# Patient Record
Sex: MALE | Race: BLACK OR AFRICAN AMERICAN | URBAN - METROPOLITAN AREA
[De-identification: names, ages, dates, MRNs, and addresses within clinical notes are randomized per-mention and may not be internally consistent; named-entity substitution may affect disease eponyms.]

---

## 2017-02-20 ENCOUNTER — OP HISTORICAL/CONVERTED ENCOUNTER (OUTPATIENT)
Dept: OTHER | Age: 77
End: 2017-02-20

## 2017-03-01 ENCOUNTER — OP HISTORICAL/CONVERTED ENCOUNTER (OUTPATIENT)
Dept: OTHER | Age: 77
End: 2017-03-01

## 2017-03-09 ENCOUNTER — OP HISTORICAL/CONVERTED ENCOUNTER (OUTPATIENT)
Dept: OTHER | Age: 77
End: 2017-03-09

## 2018-04-24 ENCOUNTER — OP HISTORICAL/CONVERTED ENCOUNTER (OUTPATIENT)
Dept: OTHER | Age: 78
End: 2018-04-24

## 2018-05-10 ENCOUNTER — OFFICE VISIT (OUTPATIENT)
Dept: ENDOCRINOLOGY | Age: 78
End: 2018-05-10

## 2018-05-10 VITALS
TEMPERATURE: 98 F | OXYGEN SATURATION: 100 % | BODY MASS INDEX: 32.75 KG/M2 | DIASTOLIC BLOOD PRESSURE: 58 MMHG | HEIGHT: 70 IN | HEART RATE: 60 BPM | SYSTOLIC BLOOD PRESSURE: 104 MMHG | WEIGHT: 228.8 LBS | RESPIRATION RATE: 20 BRPM

## 2018-05-10 DIAGNOSIS — E11.65 UNCONTROLLED TYPE 2 DIABETES MELLITUS WITH HYPERGLYCEMIA, WITH LONG-TERM CURRENT USE OF INSULIN (HCC): Primary | ICD-10-CM

## 2018-05-10 DIAGNOSIS — E78.2 MIXED HYPERLIPIDEMIA: ICD-10-CM

## 2018-05-10 DIAGNOSIS — E11.65 TYPE 2 DIABETES MELLITUS WITH HYPERGLYCEMIA, UNSPECIFIED WHETHER LONG TERM INSULIN USE (HCC): ICD-10-CM

## 2018-05-10 DIAGNOSIS — Z79.4 UNCONTROLLED TYPE 2 DIABETES MELLITUS WITH HYPERGLYCEMIA, WITH LONG-TERM CURRENT USE OF INSULIN (HCC): Primary | ICD-10-CM

## 2018-05-10 DIAGNOSIS — I10 ESSENTIAL HYPERTENSION: ICD-10-CM

## 2018-05-10 LAB
GLUCOSE POC: 197 MG/DL
HBA1C MFR BLD HPLC: 10.1 %

## 2018-05-10 RX ORDER — BLOOD-GLUCOSE METER
EACH MISCELLANEOUS
Refills: 0 | COMMUNITY
Start: 2018-05-02 | End: 2018-05-10 | Stop reason: SDUPTHER

## 2018-05-10 RX ORDER — METFORMIN HYDROCHLORIDE 1000 MG/1
TABLET ORAL
Refills: 0 | COMMUNITY
Start: 2018-04-18

## 2018-05-10 RX ORDER — METOPROLOL TARTRATE 50 MG/1
TABLET ORAL
Refills: 3 | COMMUNITY
Start: 2018-04-18

## 2018-05-10 RX ORDER — ASPIRIN 81 MG/1
TABLET ORAL
Refills: 3 | COMMUNITY
Start: 2018-04-18

## 2018-05-10 RX ORDER — DONEPEZIL HYDROCHLORIDE 10 MG/1
TABLET, FILM COATED ORAL
Refills: 0 | COMMUNITY
Start: 2018-04-18

## 2018-05-10 RX ORDER — INSULIN ASPART 100 [IU]/ML
INJECTION, SOLUTION INTRAVENOUS; SUBCUTANEOUS
Qty: 30 ML | Refills: 6 | Status: SHIPPED | OUTPATIENT
Start: 2018-05-10 | End: 2018-06-21 | Stop reason: SDUPTHER

## 2018-05-10 RX ORDER — FINASTERIDE 5 MG/1
TABLET, FILM COATED ORAL
Refills: 0 | COMMUNITY
Start: 2018-04-18

## 2018-05-10 RX ORDER — GABAPENTIN 100 MG/1
CAPSULE ORAL
Refills: 0 | COMMUNITY
Start: 2018-02-28 | End: 2018-06-21 | Stop reason: ALTCHOICE

## 2018-05-10 RX ORDER — INSULIN GLARGINE 100 [IU]/ML
INJECTION, SOLUTION SUBCUTANEOUS
Refills: 3 | COMMUNITY
Start: 2018-04-18 | End: 2018-05-10 | Stop reason: SDUPTHER

## 2018-05-10 RX ORDER — CYCLOBENZAPRINE HCL 10 MG
TABLET ORAL
Refills: 0 | COMMUNITY
Start: 2018-04-10 | End: 2018-06-21 | Stop reason: ALTCHOICE

## 2018-05-10 RX ORDER — LANOLIN ALCOHOL/MO/W.PET/CERES
CREAM (GRAM) TOPICAL
Refills: 3 | COMMUNITY
Start: 2018-04-18

## 2018-05-10 RX ORDER — TAMSULOSIN HYDROCHLORIDE 0.4 MG/1
CAPSULE ORAL
Refills: 0 | COMMUNITY
Start: 2018-04-18

## 2018-05-10 RX ORDER — ISOSORBIDE MONONITRATE 60 MG/1
TABLET, EXTENDED RELEASE ORAL
Refills: 3 | COMMUNITY
Start: 2018-04-18

## 2018-05-10 RX ORDER — INSULIN ASPART 100 [IU]/ML
10 INJECTION, SOLUTION INTRAVENOUS; SUBCUTANEOUS
COMMUNITY
End: 2018-05-10 | Stop reason: SDUPTHER

## 2018-05-10 RX ORDER — INSULIN GLARGINE 100 [IU]/ML
INJECTION, SOLUTION SUBCUTANEOUS
Qty: 15 ML | Refills: 6 | Status: SHIPPED | OUTPATIENT
Start: 2018-05-10 | End: 2018-06-21 | Stop reason: SDUPTHER

## 2018-05-10 RX ORDER — LOSARTAN POTASSIUM 100 MG/1
TABLET ORAL
Refills: 3 | COMMUNITY
Start: 2018-04-18

## 2018-05-10 RX ORDER — BLOOD SUGAR DIAGNOSTIC
STRIP MISCELLANEOUS
Refills: 0 | COMMUNITY
Start: 2018-05-02 | End: 2018-11-08 | Stop reason: SDUPTHER

## 2018-05-10 RX ORDER — CHOLECALCIFEROL (VITAMIN D3) 25 MCG
TABLET ORAL
Refills: 1 | COMMUNITY
Start: 2018-04-18

## 2018-05-10 RX ORDER — PREDNISONE 50 MG/1
TABLET ORAL
Refills: 0 | COMMUNITY
Start: 2018-04-20 | End: 2018-06-21 | Stop reason: ALTCHOICE

## 2018-05-10 NOTE — MR AVS SNAPSHOT
[address]  [address] [facility] [address] 
[phone] Patient: Della Angulo MRN: [id number] AHR:[birth year]01[birth year] Visit Information Date & Time Provider Department Dept. Phone Encounter #  
 5/10/2018 10:00 AM Jessie Garcia MD [facility] End[facility] [phone] [id number] Follow-up Instructions Return in about 6 weeks (around 6/21/2018). Upcoming Health Maintenance Date Due DTaP/Tdap/Td series (1 - Tdap) 5/31/1960 ZOSTER VACCINE AGE 60> 3/31/1999 GLAUCOMA SCREENING Q2Y 5/31/2004 Pneumococcal 65+ Low/Medium Risk (1 of 2 - PCV13) 5/31/2004 MEDICARE YEARLY EXAM 5/9/2018 Influenza Age 5 to Adult 8/1/2018 Allergies as of 5/10/2018  Review Complete On: 5/10/2018 By: Jessie Garcia MD  
 No Known Allergies Current Immunizations  Never Reviewed No immunizations on file. Not reviewed this visit You Were Diagnosed With   
  
 Codes Comments Type 2 diabetes mellitus with hyperglycemia, unspecified whether long term insulin use (HCC)    -  Primary ICD-10-CM: E11.65 ICD-9-CM: 250.00 Vitals BP Pulse Temp Resp Height(growth percentile) Weight(growth percentile) 104/58 (BP 1 Location: Left arm, BP Patient Position: Sitting) 60 98 °F (36.7 °C) (Oral) 20 5' 10\" (1.778 m) 228 lb 12.8 oz (103.8 kg) SpO2 BMI Smoking Status 100% 32.83 kg/m2 Former Smoker Vitals History BMI and BSA Data Body Mass Index Body Surface Area  
 32.83 kg/m 2 2.26 m 2 Your Updated Medication List  
  
   
This list is accurate as of 5/10/18 10:45 AM.  Always use your most recent med list.  
  
  
  
  
 ASPIR-LOW 81 mg tablet Generic drug:  aspirin delayed-release TAKE 1 TABLET BY MOUTH ONCE DAILY BASAGLAR KWIKPEN U-100 INSULIN 100 unit/mL (3 mL) Inpn Generic drug:  insulin glargine INJECT 70 UNITS SUBCUTANEOUSLY AT BEDTIME  
  
 cyclobenzaprine 10 mg tablet Commonly known as:  FLEXERIL  
take 1 by mouth once daily if needed  
  
 donepezil 10 mg tablet Commonly known as:  ARICEPT  
TAKE 1 TABLET BY MOUTH ONCE DAILY  
  
 finasteride 5 mg tablet Commonly known as:  PROSCAR  
TAKE 1 TABLET BY MOUTH ONCE DAILY  
  
 gabapentin 100 mg capsule Commonly known as:  NEURONTIN  
take 1 capsule by mouth 1 OR 2 TIMES A DAY FOR PAIN  
  
 isosorbide mononitrate ER 60 mg CR tablet Commonly known as:  IMDUR  
TAKE 1 TABLET BY MOUTH ONCE DAILY losartan 100 mg tablet Commonly known as:  COZAAR  
TAKE 1 TABLET BY MOUTH ONCE DAILY  
  
 magnesium oxide 400 mg tablet Commonly known as:  MAG-OX  
TAKE 1 TABLET BY MOUTH ONCE DAILY  
  
 metFORMIN 1,000 mg tablet Commonly known as:  GLUCOPHAGE  
TAKE 1 TABLET BY MOUTH 2 TIMES A DAY WITH MEALS  
  
 metoprolol tartrate 50 mg tablet Commonly known as:  LOPRESSOR  
TAKE 1 TABLET BY MOUTH 2 TIMES A DAY WITH FOOD NovoLOG Flexpen U-100 Insulin 100 unit/mL Inpn Generic drug:  insulin aspart U-100  
10 Units by SubCUTAneous route Before breakfast, lunch, and dinner. Precision Xtra Misc Generic drug:  Blood-Glucose Meter TEST four times a day PRECISION XTRA TEST strip Generic drug:  glucose blood VI test strips TEST four times a day  
  
 predniSONE 50 mg tablet Commonly known as:  DELTASONE  
take 1 tablet by mouth 13 HOURS,7 HOURS AND 1 HOUR PRIOR TO CT SCAN  
  
 tamsulosin 0.4 mg capsule Commonly known as:  FLOMAX TAKE 1 CAPSULE BY MOUTH ONCE DAILY AT BEDTIME  
  
 VITAMIN D3 1,000 unit tablet Generic drug:  cholecalciferol TAKE 1 TABLET BY MOUTH ONCE DAILY We Performed the Following AMB POC GLUCOSE, QUANTITATIVE, BLOOD [20877 CPT(R)] AMB POC HEMOGLOBIN A1C [02508 CPT(R)] Follow-up Instructions Return in about 6 weeks (around 6/21/2018).   
  
  
Patient Instructions   
--------------------------------------------------------------------------- ----------------- Refills    -    please call your pharmacy and have them send us a refill request 
 
Results  -  allow up to a week for lab results to be processed and reviewed. Phone calls  -  Allow upto 24 hrs. for non-urgent calls to be retained Prior authorization - It may take up to 2 weeks to process, depending on your insurance Forms  -  FMLA, DMV, patient assistance, etc. will take up to 2 weeks to process Cancellations - please notify the office in advance if you cannot keep your appointment Samples  - will only be dispensed at visits as supply is limited If you are having a medical emergency call 911 
 
-------------------------------------------------------------------------------------------- Do not skip meals Do not eat in between meals Reduce carbs- pasta, rice, potatoes, bread Do not drink juices or sodas Donot eat peanut butter Do not eat sugar free cookies and cakes Do not eat peaches, grapes, pineapples, oranges, raisins and halos and clementines Check blood sugars immediately before each meal and at bedtime Take  BASAGLAR  insulin  50 units  at bed time Take NOVOLOG  insulin 15 units before breakfast, 15 units before lunch and 15 units before dinner. ( no novolog when not eating ) Also, add additional NOVOLOG  as follows with meals  If blood sugars are[de-identified] 
 
150-200 mg 3 units 201-250 mg 6 units 251-300 mg 9 units 301-350 mg 12 units 351-400 mg 15 units 401-450 mg 18 units 451-500 mg 21 units Less than 70 mg NO INSULIN 
 
 
 
---------------------------------------------------------------------------------------------------------- Introducing [facility] & HEALTH SERVICES! [employer] introduces [employer] patient portal. Now you can access parts of your medical record, email your doctor's office, and request medication refills online.    
 
1. In your internet browser, go to [email or web address] 2. Click on the First Time User? Click Here link in the Sign In box. You will see the New Member Sign Up page. 3. Enter your [employer] Access Code exactly as it appears below. You will not need to use this code after youve completed the sign-up process. If you do not sign up before the expiration date, you must request a new code. · [employer] Access Code: [email or web address] Expires: 8/8/2018 10:41 AM 
 
4. Enter the last four digits of your Social Security Number (xxxx) and Date of Birth (mm/dd/yyyy) as indicated and click Submit. You will be taken to the next sign-up page. 5. Create a [employer] ID. This will be your [employer] login ID and cannot be changed, so think of one that is secure and easy to remember. 6. Create a [employer] password. You can change your password at any time. 7. Enter your Password Reset Question and Answer. This can be used at a later time if you forget your password. 8. Enter your e-mail address. You will receive e-mail notification when new information is available in [address] 9. Click Sign Up. You can now view and download portions of your medical record. 10. Click the Download Summary menu link to download a portable copy of your medical information. If you have questions, please visit the Frequently Asked Questions section of the [employer] website. Remember, [employer] is NOT to be used for urgent needs. For medical emergencies, dial 911. Now available from your iPhone and Android! Please provide this summary of care documentation to your next provider. Your primary care clinician is listed as Ishaan Barber. If you have any questions after today's visit, please call [phone].

## 2018-05-10 NOTE — PROGRESS NOTES
HISTORY OF PRESENT ILLNESS  Estelita Suárez is a 65 y.o. male. HPI  Patient here for initial visit of Type 2 diabetes mellitus . Referred : by self    One of my Patient's wife referred him     H/o diabetes for many  years     Current A1C is over 10 % and symptoms/problems include fluctuant sugars     Current diabetic medications include intensive insulin injection program.  Basilar 30 units at night and novolog 30 units with each meal  He moved from [place] and has lived there all the time. He has come to live with his daughter and he is accompanied by his son-in-law at the visit    Patient is not very compliant with the diet he always drinks some high glycemic index foods    Current monitoring regimen: home blood tests - daily  Home blood sugar records: trend: fluctuating a lot  Any episodes of hypoglycemia? no    Weight trend: fluctuating a lot  Prior visit with dietician: no  Current diet: \"unhealthy\" diet in general  Current exercise: no regular exercise    Known diabetic complications: peripheral neuropathy  Cardiovascular risk factors: dyslipidemia, diabetes mellitus, obesity, male gender    Eye exam current (within one year): yes  ALEC: unknown     Past Medical History:   Diagnosis Date    Diabetes ([facility] [place] [address]      History reviewed. No pertinent surgical history.   Current Outpatient Prescriptions   Medication Sig    BASAGLAR KWIKPEN U-100 INSULIN 100 unit/mL (3 mL) inpn INJECT 70 UNITS SUBCUTANEOUSLY AT BEDTIME    ASPIR-LOW 81 mg tablet TAKE 1 TABLET BY MOUTH ONCE DAILY    PRECISION XTRA TEST strip TEST four times a day    PRECISION XTRA misc TEST four times a day    VITAMIN D3 1,000 unit tablet TAKE 1 TABLET BY MOUTH ONCE DAILY    cyclobenzaprine (FLEXERIL) 10 mg tablet take 1 by mouth once daily if needed    donepezil (ARICEPT) 10 mg tablet TAKE 1 TABLET BY MOUTH ONCE DAILY    finasteride (PROSCAR) 5 mg tablet TAKE 1 TABLET BY MOUTH ONCE DAILY    isosorbide mononitrate ER (IMDUR) 60 mg CR tablet TAKE 1 TABLET BY MOUTH ONCE DAILY    losartan (COZAAR) 100 mg tablet TAKE 1 TABLET BY MOUTH ONCE DAILY    magnesium oxide (MAG-OX) 400 mg tablet TAKE 1 TABLET BY MOUTH ONCE DAILY    metFORMIN (GLUCOPHAGE) 1,000 mg tablet TAKE 1 TABLET BY MOUTH 2 TIMES A DAY WITH MEALS    metoprolol tartrate (LOPRESSOR) 50 mg tablet TAKE 1 TABLET BY MOUTH 2 TIMES A DAY WITH FOOD    tamsulosin (FLOMAX) 0.4 mg capsule TAKE 1 CAPSULE BY MOUTH ONCE DAILY AT BEDTIME    insulin aspart U-100 (NOVOLOG FLEXPEN U-100 INSULIN) 100 unit/mL inpn 10 Units by SubCUTAneous route Before breakfast, lunch, and dinner.  gabapentin (NEURONTIN) 100 mg capsule take 1 capsule by mouth 1 OR 2 TIMES A DAY FOR PAIN    predniSONE (DELTASONE) 50 mg tablet take 1 tablet by mouth 13 HOURS,7 HOURS AND 1 HOUR PRIOR TO CT SCAN     No current facility-administered medications for this visit. Review of Systems   Constitutional: Negative. HENT: Negative. Eyes: Negative for pain and redness. Respiratory: Negative. Cardiovascular: Negative for chest pain, palpitations and leg swelling. Gastrointestinal: Negative. Negative for constipation. Genitourinary: Negative. Musculoskeletal: Negative for myalgias. Skin: Negative. Neurological: Negative. Endo/Heme/Allergies: Negative. Psychiatric/Behavioral: Negative for depression and memory loss. The patient does not have insomnia. Physical Exam   Constitutional: He is oriented to person, place, and time. He appears well-developed and well-nourished. HENT:   Head: Normocephalic. Eyes: Conjunctivae and EOM are normal. Pupils are equal, round, and reactive to light. Neck: Normal range of motion. Neck supple. No JVD present. No tracheal deviation present. No thyromegaly present. Cardiovascular: Normal rate, regular rhythm and normal heart sounds. Pulmonary/Chest: Effort normal and breath sounds normal.   Abdominal: Soft.  Bowel sounds are normal.   Musculoskeletal: Normal range of motion. Lymphadenopathy:     He has no cervical adenopathy. Neurological: He is alert and oriented to person, place, and time. He has normal reflexes. Skin: Skin is warm. Psychiatric: He has a normal mood and affect. ASSESSMENT and PLAN    1. Type 2 DM, poorly controlled : a1c is over 10 %     Discussed DM 2 patho-physiology extensively     Reviewed the glucose log - has to improve the checks     Adjusted basaglar   at bedtime. Had to   Humalog  immediately before breakfast, lunch and dinner. Add additional Humalog as recommended in increments . Patient is advised about checking blood sugars 4 times a day and maintaining log book. The danger of having low blood sugars has been explained with inappropriate use of insulin  Patient voiced understanding and using the printed instructions at home. Hypoglycemia management has been explained to the patient. Low Carbohydrate diet discussed with the patient     lab results and schedule of future lab studies reviewed with patient  specific diabetic recommendations: diabetic diet discussed in detail, written exchange diet given, low cholesterol diet, weight control and daily exercise discussed, home glucose monitoring emphasized, home glucose monitoring demonstrated and taught, glucose meter dispensed to patient, all medications, side effects and compliance discussed carefully and use and side effects of insulin is taught    2. Hypoglycemia :  Educated on treating the hypoglycemia. Discussed Glucagon use and prescribed    3. HTN : continue current care. Patient is educated about importance of compliance with anti-hypertensives especially ARB/ACEI    4. Dyslipidemia : continue current meds. Patient is educated about benefits and adverse effects of statins and explained how benefits outweigh risk. 5. use of aspirin to prevent MI and TIA's discussed      6.  H/o prostrate cancer : Reviewed the bone scan and its normal      7.  ?? Pt decribes an  Allergy he had - could have gotten prednisone then -  He does not take it any more     > 50 % of time is spent on counseling   Patient voiced understanding her plan of care

## 2018-05-10 NOTE — PROGRESS NOTES
Wt Readings from Last 3 Encounters:   05/10/18 228 lb 12.8 oz (103.8 kg)     Temp Readings from Last 3 Encounters:   05/10/18 98 °F (36.7 °C) (Oral)     BP Readings from Last 3 Encounters:   05/10/18 104/58     Pulse Readings from Last 3 Encounters:   05/10/18 60     Patient has meter. Patient has upcoming eye exam scheduled.

## 2018-05-10 NOTE — PATIENT INSTRUCTIONS
--------------------------------------------------------------------------------------------    Refills    -    please call your pharmacy and have them send us a refill request    Results  -  allow up to a week for lab results to be processed and reviewed. Phone calls  -  Allow upto 24 hrs. for non-urgent calls to be retained    Prior authorization - It may take up to 2 weeks to process, depending on your insurance    Forms  -  FMLA, DMV, patient assistance, etc. will take up to 2 weeks to process    Cancellations - please notify the office in advance if you cannot keep your appointment    Samples  - will only be dispensed at visits as supply is limited      If you are having a medical emergency call 911    --------------------------------------------------------------------------------------------      Do not skip meals  Do not eat in between meals    Reduce carbs- pasta, rice, potatoes, bread   Do not drink juices or sodas  Donot eat peanut butter     Do not eat sugar free cookies and cakes   Do not eat peaches, grapes, pineapples, oranges, raisins and halos and clementines         Check blood sugars immediately before each meal and at bedtime      Take  BASAGLAR  insulin  50 units  at bed time    Take NOVOLOG  insulin 15 units before breakfast, 15 units before lunch and 15 units before dinner.   ( no novolog when not eating )    Also, add additional NOVOLOG  as follows with meals  If blood sugars are[de-identified]    150-200 mg 3 units    201-250 mg 6 units    251-300 mg 9 units    301-350 mg 12 units    351-400 mg 15 units    401-450 mg 18 units    451-500 mg 21 units     Less than 70 mg NO INSULIN        ----------------------------------------------------------------------------------------------------------

## 2018-06-14 ENCOUNTER — HOSPITAL ENCOUNTER (OUTPATIENT)
Dept: LAB | Age: 78
Discharge: HOME | End: 2018-06-14
Payer: MEDICARE

## 2018-06-14 PROCEDURE — 36415 COLL VENOUS BLD VENIPUNCTURE: CPT

## 2018-06-14 PROCEDURE — 80053 COMPREHEN METABOLIC PANEL: CPT

## 2018-06-14 PROCEDURE — 84681 ASSAY OF C-PEPTIDE: CPT

## 2018-06-14 PROCEDURE — 82043 UR ALBUMIN QUANTITATIVE: CPT

## 2018-06-14 PROCEDURE — 80061 LIPID PANEL: CPT

## 2018-06-14 PROCEDURE — 83036 HEMOGLOBIN GLYCOSYLATED A1C: CPT

## 2018-06-21 ENCOUNTER — OFFICE VISIT (OUTPATIENT)
Dept: ENDOCRINOLOGY | Age: 78
End: 2018-06-21

## 2018-06-21 VITALS
WEIGHT: 234.8 LBS | BODY MASS INDEX: 33.61 KG/M2 | SYSTOLIC BLOOD PRESSURE: 134 MMHG | DIASTOLIC BLOOD PRESSURE: 61 MMHG | RESPIRATION RATE: 18 BRPM | TEMPERATURE: 97 F | HEIGHT: 70 IN | OXYGEN SATURATION: 97 % | HEART RATE: 72 BPM

## 2018-06-21 DIAGNOSIS — E11.65 UNCONTROLLED TYPE 2 DIABETES MELLITUS WITH HYPERGLYCEMIA, WITH LONG-TERM CURRENT USE OF INSULIN (HCC): Primary | ICD-10-CM

## 2018-06-21 DIAGNOSIS — Z79.4 UNCONTROLLED TYPE 2 DIABETES MELLITUS WITH HYPERGLYCEMIA, WITH LONG-TERM CURRENT USE OF INSULIN (HCC): Primary | ICD-10-CM

## 2018-06-21 DIAGNOSIS — E11.65 TYPE 2 DIABETES MELLITUS WITH HYPERGLYCEMIA, UNSPECIFIED WHETHER LONG TERM INSULIN USE (HCC): ICD-10-CM

## 2018-06-21 DIAGNOSIS — I10 ESSENTIAL HYPERTENSION: ICD-10-CM

## 2018-06-21 DIAGNOSIS — E78.2 MIXED HYPERLIPIDEMIA: ICD-10-CM

## 2018-06-21 LAB — GLUCOSE POC: 191 MG/DL

## 2018-06-21 RX ORDER — INSULIN GLARGINE 100 [IU]/ML
INJECTION, SOLUTION SUBCUTANEOUS
Qty: 30 ML | Refills: 6 | Status: SHIPPED | OUTPATIENT
Start: 2018-06-21 | End: 2019-07-29 | Stop reason: SDUPTHER

## 2018-06-21 RX ORDER — INSULIN ASPART 100 [IU]/ML
INJECTION, SOLUTION INTRAVENOUS; SUBCUTANEOUS
Qty: 30 ML | Refills: 6 | Status: SHIPPED | OUTPATIENT
Start: 2018-06-21 | End: 2019-07-29 | Stop reason: SDUPTHER

## 2018-06-21 NOTE — MR AVS SNAPSHOT
[address] [facility] [address] 
[phone] Patient: Sydni Leslie MRN: [id number] ZCA:[birth year] Visit Information Date & Time Provider Department Dept. Phone Encounter #  
 6/21/2018  9:45 AM Sonal Spencer MD [facility] Diabetes [facility] Endocrinology [phone] [id number] Follow-up Instructions Return in about 3 months (around 9/21/2018). Upcoming Health Maintenance Date Due  
 FOOT EXAM Q1 5/31/1950 EYE EXAM RETINAL OR DILATED Q1 5/31/1949 DTaP/Tdap/Td series (1 - Tdap) 5/31/1960 ZOSTER VACCINE AGE 60> 3/31/1999 GLAUCOMA SCREENING Q2Y 5/31/2004 Pneumococcal 65+ Low/Medium Risk (1 of 2 - PCV13) 5/31/2004 MEDICARE YEARLY EXAM 5/9/2018 Influenza Age 5 to Adult 8/1/2018 HEMOGLOBIN A1C Q6M 12/14/2018 MICROALBUMIN Q1 6/14/2019 LIPID PANEL Q1 6/14/2019 Allergies as of 6/21/2018  Review Complete On: 6/21/2018 By: Sonal Spencer MD  
 No Known Allergies Current Immunizations  Never Reviewed No immunizations on file. Not reviewed this visit Vitals BP Pulse Temp Resp Height(growth percentile) Weight(growth percentile) 134/61 (BP 1 Location: Left arm, BP Patient Position: Sitting) 72 97 °F (36.1 °C) (Oral) 18 5' 10\" (1.778 m) 234 lb 12.8 oz (106.5 kg) SpO2 BMI Smoking Status 97% 33.69 kg/m2 Former Smoker Vitals History BMI and BSA Data Body Mass Index Body Surface Area  
 33.69 kg/m 2 2.29 m 2 Preferred Pharmacy Pharmacy Name Phone [facility]. [facility] [address] [facility] [address] [facility] [address] [phone] Your Updated Medication List  
  
   
This list is accurate as of 6/21/18 10:41 AM.  Always use your most recent med list.  
  
  
  
  
 ASPIR-LOW 81 mg tablet Generic drug:  aspirin delayed-release TAKE 1 TABLET BY MOUTH ONCE DAILY  
  
 donepezil 10 mg tablet Commonly known as:  ARICEPT  
TAKE 1 TABLET BY MOUTH ONCE DAILY  
  
 finasteride 5 mg tablet Commonly known as:  PROSCAR  
TAKE 1 TABLET BY MOUTH ONCE DAILY  
  
 insulin aspart U-100 100 unit/mL Inpn Commonly known as:  NovoLOG Flexpen U-100 Insulin Inject 15 units before breakfast, lunch, and dinner. Plus sliding scale to max 108 units daily. insulin glargine 100 unit/mL (3 mL) Inpn Commonly known as:  BASAGLAR KWIKPEN U-100 INSULIN Decrease to 50 UNITS SUBCUTANEOUSLY AT BEDTIME  
  
 isosorbide mononitrate ER 60 mg CR tablet Commonly known as:  IMDUR  
TAKE 1 TABLET BY MOUTH ONCE DAILY losartan 100 mg tablet Commonly known as:  COZAAR  
TAKE 1 TABLET BY MOUTH ONCE DAILY  
  
 magnesium oxide 400 mg tablet Commonly known as:  MAG-OX  
TAKE 1 TABLET BY MOUTH ONCE DAILY  
  
 metFORMIN 1,000 mg tablet Commonly known as:  GLUCOPHAGE  
TAKE 1 TABLET BY MOUTH 2 TIMES A DAY WITH MEALS  
  
 metoprolol tartrate 50 mg tablet Commonly known as:  LOPRESSOR  
TAKE 1 TABLET BY MOUTH 2 TIMES A DAY WITH FOOD PRECISION XTRA TEST strip Generic drug:  glucose blood VI test strips TEST four times a day  
  
 tamsulosin 0.4 mg capsule Commonly known as:  FLOMAX TAKE 1 CAPSULE BY MOUTH ONCE DAILY AT BEDTIME  
  
 VITAMIN D3 1,000 unit tablet Generic drug:  cholecalciferol TAKE 1 TABLET BY MOUTH ONCE DAILY Follow-up Instructions Return in about 3 months (around 9/21/2018). Patient Instructions Refills    -    please call your pharmacy and have them send us a refill request 
 
Results  -  allow up to a week for lab results to be processed and reviewed. Phone calls  -  Allow upto 24 hrs. for non-urgent calls to be retained Prior authorization - It may take up to 2 weeks to process, depending on your insurance Forms  -  FMLA, DMV, patient assistance, etc. will take up to 2 weeks to process Cancellations - please notify the office in advance if you cannot keep your appointment Samples  - will only be dispensed at visits as supply is limited If you are having a medical emergency call 911 
 
-------------------------------------------------------------------------------------------- 
 
FREE style [address] [place] it up Do not skip meals Do not eat in between meals Reduce carbs- pasta, rice, potatoes, bread Do not drink juices or sodas. sherbat Donot eat peanut butter Do not eat sugar free cookies and cakes Do not eat peaches, grapes, pineapples, oranges, raisins and halos and clementines Check blood sugars immediately before each meal and at bedtime INCREASE   BASAGLAR  insulin  60 units  at bed time INCREASE  NOVOLOG  insulin 18 units before breakfast, 18 units before lunch and 18 units before dinner. ( no novolog when not eating ) Also, add additional NOVOLOG  as follows with meals  If blood sugars are[de-identified] 
 
150-200 mg 3 units 201-250 mg 6 units 251-300 mg 9 units 301-350 mg 12 units 351-400 mg 15 units 401-450 mg 18 units 451-500 mg 21 units Less than 70 mg NO INSULIN 
 
 
 
---------------------------------------------------------------------------------------------------------- Introducing [facility] & HEALTH SERVICES! [facility] introduces "[employer]" patient portal. Now you can access parts of your medical record, email your doctor's office, and request medication refills online. 1. In your internet browser, go to [email or web address] 2. Click on the First Time User? Click Here link in the Sign In box. You will see the New Member Sign Up page. 3. Enter your "[employer]" Access Code exactly as it appears below. You will not need to use this code after youve completed the sign-up process. If you do not sign up before the expiration date, you must request a new code. · [employer] Access Code: [email or web address] Expires: 8/8/2018 10:41 AM 
 
4. Enter the last four digits of your Social Security Number (xxxx) and Date of Birth (mm/dd/yyyy) as indicated and click Submit. You will be taken to the next sign-up page. 5. Create a [employer] ID. This will be your [employer] login ID and cannot be changed, so think of one that is secure and easy to remember. 6. Create a [employer] password. You can change your password at any time. 7. Enter your Password Reset Question and Answer. This can be used at a later time if you forget your password. 8. Enter your e-mail address. You will receive e-mail notification when new information is available in [address] 9. Click Sign Up. You can now view and download portions of your medical record. 10. Click the Download Summary menu link to download a portable copy of your medical information. If you have questions, please visit the Frequently Asked Questions section of the [employer] website. Remember, [employer] is NOT to be used for urgent needs. For medical emergencies, dial 911. Now available from your iPhone and Android! Please provide this summary of care documentation to your next provider. Your primary care clinician is listed as Saima Hodges. If you have any questions after today's visit, please call [phone].

## 2018-06-21 NOTE — PROGRESS NOTES
Wt Readings from Last 3 Encounters:   06/21/18 234 lb 12.8 oz (106.5 kg)   05/10/18 228 lb 12.8 oz (103.8 kg)     Temp Readings from Last 3 Encounters:   06/21/18 97 °F (36.1 °C) (Oral)   05/10/18 98 °F (36.7 °C) (Oral)     BP Readings from Last 3 Encounters:   06/21/18 134/61   05/10/18 104/58     Pulse Readings from Last 3 Encounters:   06/21/18 72   05/10/18 60     Lab Results   Component Value Date/Time    Hemoglobin A1c 8.1 (H) 06/14/2018 08:28 AM    Hemoglobin A1c (POC) 10.1 05/10/2018 10:28 AM     Patient has meter and logbook today.

## 2018-06-21 NOTE — PROGRESS NOTES
HISTORY OF PRESENT ILLNESS  Chelsey Chacon is a 77 y.o. male. HPI  Patient here for  First f/u after  initial visit of Type 2 diabetes mellitus   From May 2018     He felt better he says     Gained 6 lbs   SUGARS ARE IN GOOD RANGE     HE HAS NO HIGH OR LOW SUGARS         Old history :   Referred : by self    One of my Patient's wife referred him     H/o diabetes for many  years     Current A1C is over 10 % and symptoms/problems include fluctuant sugars     Current diabetic medications include intensive insulin injection program.  Basilar 30 units at night and novolog 30 units with each meal  He moved from [place] and has lived there all the time. He has come to live with his daughter and he is accompanied by his son-in-law at the visit    Patient is not very compliant with the diet he always drinks some high glycemic index foods    Current monitoring regimen: home blood tests - daily  Home blood sugar records: trend: fluctuating a lot  Any episodes of hypoglycemia? no    Weight trend: fluctuating a lot  Prior visit with dietician: no  Current diet: \"unhealthy\" diet in general  Current exercise: no regular exercise    Known diabetic complications: peripheral neuropathy  Cardiovascular risk factors: dyslipidemia, diabetes mellitus, obesity, male gender    Eye exam current (within one year): yes  ALEC: unknown     Past Medical History:   Diagnosis Date    Diabetes ([facility] [address]      History reviewed. No pertinent surgical history.   Current Outpatient Prescriptions   Medication Sig    ASPIR-LOW 81 mg tablet TAKE 1 TABLET BY MOUTH ONCE DAILY    PRECISION XTRA TEST strip TEST four times a day    VITAMIN D3 1,000 unit tablet TAKE 1 TABLET BY MOUTH ONCE DAILY    donepezil (ARICEPT) 10 mg tablet TAKE 1 TABLET BY MOUTH ONCE DAILY    finasteride (PROSCAR) 5 mg tablet TAKE 1 TABLET BY MOUTH ONCE DAILY    isosorbide mononitrate ER (IMDUR) 60 mg CR tablet TAKE 1 TABLET BY MOUTH ONCE DAILY    losartan (COZAAR) 100 mg tablet TAKE 1 TABLET BY MOUTH ONCE DAILY    magnesium oxide (MAG-OX) 400 mg tablet TAKE 1 TABLET BY MOUTH ONCE DAILY    metFORMIN (GLUCOPHAGE) 1,000 mg tablet TAKE 1 TABLET BY MOUTH 2 TIMES A DAY WITH MEALS    metoprolol tartrate (LOPRESSOR) 50 mg tablet TAKE 1 TABLET BY MOUTH 2 TIMES A DAY WITH FOOD    tamsulosin (FLOMAX) 0.4 mg capsule TAKE 1 CAPSULE BY MOUTH ONCE DAILY AT BEDTIME    insulin aspart U-100 (NOVOLOG FLEXPEN U-100 INSULIN) 100 unit/mL inpn Inject 15 units before breakfast, lunch, and dinner. Plus sliding scale to max 108 units daily.  insulin glargine (BASAGLAR KWIKPEN U-100 INSULIN) 100 unit/mL (3 mL) inpn Decrease to 50 UNITS SUBCUTANEOUSLY AT BEDTIME    cyclobenzaprine (FLEXERIL) 10 mg tablet take 1 by mouth once daily if needed    gabapentin (NEURONTIN) 100 mg capsule take 1 capsule by mouth 1 OR 2 TIMES A DAY FOR PAIN    predniSONE (DELTASONE) 50 mg tablet take 1 tablet by mouth 13 HOURS,7 HOURS AND 1 HOUR PRIOR TO CT SCAN     No current facility-administered medications for this visit. Review of Systems   Constitutional: Negative. HENT: Negative. Eyes: Negative for pain and redness. Respiratory: Negative. Cardiovascular: Negative for chest pain, palpitations and leg swelling. Gastrointestinal: Negative. Negative for constipation. Genitourinary: Negative. Musculoskeletal: Negative for myalgias. Skin: Negative. Neurological: Negative. Endo/Heme/Allergies: Negative. Psychiatric/Behavioral: Negative for depression and memory loss. The patient does not have insomnia. Physical Exam   Constitutional: He is oriented to person, place, and time. He appears well-developed and well-nourished. HENT:   Head: Normocephalic. Eyes: Conjunctivae and EOM are normal. Pupils are equal, round, and reactive to light. Neck: Normal range of motion. Neck supple. No JVD present. No tracheal deviation present. No thyromegaly present.    Cardiovascular: Normal rate, regular rhythm and normal heart sounds. Pulmonary/Chest: Effort normal and breath sounds normal.   Abdominal: Soft. Bowel sounds are normal.   Musculoskeletal: Normal range of motion. Lymphadenopathy:     He has no cervical adenopathy. Neurological: He is alert and oriented to person, place, and time. He has normal reflexes. Skin: Skin is warm. Psychiatric: He has a normal mood and affect. Diabetic foot exam: 2018    Left Foot:   Visual Exam: normal   HE HAS HABERDEN NODES ON DISTAL PHALANGEAL JOINT    Pulse DP: 2+ (normal)   Filament test: normal sensation    Vibratory sensation: normal      Right Foot:   Visual Exam: normal  RIGHT HAMMER TOES    Pulse DP: 2+ (normal)   Filament test: normal sensation    Vibratory sensation: normal          ASSESSMENT and PLAN    1. Type 2 DM, poorly controlled : a1c is over 10 %   HE HAS DONE WELL WHEN HE LEFT FROM OFFICE FOR FEW WEEKS , BUT HE STARTED DRINKING  SHERBAAT     Reviewed the glucose log - has to improve the checks   DISCUSSED DIT AGAIN  REFERRED TO DTC    Adjusted basaglar   at bedtime. Had to   Humalog  immediately before breakfast, lunch and dinner. Add additional Humalog as recommended in increments . Patient is advised about checking blood sugars 4 times a day and maintaining log book. The danger of having low blood sugars has been explained with inappropriate use of insulin  Patient voiced understanding and using the printed instructions at home. Hypoglycemia management has been explained to the patient. Low Carbohydrate diet discussed with the patient     2. Hypoglycemia :  Educated on treating the hypoglycemia. Discussed Glucagon use and prescribed    3. HTN : continue current care. Patient is educated about importance of compliance with anti-hypertensives especially ARB/ACEI    4. Dyslipidemia : continue current meds.  Patient is educated about benefits and adverse effects of statins and explained how benefits outweigh risk.    5. use of aspirin to prevent MI and TIA's discussed      6.  H/o prostrate cancer : Reviewed the bone scan and its normal      7.  ?? Pt decribes an  Allergy he had - could have gotten prednisone then -  He does not take it any more         REFERRED TO PODIATRIST   Did the comprehensive foot exam today   [facility]   The patient would benefit from diabetic foot wear to protect their feet       > 50 % of time is spent on counseling   Patient voiced understanding her plan of care

## 2018-06-21 NOTE — PATIENT INSTRUCTIONS
Refills    -    please call your pharmacy and have them send us a refill request    Results  -  allow up to a week for lab results to be processed and reviewed. Phone calls  -  Allow upto 24 hrs. for non-urgent calls to be retained    Prior authorization - It may take up to 2 weeks to process, depending on your insurance    Forms  -  FMLA, DMV, patient assistance, etc. will take up to 2 weeks to process    Cancellations - please notify the office in advance if you cannot keep your appointment    Samples  - will only be dispensed at visits as supply is limited      If you are having a medical emergency call 911    --------------------------------------------------------------------------------------------    FREE style [address] it up       Do not skip meals  Do not eat in between meals    Reduce carbs- pasta, rice, potatoes, bread   Do not drink juices or sodas. sherbat   Donot eat peanut butter     Do not eat sugar free cookies and cakes   Do not eat peaches, grapes, pineapples, oranges, raisins and halos and clementines         Check blood sugars immediately before each meal and at bedtime      INCREASE   BASAGLAR  insulin  60 units  at bed time    INCREASE  NOVOLOG  insulin 18 units before breakfast, 18 units before lunch and 18 units before dinner.   ( no novolog when not eating )    Also, add additional NOVOLOG  as follows with meals  If blood sugars are[de-identified]    150-200 mg 3 units    201-250 mg 6 units    251-300 mg 9 units    301-350 mg 12 units    351-400 mg 15 units    401-450 mg 18 units    451-500 mg 21 units     Less than 70 mg NO INSULIN        ----------------------------------------------------------------------------------------------------------

## 2018-10-03 ENCOUNTER — ED HISTORICAL/CONVERTED ENCOUNTER (OUTPATIENT)
Dept: OTHER | Age: 78
End: 2018-10-03

## 2018-11-07 ENCOUNTER — HOSPITAL ENCOUNTER (OUTPATIENT)
Dept: LAB | Age: 78
Discharge: HOME | End: 2018-11-07
Payer: MEDICARE

## 2018-11-07 PROCEDURE — 80061 LIPID PANEL: CPT

## 2018-11-07 PROCEDURE — 36415 COLL VENOUS BLD VENIPUNCTURE: CPT

## 2018-11-07 PROCEDURE — 83036 HEMOGLOBIN GLYCOSYLATED A1C: CPT

## 2018-11-07 PROCEDURE — 80053 COMPREHEN METABOLIC PANEL: CPT

## 2018-11-07 PROCEDURE — 82043 UR ALBUMIN QUANTITATIVE: CPT

## 2018-11-08 ENCOUNTER — OFFICE VISIT (OUTPATIENT)
Dept: ENDOCRINOLOGY | Age: 78
End: 2018-11-08

## 2018-11-08 VITALS
DIASTOLIC BLOOD PRESSURE: 70 MMHG | WEIGHT: 246.8 LBS | TEMPERATURE: 97.2 F | RESPIRATION RATE: 16 BRPM | HEIGHT: 70 IN | OXYGEN SATURATION: 98 % | HEART RATE: 66 BPM | BODY MASS INDEX: 35.33 KG/M2 | SYSTOLIC BLOOD PRESSURE: 158 MMHG

## 2018-11-08 DIAGNOSIS — E11.65 TYPE 2 DIABETES MELLITUS WITH HYPERGLYCEMIA, UNSPECIFIED WHETHER LONG TERM INSULIN USE (HCC): Primary | ICD-10-CM

## 2018-11-08 DIAGNOSIS — I10 ESSENTIAL HYPERTENSION: ICD-10-CM

## 2018-11-08 DIAGNOSIS — Z79.4 UNCONTROLLED TYPE 2 DIABETES MELLITUS WITH HYPERGLYCEMIA, WITH LONG-TERM CURRENT USE OF INSULIN (HCC): ICD-10-CM

## 2018-11-08 DIAGNOSIS — E11.65 UNCONTROLLED TYPE 2 DIABETES MELLITUS WITH HYPERGLYCEMIA, WITH LONG-TERM CURRENT USE OF INSULIN (HCC): ICD-10-CM

## 2018-11-08 DIAGNOSIS — E78.2 MIXED HYPERLIPIDEMIA: ICD-10-CM

## 2018-11-08 DIAGNOSIS — E11.65 TYPE 2 DIABETES MELLITUS WITH HYPERGLYCEMIA, UNSPECIFIED WHETHER LONG TERM INSULIN USE (HCC): ICD-10-CM

## 2018-11-08 RX ORDER — LANCETS 28 GAUGE
EACH MISCELLANEOUS
Qty: 200 LANCET | Refills: 6 | Status: SHIPPED | OUTPATIENT
Start: 2018-11-08

## 2018-11-08 RX ORDER — BLOOD SUGAR DIAGNOSTIC
STRIP MISCELLANEOUS
Qty: 200 STRIP | Refills: 6 | Status: SHIPPED | OUTPATIENT
Start: 2018-11-08 | End: 2018-12-12 | Stop reason: SDUPTHER

## 2018-11-08 NOTE — PROGRESS NOTES
1. Have you been to the ER, urgent care clinic since your last visit? Hospitalized since your last visit? No 
 
2. Have you seen or consulted any other health care providers outside of the [address] [place] [address] since your last visit? Include any pap smears or colon screening. No  
 
Lab Results Component Value Date/Time Hemoglobin A1c 7.7 (H) 11/07/2018 10:09 AM  
 Hemoglobin A1c (POC) 10.1 05/10/2018 10:28 AM  
  
Wt Readings from Last 3 Encounters:  
11/08/18 246 lb 12.8 oz (111.9 kg) 06/21/18 234 lb 12.8 oz (106.5 kg) 05/10/18 228 lb 12.8 oz (103.8 kg) Temp Readings from Last 3 Encounters:  
11/08/18 97.2 °F (36.2 °C) (Oral) 06/21/18 97 °F (36.1 °C) (Oral) 05/10/18 98 °F (36.7 °C) (Oral) BP Readings from Last 3 Encounters:  
11/08/18 158/70  
06/21/18 134/61  
05/10/18 104/58 Pulse Readings from Last 3 Encounters:  
11/08/18 66  
06/21/18 72  
05/10/18 60

## 2018-11-08 NOTE — PROGRESS NOTES
HISTORY OF PRESENT ILLNESS Lina Basilio is a 77 y.o. male. HPI Patient here for  Second  f/u after  initial visit of Type 2 diabetes mellitus   From June 21 2018 He is feeling  better he says Gained 6 lbs SUGARS ARE IN GOOD RANGE He had one occasional  LOW SUGARS Feels lonely Made good changes in diet Old history :  
Referred : by self One of my Patient's wife referred him H/o diabetes for many  years Current A1C is over 10 % and symptoms/problems include fluctuant sugars Current diabetic medications include intensive insulin injection program.  Basilar 30 units at night and novolog 30 units with each meal 
He moved from [place] and has lived there all the time. He has come to live with his daughter and he is accompanied by his son-in-law at the visit Patient is not very compliant with the diet he always drinks some high glycemic index foods Current monitoring regimen: home blood tests - daily Home blood sugar records: trend: fluctuating a lot Any episodes of hypoglycemia? no 
 
Weight trend: fluctuating a lot Prior visit with dietician: no 
Current diet: \"unhealthy\" diet in general 
Current exercise: no regular exercise Known diabetic complications: peripheral neuropathy Cardiovascular risk factors: dyslipidemia, diabetes mellitus, obesity, male gender Eye exam current (within one year): yes ALEC: unknown Past Medical History:  
Diagnosis Date  [facility] History reviewed. No pertinent surgical history. Current Outpatient Medications Medication Sig  
 insulin glargine (BASAGLAR KWIKPEN U-100 INSULIN) 100 unit/mL (3 mL) inpn Increase to 60 UNITS SUBCUTANEOUSLY AT BEDTIME  insulin aspart U-100 (NOVOLOG FLEXPEN U-100 INSULIN) 100 unit/mL inpn Increase to 18 units before breakfast, lunch, and dinner. Plus sliding scale to max 117 units daily.  ASPIR-LOW 81 mg tablet TAKE 1 TABLET BY MOUTH ONCE DAILY  PRECISION XTRA TEST strip TEST four times a day  VITAMIN D3 1,000 unit tablet TAKE 1 TABLET BY MOUTH ONCE DAILY  donepezil (ARICEPT) 10 mg tablet TAKE 1 TABLET BY MOUTH ONCE DAILY  finasteride (PROSCAR) 5 mg tablet TAKE 1 TABLET BY MOUTH ONCE DAILY  isosorbide mononitrate ER (IMDUR) 60 mg CR tablet TAKE 1 TABLET BY MOUTH ONCE DAILY  losartan (COZAAR) 100 mg tablet TAKE 1 TABLET BY MOUTH ONCE DAILY  magnesium oxide (MAG-OX) 400 mg tablet TAKE 1 TABLET BY MOUTH ONCE DAILY  metFORMIN (GLUCOPHAGE) 1,000 mg tablet TAKE 1 TABLET BY MOUTH 2 TIMES A DAY WITH MEALS  metoprolol tartrate (LOPRESSOR) 50 mg tablet TAKE 1 TABLET BY MOUTH 2 TIMES A DAY WITH FOOD  tamsulosin (FLOMAX) 0.4 mg capsule TAKE 1 CAPSULE BY MOUTH ONCE DAILY AT BEDTIME No current facility-administered medications for this visit. Review of Systems Constitutional: Negative. HENT: Negative. Eyes: Negative for pain and redness. Respiratory: Negative. Cardiovascular: Negative for chest pain, palpitations and leg swelling. Gastrointestinal: Negative. Negative for constipation. Genitourinary: Negative. Musculoskeletal: Negative for myalgias. Skin: Negative. Neurological: Negative. Endo/Heme/Allergies: Negative. Psychiatric/Behavioral: Negative for depression and memory loss. The patient does not have insomnia. Physical Exam  
Constitutional: He is oriented to person, place, and time. He appears well-developed and well-nourished. HENT:  
Head: Normocephalic. Eyes: Conjunctivae and EOM are normal. Pupils are equal, round, and reactive to light. Neck: Normal range of motion. Neck supple. No JVD present. No tracheal deviation present. No thyromegaly present. Cardiovascular: Normal rate, regular rhythm and normal heart sounds. Pulmonary/Chest: Effort normal and breath sounds normal.  
Abdominal: Soft. Bowel sounds are normal.  
Musculoskeletal: Normal range of motion. Lymphadenopathy:  
  He has no cervical adenopathy. Neurological: He is alert and oriented to person, place, and time. He has normal reflexes. Skin: Skin is warm. Psychiatric: He has a normal mood and affect. Diabetic foot exam: June 2018 Left Foot: 
 Visual Exam: normal   HE HAS HABERDEN NODES ON DISTAL PHALANGEAL JOINT Pulse DP: 2+ (normal) Filament test: normal sensation Vibratory sensation: normal 
   
Right Foot: 
 Visual Exam: normal  RIGHT HAMMER TOES  
 Pulse DP: 2+ (normal) Filament test: normal sensation Vibratory sensation: normal 
 
 
 
 
ASSESSMENT and PLAN 1. Type 2 DM, un controlled : a1c is    7.7 %     From   Nov 2018      compared to   over 10 % Reviewed the glucose log -  
reADJUSTED  
 
HAS INSULIN RESISTANCE On  basaglar   at bedtime. And   Humalog  immediately before breakfast, lunch and dinner. RECOMMENDING CGMS Mary Sands 
 is being seen for DM type 2 with complications, retinopathy and nephropathy,   
he performs 4-6 times of blood glucose checks a day utilizing the home BGM. Mary Sands 
 uses  Continuous subcutaneous  Insulin shots life long  therapy to treat his diabetes. Mary Sands 
 requires frequent adjustments to the regimen on the basis of therapeutic CGM testing results Add additional Humalog as recommended in increments . Patient is advised about checking blood sugars 4 times a day and maintaining log book. The danger of having low blood sugars has been explained with inappropriate use of insulin  Patient voiced understanding and using the printed instructions at home. Hypoglycemia management has been explained to the patient. Low Carbohydrate diet discussed with the patient 2. Hypoglycemia :  Educated on treating the hypoglycemia. Discussed Glucagon use and prescribed 3. HTN : continue current care.  Patient is educated about importance of compliance with anti-hypertensives especially ARB/ACEI 4. Dyslipidemia : continue current meds. Patient is educated about benefits and adverse effects of statins and explained how benefits outweigh risk. 5. use of aspirin to prevent MI and TIA's discussed 6. H/o prostrate cancer : Reviewed the bone scan and its normal 
 
 
7.  ?? Pt decribes an  Allergy he had - could have gotten prednisone then -  He does not take it any more  
 
 
 
 
> 50 % of time is spent on counseling Patient voiced understanding her plan of care

## 2018-11-08 NOTE — PATIENT INSTRUCTIONS
Refills    -    please call your pharmacy and have them send us a refill request 
 
Results  -  allow up to a week for lab results to be processed and reviewed. Phone calls  -  Allow upto 24 hrs. for non-urgent calls to be retained Prior authorization - It may take up to 2 weeks to process, depending on your insurance Forms  -  FMLA, DMV, patient assistance, etc. will take up to 2 weeks to process Cancellations - please notify the office in advance if you cannot keep your appointment Samples  - will only be dispensed at visits as supply is limited If you are having a medical emergency call 911 
 
-------------------------------------------------------------------------------------------- 
 
FREE style [address] Do not skip meals Do not eat in between meals Reduce carbs- pasta, rice, potatoes, bread Do not drink juices or sodas. sherbat Donot eat peanut butter Do not eat sugar free cookies and cakes Do not eat peaches, grapes, pineapples, oranges, raisins and halos and clementines Check blood sugars immediately before each meal and at bedtime 
 
 
deCREASE   BASAGLAR  insulin  50 units  at bed time 
 
deCREASE  NOVOLOG  insulin 14 units before breakfast, 14 units before lunch and 18 units before dinner. ( no novolog when not eating ) Also, add additional NOVOLOG  as follows with meals  If blood sugars are[de-identified] 
 
150-200 mg 3 units 201-250 mg 6 units 251-300 mg 9 units 301-350 mg 12 units 351-400 mg 15 units 401-450 mg 18 units 451-500 mg 21 units Less than 70 mg NO INSULIN 
 
 
 
----------------------------------------------------------------------------------------------------------

## 2018-12-12 ENCOUNTER — TELEPHONE (OUTPATIENT)
Dept: ENDOCRINOLOGY | Age: 78
End: 2018-12-12

## 2018-12-12 DIAGNOSIS — E11.65 UNCONTROLLED TYPE 2 DIABETES MELLITUS WITH HYPERGLYCEMIA, WITH LONG-TERM CURRENT USE OF INSULIN (HCC): ICD-10-CM

## 2018-12-12 DIAGNOSIS — E78.2 MIXED HYPERLIPIDEMIA: ICD-10-CM

## 2018-12-12 DIAGNOSIS — I10 ESSENTIAL HYPERTENSION: ICD-10-CM

## 2018-12-12 DIAGNOSIS — E11.65 TYPE 2 DIABETES MELLITUS WITH HYPERGLYCEMIA, UNSPECIFIED WHETHER LONG TERM INSULIN USE (HCC): ICD-10-CM

## 2018-12-12 DIAGNOSIS — Z79.4 UNCONTROLLED TYPE 2 DIABETES MELLITUS WITH HYPERGLYCEMIA, WITH LONG-TERM CURRENT USE OF INSULIN (HCC): ICD-10-CM

## 2018-12-12 RX ORDER — BLOOD SUGAR DIAGNOSTIC
STRIP MISCELLANEOUS
Qty: 200 STRIP | Refills: 6 | Status: SHIPPED | OUTPATIENT
Start: 2018-12-12

## 2018-12-12 RX ORDER — LANCETS 28 GAUGE
EACH MISCELLANEOUS
Qty: 200 LANCET | Refills: 6 | Status: SHIPPED | OUTPATIENT
Start: 2018-12-12

## 2018-12-12 NOTE — TELEPHONE ENCOUNTER
Patient is requesting lancets and Precision xtra test strips sent to [facility]. He is in the process of trying to get approved for a new sensor device, but is afraid he will run out in the mean time.

## 2019-07-22 ENCOUNTER — HOSPITAL ENCOUNTER (OUTPATIENT)
Dept: LAB | Age: 79
Discharge: HOME | End: 2019-07-22
Payer: MEDICARE

## 2019-07-22 DIAGNOSIS — E78.2 MIXED HYPERLIPIDEMIA: ICD-10-CM

## 2019-07-22 DIAGNOSIS — Z79.4 UNCONTROLLED TYPE 2 DIABETES MELLITUS WITH HYPERGLYCEMIA, WITH LONG-TERM CURRENT USE OF INSULIN (HCC): Primary | ICD-10-CM

## 2019-07-22 DIAGNOSIS — E11.65 UNCONTROLLED TYPE 2 DIABETES MELLITUS WITH HYPERGLYCEMIA, WITH LONG-TERM CURRENT USE OF INSULIN (HCC): Primary | ICD-10-CM

## 2019-07-22 PROCEDURE — 36415 COLL VENOUS BLD VENIPUNCTURE: CPT

## 2019-07-22 PROCEDURE — 83036 HEMOGLOBIN GLYCOSYLATED A1C: CPT

## 2019-07-22 PROCEDURE — 80053 COMPREHEN METABOLIC PANEL: CPT

## 2019-07-22 PROCEDURE — 82043 UR ALBUMIN QUANTITATIVE: CPT

## 2019-07-22 PROCEDURE — 80061 LIPID PANEL: CPT

## 2019-07-23 LAB
ALBUMIN SERPL-MCNC: 4 G/DL (ref 3.5–4.7)
ALBUMIN/CREAT UR: 266.5 MG/G CREAT (ref 0–30)
ALBUMIN/GLOB SERPL: 1.5 {RATIO} (ref 1.2–2.2)
ALP SERPL-CCNC: 69 IU/L (ref 39–117)
ALT SERPL-CCNC: 18 IU/L (ref 0–44)
AST SERPL-CCNC: 18 IU/L (ref 0–40)
BILIRUB SERPL-MCNC: 0.4 MG/DL (ref 0–1.2)
BUN SERPL-MCNC: 17 MG/DL (ref 8–27)
BUN/CREAT SERPL: 15 (ref 10–24)
CALCIUM SERPL-MCNC: 9.4 MG/DL (ref 8.6–10.2)
CHLORIDE SERPL-SCNC: 105 MMOL/L (ref 96–106)
CHOLEST SERPL-MCNC: 201 MG/DL (ref 100–199)
CO2 SERPL-SCNC: 24 MMOL/L (ref 20–29)
CREAT SERPL-MCNC: 1.16 MG/DL (ref 0.76–1.27)
CREAT UR-MCNC: 221.7 MG/DL
EST. AVERAGE GLUCOSE BLD GHB EST-MCNC: 220 MG/DL
GLOBULIN SER CALC-MCNC: 2.6 G/DL (ref 1.5–4.5)
GLUCOSE SERPL-MCNC: 279 MG/DL (ref 65–99)
HBA1C MFR BLD: 9.3 % (ref 4.8–5.6)
HDLC SERPL-MCNC: 33 MG/DL
INTERPRETATION, 910389: NORMAL
INTERPRETATION: NORMAL
LDLC SERPL CALC-MCNC: 122 MG/DL (ref 0–99)
Lab: NORMAL
MICROALBUMIN UR-MCNC: 590.9 UG/ML
PDF IMAGE, 910387: NORMAL
POTASSIUM SERPL-SCNC: 4.2 MMOL/L (ref 3.5–5.2)
PROT SERPL-MCNC: 6.6 G/DL (ref 6–8.5)
SODIUM SERPL-SCNC: 143 MMOL/L (ref 134–144)
TRIGL SERPL-MCNC: 232 MG/DL (ref 0–149)
VLDLC SERPL CALC-MCNC: 46 MG/DL (ref 5–40)

## 2019-07-29 ENCOUNTER — OFFICE VISIT (OUTPATIENT)
Dept: ENDOCRINOLOGY | Age: 79
End: 2019-07-29

## 2019-07-29 VITALS
WEIGHT: 236.3 LBS | SYSTOLIC BLOOD PRESSURE: 143 MMHG | RESPIRATION RATE: 20 BRPM | TEMPERATURE: 97.6 F | HEIGHT: 70 IN | HEART RATE: 67 BPM | BODY MASS INDEX: 33.83 KG/M2 | DIASTOLIC BLOOD PRESSURE: 67 MMHG | OXYGEN SATURATION: 95 %

## 2019-07-29 DIAGNOSIS — E11.65 TYPE 2 DIABETES MELLITUS WITH HYPERGLYCEMIA, UNSPECIFIED WHETHER LONG TERM INSULIN USE (HCC): ICD-10-CM

## 2019-07-29 DIAGNOSIS — E11.65 TYPE 2 DIABETES MELLITUS WITH HYPERGLYCEMIA, UNSPECIFIED WHETHER LONG TERM INSULIN USE (HCC): Primary | ICD-10-CM

## 2019-07-29 DIAGNOSIS — R80.1 PERSISTENT PROTEINURIA: ICD-10-CM

## 2019-07-29 DIAGNOSIS — I10 ESSENTIAL HYPERTENSION: ICD-10-CM

## 2019-07-29 DIAGNOSIS — E78.2 MIXED HYPERLIPIDEMIA: ICD-10-CM

## 2019-07-29 PROBLEM — E11.21 TYPE 2 DIABETES WITH NEPHROPATHY (HCC): Status: ACTIVE | Noted: 2019-07-29

## 2019-07-29 RX ORDER — PEN NEEDLE, DIABETIC 31 GX3/16"
NEEDLE, DISPOSABLE MISCELLANEOUS
Qty: 400 PEN NEEDLE | Refills: 4 | Status: SHIPPED | OUTPATIENT
Start: 2019-07-29

## 2019-07-29 RX ORDER — INSULIN GLARGINE 100 [IU]/ML
INJECTION, SOLUTION SUBCUTANEOUS
Qty: 90 ML | Refills: 4 | Status: SHIPPED | OUTPATIENT
Start: 2019-07-29 | End: 2020-07-08

## 2019-07-29 RX ORDER — LANCETS 28 GAUGE
EACH MISCELLANEOUS
Qty: 200 LANCET | Refills: 11 | Status: SHIPPED | OUTPATIENT
Start: 2019-07-29

## 2019-07-29 RX ORDER — SIMVASTATIN 20 MG/1
20 TABLET, FILM COATED ORAL
Qty: 90 TAB | Refills: 4 | Status: SHIPPED | OUTPATIENT
Start: 2019-07-29

## 2019-07-29 RX ORDER — INSULIN ASPART 100 [IU]/ML
INJECTION, SOLUTION INTRAVENOUS; SUBCUTANEOUS
Qty: 90 ML | Refills: 4 | Status: SHIPPED | OUTPATIENT
Start: 2019-07-29 | End: 2020-07-08

## 2019-07-29 NOTE — LETTER
7/29/19 Patient: Leslie Valdivia YOB: 1940 Date of Visit: 7/29/2019 Sofía Stanley, [address] [facility] [address] VIA Facsimile: [phone] Dear Sofía Stanley MD, Thank you for referring Mr. Josephine Marte to [address] [place] [address] for evaluation. My notes for this consultation are attached. If you have questions, please do not hesitate to call me. I look forward to following your patient along with you. Sincerely, Rose Jimenez MD

## 2019-07-29 NOTE — PROGRESS NOTES
1. Have you been to the ER, urgent care clinic since your last visit? May 2019/Difficulty Breathing/  Hospitalized since your last visit? No    2. Have you seen or consulted any other health care providers outside of the [address] [place] [address] since your last visit? Include any pap smears or colon screening. No     Wt Readings from Last 3 Encounters:   07/29/19 236 lb 4.8 oz (107.2 kg)   11/08/18 246 lb 12.8 oz (111.9 kg)   06/21/18 234 lb 12.8 oz (106.5 kg)     Temp Readings from Last 3 Encounters:   07/29/19 97.6 °F (36.4 °C) (Oral)   11/08/18 97.2 °F (36.2 °C) (Oral)   06/21/18 97 °F (36.1 °C) (Oral)     BP Readings from Last 3 Encounters:   07/29/19 143/67   11/08/18 158/70   06/21/18 134/61     Pulse Readings from Last 3 Encounters:   07/29/19 67   11/08/18 66   06/21/18 72     Lab Results   Component Value Date/Time    Hemoglobin A1c 9.3 (H) 07/22/2019 09:18 AM    Hemoglobin A1c (POC) 10.1 05/10/2018 10:28 AM     Patient has new meter with him today that he has not used.

## 2019-07-29 NOTE — PROGRESS NOTES
HISTORY OF PRESENT ILLNESS  Lo Aguirre is a [de-identified] y.o. male. HPI  Patient here for   f/u after  LAST  visit of Type 2 diabetes mellitus   From November 2018     Pt got admitted in [place] for hypoglycemia   Pt took insulin and had gone there for removal of cataract     He says he has that he is taking insulin without eating    Lost 10 lbs     LONG GAP   Unsure of how much doses of insulin he is taking (he is struggling to answer this question)          Old history  :     He is feeling  better he says     Gained 6 lbs   SUGARS ARE IN GOOD RANGE     He had one occasional  LOW SUGARS     Feels lonely     Made good changes in diet         Old history :   Referred : by self    One of my Patient's wife referred him     H/o diabetes for many  years     Current A1C is over 10 % and symptoms/problems include fluctuant sugars     Current diabetic medications include intensive insulin injection program.  Basilar 30 units at night and novolog 30 units with each meal  He moved from [place] and has lived there all the time. He has come to live with his daughter and he is accompanied by his son-in-law at the visit    Patient is not very compliant with the diet he always drinks some high glycemic index foods    Current monitoring regimen: home blood tests - daily  Home blood sugar records: trend: fluctuating a lot  Any episodes of hypoglycemia? no    Weight trend: fluctuating a lot  Prior visit with dietician: no  Current diet: \"unhealthy\" diet in general  Current exercise: no regular exercise    Known diabetic complications: peripheral neuropathy  Cardiovascular risk factors: dyslipidemia, diabetes mellitus, obesity, male gender    Eye exam current (within one year): yes  ALEC: unknown     Past Medical History:   Diagnosis Date    Diabetes ([facility] [address]      History reviewed. No pertinent surgical history. Current Outpatient Medications   Medication Sig    PRECISION XTRA TEST strip TEST four times a day Dx.  Code E11.65    lancets (FREESTYLE LANCETS) 28 gauge misc Use to test blood sugars four times daily. Dx code E11.65    lancets (TRUEPLUS LANCETS) 28 gauge misc Use to test blood sugars four times daily. Dx. Code E11.65    insulin glargine (BASAGLAR KWIKPEN U-100 INSULIN) 100 unit/mL (3 mL) inpn Increase to 60 UNITS SUBCUTANEOUSLY AT BEDTIME    ASPIR-LOW 81 mg tablet TAKE 1 TABLET BY MOUTH ONCE DAILY    VITAMIN D3 1,000 unit tablet TAKE 1 TABLET BY MOUTH ONCE DAILY    donepezil (ARICEPT) 10 mg tablet TAKE 1 TABLET BY MOUTH ONCE DAILY    finasteride (PROSCAR) 5 mg tablet TAKE 1 TABLET BY MOUTH ONCE DAILY    isosorbide mononitrate ER (IMDUR) 60 mg CR tablet TAKE 1 TABLET BY MOUTH ONCE DAILY    losartan (COZAAR) 100 mg tablet TAKE 1 TABLET BY MOUTH ONCE DAILY    magnesium oxide (MAG-OX) 400 mg tablet TAKE 1 TABLET BY MOUTH ONCE DAILY    metFORMIN (GLUCOPHAGE) 1,000 mg tablet TAKE 1 TABLET BY MOUTH 2 TIMES A DAY WITH MEALS    metoprolol tartrate (LOPRESSOR) 50 mg tablet TAKE 1 TABLET BY MOUTH 2 TIMES A DAY WITH FOOD    tamsulosin (FLOMAX) 0.4 mg capsule TAKE 1 CAPSULE BY MOUTH ONCE DAILY AT BEDTIME    flash glucose sensor (FREESTYLE LOLA 10 DAY SENSOR) kit Use one sensor every 10 days. Dx.Code E11.65    flash glucose scanning reader (FREESTYLE LOLA 10 DAY READER) [facility] Use as directed. Dx. Code E11.65    insulin aspart U-100 (NOVOLOG FLEXPEN U-100 INSULIN) 100 unit/mL inpn Increase to 18 units before breakfast, lunch, and dinner. Plus sliding scale to max 117 units daily. No current facility-administered medications for this visit. Review of Systems   Constitutional: Negative. HENT: Negative. Eyes: Negative for pain and redness. Respiratory: Negative. Cardiovascular: Negative for chest pain, palpitations and leg swelling. Gastrointestinal: Negative. Negative for constipation. Genitourinary: Negative. Musculoskeletal: Negative for myalgias. Skin: Negative. Neurological: Negative. Endo/Heme/Allergies: Negative. Psychiatric/Behavioral: Negative for depression and memory loss. The patient does not have insomnia. Physical Exam   Constitutional: He is oriented to person, place, and time. He appears well-developed and well-nourished. HENT:   Head: Normocephalic. Eyes: Conjunctivae and EOM are normal. Pupils are equal, round, and reactive to light. Neck: Normal range of motion. Neck supple. No JVD present. No tracheal deviation present. No thyromegaly present. Cardiovascular: Normal rate, regular rhythm and normal heart sounds. Pulmonary/Chest: Effort normal and breath sounds normal.   Abdominal: Soft. Bowel sounds are normal.   Musculoskeletal: Normal range of motion. Lymphadenopathy:     He has no cervical adenopathy. Neurological: He is alert and oriented to person, place, and time. He has normal reflexes. Skin: Skin is warm. Psychiatric: He has a normal mood and affect.        Diabetic foot exam: June 2018    Left Foot:   Visual Exam: normal   HE HAS HABERDEN NODES ON DISTAL PHALANGEAL JOINT    Pulse DP: 2+ (normal)   Filament test: normal sensation    Vibratory sensation: normal      Right Foot:   Visual Exam: normal  RIGHT HAMMER TOES    Pulse DP: 2+ (normal)   Filament test: normal sensation    Vibratory sensation: normal        Lab Results   Component Value Date/Time    Hemoglobin A1c 9.3 (H) 07/22/2019 09:18 AM    Hemoglobin A1c 7.7 (H) 11/07/2018 10:09 AM    Hemoglobin A1c 8.1 (H) 06/14/2018 08:28 AM    Glucose 279 (H) 07/22/2019 09:18 AM    Glucose  06/21/2018 10:45 AM    Microalb/Creat ratio (ug/mg creat.) 266.5 (H) 07/22/2019 09:18 AM    LDL, calculated 122 (H) 07/22/2019 09:18 AM    Creatinine 1.16 07/22/2019 09:18 AM      Lab Results   Component Value Date/Time    Cholesterol, total 201 (H) 07/22/2019 09:18 AM    HDL Cholesterol 33 (L) 07/22/2019 09:18 AM    LDL, calculated 122 (H) 07/22/2019 09:18 AM    Triglyceride 232 (H) 07/22/2019 09:18 AM Lab Results   Component Value Date/Time    ALT (SGPT) 18 07/22/2019 09:18 AM    AST (SGOT) 18 07/22/2019 09:18 AM    Alk. phosphatase 69 07/22/2019 09:18 AM    Bilirubin, total 0.4 07/22/2019 09:18 AM    Albumin 4.0 07/22/2019 09:18 AM    Protein, total 6.6 07/22/2019 09:18 AM     Lab Results   Component Value Date/Time    GFR est non-AA 59 (L) 07/22/2019 09:18 AM    GFR est AA 68 07/22/2019 09:18 AM    Creatinine 1.16 07/22/2019 09:18 AM    BUN 17 07/22/2019 09:18 AM    Sodium 143 07/22/2019 09:18 AM    Potassium 4.2 07/22/2019 09:18 AM    Chloride 105 07/22/2019 09:18 AM    CO2 24 07/22/2019 09:18 AM           ASSESSMENT and PLAN    1. Type 2 DM, un controlled : a1c is 9.3 %   From  July 2019    Compared to    7.7 %     From   Nov 2018      compared to   over 10 %     Could not review the glucose log -   Unclear about the control because patient has brought to meters with him precision extra and Prodig[employer]    He mentions that he had difficulty using the meters of any kind as noticed strips were being covered  Interestingly, upon review of the discharge notes from the hospital in [place] patient has explained to them about taking Basaglar 80 units and also higher doses of NovoLog which actually were given to him prior to the last visit    Today upon questioning, he is going  around and around the doses but he never specified how much he is taking    On  basaglar   at bedtime. And   Humalog  immediately before breakfast, lunch and dinner. RECOMMENDING CGMS but I have to review the log    Explained again the basal bolus regimen    Patient is advised about checking blood sugars 4 times a day and maintaining log book. The danger of having low blood sugars has been explained with inappropriate use of insulin  Patient voiced understanding and using the printed instructions at home. Hypoglycemia management has been explained to the patient. Low Carbohydrate diet discussed with the patient     2.  Hypoglycemia : Educated on treating the hypoglycemia. Discussed Glucagon use and prescribed    3. HTN : continue metoprolol and cozzar  And imdur  . Patient is educated about importance of compliance with anti-hypertensives especially ARB/ACEI    4. Dyslipidemia : ? LDL is 122   He is interested in starting on statin   . Patient is educated about benefits and adverse effects of statins and explained how benefits outweigh risk. 5. use of aspirin to prevent MI and TIA's discussed      6. H/o prostrate cancer : Reviewed the bone scan and its normal      7.  On ARICEPT   Wife is helpful as she has taken the pictures of his bubble wrapped medications and seems to know the difficulties which patient is going through at this time but patient is not willing to accept his deficiencies        > 50 % of time is spent on counseling   Patient voiced understanding her plan of care

## 2019-07-30 ENCOUNTER — TELEPHONE (OUTPATIENT)
Dept: ENDOCRINOLOGY | Age: 79
End: 2019-07-30

## 2019-07-30 NOTE — TELEPHONE ENCOUNTER
Pharmacy called with questions regarding Haroldine Beets. Pharmacists states that Dr. Darren Marshall [phone] has patient taking 80 units of Basaglar at bedtime on file at their pharmacy. Pharmacists is concerned because this office sent in Haroldine Beets for 50 units at bedtime.  Writer informed pharmacist that patient is to take Insulin as prescribed by Dr. Tim Paredes.

## 2019-10-07 ENCOUNTER — OP HISTORICAL/CONVERTED ENCOUNTER (OUTPATIENT)
Dept: OTHER | Age: 79
End: 2019-10-07

## 2021-01-01 ENCOUNTER — TRANSCRIPTION ENCOUNTER (OUTPATIENT)
Age: 81
End: 2021-01-01

## 2021-01-01 ENCOUNTER — INPATIENT (INPATIENT)
Facility: HOSPITAL | Age: 81
LOS: 2 days | Discharge: HOME | End: 2021-09-23
Attending: HOSPITALIST | Admitting: HOSPITALIST
Payer: MEDICARE

## 2021-01-01 VITALS — HEART RATE: 88 BPM | OXYGEN SATURATION: 97 %

## 2021-01-01 VITALS
HEIGHT: 70 IN | OXYGEN SATURATION: 100 % | TEMPERATURE: 97 F | SYSTOLIC BLOOD PRESSURE: 123 MMHG | RESPIRATION RATE: 16 BRPM | DIASTOLIC BLOOD PRESSURE: 52 MMHG | HEART RATE: 66 BPM

## 2021-01-01 DIAGNOSIS — I25.10 ATHEROSCLEROTIC HEART DISEASE OF NATIVE CORONARY ARTERY WITHOUT ANGINA PECTORIS: ICD-10-CM

## 2021-01-01 DIAGNOSIS — K92.2 GASTROINTESTINAL HEMORRHAGE, UNSPECIFIED: ICD-10-CM

## 2021-01-01 DIAGNOSIS — E11.69 TYPE 2 DIABETES MELLITUS WITH OTHER SPECIFIED COMPLICATION: ICD-10-CM

## 2021-01-01 DIAGNOSIS — N39.0 URINARY TRACT INFECTION, SITE NOT SPECIFIED: ICD-10-CM

## 2021-01-01 DIAGNOSIS — G47.33 OBSTRUCTIVE SLEEP APNEA (ADULT) (PEDIATRIC): ICD-10-CM

## 2021-01-01 DIAGNOSIS — K92.1 MELENA: ICD-10-CM

## 2021-01-01 DIAGNOSIS — I10 ESSENTIAL (PRIMARY) HYPERTENSION: ICD-10-CM

## 2021-01-01 DIAGNOSIS — M54.5 LOW BACK PAIN: ICD-10-CM

## 2021-01-01 DIAGNOSIS — C61 MALIGNANT NEOPLASM OF PROSTATE: ICD-10-CM

## 2021-01-01 DIAGNOSIS — Z29.9 ENCOUNTER FOR PROPHYLACTIC MEASURES, UNSPECIFIED: ICD-10-CM

## 2021-01-01 DIAGNOSIS — R06.02 SHORTNESS OF BREATH: ICD-10-CM

## 2021-01-01 LAB
A1C WITH ESTIMATED AVERAGE GLUCOSE RESULT: 8.8 % — HIGH (ref 4–5.6)
ALBUMIN SERPL ELPH-MCNC: 4.1 G/DL — SIGNIFICANT CHANGE UP (ref 3.3–5)
ALP SERPL-CCNC: 77 U/L — SIGNIFICANT CHANGE UP (ref 40–120)
ALT FLD-CCNC: 18 U/L — SIGNIFICANT CHANGE UP (ref 4–41)
ANION GAP SERPL CALC-SCNC: 11 MMOL/L — SIGNIFICANT CHANGE UP (ref 7–14)
ANION GAP SERPL CALC-SCNC: 13 MMOL/L — SIGNIFICANT CHANGE UP (ref 7–14)
ANION GAP SERPL CALC-SCNC: 13 MMOL/L — SIGNIFICANT CHANGE UP (ref 7–14)
ANION GAP SERPL CALC-SCNC: 7 MMOL/L — SIGNIFICANT CHANGE UP (ref 7–14)
APPEARANCE UR: ABNORMAL
AST SERPL-CCNC: 23 U/L — SIGNIFICANT CHANGE UP (ref 4–40)
BACTERIA # UR AUTO: NEGATIVE — SIGNIFICANT CHANGE UP
BASOPHILS # BLD AUTO: 0.04 K/UL — SIGNIFICANT CHANGE UP (ref 0–0.2)
BASOPHILS # BLD AUTO: 0.05 K/UL — SIGNIFICANT CHANGE UP (ref 0–0.2)
BASOPHILS NFR BLD AUTO: 0.9 % — SIGNIFICANT CHANGE UP (ref 0–2)
BASOPHILS NFR BLD AUTO: 1 % — SIGNIFICANT CHANGE UP (ref 0–2)
BILIRUB SERPL-MCNC: 0.4 MG/DL — SIGNIFICANT CHANGE UP (ref 0.2–1.2)
BILIRUB UR-MCNC: NEGATIVE — SIGNIFICANT CHANGE UP
BLD GP AB SCN SERPL QL: NEGATIVE — SIGNIFICANT CHANGE UP
BUN SERPL-MCNC: 14 MG/DL — SIGNIFICANT CHANGE UP (ref 7–23)
BUN SERPL-MCNC: 17 MG/DL — SIGNIFICANT CHANGE UP (ref 7–23)
BUN SERPL-MCNC: 21 MG/DL — SIGNIFICANT CHANGE UP (ref 7–23)
BUN SERPL-MCNC: 25 MG/DL — HIGH (ref 7–23)
CALCIUM SERPL-MCNC: 9.3 MG/DL — SIGNIFICANT CHANGE UP (ref 8.4–10.5)
CALCIUM SERPL-MCNC: 9.5 MG/DL — SIGNIFICANT CHANGE UP (ref 8.4–10.5)
CHLORIDE SERPL-SCNC: 103 MMOL/L — SIGNIFICANT CHANGE UP (ref 98–107)
CHLORIDE SERPL-SCNC: 108 MMOL/L — HIGH (ref 98–107)
CHLORIDE SERPL-SCNC: 108 MMOL/L — HIGH (ref 98–107)
CHLORIDE SERPL-SCNC: 109 MMOL/L — HIGH (ref 98–107)
CHOLEST SERPL-MCNC: 120 MG/DL — SIGNIFICANT CHANGE UP
CO2 SERPL-SCNC: 20 MMOL/L — LOW (ref 22–31)
CO2 SERPL-SCNC: 20 MMOL/L — LOW (ref 22–31)
CO2 SERPL-SCNC: 24 MMOL/L — SIGNIFICANT CHANGE UP (ref 22–31)
CO2 SERPL-SCNC: 25 MMOL/L — SIGNIFICANT CHANGE UP (ref 22–31)
COLOR SPEC: ABNORMAL
COVID-19 SPIKE DOMAIN AB INTERP: POSITIVE
COVID-19 SPIKE DOMAIN ANTIBODY RESULT: 66.9 U/ML — HIGH
CREAT SERPL-MCNC: 0.81 MG/DL — SIGNIFICANT CHANGE UP (ref 0.5–1.3)
CREAT SERPL-MCNC: 0.9 MG/DL — SIGNIFICANT CHANGE UP (ref 0.5–1.3)
CREAT SERPL-MCNC: 0.98 MG/DL — SIGNIFICANT CHANGE UP (ref 0.5–1.3)
CREAT SERPL-MCNC: 1.13 MG/DL — SIGNIFICANT CHANGE UP (ref 0.5–1.3)
CULTURE RESULTS: SIGNIFICANT CHANGE UP
DIFF PNL FLD: ABNORMAL
EOSINOPHIL # BLD AUTO: 0.17 K/UL — SIGNIFICANT CHANGE UP (ref 0–0.5)
EOSINOPHIL # BLD AUTO: 0.19 K/UL — SIGNIFICANT CHANGE UP (ref 0–0.5)
EOSINOPHIL NFR BLD AUTO: 3.3 % — SIGNIFICANT CHANGE UP (ref 0–6)
EOSINOPHIL NFR BLD AUTO: 4.3 % — SIGNIFICANT CHANGE UP (ref 0–6)
EPI CELLS # UR: 1 /HPF — SIGNIFICANT CHANGE UP (ref 0–5)
ESTIMATED AVERAGE GLUCOSE: 206 — SIGNIFICANT CHANGE UP
GLUCOSE BLDC GLUCOMTR-MCNC: 170 MG/DL — HIGH (ref 70–99)
GLUCOSE BLDC GLUCOMTR-MCNC: 224 MG/DL — HIGH (ref 70–99)
GLUCOSE SERPL-MCNC: 170 MG/DL — HIGH (ref 70–99)
GLUCOSE SERPL-MCNC: 172 MG/DL — HIGH (ref 70–99)
GLUCOSE SERPL-MCNC: 287 MG/DL — HIGH (ref 70–99)
GLUCOSE SERPL-MCNC: 288 MG/DL — HIGH (ref 70–99)
GLUCOSE UR QL: ABNORMAL
HCT VFR BLD CALC: 35.7 % — LOW (ref 39–50)
HCT VFR BLD CALC: 36.1 % — LOW (ref 39–50)
HCT VFR BLD CALC: 36.3 % — LOW (ref 39–50)
HCT VFR BLD CALC: 38.2 % — LOW (ref 39–50)
HCT VFR BLD CALC: 39.5 % — SIGNIFICANT CHANGE UP (ref 39–50)
HCT VFR BLD CALC: 40.7 % — SIGNIFICANT CHANGE UP (ref 39–50)
HDLC SERPL-MCNC: 40 MG/DL — LOW
HGB BLD-MCNC: 12.2 G/DL — LOW (ref 13–17)
HGB BLD-MCNC: 12.2 G/DL — LOW (ref 13–17)
HGB BLD-MCNC: 12.5 G/DL — LOW (ref 13–17)
HGB BLD-MCNC: 12.8 G/DL — LOW (ref 13–17)
HGB BLD-MCNC: 13.5 G/DL — SIGNIFICANT CHANGE UP (ref 13–17)
HGB BLD-MCNC: 13.7 G/DL — SIGNIFICANT CHANGE UP (ref 13–17)
HYALINE CASTS # UR AUTO: 0 /LPF — SIGNIFICANT CHANGE UP (ref 0–7)
IANC: 2.16 K/UL — SIGNIFICANT CHANGE UP (ref 1.5–8.5)
IANC: 2.73 K/UL — SIGNIFICANT CHANGE UP (ref 1.5–8.5)
IMM GRANULOCYTES NFR BLD AUTO: 0.2 % — SIGNIFICANT CHANGE UP (ref 0–1.5)
IMM GRANULOCYTES NFR BLD AUTO: 0.2 % — SIGNIFICANT CHANGE UP (ref 0–1.5)
INR BLD: 1.14 RATIO — SIGNIFICANT CHANGE UP (ref 0.88–1.16)
KETONES UR-MCNC: NEGATIVE — SIGNIFICANT CHANGE UP
LEUKOCYTE ESTERASE UR-ACNC: ABNORMAL
LIPID PNL WITH DIRECT LDL SERPL: 56 MG/DL — SIGNIFICANT CHANGE UP
LYMPHOCYTES # BLD AUTO: 1.52 K/UL — SIGNIFICANT CHANGE UP (ref 1–3.3)
LYMPHOCYTES # BLD AUTO: 1.77 K/UL — SIGNIFICANT CHANGE UP (ref 1–3.3)
LYMPHOCYTES # BLD AUTO: 34.2 % — SIGNIFICANT CHANGE UP (ref 13–44)
LYMPHOCYTES # BLD AUTO: 34.2 % — SIGNIFICANT CHANGE UP (ref 13–44)
MAGNESIUM SERPL-MCNC: 1.7 MG/DL — SIGNIFICANT CHANGE UP (ref 1.6–2.6)
MAGNESIUM SERPL-MCNC: 1.8 MG/DL — SIGNIFICANT CHANGE UP (ref 1.6–2.6)
MAGNESIUM SERPL-MCNC: 1.9 MG/DL — SIGNIFICANT CHANGE UP (ref 1.6–2.6)
MCHC RBC-ENTMCNC: 29.2 PG — SIGNIFICANT CHANGE UP (ref 27–34)
MCHC RBC-ENTMCNC: 29.3 PG — SIGNIFICANT CHANGE UP (ref 27–34)
MCHC RBC-ENTMCNC: 29.3 PG — SIGNIFICANT CHANGE UP (ref 27–34)
MCHC RBC-ENTMCNC: 29.5 PG — SIGNIFICANT CHANGE UP (ref 27–34)
MCHC RBC-ENTMCNC: 29.6 PG — SIGNIFICANT CHANGE UP (ref 27–34)
MCHC RBC-ENTMCNC: 29.8 PG — SIGNIFICANT CHANGE UP (ref 27–34)
MCHC RBC-ENTMCNC: 33.5 GM/DL — SIGNIFICANT CHANGE UP (ref 32–36)
MCHC RBC-ENTMCNC: 33.6 GM/DL — SIGNIFICANT CHANGE UP (ref 32–36)
MCHC RBC-ENTMCNC: 33.7 GM/DL — SIGNIFICANT CHANGE UP (ref 32–36)
MCHC RBC-ENTMCNC: 33.8 GM/DL — SIGNIFICANT CHANGE UP (ref 32–36)
MCHC RBC-ENTMCNC: 34.2 GM/DL — SIGNIFICANT CHANGE UP (ref 32–36)
MCHC RBC-ENTMCNC: 35 GM/DL — SIGNIFICANT CHANGE UP (ref 32–36)
MCV RBC AUTO: 85 FL — SIGNIFICANT CHANGE UP (ref 80–100)
MCV RBC AUTO: 85.9 FL — SIGNIFICANT CHANGE UP (ref 80–100)
MCV RBC AUTO: 86.8 FL — SIGNIFICANT CHANGE UP (ref 80–100)
MCV RBC AUTO: 87.2 FL — SIGNIFICANT CHANGE UP (ref 80–100)
MCV RBC AUTO: 87.6 FL — SIGNIFICANT CHANGE UP (ref 80–100)
MCV RBC AUTO: 88 FL — SIGNIFICANT CHANGE UP (ref 80–100)
MONOCYTES # BLD AUTO: 0.45 K/UL — SIGNIFICANT CHANGE UP (ref 0–0.9)
MONOCYTES # BLD AUTO: 0.52 K/UL — SIGNIFICANT CHANGE UP (ref 0–0.9)
MONOCYTES NFR BLD AUTO: 11.7 % — SIGNIFICANT CHANGE UP (ref 2–14)
MONOCYTES NFR BLD AUTO: 8.7 % — SIGNIFICANT CHANGE UP (ref 2–14)
NEUTROPHILS # BLD AUTO: 2.16 K/UL — SIGNIFICANT CHANGE UP (ref 1.8–7.4)
NEUTROPHILS # BLD AUTO: 2.73 K/UL — SIGNIFICANT CHANGE UP (ref 1.8–7.4)
NEUTROPHILS NFR BLD AUTO: 48.7 % — SIGNIFICANT CHANGE UP (ref 43–77)
NEUTROPHILS NFR BLD AUTO: 52.6 % — SIGNIFICANT CHANGE UP (ref 43–77)
NITRITE UR-MCNC: NEGATIVE — SIGNIFICANT CHANGE UP
NON HDL CHOLESTEROL: 80 MG/DL — SIGNIFICANT CHANGE UP
NRBC # BLD: 0 /100 WBCS — SIGNIFICANT CHANGE UP
NRBC # FLD: 0 K/UL — SIGNIFICANT CHANGE UP
NT-PROBNP SERPL-SCNC: 595 PG/ML — HIGH
OB PNL STL: POSITIVE
PH UR: 6 — SIGNIFICANT CHANGE UP (ref 5–8)
PHOSPHATE SERPL-MCNC: 2.7 MG/DL — SIGNIFICANT CHANGE UP (ref 2.5–4.5)
PHOSPHATE SERPL-MCNC: 2.8 MG/DL — SIGNIFICANT CHANGE UP (ref 2.5–4.5)
PHOSPHATE SERPL-MCNC: 3.1 MG/DL — SIGNIFICANT CHANGE UP (ref 2.5–4.5)
PLATELET # BLD AUTO: 106 K/UL — LOW (ref 150–400)
PLATELET # BLD AUTO: 147 K/UL — LOW (ref 150–400)
PLATELET # BLD AUTO: 152 K/UL — SIGNIFICANT CHANGE UP (ref 150–400)
PLATELET # BLD AUTO: 153 K/UL — SIGNIFICANT CHANGE UP (ref 150–400)
PLATELET # BLD AUTO: 163 K/UL — SIGNIFICANT CHANGE UP (ref 150–400)
PLATELET # BLD AUTO: 178 K/UL — SIGNIFICANT CHANGE UP (ref 150–400)
POTASSIUM SERPL-MCNC: 3.8 MMOL/L — SIGNIFICANT CHANGE UP (ref 3.5–5.3)
POTASSIUM SERPL-MCNC: 3.8 MMOL/L — SIGNIFICANT CHANGE UP (ref 3.5–5.3)
POTASSIUM SERPL-MCNC: 4 MMOL/L — SIGNIFICANT CHANGE UP (ref 3.5–5.3)
POTASSIUM SERPL-MCNC: 4 MMOL/L — SIGNIFICANT CHANGE UP (ref 3.5–5.3)
POTASSIUM SERPL-SCNC: 3.8 MMOL/L — SIGNIFICANT CHANGE UP (ref 3.5–5.3)
POTASSIUM SERPL-SCNC: 3.8 MMOL/L — SIGNIFICANT CHANGE UP (ref 3.5–5.3)
POTASSIUM SERPL-SCNC: 4 MMOL/L — SIGNIFICANT CHANGE UP (ref 3.5–5.3)
POTASSIUM SERPL-SCNC: 4 MMOL/L — SIGNIFICANT CHANGE UP (ref 3.5–5.3)
PROT SERPL-MCNC: 7.2 G/DL — SIGNIFICANT CHANGE UP (ref 6–8.3)
PROT UR-MCNC: ABNORMAL
PROTHROM AB SERPL-ACNC: 12.9 SEC — SIGNIFICANT CHANGE UP (ref 10.6–13.6)
RBC # BLD: 4.12 M/UL — LOW (ref 4.2–5.8)
RBC # BLD: 4.18 M/UL — LOW (ref 4.2–5.8)
RBC # BLD: 4.2 M/UL — SIGNIFICANT CHANGE UP (ref 4.2–5.8)
RBC # BLD: 4.34 M/UL — SIGNIFICANT CHANGE UP (ref 4.2–5.8)
RBC # BLD: 4.6 M/UL — SIGNIFICANT CHANGE UP (ref 4.2–5.8)
RBC # BLD: 4.67 M/UL — SIGNIFICANT CHANGE UP (ref 4.2–5.8)
RBC # FLD: 14.3 % — SIGNIFICANT CHANGE UP (ref 10.3–14.5)
RBC # FLD: 14.4 % — SIGNIFICANT CHANGE UP (ref 10.3–14.5)
RBC # FLD: 14.4 % — SIGNIFICANT CHANGE UP (ref 10.3–14.5)
RBC CASTS # UR COMP ASSIST: 195 /HPF — HIGH (ref 0–4)
RH IG SCN BLD-IMP: POSITIVE — SIGNIFICANT CHANGE UP
SARS-COV-2 IGG+IGM SERPL QL IA: 66.9 U/ML — HIGH
SARS-COV-2 IGG+IGM SERPL QL IA: POSITIVE
SARS-COV-2 RNA SPEC QL NAA+PROBE: SIGNIFICANT CHANGE UP
SODIUM SERPL-SCNC: 139 MMOL/L — SIGNIFICANT CHANGE UP (ref 135–145)
SODIUM SERPL-SCNC: 139 MMOL/L — SIGNIFICANT CHANGE UP (ref 135–145)
SODIUM SERPL-SCNC: 141 MMOL/L — SIGNIFICANT CHANGE UP (ref 135–145)
SODIUM SERPL-SCNC: 142 MMOL/L — SIGNIFICANT CHANGE UP (ref 135–145)
SP GR SPEC: 1.03 — SIGNIFICANT CHANGE UP (ref 1–1.05)
SPECIMEN SOURCE: SIGNIFICANT CHANGE UP
TRIGL SERPL-MCNC: 121 MG/DL — SIGNIFICANT CHANGE UP
TROPONIN T, HIGH SENSITIVITY RESULT: 13 NG/L — SIGNIFICANT CHANGE UP
TROPONIN T, HIGH SENSITIVITY RESULT: 14 NG/L — SIGNIFICANT CHANGE UP
UROBILINOGEN FLD QL: SIGNIFICANT CHANGE UP
WBC # BLD: 4.3 K/UL — SIGNIFICANT CHANGE UP (ref 3.8–10.5)
WBC # BLD: 4.44 K/UL — SIGNIFICANT CHANGE UP (ref 3.8–10.5)
WBC # BLD: 4.45 K/UL — SIGNIFICANT CHANGE UP (ref 3.8–10.5)
WBC # BLD: 4.56 K/UL — SIGNIFICANT CHANGE UP (ref 3.8–10.5)
WBC # BLD: 4.84 K/UL — SIGNIFICANT CHANGE UP (ref 3.8–10.5)
WBC # BLD: 5.18 K/UL — SIGNIFICANT CHANGE UP (ref 3.8–10.5)
WBC # FLD AUTO: 4.3 K/UL — SIGNIFICANT CHANGE UP (ref 3.8–10.5)
WBC # FLD AUTO: 4.44 K/UL — SIGNIFICANT CHANGE UP (ref 3.8–10.5)
WBC # FLD AUTO: 4.45 K/UL — SIGNIFICANT CHANGE UP (ref 3.8–10.5)
WBC # FLD AUTO: 4.56 K/UL — SIGNIFICANT CHANGE UP (ref 3.8–10.5)
WBC # FLD AUTO: 4.84 K/UL — SIGNIFICANT CHANGE UP (ref 3.8–10.5)
WBC # FLD AUTO: 5.18 K/UL — SIGNIFICANT CHANGE UP (ref 3.8–10.5)
WBC UR QL: >720 /HPF — HIGH (ref 0–5)

## 2021-01-01 PROCEDURE — 71045 X-RAY EXAM CHEST 1 VIEW: CPT | Mod: 26

## 2021-01-01 PROCEDURE — 93010 ELECTROCARDIOGRAM REPORT: CPT

## 2021-01-01 PROCEDURE — 99232 SBSQ HOSP IP/OBS MODERATE 35: CPT | Mod: GC

## 2021-01-01 PROCEDURE — 99223 1ST HOSP IP/OBS HIGH 75: CPT

## 2021-01-01 PROCEDURE — 99239 HOSP IP/OBS DSCHRG MGMT >30: CPT

## 2021-01-01 PROCEDURE — 99285 EMERGENCY DEPT VISIT HI MDM: CPT | Mod: 25

## 2021-01-01 PROCEDURE — 12345: CPT | Mod: NC

## 2021-01-01 RX ORDER — ATORVASTATIN CALCIUM 80 MG/1
40 TABLET, FILM COATED ORAL AT BEDTIME
Refills: 0 | Status: DISCONTINUED | OUTPATIENT
Start: 2021-01-01 | End: 2021-01-01

## 2021-01-01 RX ORDER — GLUCAGON INJECTION, SOLUTION 0.5 MG/.1ML
1 INJECTION, SOLUTION SUBCUTANEOUS ONCE
Refills: 0 | Status: DISCONTINUED | OUTPATIENT
Start: 2021-01-01 | End: 2021-01-01

## 2021-01-01 RX ORDER — INFLUENZA VIRUS VACCINE 15; 15; 15; 15 UG/.5ML; UG/.5ML; UG/.5ML; UG/.5ML
0.5 SUSPENSION INTRAMUSCULAR ONCE
Refills: 0 | Status: DISCONTINUED | OUTPATIENT
Start: 2021-01-01 | End: 2021-01-01

## 2021-01-01 RX ORDER — DEXTROSE 50 % IN WATER 50 %
25 SYRINGE (ML) INTRAVENOUS ONCE
Refills: 0 | Status: DISCONTINUED | OUTPATIENT
Start: 2021-01-01 | End: 2021-01-01

## 2021-01-01 RX ORDER — INSULIN GLARGINE 100 [IU]/ML
30 INJECTION, SOLUTION SUBCUTANEOUS AT BEDTIME
Refills: 0 | Status: DISCONTINUED | OUTPATIENT
Start: 2021-01-01 | End: 2021-01-01

## 2021-01-01 RX ORDER — LOSARTAN POTASSIUM 100 MG/1
50 TABLET, FILM COATED ORAL DAILY
Refills: 0 | Status: DISCONTINUED | OUTPATIENT
Start: 2021-01-01 | End: 2021-01-01

## 2021-01-01 RX ORDER — ISOSORBIDE MONONITRATE 60 MG/1
60 TABLET, EXTENDED RELEASE ORAL DAILY
Refills: 0 | Status: DISCONTINUED | OUTPATIENT
Start: 2021-01-01 | End: 2021-01-01

## 2021-01-01 RX ORDER — DIPHENHYDRAMINE HCL 50 MG
25 CAPSULE ORAL ONCE
Refills: 0 | Status: COMPLETED | OUTPATIENT
Start: 2021-01-01 | End: 2021-01-01

## 2021-01-01 RX ORDER — METOPROLOL TARTRATE 50 MG
50 TABLET ORAL
Refills: 0 | Status: DISCONTINUED | OUTPATIENT
Start: 2021-01-01 | End: 2021-01-01

## 2021-01-01 RX ORDER — TAMSULOSIN HYDROCHLORIDE 0.4 MG/1
0.4 CAPSULE ORAL AT BEDTIME
Refills: 0 | Status: DISCONTINUED | OUTPATIENT
Start: 2021-01-01 | End: 2021-01-01

## 2021-01-01 RX ORDER — INSULIN GLARGINE 100 [IU]/ML
15 INJECTION, SOLUTION SUBCUTANEOUS AT BEDTIME
Refills: 0 | Status: DISCONTINUED | OUTPATIENT
Start: 2021-01-01 | End: 2021-01-01

## 2021-01-01 RX ORDER — DEXTROSE 50 % IN WATER 50 %
12.5 SYRINGE (ML) INTRAVENOUS ONCE
Refills: 0 | Status: DISCONTINUED | OUTPATIENT
Start: 2021-01-01 | End: 2021-01-01

## 2021-01-01 RX ORDER — ACETAMINOPHEN 500 MG
650 TABLET ORAL EVERY 6 HOURS
Refills: 0 | Status: DISCONTINUED | OUTPATIENT
Start: 2021-01-01 | End: 2021-01-01

## 2021-01-01 RX ORDER — HYDROCORTISONE 1 %
1 OINTMENT (GRAM) TOPICAL
Qty: 30 | Refills: 0
Start: 2021-01-01 | End: 2021-01-01

## 2021-01-01 RX ORDER — INSULIN LISPRO 100/ML
VIAL (ML) SUBCUTANEOUS
Refills: 0 | Status: DISCONTINUED | OUTPATIENT
Start: 2021-01-01 | End: 2021-01-01

## 2021-01-01 RX ORDER — ENOXAPARIN SODIUM 100 MG/ML
40 INJECTION SUBCUTANEOUS DAILY
Refills: 0 | Status: DISCONTINUED | OUTPATIENT
Start: 2021-01-01 | End: 2021-01-01

## 2021-01-01 RX ORDER — DEXTROSE 50 % IN WATER 50 %
15 SYRINGE (ML) INTRAVENOUS ONCE
Refills: 0 | Status: DISCONTINUED | OUTPATIENT
Start: 2021-01-01 | End: 2021-01-01

## 2021-01-01 RX ORDER — INFLUENZA VIRUS VACCINE 15; 15; 15; 15 UG/.5ML; UG/.5ML; UG/.5ML; UG/.5ML
0.7 SUSPENSION INTRAMUSCULAR ONCE
Refills: 0 | Status: COMPLETED | OUTPATIENT
Start: 2021-01-01 | End: 2021-01-01

## 2021-01-01 RX ORDER — HYDROCORTISONE 1 %
1 OINTMENT (GRAM) TOPICAL AT BEDTIME
Refills: 0 | Status: DISCONTINUED | OUTPATIENT
Start: 2021-01-01 | End: 2021-01-01

## 2021-01-01 RX ORDER — ASPIRIN/CALCIUM CARB/MAGNESIUM 324 MG
81 TABLET ORAL DAILY
Refills: 0 | Status: DISCONTINUED | OUTPATIENT
Start: 2021-01-01 | End: 2021-01-01

## 2021-01-01 RX ORDER — CIPROFLOXACIN LACTATE 400MG/40ML
400 VIAL (ML) INTRAVENOUS EVERY 12 HOURS
Refills: 0 | Status: DISCONTINUED | OUTPATIENT
Start: 2021-01-01 | End: 2021-01-01

## 2021-01-01 RX ORDER — INSULIN LISPRO 100/ML
VIAL (ML) SUBCUTANEOUS AT BEDTIME
Refills: 0 | Status: DISCONTINUED | OUTPATIENT
Start: 2021-01-01 | End: 2021-01-01

## 2021-01-01 RX ORDER — SODIUM CHLORIDE 9 MG/ML
1000 INJECTION, SOLUTION INTRAVENOUS
Refills: 0 | Status: DISCONTINUED | OUTPATIENT
Start: 2021-01-01 | End: 2021-01-01

## 2021-01-01 RX ORDER — INSULIN GLARGINE 100 [IU]/ML
0 INJECTION, SOLUTION SUBCUTANEOUS
Qty: 0 | Refills: 0 | DISCHARGE

## 2021-01-01 RX ORDER — CEFTRIAXONE 500 MG/1
1000 INJECTION, POWDER, FOR SOLUTION INTRAMUSCULAR; INTRAVENOUS ONCE
Refills: 0 | Status: COMPLETED | OUTPATIENT
Start: 2021-01-01 | End: 2021-01-01

## 2021-01-01 RX ADMIN — Medication 1 TABLET(S): at 05:52

## 2021-01-01 RX ADMIN — INSULIN GLARGINE 15 UNIT(S): 100 INJECTION, SOLUTION SUBCUTANEOUS at 02:45

## 2021-01-01 RX ADMIN — LOSARTAN POTASSIUM 50 MILLIGRAM(S): 100 TABLET, FILM COATED ORAL at 05:53

## 2021-01-01 RX ADMIN — INSULIN GLARGINE 30 UNIT(S): 100 INJECTION, SOLUTION SUBCUTANEOUS at 21:51

## 2021-01-01 RX ADMIN — Medication 50 MILLIGRAM(S): at 05:52

## 2021-01-01 RX ADMIN — ENOXAPARIN SODIUM 40 MILLIGRAM(S): 100 INJECTION SUBCUTANEOUS at 18:08

## 2021-01-01 RX ADMIN — Medication 81 MILLIGRAM(S): at 16:12

## 2021-01-01 RX ADMIN — Medication 1: at 08:55

## 2021-01-01 RX ADMIN — Medication 25 MILLIGRAM(S): at 23:44

## 2021-01-01 RX ADMIN — Medication 1: at 09:08

## 2021-01-01 RX ADMIN — Medication 3: at 12:52

## 2021-01-01 RX ADMIN — ISOSORBIDE MONONITRATE 60 MILLIGRAM(S): 60 TABLET, EXTENDED RELEASE ORAL at 11:27

## 2021-01-01 RX ADMIN — Medication 81 MILLIGRAM(S): at 11:26

## 2021-01-01 RX ADMIN — ISOSORBIDE MONONITRATE 60 MILLIGRAM(S): 60 TABLET, EXTENDED RELEASE ORAL at 11:53

## 2021-01-01 RX ADMIN — ATORVASTATIN CALCIUM 40 MILLIGRAM(S): 80 TABLET, FILM COATED ORAL at 21:19

## 2021-01-01 RX ADMIN — LOSARTAN POTASSIUM 50 MILLIGRAM(S): 100 TABLET, FILM COATED ORAL at 05:03

## 2021-01-01 RX ADMIN — LOSARTAN POTASSIUM 50 MILLIGRAM(S): 100 TABLET, FILM COATED ORAL at 05:51

## 2021-01-01 RX ADMIN — Medication 1 SUPPOSITORY(S): at 21:19

## 2021-01-01 RX ADMIN — Medication 200 MILLIGRAM(S): at 19:16

## 2021-01-01 RX ADMIN — Medication 81 MILLIGRAM(S): at 11:33

## 2021-01-01 RX ADMIN — Medication 50 MILLIGRAM(S): at 05:54

## 2021-01-01 RX ADMIN — Medication 50 MILLIGRAM(S): at 18:08

## 2021-01-01 RX ADMIN — CEFTRIAXONE 100 MILLIGRAM(S): 500 INJECTION, POWDER, FOR SOLUTION INTRAMUSCULAR; INTRAVENOUS at 22:41

## 2021-01-01 RX ADMIN — Medication 1 TABLET(S): at 17:56

## 2021-01-01 RX ADMIN — TAMSULOSIN HYDROCHLORIDE 0.4 MILLIGRAM(S): 0.4 CAPSULE ORAL at 21:19

## 2021-01-01 RX ADMIN — Medication 2: at 08:53

## 2021-01-01 RX ADMIN — Medication 3: at 17:33

## 2021-01-01 RX ADMIN — INSULIN GLARGINE 30 UNIT(S): 100 INJECTION, SOLUTION SUBCUTANEOUS at 22:28

## 2021-01-01 RX ADMIN — Medication 25 MILLIGRAM(S): at 19:59

## 2021-01-01 RX ADMIN — ATORVASTATIN CALCIUM 40 MILLIGRAM(S): 80 TABLET, FILM COATED ORAL at 21:26

## 2021-01-01 RX ADMIN — Medication 1 TABLET(S): at 05:54

## 2021-01-01 RX ADMIN — Medication 1 SUPPOSITORY(S): at 22:48

## 2021-01-01 RX ADMIN — TAMSULOSIN HYDROCHLORIDE 0.4 MILLIGRAM(S): 0.4 CAPSULE ORAL at 21:26

## 2021-01-01 RX ADMIN — Medication 3: at 12:57

## 2021-01-01 RX ADMIN — ISOSORBIDE MONONITRATE 60 MILLIGRAM(S): 60 TABLET, EXTENDED RELEASE ORAL at 11:33

## 2021-01-01 RX ADMIN — Medication 2: at 18:14

## 2021-01-01 RX ADMIN — Medication 50 MILLIGRAM(S): at 05:04

## 2021-09-20 NOTE — ED ADULT NURSE NOTE - OBJECTIVE STATEMENT
Pt received to room 15 AAO x 3 c/o BRBPR x 1 month intermittently with intermittent SOB. Pt not on blood thinners, denies dizziness. Labs sent, 18G placed to the left AC and pt breathing with ease on room aair at rest. Eval in progress.

## 2021-09-20 NOTE — ED ADULT TRIAGE NOTE - CHIEF COMPLAINT QUOTE
pt c/o lower back pain and dysuria beginning this morning. pt also reports 2 episodes of bright red blood from the rectum around 3pm this afternoon, no clots. denies chest pain, sob, n/v/d, fever. hx. DM, HTN, CAD, prostate CA no chemo.

## 2021-09-20 NOTE — ED PROVIDER NOTE - NSICDXPASTSURGICALHX_GEN_ALL_CORE_FT
PAST SURGICAL HISTORY:  S/P cholecystectomy     S/P coronary angioplasty 10 years ago stent placement

## 2021-09-20 NOTE — ED PROVIDER NOTE - CLINICAL SUMMARY MEDICAL DECISION MAKING FREE TEXT BOX
elderly well appearing male w/ brbpr, some exertional sob. VSS. Exam w ***** Concern for ***** elderly well appearing male w/ brbpr, some exertional sob. VSS. Exam w maroon colored blood per rectum and some internal hemorrhoids. Concern for blood loss anemia. Exertional sob could be 2/2 anemia or cardiac etiology. Will send labs, type, cxr, blood gas, ua/ucx, reassess.

## 2021-09-20 NOTE — ED PROVIDER NOTE - NSICDXPASTMEDICALHX_GEN_ALL_CORE_FT
PAST MEDICAL HISTORY:  CAD (coronary artery disease)     Diabetes mellitus     Hypercholesteremia     Hypertension     Prostate ca s/p SEED 5 yrs ago    Sleep apnea on cpap at home

## 2021-09-20 NOTE — ED PROVIDER NOTE - PHYSICAL EXAMINATION
Dressing General: NAD  HEENT: NCAT, PERRL  Cardiac: RRR, no murmurs, 2+ peripheral pulses  Chest: CTA  Abdomen: soft, non-distended, bowel sounds present, no ttp, no rebound or guarding  Rectal: Chaperoned by Dr. Jimenez........********  Extremities: no peripheral edema, calf tenderness, or leg size discrepancies  Skin: no rashes  Neuro: AAOx3, motor and sensory grossly intact  Psych: mood and affect appropriate

## 2021-09-20 NOTE — ED ADULT NURSE REASSESSMENT NOTE - NS ED NURSE REASSESS COMMENT FT1
Pt complaining of itchiness and red rashes on inner thigh post ceftriaxone, Md Pickett notified, will monitor pt, vss

## 2021-09-20 NOTE — ED PROVIDER NOTE - OBJECTIVE STATEMENT
80yo M hx of prostate ca, htn, hld, dm, cad comes to ED for brbpr. x2 weeks, brbpr when wiping, last week has had looser stools but not liquid, x2 formed bm per day. Also x1 week sob w/ exertion, no cp. Dysuria for last 2 days. Denies fevers, cp, abdominal pain. No thinners.

## 2021-09-20 NOTE — ED PROVIDER NOTE - ATTENDING CONTRIBUTION TO CARE
I have personally performed a face to face bedside history and physical examination of this patient. I have discussed the history, examination, review of systems, assessment and plan of management with the resident. I have reviewed the electronic medical record and amended it to reflect my history, review of systems, physical exam, assessment and plan.    Brief HPI:  80 yo M hx of prostate ca, htn, hld, dm, cad comes to ED for bright red blood per rectum for two weeks.  Patient on asa.  Reports blood on wiping while having bowel movements.  Denies abdominal pain.  Denies fever, chills, lightheadedness, palpitations, dizziness.      Vitals:   Reviewed    Exam:    GEN:  Non-toxic appearing, non-distressed, speaking full sentences, non-diaphoretic, AAOx3  HEENT:  NCAT, neck supple, EOMI, PERRLA, sclera anicteric, no conjunctival pallor or injection, no stridor, normal voice, no tonsillar exudate, uvula midline  CV:  regular rhythm and rate, s1/s2 audible, no murmurs, rubs or gallops, peripheral pulses 2+ and symmetric  PULM:  non-labored respirations, lungs clear to auscultation bilaterally, no wheezes, crackles or rales  ABD:  non distended, non-tender, no rebound, no guarding, negative Bonilla's sign, bowel sounds normal, no cvat  MSK:  no gross deformity, non-tender extremities and joints, range of motion grossly normal appearing, no extremity edema, extremities warm and well perfused   NEURO:  AAOx3, CN II-XII intact, motor 5/5 in upper and lower extremities bilaterally, sensation grossly intact in extremities and trunk  SKIN:  warm, dry, no rash or vesicles     A/P:  80 yo M hx of prostate ca, htn, hld, dm, cad comes to ED for bright red blood per rectum for two weeks.  VSS AF.  ARY with maroon stool.  abdomen non-tender.  Possible gi bleed, patient clinically stable.  Will send labs, supportive care.  Anticipate admission.

## 2021-09-21 NOTE — PROGRESS NOTE ADULT - SUBJECTIVE AND OBJECTIVE BOX
Patient:  CHIDI RUBIO  [id number]    Progress Note    Interval events: No acute events.  Pertinent ROS (if any):      Administered:  metoprolol tartrate: 50 milliGRAM(s) Oral (09-21 @ 05:04)  losartan: 50 milliGRAM(s) Oral (09-21 @ 05:03)  insulin glargine Injectable (LANTUS): 15 Unit(s) SubCutaneous (09-21 @ 02:45)  (Floorstock): 1 Each &lt;See Task&gt; (09-20 @ 23:46)  diphenhydrAMINE   Injectable: 25 milliGRAM(s) IV Push (09-20 @ 23:44)  (Floorstock): 1 Each &lt;See Task&gt; (09-20 @ 22:44)  cefTRIAXone   IVPB: 100 mL/Hr IV Intermittent (09-20 @ 22:41)        OBJECTIVE:    CAPILLARY BLOOD GLUCOSE      POCT Blood Glucose.: 170 mg/dL (21 Sep 2021 02:07)        VITALS:  T(F): 98.1 (09-21-21 @ 05:48), Max: 98.1 (09-21-21 @ 03:07)  HR: 70 (09-21-21 @ 07:05) (55 - 70)  BP: 148/72 (09-21-21 @ 05:48) (123/52 - 164/75)  BP(mean): --  ABP: --  ABP(mean): --  RR: 17 (09-21-21 @ 05:48) (14 - 17)  SpO2: 98% (09-21-21 @ 07:05) (98% - 100%)    PHYSICAL EXAM:  GENERAL: NAD, lying in bed comfortably  HEAD:  Atraumatic, Normocephalic  EYES: EOMI, PERRLA, conjunctiva and sclera clear  ENT: Moist mucous membranes  NECK: Supple, No JVD  CHEST/LUNG: Clear to auscultation bilaterally; No rales, rhonchi, wheezing, or rubs. Unlabored respirations  HEART: Regular rate and rhythm; No murmurs, rubs, or gallops  ABDOMEN: Bowel sounds present; Soft, Nontender, Nondistended. No hepatomegaly  EXTREMITIES:  2+ Peripheral Pulses, brisk capillary refill. No clubbing, cyanosis, or edema  NERVOUS SYSTEM:  Alert & Oriented X3, speech clear. No deficits   MSK: FROM all 4 extremities, full and equal strength  SKIN: No rashes or lesions    HOSPITAL MEDICATIONS:  Standing Meds:  atorvastatin 40 milliGRAM(s) Oral at bedtime  ciprofloxacin   IVPB 400 milliGRAM(s) IV Intermittent every 12 hours  dextrose 40% Gel 15 Gram(s) Oral once  dextrose 5%. 1000 milliLiter(s) IV Continuous <Continuous>  dextrose 5%. 1000 milliLiter(s) IV Continuous <Continuous>  dextrose 50% Injectable 25 Gram(s) IV Push once  dextrose 50% Injectable 12.5 Gram(s) IV Push once  dextrose 50% Injectable 25 Gram(s) IV Push once  glucagon  Injectable 1 milliGRAM(s) IntraMuscular once  influenza  Vaccine (HIGH DOSE) 0.7 milliLiter(s) IntraMuscular once  insulin glargine Injectable (LANTUS) 15 Unit(s) SubCutaneous at bedtime  insulin lispro (ADMELOG) corrective regimen sliding scale   SubCutaneous three times a day before meals  insulin lispro (ADMELOG) corrective regimen sliding scale   SubCutaneous at bedtime  isosorbide   mononitrate ER Tablet (IMDUR) 60 milliGRAM(s) Oral daily  losartan 50 milliGRAM(s) Oral daily  metoprolol tartrate 50 milliGRAM(s) Oral two times a day  tamsulosin 0.4 milliGRAM(s) Oral at bedtime      PRN Meds:  acetaminophen   Tablet .. 650 milliGRAM(s) Oral every 6 hours PRN      LABS:  CBC 09-21-21 @ 06:49                        12.8   4.45  )-----------( 106                   38.2       Hgb trend: 12.8 <-- , 13.7 <--   WBC trend: 4.45 <-- , 5.18 <--       CMP 09-21-21 @ 06:49    139  |  108<H>  |  14  ----------------------------<  288<H>  3.8   |  24  |  0.81    Ca    9.3      09-21-21 @ 06:49  Phos  2.7     09-21  Mg     1.90     09-21    TPro  7.2  /  Alb  4.1  /  TBili  0.4  /  DBili  x   /  AST  23  /  ALT  18  /  AlkPhos  77  09-20      Serum Cr trend: 0.81 <-- , 0.90 <--     PT/INR - ( 21 Sep 2021 06:49 )   PT: 12.9 sec;   INR: 1.14 ratio           Urinalysis Basic - ( 20 Sep 2021 21:35 )    Color: x / Appearance: x / SG: x / pH: x  Gluc: x / Ketone: x  / Bili: x / Urobili: x   Blood: x / Protein: x / Nitrite: x   Leuk Esterase: x / RBC: 195 /HPF / WBC >720 /HPF   Sq Epi: x / Non Sq Epi: 1 /HPF / Bacteria: Negative      ABG Trend:     VBG Trend:       MICROBIOLOGY:       RADIOLOGY:  [ ] Reviewed and interpreted by me    EKG:   Patient:  CHIDI RUBIO  [id number]    Progress Note    Interval events: No acute events. Patient reports dysuria, burning on urination. Endorses BRBPR with wiping. States back pain is constant, left sided without changes. States he is aware of reasons for hospitalization and will stay hospitalized. He asks for referral to urology for remote history of prostate cancer. He answers for referral to PMD for erectile dysfunction. He denies f/c/n/v/cp/ap/headache.  Pertinent ROS (if any):      Administered:  metoprolol tartrate: 50 milliGRAM(s) Oral (09-21 @ 05:04)  losartan: 50 milliGRAM(s) Oral (09-21 @ 05:03)  insulin glargine Injectable (LANTUS): 15 Unit(s) SubCutaneous (09-21 @ 02:45)  (Floorstock): 1 Each &lt;See Task&gt; (09-20 @ 23:46)  diphenhydrAMINE   Injectable: 25 milliGRAM(s) IV Push (09-20 @ 23:44)  (Floorstock): 1 Each &lt;See Task&gt; (09-20 @ 22:44)  cefTRIAXone   IVPB: 100 mL/Hr IV Intermittent (09-20 @ 22:41)        OBJECTIVE:    CAPILLARY BLOOD GLUCOSE      POCT Blood Glucose.: 170 mg/dL (21 Sep 2021 02:07)      VITALS:  T(F): 98.1 (09-21-21 @ 05:48), Max: 98.1 (09-21-21 @ 03:07)  HR: 70 (09-21-21 @ 07:05) (55 - 70)  BP: 148/72 (09-21-21 @ 05:48) (123/52 - 164/75)  BP(mean): --  ABP: --  ABP(mean): --  RR: 17 (09-21-21 @ 05:48) (14 - 17)  SpO2: 98% (09-21-21 @ 07:05) (98% - 100%)    CONSTITUTIONAL: No weakness, fevers or chills  EYES/ENT: No visual changes;  No dysphagia  NECK: No pain or stiffness  RESPIRATORY: +dyspnea, No cough, wheezing, hemoptysis  CARDIOVASCULAR: No chest pain or palpitations; No lower extremity edema  GASTROINTESTINAL: +BRBPR, No abdominal or epigastric pain. No nausea, vomiting, or hematemesis; No diarrhea or constipation. No melena   GENITOURINARY: +dysuria, pelvic pain. no frequency or hematuria  BACK: +back pain  NEUROLOGICAL: No numbness or weakness  SKIN: No itching, burning, rashes, or lesions    HOSPITAL MEDICATIONS:  Standing Meds:  atorvastatin 40 milliGRAM(s) Oral at bedtime  ciprofloxacin   IVPB 400 milliGRAM(s) IV Intermittent every 12 hours  dextrose 40% Gel 15 Gram(s) Oral once  dextrose 5%. 1000 milliLiter(s) IV Continuous <Continuous>  dextrose 5%. 1000 milliLiter(s) IV Continuous <Continuous>  dextrose 50% Injectable 25 Gram(s) IV Push once  dextrose 50% Injectable 12.5 Gram(s) IV Push once  dextrose 50% Injectable 25 Gram(s) IV Push once  glucagon  Injectable 1 milliGRAM(s) IntraMuscular once  influenza  Vaccine (HIGH DOSE) 0.7 milliLiter(s) IntraMuscular once  insulin glargine Injectable (LANTUS) 15 Unit(s) SubCutaneous at bedtime  insulin lispro (ADMELOG) corrective regimen sliding scale   SubCutaneous three times a day before meals  insulin lispro (ADMELOG) corrective regimen sliding scale   SubCutaneous at bedtime  isosorbide   mononitrate ER Tablet (IMDUR) 60 milliGRAM(s) Oral daily  losartan 50 milliGRAM(s) Oral daily  metoprolol tartrate 50 milliGRAM(s) Oral two times a day  tamsulosin 0.4 milliGRAM(s) Oral at bedtime      PRN Meds:  acetaminophen   Tablet .. 650 milliGRAM(s) Oral every 6 hours PRN      LABS:  CBC 09-21-21 @ 06:49                        12.8   4.45  )-----------( 106                   38.2       Hgb trend: 12.8 <-- , 13.7 <--   WBC trend: 4.45 <-- , 5.18 <--       CMP 09-21-21 @ 06:49    139  |  108<H>  |  14  ----------------------------<  288<H>  3.8   |  24  |  0.81    Ca    9.3      09-21-21 @ 06:49  Phos  2.7     09-21  Mg     1.90     09-21    TPro  7.2  /  Alb  4.1  /  TBili  0.4  /  DBili  x   /  AST  23  /  ALT  18  /  AlkPhos  77  09-20      Serum Cr trend: 0.81 <-- , 0.90 <--     PT/INR - ( 21 Sep 2021 06:49 )   PT: 12.9 sec;   INR: 1.14 ratio           Urinalysis Basic - ( 20 Sep 2021 21:35 )    Color: x / Appearance: x / SG: x / pH: x  Gluc: x / Ketone: x  / Bili: x / Urobili: x   Blood: x / Protein: x / Nitrite: x   Leuk Esterase: x / RBC: 195 /HPF / WBC >720 /HPF   Sq Epi: x / Non Sq Epi: 1 /HPF / Bacteria: Negative      ABG Trend:     VBG Trend:       MICROBIOLOGY:       RADIOLOGY:  [ ] Reviewed and interpreted by me    EKG:

## 2021-09-21 NOTE — H&P ADULT - ASSESSMENT
80 y/o male with pmhx prostate ca s/p Seed implant, HTN, DM2 on insulin, CAD s/p stent (last one about 1 yr ago) and JACKELIN on cpap presents to the hospital with brbpr for the last 2 weeks when wiping as well as dysuria/pelvic discomfort when urinating for the last two days and SOB today. to be admitted for further management.

## 2021-09-21 NOTE — PROGRESS NOTE ADULT - PROBLEM SELECTOR PLAN 1
positive UA on arrival. s/p ceftriaxone in ER but with development of rash, s/p benadryl and no rash present on my eval.  -will switch to ciprofloxacin BID for now and f/u cultures - Patient presenting with 2 week history of dysuria/pelvic discomfort with urination  - In ED: VSS, labs notable for positive UA on arrival  - F/U UCx  - s/p Ceftriaxone in ER but with development of rash, s/p benadryl and no rash present on AM evaluation  - Plan to switch to Ciprofloxacin 400mg BID for now and f/u cultures

## 2021-09-21 NOTE — H&P ADULT - PROBLEM SELECTOR PLAN 6
s/p seed implants, reportedly no recent f/u. Will need collateral from PCP or urology in AM. Given back pain reported (unclear if related to UTI/cva tenderness), will obtain lumbar xray for now  -resume flomax

## 2021-09-21 NOTE — H&P ADULT - NSHPLABSRESULTS_GEN_ALL_CORE
09-20    139  |  103  |  17  ----------------------------<  287<H>  4.0   |  25  |  0.90    Ca    9.3      20 Sep 2021 20:29    TPro  7.2  /  Alb  4.1  /  TBili  0.4  /  DBili  x   /  AST  23  /  ALT  18  /  AlkPhos  77  09-20                            13.7   5.18  )-----------( 178      ( 20 Sep 2021 20:29 )             40.7             LIVER FUNCTIONS - ( 20 Sep 2021 20:29 )  Alb: 4.1 g/dL / Pro: 7.2 g/dL / ALK PHOS: 77 U/L / ALT: 18 U/L / AST: 23 U/L / GGT: x                 Urinalysis Basic - ( 20 Sep 2021 21:35 )    Color: x / Appearance: x / SG: x / pH: x  Gluc: x / Ketone: x  / Bili: x / Urobili: x   Blood: x / Protein: x / Nitrite: x   Leuk Esterase: x / RBC: 195 /HPF / WBC >720 /HPF   Sq Epi: x / Non Sq Epi: 1 /HPF / Bacteria: Negative      CXR: FINDINGS:    The lungs are clear. The left costophrenic angle and lateral diaphragm are not well visualized, likely secondary to overlapping soft tissue. No pleural effusion or pneumothorax.  Heart size cannot be accurately assessed on this portable projection.  Degenerative changes of the spine and shoulders.    IMPRESSION:  No acute pulmonary findings.    EKG: sinus bradycardia with nonspecific twi III, avF

## 2021-09-21 NOTE — H&P ADULT - PROBLEM SELECTOR PLAN 5
elevated sugars on arrival. pt and son report basaglar 30 units qhs but ?unsure. Will start lantus at 15 units for now and sliding scale. check a1c

## 2021-09-21 NOTE — H&P ADULT - NSHPREVIEWOFSYSTEMS_GEN_ALL_CORE
REVIEW OF SYSTEMS:    CONSTITUTIONAL: No weakness, fevers or chills  EYES/ENT: No visual changes;  No dysphagia  NECK: No pain or stiffness  RESPIRATORY: +dyspnea, No cough, wheezing, hemoptysis  CARDIOVASCULAR: No chest pain or palpitations; No lower extremity edema  GASTROINTESTINAL: +BRBPR, No abdominal or epigastric pain. No nausea, vomiting, or hematemesis; No diarrhea or constipation. No melena   GENITOURINARY: +dysuria, pelvic pain. no frequency or hematuria  BACK: +back pain  NEUROLOGICAL: No numbness or weakness  SKIN: No itching, burning, rashes, or lesions

## 2021-09-21 NOTE — PROGRESS NOTE ADULT - PROBLEM SELECTOR PLAN 4
Pt with recent stent placed 1 year ago and reportedly has 4 stents total. On asa/plavix. Will hold for now and restart if hb in Am stable (if does not need GI eval)  -no chest pain and no SOB currently  -Trops stable  -continue statin, betablocker, ARB, imdur - Patient with history of 4 stents placed, most recently 1 year ago. On ASA/Plavix. Will hold for now and restart if Hb stable in AM. On exam, patient currently w/o CP or SOB  - Trops negative x2  - C/W statin, betablocker, ARB, imdur  - Will speak to PMD about possible history of ?HF - Patient with history of 4 stents placed, most recently 1 year ago. On ASA/Plavix. Will hold for now and restart if Hb stable in AM. On exam, patient currently w/o CP or SOB  - Trops negative x2  - C/W statin, betablocker, ARB, imdur  - Spoke with PMD (Dr. Limon): no hx of hf, unable to obtain recent echo/ef

## 2021-09-21 NOTE — PROGRESS NOTE ADULT - ASSESSMENT
80 y/o male with pmhx prostate ca s/p Seed implant, HTN, DM2 on insulin, CAD s/p stent (last one about 1 yr ago) and JACKELIN on cpap presents to the hospital with brbpr for the last 2 weeks when wiping as well as dysuria/pelvic discomfort when urinating for the last two days and SOB today. to be admitted for further management.  82 y/o male with pmhx prostate ca s/p Seed implant, HTN, DM2 on insulin, CAD s/p stent (last one about 1 yr ago) and JACKELIN on cpap presents to the hospital with brbpr for the last 2 weeks when wiping as well as dysuria/pelvic discomfort when urinating for the last two days, admitted for UTI and likely internal hemmoroids.

## 2021-09-21 NOTE — H&P ADULT - PROBLEM SELECTOR PLAN 2
possibly from internal hemrrhoids. hb stable. No colonoscoy in 10 years, if cbc stable, can probably have GI eval outpatient.  -trend cbc

## 2021-09-21 NOTE — PROGRESS NOTE ADULT - PROBLEM SELECTOR PLAN 2
possibly from internal hemrrhoids. hb stable. No colonoscoy in 10 years, if cbc stable, can probably have GI eval outpatient.  -trend cbc - At presentation, patient endorsing 2 week history of BRBPR with wiping, especially after episodes of hard stooling, with occasional sharp pain white wiping, likely 2/2 itnernal hemorrhoids. Low likelihood for GI bleed at this time  - In ED: Hg 13.7 ->12.8, however Hb stable on repeat CBC (13.5). s/p 1L IVF in ED  - Patient's last colonscopy 10 years ago, likely requires OP GI follow-up for surveillance colonscipy.  - Trend CBC

## 2021-09-21 NOTE — H&P ADULT - PROBLEM SELECTOR PLAN 4
Pt with recent stent placed 1 year ago and reportedly has 4 stents total. On asa/plavix. Will hold for now and restart if hb in Am stable (if does not need GI eval)  -no chest pain and no SOB currently  -Trops stable  -continue statin, betablocker, ARB, imdur

## 2021-09-21 NOTE — H&P ADULT - PROBLEM SELECTOR PLAN 3
No current SOB. on room air satting well. Trops flat. cxr clear. from long standing JACKELIN vs stable angina vs less likely from anemia.  -resume cpap, sob possibly worsened by UTI?  -physical therapy eval  -stress test outpatient  -no chest pain

## 2021-09-21 NOTE — H&P ADULT - PROBLEM SELECTOR PLAN 1
positive UA on arrival. s/p ceftriaxone in ER but with development of rash, s/p benadryl and no rash present on my eval.  -will switch to ciprofloxacin BID for now and f/u cultures

## 2021-09-21 NOTE — PROGRESS NOTE ADULT - PROBLEM SELECTOR PLAN 3
No current SOB. on room air satting well. Trops flat. cxr clear. from long standing JACKELIN vs stable angina vs less likely from anemia.  -resume cpap, sob possibly worsened by UTI?  -physical therapy eval  -stress test outpatient  -no chest pain - Patient endorses long standing history of SOB while walking 50+ feet, up stairs, however no recent changes and no SOB at rest. Chronic SOB likely from JACKELIN vs. stable angina  - In ED: satting well on RA, labs: trops: negative. Imaging: CXR negative  - Patient with history of long-standing JACKELIN  - Will resume CPAP after speaking to PMD  - Stress test OP

## 2021-09-21 NOTE — PROGRESS NOTE ADULT - PROBLEM SELECTOR PLAN 8
pt uses CPAP at night, unsure what his settings are. Will start at at 8 for now. Confirm in Am - Restart CPAP, will obtain setting from PMD

## 2021-09-21 NOTE — PROGRESS NOTE ADULT - PROBLEM SELECTOR PLAN 9
SCDs for now, can start chemical ppx if hb stable, diabetic diet DVT: SCD   Diet: CC  Dispo: PT eval DVT: Lovenox  Diet: CC  Dispo: PT eval

## 2021-09-21 NOTE — H&P ADULT - NSHPPHYSICALEXAM_GEN_ALL_CORE
PHYSICAL EXAM:  GENERAL: NAD, well-developed, well-nourished  HEAD:  Atraumatic, Normocephalic  EYES: EOMI, PERRLA, conjunctiva and sclera clear  NECK: Supple, No JVD  CHEST/LUNG: Clear to auscultation bilaterally; No wheezes, rales or rhonchi  HEART: Regular rate and rhythm; No murmurs, rubs, or gallops, (+)S1, S2  ABDOMEN: Soft, Nontender, Nondistended; Normal Bowel sounds   EXTREMITIES: no LE edema b/l  PSYCH: normal mood and affect  NEUROLOGY: AAOx3, strength 5/5 b/l UE and LE  MSK: +lumbar back pain ttp  SKIN: No rashes or lesions

## 2021-09-21 NOTE — PROGRESS NOTE ADULT - PROBLEM SELECTOR PLAN 6
s/p seed implants, reportedly no recent f/u. Will need collateral from PCP or urology in AM. Given back pain reported (unclear if related to UTI/cva tenderness), will obtain lumbar xray for now  -resume flomax - s/p seed implants, reportedly no recent f/u  - REsume flox  - Will obtain OP Urology f/u - s/p seed implants, reportedly no recent f/u  - Resume flox  - Will obtain OP Urology f/u

## 2021-09-21 NOTE — H&P ADULT - HISTORY OF PRESENT ILLNESS
Pt is an 80 y/o male with pmhx prostate ca s/p Seed implant, HTN, DM2 on insulin, CAD s/p stent (last one about 1 yr ago) and JACKELIN on cpap presents to the hospital with brbpr for the last 2 weeks when wiping as well as dysuria/pelvic discomfort when urinating for the last two days and SOB today. Collateral obtained from pt's son. Pt had no fever, chills, cough, chest pain or LE edema reported. He endorses that he at times will get random SOB but that today was more persistent for some reason. He denies any CP and can generally walk around fine with no pain. He has been having looser stools as well. Last colonoscopy about 10 years ago was normal. Pt takes Asa/plavix at home for his CAD. In the ER received ceftriaxone with a rash appearing on his leg and s/p benadryl with improvment.

## 2021-09-22 NOTE — DISCHARGE NOTE PROVIDER - CARE PROVIDER_API CALL
STACY CARRILLO  Internal Medicine  [address] [place] [address]  Phone: [phone]  Fax: [phone]  Established Patient  Follow Up Time: 2 weeks

## 2021-09-22 NOTE — PHYSICAL THERAPY INITIAL EVALUATION ADULT - GAIT DEVIATIONS NOTED, PT EVAL
decreased nikolas/increased time in double stance/decreased velocity of limb motion/decreased weight-shifting ability

## 2021-09-22 NOTE — PROGRESS NOTE ADULT - PROBLEM SELECTOR PLAN 1
- Patient presenting with 2 week history of dysuria/pelvic discomfort with urination  - In ED: VSS, labs notable for positive UA on arrival  - UA: + LE, bacteria, WBC  - F/U UCx  - s/p Ceftriaxone in ED and Cipro on floor but with development of rash, s/p benadryl and no rash present evaluation  - C/W Bactrim

## 2021-09-22 NOTE — PROVIDER CONTACT NOTE (OTHER) - ASSESSMENT
Pt is asymptomatic. Will continue to monitor.
Pt is asymptomatic. Will continue to monitor.
left ac arm slight redness noted
pt stable no signs of acute distress noted
pt with diastolic of 59

## 2021-09-22 NOTE — PROGRESS NOTE ADULT - PROBLEM SELECTOR PLAN 3
- Patient endorses long standing history of SOB while walking 50+ feet, up stairs, however no recent changes and no SOB at rest. Chronic SOB likely from JACKELIN vs. stable angina  - In ED: satting well on RA, labs: trops: negative. Imaging: CXR negative  - Patient with history of long-standing JACKELIN  - C/W CPAP  - Stress test OP

## 2021-09-22 NOTE — PROVIDER CONTACT NOTE (OTHER) - ACTION/TREATMENT ORDERED:
dr. nunez notified, benadryl iv given. cipro abx stopped. will continue  to monitor
MD notified, will continue to monitor
MD notified. Will continue to monitor closely
MD notified. Will continue to monitor.
MD notified. will continue to monitor closely

## 2021-09-22 NOTE — PROGRESS NOTE ADULT - ASSESSMENT
82 y/o male with pmhx prostate ca s/p Seed implant, HTN, DM2 on insulin, CAD s/p stent (last one about 1 yr ago) and JACKELIN on cpap presents to the hospital with brbpr for the last 2 weeks when wiping as well as dysuria/pelvic discomfort when urinating for the last two days, admitted for UTI and likely internal hemmoroids.

## 2021-09-22 NOTE — PHYSICAL THERAPY INITIAL EVALUATION ADULT - PERTINENT HX OF CURRENT PROBLEM, REHAB EVAL
patient admitted for BRBPR and dysuria. As per chart likely with internal hemorrhoids and UTI. PMH listed below

## 2021-09-22 NOTE — PROVIDER CONTACT NOTE (OTHER) - SITUATION
pt with diastolic of 57
pt with diastolic of 59
Pt diastolic 54.
Pt /53
pt c/o of itching / rash at IV site right after Cipro Abx was given

## 2021-09-22 NOTE — PROVIDER CONTACT NOTE (OTHER) - RECOMMENDATIONS
continue to monitor
stop iv abx, benadryl
Hold metoprolol for parameters.
will continue to monitor closely

## 2021-09-22 NOTE — PROGRESS NOTE ADULT - PROBLEM SELECTOR PLAN 2
- At presentation, patient endorsing 2 week history of BRBPR with wiping, especially after episodes of hard stooling, with occasional sharp pain white wiping, likely 2/2 itnernal hemorrhoids. Low likelihood for GI bleed at this time  - In ED: Hg 13.7 ->12.8, however Hb stable on repeat CBC (13.5). s/p 1L IVF in ED  - Patient's last colonscopy 10 years ago, likely requires OP GI follow-up for surveillance colonscipy.  - Trend CBC

## 2021-09-22 NOTE — PROGRESS NOTE ADULT - PROBLEM SELECTOR PLAN 4
- Patient with history of 4 stents placed, most recently 1 year ago. On ASA/Plavix. Will hold for now and restart if Hb stable in AM. On exam, patient currently w/o CP or SOB  - Trops negative x2  - C/W statin, betablocker, ARB, imdur  - Spoke with PMD (Dr. Limon): no hx of hf, unable to obtain recent echo/ef - Patient with history of 4 stents placed, most recently 1 year ago. On ASA  On exam, patient currently w/o CP or SOB  - Trops negative x2  - C/W statin, betablocker, ARB, imdur  - Spoke with PMD (Dr. Limon): no hx of hf, unable to obtain recent echo/ef

## 2021-09-22 NOTE — PROGRESS NOTE ADULT - PROBLEM SELECTOR PLAN 5
Patient with elevated sugars on arrival  - Patient and son report basaglar 30 units qhs but ?unsure. Will start lantus at 15 units for now and sliding scale  - A1c: 8.8  - C/W home dose 30 units lantus bedtime

## 2021-09-22 NOTE — DISCHARGE NOTE PROVIDER - NSDCCPCAREPLAN_GEN_ALL_CORE_FT
PRINCIPAL DISCHARGE DIAGNOSIS  Diagnosis: UTI (urinary tract infection)  Assessment and Plan of Treatment: You were admitted to the hospital for medical management of a UTI, as confirmed by a positive urinalysis. UTI symptoms include pain with urination, blood with urination, and frequent urination. YOu intiially received IV Ceftriaxone, which resulted in an allergic reaction. You were transitioned to IV Ciprofloxacin which also resulted in a simular allergic reaction (redness, itchyness, hives) at IV site and back of next. Both allergic reactions resolved with benadryl. You were transitioned to Bactrim, which you tolerated well without reaction. You will continue on this medication after discharge for a set amount of time.   If you develop sudden worsening of your UTI symptoms despite antibiotic treatment, or pain in the area of your kidneys and continued UTI symptoms, please report to your primary medical doctor or the hospital for continued testing, treatment, and care. Follow up with your PMD within 2 weeks of discharge.      SECONDARY DISCHARGE DIAGNOSES  Diagnosis: Hematochezia  Assessment and Plan of Treatment: You noted several weeks of bright red blood after wiping after a bowel movement. You noted pain with defecation, especially while constipated. This blood in stool (or "hematochezia") is likely due to internal hemmorhoids. These hemorroids are irritated with stooling, resulting in blood on the toilet paper when wiping. It is recommended that you continue supporitve care for this condition (laxatives, topical creams, high fiber diet, and gentle wiping).     PRINCIPAL DISCHARGE DIAGNOSIS  Diagnosis: UTI (urinary tract infection)  Assessment and Plan of Treatment: You were admitted to the hospital for medical management of a UTI, as confirmed by a positive urinalysis. UTI symptoms include pain with urination, blood with urination, and frequent urination. YOu intiially received IV Ceftriaxone, which resulted in an allergic reaction. You were transitioned to IV Ciprofloxacin which also resulted in a simular allergic reaction (redness, itchyness, hives) at IV site and back of next. Both allergic reactions resolved with benadryl. You were transitioned to Bactrim, which you tolerated well without reaction. You will continue on this medication after discharge for a set amount of time.   If you develop sudden worsening of your UTI symptoms despite antibiotic treatment, or pain in the area of your kidneys and continued UTI symptoms, please report to your primary medical doctor or the hospital for continued testing, treatment, and care. Follow up with your PMD within 2 weeks of discharge.  Please continue your antibiotics until 9/27      SECONDARY DISCHARGE DIAGNOSES  Diagnosis: Hematochezia  Assessment and Plan of Treatment: You noted several weeks of bright red blood after wiping after a bowel movement. You noted pain with defecation, especially while constipated. This blood in stool (or "hematochezia") is likely due to internal hemmorhoids. These hemorroids are irritated with stooling, resulting in blood on the toilet paper when wiping. It is recommended that you continue supporitve care for this condition (laxatives, topical creams, high fiber diet, and gentle wiping).

## 2021-09-22 NOTE — PROVIDER CONTACT NOTE (OTHER) - BACKGROUND
Pt admitted with GI Bleed. + UTI
pt admitted with GI bleed, + uti
Pt admitted with GI Bleed. + for UTI.
pt admitted with GI bleed
pt admitted with GI bleed

## 2021-09-22 NOTE — PROGRESS NOTE ADULT - SUBJECTIVE AND OBJECTIVE BOX
Pt states last meal was last noc Patient:  CHIDI RUBIO  [id number]    Progress Note    Interval events: O/N, patient developed erythema/pruritis at IV site of cipro and r. side of neck, improved with benadryl. Noted reaction similar to ED allergic reaction to Ceftriaxone. Patient was transitioned to Bactrim PO. Otherwise, no other complaints, tolerating new medication well. Patient denies n/v/f/c/cp/sob/ap/bowel or bladder dysfunction.    Pertinent ROS (if any):      Administered:  trimethoprim  160 mG/sulfamethoxazole 800 m Tablet(s) Oral ( @ 05:54)  metoprolol tartrate: 50 milliGRAM(s) Oral ( @ 05:54)  losartan: 50 milliGRAM(s) Oral ( @ 05:53)  hydrocortisone hemorrhoidal Suppository: 1 Suppository(s) Rectal ( @ 22:48)  insulin glargine Injectable (LANTUS): 30 Unit(s) SubCutaneous ( @ 22:28)  tamsulosin: 0.4 milliGRAM(s) Oral ( @ 21:26)  atorvastatin: 40 milliGRAM(s) Oral ( @ 21:26)  diphenhydrAMINE   Injectable: 25 milliGRAM(s) IV Push ( @ 19:59)        OBJECTIVE:    CAPILLARY BLOOD GLUCOSE      POCT Blood Glucose.: 227 mg/dL (21 Sep 2021 22:22)        VITALS:  T(F): 97.6 (21 @ 05:55), Max: 98.4 (21 @ 14:00)  HR: 60 (21 @ 05:55) (60 - 66)  BP: 146/71 (21 @ 05:55) (114/59 - 146/71)  BP(mean): --  ABP: --  ABP(mean): --  RR: 18 (21 @ 05:55) (17 - 18)  SpO2: 100% (21 @ 05:55) (99% - 100%)    CONSTITUTIONAL: No weakness, fevers or chills  EYES/ENT: No visual changes;  No dysphagia  NECK: No pain or stiffness  RESPIRATORY: +dyspnea, No cough, wheezing, hemoptysis  CARDIOVASCULAR: No chest pain or palpitations; No lower extremity edema  GASTROINTESTINAL: +BRBPR, No abdominal or epigastric pain. No nausea, vomiting, or hematemesis; No diarrhea or constipation. No melena   GENITOURINARY: +dysuria, pelvic pain. no frequency or hematuria  BACK: +back pain  NEUROLOGICAL: No numbness or weakness  SKIN: No itching, burning, rashes, or lesions    HOSPITAL MEDICATIONS:  Standing Meds:  aspirin  chewable 81 milliGRAM(s) Oral daily  atorvastatin 40 milliGRAM(s) Oral at bedtime  dextrose 40% Gel 15 Gram(s) Oral once  dextrose 5%. 1000 milliLiter(s) IV Continuous <Continuous>  dextrose 5%. 1000 milliLiter(s) IV Continuous <Continuous>  dextrose 50% Injectable 25 Gram(s) IV Push once  dextrose 50% Injectable 12.5 Gram(s) IV Push once  dextrose 50% Injectable 25 Gram(s) IV Push once  enoxaparin Injectable 40 milliGRAM(s) SubCutaneous daily  glucagon  Injectable 1 milliGRAM(s) IntraMuscular once  hydrocortisone hemorrhoidal Suppository 1 Suppository(s) Rectal at bedtime  influenza  Vaccine (HIGH DOSE) 0.7 milliLiter(s) IntraMuscular once  insulin glargine Injectable (LANTUS) 30 Unit(s) SubCutaneous at bedtime  insulin lispro (ADMELOG) corrective regimen sliding scale   SubCutaneous three times a day before meals  insulin lispro (ADMELOG) corrective regimen sliding scale   SubCutaneous at bedtime  isosorbide   mononitrate ER Tablet (IMDUR) 60 milliGRAM(s) Oral daily  losartan 50 milliGRAM(s) Oral daily  metoprolol tartrate 50 milliGRAM(s) Oral two times a day  tamsulosin 0.4 milliGRAM(s) Oral at bedtime  trimethoprim  160 mG/sulfamethoxazole 800 mG 1 Tablet(s) Oral two times a day      PRN Meds:  acetaminophen   Tablet .. 650 milliGRAM(s) Oral every 6 hours PRN      LABS:  CBC 21 @ 11:26                        13.5   4.44  )-----------( 163                   39.5       Hgb trend: 13.5 <-- , 12.8 <-- , 13.7 <--   WBC trend: 4.44 <-- , 4.45 <-- , 5.18 <--       CMP 21 @ 06:49    139  |  108<H>  |  14  ----------------------------<  288<H>  3.8   |  24  |  0.81    Phos  2.7       Mg     1.90         TPro  7.2  /  Alb  4.1  /  TBili  0.4  /  DBili  x   /  AST  23  /  ALT  18  /  AlkPhos  77        Serum Cr trend: 0.81 <-- , 0.90 <--     PT/INR - ( 21 Sep 2021 06:49 )   PT: 12.9 sec;   INR: 1.14 ratio           Urinalysis Basic - ( 20 Sep 2021 21:35 )    Color: x / Appearance: x / SG: x / pH: x  Gluc: x / Ketone: x  / Bili: x / Urobili: x   Blood: x / Protein: x / Nitrite: x   Leuk Esterase: x / RBC: 195 /HPF / WBC >720 /HPF   Sq Epi: x / Non Sq Epi: 1 /HPF / Bacteria: Negative      ABG Trend:     VBG Trend:       MICROBIOLOGY:       RADIOLOGY:  [ ] Reviewed and interpreted by me    EKG:

## 2021-09-22 NOTE — DISCHARGE NOTE PROVIDER - NSFOLLOWUPCLINICS_GEN_ALL_ED_FT
[facility] - Urology  Urology  [address], 3rd & 4th [place] [address]  Phone: [phone]  Fax:     [facility]  Gastroenterology  [address] [place] [address]  Phone: [phone]  Fax:

## 2021-09-22 NOTE — PROGRESS NOTE ADULT - ATTENDING COMMENTS
81 M, CAD with stents, h/o prostate Ca (not currently on any treatment), BPH, chronic back pain, admitted with UTI and Lower GIB (likely hemorrhoids).  Allergic rxn to ceftriaxone, now on cipro.  PT eval, f/u UC.  Hgb stable, will restart ASA.
81 M, CAD with stents, h/o prostate Ca (not currently on any treatment), BPH, chronic back pain, admitted with UTI and Lower GIB (likely hemorrhoids).  Allergic rxn to ceftriaxone then cipor, now on bactrim.  PT eval - o/p PT.  f/u UC.  restarted ASA, some hematuria this AM, resolved, likely in setting of UTI.  Will recheck Hgb in AM.  DC planning

## 2021-09-22 NOTE — PHYSICAL THERAPY INITIAL EVALUATION ADULT - ADDITIONAL COMMENTS
patient reports he occasionally walks with a straight cane. he reports his son drives him to appointments. he reports he currently participates in outpatient PT

## 2021-09-22 NOTE — DISCHARGE NOTE PROVIDER - NSDCMRMEDTOKEN_GEN_ALL_CORE_FT
aspirin 81 mg oral tablet, chewable: 1 tab(s) orally once a day  atorvastatin 40 mg oral tablet: 1 tab(s) orally once a day  Basaglar KwikPen 100 units/mL subcutaneous solution: 30  subcutaneous once a day (at bedtime)  clopidogrel 75 mg oral tablet: 1 tab(s) orally once a day  Farxiga 5 mg oral tablet: 1 tab(s) orally once a day  isosorbide mononitrate 60 mg oral tablet, extended release: 1 tab(s) orally once a day (in the morning)  losartan 50 mg oral tablet: 1 tab(s) orally once a day  metFORMIN 850 mg oral tablet: 1 tab(s) orally once a day (in the morning)  metoprolol tartrate 50 mg oral tablet: 1 tab(s) orally 2 times a day  tamsulosin 0.4 mg oral capsule: 1 cap(s) orally once a day   aspirin 81 mg oral tablet, chewable: 1 tab(s) orally once a day  atorvastatin 40 mg oral tablet: 1 tab(s) orally once a day  Basaglar KwikPen 100 units/mL subcutaneous solution: 30  subcutaneous once a day (at bedtime)  clopidogrel 75 mg oral tablet: 1 tab(s) orally once a day  Farxiga 5 mg oral tablet: 1 tab(s) orally once a day  hydrocortisone 25 mg rectal suppository: 1 suppository(ies) rectal once a day (at bedtime)  isosorbide mononitrate 60 mg oral tablet, extended release: 1 tab(s) orally once a day (in the morning)  losartan 50 mg oral tablet: 1 tab(s) orally once a day  metFORMIN 850 mg oral tablet: 1 tab(s) orally once a day (in the morning)  metoprolol tartrate 50 mg oral tablet: 1 tab(s) orally 2 times a day  sulfamethoxazole-trimethoprim 800 mg-160 mg oral tablet: 1 tab(s) orally 2 times a day  tamsulosin 0.4 mg oral capsule: 1 cap(s) orally once a day

## 2021-09-22 NOTE — DISCHARGE NOTE PROVIDER - HOSPITAL COURSE
Pt is an 82 y/o male with pmhx prostate ca s/p Seed implant, HTN, DM2 on insulin, CAD s/p stent (last one about 1 yr ago) and JACKELIN on cpap presents to the hospital with brbpr for the last 2 weeks when wiping as well as dysuria/pelvic discomfort when urinating for the last two days and SOB today. Collateral obtained from pt's son. Pt had no fever, chills, cough, chest pain or LE edema reported. He endorses that he at times will get random SOB but that today was more persistent for some reason. He denies any CP and can generally walk around fine with no pain. He has been having looser stools as well. Last colonoscopy about 10 years ago was normal. Pt takes Asa/plavix at home for his CAD. In the ER received ceftriaxone with a rash appearing on his leg and s/p benadryl with improvement.    Patient admitted to medical floors for UTI (UA+ LE, bacteria, WBC) and started on ciprofloxacin. Patient noted to have BRBPR and +FOBT likely 2/2 internal hemorrhoids with supportive care. Spoke to patient's PMD who confirmed history of CAD, however stated no history of HF. During hospitalization, patient noted to have likely allergic reaction (erythema, pruritis, urticaria) after administration of both ceftriaxone and ciprofloxacin, and transitioned to PO Bactrim (without complication). Patient endorsed one episode of hematuria likely related to UTI, however Hb stable throughout hospitalization. PT consult placed, with recommendations for OP PT for chronic low back pain. UCx positive/negative for......    At this time, patient is medically optimized for discharge. Pt is an 82 y/o male with pmhx prostate ca s/p Seed implant, HTN, DM2 on insulin, CAD s/p stent (last one about 1 yr ago) and JACKELIN on cpap presents to the hospital with brbpr for the last 2 weeks when wiping as well as dysuria/pelvic discomfort when urinating for the last two days and SOB today. Collateral obtained from pt's son. Pt had no fever, chills, cough, chest pain or LE edema reported. He endorses that he at times will get random SOB but that today was more persistent for some reason. He denies any CP and can generally walk around fine with no pain. He has been having looser stools as well. Last colonoscopy about 10 years ago was normal. Pt takes Asa/plavix at home for his CAD. In the ER received ceftriaxone with a rash appearing on his leg and s/p benadryl with improvement.    Patient admitted to medical floors for UTI (UA+ LE, bacteria, WBC) and started on ciprofloxacin. Patient noted to have BRBPR and +FOBT likely 2/2 internal hemorrhoids with supportive care. Spoke to patient's PMD who confirmed history of CAD, however stated no history of HF. During hospitalization, patient noted to have likely allergic reaction (erythema, pruritis, urticaria) after administration of both ceftriaxone and ciprofloxacin, and transitioned to PO Bactrim (without complication). Patient endorsed one episode of hematuria likely related to UTI, however Hb stable throughout hospitalization. PT consult placed, with recommendations for OP PT for chronic low back pain. UCx showed probable contamination. Patient clinically improved.     At this time, patient is medically optimized for discharge. Pt is an 82 y/o male with pmhx prostate ca s/p Seed implant, HTN, DM2 on insulin, CAD s/p stent (last one about 1 yr ago) and JACKELIN on cpap presents to the hospital with brbpr for the last 2 weeks when wiping as well as dysuria/pelvic discomfort when urinating for the last two days and SOB today. Collateral obtained from pt's son. Pt had no fever, chills, cough, chest pain or LE edema reported. He endorses that he at times will get random SOB but that today was more persistent for some reason. He denies any CP and can generally walk around fine with no pain. He has been having looser stools as well. Last colonoscopy about 10 years ago was normal. Pt takes Asa/plavix at home for his CAD. In the ER received ceftriaxone with a rash appearing on his leg and s/p benadryl with improvement.    Patient admitted to medical floors for UTI (UA+ LE, bacteria, WBC) and started on ciprofloxacin. Patient noted to have BRBPR and +FOBT likely 2/2 internal hemorrhoids with supportive care. Spoke to patient's PMD who confirmed history of CAD, however stated no history of HF. During hospitalization, patient noted to have likely allergic reaction (erythema, pruritis, urticaria) after administration of both ceftriaxone and ciprofloxacin, and transitioned to PO Bactrim (without complication). Patient endorsed one episode of hematuria likely related to UTI, however Hb stable throughout hospitalization. PT consult placed, with recommendations for OP PT for chronic low back pain. UCx showed probable contamination. Patient clinically improved, will complete course of abx as o/p and f/u with pcp.    At this time, patient is medically optimized for discharge.

## 2021-09-23 NOTE — PROGRESS NOTE ADULT - ASSESSMENT
80 y/o male with pmhx prostate ca s/p Seed implant, HTN, DM2 on insulin, CAD s/p stent (last one about 1 yr ago) and JACKELIN on cpap presents to the hospital with brbpr for the last 2 weeks when wiping as well as dysuria/pelvic discomfort when urinating for the last two days, admitted for UTI and likely internal hemmoroids.

## 2021-09-23 NOTE — DISCHARGE NOTE NURSING/CASE MANAGEMENT/SOCIAL WORK - PATIENT PORTAL LINK FT
You can access the FollowMyHealth Patient Portal offered by [facility] by registering at the following website: [email or web address]. By joining [facility]’s FollowMyHealth portal, you will also be able to view your health information using other applications (apps) compatible with our system.

## 2021-09-23 NOTE — CHART NOTE - NSCHARTNOTEFT_GEN_A_CORE
Pt seen/examined at bedside.  No further hematuria.  CBC stable.  WBC OK.  UC > 3 organisms, likely contaminated sample.  Will complete course of abx as outpatient.  Signout to be given to PMD.  DC time 40 minutes

## 2021-09-23 NOTE — PROGRESS NOTE ADULT - PROBLEM SELECTOR PLAN 1
- Patient presenting with 2 week history of dysuria/pelvic discomfort with urination  - In ED: VSS, labs notable for positive UA on arrival  - UA: + LE, bacteria, WBC  -  UCx neg  - s/p Ceftriaxone in ED and Cipro on floor but with development of rash, s/p benadryl and no rash present evaluation  - C/W Bactrim, total 7 day course of abx

## 2021-09-23 NOTE — PROGRESS NOTE ADULT - PROBLEM SELECTOR PROBLEM 5
Type 2 diabetes mellitus with other specified complication

## 2021-09-23 NOTE — PROGRESS NOTE ADULT - PROBLEM SELECTOR PLAN 5
Patient with elevated sugars on arrival  - Patient and son report basaglar 30 units qhs   - A1c: 8.8  - C/W home dose 30 units lantus bedtime

## 2021-09-23 NOTE — PROGRESS NOTE ADULT - PROBLEM SELECTOR PLAN 4
- Patient with history of 4 stents placed, most recently 1 year ago. On ASA  On exam, patient currently w/o CP or SOB  - Trops negative x2  - C/W statin, betablocker, ARB, imdur  - Spoke with PMD (Dr. Limon): no hx of hf, unable to obtain recent echo/ef

## 2021-09-23 NOTE — PROGRESS NOTE ADULT - SUBJECTIVE AND OBJECTIVE BOX
PROGRESS NOTE:   Authored by Dr. Suman Simpson MD  Pager [phone] [facility], [id number] [address],    Patient is a 81y old  Male who presents with a chief complaint of BRBPR, back pain, UTI (22 Sep 2021 22:48)      SUBJECTIVE / OVERNIGHT EVENTS: No events OV. Patient this AM reports no new changes    ADDITIONAL REVIEW OF SYSTEMS: Denies fever, CP, SOB, urinary retention, hematuria     MEDICATIONS  (STANDING):  aspirin  chewable 81 milliGRAM(s) Oral daily  atorvastatin 40 milliGRAM(s) Oral at bedtime  dextrose 40% Gel 15 Gram(s) Oral once  dextrose 5%. 1000 milliLiter(s) (50 mL/Hr) IV Continuous <Continuous>  dextrose 5%. 1000 milliLiter(s) (100 mL/Hr) IV Continuous <Continuous>  dextrose 50% Injectable 25 Gram(s) IV Push once  dextrose 50% Injectable 12.5 Gram(s) IV Push once  dextrose 50% Injectable 25 Gram(s) IV Push once  glucagon  Injectable 1 milliGRAM(s) IntraMuscular once  hydrocortisone hemorrhoidal Suppository 1 Suppository(s) Rectal at bedtime  influenza  Vaccine (HIGH DOSE) 0.7 milliLiter(s) IntraMuscular once  insulin glargine Injectable (LANTUS) 30 Unit(s) SubCutaneous at bedtime  insulin lispro (ADMELOG) corrective regimen sliding scale   SubCutaneous three times a day before meals  insulin lispro (ADMELOG) corrective regimen sliding scale   SubCutaneous at bedtime  isosorbide   mononitrate ER Tablet (IMDUR) 60 milliGRAM(s) Oral daily  losartan 50 milliGRAM(s) Oral daily  metoprolol tartrate 50 milliGRAM(s) Oral two times a day  tamsulosin 0.4 milliGRAM(s) Oral at bedtime  trimethoprim  160 mG/sulfamethoxazole 800 mG 1 Tablet(s) Oral two times a day    MEDICATIONS  (PRN):  acetaminophen   Tablet .. 650 milliGRAM(s) Oral every 6 hours PRN Temp greater or equal to 38.5C (101.3F), Mild Pain (1 - 3)      CAPILLARY BLOOD GLUCOSE      POCT Blood Glucose.: 155 mg/dL (23 Sep 2021 08:48)  POCT Blood Glucose.: 208 mg/dL (22 Sep 2021 21:50)  POCT Blood Glucose.: 266 mg/dL (22 Sep 2021 12:51)    I&O's Summary    22 Sep 2021 07:01  -  23 Sep 2021 07:00  --------------------------------------------------------  IN: 0 mL / OUT: 400 mL / NET: -400 mL    23 Sep 2021 07:01  -  23 Sep 2021 09:18  --------------------------------------------------------  IN: 0 mL / OUT: 200 mL / NET: -200 mL        PHYSICAL EXAM:  Vital Signs Last 24 Hrs  T(C): 36.7 (23 Sep 2021 06:00), Max: 37.1 (22 Sep 2021 21:00)  T(F): 98 (23 Sep 2021 06:00), Max: 98.7 (22 Sep 2021 21:00)  HR: 92 (23 Sep 2021 06:59) (57 - 96)  BP: 150/63 (23 Sep 2021 06:00) (111/53 - 150/63)  BP(mean): --  RR: 18 (23 Sep 2021 06:00) (17 - 18)  SpO2: 98% (23 Sep 2021 06:59) (97% - 100%)    GENERAL: No acute distress, well-developed  HEAD:  Atraumatic, Normocephalic  EYES: EOMI, PERRLA, conjunctiva and sclera clear  NECK: Supple, no lymphadenopathy, no JVD  CHEST/LUNG: CTAB; No wheezes, rales, or rhonchi  HEART: Regular rate and rhythm; No murmurs, rubs, or gallops  ABDOMEN: Soft, non-tender, non-distended; normal bowel sounds, no organomegaly  EXTREMITIES:  2+ peripheral pulses b/l, No clubbing, cyanosis, or edema  NEUROLOGY: A&O x 3, no focal deficits  SKIN: No rashes or lesions    LABS:                        12.5   4.84  )-----------( 147      ( 23 Sep 2021 06:28 )             35.7     09-23    141  |  108<H>  |  25<H>  ----------------------------<  170<H>  4.0   |  20<L>  |  1.13    Ca    9.5      23 Sep 2021 06:28  Phos  3.1     09-23  Mg     1.80     09-23                Culture - Urine (collected 20 Sep 2021 21:19)  Source: Clean Catch Clean Catch (Midstream)  Final Report (23 Sep 2021 00:46):    >=3 organisms. Probable collection contamination.        RADIOLOGY & ADDITIONAL TESTS:  Results Reviewed:   Imaging Personally Reviewed:  Electrocardiogram Personally Reviewed:    COORDINATION OF CARE:  Care Discussed with Consultants/Other Providers [Y/N]:  Prior or Outpatient Records Reviewed [Y/N]:

## 2021-09-23 NOTE — PROGRESS NOTE ADULT - PROBLEM SELECTOR PLAN 2
- At presentation, patient endorsing 2 week history of BRBPR with wiping, especially after episodes of hard stooling, with occasional sharp pain white wiping, likely 2/2 itnernal hemorrhoids. Low likelihood for GI bleed at this time  - In ED: Hg 13.7 ->12.8, however Hb stable on repeat CBC (13.5). s/p 1L IVF in ED  - Patient's last colonscopy 10 years ago, likely requires OP GI follow-up for surveillance colonscipy.  - Trend CBC, currently stable

## 2021-09-23 NOTE — PROGRESS NOTE ADULT - PROBLEM SELECTOR PLAN 3
- Patient endorses long standing history of SOB while walking 50+ feet, up stairs, however no recent changes and no SOB at rest. Chronic SOB likely from JACKELIN vs. stable angina  - In ED: satting well on RA, labs: trops: negative. Imaging: CXR negative  - Patient with history of long-standing JACKELIN  - C/W CPAP at night   - Stress test OP - Patient endorses long standing history of SOB while walking 50+ feet, up stairs, however no recent changes and no SOB at rest. Chronic SOB likely from JACKELIN vs. stable angina  - In ED: satting well on RA, labs: trops: negative. Imaging: CXR negative  - Patient with history of long-standing JACKELIN  - C/W CPAP at night

## 2022-01-01 ENCOUNTER — TRANSCRIPTION ENCOUNTER (OUTPATIENT)
Age: 82
End: 2022-01-01

## 2022-01-01 ENCOUNTER — INPATIENT (INPATIENT)
Facility: HOSPITAL | Age: 82
LOS: 8 days | Discharge: EXPIRED | End: 2022-08-25
Attending: INTERNAL MEDICINE | Admitting: INTERNAL MEDICINE

## 2022-01-01 ENCOUNTER — INPATIENT (INPATIENT)
Facility: HOSPITAL | Age: 82
LOS: 14 days | Discharge: NURSING, REHAB OR LONG-TERM CARE | End: 2022-05-17
Attending: INTERNAL MEDICINE | Admitting: INTERNAL MEDICINE
Payer: MEDICARE

## 2022-01-01 VITALS
WEIGHT: 186.29 LBS | HEART RATE: 102 BPM | HEIGHT: 69 IN | RESPIRATION RATE: 18 BRPM | SYSTOLIC BLOOD PRESSURE: 91 MMHG | DIASTOLIC BLOOD PRESSURE: 65 MMHG | OXYGEN SATURATION: 96 % | TEMPERATURE: 98 F

## 2022-01-01 VITALS
RESPIRATION RATE: 16 BRPM | HEIGHT: 69 IN | SYSTOLIC BLOOD PRESSURE: 108 MMHG | DIASTOLIC BLOOD PRESSURE: 92 MMHG | OXYGEN SATURATION: 100 % | TEMPERATURE: 98 F | HEART RATE: 68 BPM

## 2022-01-01 VITALS
SYSTOLIC BLOOD PRESSURE: 148 MMHG | TEMPERATURE: 98 F | DIASTOLIC BLOOD PRESSURE: 71 MMHG | HEART RATE: 83 BPM | OXYGEN SATURATION: 100 % | RESPIRATION RATE: 18 BRPM

## 2022-01-01 DIAGNOSIS — R57.0 CARDIOGENIC SHOCK: ICD-10-CM

## 2022-01-01 DIAGNOSIS — G47.33 OBSTRUCTIVE SLEEP APNEA (ADULT) (PEDIATRIC): ICD-10-CM

## 2022-01-01 DIAGNOSIS — E11.9 TYPE 2 DIABETES MELLITUS WITHOUT COMPLICATIONS: ICD-10-CM

## 2022-01-01 DIAGNOSIS — Z29.9 ENCOUNTER FOR PROPHYLACTIC MEASURES, UNSPECIFIED: ICD-10-CM

## 2022-01-01 DIAGNOSIS — I50.21 ACUTE SYSTOLIC (CONGESTIVE) HEART FAILURE: ICD-10-CM

## 2022-01-01 DIAGNOSIS — Z71.89 OTHER SPECIFIED COUNSELING: ICD-10-CM

## 2022-01-01 DIAGNOSIS — I10 ESSENTIAL (PRIMARY) HYPERTENSION: ICD-10-CM

## 2022-01-01 DIAGNOSIS — D72.829 ELEVATED WHITE BLOOD CELL COUNT, UNSPECIFIED: ICD-10-CM

## 2022-01-01 DIAGNOSIS — I21.4 NON-ST ELEVATION (NSTEMI) MYOCARDIAL INFARCTION: ICD-10-CM

## 2022-01-01 DIAGNOSIS — G92.8 OTHER TOXIC ENCEPHALOPATHY: ICD-10-CM

## 2022-01-01 DIAGNOSIS — Z98.890 OTHER SPECIFIED POSTPROCEDURAL STATES: Chronic | ICD-10-CM

## 2022-01-01 DIAGNOSIS — R79.89 OTHER SPECIFIED ABNORMAL FINDINGS OF BLOOD CHEMISTRY: ICD-10-CM

## 2022-01-01 DIAGNOSIS — I24.9 ACUTE ISCHEMIC HEART DISEASE, UNSPECIFIED: ICD-10-CM

## 2022-01-01 DIAGNOSIS — R06.02 SHORTNESS OF BREATH: ICD-10-CM

## 2022-01-01 DIAGNOSIS — I63.9 CEREBRAL INFARCTION, UNSPECIFIED: ICD-10-CM

## 2022-01-01 DIAGNOSIS — E78.5 HYPERLIPIDEMIA, UNSPECIFIED: ICD-10-CM

## 2022-01-01 DIAGNOSIS — E11.65 TYPE 2 DIABETES MELLITUS WITH HYPERGLYCEMIA: ICD-10-CM

## 2022-01-01 DIAGNOSIS — N17.9 ACUTE KIDNEY FAILURE, UNSPECIFIED: ICD-10-CM

## 2022-01-01 DIAGNOSIS — G93.49 OTHER ENCEPHALOPATHY: ICD-10-CM

## 2022-01-01 DIAGNOSIS — C61 MALIGNANT NEOPLASM OF PROSTATE: ICD-10-CM

## 2022-01-01 DIAGNOSIS — A41.9 SEPSIS, UNSPECIFIED ORGANISM: ICD-10-CM

## 2022-01-01 DIAGNOSIS — E87.2 ACIDOSIS: ICD-10-CM

## 2022-01-01 DIAGNOSIS — I25.10 ATHEROSCLEROTIC HEART DISEASE OF NATIVE CORONARY ARTERY WITHOUT ANGINA PECTORIS: ICD-10-CM

## 2022-01-01 DIAGNOSIS — K59.00 CONSTIPATION, UNSPECIFIED: ICD-10-CM

## 2022-01-01 DIAGNOSIS — I48.0 PAROXYSMAL ATRIAL FIBRILLATION: ICD-10-CM

## 2022-01-01 DIAGNOSIS — R41.82 ALTERED MENTAL STATUS, UNSPECIFIED: ICD-10-CM

## 2022-01-01 DIAGNOSIS — R33.9 RETENTION OF URINE, UNSPECIFIED: ICD-10-CM

## 2022-01-01 DIAGNOSIS — G93.40 ENCEPHALOPATHY, UNSPECIFIED: ICD-10-CM

## 2022-01-01 LAB
A1C WITH ESTIMATED AVERAGE GLUCOSE RESULT: 8.3 % — HIGH (ref 4–5.6)
A1C WITH ESTIMATED AVERAGE GLUCOSE RESULT: 9.2 % — HIGH (ref 4–5.6)
ALBUMIN SERPL ELPH-MCNC: 2.8 G/DL — LOW (ref 3.3–5)
ALBUMIN SERPL ELPH-MCNC: 2.8 G/DL — LOW (ref 3.3–5)
ALBUMIN SERPL ELPH-MCNC: 2.9 G/DL — LOW (ref 3.3–5)
ALBUMIN SERPL ELPH-MCNC: 3 G/DL — LOW (ref 3.3–5)
ALBUMIN SERPL ELPH-MCNC: 3.1 G/DL — LOW (ref 3.3–5)
ALBUMIN SERPL ELPH-MCNC: 3.1 G/DL — LOW (ref 3.3–5)
ALBUMIN SERPL ELPH-MCNC: 3.2 G/DL — LOW (ref 3.3–5)
ALBUMIN SERPL ELPH-MCNC: 3.3 G/DL — SIGNIFICANT CHANGE UP (ref 3.3–5)
ALBUMIN SERPL ELPH-MCNC: 3.3 G/DL — SIGNIFICANT CHANGE UP (ref 3.3–5)
ALBUMIN SERPL ELPH-MCNC: 3.4 G/DL — SIGNIFICANT CHANGE UP (ref 3.3–5)
ALBUMIN SERPL ELPH-MCNC: 3.5 G/DL — SIGNIFICANT CHANGE UP (ref 3.3–5)
ALBUMIN SERPL ELPH-MCNC: 3.5 G/DL — SIGNIFICANT CHANGE UP (ref 3.3–5)
ALBUMIN SERPL ELPH-MCNC: 3.9 G/DL — SIGNIFICANT CHANGE UP (ref 3.3–5)
ALP SERPL-CCNC: 101 U/L — SIGNIFICANT CHANGE UP (ref 40–120)
ALP SERPL-CCNC: 103 U/L — SIGNIFICANT CHANGE UP (ref 40–120)
ALP SERPL-CCNC: 66 U/L — SIGNIFICANT CHANGE UP (ref 40–120)
ALP SERPL-CCNC: 72 U/L — SIGNIFICANT CHANGE UP (ref 40–120)
ALP SERPL-CCNC: 76 U/L — SIGNIFICANT CHANGE UP (ref 40–120)
ALP SERPL-CCNC: 80 U/L — SIGNIFICANT CHANGE UP (ref 40–120)
ALP SERPL-CCNC: 81 U/L — SIGNIFICANT CHANGE UP (ref 40–120)
ALP SERPL-CCNC: 84 U/L — SIGNIFICANT CHANGE UP (ref 40–120)
ALP SERPL-CCNC: 84 U/L — SIGNIFICANT CHANGE UP (ref 40–120)
ALP SERPL-CCNC: 86 U/L — SIGNIFICANT CHANGE UP (ref 40–120)
ALP SERPL-CCNC: 86 U/L — SIGNIFICANT CHANGE UP (ref 40–120)
ALP SERPL-CCNC: 87 U/L — SIGNIFICANT CHANGE UP (ref 40–120)
ALP SERPL-CCNC: 87 U/L — SIGNIFICANT CHANGE UP (ref 40–120)
ALP SERPL-CCNC: 89 U/L — SIGNIFICANT CHANGE UP (ref 40–120)
ALP SERPL-CCNC: 90 U/L — SIGNIFICANT CHANGE UP (ref 40–120)
ALP SERPL-CCNC: 91 U/L — SIGNIFICANT CHANGE UP (ref 40–120)
ALP SERPL-CCNC: 91 U/L — SIGNIFICANT CHANGE UP (ref 40–120)
ALP SERPL-CCNC: 93 U/L — SIGNIFICANT CHANGE UP (ref 40–120)
ALP SERPL-CCNC: 94 U/L — SIGNIFICANT CHANGE UP (ref 40–120)
ALT FLD-CCNC: 13 U/L — SIGNIFICANT CHANGE UP (ref 4–41)
ALT FLD-CCNC: 21 U/L — SIGNIFICANT CHANGE UP (ref 4–41)
ALT FLD-CCNC: 21 U/L — SIGNIFICANT CHANGE UP (ref 4–41)
ALT FLD-CCNC: 23 U/L — SIGNIFICANT CHANGE UP (ref 4–41)
ALT FLD-CCNC: 23 U/L — SIGNIFICANT CHANGE UP (ref 4–41)
ALT FLD-CCNC: 26 U/L — SIGNIFICANT CHANGE UP (ref 4–41)
ALT FLD-CCNC: 30 U/L — SIGNIFICANT CHANGE UP (ref 4–41)
ALT FLD-CCNC: 31 U/L — SIGNIFICANT CHANGE UP (ref 4–41)
ALT FLD-CCNC: 33 U/L — SIGNIFICANT CHANGE UP (ref 4–41)
ALT FLD-CCNC: 34 U/L — SIGNIFICANT CHANGE UP (ref 4–41)
ALT FLD-CCNC: 37 U/L — SIGNIFICANT CHANGE UP (ref 4–41)
ALT FLD-CCNC: 38 U/L — SIGNIFICANT CHANGE UP (ref 4–41)
ALT FLD-CCNC: 40 U/L — SIGNIFICANT CHANGE UP (ref 4–41)
ALT FLD-CCNC: 43 U/L — HIGH (ref 4–41)
ALT FLD-CCNC: 45 U/L — HIGH (ref 4–41)
ALT FLD-CCNC: 50 U/L — HIGH (ref 4–41)
ALT FLD-CCNC: 50 U/L — HIGH (ref 4–41)
ALT FLD-CCNC: 51 U/L — HIGH (ref 4–41)
ALT FLD-CCNC: 56 U/L — HIGH (ref 4–41)
ALT FLD-CCNC: 57 U/L — HIGH (ref 4–41)
ALT FLD-CCNC: 68 U/L — HIGH (ref 4–41)
ANION GAP SERPL CALC-SCNC: 10 MMOL/L — SIGNIFICANT CHANGE UP (ref 7–14)
ANION GAP SERPL CALC-SCNC: 10 MMOL/L — SIGNIFICANT CHANGE UP (ref 7–14)
ANION GAP SERPL CALC-SCNC: 11 MMOL/L — SIGNIFICANT CHANGE UP (ref 7–14)
ANION GAP SERPL CALC-SCNC: 12 MMOL/L — SIGNIFICANT CHANGE UP (ref 7–14)
ANION GAP SERPL CALC-SCNC: 13 MMOL/L — SIGNIFICANT CHANGE UP (ref 7–14)
ANION GAP SERPL CALC-SCNC: 14 MMOL/L — SIGNIFICANT CHANGE UP (ref 7–14)
ANION GAP SERPL CALC-SCNC: 14 MMOL/L — SIGNIFICANT CHANGE UP (ref 7–14)
ANION GAP SERPL CALC-SCNC: 15 MMOL/L — HIGH (ref 7–14)
ANION GAP SERPL CALC-SCNC: 16 MMOL/L — HIGH (ref 7–14)
ANION GAP SERPL CALC-SCNC: 17 MMOL/L — HIGH (ref 7–14)
ANION GAP SERPL CALC-SCNC: 18 MMOL/L — HIGH (ref 7–14)
ANION GAP SERPL CALC-SCNC: 23 MMOL/L — HIGH (ref 7–14)
ANION GAP SERPL CALC-SCNC: 27 MMOL/L — HIGH (ref 7–14)
ANION GAP SERPL CALC-SCNC: 31 MMOL/L — HIGH (ref 7–14)
ANION GAP SERPL CALC-SCNC: 8 MMOL/L — SIGNIFICANT CHANGE UP (ref 7–14)
ANION GAP SERPL CALC-SCNC: 9 MMOL/L — SIGNIFICANT CHANGE UP (ref 7–14)
APPEARANCE UR: ABNORMAL
APPEARANCE UR: ABNORMAL
APTT BLD: 26.3 SEC — LOW (ref 27–36.3)
APTT BLD: 26.4 SEC — LOW (ref 27–36.3)
APTT BLD: 27.6 SEC — SIGNIFICANT CHANGE UP (ref 27–36.3)
APTT BLD: 30 SEC — SIGNIFICANT CHANGE UP (ref 27–36.3)
APTT BLD: 34.9 SEC — SIGNIFICANT CHANGE UP (ref 27–36.3)
APTT BLD: 36 SEC — SIGNIFICANT CHANGE UP (ref 27–36.3)
APTT BLD: 41.2 SEC — HIGH (ref 27–36.3)
APTT BLD: 42.5 SEC — HIGH (ref 27–36.3)
APTT BLD: 44.2 SEC — HIGH (ref 27–36.3)
APTT BLD: 44.7 SEC — HIGH (ref 27–36.3)
APTT BLD: 45.5 SEC — HIGH (ref 27–36.3)
APTT BLD: 48.4 SEC — HIGH (ref 27–36.3)
APTT BLD: 48.5 SEC — HIGH (ref 27–36.3)
APTT BLD: 49.9 SEC — HIGH (ref 27–36.3)
APTT BLD: 50 SEC — HIGH (ref 27–36.3)
APTT BLD: 51.3 SEC — HIGH (ref 27–36.3)
APTT BLD: 52.9 SEC — HIGH (ref 27–36.3)
APTT BLD: 53.4 SEC — HIGH (ref 27–36.3)
APTT BLD: 54 SEC — HIGH (ref 27–36.3)
APTT BLD: 60.9 SEC — HIGH (ref 27–36.3)
APTT BLD: 61.1 SEC — HIGH (ref 27–36.3)
APTT BLD: 63 SEC — HIGH (ref 27–36.3)
APTT BLD: 65.6 SEC — HIGH (ref 27–36.3)
APTT BLD: 71.7 SEC — HIGH (ref 27–36.3)
APTT BLD: 74.3 SEC — HIGH (ref 27–36.3)
APTT BLD: 75.5 SEC — HIGH (ref 27–36.3)
APTT BLD: 75.5 SEC — HIGH (ref 27–36.3)
APTT BLD: 75.9 SEC — HIGH (ref 27–36.3)
APTT BLD: 80.1 SEC — HIGH (ref 27–36.3)
APTT BLD: 80.1 SEC — HIGH (ref 27–36.3)
APTT BLD: 81.9 SEC — HIGH (ref 27–36.3)
APTT BLD: 81.9 SEC — HIGH (ref 27–36.3)
APTT BLD: 82 SEC — HIGH (ref 27–36.3)
APTT BLD: 91.6 SEC — HIGH (ref 27–36.3)
APTT BLD: 95.2 SEC — HIGH (ref 27–36.3)
AST SERPL-CCNC: 100 U/L — HIGH (ref 4–40)
AST SERPL-CCNC: 17 U/L — SIGNIFICANT CHANGE UP (ref 4–40)
AST SERPL-CCNC: 21 U/L — SIGNIFICANT CHANGE UP (ref 4–40)
AST SERPL-CCNC: 22 U/L — SIGNIFICANT CHANGE UP (ref 4–40)
AST SERPL-CCNC: 23 U/L — SIGNIFICANT CHANGE UP (ref 4–40)
AST SERPL-CCNC: 30 U/L — SIGNIFICANT CHANGE UP (ref 4–40)
AST SERPL-CCNC: 30 U/L — SIGNIFICANT CHANGE UP (ref 4–40)
AST SERPL-CCNC: 32 U/L — SIGNIFICANT CHANGE UP (ref 4–40)
AST SERPL-CCNC: 40 U/L — SIGNIFICANT CHANGE UP (ref 4–40)
AST SERPL-CCNC: 46 U/L — HIGH (ref 4–40)
AST SERPL-CCNC: 51 U/L — HIGH (ref 4–40)
AST SERPL-CCNC: 55 U/L — HIGH (ref 4–40)
AST SERPL-CCNC: 56 U/L — HIGH (ref 4–40)
AST SERPL-CCNC: 62 U/L — HIGH (ref 4–40)
AST SERPL-CCNC: 63 U/L — HIGH (ref 4–40)
AST SERPL-CCNC: 63 U/L — HIGH (ref 4–40)
AST SERPL-CCNC: 73 U/L — HIGH (ref 4–40)
AST SERPL-CCNC: 93 U/L — HIGH (ref 4–40)
AST SERPL-CCNC: 95 U/L — HIGH (ref 4–40)
B PERT DNA SPEC QL NAA+PROBE: SIGNIFICANT CHANGE UP
B PERT+PARAPERT DNA PNL SPEC NAA+PROBE: SIGNIFICANT CHANGE UP
B-OH-BUTYR SERPL-SCNC: 0.3 MMOL/L — SIGNIFICANT CHANGE UP (ref 0–0.4)
B-OH-BUTYR SERPL-SCNC: 0.6 MMOL/L — HIGH (ref 0–0.4)
BACTERIA # UR AUTO: ABNORMAL
BASE EXCESS BLDA CALC-SCNC: -2.1 MMOL/L — LOW (ref -2–3)
BASE EXCESS BLDA CALC-SCNC: 0.3 MMOL/L — SIGNIFICANT CHANGE UP (ref -2–3)
BASE EXCESS BLDA CALC-SCNC: 3.6 MMOL/L — HIGH (ref -2–3)
BASE EXCESS BLDA CALC-SCNC: 3.6 MMOL/L — HIGH (ref -2–3)
BASE EXCESS BLDV CALC-SCNC: -0.4 MMOL/L — SIGNIFICANT CHANGE UP (ref -2–3)
BASE EXCESS BLDV CALC-SCNC: -0.8 MMOL/L — SIGNIFICANT CHANGE UP (ref -2–3)
BASE EXCESS BLDV CALC-SCNC: -1.1 MMOL/L — SIGNIFICANT CHANGE UP (ref -2–3)
BASE EXCESS BLDV CALC-SCNC: -1.2 MMOL/L — SIGNIFICANT CHANGE UP (ref -2–3)
BASE EXCESS BLDV CALC-SCNC: -1.3 MMOL/L — SIGNIFICANT CHANGE UP (ref -2–3)
BASE EXCESS BLDV CALC-SCNC: -1.6 MMOL/L — SIGNIFICANT CHANGE UP (ref -2–3)
BASE EXCESS BLDV CALC-SCNC: -1.9 MMOL/L — SIGNIFICANT CHANGE UP (ref -2–3)
BASE EXCESS BLDV CALC-SCNC: -13.8 MMOL/L — LOW (ref -2–3)
BASE EXCESS BLDV CALC-SCNC: -15.4 MMOL/L — LOW (ref -2–3)
BASE EXCESS BLDV CALC-SCNC: -2.1 MMOL/L — LOW (ref -2–3)
BASE EXCESS BLDV CALC-SCNC: -2.9 MMOL/L — LOW (ref -2–3)
BASE EXCESS BLDV CALC-SCNC: -4.6 MMOL/L — LOW (ref -2–3)
BASE EXCESS BLDV CALC-SCNC: 0.2 MMOL/L — SIGNIFICANT CHANGE UP (ref -2–3)
BASE EXCESS BLDV CALC-SCNC: 0.8 MMOL/L — SIGNIFICANT CHANGE UP (ref -2–3)
BASE EXCESS BLDV CALC-SCNC: 1.5 MMOL/L — SIGNIFICANT CHANGE UP (ref -2–3)
BASE EXCESS BLDV CALC-SCNC: 1.7 MMOL/L — SIGNIFICANT CHANGE UP (ref -2–3)
BASE EXCESS BLDV CALC-SCNC: 1.7 MMOL/L — SIGNIFICANT CHANGE UP (ref -2–3)
BASE EXCESS BLDV CALC-SCNC: 2.3 MMOL/L — SIGNIFICANT CHANGE UP (ref -2–3)
BASE EXCESS BLDV CALC-SCNC: 2.4 MMOL/L — SIGNIFICANT CHANGE UP (ref -2–3)
BASE EXCESS BLDV CALC-SCNC: 3.1 MMOL/L — HIGH (ref -2–3)
BASE EXCESS BLDV CALC-SCNC: 5 MMOL/L — HIGH (ref -2–3)
BASE EXCESS BLDV CALC-SCNC: 5.3 MMOL/L — HIGH (ref -2–3)
BASE EXCESS BLDV CALC-SCNC: 5.9 MMOL/L — HIGH (ref -2–3)
BASE EXCESS BLDV CALC-SCNC: 5.9 MMOL/L — HIGH (ref -2–3)
BASE EXCESS BLDV CALC-SCNC: 6.1 MMOL/L — HIGH (ref -2–3)
BASE EXCESS BLDV CALC-SCNC: 6.6 MMOL/L — HIGH (ref -2–3)
BASE EXCESS BLDV CALC-SCNC: 6.8 MMOL/L — HIGH (ref -2–3)
BASE EXCESS BLDV CALC-SCNC: 7.7 MMOL/L — HIGH (ref -2–3)
BASOPHILS # BLD AUTO: 0.01 K/UL — SIGNIFICANT CHANGE UP (ref 0–0.2)
BASOPHILS # BLD AUTO: 0.02 K/UL — SIGNIFICANT CHANGE UP (ref 0–0.2)
BASOPHILS # BLD AUTO: 0.04 K/UL — SIGNIFICANT CHANGE UP (ref 0–0.2)
BASOPHILS NFR BLD AUTO: 0.1 % — SIGNIFICANT CHANGE UP (ref 0–2)
BASOPHILS NFR BLD AUTO: 0.1 % — SIGNIFICANT CHANGE UP (ref 0–2)
BASOPHILS NFR BLD AUTO: 0.3 % — SIGNIFICANT CHANGE UP (ref 0–2)
BASOPHILS NFR BLD AUTO: 0.5 % — SIGNIFICANT CHANGE UP (ref 0–2)
BASOPHILS NFR BLD AUTO: 0.6 % — SIGNIFICANT CHANGE UP (ref 0–2)
BILIRUB SERPL-MCNC: 0.4 MG/DL — SIGNIFICANT CHANGE UP (ref 0.2–1.2)
BILIRUB SERPL-MCNC: 0.5 MG/DL — SIGNIFICANT CHANGE UP (ref 0.2–1.2)
BILIRUB SERPL-MCNC: 0.6 MG/DL — SIGNIFICANT CHANGE UP (ref 0.2–1.2)
BILIRUB SERPL-MCNC: 0.7 MG/DL — SIGNIFICANT CHANGE UP (ref 0.2–1.2)
BILIRUB SERPL-MCNC: 0.8 MG/DL — SIGNIFICANT CHANGE UP (ref 0.2–1.2)
BILIRUB SERPL-MCNC: 0.9 MG/DL — SIGNIFICANT CHANGE UP (ref 0.2–1.2)
BILIRUB SERPL-MCNC: 0.9 MG/DL — SIGNIFICANT CHANGE UP (ref 0.2–1.2)
BILIRUB SERPL-MCNC: 1.1 MG/DL — SIGNIFICANT CHANGE UP (ref 0.2–1.2)
BILIRUB SERPL-MCNC: 1.2 MG/DL — SIGNIFICANT CHANGE UP (ref 0.2–1.2)
BILIRUB SERPL-MCNC: 1.2 MG/DL — SIGNIFICANT CHANGE UP (ref 0.2–1.2)
BILIRUB UR-MCNC: NEGATIVE — SIGNIFICANT CHANGE UP
BILIRUB UR-MCNC: NEGATIVE — SIGNIFICANT CHANGE UP
BLD GP AB SCN SERPL QL: NEGATIVE — SIGNIFICANT CHANGE UP
BLD GP AB SCN SERPL QL: NEGATIVE — SIGNIFICANT CHANGE UP
BLOOD GAS ARTERIAL - LYTES,HGB,ICA,LACT RESULT: SIGNIFICANT CHANGE UP
BLOOD GAS ARTERIAL COMPREHENSIVE RESULT: SIGNIFICANT CHANGE UP
BLOOD GAS VENOUS COMPREHENSIVE RESULT: SIGNIFICANT CHANGE UP
BORDETELLA PARAPERTUSSIS (RAPRVP): SIGNIFICANT CHANGE UP
BUN SERPL-MCNC: 10 MG/DL — SIGNIFICANT CHANGE UP (ref 7–23)
BUN SERPL-MCNC: 12 MG/DL — SIGNIFICANT CHANGE UP (ref 7–23)
BUN SERPL-MCNC: 16 MG/DL — SIGNIFICANT CHANGE UP (ref 7–23)
BUN SERPL-MCNC: 20 MG/DL — SIGNIFICANT CHANGE UP (ref 7–23)
BUN SERPL-MCNC: 20 MG/DL — SIGNIFICANT CHANGE UP (ref 7–23)
BUN SERPL-MCNC: 24 MG/DL — HIGH (ref 7–23)
BUN SERPL-MCNC: 26 MG/DL — HIGH (ref 7–23)
BUN SERPL-MCNC: 31 MG/DL — HIGH (ref 7–23)
BUN SERPL-MCNC: 31 MG/DL — HIGH (ref 7–23)
BUN SERPL-MCNC: 36 MG/DL — HIGH (ref 7–23)
BUN SERPL-MCNC: 36 MG/DL — HIGH (ref 7–23)
BUN SERPL-MCNC: 38 MG/DL — HIGH (ref 7–23)
BUN SERPL-MCNC: 38 MG/DL — HIGH (ref 7–23)
BUN SERPL-MCNC: 40 MG/DL — HIGH (ref 7–23)
BUN SERPL-MCNC: 42 MG/DL — HIGH (ref 7–23)
BUN SERPL-MCNC: 43 MG/DL — HIGH (ref 7–23)
BUN SERPL-MCNC: 44 MG/DL — HIGH (ref 7–23)
BUN SERPL-MCNC: 44 MG/DL — HIGH (ref 7–23)
BUN SERPL-MCNC: 45 MG/DL — HIGH (ref 7–23)
BUN SERPL-MCNC: 45 MG/DL — HIGH (ref 7–23)
BUN SERPL-MCNC: 46 MG/DL — HIGH (ref 7–23)
BUN SERPL-MCNC: 47 MG/DL — HIGH (ref 7–23)
BUN SERPL-MCNC: 47 MG/DL — HIGH (ref 7–23)
BUN SERPL-MCNC: 48 MG/DL — HIGH (ref 7–23)
BUN SERPL-MCNC: 49 MG/DL — HIGH (ref 7–23)
BUN SERPL-MCNC: 56 MG/DL — HIGH (ref 7–23)
BUN SERPL-MCNC: 60 MG/DL — HIGH (ref 7–23)
BUN SERPL-MCNC: 65 MG/DL — HIGH (ref 7–23)
BUN SERPL-MCNC: 70 MG/DL — HIGH (ref 7–23)
BUN SERPL-MCNC: 79 MG/DL — HIGH (ref 7–23)
BUN SERPL-MCNC: 8 MG/DL — SIGNIFICANT CHANGE UP (ref 7–23)
BUN SERPL-MCNC: 84 MG/DL — HIGH (ref 7–23)
BUN SERPL-MCNC: 9 MG/DL — SIGNIFICANT CHANGE UP (ref 7–23)
C PNEUM DNA SPEC QL NAA+PROBE: SIGNIFICANT CHANGE UP
CALCIUM SERPL-MCNC: 8.2 MG/DL — LOW (ref 8.4–10.5)
CALCIUM SERPL-MCNC: 8.2 MG/DL — LOW (ref 8.4–10.5)
CALCIUM SERPL-MCNC: 8.3 MG/DL — LOW (ref 8.4–10.5)
CALCIUM SERPL-MCNC: 8.4 MG/DL — SIGNIFICANT CHANGE UP (ref 8.4–10.5)
CALCIUM SERPL-MCNC: 8.4 MG/DL — SIGNIFICANT CHANGE UP (ref 8.4–10.5)
CALCIUM SERPL-MCNC: 8.5 MG/DL — SIGNIFICANT CHANGE UP (ref 8.4–10.5)
CALCIUM SERPL-MCNC: 8.6 MG/DL — SIGNIFICANT CHANGE UP (ref 8.4–10.5)
CALCIUM SERPL-MCNC: 8.7 MG/DL — SIGNIFICANT CHANGE UP (ref 8.4–10.5)
CALCIUM SERPL-MCNC: 8.8 MG/DL — SIGNIFICANT CHANGE UP (ref 8.4–10.5)
CALCIUM SERPL-MCNC: 8.9 MG/DL — SIGNIFICANT CHANGE UP (ref 8.4–10.5)
CALCIUM SERPL-MCNC: 9 MG/DL — SIGNIFICANT CHANGE UP (ref 8.4–10.5)
CALCIUM SERPL-MCNC: 9.1 MG/DL — SIGNIFICANT CHANGE UP (ref 8.4–10.5)
CALCIUM SERPL-MCNC: 9.3 MG/DL — SIGNIFICANT CHANGE UP (ref 8.4–10.5)
CALCIUM SERPL-MCNC: 9.4 MG/DL — SIGNIFICANT CHANGE UP (ref 8.4–10.5)
CALCIUM SERPL-MCNC: 9.5 MG/DL — SIGNIFICANT CHANGE UP (ref 8.4–10.5)
CALCIUM SERPL-MCNC: 9.6 MG/DL — SIGNIFICANT CHANGE UP (ref 8.4–10.5)
CALCIUM SERPL-MCNC: 9.8 MG/DL — SIGNIFICANT CHANGE UP (ref 8.4–10.5)
CHLORIDE BLDV-SCNC: 101 MMOL/L — SIGNIFICANT CHANGE UP (ref 96–108)
CHLORIDE BLDV-SCNC: 102 MMOL/L — SIGNIFICANT CHANGE UP (ref 96–108)
CHLORIDE BLDV-SCNC: 102 MMOL/L — SIGNIFICANT CHANGE UP (ref 96–108)
CHLORIDE BLDV-SCNC: 103 MMOL/L — SIGNIFICANT CHANGE UP (ref 96–108)
CHLORIDE BLDV-SCNC: 104 MMOL/L — SIGNIFICANT CHANGE UP (ref 96–108)
CHLORIDE BLDV-SCNC: 105 MMOL/L — SIGNIFICANT CHANGE UP (ref 96–108)
CHLORIDE BLDV-SCNC: 106 MMOL/L — SIGNIFICANT CHANGE UP (ref 96–108)
CHLORIDE BLDV-SCNC: 107 MMOL/L — SIGNIFICANT CHANGE UP (ref 96–108)
CHLORIDE BLDV-SCNC: 108 MMOL/L — SIGNIFICANT CHANGE UP (ref 96–108)
CHLORIDE BLDV-SCNC: 124 MMOL/L — HIGH (ref 96–108)
CHLORIDE SERPL-SCNC: 100 MMOL/L — SIGNIFICANT CHANGE UP (ref 98–107)
CHLORIDE SERPL-SCNC: 100 MMOL/L — SIGNIFICANT CHANGE UP (ref 98–107)
CHLORIDE SERPL-SCNC: 101 MMOL/L — SIGNIFICANT CHANGE UP (ref 98–107)
CHLORIDE SERPL-SCNC: 102 MMOL/L — SIGNIFICANT CHANGE UP (ref 98–107)
CHLORIDE SERPL-SCNC: 103 MMOL/L — SIGNIFICANT CHANGE UP (ref 98–107)
CHLORIDE SERPL-SCNC: 104 MMOL/L — SIGNIFICANT CHANGE UP (ref 98–107)
CHLORIDE SERPL-SCNC: 105 MMOL/L — SIGNIFICANT CHANGE UP (ref 98–107)
CHLORIDE SERPL-SCNC: 106 MMOL/L — SIGNIFICANT CHANGE UP (ref 98–107)
CHLORIDE SERPL-SCNC: 107 MMOL/L — SIGNIFICANT CHANGE UP (ref 98–107)
CHLORIDE SERPL-SCNC: 107 MMOL/L — SIGNIFICANT CHANGE UP (ref 98–107)
CHLORIDE SERPL-SCNC: 108 MMOL/L — HIGH (ref 98–107)
CHLORIDE SERPL-SCNC: 108 MMOL/L — HIGH (ref 98–107)
CHLORIDE SERPL-SCNC: 109 MMOL/L — HIGH (ref 98–107)
CHLORIDE SERPL-SCNC: 110 MMOL/L — HIGH (ref 98–107)
CHLORIDE SERPL-SCNC: 111 MMOL/L — HIGH (ref 98–107)
CHLORIDE SERPL-SCNC: 98 MMOL/L — SIGNIFICANT CHANGE UP (ref 98–107)
CHLORIDE UR-SCNC: 34 MMOL/L — SIGNIFICANT CHANGE UP
CHOLEST SERPL-MCNC: 131 MG/DL — SIGNIFICANT CHANGE UP
CHOLEST SERPL-MCNC: 91 MG/DL — SIGNIFICANT CHANGE UP
CK MB BLD-MCNC: 0.9 % — SIGNIFICANT CHANGE UP (ref 0–2.5)
CK MB BLD-MCNC: 1.2 % — SIGNIFICANT CHANGE UP (ref 0–2.5)
CK MB BLD-MCNC: 1.5 % — SIGNIFICANT CHANGE UP (ref 0–2.5)
CK MB BLD-MCNC: 2 % — SIGNIFICANT CHANGE UP (ref 0–2.5)
CK MB BLD-MCNC: 2.2 % — SIGNIFICANT CHANGE UP (ref 0–2.5)
CK MB BLD-MCNC: 2.5 % — SIGNIFICANT CHANGE UP (ref 0–2.5)
CK MB BLD-MCNC: 2.5 % — SIGNIFICANT CHANGE UP (ref 0–2.5)
CK MB CFR SERPL CALC: 11 NG/ML — HIGH
CK MB CFR SERPL CALC: 4 NG/ML — SIGNIFICANT CHANGE UP
CK MB CFR SERPL CALC: 5 NG/ML — SIGNIFICANT CHANGE UP
CK MB CFR SERPL CALC: 5.6 NG/ML — SIGNIFICANT CHANGE UP
CK MB CFR SERPL CALC: 6.4 NG/ML — SIGNIFICANT CHANGE UP
CK MB CFR SERPL CALC: 8.5 NG/ML — HIGH
CK MB CFR SERPL CALC: 9.4 NG/ML — HIGH
CK MB CFR SERPL CALC: 9.6 NG/ML — HIGH
CK SERPL-CCNC: 161 U/L — SIGNIFICANT CHANGE UP (ref 30–200)
CK SERPL-CCNC: 290 U/L — HIGH (ref 30–200)
CK SERPL-CCNC: 425 U/L — HIGH (ref 30–200)
CK SERPL-CCNC: 428 U/L — HIGH (ref 30–200)
CK SERPL-CCNC: 442 U/L — HIGH (ref 30–200)
CK SERPL-CCNC: 446 U/L — HIGH (ref 30–200)
CK SERPL-CCNC: 462 U/L — HIGH (ref 30–200)
CK SERPL-CCNC: 467 U/L — HIGH (ref 30–200)
CK SERPL-CCNC: 956 U/L — HIGH (ref 30–200)
CO2 BLDA-SCNC: 22 MMOL/L — SIGNIFICANT CHANGE UP (ref 19–24)
CO2 BLDA-SCNC: 24 MMOL/L — SIGNIFICANT CHANGE UP (ref 19–24)
CO2 BLDA-SCNC: 25 MMOL/L — HIGH (ref 19–24)
CO2 BLDA-SCNC: 28 MMOL/L — HIGH (ref 19–24)
CO2 BLDV-SCNC: 12.2 MMOL/L — LOW (ref 22–26)
CO2 BLDV-SCNC: 16 MMOL/L — LOW (ref 22–26)
CO2 BLDV-SCNC: 21.4 MMOL/L — LOW (ref 22–26)
CO2 BLDV-SCNC: 23.4 MMOL/L — SIGNIFICANT CHANGE UP (ref 22–26)
CO2 BLDV-SCNC: 24.6 MMOL/L — SIGNIFICANT CHANGE UP (ref 22–26)
CO2 BLDV-SCNC: 24.7 MMOL/L — SIGNIFICANT CHANGE UP (ref 22–26)
CO2 BLDV-SCNC: 24.9 MMOL/L — SIGNIFICANT CHANGE UP (ref 22–26)
CO2 BLDV-SCNC: 25 MMOL/L — SIGNIFICANT CHANGE UP (ref 22–26)
CO2 BLDV-SCNC: 25.3 MMOL/L — SIGNIFICANT CHANGE UP (ref 22–26)
CO2 BLDV-SCNC: 25.6 MMOL/L — SIGNIFICANT CHANGE UP (ref 22–26)
CO2 BLDV-SCNC: 25.7 MMOL/L — SIGNIFICANT CHANGE UP (ref 22–26)
CO2 BLDV-SCNC: 26.8 MMOL/L — HIGH (ref 22–26)
CO2 BLDV-SCNC: 27.1 MMOL/L — HIGH (ref 22–26)
CO2 BLDV-SCNC: 27.4 MMOL/L — HIGH (ref 22–26)
CO2 BLDV-SCNC: 27.6 MMOL/L — HIGH (ref 22–26)
CO2 BLDV-SCNC: 27.9 MMOL/L — HIGH (ref 22–26)
CO2 BLDV-SCNC: 28 MMOL/L — HIGH (ref 22–26)
CO2 BLDV-SCNC: 28.1 MMOL/L — HIGH (ref 22–26)
CO2 BLDV-SCNC: 28.2 MMOL/L — HIGH (ref 22–26)
CO2 BLDV-SCNC: 28.5 MMOL/L — HIGH (ref 22–26)
CO2 BLDV-SCNC: 28.6 MMOL/L — HIGH (ref 22–26)
CO2 BLDV-SCNC: 30.3 MMOL/L — HIGH (ref 22–26)
CO2 BLDV-SCNC: 30.9 MMOL/L — HIGH (ref 22–26)
CO2 BLDV-SCNC: 31.2 MMOL/L — HIGH (ref 22–26)
CO2 BLDV-SCNC: 31.5 MMOL/L — HIGH (ref 22–26)
CO2 BLDV-SCNC: 31.7 MMOL/L — HIGH (ref 22–26)
CO2 BLDV-SCNC: 31.9 MMOL/L — HIGH (ref 22–26)
CO2 BLDV-SCNC: 32 MMOL/L — HIGH (ref 22–26)
CO2 SERPL-SCNC: 10 MMOL/L — CRITICAL LOW (ref 22–31)
CO2 SERPL-SCNC: 12 MMOL/L — LOW (ref 22–31)
CO2 SERPL-SCNC: 17 MMOL/L — LOW (ref 22–31)
CO2 SERPL-SCNC: 18 MMOL/L — LOW (ref 22–31)
CO2 SERPL-SCNC: 20 MMOL/L — LOW (ref 22–31)
CO2 SERPL-SCNC: 21 MMOL/L — LOW (ref 22–31)
CO2 SERPL-SCNC: 22 MMOL/L — SIGNIFICANT CHANGE UP (ref 22–31)
CO2 SERPL-SCNC: 23 MMOL/L — SIGNIFICANT CHANGE UP (ref 22–31)
CO2 SERPL-SCNC: 24 MMOL/L — SIGNIFICANT CHANGE UP (ref 22–31)
CO2 SERPL-SCNC: 25 MMOL/L — SIGNIFICANT CHANGE UP (ref 22–31)
CO2 SERPL-SCNC: 26 MMOL/L — SIGNIFICANT CHANGE UP (ref 22–31)
CO2 SERPL-SCNC: 27 MMOL/L — SIGNIFICANT CHANGE UP (ref 22–31)
CO2 SERPL-SCNC: 27 MMOL/L — SIGNIFICANT CHANGE UP (ref 22–31)
CO2 SERPL-SCNC: 28 MMOL/L — SIGNIFICANT CHANGE UP (ref 22–31)
COLOR SPEC: ABNORMAL
COLOR SPEC: YELLOW — SIGNIFICANT CHANGE UP
CREAT ?TM UR-MCNC: 106 MG/DL — SIGNIFICANT CHANGE UP
CREAT SERPL-MCNC: 0.95 MG/DL — SIGNIFICANT CHANGE UP (ref 0.5–1.3)
CREAT SERPL-MCNC: 0.98 MG/DL — SIGNIFICANT CHANGE UP (ref 0.5–1.3)
CREAT SERPL-MCNC: 1 MG/DL — SIGNIFICANT CHANGE UP (ref 0.5–1.3)
CREAT SERPL-MCNC: 1.02 MG/DL — SIGNIFICANT CHANGE UP (ref 0.5–1.3)
CREAT SERPL-MCNC: 1.04 MG/DL — SIGNIFICANT CHANGE UP (ref 0.5–1.3)
CREAT SERPL-MCNC: 1.06 MG/DL — SIGNIFICANT CHANGE UP (ref 0.5–1.3)
CREAT SERPL-MCNC: 1.09 MG/DL — SIGNIFICANT CHANGE UP (ref 0.5–1.3)
CREAT SERPL-MCNC: 1.09 MG/DL — SIGNIFICANT CHANGE UP (ref 0.5–1.3)
CREAT SERPL-MCNC: 1.1 MG/DL — SIGNIFICANT CHANGE UP (ref 0.5–1.3)
CREAT SERPL-MCNC: 1.13 MG/DL — SIGNIFICANT CHANGE UP (ref 0.5–1.3)
CREAT SERPL-MCNC: 1.14 MG/DL — SIGNIFICANT CHANGE UP (ref 0.5–1.3)
CREAT SERPL-MCNC: 1.16 MG/DL — SIGNIFICANT CHANGE UP (ref 0.5–1.3)
CREAT SERPL-MCNC: 1.21 MG/DL — SIGNIFICANT CHANGE UP (ref 0.5–1.3)
CREAT SERPL-MCNC: 1.23 MG/DL — SIGNIFICANT CHANGE UP (ref 0.5–1.3)
CREAT SERPL-MCNC: 1.31 MG/DL — HIGH (ref 0.5–1.3)
CREAT SERPL-MCNC: 1.31 MG/DL — HIGH (ref 0.5–1.3)
CREAT SERPL-MCNC: 1.46 MG/DL — HIGH (ref 0.5–1.3)
CREAT SERPL-MCNC: 1.56 MG/DL — HIGH (ref 0.5–1.3)
CREAT SERPL-MCNC: 1.66 MG/DL — HIGH (ref 0.5–1.3)
CREAT SERPL-MCNC: 1.84 MG/DL — HIGH (ref 0.5–1.3)
CREAT SERPL-MCNC: 1.91 MG/DL — HIGH (ref 0.5–1.3)
CREAT SERPL-MCNC: 1.94 MG/DL — HIGH (ref 0.5–1.3)
CREAT SERPL-MCNC: 1.99 MG/DL — HIGH (ref 0.5–1.3)
CREAT SERPL-MCNC: 2 MG/DL — HIGH (ref 0.5–1.3)
CREAT SERPL-MCNC: 2.04 MG/DL — HIGH (ref 0.5–1.3)
CREAT SERPL-MCNC: 2.12 MG/DL — HIGH (ref 0.5–1.3)
CREAT SERPL-MCNC: 2.14 MG/DL — HIGH (ref 0.5–1.3)
CREAT SERPL-MCNC: 2.15 MG/DL — HIGH (ref 0.5–1.3)
CREAT SERPL-MCNC: 2.2 MG/DL — HIGH (ref 0.5–1.3)
CREAT SERPL-MCNC: 2.22 MG/DL — HIGH (ref 0.5–1.3)
CREAT SERPL-MCNC: 2.24 MG/DL — HIGH (ref 0.5–1.3)
CREAT SERPL-MCNC: 2.24 MG/DL — HIGH (ref 0.5–1.3)
CREAT SERPL-MCNC: 2.26 MG/DL — HIGH (ref 0.5–1.3)
CREAT SERPL-MCNC: 2.28 MG/DL — HIGH (ref 0.5–1.3)
CREAT SERPL-MCNC: 2.34 MG/DL — HIGH (ref 0.5–1.3)
CREAT SERPL-MCNC: 2.71 MG/DL — HIGH (ref 0.5–1.3)
CREAT SERPL-MCNC: 3.16 MG/DL — HIGH (ref 0.5–1.3)
CREAT SERPL-MCNC: 3.75 MG/DL — HIGH (ref 0.5–1.3)
CREAT SERPL-MCNC: 4.15 MG/DL — HIGH (ref 0.5–1.3)
CREAT SERPL-MCNC: 5 MG/DL — HIGH (ref 0.5–1.3)
CREAT SERPL-MCNC: 5.35 MG/DL — HIGH (ref 0.5–1.3)
CULTURE RESULTS: SIGNIFICANT CHANGE UP
DIFF PNL FLD: ABNORMAL
DIFF PNL FLD: ABNORMAL
DIGOXIN SERPL-MCNC: 0.8 NG/ML — SIGNIFICANT CHANGE UP (ref 0.8–2)
DIGOXIN SERPL-MCNC: <0.3 NG/ML — LOW (ref 0.8–2)
EGFR: 10 ML/MIN/1.73M2 — LOW
EGFR: 11 ML/MIN/1.73M2 — LOW
EGFR: 14 ML/MIN/1.73M2 — LOW
EGFR: 15 ML/MIN/1.73M2 — LOW
EGFR: 19 ML/MIN/1.73M2 — LOW
EGFR: 23 ML/MIN/1.73M2 — LOW
EGFR: 27 ML/MIN/1.73M2 — LOW
EGFR: 28 ML/MIN/1.73M2 — LOW
EGFR: 29 ML/MIN/1.73M2 — LOW
EGFR: 29 ML/MIN/1.73M2 — LOW
EGFR: 30 ML/MIN/1.73M2 — LOW
EGFR: 32 ML/MIN/1.73M2 — LOW
EGFR: 33 ML/MIN/1.73M2 — LOW
EGFR: 33 ML/MIN/1.73M2 — LOW
EGFR: 34 ML/MIN/1.73M2 — LOW
EGFR: 34 ML/MIN/1.73M2 — LOW
EGFR: 36 ML/MIN/1.73M2 — LOW
EGFR: 41 ML/MIN/1.73M2 — LOW
EGFR: 44 ML/MIN/1.73M2 — LOW
EGFR: 48 ML/MIN/1.73M2 — LOW
EGFR: 54 ML/MIN/1.73M2 — LOW
EGFR: 54 ML/MIN/1.73M2 — LOW
EGFR: 59 ML/MIN/1.73M2 — LOW
EGFR: 60 ML/MIN/1.73M2 — SIGNIFICANT CHANGE UP
EGFR: 62 ML/MIN/1.73M2 — SIGNIFICANT CHANGE UP
EGFR: 64 ML/MIN/1.73M2 — SIGNIFICANT CHANGE UP
EGFR: 64 ML/MIN/1.73M2 — SIGNIFICANT CHANGE UP
EGFR: 67 ML/MIN/1.73M2 — SIGNIFICANT CHANGE UP
EGFR: 68 ML/MIN/1.73M2 — SIGNIFICANT CHANGE UP
EGFR: 68 ML/MIN/1.73M2 — SIGNIFICANT CHANGE UP
EGFR: 70 ML/MIN/1.73M2 — SIGNIFICANT CHANGE UP
EGFR: 72 ML/MIN/1.73M2 — SIGNIFICANT CHANGE UP
EGFR: 73 ML/MIN/1.73M2 — SIGNIFICANT CHANGE UP
EGFR: 75 ML/MIN/1.73M2 — SIGNIFICANT CHANGE UP
EGFR: 77 ML/MIN/1.73M2 — SIGNIFICANT CHANGE UP
EGFR: 80 ML/MIN/1.73M2 — SIGNIFICANT CHANGE UP
EOSINOPHIL # BLD AUTO: 0 K/UL — SIGNIFICANT CHANGE UP (ref 0–0.5)
EOSINOPHIL # BLD AUTO: 0.01 K/UL — SIGNIFICANT CHANGE UP (ref 0–0.5)
EOSINOPHIL # BLD AUTO: 0.01 K/UL — SIGNIFICANT CHANGE UP (ref 0–0.5)
EOSINOPHIL # BLD AUTO: 0.02 K/UL — SIGNIFICANT CHANGE UP (ref 0–0.5)
EOSINOPHIL # BLD AUTO: 0.03 K/UL — SIGNIFICANT CHANGE UP (ref 0–0.5)
EOSINOPHIL NFR BLD AUTO: 0 % — SIGNIFICANT CHANGE UP (ref 0–6)
EOSINOPHIL NFR BLD AUTO: 0.1 % — SIGNIFICANT CHANGE UP (ref 0–6)
EOSINOPHIL NFR BLD AUTO: 0.1 % — SIGNIFICANT CHANGE UP (ref 0–6)
EOSINOPHIL NFR BLD AUTO: 0.3 % — SIGNIFICANT CHANGE UP (ref 0–6)
EOSINOPHIL NFR BLD AUTO: 0.4 % — SIGNIFICANT CHANGE UP (ref 0–6)
ESTIMATED AVERAGE GLUCOSE: 192 — SIGNIFICANT CHANGE UP
ESTIMATED AVERAGE GLUCOSE: 217 — SIGNIFICANT CHANGE UP
FACT X ACT/NOR PPP: 49.2 % — LOW (ref 70–150)
FLUAV AG NPH QL: SIGNIFICANT CHANGE UP
FLUAV AG NPH QL: SIGNIFICANT CHANGE UP
FLUAV SUBTYP SPEC NAA+PROBE: SIGNIFICANT CHANGE UP
FLUBV AG NPH QL: SIGNIFICANT CHANGE UP
FLUBV AG NPH QL: SIGNIFICANT CHANGE UP
FLUBV RNA SPEC QL NAA+PROBE: SIGNIFICANT CHANGE UP
FOLATE SERPL-MCNC: 9.9 NG/ML — SIGNIFICANT CHANGE UP (ref 3.1–17.5)
GAS PNL BLDA: SIGNIFICANT CHANGE UP
GAS PNL BLDV: 134 MMOL/L — LOW (ref 136–145)
GAS PNL BLDV: 136 MMOL/L — SIGNIFICANT CHANGE UP (ref 136–145)
GAS PNL BLDV: 137 MMOL/L — SIGNIFICANT CHANGE UP (ref 136–145)
GAS PNL BLDV: 137 MMOL/L — SIGNIFICANT CHANGE UP (ref 136–145)
GAS PNL BLDV: 138 MMOL/L — SIGNIFICANT CHANGE UP (ref 136–145)
GAS PNL BLDV: 139 MMOL/L — SIGNIFICANT CHANGE UP (ref 136–145)
GAS PNL BLDV: 140 MMOL/L — SIGNIFICANT CHANGE UP (ref 136–145)
GAS PNL BLDV: 141 MMOL/L — SIGNIFICANT CHANGE UP (ref 136–145)
GAS PNL BLDV: 144 MMOL/L — SIGNIFICANT CHANGE UP (ref 136–145)
GAS PNL BLDV: SIGNIFICANT CHANGE UP
GLUCOSE BLDC GLUCOMTR-MCNC: 101 MG/DL — HIGH (ref 70–99)
GLUCOSE BLDC GLUCOMTR-MCNC: 102 MG/DL — HIGH (ref 70–99)
GLUCOSE BLDC GLUCOMTR-MCNC: 112 MG/DL — HIGH (ref 70–99)
GLUCOSE BLDC GLUCOMTR-MCNC: 112 MG/DL — HIGH (ref 70–99)
GLUCOSE BLDC GLUCOMTR-MCNC: 115 MG/DL — HIGH (ref 70–99)
GLUCOSE BLDC GLUCOMTR-MCNC: 130 MG/DL — HIGH (ref 70–99)
GLUCOSE BLDC GLUCOMTR-MCNC: 133 MG/DL — HIGH (ref 70–99)
GLUCOSE BLDC GLUCOMTR-MCNC: 133 MG/DL — HIGH (ref 70–99)
GLUCOSE BLDC GLUCOMTR-MCNC: 137 MG/DL — HIGH (ref 70–99)
GLUCOSE BLDC GLUCOMTR-MCNC: 137 MG/DL — HIGH (ref 70–99)
GLUCOSE BLDC GLUCOMTR-MCNC: 139 MG/DL — HIGH (ref 70–99)
GLUCOSE BLDC GLUCOMTR-MCNC: 139 MG/DL — HIGH (ref 70–99)
GLUCOSE BLDC GLUCOMTR-MCNC: 140 MG/DL — HIGH (ref 70–99)
GLUCOSE BLDC GLUCOMTR-MCNC: 140 MG/DL — HIGH (ref 70–99)
GLUCOSE BLDC GLUCOMTR-MCNC: 141 MG/DL — HIGH (ref 70–99)
GLUCOSE BLDC GLUCOMTR-MCNC: 141 MG/DL — HIGH (ref 70–99)
GLUCOSE BLDC GLUCOMTR-MCNC: 143 MG/DL — HIGH (ref 70–99)
GLUCOSE BLDC GLUCOMTR-MCNC: 144 MG/DL — HIGH (ref 70–99)
GLUCOSE BLDC GLUCOMTR-MCNC: 147 MG/DL — HIGH (ref 70–99)
GLUCOSE BLDC GLUCOMTR-MCNC: 150 MG/DL — HIGH (ref 70–99)
GLUCOSE BLDC GLUCOMTR-MCNC: 152 MG/DL — HIGH (ref 70–99)
GLUCOSE BLDC GLUCOMTR-MCNC: 155 MG/DL — HIGH (ref 70–99)
GLUCOSE BLDC GLUCOMTR-MCNC: 156 MG/DL — HIGH (ref 70–99)
GLUCOSE BLDC GLUCOMTR-MCNC: 162 MG/DL — HIGH (ref 70–99)
GLUCOSE BLDC GLUCOMTR-MCNC: 164 MG/DL — HIGH (ref 70–99)
GLUCOSE BLDC GLUCOMTR-MCNC: 167 MG/DL — HIGH (ref 70–99)
GLUCOSE BLDC GLUCOMTR-MCNC: 171 MG/DL — HIGH (ref 70–99)
GLUCOSE BLDC GLUCOMTR-MCNC: 173 MG/DL — HIGH (ref 70–99)
GLUCOSE BLDC GLUCOMTR-MCNC: 178 MG/DL — HIGH (ref 70–99)
GLUCOSE BLDC GLUCOMTR-MCNC: 178 MG/DL — HIGH (ref 70–99)
GLUCOSE BLDC GLUCOMTR-MCNC: 179 MG/DL — HIGH (ref 70–99)
GLUCOSE BLDC GLUCOMTR-MCNC: 179 MG/DL — HIGH (ref 70–99)
GLUCOSE BLDC GLUCOMTR-MCNC: 180 MG/DL — HIGH (ref 70–99)
GLUCOSE BLDC GLUCOMTR-MCNC: 181 MG/DL — HIGH (ref 70–99)
GLUCOSE BLDC GLUCOMTR-MCNC: 181 MG/DL — HIGH (ref 70–99)
GLUCOSE BLDC GLUCOMTR-MCNC: 187 MG/DL — HIGH (ref 70–99)
GLUCOSE BLDC GLUCOMTR-MCNC: 190 MG/DL — HIGH (ref 70–99)
GLUCOSE BLDC GLUCOMTR-MCNC: 192 MG/DL — HIGH (ref 70–99)
GLUCOSE BLDC GLUCOMTR-MCNC: 195 MG/DL — HIGH (ref 70–99)
GLUCOSE BLDC GLUCOMTR-MCNC: 197 MG/DL — HIGH (ref 70–99)
GLUCOSE BLDC GLUCOMTR-MCNC: 197 MG/DL — HIGH (ref 70–99)
GLUCOSE BLDC GLUCOMTR-MCNC: 200 MG/DL — HIGH (ref 70–99)
GLUCOSE BLDC GLUCOMTR-MCNC: 201 MG/DL — HIGH (ref 70–99)
GLUCOSE BLDC GLUCOMTR-MCNC: 204 MG/DL — HIGH (ref 70–99)
GLUCOSE BLDC GLUCOMTR-MCNC: 205 MG/DL — HIGH (ref 70–99)
GLUCOSE BLDC GLUCOMTR-MCNC: 209 MG/DL — HIGH (ref 70–99)
GLUCOSE BLDC GLUCOMTR-MCNC: 210 MG/DL — HIGH (ref 70–99)
GLUCOSE BLDC GLUCOMTR-MCNC: 210 MG/DL — HIGH (ref 70–99)
GLUCOSE BLDC GLUCOMTR-MCNC: 212 MG/DL — HIGH (ref 70–99)
GLUCOSE BLDC GLUCOMTR-MCNC: 218 MG/DL — HIGH (ref 70–99)
GLUCOSE BLDC GLUCOMTR-MCNC: 221 MG/DL — HIGH (ref 70–99)
GLUCOSE BLDC GLUCOMTR-MCNC: 222 MG/DL — HIGH (ref 70–99)
GLUCOSE BLDC GLUCOMTR-MCNC: 222 MG/DL — HIGH (ref 70–99)
GLUCOSE BLDC GLUCOMTR-MCNC: 225 MG/DL — HIGH (ref 70–99)
GLUCOSE BLDC GLUCOMTR-MCNC: 228 MG/DL — HIGH (ref 70–99)
GLUCOSE BLDC GLUCOMTR-MCNC: 230 MG/DL — HIGH (ref 70–99)
GLUCOSE BLDC GLUCOMTR-MCNC: 237 MG/DL — HIGH (ref 70–99)
GLUCOSE BLDC GLUCOMTR-MCNC: 240 MG/DL — HIGH (ref 70–99)
GLUCOSE BLDC GLUCOMTR-MCNC: 241 MG/DL — HIGH (ref 70–99)
GLUCOSE BLDC GLUCOMTR-MCNC: 242 MG/DL — HIGH (ref 70–99)
GLUCOSE BLDC GLUCOMTR-MCNC: 244 MG/DL — HIGH (ref 70–99)
GLUCOSE BLDC GLUCOMTR-MCNC: 251 MG/DL — HIGH (ref 70–99)
GLUCOSE BLDC GLUCOMTR-MCNC: 255 MG/DL — HIGH (ref 70–99)
GLUCOSE BLDC GLUCOMTR-MCNC: 256 MG/DL — HIGH (ref 70–99)
GLUCOSE BLDC GLUCOMTR-MCNC: 259 MG/DL — HIGH (ref 70–99)
GLUCOSE BLDC GLUCOMTR-MCNC: 259 MG/DL — HIGH (ref 70–99)
GLUCOSE BLDC GLUCOMTR-MCNC: 282 MG/DL — HIGH (ref 70–99)
GLUCOSE BLDC GLUCOMTR-MCNC: 305 MG/DL — HIGH (ref 70–99)
GLUCOSE BLDC GLUCOMTR-MCNC: 308 MG/DL — HIGH (ref 70–99)
GLUCOSE BLDC GLUCOMTR-MCNC: 427 MG/DL — HIGH (ref 70–99)
GLUCOSE BLDC GLUCOMTR-MCNC: 72 MG/DL — SIGNIFICANT CHANGE UP (ref 70–99)
GLUCOSE BLDV-MCNC: 105 MG/DL — HIGH (ref 70–99)
GLUCOSE BLDV-MCNC: 110 MG/DL — HIGH (ref 70–99)
GLUCOSE BLDV-MCNC: 110 MG/DL — HIGH (ref 70–99)
GLUCOSE BLDV-MCNC: 121 MG/DL — HIGH (ref 70–99)
GLUCOSE BLDV-MCNC: 124 MG/DL — HIGH (ref 70–99)
GLUCOSE BLDV-MCNC: 133 MG/DL — HIGH (ref 70–99)
GLUCOSE BLDV-MCNC: 134 MG/DL — HIGH (ref 70–99)
GLUCOSE BLDV-MCNC: 137 MG/DL — HIGH (ref 70–99)
GLUCOSE BLDV-MCNC: 139 MG/DL — HIGH (ref 70–99)
GLUCOSE BLDV-MCNC: 143 MG/DL — HIGH (ref 70–99)
GLUCOSE BLDV-MCNC: 166 MG/DL — HIGH (ref 70–99)
GLUCOSE BLDV-MCNC: 172 MG/DL — HIGH (ref 70–99)
GLUCOSE BLDV-MCNC: 175 MG/DL — HIGH (ref 70–99)
GLUCOSE BLDV-MCNC: 186 MG/DL — HIGH (ref 70–99)
GLUCOSE BLDV-MCNC: 194 MG/DL — HIGH (ref 70–99)
GLUCOSE BLDV-MCNC: 195 MG/DL — HIGH (ref 70–99)
GLUCOSE BLDV-MCNC: 198 MG/DL — HIGH (ref 70–99)
GLUCOSE BLDV-MCNC: 206 MG/DL — HIGH (ref 70–99)
GLUCOSE BLDV-MCNC: 210 MG/DL — HIGH (ref 70–99)
GLUCOSE BLDV-MCNC: 258 MG/DL — HIGH (ref 70–99)
GLUCOSE BLDV-MCNC: 263 MG/DL — HIGH (ref 70–99)
GLUCOSE BLDV-MCNC: 288 MG/DL — HIGH (ref 70–99)
GLUCOSE BLDV-MCNC: 348 MG/DL — HIGH (ref 70–99)
GLUCOSE BLDV-MCNC: 420 MG/DL — HIGH (ref 70–99)
GLUCOSE BLDV-MCNC: 72 MG/DL — SIGNIFICANT CHANGE UP (ref 70–99)
GLUCOSE BLDV-MCNC: 77 MG/DL — SIGNIFICANT CHANGE UP (ref 70–99)
GLUCOSE SERPL-MCNC: 116 MG/DL — HIGH (ref 70–99)
GLUCOSE SERPL-MCNC: 116 MG/DL — HIGH (ref 70–99)
GLUCOSE SERPL-MCNC: 122 MG/DL — HIGH (ref 70–99)
GLUCOSE SERPL-MCNC: 129 MG/DL — HIGH (ref 70–99)
GLUCOSE SERPL-MCNC: 133 MG/DL — HIGH (ref 70–99)
GLUCOSE SERPL-MCNC: 137 MG/DL — HIGH (ref 70–99)
GLUCOSE SERPL-MCNC: 138 MG/DL — HIGH (ref 70–99)
GLUCOSE SERPL-MCNC: 139 MG/DL — HIGH (ref 70–99)
GLUCOSE SERPL-MCNC: 142 MG/DL — HIGH (ref 70–99)
GLUCOSE SERPL-MCNC: 143 MG/DL — HIGH (ref 70–99)
GLUCOSE SERPL-MCNC: 146 MG/DL — HIGH (ref 70–99)
GLUCOSE SERPL-MCNC: 149 MG/DL — HIGH (ref 70–99)
GLUCOSE SERPL-MCNC: 161 MG/DL — HIGH (ref 70–99)
GLUCOSE SERPL-MCNC: 170 MG/DL — HIGH (ref 70–99)
GLUCOSE SERPL-MCNC: 172 MG/DL — HIGH (ref 70–99)
GLUCOSE SERPL-MCNC: 181 MG/DL — HIGH (ref 70–99)
GLUCOSE SERPL-MCNC: 182 MG/DL — HIGH (ref 70–99)
GLUCOSE SERPL-MCNC: 188 MG/DL — HIGH (ref 70–99)
GLUCOSE SERPL-MCNC: 213 MG/DL — HIGH (ref 70–99)
GLUCOSE SERPL-MCNC: 216 MG/DL — HIGH (ref 70–99)
GLUCOSE SERPL-MCNC: 219 MG/DL — HIGH (ref 70–99)
GLUCOSE SERPL-MCNC: 223 MG/DL — HIGH (ref 70–99)
GLUCOSE SERPL-MCNC: 233 MG/DL — HIGH (ref 70–99)
GLUCOSE SERPL-MCNC: 235 MG/DL — HIGH (ref 70–99)
GLUCOSE SERPL-MCNC: 237 MG/DL — HIGH (ref 70–99)
GLUCOSE SERPL-MCNC: 256 MG/DL — HIGH (ref 70–99)
GLUCOSE SERPL-MCNC: 256 MG/DL — HIGH (ref 70–99)
GLUCOSE SERPL-MCNC: 258 MG/DL — HIGH (ref 70–99)
GLUCOSE SERPL-MCNC: 258 MG/DL — HIGH (ref 70–99)
GLUCOSE SERPL-MCNC: 265 MG/DL — HIGH (ref 70–99)
GLUCOSE SERPL-MCNC: 271 MG/DL — HIGH (ref 70–99)
GLUCOSE SERPL-MCNC: 279 MG/DL — HIGH (ref 70–99)
GLUCOSE SERPL-MCNC: 290 MG/DL — HIGH (ref 70–99)
GLUCOSE SERPL-MCNC: 293 MG/DL — HIGH (ref 70–99)
GLUCOSE SERPL-MCNC: 296 MG/DL — HIGH (ref 70–99)
GLUCOSE SERPL-MCNC: 316 MG/DL — HIGH (ref 70–99)
GLUCOSE SERPL-MCNC: 338 MG/DL — HIGH (ref 70–99)
GLUCOSE SERPL-MCNC: 388 MG/DL — HIGH (ref 70–99)
GLUCOSE SERPL-MCNC: 519 MG/DL — CRITICAL HIGH (ref 70–99)
GLUCOSE SERPL-MCNC: 80 MG/DL — SIGNIFICANT CHANGE UP (ref 70–99)
GLUCOSE SERPL-MCNC: 83 MG/DL — SIGNIFICANT CHANGE UP (ref 70–99)
GLUCOSE UR QL: ABNORMAL
GLUCOSE UR QL: ABNORMAL
HADV DNA SPEC QL NAA+PROBE: SIGNIFICANT CHANGE UP
HCO3 BLDA-SCNC: 21 MMOL/L — SIGNIFICANT CHANGE UP (ref 21–28)
HCO3 BLDA-SCNC: 23 MMOL/L — SIGNIFICANT CHANGE UP (ref 21–28)
HCO3 BLDA-SCNC: 24 MMOL/L — SIGNIFICANT CHANGE UP (ref 21–28)
HCO3 BLDA-SCNC: 26 MMOL/L — SIGNIFICANT CHANGE UP (ref 21–28)
HCO3 BLDV-SCNC: 12 MMOL/L — LOW (ref 22–29)
HCO3 BLDV-SCNC: 14 MMOL/L — LOW (ref 22–29)
HCO3 BLDV-SCNC: 20 MMOL/L — LOW (ref 22–29)
HCO3 BLDV-SCNC: 22 MMOL/L — SIGNIFICANT CHANGE UP (ref 22–29)
HCO3 BLDV-SCNC: 23 MMOL/L — SIGNIFICANT CHANGE UP (ref 22–29)
HCO3 BLDV-SCNC: 24 MMOL/L — SIGNIFICANT CHANGE UP (ref 22–29)
HCO3 BLDV-SCNC: 25 MMOL/L — SIGNIFICANT CHANGE UP (ref 22–29)
HCO3 BLDV-SCNC: 26 MMOL/L — SIGNIFICANT CHANGE UP (ref 22–29)
HCO3 BLDV-SCNC: 27 MMOL/L — SIGNIFICANT CHANGE UP (ref 22–29)
HCO3 BLDV-SCNC: 29 MMOL/L — SIGNIFICANT CHANGE UP (ref 22–29)
HCO3 BLDV-SCNC: 30 MMOL/L — HIGH (ref 22–29)
HCO3 BLDV-SCNC: 31 MMOL/L — HIGH (ref 22–29)
HCO3 BLDV-SCNC: 31 MMOL/L — HIGH (ref 22–29)
HCOV 229E RNA SPEC QL NAA+PROBE: SIGNIFICANT CHANGE UP
HCOV HKU1 RNA SPEC QL NAA+PROBE: SIGNIFICANT CHANGE UP
HCOV NL63 RNA SPEC QL NAA+PROBE: SIGNIFICANT CHANGE UP
HCOV OC43 RNA SPEC QL NAA+PROBE: SIGNIFICANT CHANGE UP
HCT VFR BLD CALC: 29.1 % — LOW (ref 39–50)
HCT VFR BLD CALC: 29.8 % — LOW (ref 39–50)
HCT VFR BLD CALC: 30.3 % — LOW (ref 39–50)
HCT VFR BLD CALC: 30.3 % — LOW (ref 39–50)
HCT VFR BLD CALC: 30.4 % — LOW (ref 39–50)
HCT VFR BLD CALC: 31 % — LOW (ref 39–50)
HCT VFR BLD CALC: 31.2 % — LOW (ref 39–50)
HCT VFR BLD CALC: 31.3 % — LOW (ref 39–50)
HCT VFR BLD CALC: 31.5 % — LOW (ref 39–50)
HCT VFR BLD CALC: 31.6 % — LOW (ref 39–50)
HCT VFR BLD CALC: 32 % — LOW (ref 39–50)
HCT VFR BLD CALC: 32 % — LOW (ref 39–50)
HCT VFR BLD CALC: 32.9 % — LOW (ref 39–50)
HCT VFR BLD CALC: 33.1 % — LOW (ref 39–50)
HCT VFR BLD CALC: 33.2 % — LOW (ref 39–50)
HCT VFR BLD CALC: 34 % — LOW (ref 39–50)
HCT VFR BLD CALC: 34.6 % — LOW (ref 39–50)
HCT VFR BLD CALC: 35.1 % — LOW (ref 39–50)
HCT VFR BLD CALC: 35.3 % — LOW (ref 39–50)
HCT VFR BLD CALC: 35.5 % — LOW (ref 39–50)
HCT VFR BLD CALC: 35.5 % — LOW (ref 39–50)
HCT VFR BLD CALC: 35.6 % — LOW (ref 39–50)
HCT VFR BLD CALC: 35.6 % — LOW (ref 39–50)
HCT VFR BLD CALC: 36.1 % — LOW (ref 39–50)
HCT VFR BLD CALC: 36.2 % — LOW (ref 39–50)
HCT VFR BLD CALC: 36.4 % — LOW (ref 39–50)
HCT VFR BLD CALC: 36.7 % — LOW (ref 39–50)
HCT VFR BLD CALC: 36.9 % — LOW (ref 39–50)
HCT VFR BLD CALC: 36.9 % — LOW (ref 39–50)
HCT VFR BLD CALC: 37.2 % — LOW (ref 39–50)
HCT VFR BLD CALC: 37.3 % — LOW (ref 39–50)
HCT VFR BLD CALC: 37.6 % — LOW (ref 39–50)
HCT VFR BLD CALC: 38.2 % — LOW (ref 39–50)
HCT VFR BLD CALC: 38.3 % — LOW (ref 39–50)
HCT VFR BLD CALC: 39.2 % — SIGNIFICANT CHANGE UP (ref 39–50)
HCT VFR BLD CALC: 39.5 % — SIGNIFICANT CHANGE UP (ref 39–50)
HCT VFR BLD CALC: 39.7 % — SIGNIFICANT CHANGE UP (ref 39–50)
HCT VFR BLDA CALC: 19 % — CRITICAL LOW (ref 39–51)
HCT VFR BLDA CALC: 28 % — LOW (ref 39–51)
HCT VFR BLDA CALC: 28 % — LOW (ref 39–51)
HCT VFR BLDA CALC: 29 % — LOW (ref 39–51)
HCT VFR BLDA CALC: 30 % — LOW (ref 39–51)
HCT VFR BLDA CALC: 30 % — LOW (ref 39–51)
HCT VFR BLDA CALC: 31 % — LOW (ref 39–51)
HCT VFR BLDA CALC: 32 % — LOW (ref 39–51)
HCT VFR BLDA CALC: 35 % — LOW (ref 39–51)
HCT VFR BLDA CALC: 36 % — LOW (ref 39–51)
HCT VFR BLDA CALC: 36 % — LOW (ref 39–51)
HCT VFR BLDA CALC: 39 % — SIGNIFICANT CHANGE UP (ref 39–51)
HCT VFR BLDA CALC: 41 % — SIGNIFICANT CHANGE UP (ref 39–51)
HCYS SERPL-MCNC: 11.7 UMOL/L — SIGNIFICANT CHANGE UP
HDLC SERPL-MCNC: 38 MG/DL — LOW
HDLC SERPL-MCNC: 50 MG/DL — SIGNIFICANT CHANGE UP
HGB BLD CALC-MCNC: 10 G/DL — LOW (ref 13–17)
HGB BLD CALC-MCNC: 10 G/DL — LOW (ref 13–17)
HGB BLD CALC-MCNC: 10.2 G/DL — LOW (ref 13–17)
HGB BLD CALC-MCNC: 10.4 G/DL — LOW (ref 13–17)
HGB BLD CALC-MCNC: 10.5 G/DL — LOW (ref 13–17)
HGB BLD CALC-MCNC: 10.6 G/DL — LOW (ref 13–17)
HGB BLD CALC-MCNC: 10.7 G/DL — LOW (ref 13–17)
HGB BLD CALC-MCNC: 10.8 G/DL — LOW (ref 13–17)
HGB BLD CALC-MCNC: 11.6 G/DL — LOW (ref 13–17)
HGB BLD CALC-MCNC: 12 G/DL — LOW (ref 13–17)
HGB BLD CALC-MCNC: 12.1 G/DL — LOW (ref 13–17)
HGB BLD CALC-MCNC: 13 G/DL — SIGNIFICANT CHANGE UP (ref 13–17)
HGB BLD CALC-MCNC: 13.6 G/DL — SIGNIFICANT CHANGE UP (ref 13–17)
HGB BLD CALC-MCNC: 6.4 G/DL — CRITICAL LOW (ref 13–17)
HGB BLD CALC-MCNC: 9.2 G/DL — LOW (ref 13–17)
HGB BLD CALC-MCNC: 9.3 G/DL — LOW (ref 13–17)
HGB BLD CALC-MCNC: 9.5 G/DL — LOW (ref 13–17)
HGB BLD CALC-MCNC: 9.5 G/DL — LOW (ref 13–17)
HGB BLD CALC-MCNC: 9.7 G/DL — LOW (ref 13–17)
HGB BLD CALC-MCNC: 9.8 G/DL — LOW (ref 13–17)
HGB BLD-MCNC: 10.1 G/DL — LOW (ref 13–17)
HGB BLD-MCNC: 10.2 G/DL — LOW (ref 13–17)
HGB BLD-MCNC: 10.2 G/DL — LOW (ref 13–17)
HGB BLD-MCNC: 10.3 G/DL — LOW (ref 13–17)
HGB BLD-MCNC: 10.5 G/DL — LOW (ref 13–17)
HGB BLD-MCNC: 10.6 G/DL — LOW (ref 13–17)
HGB BLD-MCNC: 10.7 G/DL — LOW (ref 13–17)
HGB BLD-MCNC: 10.8 G/DL — LOW (ref 13–17)
HGB BLD-MCNC: 11 G/DL — LOW (ref 13–17)
HGB BLD-MCNC: 11.1 G/DL — LOW (ref 13–17)
HGB BLD-MCNC: 11.2 G/DL — LOW (ref 13–17)
HGB BLD-MCNC: 11.3 G/DL — LOW (ref 13–17)
HGB BLD-MCNC: 11.6 G/DL — LOW (ref 13–17)
HGB BLD-MCNC: 11.7 G/DL — LOW (ref 13–17)
HGB BLD-MCNC: 11.8 G/DL — LOW (ref 13–17)
HGB BLD-MCNC: 11.8 G/DL — LOW (ref 13–17)
HGB BLD-MCNC: 11.9 G/DL — LOW (ref 13–17)
HGB BLD-MCNC: 12 G/DL — LOW (ref 13–17)
HGB BLD-MCNC: 12.1 G/DL — LOW (ref 13–17)
HGB BLD-MCNC: 12.1 G/DL — LOW (ref 13–17)
HGB BLD-MCNC: 12.2 G/DL — LOW (ref 13–17)
HGB BLD-MCNC: 12.3 G/DL — LOW (ref 13–17)
HGB BLD-MCNC: 12.4 G/DL — LOW (ref 13–17)
HGB BLD-MCNC: 12.6 G/DL — LOW (ref 13–17)
HGB BLD-MCNC: 12.8 G/DL — LOW (ref 13–17)
HGB BLD-MCNC: 13.1 G/DL — SIGNIFICANT CHANGE UP (ref 13–17)
HGB BLD-MCNC: 13.3 G/DL — SIGNIFICANT CHANGE UP (ref 13–17)
HGB BLD-MCNC: 9.3 G/DL — LOW (ref 13–17)
HGB BLD-MCNC: 9.7 G/DL — LOW (ref 13–17)
HIV 1+2 AB+HIV1 P24 AG SERPL QL IA: SIGNIFICANT CHANGE UP
HMPV RNA SPEC QL NAA+PROBE: SIGNIFICANT CHANGE UP
HPIV1 RNA SPEC QL NAA+PROBE: SIGNIFICANT CHANGE UP
HPIV2 RNA SPEC QL NAA+PROBE: SIGNIFICANT CHANGE UP
HPIV3 RNA SPEC QL NAA+PROBE: SIGNIFICANT CHANGE UP
HPIV4 RNA SPEC QL NAA+PROBE: SIGNIFICANT CHANGE UP
IANC: 13 K/UL — HIGH (ref 1.8–7.4)
IANC: 14.17 K/UL — HIGH (ref 1.8–7.4)
IANC: 4.48 K/UL — SIGNIFICANT CHANGE UP (ref 1.8–7.4)
IANC: 5.74 K/UL — SIGNIFICANT CHANGE UP (ref 1.8–7.4)
IANC: 7.92 K/UL — HIGH (ref 1.8–7.4)
IMM GRANULOCYTES NFR BLD AUTO: 0.3 % — SIGNIFICANT CHANGE UP (ref 0–1.5)
IMM GRANULOCYTES NFR BLD AUTO: 0.4 % — SIGNIFICANT CHANGE UP (ref 0–1.5)
IMM GRANULOCYTES NFR BLD AUTO: 0.5 % — SIGNIFICANT CHANGE UP (ref 0–1.5)
IMM GRANULOCYTES NFR BLD AUTO: 0.5 % — SIGNIFICANT CHANGE UP (ref 0–1.5)
IMM GRANULOCYTES NFR BLD AUTO: 0.7 % — SIGNIFICANT CHANGE UP (ref 0–1.5)
INR BLD: 1.27 RATIO — HIGH (ref 0.88–1.16)
INR BLD: 1.34 RATIO — HIGH (ref 0.88–1.16)
INR BLD: 1.35 RATIO — HIGH (ref 0.88–1.16)
INR BLD: 1.42 RATIO — HIGH (ref 0.88–1.16)
INR BLD: 1.51 RATIO — HIGH (ref 0.88–1.16)
INR BLD: 1.6 RATIO — HIGH (ref 0.88–1.16)
INR BLD: 1.62 RATIO — HIGH (ref 0.88–1.16)
KETONES UR-MCNC: NEGATIVE — SIGNIFICANT CHANGE UP
KETONES UR-MCNC: NEGATIVE — SIGNIFICANT CHANGE UP
LACTATE BLDV-MCNC: 1.1 MMOL/L — SIGNIFICANT CHANGE UP (ref 0.5–2)
LACTATE BLDV-MCNC: 1.1 MMOL/L — SIGNIFICANT CHANGE UP (ref 0.5–2)
LACTATE BLDV-MCNC: 1.2 MMOL/L — SIGNIFICANT CHANGE UP (ref 0.5–2)
LACTATE BLDV-MCNC: 1.2 MMOL/L — SIGNIFICANT CHANGE UP (ref 0.5–2)
LACTATE BLDV-MCNC: 1.3 MMOL/L — SIGNIFICANT CHANGE UP (ref 0.5–2)
LACTATE BLDV-MCNC: 1.4 MMOL/L — SIGNIFICANT CHANGE UP (ref 0.5–2)
LACTATE BLDV-MCNC: 1.4 MMOL/L — SIGNIFICANT CHANGE UP (ref 0.5–2)
LACTATE BLDV-MCNC: 1.5 MMOL/L — SIGNIFICANT CHANGE UP (ref 0.5–2)
LACTATE BLDV-MCNC: 1.6 MMOL/L — SIGNIFICANT CHANGE UP (ref 0.5–2)
LACTATE BLDV-MCNC: 1.7 MMOL/L — SIGNIFICANT CHANGE UP (ref 0.5–2)
LACTATE BLDV-MCNC: 1.7 MMOL/L — SIGNIFICANT CHANGE UP (ref 0.5–2)
LACTATE BLDV-MCNC: 1.8 MMOL/L — SIGNIFICANT CHANGE UP (ref 0.5–2)
LACTATE BLDV-MCNC: 13.8 MMOL/L — CRITICAL HIGH (ref 0.5–2)
LACTATE BLDV-MCNC: 2 MMOL/L — SIGNIFICANT CHANGE UP (ref 0.5–2)
LACTATE BLDV-MCNC: 2.1 MMOL/L — HIGH (ref 0.5–2)
LACTATE BLDV-MCNC: 2.5 MMOL/L — HIGH (ref 0.5–2)
LACTATE BLDV-MCNC: 2.8 MMOL/L — HIGH (ref 0.5–2)
LACTATE BLDV-MCNC: 3 MMOL/L — HIGH (ref 0.5–2)
LACTATE BLDV-MCNC: 7 MMOL/L — CRITICAL HIGH (ref 0.5–2)
LACTATE SERPL-SCNC: 13.5 MMOL/L — CRITICAL HIGH (ref 0.5–2)
LACTATE SERPL-SCNC: 2.2 MMOL/L — HIGH (ref 0.5–2)
LACTATE SERPL-SCNC: 4.1 MMOL/L — CRITICAL HIGH (ref 0.5–2)
LACTATE SERPL-SCNC: 6.5 MMOL/L — CRITICAL HIGH (ref 0.5–2)
LEUKOCYTE ESTERASE UR-ACNC: ABNORMAL
LEUKOCYTE ESTERASE UR-ACNC: ABNORMAL
LIPID PNL WITH DIRECT LDL SERPL: 26 MG/DL — SIGNIFICANT CHANGE UP
LIPID PNL WITH DIRECT LDL SERPL: 64 MG/DL — SIGNIFICANT CHANGE UP
LYMPHOCYTES # BLD AUTO: 0.72 K/UL — LOW (ref 1–3.3)
LYMPHOCYTES # BLD AUTO: 0.85 K/UL — LOW (ref 1–3.3)
LYMPHOCYTES # BLD AUTO: 0.91 K/UL — LOW (ref 1–3.3)
LYMPHOCYTES # BLD AUTO: 1.48 K/UL — SIGNIFICANT CHANGE UP (ref 1–3.3)
LYMPHOCYTES # BLD AUTO: 1.52 K/UL — SIGNIFICANT CHANGE UP (ref 1–3.3)
LYMPHOCYTES # BLD AUTO: 12.2 % — LOW (ref 13–44)
LYMPHOCYTES # BLD AUTO: 21.8 % — SIGNIFICANT CHANGE UP (ref 13–44)
LYMPHOCYTES # BLD AUTO: 5.1 % — LOW (ref 13–44)
LYMPHOCYTES # BLD AUTO: 7.5 % — LOW (ref 13–44)
LYMPHOCYTES # BLD AUTO: 9.7 % — LOW (ref 13–44)
M PNEUMO DNA SPEC QL NAA+PROBE: SIGNIFICANT CHANGE UP
MAGNESIUM SERPL-MCNC: 1.7 MG/DL — SIGNIFICANT CHANGE UP (ref 1.6–2.6)
MAGNESIUM SERPL-MCNC: 1.7 MG/DL — SIGNIFICANT CHANGE UP (ref 1.6–2.6)
MAGNESIUM SERPL-MCNC: 1.8 MG/DL — SIGNIFICANT CHANGE UP (ref 1.6–2.6)
MAGNESIUM SERPL-MCNC: 1.9 MG/DL — SIGNIFICANT CHANGE UP (ref 1.6–2.6)
MAGNESIUM SERPL-MCNC: 2 MG/DL — SIGNIFICANT CHANGE UP (ref 1.6–2.6)
MAGNESIUM SERPL-MCNC: 2.1 MG/DL — SIGNIFICANT CHANGE UP (ref 1.6–2.6)
MAGNESIUM SERPL-MCNC: 2.1 MG/DL — SIGNIFICANT CHANGE UP (ref 1.6–2.6)
MAGNESIUM SERPL-MCNC: 2.2 MG/DL — SIGNIFICANT CHANGE UP (ref 1.6–2.6)
MAGNESIUM SERPL-MCNC: 2.3 MG/DL — SIGNIFICANT CHANGE UP (ref 1.6–2.6)
MAGNESIUM SERPL-MCNC: 2.4 MG/DL — SIGNIFICANT CHANGE UP (ref 1.6–2.6)
MAGNESIUM SERPL-MCNC: 2.5 MG/DL — SIGNIFICANT CHANGE UP (ref 1.6–2.6)
MAGNESIUM SERPL-MCNC: 2.5 MG/DL — SIGNIFICANT CHANGE UP (ref 1.6–2.6)
MCHC RBC-ENTMCNC: 26.4 PG — LOW (ref 27–34)
MCHC RBC-ENTMCNC: 26.6 PG — LOW (ref 27–34)
MCHC RBC-ENTMCNC: 26.6 PG — LOW (ref 27–34)
MCHC RBC-ENTMCNC: 26.7 PG — LOW (ref 27–34)
MCHC RBC-ENTMCNC: 26.8 PG — LOW (ref 27–34)
MCHC RBC-ENTMCNC: 26.8 PG — LOW (ref 27–34)
MCHC RBC-ENTMCNC: 26.9 PG — LOW (ref 27–34)
MCHC RBC-ENTMCNC: 27 PG — SIGNIFICANT CHANGE UP (ref 27–34)
MCHC RBC-ENTMCNC: 27 PG — SIGNIFICANT CHANGE UP (ref 27–34)
MCHC RBC-ENTMCNC: 27.1 PG — SIGNIFICANT CHANGE UP (ref 27–34)
MCHC RBC-ENTMCNC: 27.2 PG — SIGNIFICANT CHANGE UP (ref 27–34)
MCHC RBC-ENTMCNC: 27.3 PG — SIGNIFICANT CHANGE UP (ref 27–34)
MCHC RBC-ENTMCNC: 27.4 PG — SIGNIFICANT CHANGE UP (ref 27–34)
MCHC RBC-ENTMCNC: 27.4 PG — SIGNIFICANT CHANGE UP (ref 27–34)
MCHC RBC-ENTMCNC: 27.9 PG — SIGNIFICANT CHANGE UP (ref 27–34)
MCHC RBC-ENTMCNC: 28 PG — SIGNIFICANT CHANGE UP (ref 27–34)
MCHC RBC-ENTMCNC: 28.2 PG — SIGNIFICANT CHANGE UP (ref 27–34)
MCHC RBC-ENTMCNC: 28.3 PG — SIGNIFICANT CHANGE UP (ref 27–34)
MCHC RBC-ENTMCNC: 28.4 PG — SIGNIFICANT CHANGE UP (ref 27–34)
MCHC RBC-ENTMCNC: 28.5 PG — SIGNIFICANT CHANGE UP (ref 27–34)
MCHC RBC-ENTMCNC: 28.5 PG — SIGNIFICANT CHANGE UP (ref 27–34)
MCHC RBC-ENTMCNC: 28.6 PG — SIGNIFICANT CHANGE UP (ref 27–34)
MCHC RBC-ENTMCNC: 28.6 PG — SIGNIFICANT CHANGE UP (ref 27–34)
MCHC RBC-ENTMCNC: 28.7 PG — SIGNIFICANT CHANGE UP (ref 27–34)
MCHC RBC-ENTMCNC: 28.8 PG — SIGNIFICANT CHANGE UP (ref 27–34)
MCHC RBC-ENTMCNC: 28.9 PG — SIGNIFICANT CHANGE UP (ref 27–34)
MCHC RBC-ENTMCNC: 29 PG — SIGNIFICANT CHANGE UP (ref 27–34)
MCHC RBC-ENTMCNC: 29.2 PG — SIGNIFICANT CHANGE UP (ref 27–34)
MCHC RBC-ENTMCNC: 30.5 GM/DL — LOW (ref 32–36)
MCHC RBC-ENTMCNC: 31.1 GM/DL — LOW (ref 32–36)
MCHC RBC-ENTMCNC: 31.2 GM/DL — LOW (ref 32–36)
MCHC RBC-ENTMCNC: 31.7 GM/DL — LOW (ref 32–36)
MCHC RBC-ENTMCNC: 31.8 GM/DL — LOW (ref 32–36)
MCHC RBC-ENTMCNC: 31.9 GM/DL — LOW (ref 32–36)
MCHC RBC-ENTMCNC: 32 GM/DL — SIGNIFICANT CHANGE UP (ref 32–36)
MCHC RBC-ENTMCNC: 32 GM/DL — SIGNIFICANT CHANGE UP (ref 32–36)
MCHC RBC-ENTMCNC: 32.1 GM/DL — SIGNIFICANT CHANGE UP (ref 32–36)
MCHC RBC-ENTMCNC: 32.2 GM/DL — SIGNIFICANT CHANGE UP (ref 32–36)
MCHC RBC-ENTMCNC: 32.2 GM/DL — SIGNIFICANT CHANGE UP (ref 32–36)
MCHC RBC-ENTMCNC: 32.4 GM/DL — SIGNIFICANT CHANGE UP (ref 32–36)
MCHC RBC-ENTMCNC: 32.5 GM/DL — SIGNIFICANT CHANGE UP (ref 32–36)
MCHC RBC-ENTMCNC: 32.6 GM/DL — SIGNIFICANT CHANGE UP (ref 32–36)
MCHC RBC-ENTMCNC: 32.7 GM/DL — SIGNIFICANT CHANGE UP (ref 32–36)
MCHC RBC-ENTMCNC: 32.8 GM/DL — SIGNIFICANT CHANGE UP (ref 32–36)
MCHC RBC-ENTMCNC: 32.9 GM/DL — SIGNIFICANT CHANGE UP (ref 32–36)
MCHC RBC-ENTMCNC: 32.9 GM/DL — SIGNIFICANT CHANGE UP (ref 32–36)
MCHC RBC-ENTMCNC: 33 GM/DL — SIGNIFICANT CHANGE UP (ref 32–36)
MCHC RBC-ENTMCNC: 33 GM/DL — SIGNIFICANT CHANGE UP (ref 32–36)
MCHC RBC-ENTMCNC: 33.1 GM/DL — SIGNIFICANT CHANGE UP (ref 32–36)
MCHC RBC-ENTMCNC: 33.2 GM/DL — SIGNIFICANT CHANGE UP (ref 32–36)
MCHC RBC-ENTMCNC: 33.3 GM/DL — SIGNIFICANT CHANGE UP (ref 32–36)
MCHC RBC-ENTMCNC: 33.3 GM/DL — SIGNIFICANT CHANGE UP (ref 32–36)
MCHC RBC-ENTMCNC: 33.4 GM/DL — SIGNIFICANT CHANGE UP (ref 32–36)
MCHC RBC-ENTMCNC: 33.6 GM/DL — SIGNIFICANT CHANGE UP (ref 32–36)
MCHC RBC-ENTMCNC: 33.7 GM/DL — SIGNIFICANT CHANGE UP (ref 32–36)
MCHC RBC-ENTMCNC: 33.7 GM/DL — SIGNIFICANT CHANGE UP (ref 32–36)
MCHC RBC-ENTMCNC: 34 GM/DL — SIGNIFICANT CHANGE UP (ref 32–36)
MCHC RBC-ENTMCNC: 34.3 GM/DL — SIGNIFICANT CHANGE UP (ref 32–36)
MCV RBC AUTO: 80.6 FL — SIGNIFICANT CHANGE UP (ref 80–100)
MCV RBC AUTO: 80.7 FL — SIGNIFICANT CHANGE UP (ref 80–100)
MCV RBC AUTO: 80.8 FL — SIGNIFICANT CHANGE UP (ref 80–100)
MCV RBC AUTO: 81.4 FL — SIGNIFICANT CHANGE UP (ref 80–100)
MCV RBC AUTO: 81.7 FL — SIGNIFICANT CHANGE UP (ref 80–100)
MCV RBC AUTO: 82.3 FL — SIGNIFICANT CHANGE UP (ref 80–100)
MCV RBC AUTO: 83 FL — SIGNIFICANT CHANGE UP (ref 80–100)
MCV RBC AUTO: 83.2 FL — SIGNIFICANT CHANGE UP (ref 80–100)
MCV RBC AUTO: 83.3 FL — SIGNIFICANT CHANGE UP (ref 80–100)
MCV RBC AUTO: 83.5 FL — SIGNIFICANT CHANGE UP (ref 80–100)
MCV RBC AUTO: 83.6 FL — SIGNIFICANT CHANGE UP (ref 80–100)
MCV RBC AUTO: 83.8 FL — SIGNIFICANT CHANGE UP (ref 80–100)
MCV RBC AUTO: 84 FL — SIGNIFICANT CHANGE UP (ref 80–100)
MCV RBC AUTO: 84.4 FL — SIGNIFICANT CHANGE UP (ref 80–100)
MCV RBC AUTO: 84.7 FL — SIGNIFICANT CHANGE UP (ref 80–100)
MCV RBC AUTO: 85 FL — SIGNIFICANT CHANGE UP (ref 80–100)
MCV RBC AUTO: 85.1 FL — SIGNIFICANT CHANGE UP (ref 80–100)
MCV RBC AUTO: 85.3 FL — SIGNIFICANT CHANGE UP (ref 80–100)
MCV RBC AUTO: 85.4 FL — SIGNIFICANT CHANGE UP (ref 80–100)
MCV RBC AUTO: 85.4 FL — SIGNIFICANT CHANGE UP (ref 80–100)
MCV RBC AUTO: 85.5 FL — SIGNIFICANT CHANGE UP (ref 80–100)
MCV RBC AUTO: 85.6 FL — SIGNIFICANT CHANGE UP (ref 80–100)
MCV RBC AUTO: 85.6 FL — SIGNIFICANT CHANGE UP (ref 80–100)
MCV RBC AUTO: 85.8 FL — SIGNIFICANT CHANGE UP (ref 80–100)
MCV RBC AUTO: 86.2 FL — SIGNIFICANT CHANGE UP (ref 80–100)
MCV RBC AUTO: 86.3 FL — SIGNIFICANT CHANGE UP (ref 80–100)
MCV RBC AUTO: 86.4 FL — SIGNIFICANT CHANGE UP (ref 80–100)
MCV RBC AUTO: 86.8 FL — SIGNIFICANT CHANGE UP (ref 80–100)
MCV RBC AUTO: 86.9 FL — SIGNIFICANT CHANGE UP (ref 80–100)
MCV RBC AUTO: 87 FL — SIGNIFICANT CHANGE UP (ref 80–100)
MCV RBC AUTO: 87.4 FL — SIGNIFICANT CHANGE UP (ref 80–100)
MCV RBC AUTO: 87.7 FL — SIGNIFICANT CHANGE UP (ref 80–100)
MCV RBC AUTO: 87.9 FL — SIGNIFICANT CHANGE UP (ref 80–100)
MCV RBC AUTO: 88.2 FL — SIGNIFICANT CHANGE UP (ref 80–100)
MCV RBC AUTO: 88.8 FL — SIGNIFICANT CHANGE UP (ref 80–100)
METHYLMALONATE SERPL-SCNC: 186 NMOL/L — SIGNIFICANT CHANGE UP (ref 0–378)
MONOCYTES # BLD AUTO: 0.73 K/UL — SIGNIFICANT CHANGE UP (ref 0–0.9)
MONOCYTES # BLD AUTO: 0.74 K/UL — SIGNIFICANT CHANGE UP (ref 0–0.9)
MONOCYTES # BLD AUTO: 0.85 K/UL — SIGNIFICANT CHANGE UP (ref 0–0.9)
MONOCYTES # BLD AUTO: 1.08 K/UL — HIGH (ref 0–0.9)
MONOCYTES # BLD AUTO: 1.4 K/UL — HIGH (ref 0–0.9)
MONOCYTES NFR BLD AUTO: 10.8 % — SIGNIFICANT CHANGE UP (ref 2–14)
MONOCYTES NFR BLD AUTO: 6.9 % — SIGNIFICANT CHANGE UP (ref 2–14)
MONOCYTES NFR BLD AUTO: 8.5 % — SIGNIFICANT CHANGE UP (ref 2–14)
MONOCYTES NFR BLD AUTO: 8.9 % — SIGNIFICANT CHANGE UP (ref 2–14)
MONOCYTES NFR BLD AUTO: 9.9 % — SIGNIFICANT CHANGE UP (ref 2–14)
MRSA PCR RESULT.: SIGNIFICANT CHANGE UP
NEUTROPHILS # BLD AUTO: 13 K/UL — HIGH (ref 1.8–7.4)
NEUTROPHILS # BLD AUTO: 14.17 K/UL — HIGH (ref 1.8–7.4)
NEUTROPHILS # BLD AUTO: 4.48 K/UL — SIGNIFICANT CHANGE UP (ref 1.8–7.4)
NEUTROPHILS # BLD AUTO: 5.74 K/UL — SIGNIFICANT CHANGE UP (ref 1.8–7.4)
NEUTROPHILS # BLD AUTO: 7.92 K/UL — HIGH (ref 1.8–7.4)
NEUTROPHILS NFR BLD AUTO: 66.1 % — SIGNIFICANT CHANGE UP (ref 43–77)
NEUTROPHILS NFR BLD AUTO: 76.7 % — SIGNIFICANT CHANGE UP (ref 43–77)
NEUTROPHILS NFR BLD AUTO: 82.5 % — HIGH (ref 43–77)
NEUTROPHILS NFR BLD AUTO: 82.7 % — HIGH (ref 43–77)
NEUTROPHILS NFR BLD AUTO: 85.8 % — HIGH (ref 43–77)
NITRITE UR-MCNC: NEGATIVE — SIGNIFICANT CHANGE UP
NITRITE UR-MCNC: NEGATIVE — SIGNIFICANT CHANGE UP
NON HDL CHOLESTEROL: 41 MG/DL — SIGNIFICANT CHANGE UP
NON HDL CHOLESTEROL: 93 MG/DL — SIGNIFICANT CHANGE UP
NRBC # BLD: 0 /100 WBCS — SIGNIFICANT CHANGE UP
NRBC # BLD: 0 /100 WBCS — SIGNIFICANT CHANGE UP (ref 0–0)
NRBC # BLD: 1 /100 WBCS — HIGH (ref 0–0)
NRBC # BLD: 1 /100 WBCS — HIGH (ref 0–0)
NRBC # BLD: 2 /100 WBCS — HIGH (ref 0–0)
NRBC # BLD: 2 /100 WBCS — HIGH (ref 0–0)
NRBC # FLD: 0 K/UL — SIGNIFICANT CHANGE UP
NRBC # FLD: 0 K/UL — SIGNIFICANT CHANGE UP (ref 0–0)
NRBC # FLD: 0.02 K/UL — HIGH (ref 0–0)
NRBC # FLD: 0.03 K/UL — HIGH (ref 0–0)
NRBC # FLD: 0.03 K/UL — HIGH (ref 0–0)
NRBC # FLD: 0.1 K/UL — HIGH (ref 0–0)
NRBC # FLD: 0.11 K/UL — HIGH (ref 0–0)
NRBC # FLD: 0.14 K/UL — HIGH (ref 0–0)
NRBC # FLD: 0.21 K/UL — HIGH (ref 0–0)
NT-PROBNP SERPL-SCNC: HIGH PG/ML
OB PNL STL: NEGATIVE — SIGNIFICANT CHANGE UP
OB PNL STL: NEGATIVE — SIGNIFICANT CHANGE UP
OSMOLALITY SERPL: 314 MOSM/KG — HIGH (ref 275–295)
OSMOLALITY UR: 424 MOSM/KG — SIGNIFICANT CHANGE UP (ref 50–1200)
PCO2 BLDA: 26 MMHG — LOW (ref 35–48)
PCO2 BLDA: 31 MMHG — LOW (ref 35–48)
PCO2 BLDA: 32 MMHG — LOW (ref 35–48)
PCO2 BLDA: 34 MMHG — LOW (ref 35–48)
PCO2 BLDV: 24 MMHG — LOW (ref 42–55)
PCO2 BLDV: 29 MMHG — LOW (ref 42–55)
PCO2 BLDV: 36 MMHG — LOW (ref 42–55)
PCO2 BLDV: 36 MMHG — LOW (ref 42–55)
PCO2 BLDV: 37 MMHG — LOW (ref 42–55)
PCO2 BLDV: 38 MMHG — LOW (ref 42–55)
PCO2 BLDV: 39 MMHG — LOW (ref 42–55)
PCO2 BLDV: 39 MMHG — LOW (ref 42–55)
PCO2 BLDV: 40 MMHG — LOW (ref 42–55)
PCO2 BLDV: 42 MMHG — SIGNIFICANT CHANGE UP (ref 42–55)
PCO2 BLDV: 42 MMHG — SIGNIFICANT CHANGE UP (ref 42–55)
PCO2 BLDV: 43 MMHG — SIGNIFICANT CHANGE UP (ref 42–55)
PCO2 BLDV: 45 MMHG — SIGNIFICANT CHANGE UP (ref 42–55)
PCO2 BLDV: 46 MMHG — SIGNIFICANT CHANGE UP (ref 42–55)
PCO2 BLDV: 46 MMHG — SIGNIFICANT CHANGE UP (ref 42–55)
PCO2 BLDV: 47 MMHG — SIGNIFICANT CHANGE UP (ref 42–55)
PCO2 BLDV: 50 MMHG — SIGNIFICANT CHANGE UP (ref 42–55)
PH BLDA: 7.44 — SIGNIFICANT CHANGE UP (ref 7.35–7.45)
PH BLDA: 7.47 — HIGH (ref 7.35–7.45)
PH BLDA: 7.5 — HIGH (ref 7.35–7.45)
PH BLDA: 7.58 — HIGH (ref 7.35–7.45)
PH BLDV: 7.07 — LOW (ref 7.32–7.43)
PH BLDV: 7.29 — LOW (ref 7.32–7.43)
PH BLDV: 7.32 — SIGNIFICANT CHANGE UP (ref 7.32–7.43)
PH BLDV: 7.34 — SIGNIFICANT CHANGE UP (ref 7.32–7.43)
PH BLDV: 7.34 — SIGNIFICANT CHANGE UP (ref 7.32–7.43)
PH BLDV: 7.35 — SIGNIFICANT CHANGE UP (ref 7.32–7.43)
PH BLDV: 7.36 — SIGNIFICANT CHANGE UP (ref 7.32–7.43)
PH BLDV: 7.37 — SIGNIFICANT CHANGE UP (ref 7.32–7.43)
PH BLDV: 7.38 — SIGNIFICANT CHANGE UP (ref 7.32–7.43)
PH BLDV: 7.39 — SIGNIFICANT CHANGE UP (ref 7.32–7.43)
PH BLDV: 7.4 — SIGNIFICANT CHANGE UP (ref 7.32–7.43)
PH BLDV: 7.4 — SIGNIFICANT CHANGE UP (ref 7.32–7.43)
PH BLDV: 7.41 — SIGNIFICANT CHANGE UP (ref 7.32–7.43)
PH BLDV: 7.41 — SIGNIFICANT CHANGE UP (ref 7.32–7.43)
PH BLDV: 7.42 — SIGNIFICANT CHANGE UP (ref 7.32–7.43)
PH BLDV: 7.43 — SIGNIFICANT CHANGE UP (ref 7.32–7.43)
PH BLDV: 7.45 — HIGH (ref 7.32–7.43)
PH BLDV: 7.45 — HIGH (ref 7.32–7.43)
PH BLDV: 7.46 — HIGH (ref 7.32–7.43)
PH BLDV: 7.46 — HIGH (ref 7.32–7.43)
PH BLDV: 7.47 — HIGH (ref 7.32–7.43)
PH BLDV: 7.49 — HIGH (ref 7.32–7.43)
PH BLDV: 7.49 — HIGH (ref 7.32–7.43)
PH BLDV: 7.51 — HIGH (ref 7.32–7.43)
PH BLDV: 7.53 — HIGH (ref 7.32–7.43)
PH BLDV: 7.58 — HIGH (ref 7.32–7.43)
PH UR: 6 — SIGNIFICANT CHANGE UP (ref 5–8)
PH UR: 6 — SIGNIFICANT CHANGE UP (ref 5–8)
PHOSPHATE SERPL-MCNC: 1.5 MG/DL — LOW (ref 2.5–4.5)
PHOSPHATE SERPL-MCNC: 1.8 MG/DL — LOW (ref 2.5–4.5)
PHOSPHATE SERPL-MCNC: 2 MG/DL — LOW (ref 2.5–4.5)
PHOSPHATE SERPL-MCNC: 2.1 MG/DL — LOW (ref 2.5–4.5)
PHOSPHATE SERPL-MCNC: 2.6 MG/DL — SIGNIFICANT CHANGE UP (ref 2.5–4.5)
PHOSPHATE SERPL-MCNC: 2.6 MG/DL — SIGNIFICANT CHANGE UP (ref 2.5–4.5)
PHOSPHATE SERPL-MCNC: 2.7 MG/DL — SIGNIFICANT CHANGE UP (ref 2.5–4.5)
PHOSPHATE SERPL-MCNC: 2.8 MG/DL — SIGNIFICANT CHANGE UP (ref 2.5–4.5)
PHOSPHATE SERPL-MCNC: 3 MG/DL — SIGNIFICANT CHANGE UP (ref 2.5–4.5)
PHOSPHATE SERPL-MCNC: 3 MG/DL — SIGNIFICANT CHANGE UP (ref 2.5–4.5)
PHOSPHATE SERPL-MCNC: 3.6 MG/DL — SIGNIFICANT CHANGE UP (ref 2.5–4.5)
PHOSPHATE SERPL-MCNC: 4.5 MG/DL — SIGNIFICANT CHANGE UP (ref 2.5–4.5)
PHOSPHATE SERPL-MCNC: 4.6 MG/DL — HIGH (ref 2.5–4.5)
PHOSPHATE SERPL-MCNC: 4.7 MG/DL — HIGH (ref 2.5–4.5)
PHOSPHATE SERPL-MCNC: 5.3 MG/DL — HIGH (ref 2.5–4.5)
PHOSPHATE SERPL-MCNC: 5.4 MG/DL — HIGH (ref 2.5–4.5)
PHOSPHATE SERPL-MCNC: 5.7 MG/DL — HIGH (ref 2.5–4.5)
PHOSPHATE SERPL-MCNC: 5.9 MG/DL — HIGH (ref 2.5–4.5)
PHOSPHATE SERPL-MCNC: 6.1 MG/DL — HIGH (ref 2.5–4.5)
PHOSPHATE SERPL-MCNC: 6.5 MG/DL — HIGH (ref 2.5–4.5)
PHOSPHATE SERPL-MCNC: 7.5 MG/DL — HIGH (ref 2.5–4.5)
PLATELET # BLD AUTO: 156 K/UL — SIGNIFICANT CHANGE UP (ref 150–400)
PLATELET # BLD AUTO: 165 K/UL — SIGNIFICANT CHANGE UP (ref 150–400)
PLATELET # BLD AUTO: 183 K/UL — SIGNIFICANT CHANGE UP (ref 150–400)
PLATELET # BLD AUTO: 187 K/UL — SIGNIFICANT CHANGE UP (ref 150–400)
PLATELET # BLD AUTO: 191 K/UL — SIGNIFICANT CHANGE UP (ref 150–400)
PLATELET # BLD AUTO: 193 K/UL — SIGNIFICANT CHANGE UP (ref 150–400)
PLATELET # BLD AUTO: 202 K/UL — SIGNIFICANT CHANGE UP (ref 150–400)
PLATELET # BLD AUTO: 203 K/UL — SIGNIFICANT CHANGE UP (ref 150–400)
PLATELET # BLD AUTO: 209 K/UL — SIGNIFICANT CHANGE UP (ref 150–400)
PLATELET # BLD AUTO: 213 K/UL — SIGNIFICANT CHANGE UP (ref 150–400)
PLATELET # BLD AUTO: 215 K/UL — SIGNIFICANT CHANGE UP (ref 150–400)
PLATELET # BLD AUTO: 218 K/UL — SIGNIFICANT CHANGE UP (ref 150–400)
PLATELET # BLD AUTO: 224 K/UL — SIGNIFICANT CHANGE UP (ref 150–400)
PLATELET # BLD AUTO: 229 K/UL — SIGNIFICANT CHANGE UP (ref 150–400)
PLATELET # BLD AUTO: 234 K/UL — SIGNIFICANT CHANGE UP (ref 150–400)
PLATELET # BLD AUTO: 236 K/UL — SIGNIFICANT CHANGE UP (ref 150–400)
PLATELET # BLD AUTO: 239 K/UL — SIGNIFICANT CHANGE UP (ref 150–400)
PLATELET # BLD AUTO: 243 K/UL — SIGNIFICANT CHANGE UP (ref 150–400)
PLATELET # BLD AUTO: 250 K/UL — SIGNIFICANT CHANGE UP (ref 150–400)
PLATELET # BLD AUTO: 254 K/UL — SIGNIFICANT CHANGE UP (ref 150–400)
PLATELET # BLD AUTO: 254 K/UL — SIGNIFICANT CHANGE UP (ref 150–400)
PLATELET # BLD AUTO: 257 K/UL — SIGNIFICANT CHANGE UP (ref 150–400)
PLATELET # BLD AUTO: 259 K/UL — SIGNIFICANT CHANGE UP (ref 150–400)
PLATELET # BLD AUTO: 261 K/UL — SIGNIFICANT CHANGE UP (ref 150–400)
PLATELET # BLD AUTO: 286 K/UL — SIGNIFICANT CHANGE UP (ref 150–400)
PLATELET # BLD AUTO: 293 K/UL — SIGNIFICANT CHANGE UP (ref 150–400)
PLATELET # BLD AUTO: 304 K/UL — SIGNIFICANT CHANGE UP (ref 150–400)
PLATELET # BLD AUTO: 306 K/UL — SIGNIFICANT CHANGE UP (ref 150–400)
PLATELET # BLD AUTO: 313 K/UL — SIGNIFICANT CHANGE UP (ref 150–400)
PLATELET # BLD AUTO: 320 K/UL — SIGNIFICANT CHANGE UP (ref 150–400)
PLATELET # BLD AUTO: 325 K/UL — SIGNIFICANT CHANGE UP (ref 150–400)
PLATELET # BLD AUTO: 329 K/UL — SIGNIFICANT CHANGE UP (ref 150–400)
PLATELET # BLD AUTO: 335 K/UL — SIGNIFICANT CHANGE UP (ref 150–400)
PLATELET # BLD AUTO: 346 K/UL — SIGNIFICANT CHANGE UP (ref 150–400)
PLATELET # BLD AUTO: 349 K/UL — SIGNIFICANT CHANGE UP (ref 150–400)
PLATELET # BLD AUTO: 357 K/UL — SIGNIFICANT CHANGE UP (ref 150–400)
PLATELET # BLD AUTO: 377 K/UL — SIGNIFICANT CHANGE UP (ref 150–400)
PO2 BLDA: 119 MMHG — HIGH (ref 83–108)
PO2 BLDA: 88 MMHG — SIGNIFICANT CHANGE UP (ref 83–108)
PO2 BLDA: 94 MMHG — SIGNIFICANT CHANGE UP (ref 83–108)
PO2 BLDA: 98 MMHG — SIGNIFICANT CHANGE UP (ref 83–108)
PO2 BLDV: 160 MMHG — SIGNIFICANT CHANGE UP
PO2 BLDV: 23 MMHG — SIGNIFICANT CHANGE UP
PO2 BLDV: 26 MMHG — SIGNIFICANT CHANGE UP
PO2 BLDV: 26 MMHG — SIGNIFICANT CHANGE UP
PO2 BLDV: 27 MMHG — SIGNIFICANT CHANGE UP
PO2 BLDV: 32 MMHG — SIGNIFICANT CHANGE UP
PO2 BLDV: 33 MMHG — SIGNIFICANT CHANGE UP
PO2 BLDV: 33 MMHG — SIGNIFICANT CHANGE UP
PO2 BLDV: 34 MMHG — SIGNIFICANT CHANGE UP
PO2 BLDV: 35 MMHG — SIGNIFICANT CHANGE UP
PO2 BLDV: 36 MMHG — SIGNIFICANT CHANGE UP
PO2 BLDV: 38 MMHG — SIGNIFICANT CHANGE UP
PO2 BLDV: 39 MMHG — SIGNIFICANT CHANGE UP
PO2 BLDV: 42 MMHG — SIGNIFICANT CHANGE UP
PO2 BLDV: 42 MMHG — SIGNIFICANT CHANGE UP
PO2 BLDV: 43 MMHG — SIGNIFICANT CHANGE UP
PO2 BLDV: 44 MMHG — SIGNIFICANT CHANGE UP
PO2 BLDV: 47 MMHG — SIGNIFICANT CHANGE UP
PO2 BLDV: 48 MMHG — SIGNIFICANT CHANGE UP
PO2 BLDV: 51 MMHG — SIGNIFICANT CHANGE UP
PO2 BLDV: 53 MMHG — SIGNIFICANT CHANGE UP
PO2 BLDV: 60 MMHG — SIGNIFICANT CHANGE UP
POTASSIUM BLDV-SCNC: 1.5 MMOL/L — CRITICAL LOW (ref 3.5–5.1)
POTASSIUM BLDV-SCNC: 2.8 MMOL/L — CRITICAL LOW (ref 3.5–5.1)
POTASSIUM BLDV-SCNC: 2.9 MMOL/L — CRITICAL LOW (ref 3.5–5.1)
POTASSIUM BLDV-SCNC: 3.3 MMOL/L — LOW (ref 3.5–5.1)
POTASSIUM BLDV-SCNC: 3.4 MMOL/L — LOW (ref 3.5–5.1)
POTASSIUM BLDV-SCNC: 3.6 MMOL/L — SIGNIFICANT CHANGE UP (ref 3.5–5.1)
POTASSIUM BLDV-SCNC: 3.8 MMOL/L — SIGNIFICANT CHANGE UP (ref 3.5–5.1)
POTASSIUM BLDV-SCNC: 3.8 MMOL/L — SIGNIFICANT CHANGE UP (ref 3.5–5.1)
POTASSIUM BLDV-SCNC: 3.9 MMOL/L — SIGNIFICANT CHANGE UP (ref 3.5–5.1)
POTASSIUM BLDV-SCNC: 3.9 MMOL/L — SIGNIFICANT CHANGE UP (ref 3.5–5.1)
POTASSIUM BLDV-SCNC: 4 MMOL/L — SIGNIFICANT CHANGE UP (ref 3.5–5.1)
POTASSIUM BLDV-SCNC: 4 MMOL/L — SIGNIFICANT CHANGE UP (ref 3.5–5.1)
POTASSIUM BLDV-SCNC: 4.1 MMOL/L — SIGNIFICANT CHANGE UP (ref 3.5–5.1)
POTASSIUM BLDV-SCNC: 4.1 MMOL/L — SIGNIFICANT CHANGE UP (ref 3.5–5.1)
POTASSIUM BLDV-SCNC: 4.2 MMOL/L — SIGNIFICANT CHANGE UP (ref 3.5–5.1)
POTASSIUM BLDV-SCNC: 4.3 MMOL/L — SIGNIFICANT CHANGE UP (ref 3.5–5.1)
POTASSIUM BLDV-SCNC: 4.3 MMOL/L — SIGNIFICANT CHANGE UP (ref 3.5–5.1)
POTASSIUM BLDV-SCNC: 4.4 MMOL/L — SIGNIFICANT CHANGE UP (ref 3.5–5.1)
POTASSIUM BLDV-SCNC: 4.6 MMOL/L — SIGNIFICANT CHANGE UP (ref 3.5–5.1)
POTASSIUM BLDV-SCNC: 4.8 MMOL/L — SIGNIFICANT CHANGE UP (ref 3.5–5.1)
POTASSIUM BLDV-SCNC: 4.9 MMOL/L — SIGNIFICANT CHANGE UP (ref 3.5–5.1)
POTASSIUM BLDV-SCNC: 5.2 MMOL/L — HIGH (ref 3.5–5.1)
POTASSIUM BLDV-SCNC: 5.3 MMOL/L — HIGH (ref 3.5–5.1)
POTASSIUM SERPL-MCNC: 3 MMOL/L — LOW (ref 3.5–5.3)
POTASSIUM SERPL-MCNC: 3.1 MMOL/L — LOW (ref 3.5–5.3)
POTASSIUM SERPL-MCNC: 3.5 MMOL/L — SIGNIFICANT CHANGE UP (ref 3.5–5.3)
POTASSIUM SERPL-MCNC: 3.6 MMOL/L — SIGNIFICANT CHANGE UP (ref 3.5–5.3)
POTASSIUM SERPL-MCNC: 3.7 MMOL/L — SIGNIFICANT CHANGE UP (ref 3.5–5.3)
POTASSIUM SERPL-MCNC: 3.8 MMOL/L — SIGNIFICANT CHANGE UP (ref 3.5–5.3)
POTASSIUM SERPL-MCNC: 3.9 MMOL/L — SIGNIFICANT CHANGE UP (ref 3.5–5.3)
POTASSIUM SERPL-MCNC: 3.9 MMOL/L — SIGNIFICANT CHANGE UP (ref 3.5–5.3)
POTASSIUM SERPL-MCNC: 4 MMOL/L — SIGNIFICANT CHANGE UP (ref 3.5–5.3)
POTASSIUM SERPL-MCNC: 4 MMOL/L — SIGNIFICANT CHANGE UP (ref 3.5–5.3)
POTASSIUM SERPL-MCNC: 4.1 MMOL/L — SIGNIFICANT CHANGE UP (ref 3.5–5.3)
POTASSIUM SERPL-MCNC: 4.3 MMOL/L — SIGNIFICANT CHANGE UP (ref 3.5–5.3)
POTASSIUM SERPL-MCNC: 4.4 MMOL/L — SIGNIFICANT CHANGE UP (ref 3.5–5.3)
POTASSIUM SERPL-MCNC: 4.5 MMOL/L — SIGNIFICANT CHANGE UP (ref 3.5–5.3)
POTASSIUM SERPL-MCNC: 4.6 MMOL/L — SIGNIFICANT CHANGE UP (ref 3.5–5.3)
POTASSIUM SERPL-MCNC: 4.7 MMOL/L — SIGNIFICANT CHANGE UP (ref 3.5–5.3)
POTASSIUM SERPL-MCNC: 4.7 MMOL/L — SIGNIFICANT CHANGE UP (ref 3.5–5.3)
POTASSIUM SERPL-MCNC: 4.9 MMOL/L — SIGNIFICANT CHANGE UP (ref 3.5–5.3)
POTASSIUM SERPL-MCNC: 4.9 MMOL/L — SIGNIFICANT CHANGE UP (ref 3.5–5.3)
POTASSIUM SERPL-MCNC: 5 MMOL/L — SIGNIFICANT CHANGE UP (ref 3.5–5.3)
POTASSIUM SERPL-MCNC: 5.1 MMOL/L — SIGNIFICANT CHANGE UP (ref 3.5–5.3)
POTASSIUM SERPL-MCNC: 5.2 MMOL/L — SIGNIFICANT CHANGE UP (ref 3.5–5.3)
POTASSIUM SERPL-SCNC: 3 MMOL/L — LOW (ref 3.5–5.3)
POTASSIUM SERPL-SCNC: 3.1 MMOL/L — LOW (ref 3.5–5.3)
POTASSIUM SERPL-SCNC: 3.5 MMOL/L — SIGNIFICANT CHANGE UP (ref 3.5–5.3)
POTASSIUM SERPL-SCNC: 3.6 MMOL/L — SIGNIFICANT CHANGE UP (ref 3.5–5.3)
POTASSIUM SERPL-SCNC: 3.7 MMOL/L — SIGNIFICANT CHANGE UP (ref 3.5–5.3)
POTASSIUM SERPL-SCNC: 3.8 MMOL/L — SIGNIFICANT CHANGE UP (ref 3.5–5.3)
POTASSIUM SERPL-SCNC: 3.9 MMOL/L — SIGNIFICANT CHANGE UP (ref 3.5–5.3)
POTASSIUM SERPL-SCNC: 3.9 MMOL/L — SIGNIFICANT CHANGE UP (ref 3.5–5.3)
POTASSIUM SERPL-SCNC: 4 MMOL/L — SIGNIFICANT CHANGE UP (ref 3.5–5.3)
POTASSIUM SERPL-SCNC: 4 MMOL/L — SIGNIFICANT CHANGE UP (ref 3.5–5.3)
POTASSIUM SERPL-SCNC: 4.1 MMOL/L — SIGNIFICANT CHANGE UP (ref 3.5–5.3)
POTASSIUM SERPL-SCNC: 4.3 MMOL/L — SIGNIFICANT CHANGE UP (ref 3.5–5.3)
POTASSIUM SERPL-SCNC: 4.4 MMOL/L — SIGNIFICANT CHANGE UP (ref 3.5–5.3)
POTASSIUM SERPL-SCNC: 4.5 MMOL/L — SIGNIFICANT CHANGE UP (ref 3.5–5.3)
POTASSIUM SERPL-SCNC: 4.6 MMOL/L — SIGNIFICANT CHANGE UP (ref 3.5–5.3)
POTASSIUM SERPL-SCNC: 4.7 MMOL/L — SIGNIFICANT CHANGE UP (ref 3.5–5.3)
POTASSIUM SERPL-SCNC: 4.7 MMOL/L — SIGNIFICANT CHANGE UP (ref 3.5–5.3)
POTASSIUM SERPL-SCNC: 4.9 MMOL/L — SIGNIFICANT CHANGE UP (ref 3.5–5.3)
POTASSIUM SERPL-SCNC: 4.9 MMOL/L — SIGNIFICANT CHANGE UP (ref 3.5–5.3)
POTASSIUM SERPL-SCNC: 5 MMOL/L — SIGNIFICANT CHANGE UP (ref 3.5–5.3)
POTASSIUM SERPL-SCNC: 5.1 MMOL/L — SIGNIFICANT CHANGE UP (ref 3.5–5.3)
POTASSIUM SERPL-SCNC: 5.2 MMOL/L — SIGNIFICANT CHANGE UP (ref 3.5–5.3)
POTASSIUM UR-SCNC: 60.7 MMOL/L — SIGNIFICANT CHANGE UP
PROCALCITONIN SERPL-MCNC: 0.53 NG/ML — HIGH (ref 0.02–0.1)
PROCALCITONIN SERPL-MCNC: 0.61 NG/ML — HIGH (ref 0.02–0.1)
PROCALCITONIN SERPL-MCNC: 2.44 NG/ML — HIGH (ref 0.02–0.1)
PROT ?TM UR-MCNC: 110 MG/DL — SIGNIFICANT CHANGE UP
PROT SERPL-MCNC: 5.5 G/DL — LOW (ref 6–8.3)
PROT SERPL-MCNC: 5.6 G/DL — LOW (ref 6–8.3)
PROT SERPL-MCNC: 5.7 G/DL — LOW (ref 6–8.3)
PROT SERPL-MCNC: 5.8 G/DL — LOW (ref 6–8.3)
PROT SERPL-MCNC: 6 G/DL — SIGNIFICANT CHANGE UP (ref 6–8.3)
PROT SERPL-MCNC: 6 G/DL — SIGNIFICANT CHANGE UP (ref 6–8.3)
PROT SERPL-MCNC: 6.1 G/DL — SIGNIFICANT CHANGE UP (ref 6–8.3)
PROT SERPL-MCNC: 6.2 G/DL — SIGNIFICANT CHANGE UP (ref 6–8.3)
PROT SERPL-MCNC: 6.3 G/DL — SIGNIFICANT CHANGE UP (ref 6–8.3)
PROT SERPL-MCNC: 6.4 G/DL — SIGNIFICANT CHANGE UP (ref 6–8.3)
PROT SERPL-MCNC: 6.4 G/DL — SIGNIFICANT CHANGE UP (ref 6–8.3)
PROT SERPL-MCNC: 6.5 G/DL — SIGNIFICANT CHANGE UP (ref 6–8.3)
PROT SERPL-MCNC: 6.6 G/DL — SIGNIFICANT CHANGE UP (ref 6–8.3)
PROT SERPL-MCNC: 6.7 G/DL — SIGNIFICANT CHANGE UP (ref 6–8.3)
PROT SERPL-MCNC: 6.8 G/DL — SIGNIFICANT CHANGE UP (ref 6–8.3)
PROT SERPL-MCNC: 6.9 G/DL — SIGNIFICANT CHANGE UP (ref 6–8.3)
PROT SERPL-MCNC: 7 G/DL — SIGNIFICANT CHANGE UP (ref 6–8.3)
PROT SERPL-MCNC: 7.2 G/DL — SIGNIFICANT CHANGE UP (ref 6–8.3)
PROT SERPL-MCNC: 7.5 G/DL — SIGNIFICANT CHANGE UP (ref 6–8.3)
PROT SERPL-MCNC: 7.6 G/DL — SIGNIFICANT CHANGE UP (ref 6–8.3)
PROT UR-MCNC: ABNORMAL
PROT UR-MCNC: ABNORMAL
PROT/CREAT UR-RTO: 1 RATIO — HIGH (ref 0–0.2)
PROTHROM AB SERPL-ACNC: 14.8 SEC — HIGH (ref 10.5–13.4)
PROTHROM AB SERPL-ACNC: 15.6 SEC — HIGH (ref 10.5–13.4)
PROTHROM AB SERPL-ACNC: 15.7 SEC — HIGH (ref 10.5–13.4)
PROTHROM AB SERPL-ACNC: 16.5 SEC — HIGH (ref 10.5–13.4)
PROTHROM AB SERPL-ACNC: 17.6 SEC — HIGH (ref 10.5–13.4)
PROTHROM AB SERPL-ACNC: 18.7 SEC — HIGH (ref 10.5–13.4)
PROTHROM AB SERPL-ACNC: 18.9 SEC — HIGH (ref 10.5–13.4)
RAPID RVP RESULT: SIGNIFICANT CHANGE UP
RBC # BLD: 3.48 M/UL — LOW (ref 4.2–5.8)
RBC # BLD: 3.56 M/UL — LOW (ref 4.2–5.8)
RBC # BLD: 3.6 M/UL — LOW (ref 4.2–5.8)
RBC # BLD: 3.67 M/UL — LOW (ref 4.2–5.8)
RBC # BLD: 3.75 M/UL — LOW (ref 4.2–5.8)
RBC # BLD: 3.76 M/UL — LOW (ref 4.2–5.8)
RBC # BLD: 3.76 M/UL — LOW (ref 4.2–5.8)
RBC # BLD: 3.77 M/UL — LOW (ref 4.2–5.8)
RBC # BLD: 3.78 M/UL — LOW (ref 4.2–5.8)
RBC # BLD: 3.82 M/UL — LOW (ref 4.2–5.8)
RBC # BLD: 3.84 M/UL — LOW (ref 4.2–5.8)
RBC # BLD: 3.85 M/UL — LOW (ref 4.2–5.8)
RBC # BLD: 3.89 M/UL — LOW (ref 4.2–5.8)
RBC # BLD: 3.91 M/UL — LOW (ref 4.2–5.8)
RBC # BLD: 3.93 M/UL — LOW (ref 4.2–5.8)
RBC # BLD: 3.97 M/UL — LOW (ref 4.2–5.8)
RBC # BLD: 4.05 M/UL — LOW (ref 4.2–5.8)
RBC # BLD: 4.05 M/UL — LOW (ref 4.2–5.8)
RBC # BLD: 4.1 M/UL — LOW (ref 4.2–5.8)
RBC # BLD: 4.11 M/UL — LOW (ref 4.2–5.8)
RBC # BLD: 4.12 M/UL — LOW (ref 4.2–5.8)
RBC # BLD: 4.14 M/UL — LOW (ref 4.2–5.8)
RBC # BLD: 4.16 M/UL — LOW (ref 4.2–5.8)
RBC # BLD: 4.19 M/UL — LOW (ref 4.2–5.8)
RBC # BLD: 4.22 M/UL — SIGNIFICANT CHANGE UP (ref 4.2–5.8)
RBC # BLD: 4.24 M/UL — SIGNIFICANT CHANGE UP (ref 4.2–5.8)
RBC # BLD: 4.24 M/UL — SIGNIFICANT CHANGE UP (ref 4.2–5.8)
RBC # BLD: 4.25 M/UL — SIGNIFICANT CHANGE UP (ref 4.2–5.8)
RBC # BLD: 4.27 M/UL — SIGNIFICANT CHANGE UP (ref 4.2–5.8)
RBC # BLD: 4.32 M/UL — SIGNIFICANT CHANGE UP (ref 4.2–5.8)
RBC # BLD: 4.36 M/UL — SIGNIFICANT CHANGE UP (ref 4.2–5.8)
RBC # BLD: 4.4 M/UL — SIGNIFICANT CHANGE UP (ref 4.2–5.8)
RBC # BLD: 4.47 M/UL — SIGNIFICANT CHANGE UP (ref 4.2–5.8)
RBC # BLD: 4.48 M/UL — SIGNIFICANT CHANGE UP (ref 4.2–5.8)
RBC # BLD: 4.53 M/UL — SIGNIFICANT CHANGE UP (ref 4.2–5.8)
RBC # BLD: 4.64 M/UL — SIGNIFICANT CHANGE UP (ref 4.2–5.8)
RBC # BLD: 4.69 M/UL — SIGNIFICANT CHANGE UP (ref 4.2–5.8)
RBC # FLD: 14.1 % — SIGNIFICANT CHANGE UP (ref 10.3–14.5)
RBC # FLD: 14.2 % — SIGNIFICANT CHANGE UP (ref 10.3–14.5)
RBC # FLD: 14.3 % — SIGNIFICANT CHANGE UP (ref 10.3–14.5)
RBC # FLD: 14.4 % — SIGNIFICANT CHANGE UP (ref 10.3–14.5)
RBC # FLD: 14.4 % — SIGNIFICANT CHANGE UP (ref 10.3–14.5)
RBC # FLD: 14.5 % — SIGNIFICANT CHANGE UP (ref 10.3–14.5)
RBC # FLD: 14.5 % — SIGNIFICANT CHANGE UP (ref 10.3–14.5)
RBC # FLD: 14.6 % — HIGH (ref 10.3–14.5)
RBC # FLD: 14.8 % — HIGH (ref 10.3–14.5)
RBC # FLD: 15.3 % — HIGH (ref 10.3–14.5)
RBC # FLD: 15.4 % — HIGH (ref 10.3–14.5)
RBC # FLD: 16 % — HIGH (ref 10.3–14.5)
RBC # FLD: 16 % — HIGH (ref 10.3–14.5)
RBC # FLD: 16.1 % — HIGH (ref 10.3–14.5)
RBC # FLD: 16.1 % — HIGH (ref 10.3–14.5)
RBC # FLD: 16.2 % — HIGH (ref 10.3–14.5)
RBC # FLD: 16.2 % — HIGH (ref 10.3–14.5)
RBC # FLD: 16.3 % — HIGH (ref 10.3–14.5)
RBC # FLD: 16.4 % — HIGH (ref 10.3–14.5)
RBC # FLD: 16.4 % — HIGH (ref 10.3–14.5)
RBC # FLD: 16.5 % — HIGH (ref 10.3–14.5)
RBC # FLD: 16.7 % — HIGH (ref 10.3–14.5)
RBC # FLD: 16.7 % — HIGH (ref 10.3–14.5)
RBC # FLD: 16.8 % — HIGH (ref 10.3–14.5)
RBC # FLD: 17 % — HIGH (ref 10.3–14.5)
RBC # FLD: 17 % — HIGH (ref 10.3–14.5)
RBC # FLD: 17.1 % — HIGH (ref 10.3–14.5)
RBC # FLD: 17.2 % — HIGH (ref 10.3–14.5)
RBC # FLD: 17.3 % — HIGH (ref 10.3–14.5)
RBC CASTS # UR COMP ASSIST: >50 /HPF — SIGNIFICANT CHANGE UP (ref 0–4)
RBC CASTS # UR COMP ASSIST: SIGNIFICANT CHANGE UP /HPF (ref 0–4)
RH IG SCN BLD-IMP: POSITIVE — SIGNIFICANT CHANGE UP
RH IG SCN BLD-IMP: POSITIVE — SIGNIFICANT CHANGE UP
RSV RNA NPH QL NAA+NON-PROBE: SIGNIFICANT CHANGE UP
RSV RNA NPH QL NAA+NON-PROBE: SIGNIFICANT CHANGE UP
RSV RNA SPEC QL NAA+PROBE: SIGNIFICANT CHANGE UP
RV+EV RNA SPEC QL NAA+PROBE: SIGNIFICANT CHANGE UP
S AUREUS DNA NOSE QL NAA+PROBE: SIGNIFICANT CHANGE UP
SAO2 % BLDA: 97.8 % — SIGNIFICANT CHANGE UP (ref 94–98)
SAO2 % BLDA: 98.1 % — HIGH (ref 94–98)
SAO2 % BLDA: 98.2 % — HIGH (ref 94–98)
SAO2 % BLDA: 98.7 % — HIGH (ref 94–98)
SAO2 % BLDV: 25.1 % — SIGNIFICANT CHANGE UP
SAO2 % BLDV: 29.6 % — SIGNIFICANT CHANGE UP
SAO2 % BLDV: 32.7 % — SIGNIFICANT CHANGE UP
SAO2 % BLDV: 44.6 % — SIGNIFICANT CHANGE UP
SAO2 % BLDV: 46.1 % — SIGNIFICANT CHANGE UP
SAO2 % BLDV: 46.5 % — SIGNIFICANT CHANGE UP
SAO2 % BLDV: 48.2 % — SIGNIFICANT CHANGE UP
SAO2 % BLDV: 51.3 % — SIGNIFICANT CHANGE UP
SAO2 % BLDV: 53.1 % — SIGNIFICANT CHANGE UP
SAO2 % BLDV: 53.2 % — SIGNIFICANT CHANGE UP
SAO2 % BLDV: 53.4 % — SIGNIFICANT CHANGE UP
SAO2 % BLDV: 57.4 % — SIGNIFICANT CHANGE UP
SAO2 % BLDV: 57.5 % — SIGNIFICANT CHANGE UP
SAO2 % BLDV: 61.5 % — SIGNIFICANT CHANGE UP
SAO2 % BLDV: 62.7 % — SIGNIFICANT CHANGE UP
SAO2 % BLDV: 63 % — SIGNIFICANT CHANGE UP
SAO2 % BLDV: 63.5 % — SIGNIFICANT CHANGE UP
SAO2 % BLDV: 65.9 % — SIGNIFICANT CHANGE UP
SAO2 % BLDV: 66.1 % — SIGNIFICANT CHANGE UP
SAO2 % BLDV: 67.1 % — SIGNIFICANT CHANGE UP
SAO2 % BLDV: 68.1 % — SIGNIFICANT CHANGE UP
SAO2 % BLDV: 70 % — SIGNIFICANT CHANGE UP
SAO2 % BLDV: 74.1 % — SIGNIFICANT CHANGE UP
SAO2 % BLDV: 76.5 % — SIGNIFICANT CHANGE UP
SAO2 % BLDV: 81.8 % — SIGNIFICANT CHANGE UP
SAO2 % BLDV: 84.4 % — SIGNIFICANT CHANGE UP
SAO2 % BLDV: 89.9 % — SIGNIFICANT CHANGE UP
SAO2 % BLDV: 99.8 % — SIGNIFICANT CHANGE UP
SARS-COV-2 RNA SPEC QL NAA+PROBE: SIGNIFICANT CHANGE UP
SODIUM SERPL-SCNC: 136 MMOL/L — SIGNIFICANT CHANGE UP (ref 135–145)
SODIUM SERPL-SCNC: 136 MMOL/L — SIGNIFICANT CHANGE UP (ref 135–145)
SODIUM SERPL-SCNC: 137 MMOL/L — SIGNIFICANT CHANGE UP (ref 135–145)
SODIUM SERPL-SCNC: 138 MMOL/L — SIGNIFICANT CHANGE UP (ref 135–145)
SODIUM SERPL-SCNC: 138 MMOL/L — SIGNIFICANT CHANGE UP (ref 135–145)
SODIUM SERPL-SCNC: 139 MMOL/L — SIGNIFICANT CHANGE UP (ref 135–145)
SODIUM SERPL-SCNC: 140 MMOL/L — SIGNIFICANT CHANGE UP (ref 135–145)
SODIUM SERPL-SCNC: 141 MMOL/L — SIGNIFICANT CHANGE UP (ref 135–145)
SODIUM SERPL-SCNC: 142 MMOL/L — SIGNIFICANT CHANGE UP (ref 135–145)
SODIUM SERPL-SCNC: 143 MMOL/L — SIGNIFICANT CHANGE UP (ref 135–145)
SODIUM SERPL-SCNC: 144 MMOL/L — SIGNIFICANT CHANGE UP (ref 135–145)
SODIUM SERPL-SCNC: 145 MMOL/L — SIGNIFICANT CHANGE UP (ref 135–145)
SODIUM SERPL-SCNC: 145 MMOL/L — SIGNIFICANT CHANGE UP (ref 135–145)
SODIUM SERPL-SCNC: 146 MMOL/L — HIGH (ref 135–145)
SODIUM SERPL-SCNC: 149 MMOL/L — HIGH (ref 135–145)
SODIUM UR-SCNC: <20 MMOL/L — SIGNIFICANT CHANGE UP
SP GR SPEC: 1.02 — SIGNIFICANT CHANGE UP (ref 1.01–1.05)
SP GR SPEC: 1.02 — SIGNIFICANT CHANGE UP (ref 1–1.05)
SPECIMEN SOURCE: SIGNIFICANT CHANGE UP
TRIGL SERPL-MCNC: 143 MG/DL — SIGNIFICANT CHANGE UP
TRIGL SERPL-MCNC: 74 MG/DL — SIGNIFICANT CHANGE UP
TROPONIN T, HIGH SENSITIVITY RESULT: 1000 NG/L — CRITICAL HIGH
TROPONIN T, HIGH SENSITIVITY RESULT: 1123 NG/L — CRITICAL HIGH
TROPONIN T, HIGH SENSITIVITY RESULT: 1264 NG/L — CRITICAL HIGH
TROPONIN T, HIGH SENSITIVITY RESULT: 1335 NG/L — CRITICAL HIGH
TROPONIN T, HIGH SENSITIVITY RESULT: 1385 NG/L — CRITICAL HIGH
TROPONIN T, HIGH SENSITIVITY RESULT: 1602 NG/L — CRITICAL HIGH
TROPONIN T, HIGH SENSITIVITY RESULT: 2667 NG/L — CRITICAL HIGH
TROPONIN T, HIGH SENSITIVITY RESULT: 3041 NG/L — CRITICAL HIGH
TROPONIN T, HIGH SENSITIVITY RESULT: 3089 NG/L — CRITICAL HIGH
TROPONIN T, HIGH SENSITIVITY RESULT: 3101 NG/L — CRITICAL HIGH
TROPONIN T, HIGH SENSITIVITY RESULT: 403 NG/L — CRITICAL HIGH
TROPONIN T, HIGH SENSITIVITY RESULT: 842 NG/L — CRITICAL HIGH
TROPONIN T, HIGH SENSITIVITY RESULT: 865 NG/L — CRITICAL HIGH
TSH SERPL-MCNC: 2.34 UIU/ML — SIGNIFICANT CHANGE UP (ref 0.27–4.2)
TSH SERPL-MCNC: 4.02 UIU/ML — SIGNIFICANT CHANGE UP (ref 0.27–4.2)
UROBILINOGEN FLD QL: SIGNIFICANT CHANGE UP
UROBILINOGEN FLD QL: SIGNIFICANT CHANGE UP
UUN UR-MCNC: 520.2 MG/DL — SIGNIFICANT CHANGE UP
VIT B12 SERPL-MCNC: 781 PG/ML — SIGNIFICANT CHANGE UP (ref 200–900)
WBC # BLD: 10.29 K/UL — SIGNIFICANT CHANGE UP (ref 3.8–10.5)
WBC # BLD: 10.57 K/UL — HIGH (ref 3.8–10.5)
WBC # BLD: 10.58 K/UL — HIGH (ref 3.8–10.5)
WBC # BLD: 11.03 K/UL — HIGH (ref 3.8–10.5)
WBC # BLD: 11.09 K/UL — HIGH (ref 3.8–10.5)
WBC # BLD: 11.95 K/UL — HIGH (ref 3.8–10.5)
WBC # BLD: 12.1 K/UL — HIGH (ref 3.8–10.5)
WBC # BLD: 13.63 K/UL — HIGH (ref 3.8–10.5)
WBC # BLD: 14 K/UL — HIGH (ref 3.8–10.5)
WBC # BLD: 15.73 K/UL — HIGH (ref 3.8–10.5)
WBC # BLD: 15.85 K/UL — HIGH (ref 3.8–10.5)
WBC # BLD: 16.52 K/UL — HIGH (ref 3.8–10.5)
WBC # BLD: 5.36 K/UL — SIGNIFICANT CHANGE UP (ref 3.8–10.5)
WBC # BLD: 5.65 K/UL — SIGNIFICANT CHANGE UP (ref 3.8–10.5)
WBC # BLD: 5.69 K/UL — SIGNIFICANT CHANGE UP (ref 3.8–10.5)
WBC # BLD: 5.79 K/UL — SIGNIFICANT CHANGE UP (ref 3.8–10.5)
WBC # BLD: 5.84 K/UL — SIGNIFICANT CHANGE UP (ref 3.8–10.5)
WBC # BLD: 6.18 K/UL — SIGNIFICANT CHANGE UP (ref 3.8–10.5)
WBC # BLD: 6.18 K/UL — SIGNIFICANT CHANGE UP (ref 3.8–10.5)
WBC # BLD: 6.24 K/UL — SIGNIFICANT CHANGE UP (ref 3.8–10.5)
WBC # BLD: 6.32 K/UL — SIGNIFICANT CHANGE UP (ref 3.8–10.5)
WBC # BLD: 6.61 K/UL — SIGNIFICANT CHANGE UP (ref 3.8–10.5)
WBC # BLD: 6.67 K/UL — SIGNIFICANT CHANGE UP (ref 3.8–10.5)
WBC # BLD: 6.68 K/UL — SIGNIFICANT CHANGE UP (ref 3.8–10.5)
WBC # BLD: 6.76 K/UL — SIGNIFICANT CHANGE UP (ref 3.8–10.5)
WBC # BLD: 6.78 K/UL — SIGNIFICANT CHANGE UP (ref 3.8–10.5)
WBC # BLD: 7.44 K/UL — SIGNIFICANT CHANGE UP (ref 3.8–10.5)
WBC # BLD: 7.48 K/UL — SIGNIFICANT CHANGE UP (ref 3.8–10.5)
WBC # BLD: 7.82 K/UL — SIGNIFICANT CHANGE UP (ref 3.8–10.5)
WBC # BLD: 8.16 K/UL — SIGNIFICANT CHANGE UP (ref 3.8–10.5)
WBC # BLD: 8.17 K/UL — SIGNIFICANT CHANGE UP (ref 3.8–10.5)
WBC # BLD: 8.72 K/UL — SIGNIFICANT CHANGE UP (ref 3.8–10.5)
WBC # BLD: 8.8 K/UL — SIGNIFICANT CHANGE UP (ref 3.8–10.5)
WBC # BLD: 8.95 K/UL — SIGNIFICANT CHANGE UP (ref 3.8–10.5)
WBC # BLD: 9.21 K/UL — SIGNIFICANT CHANGE UP (ref 3.8–10.5)
WBC # BLD: 9.58 K/UL — SIGNIFICANT CHANGE UP (ref 3.8–10.5)
WBC # BLD: 9.61 K/UL — SIGNIFICANT CHANGE UP (ref 3.8–10.5)
WBC # FLD AUTO: 10.29 K/UL — SIGNIFICANT CHANGE UP (ref 3.8–10.5)
WBC # FLD AUTO: 10.57 K/UL — HIGH (ref 3.8–10.5)
WBC # FLD AUTO: 10.58 K/UL — HIGH (ref 3.8–10.5)
WBC # FLD AUTO: 11.03 K/UL — HIGH (ref 3.8–10.5)
WBC # FLD AUTO: 11.09 K/UL — HIGH (ref 3.8–10.5)
WBC # FLD AUTO: 11.95 K/UL — HIGH (ref 3.8–10.5)
WBC # FLD AUTO: 12.1 K/UL — HIGH (ref 3.8–10.5)
WBC # FLD AUTO: 13.63 K/UL — HIGH (ref 3.8–10.5)
WBC # FLD AUTO: 14 K/UL — HIGH (ref 3.8–10.5)
WBC # FLD AUTO: 15.73 K/UL — HIGH (ref 3.8–10.5)
WBC # FLD AUTO: 15.85 K/UL — HIGH (ref 3.8–10.5)
WBC # FLD AUTO: 16.52 K/UL — HIGH (ref 3.8–10.5)
WBC # FLD AUTO: 5.36 K/UL — SIGNIFICANT CHANGE UP (ref 3.8–10.5)
WBC # FLD AUTO: 5.65 K/UL — SIGNIFICANT CHANGE UP (ref 3.8–10.5)
WBC # FLD AUTO: 5.69 K/UL — SIGNIFICANT CHANGE UP (ref 3.8–10.5)
WBC # FLD AUTO: 5.79 K/UL — SIGNIFICANT CHANGE UP (ref 3.8–10.5)
WBC # FLD AUTO: 5.84 K/UL — SIGNIFICANT CHANGE UP (ref 3.8–10.5)
WBC # FLD AUTO: 6.18 K/UL — SIGNIFICANT CHANGE UP (ref 3.8–10.5)
WBC # FLD AUTO: 6.18 K/UL — SIGNIFICANT CHANGE UP (ref 3.8–10.5)
WBC # FLD AUTO: 6.24 K/UL — SIGNIFICANT CHANGE UP (ref 3.8–10.5)
WBC # FLD AUTO: 6.32 K/UL — SIGNIFICANT CHANGE UP (ref 3.8–10.5)
WBC # FLD AUTO: 6.61 K/UL — SIGNIFICANT CHANGE UP (ref 3.8–10.5)
WBC # FLD AUTO: 6.67 K/UL — SIGNIFICANT CHANGE UP (ref 3.8–10.5)
WBC # FLD AUTO: 6.68 K/UL — SIGNIFICANT CHANGE UP (ref 3.8–10.5)
WBC # FLD AUTO: 6.76 K/UL — SIGNIFICANT CHANGE UP (ref 3.8–10.5)
WBC # FLD AUTO: 6.78 K/UL — SIGNIFICANT CHANGE UP (ref 3.8–10.5)
WBC # FLD AUTO: 7.44 K/UL — SIGNIFICANT CHANGE UP (ref 3.8–10.5)
WBC # FLD AUTO: 7.48 K/UL — SIGNIFICANT CHANGE UP (ref 3.8–10.5)
WBC # FLD AUTO: 7.82 K/UL — SIGNIFICANT CHANGE UP (ref 3.8–10.5)
WBC # FLD AUTO: 8.16 K/UL — SIGNIFICANT CHANGE UP (ref 3.8–10.5)
WBC # FLD AUTO: 8.17 K/UL — SIGNIFICANT CHANGE UP (ref 3.8–10.5)
WBC # FLD AUTO: 8.72 K/UL — SIGNIFICANT CHANGE UP (ref 3.8–10.5)
WBC # FLD AUTO: 8.8 K/UL — SIGNIFICANT CHANGE UP (ref 3.8–10.5)
WBC # FLD AUTO: 8.95 K/UL — SIGNIFICANT CHANGE UP (ref 3.8–10.5)
WBC # FLD AUTO: 9.21 K/UL — SIGNIFICANT CHANGE UP (ref 3.8–10.5)
WBC # FLD AUTO: 9.58 K/UL — SIGNIFICANT CHANGE UP (ref 3.8–10.5)
WBC # FLD AUTO: 9.61 K/UL — SIGNIFICANT CHANGE UP (ref 3.8–10.5)
WBC UR QL: >50 /HPF — SIGNIFICANT CHANGE UP (ref 0–5)
WBC UR QL: >50 /HPF — SIGNIFICANT CHANGE UP (ref 0–5)

## 2022-01-01 PROCEDURE — 99232 SBSQ HOSP IP/OBS MODERATE 35: CPT

## 2022-01-01 PROCEDURE — 71045 X-RAY EXAM CHEST 1 VIEW: CPT | Mod: 26

## 2022-01-01 PROCEDURE — 99291 CRITICAL CARE FIRST HOUR: CPT

## 2022-01-01 PROCEDURE — 99223 1ST HOSP IP/OBS HIGH 75: CPT

## 2022-01-01 PROCEDURE — 36620 INSERTION CATHETER ARTERY: CPT

## 2022-01-01 PROCEDURE — 74177 CT ABD & PELVIS W/CONTRAST: CPT | Mod: 26,MA

## 2022-01-01 PROCEDURE — 0042T: CPT

## 2022-01-01 PROCEDURE — 99291 CRITICAL CARE FIRST HOUR: CPT | Mod: FT,25

## 2022-01-01 PROCEDURE — 99233 SBSQ HOSP IP/OBS HIGH 50: CPT | Mod: FS

## 2022-01-01 PROCEDURE — 99231 SBSQ HOSP IP/OBS SF/LOW 25: CPT

## 2022-01-01 PROCEDURE — 99221 1ST HOSP IP/OBS SF/LOW 40: CPT | Mod: FS,GC

## 2022-01-01 PROCEDURE — 93010 ELECTROCARDIOGRAM REPORT: CPT

## 2022-01-01 PROCEDURE — 71260 CT THORAX DX C+: CPT | Mod: 26

## 2022-01-01 PROCEDURE — 70496 CT ANGIOGRAPHY HEAD: CPT | Mod: 26

## 2022-01-01 PROCEDURE — 99285 EMERGENCY DEPT VISIT HI MDM: CPT | Mod: CS

## 2022-01-01 PROCEDURE — 99223 1ST HOSP IP/OBS HIGH 75: CPT | Mod: GC

## 2022-01-01 PROCEDURE — 99497 ADVNCD CARE PLAN 30 MIN: CPT | Mod: 25

## 2022-01-01 PROCEDURE — 99223 1ST HOSP IP/OBS HIGH 75: CPT | Mod: FS

## 2022-01-01 PROCEDURE — 90792 PSYCH DIAG EVAL W/MED SRVCS: CPT

## 2022-01-01 PROCEDURE — 93451 RIGHT HEART CATH: CPT | Mod: 26

## 2022-01-01 PROCEDURE — 99291 CRITICAL CARE FIRST HOUR: CPT | Mod: FS

## 2022-01-01 PROCEDURE — 74018 RADEX ABDOMEN 1 VIEW: CPT | Mod: 26

## 2022-01-01 PROCEDURE — 99222 1ST HOSP IP/OBS MODERATE 55: CPT

## 2022-01-01 PROCEDURE — 99233 SBSQ HOSP IP/OBS HIGH 50: CPT

## 2022-01-01 PROCEDURE — 99233 SBSQ HOSP IP/OBS HIGH 50: CPT | Mod: GC

## 2022-01-01 PROCEDURE — 99232 SBSQ HOSP IP/OBS MODERATE 35: CPT | Mod: GC

## 2022-01-01 PROCEDURE — 95718 EEG PHYS/QHP 2-12 HR W/VEEG: CPT

## 2022-01-01 PROCEDURE — 95720 EEG PHY/QHP EA INCR W/VEEG: CPT

## 2022-01-01 PROCEDURE — 93308 TTE F-UP OR LMTD: CPT | Mod: 26

## 2022-01-01 PROCEDURE — 76937 US GUIDE VASCULAR ACCESS: CPT | Mod: 26

## 2022-01-01 PROCEDURE — 74177 CT ABD & PELVIS W/CONTRAST: CPT | Mod: 26

## 2022-01-01 PROCEDURE — 33967 INSERT I-AORT PERCUT DEVICE: CPT

## 2022-01-01 PROCEDURE — 99497 ADVNCD CARE PLAN 30 MIN: CPT

## 2022-01-01 PROCEDURE — 99498 ADVNCD CARE PLAN ADDL 30 MIN: CPT

## 2022-01-01 PROCEDURE — 93306 TTE W/DOPPLER COMPLETE: CPT | Mod: 26

## 2022-01-01 PROCEDURE — 70498 CT ANGIOGRAPHY NECK: CPT | Mod: 26

## 2022-01-01 PROCEDURE — 71275 CT ANGIOGRAPHY CHEST: CPT | Mod: 26,MA

## 2022-01-01 PROCEDURE — 70450 CT HEAD/BRAIN W/O DYE: CPT | Mod: 26

## 2022-01-01 PROCEDURE — 36573 INSJ PICC RS&I 5 YR+: CPT

## 2022-01-01 RX ORDER — POTASSIUM CHLORIDE 20 MEQ
40 PACKET (EA) ORAL ONCE
Refills: 0 | Status: DISCONTINUED | OUTPATIENT
Start: 2022-01-01 | End: 2022-01-01

## 2022-01-01 RX ORDER — DAPAGLIFLOZIN 10 MG/1
1 TABLET, FILM COATED ORAL
Qty: 0 | Refills: 0 | DISCHARGE

## 2022-01-01 RX ORDER — ISOSORBIDE MONONITRATE 60 MG/1
1 TABLET, EXTENDED RELEASE ORAL
Qty: 0 | Refills: 0 | DISCHARGE

## 2022-01-01 RX ORDER — HEPARIN SODIUM 5000 [USP'U]/ML
5000 INJECTION INTRAVENOUS; SUBCUTANEOUS ONCE
Refills: 0 | Status: COMPLETED | OUTPATIENT
Start: 2022-01-01 | End: 2022-01-01

## 2022-01-01 RX ORDER — INSULIN GLARGINE 100 [IU]/ML
30 INJECTION, SOLUTION SUBCUTANEOUS
Qty: 0 | Refills: 0 | DISCHARGE

## 2022-01-01 RX ORDER — HYDROCORTISONE 1 %
1 OINTMENT (GRAM) TOPICAL AT BEDTIME
Refills: 0 | Status: COMPLETED | OUTPATIENT
Start: 2022-01-01 | End: 2022-01-01

## 2022-01-01 RX ORDER — APIXABAN 2.5 MG/1
5 TABLET, FILM COATED ORAL EVERY 12 HOURS
Refills: 0 | Status: DISCONTINUED | OUTPATIENT
Start: 2022-01-01 | End: 2022-01-01

## 2022-01-01 RX ORDER — CEFPODOXIME PROXETIL 100 MG
200 TABLET ORAL EVERY 12 HOURS
Refills: 0 | Status: DISCONTINUED | OUTPATIENT
Start: 2022-01-01 | End: 2022-01-01

## 2022-01-01 RX ORDER — INSULIN LISPRO 100/ML
VIAL (ML) SUBCUTANEOUS
Refills: 0 | Status: DISCONTINUED | OUTPATIENT
Start: 2022-01-01 | End: 2022-01-01

## 2022-01-01 RX ORDER — DILTIAZEM HCL 120 MG
10 CAPSULE, EXT RELEASE 24 HR ORAL ONCE
Refills: 0 | Status: DISCONTINUED | OUTPATIENT
Start: 2022-01-01 | End: 2022-01-01

## 2022-01-01 RX ORDER — SODIUM CHLORIDE 9 MG/ML
3 INJECTION INTRAMUSCULAR; INTRAVENOUS; SUBCUTANEOUS EVERY 8 HOURS
Refills: 0 | Status: DISCONTINUED | OUTPATIENT
Start: 2022-01-01 | End: 2022-01-01

## 2022-01-01 RX ORDER — DILTIAZEM HCL 120 MG
10 CAPSULE, EXT RELEASE 24 HR ORAL ONCE
Refills: 0 | Status: COMPLETED | OUTPATIENT
Start: 2022-01-01 | End: 2022-01-01

## 2022-01-01 RX ORDER — INSULIN LISPRO 100/ML
VIAL (ML) SUBCUTANEOUS AT BEDTIME
Refills: 0 | Status: DISCONTINUED | OUTPATIENT
Start: 2022-01-01 | End: 2022-01-01

## 2022-01-01 RX ORDER — SODIUM CHLORIDE 9 MG/ML
1000 INJECTION, SOLUTION INTRAVENOUS
Refills: 0 | Status: DISCONTINUED | OUTPATIENT
Start: 2022-01-01 | End: 2022-01-01

## 2022-01-01 RX ORDER — POTASSIUM CHLORIDE 20 MEQ
10 PACKET (EA) ORAL
Refills: 0 | Status: COMPLETED | OUTPATIENT
Start: 2022-01-01 | End: 2022-01-01

## 2022-01-01 RX ORDER — INSULIN ASPART 100 [IU]/ML
7 INJECTION, SOLUTION SUBCUTANEOUS
Qty: 0 | Refills: 0 | DISCHARGE

## 2022-01-01 RX ORDER — MAGNESIUM SULFATE 500 MG/ML
1 VIAL (ML) INJECTION ONCE
Refills: 0 | Status: DISCONTINUED | OUTPATIENT
Start: 2022-01-01 | End: 2022-01-01

## 2022-01-01 RX ORDER — MOMETASONE FUROATE 220 UG/1
2 INHALANT RESPIRATORY (INHALATION) DAILY
Refills: 0 | Status: DISCONTINUED | OUTPATIENT
Start: 2022-01-01 | End: 2022-01-01

## 2022-01-01 RX ORDER — ERTAPENEM SODIUM 1 G/1
1000 INJECTION, POWDER, LYOPHILIZED, FOR SOLUTION INTRAMUSCULAR; INTRAVENOUS EVERY 24 HOURS
Refills: 0 | Status: COMPLETED | OUTPATIENT
Start: 2022-01-01 | End: 2022-01-01

## 2022-01-01 RX ORDER — DILTIAZEM HCL 120 MG
5 CAPSULE, EXT RELEASE 24 HR ORAL ONCE
Refills: 0 | Status: COMPLETED | OUTPATIENT
Start: 2022-01-01 | End: 2022-01-01

## 2022-01-01 RX ORDER — SODIUM,POTASSIUM PHOSPHATES 278-250MG
1 POWDER IN PACKET (EA) ORAL ONCE
Refills: 0 | Status: COMPLETED | OUTPATIENT
Start: 2022-01-01 | End: 2022-01-01

## 2022-01-01 RX ORDER — SODIUM CHLORIDE 9 MG/ML
250 INJECTION INTRAMUSCULAR; INTRAVENOUS; SUBCUTANEOUS
Refills: 0 | Status: DISCONTINUED | OUTPATIENT
Start: 2022-01-01 | End: 2022-01-01

## 2022-01-01 RX ORDER — METOPROLOL TARTRATE 50 MG
50 TABLET ORAL
Refills: 0 | Status: DISCONTINUED | OUTPATIENT
Start: 2022-01-01 | End: 2022-01-01

## 2022-01-01 RX ORDER — SODIUM BICARBONATE 1 MEQ/ML
50 SYRINGE (ML) INTRAVENOUS ONCE
Refills: 0 | Status: COMPLETED | OUTPATIENT
Start: 2022-01-01 | End: 2022-01-01

## 2022-01-01 RX ORDER — OLANZAPINE 15 MG/1
5 TABLET, FILM COATED ORAL ONCE
Refills: 0 | Status: COMPLETED | OUTPATIENT
Start: 2022-01-01 | End: 2022-01-01

## 2022-01-01 RX ORDER — CLOPIDOGREL BISULFATE 75 MG/1
75 TABLET, FILM COATED ORAL DAILY
Refills: 0 | Status: DISCONTINUED | OUTPATIENT
Start: 2022-01-01 | End: 2022-01-01

## 2022-01-01 RX ORDER — DEXMEDETOMIDINE HYDROCHLORIDE IN 0.9% SODIUM CHLORIDE 4 UG/ML
0.2 INJECTION INTRAVENOUS
Qty: 400 | Refills: 0 | Status: DISCONTINUED | OUTPATIENT
Start: 2022-01-01 | End: 2022-01-01

## 2022-01-01 RX ORDER — PANTOPRAZOLE SODIUM 20 MG/1
1 TABLET, DELAYED RELEASE ORAL
Qty: 0 | Refills: 0 | DISCHARGE
Start: 2022-01-01

## 2022-01-01 RX ORDER — OLANZAPINE 15 MG/1
2.5 TABLET, FILM COATED ORAL ONCE
Refills: 0 | Status: COMPLETED | OUTPATIENT
Start: 2022-01-01 | End: 2022-01-01

## 2022-01-01 RX ORDER — HEPARIN SODIUM 5000 [USP'U]/ML
5300 INJECTION INTRAVENOUS; SUBCUTANEOUS EVERY 6 HOURS
Refills: 0 | Status: DISCONTINUED | OUTPATIENT
Start: 2022-01-01 | End: 2022-01-01

## 2022-01-01 RX ORDER — DEXTROSE 50 % IN WATER 50 %
25 SYRINGE (ML) INTRAVENOUS ONCE
Refills: 0 | Status: DISCONTINUED | OUTPATIENT
Start: 2022-01-01 | End: 2022-01-01

## 2022-01-01 RX ORDER — POTASSIUM PHOSPHATE, MONOBASIC POTASSIUM PHOSPHATE, DIBASIC 236; 224 MG/ML; MG/ML
15 INJECTION, SOLUTION INTRAVENOUS ONCE
Refills: 0 | Status: COMPLETED | OUTPATIENT
Start: 2022-01-01 | End: 2022-01-01

## 2022-01-01 RX ORDER — BUMETANIDE 0.25 MG/ML
4 INJECTION INTRAMUSCULAR; INTRAVENOUS ONCE
Refills: 0 | Status: COMPLETED | OUTPATIENT
Start: 2022-01-01 | End: 2022-01-01

## 2022-01-01 RX ORDER — HYDROCORTISONE 1 %
1 OINTMENT (GRAM) TOPICAL AT BEDTIME
Refills: 0 | Status: DISCONTINUED | OUTPATIENT
Start: 2022-01-01 | End: 2022-01-01

## 2022-01-01 RX ORDER — CLOPIDOGREL BISULFATE 75 MG/1
1 TABLET, FILM COATED ORAL
Qty: 0 | Refills: 0 | DISCHARGE

## 2022-01-01 RX ORDER — LOSARTAN POTASSIUM 100 MG/1
1 TABLET, FILM COATED ORAL
Qty: 0 | Refills: 0 | DISCHARGE

## 2022-01-01 RX ORDER — DEXTROSE 50 % IN WATER 50 %
12.5 SYRINGE (ML) INTRAVENOUS ONCE
Refills: 0 | Status: DISCONTINUED | OUTPATIENT
Start: 2022-01-01 | End: 2022-01-01

## 2022-01-01 RX ORDER — ATORVASTATIN CALCIUM 80 MG/1
1 TABLET, FILM COATED ORAL
Qty: 0 | Refills: 0 | DISCHARGE

## 2022-01-01 RX ORDER — FUROSEMIDE 40 MG
40 TABLET ORAL
Refills: 0 | Status: DISCONTINUED | OUTPATIENT
Start: 2022-01-01 | End: 2022-01-01

## 2022-01-01 RX ORDER — AMIODARONE HYDROCHLORIDE 400 MG/1
150 TABLET ORAL ONCE
Refills: 0 | Status: COMPLETED | OUTPATIENT
Start: 2022-01-01 | End: 2022-01-01

## 2022-01-01 RX ORDER — APIXABAN 2.5 MG/1
5 TABLET, FILM COATED ORAL
Refills: 0 | Status: DISCONTINUED | OUTPATIENT
Start: 2022-01-01 | End: 2022-01-01

## 2022-01-01 RX ORDER — HEPARIN SODIUM 5000 [USP'U]/ML
4000 INJECTION INTRAVENOUS; SUBCUTANEOUS ONCE
Refills: 0 | Status: COMPLETED | OUTPATIENT
Start: 2022-01-01 | End: 2022-01-01

## 2022-01-01 RX ORDER — HEPARIN SODIUM 5000 [USP'U]/ML
5000 INJECTION INTRAVENOUS; SUBCUTANEOUS EVERY 8 HOURS
Refills: 0 | Status: DISCONTINUED | OUTPATIENT
Start: 2022-01-01 | End: 2022-01-01

## 2022-01-01 RX ORDER — HEPARIN SODIUM 5000 [USP'U]/ML
1000 INJECTION INTRAVENOUS; SUBCUTANEOUS
Qty: 25000 | Refills: 0 | Status: DISCONTINUED | OUTPATIENT
Start: 2022-01-01 | End: 2022-01-01

## 2022-01-01 RX ORDER — APIXABAN 2.5 MG/1
2.5 TABLET, FILM COATED ORAL EVERY 12 HOURS
Refills: 0 | Status: DISCONTINUED | OUTPATIENT
Start: 2022-01-01 | End: 2022-01-01

## 2022-01-01 RX ORDER — MORPHINE SULFATE 50 MG/1
4 CAPSULE, EXTENDED RELEASE ORAL ONCE
Refills: 0 | Status: DISCONTINUED | OUTPATIENT
Start: 2022-01-01 | End: 2022-01-01

## 2022-01-01 RX ORDER — ERTAPENEM SODIUM 1 G/1
1000 INJECTION, POWDER, LYOPHILIZED, FOR SOLUTION INTRAMUSCULAR; INTRAVENOUS EVERY 24 HOURS
Refills: 0 | Status: DISCONTINUED | OUTPATIENT
Start: 2022-01-01 | End: 2022-01-01

## 2022-01-01 RX ORDER — LANOLIN ALCOHOL/MO/W.PET/CERES
3 CREAM (GRAM) TOPICAL AT BEDTIME
Refills: 0 | Status: DISCONTINUED | OUTPATIENT
Start: 2022-01-01 | End: 2022-01-01

## 2022-01-01 RX ORDER — AMIODARONE HYDROCHLORIDE 400 MG/1
TABLET ORAL
Refills: 0 | Status: DISCONTINUED | OUTPATIENT
Start: 2022-01-01 | End: 2022-01-01

## 2022-01-01 RX ORDER — INSULIN LISPRO 100/ML
7 VIAL (ML) SUBCUTANEOUS
Qty: 0 | Refills: 0 | DISCHARGE
Start: 2022-01-01

## 2022-01-01 RX ORDER — AMIODARONE HYDROCHLORIDE 400 MG/1
400 TABLET ORAL EVERY 8 HOURS
Refills: 0 | Status: DISCONTINUED | OUTPATIENT
Start: 2022-01-01 | End: 2022-01-01

## 2022-01-01 RX ORDER — SODIUM CHLORIDE 9 MG/ML
1000 INJECTION INTRAMUSCULAR; INTRAVENOUS; SUBCUTANEOUS
Refills: 0 | Status: DISCONTINUED | OUTPATIENT
Start: 2022-01-01 | End: 2022-01-01

## 2022-01-01 RX ORDER — POTASSIUM CHLORIDE 20 MEQ
40 PACKET (EA) ORAL ONCE
Refills: 0 | Status: COMPLETED | OUTPATIENT
Start: 2022-01-01 | End: 2022-01-01

## 2022-01-01 RX ORDER — TELMISARTAN 20 MG/1
1 TABLET ORAL
Qty: 0 | Refills: 0 | DISCHARGE

## 2022-01-01 RX ORDER — INSULIN LISPRO 100/ML
7 VIAL (ML) SUBCUTANEOUS ONCE
Refills: 0 | Status: COMPLETED | OUTPATIENT
Start: 2022-01-01 | End: 2022-01-01

## 2022-01-01 RX ORDER — METOPROLOL TARTRATE 50 MG
2.5 TABLET ORAL ONCE
Refills: 0 | Status: COMPLETED | OUTPATIENT
Start: 2022-01-01 | End: 2022-01-01

## 2022-01-01 RX ORDER — QUETIAPINE FUMARATE 200 MG/1
25 TABLET, FILM COATED ORAL ONCE
Refills: 0 | Status: COMPLETED | OUTPATIENT
Start: 2022-01-01 | End: 2022-01-01

## 2022-01-01 RX ORDER — MAGNESIUM SULFATE 500 MG/ML
2 VIAL (ML) INJECTION ONCE
Refills: 0 | Status: COMPLETED | OUTPATIENT
Start: 2022-01-01 | End: 2022-01-01

## 2022-01-01 RX ORDER — BUMETANIDE 0.25 MG/ML
1 INJECTION INTRAMUSCULAR; INTRAVENOUS
Qty: 20 | Refills: 0 | Status: DISCONTINUED | OUTPATIENT
Start: 2022-01-01 | End: 2022-01-01

## 2022-01-01 RX ORDER — MESALAMINE 400 MG
1000 TABLET, DELAYED RELEASE (ENTERIC COATED) ORAL AT BEDTIME
Refills: 0 | Status: DISCONTINUED | OUTPATIENT
Start: 2022-01-01 | End: 2022-01-01

## 2022-01-01 RX ORDER — APIXABAN 2.5 MG/1
1 TABLET, FILM COATED ORAL
Qty: 0 | Refills: 0 | DISCHARGE
Start: 2022-01-01

## 2022-01-01 RX ORDER — POTASSIUM CHLORIDE 20 MEQ
40 PACKET (EA) ORAL
Refills: 0 | Status: COMPLETED | OUTPATIENT
Start: 2022-01-01 | End: 2022-01-01

## 2022-01-01 RX ORDER — ERTAPENEM SODIUM 1 G/1
1000 INJECTION, POWDER, LYOPHILIZED, FOR SOLUTION INTRAMUSCULAR; INTRAVENOUS ONCE
Refills: 0 | Status: COMPLETED | OUTPATIENT
Start: 2022-01-01 | End: 2022-01-01

## 2022-01-01 RX ORDER — BUMETANIDE 0.25 MG/ML
1 INJECTION INTRAMUSCULAR; INTRAVENOUS ONCE
Refills: 0 | Status: COMPLETED | OUTPATIENT
Start: 2022-01-01 | End: 2022-01-01

## 2022-01-01 RX ORDER — METOPROLOL TARTRATE 50 MG
5 TABLET ORAL ONCE
Refills: 0 | Status: COMPLETED | OUTPATIENT
Start: 2022-01-01 | End: 2022-01-01

## 2022-01-01 RX ORDER — FUROSEMIDE 40 MG
5 TABLET ORAL
Qty: 500 | Refills: 0 | Status: DISCONTINUED | OUTPATIENT
Start: 2022-01-01 | End: 2022-01-01

## 2022-01-01 RX ORDER — LORATADINE 10 MG/1
10 TABLET ORAL DAILY
Refills: 0 | Status: DISCONTINUED | OUTPATIENT
Start: 2022-01-01 | End: 2022-01-01

## 2022-01-01 RX ORDER — MAGNESIUM SULFATE 500 MG/ML
1 VIAL (ML) INJECTION ONCE
Refills: 0 | Status: COMPLETED | OUTPATIENT
Start: 2022-01-01 | End: 2022-01-01

## 2022-01-01 RX ORDER — POLYETHYLENE GLYCOL 3350 17 G/17G
17 POWDER, FOR SOLUTION ORAL DAILY
Refills: 0 | Status: DISCONTINUED | OUTPATIENT
Start: 2022-01-01 | End: 2022-01-01

## 2022-01-01 RX ORDER — PANTOPRAZOLE SODIUM 20 MG/1
40 TABLET, DELAYED RELEASE ORAL
Refills: 0 | Status: DISCONTINUED | OUTPATIENT
Start: 2022-01-01 | End: 2022-01-01

## 2022-01-01 RX ORDER — HEPARIN SODIUM 5000 [USP'U]/ML
1200 INJECTION INTRAVENOUS; SUBCUTANEOUS
Qty: 25000 | Refills: 0 | Status: DISCONTINUED | OUTPATIENT
Start: 2022-01-01 | End: 2022-01-01

## 2022-01-01 RX ORDER — POTASSIUM CHLORIDE 20 MEQ
20 PACKET (EA) ORAL ONCE
Refills: 0 | Status: COMPLETED | OUTPATIENT
Start: 2022-01-01 | End: 2022-01-01

## 2022-01-01 RX ORDER — ASPIRIN/CALCIUM CARB/MAGNESIUM 324 MG
162 TABLET ORAL ONCE
Refills: 0 | Status: COMPLETED | OUTPATIENT
Start: 2022-01-01 | End: 2022-01-01

## 2022-01-01 RX ORDER — DOXAZOSIN MESYLATE 4 MG
1 TABLET ORAL
Qty: 0 | Refills: 0 | DISCHARGE
Start: 2022-01-01

## 2022-01-01 RX ORDER — FUROSEMIDE 40 MG
80 TABLET ORAL ONCE
Refills: 0 | Status: COMPLETED | OUTPATIENT
Start: 2022-01-01 | End: 2022-01-01

## 2022-01-01 RX ORDER — POTASSIUM CHLORIDE 20 MEQ
20 PACKET (EA) ORAL
Refills: 0 | Status: DISCONTINUED | OUTPATIENT
Start: 2022-01-01 | End: 2022-01-01

## 2022-01-01 RX ORDER — HYDROMORPHONE HYDROCHLORIDE 2 MG/ML
1 INJECTION INTRAMUSCULAR; INTRAVENOUS; SUBCUTANEOUS ONCE
Refills: 0 | Status: DISCONTINUED | OUTPATIENT
Start: 2022-01-01 | End: 2022-01-01

## 2022-01-01 RX ORDER — CHLORHEXIDINE GLUCONATE 213 G/1000ML
1 SOLUTION TOPICAL
Refills: 0 | Status: DISCONTINUED | OUTPATIENT
Start: 2022-01-01 | End: 2022-01-01

## 2022-01-01 RX ORDER — OLANZAPINE 15 MG/1
1.25 TABLET, FILM COATED ORAL ONCE
Refills: 0 | Status: COMPLETED | OUTPATIENT
Start: 2022-01-01 | End: 2022-01-01

## 2022-01-01 RX ORDER — SODIUM BICARBONATE 1 MEQ/ML
0.13 SYRINGE (ML) INTRAVENOUS
Qty: 150 | Refills: 0 | Status: DISCONTINUED | OUTPATIENT
Start: 2022-01-01 | End: 2022-01-01

## 2022-01-01 RX ORDER — DONEPEZIL HYDROCHLORIDE 10 MG/1
10 TABLET, FILM COATED ORAL AT BEDTIME
Refills: 0 | Status: DISCONTINUED | OUTPATIENT
Start: 2022-01-01 | End: 2022-01-01

## 2022-01-01 RX ORDER — TICAGRELOR 90 MG/1
90 TABLET ORAL EVERY 12 HOURS
Refills: 0 | Status: DISCONTINUED | OUTPATIENT
Start: 2022-01-01 | End: 2022-01-01

## 2022-01-01 RX ORDER — MILRINONE LACTATE 1 MG/ML
0.25 INJECTION, SOLUTION INTRAVENOUS
Qty: 20 | Refills: 0 | Status: DISCONTINUED | OUTPATIENT
Start: 2022-01-01 | End: 2022-01-01

## 2022-01-01 RX ORDER — FLUTICASONE PROPIONATE 220 MCG
1 AEROSOL WITH ADAPTER (GRAM) INHALATION
Qty: 0 | Refills: 0 | DISCHARGE

## 2022-01-01 RX ORDER — INSULIN GLARGINE AND LIXISENATIDE 100; 33 U/ML; UG/ML
28 INJECTION, SOLUTION SUBCUTANEOUS
Qty: 0 | Refills: 0 | DISCHARGE

## 2022-01-01 RX ORDER — CEFPODOXIME PROXETIL 100 MG
1 TABLET ORAL
Qty: 0 | Refills: 0 | DISCHARGE
Start: 2022-01-01

## 2022-01-01 RX ORDER — KETOROLAC TROMETHAMINE 0.5 %
1 DROPS OPHTHALMIC (EYE) THREE TIMES A DAY
Refills: 0 | Status: DISCONTINUED | OUTPATIENT
Start: 2022-01-01 | End: 2022-01-01

## 2022-01-01 RX ORDER — FUROSEMIDE 40 MG
80 TABLET ORAL
Refills: 0 | Status: DISCONTINUED | OUTPATIENT
Start: 2022-01-01 | End: 2022-01-01

## 2022-01-01 RX ORDER — ATORVASTATIN CALCIUM 80 MG/1
80 TABLET, FILM COATED ORAL ONCE
Refills: 0 | Status: COMPLETED | OUTPATIENT
Start: 2022-01-01 | End: 2022-01-01

## 2022-01-01 RX ORDER — ATORVASTATIN CALCIUM 80 MG/1
40 TABLET, FILM COATED ORAL AT BEDTIME
Refills: 0 | Status: DISCONTINUED | OUTPATIENT
Start: 2022-01-01 | End: 2022-01-01

## 2022-01-01 RX ORDER — SODIUM CHLORIDE 9 MG/ML
1000 INJECTION INTRAMUSCULAR; INTRAVENOUS; SUBCUTANEOUS ONCE
Refills: 0 | Status: COMPLETED | OUTPATIENT
Start: 2022-01-01 | End: 2022-01-01

## 2022-01-01 RX ORDER — DIGOXIN 250 MCG
125 TABLET ORAL DAILY
Refills: 0 | Status: DISCONTINUED | OUTPATIENT
Start: 2022-01-01 | End: 2022-01-01

## 2022-01-01 RX ORDER — TICAGRELOR 90 MG/1
180 TABLET ORAL ONCE
Refills: 0 | Status: COMPLETED | OUTPATIENT
Start: 2022-01-01 | End: 2022-01-01

## 2022-01-01 RX ORDER — SENNA PLUS 8.6 MG/1
2 TABLET ORAL AT BEDTIME
Refills: 0 | Status: DISCONTINUED | OUTPATIENT
Start: 2022-01-01 | End: 2022-01-01

## 2022-01-01 RX ORDER — INSULIN GLARGINE 100 [IU]/ML
20 INJECTION, SOLUTION SUBCUTANEOUS AT BEDTIME
Refills: 0 | Status: DISCONTINUED | OUTPATIENT
Start: 2022-01-01 | End: 2022-01-01

## 2022-01-01 RX ORDER — ONDANSETRON 8 MG/1
4 TABLET, FILM COATED ORAL ONCE
Refills: 0 | Status: COMPLETED | OUTPATIENT
Start: 2022-01-01 | End: 2022-01-01

## 2022-01-01 RX ORDER — GLUCAGON INJECTION, SOLUTION 0.5 MG/.1ML
1 INJECTION, SOLUTION SUBCUTANEOUS ONCE
Refills: 0 | Status: DISCONTINUED | OUTPATIENT
Start: 2022-01-01 | End: 2022-01-01

## 2022-01-01 RX ORDER — ACETAMINOPHEN 500 MG
650 TABLET ORAL ONCE
Refills: 0 | Status: COMPLETED | OUTPATIENT
Start: 2022-01-01 | End: 2022-01-01

## 2022-01-01 RX ORDER — HALOPERIDOL DECANOATE 100 MG/ML
0.5 INJECTION INTRAMUSCULAR DAILY
Refills: 0 | Status: DISCONTINUED | OUTPATIENT
Start: 2022-01-01 | End: 2022-01-01

## 2022-01-01 RX ORDER — METFORMIN HYDROCHLORIDE 850 MG/1
1 TABLET ORAL
Qty: 0 | Refills: 0 | DISCHARGE

## 2022-01-01 RX ORDER — POLYETHYLENE GLYCOL 3350 17 G/17G
17 POWDER, FOR SOLUTION ORAL
Qty: 0 | Refills: 0 | DISCHARGE
Start: 2022-01-01

## 2022-01-01 RX ORDER — INSULIN GLARGINE 100 [IU]/ML
20 INJECTION, SOLUTION SUBCUTANEOUS ONCE
Refills: 0 | Status: COMPLETED | OUTPATIENT
Start: 2022-01-01 | End: 2022-01-01

## 2022-01-01 RX ORDER — SENNA PLUS 8.6 MG/1
2 TABLET ORAL
Qty: 0 | Refills: 0 | DISCHARGE
Start: 2022-01-01

## 2022-01-01 RX ORDER — TAMSULOSIN HYDROCHLORIDE 0.4 MG/1
0.4 CAPSULE ORAL AT BEDTIME
Refills: 0 | Status: DISCONTINUED | OUTPATIENT
Start: 2022-01-01 | End: 2022-01-01

## 2022-01-01 RX ORDER — NOREPINEPHRINE BITARTRATE/D5W 8 MG/250ML
0.05 PLASTIC BAG, INJECTION (ML) INTRAVENOUS
Qty: 8 | Refills: 0 | Status: DISCONTINUED | OUTPATIENT
Start: 2022-01-01 | End: 2022-01-01

## 2022-01-01 RX ORDER — METOPROLOL TARTRATE 50 MG
5 TABLET ORAL ONCE
Refills: 0 | Status: DISCONTINUED | OUTPATIENT
Start: 2022-01-01 | End: 2022-01-01

## 2022-01-01 RX ORDER — INSULIN ASPART 100 [IU]/ML
0 INJECTION, SOLUTION SUBCUTANEOUS
Qty: 0 | Refills: 0 | DISCHARGE

## 2022-01-01 RX ORDER — DIGOXIN 250 MCG
125 TABLET ORAL ONCE
Refills: 0 | Status: COMPLETED | OUTPATIENT
Start: 2022-01-01 | End: 2022-01-01

## 2022-01-01 RX ORDER — CEFEPIME 1 G/1
1000 INJECTION, POWDER, FOR SOLUTION INTRAMUSCULAR; INTRAVENOUS DAILY
Refills: 0 | Status: DISCONTINUED | OUTPATIENT
Start: 2022-01-01 | End: 2022-01-01

## 2022-01-01 RX ORDER — FAMOTIDINE 10 MG/ML
20 INJECTION INTRAVENOUS ONCE
Refills: 0 | Status: COMPLETED | OUTPATIENT
Start: 2022-01-01 | End: 2022-01-01

## 2022-01-01 RX ORDER — HYDRALAZINE HCL 50 MG
5 TABLET ORAL EVERY 8 HOURS
Refills: 0 | Status: DISCONTINUED | OUTPATIENT
Start: 2022-01-01 | End: 2022-01-01

## 2022-01-01 RX ORDER — MESALAMINE 400 MG
1 TABLET, DELAYED RELEASE (ENTERIC COATED) ORAL
Qty: 0 | Refills: 0 | DISCHARGE
Start: 2022-01-01

## 2022-01-01 RX ORDER — SODIUM CHLORIDE 9 MG/ML
250 INJECTION INTRAMUSCULAR; INTRAVENOUS; SUBCUTANEOUS ONCE
Refills: 0 | Status: COMPLETED | OUTPATIENT
Start: 2022-01-01 | End: 2022-01-01

## 2022-01-01 RX ORDER — TAMSULOSIN HYDROCHLORIDE 0.4 MG/1
1 CAPSULE ORAL
Qty: 0 | Refills: 0 | DISCHARGE

## 2022-01-01 RX ORDER — SODIUM CHLORIDE 9 MG/ML
10 INJECTION INTRAMUSCULAR; INTRAVENOUS; SUBCUTANEOUS
Refills: 0 | Status: DISCONTINUED | OUTPATIENT
Start: 2022-01-01 | End: 2022-01-01

## 2022-01-01 RX ORDER — BUMETANIDE 0.25 MG/ML
2 INJECTION INTRAMUSCULAR; INTRAVENOUS ONCE
Refills: 0 | Status: COMPLETED | OUTPATIENT
Start: 2022-01-01 | End: 2022-01-01

## 2022-01-01 RX ORDER — HALOPERIDOL DECANOATE 100 MG/ML
0.5 INJECTION INTRAMUSCULAR ONCE
Refills: 0 | Status: COMPLETED | OUTPATIENT
Start: 2022-01-01 | End: 2022-01-01

## 2022-01-01 RX ORDER — HYDROCORTISONE 1 %
1 OINTMENT (GRAM) TOPICAL EVERY 12 HOURS
Refills: 0 | Status: DISCONTINUED | OUTPATIENT
Start: 2022-01-01 | End: 2022-01-01

## 2022-01-01 RX ORDER — HALOPERIDOL DECANOATE 100 MG/ML
0.5 INJECTION INTRAMUSCULAR AT BEDTIME
Refills: 0 | Status: DISCONTINUED | OUTPATIENT
Start: 2022-01-01 | End: 2022-01-01

## 2022-01-01 RX ORDER — DEXTROSE 50 % IN WATER 50 %
15 SYRINGE (ML) INTRAVENOUS ONCE
Refills: 0 | Status: DISCONTINUED | OUTPATIENT
Start: 2022-01-01 | End: 2022-01-01

## 2022-01-01 RX ORDER — INSULIN LISPRO 100/ML
7 VIAL (ML) SUBCUTANEOUS
Refills: 0 | Status: DISCONTINUED | OUTPATIENT
Start: 2022-01-01 | End: 2022-01-01

## 2022-01-01 RX ORDER — FLUTICASONE PROPIONATE 50 MCG
1 SPRAY, SUSPENSION NASAL
Refills: 0 | Status: DISCONTINUED | OUTPATIENT
Start: 2022-01-01 | End: 2022-01-01

## 2022-01-01 RX ORDER — ACETAMINOPHEN 500 MG
650 TABLET ORAL EVERY 6 HOURS
Refills: 0 | Status: DISCONTINUED | OUTPATIENT
Start: 2022-01-01 | End: 2022-01-01

## 2022-01-01 RX ORDER — DIGOXIN 250 MCG
250 TABLET ORAL EVERY 4 HOURS
Refills: 0 | Status: COMPLETED | OUTPATIENT
Start: 2022-01-01 | End: 2022-01-01

## 2022-01-01 RX ORDER — ASPIRIN/CALCIUM CARB/MAGNESIUM 324 MG
81 TABLET ORAL DAILY
Refills: 0 | Status: DISCONTINUED | OUTPATIENT
Start: 2022-01-01 | End: 2022-01-01

## 2022-01-01 RX ORDER — PIPERACILLIN AND TAZOBACTAM 4; .5 G/20ML; G/20ML
3.38 INJECTION, POWDER, LYOPHILIZED, FOR SOLUTION INTRAVENOUS ONCE
Refills: 0 | Status: COMPLETED | OUTPATIENT
Start: 2022-01-01 | End: 2022-01-01

## 2022-01-01 RX ORDER — DIGOXIN 250 MCG
1 TABLET ORAL
Qty: 0 | Refills: 0 | DISCHARGE
Start: 2022-01-01

## 2022-01-01 RX ORDER — HEPARIN SODIUM 5000 [USP'U]/ML
INJECTION INTRAVENOUS; SUBCUTANEOUS
Qty: 25000 | Refills: 0 | Status: DISCONTINUED | OUTPATIENT
Start: 2022-01-01 | End: 2022-01-01

## 2022-01-01 RX ORDER — DONEPEZIL HYDROCHLORIDE 10 MG/1
1 TABLET, FILM COATED ORAL
Qty: 0 | Refills: 0 | DISCHARGE

## 2022-01-01 RX ORDER — ROBINUL 0.2 MG/ML
0.4 INJECTION INTRAMUSCULAR; INTRAVENOUS EVERY 6 HOURS
Refills: 0 | Status: DISCONTINUED | OUTPATIENT
Start: 2022-01-01 | End: 2022-01-01

## 2022-01-01 RX ORDER — AMIODARONE HYDROCHLORIDE 400 MG/1
200 TABLET ORAL DAILY
Refills: 0 | Status: DISCONTINUED | OUTPATIENT
Start: 2022-01-01 | End: 2022-01-01

## 2022-01-01 RX ORDER — NOREPINEPHRINE BITARTRATE/D5W 8 MG/250ML
0.05 PLASTIC BAG, INJECTION (ML) INTRAVENOUS
Qty: 16 | Refills: 0 | Status: DISCONTINUED | OUTPATIENT
Start: 2022-01-01 | End: 2022-01-01

## 2022-01-01 RX ORDER — POTASSIUM CHLORIDE 20 MEQ
20 PACKET (EA) ORAL ONCE
Refills: 0 | Status: DISCONTINUED | OUTPATIENT
Start: 2022-01-01 | End: 2022-01-01

## 2022-01-01 RX ORDER — INSULIN GLARGINE 100 [IU]/ML
15 INJECTION, SOLUTION SUBCUTANEOUS AT BEDTIME
Refills: 0 | Status: DISCONTINUED | OUTPATIENT
Start: 2022-01-01 | End: 2022-01-01

## 2022-01-01 RX ORDER — SEMAGLUTIDE 0.68 MG/ML
0.25 INJECTION, SOLUTION SUBCUTANEOUS
Qty: 4 | Refills: 0
Start: 2022-01-01 | End: 2022-01-01

## 2022-01-01 RX ORDER — ATORVASTATIN CALCIUM 80 MG/1
80 TABLET, FILM COATED ORAL AT BEDTIME
Refills: 0 | Status: DISCONTINUED | OUTPATIENT
Start: 2022-01-01 | End: 2022-01-01

## 2022-01-01 RX ORDER — ASPIRIN/CALCIUM CARB/MAGNESIUM 324 MG
325 TABLET ORAL ONCE
Refills: 0 | Status: COMPLETED | OUTPATIENT
Start: 2022-01-01 | End: 2022-01-01

## 2022-01-01 RX ORDER — LANOLIN ALCOHOL/MO/W.PET/CERES
3 CREAM (GRAM) TOPICAL ONCE
Refills: 0 | Status: COMPLETED | OUTPATIENT
Start: 2022-01-01 | End: 2022-01-01

## 2022-01-01 RX ORDER — DOXAZOSIN MESYLATE 4 MG
1 TABLET ORAL AT BEDTIME
Refills: 0 | Status: DISCONTINUED | OUTPATIENT
Start: 2022-01-01 | End: 2022-01-01

## 2022-01-01 RX ORDER — ASPIRIN/CALCIUM CARB/MAGNESIUM 324 MG
1 TABLET ORAL
Qty: 0 | Refills: 0 | DISCHARGE

## 2022-01-01 RX ORDER — HYDRALAZINE HCL 50 MG
10 TABLET ORAL EVERY 8 HOURS
Refills: 0 | Status: DISCONTINUED | OUTPATIENT
Start: 2022-01-01 | End: 2022-01-01

## 2022-01-01 RX ORDER — HALOPERIDOL DECANOATE 100 MG/ML
1 INJECTION INTRAMUSCULAR ONCE
Refills: 0 | Status: COMPLETED | OUTPATIENT
Start: 2022-01-01 | End: 2022-01-01

## 2022-01-01 RX ORDER — REPAGLINIDE 1 MG/1
1 TABLET ORAL
Qty: 0 | Refills: 0 | DISCHARGE

## 2022-01-01 RX ORDER — INSULIN GLARGINE 100 [IU]/ML
18 INJECTION, SOLUTION SUBCUTANEOUS
Qty: 0 | Refills: 0 | DISCHARGE
Start: 2022-01-01

## 2022-01-01 RX ORDER — METOPROLOL TARTRATE 50 MG
1 TABLET ORAL
Qty: 0 | Refills: 0 | DISCHARGE

## 2022-01-01 RX ORDER — HEPARIN SODIUM 5000 [USP'U]/ML
4000 INJECTION INTRAVENOUS; SUBCUTANEOUS EVERY 6 HOURS
Refills: 0 | Status: DISCONTINUED | OUTPATIENT
Start: 2022-01-01 | End: 2022-01-01

## 2022-01-01 RX ORDER — AZTREONAM 2 G
VIAL (EA) INJECTION
Refills: 0 | Status: DISCONTINUED | OUTPATIENT
Start: 2022-01-01 | End: 2022-01-01

## 2022-01-01 RX ORDER — ONDANSETRON 8 MG/1
4 TABLET, FILM COATED ORAL ONCE
Refills: 0 | Status: DISCONTINUED | OUTPATIENT
Start: 2022-01-01 | End: 2022-01-01

## 2022-01-01 RX ORDER — INSULIN LISPRO 100/ML
6 VIAL (ML) SUBCUTANEOUS
Refills: 0 | Status: DISCONTINUED | OUTPATIENT
Start: 2022-01-01 | End: 2022-01-01

## 2022-01-01 RX ORDER — AMIODARONE HYDROCHLORIDE 400 MG/1
400 TABLET ORAL
Refills: 0 | Status: DISCONTINUED | OUTPATIENT
Start: 2022-01-01 | End: 2022-01-01

## 2022-01-01 RX ORDER — FUROSEMIDE 40 MG
40 TABLET ORAL ONCE
Refills: 0 | Status: COMPLETED | OUTPATIENT
Start: 2022-01-01 | End: 2022-01-01

## 2022-01-01 RX ORDER — PIPERACILLIN AND TAZOBACTAM 4; .5 G/20ML; G/20ML
3.38 INJECTION, POWDER, LYOPHILIZED, FOR SOLUTION INTRAVENOUS EVERY 8 HOURS
Refills: 0 | Status: DISCONTINUED | OUTPATIENT
Start: 2022-01-01 | End: 2022-01-01

## 2022-01-01 RX ORDER — INSULIN LISPRO 100/ML
5 VIAL (ML) SUBCUTANEOUS
Refills: 0 | Status: DISCONTINUED | OUTPATIENT
Start: 2022-01-01 | End: 2022-01-01

## 2022-01-01 RX ORDER — HYDROCORTISONE 1 %
1 OINTMENT (GRAM) TOPICAL
Qty: 0 | Refills: 0 | DISCHARGE
Start: 2022-01-01

## 2022-01-01 RX ORDER — CLOPIDOGREL BISULFATE 75 MG/1
300 TABLET, FILM COATED ORAL ONCE
Refills: 0 | Status: COMPLETED | OUTPATIENT
Start: 2022-01-01 | End: 2022-01-01

## 2022-01-01 RX ORDER — ERTAPENEM SODIUM 1 G/1
INJECTION, POWDER, LYOPHILIZED, FOR SOLUTION INTRAMUSCULAR; INTRAVENOUS
Refills: 0 | Status: DISCONTINUED | OUTPATIENT
Start: 2022-01-01 | End: 2022-01-01

## 2022-01-01 RX ORDER — INSULIN GLARGINE 100 [IU]/ML
18 INJECTION, SOLUTION SUBCUTANEOUS AT BEDTIME
Refills: 0 | Status: DISCONTINUED | OUTPATIENT
Start: 2022-01-01 | End: 2022-01-01

## 2022-01-01 RX ORDER — INSULIN GLARGINE 100 [IU]/ML
14 INJECTION, SOLUTION SUBCUTANEOUS AT BEDTIME
Refills: 0 | Status: DISCONTINUED | OUTPATIENT
Start: 2022-01-01 | End: 2022-01-01

## 2022-01-01 RX ORDER — INSULIN GLARGINE 100 [IU]/ML
16 INJECTION, SOLUTION SUBCUTANEOUS AT BEDTIME
Refills: 0 | Status: DISCONTINUED | OUTPATIENT
Start: 2022-01-01 | End: 2022-01-01

## 2022-01-01 RX ORDER — KETOROLAC TROMETHAMINE 0.5 %
1 DROPS OPHTHALMIC (EYE)
Qty: 0 | Refills: 0 | DISCHARGE

## 2022-01-01 RX ORDER — LORATADINE 10 MG/1
1 TABLET ORAL
Qty: 0 | Refills: 0 | DISCHARGE

## 2022-01-01 RX ORDER — INSULIN GLARGINE 100 [IU]/ML
18 INJECTION, SOLUTION SUBCUTANEOUS
Qty: 0 | Refills: 0 | DISCHARGE

## 2022-01-01 RX ORDER — BUMETANIDE 0.25 MG/ML
4 INJECTION INTRAMUSCULAR; INTRAVENOUS
Refills: 0 | Status: DISCONTINUED | OUTPATIENT
Start: 2022-01-01 | End: 2022-01-01

## 2022-01-01 RX ORDER — FUROSEMIDE 40 MG
40 TABLET ORAL ONCE
Refills: 0 | Status: DISCONTINUED | OUTPATIENT
Start: 2022-01-01 | End: 2022-01-01

## 2022-01-01 RX ADMIN — Medication 50 MILLIGRAM(S): at 05:06

## 2022-01-01 RX ADMIN — SODIUM CHLORIDE 75 MILLILITER(S): 9 INJECTION INTRAMUSCULAR; INTRAVENOUS; SUBCUTANEOUS at 00:35

## 2022-01-01 RX ADMIN — Medication 1 SPRAY(S): at 05:07

## 2022-01-01 RX ADMIN — Medication 50 MILLIGRAM(S): at 17:09

## 2022-01-01 RX ADMIN — Medication 1 DROP(S): at 00:03

## 2022-01-01 RX ADMIN — ATORVASTATIN CALCIUM 40 MILLIGRAM(S): 80 TABLET, FILM COATED ORAL at 21:22

## 2022-01-01 RX ADMIN — Medication 8.31 MICROGRAM(S)/KG/MIN: at 07:35

## 2022-01-01 RX ADMIN — Medication 80 MILLIGRAM(S): at 15:46

## 2022-01-01 RX ADMIN — INSULIN GLARGINE 16 UNIT(S): 100 INJECTION, SOLUTION SUBCUTANEOUS at 22:08

## 2022-01-01 RX ADMIN — CLOPIDOGREL BISULFATE 75 MILLIGRAM(S): 75 TABLET, FILM COATED ORAL at 11:43

## 2022-01-01 RX ADMIN — HEPARIN SODIUM 1200 UNIT(S)/HR: 5000 INJECTION INTRAVENOUS; SUBCUTANEOUS at 17:14

## 2022-01-01 RX ADMIN — Medication 7 UNIT(S): at 11:43

## 2022-01-01 RX ADMIN — Medication 1 MILLIGRAM(S): at 22:01

## 2022-01-01 RX ADMIN — PANTOPRAZOLE SODIUM 40 MILLIGRAM(S): 20 TABLET, DELAYED RELEASE ORAL at 05:43

## 2022-01-01 RX ADMIN — Medication 125 MICROGRAM(S): at 14:41

## 2022-01-01 RX ADMIN — APIXABAN 2.5 MILLIGRAM(S): 2.5 TABLET, FILM COATED ORAL at 18:19

## 2022-01-01 RX ADMIN — Medication 81 MILLIGRAM(S): at 11:40

## 2022-01-01 RX ADMIN — Medication 1 MILLIGRAM(S): at 22:07

## 2022-01-01 RX ADMIN — SODIUM CHLORIDE 3 MILLILITER(S): 9 INJECTION INTRAMUSCULAR; INTRAVENOUS; SUBCUTANEOUS at 13:52

## 2022-01-01 RX ADMIN — DONEPEZIL HYDROCHLORIDE 10 MILLIGRAM(S): 10 TABLET, FILM COATED ORAL at 23:57

## 2022-01-01 RX ADMIN — MILRINONE LACTATE 6.65 MICROGRAM(S)/KG/MIN: 1 INJECTION, SOLUTION INTRAVENOUS at 05:07

## 2022-01-01 RX ADMIN — Medication 40 MILLIGRAM(S): at 05:14

## 2022-01-01 RX ADMIN — Medication 5 MILLIGRAM(S): at 05:56

## 2022-01-01 RX ADMIN — Medication 2: at 07:56

## 2022-01-01 RX ADMIN — Medication 5 UNIT(S): at 12:06

## 2022-01-01 RX ADMIN — POLYETHYLENE GLYCOL 3350 17 GRAM(S): 17 POWDER, FOR SOLUTION ORAL at 12:10

## 2022-01-01 RX ADMIN — Medication 7 UNIT(S): at 07:50

## 2022-01-01 RX ADMIN — SODIUM CHLORIDE 3 MILLILITER(S): 9 INJECTION INTRAMUSCULAR; INTRAVENOUS; SUBCUTANEOUS at 21:15

## 2022-01-01 RX ADMIN — Medication 3: at 11:57

## 2022-01-01 RX ADMIN — APIXABAN 5 MILLIGRAM(S): 2.5 TABLET, FILM COATED ORAL at 06:30

## 2022-01-01 RX ADMIN — APIXABAN 2.5 MILLIGRAM(S): 2.5 TABLET, FILM COATED ORAL at 05:22

## 2022-01-01 RX ADMIN — ATORVASTATIN CALCIUM 40 MILLIGRAM(S): 80 TABLET, FILM COATED ORAL at 22:05

## 2022-01-01 RX ADMIN — Medication 81 MILLIGRAM(S): at 11:43

## 2022-01-01 RX ADMIN — Medication 1: at 08:05

## 2022-01-01 RX ADMIN — Medication 50 MILLIGRAM(S): at 05:07

## 2022-01-01 RX ADMIN — Medication 200 MILLIGRAM(S): at 17:38

## 2022-01-01 RX ADMIN — Medication 81 MILLIGRAM(S): at 12:29

## 2022-01-01 RX ADMIN — CHLORHEXIDINE GLUCONATE 1 APPLICATION(S): 213 SOLUTION TOPICAL at 05:26

## 2022-01-01 RX ADMIN — SODIUM CHLORIDE 3 MILLILITER(S): 9 INJECTION INTRAMUSCULAR; INTRAVENOUS; SUBCUTANEOUS at 22:31

## 2022-01-01 RX ADMIN — Medication 1 SUPPOSITORY(S): at 22:11

## 2022-01-01 RX ADMIN — MILRINONE LACTATE 9.97 MICROGRAM(S)/KG/MIN: 1 INJECTION, SOLUTION INTRAVENOUS at 08:18

## 2022-01-01 RX ADMIN — Medication 1 SUPPOSITORY(S): at 21:23

## 2022-01-01 RX ADMIN — HEPARIN SODIUM 1650 UNIT(S)/HR: 5000 INJECTION INTRAVENOUS; SUBCUTANEOUS at 07:38

## 2022-01-01 RX ADMIN — MILRINONE LACTATE 6.65 MICROGRAM(S)/KG/MIN: 1 INJECTION, SOLUTION INTRAVENOUS at 07:44

## 2022-01-01 RX ADMIN — Medication 650 MILLIGRAM(S): at 14:40

## 2022-01-01 RX ADMIN — Medication 50 MILLIGRAM(S): at 05:41

## 2022-01-01 RX ADMIN — SODIUM CHLORIDE 1000 MILLILITER(S): 9 INJECTION INTRAMUSCULAR; INTRAVENOUS; SUBCUTANEOUS at 16:14

## 2022-01-01 RX ADMIN — Medication 1 SPRAY(S): at 17:09

## 2022-01-01 RX ADMIN — Medication 2: at 22:33

## 2022-01-01 RX ADMIN — Medication 1 DROP(S): at 13:35

## 2022-01-01 RX ADMIN — BUMETANIDE 1 MILLIGRAM(S): 0.25 INJECTION INTRAMUSCULAR; INTRAVENOUS at 11:28

## 2022-01-01 RX ADMIN — LORATADINE 10 MILLIGRAM(S): 10 TABLET ORAL at 13:45

## 2022-01-01 RX ADMIN — HEPARIN SODIUM 1450 UNIT(S)/HR: 5000 INJECTION INTRAVENOUS; SUBCUTANEOUS at 07:28

## 2022-01-01 RX ADMIN — PIPERACILLIN AND TAZOBACTAM 200 GRAM(S): 4; .5 INJECTION, POWDER, LYOPHILIZED, FOR SOLUTION INTRAVENOUS at 21:45

## 2022-01-01 RX ADMIN — Medication 125 MICROGRAM(S): at 06:31

## 2022-01-01 RX ADMIN — SENNA PLUS 2 TABLET(S): 8.6 TABLET ORAL at 21:24

## 2022-01-01 RX ADMIN — CHLORHEXIDINE GLUCONATE 1 APPLICATION(S): 213 SOLUTION TOPICAL at 06:16

## 2022-01-01 RX ADMIN — Medication 50 MILLIGRAM(S): at 17:59

## 2022-01-01 RX ADMIN — Medication 81 MILLIGRAM(S): at 17:02

## 2022-01-01 RX ADMIN — Medication 1 MILLIGRAM(S): at 22:08

## 2022-01-01 RX ADMIN — Medication 1 MILLIGRAM(S): at 23:36

## 2022-01-01 RX ADMIN — Medication 650 MILLIGRAM(S): at 15:40

## 2022-01-01 RX ADMIN — SODIUM CHLORIDE 3 MILLILITER(S): 9 INJECTION INTRAMUSCULAR; INTRAVENOUS; SUBCUTANEOUS at 16:50

## 2022-01-01 RX ADMIN — SODIUM CHLORIDE 3 MILLILITER(S): 9 INJECTION INTRAMUSCULAR; INTRAVENOUS; SUBCUTANEOUS at 13:54

## 2022-01-01 RX ADMIN — Medication 5 UNIT(S): at 11:56

## 2022-01-01 RX ADMIN — Medication 4.15 MICROGRAM(S)/KG/MIN: at 21:20

## 2022-01-01 RX ADMIN — SODIUM CHLORIDE 3 MILLILITER(S): 9 INJECTION INTRAMUSCULAR; INTRAVENOUS; SUBCUTANEOUS at 22:10

## 2022-01-01 RX ADMIN — Medication 4: at 17:00

## 2022-01-01 RX ADMIN — INSULIN GLARGINE 14 UNIT(S): 100 INJECTION, SOLUTION SUBCUTANEOUS at 23:00

## 2022-01-01 RX ADMIN — HEPARIN SODIUM 1200 UNIT(S)/HR: 5000 INJECTION INTRAVENOUS; SUBCUTANEOUS at 04:36

## 2022-01-01 RX ADMIN — SODIUM CHLORIDE 3 MILLILITER(S): 9 INJECTION INTRAMUSCULAR; INTRAVENOUS; SUBCUTANEOUS at 05:24

## 2022-01-01 RX ADMIN — MILRINONE LACTATE 6.65 MICROGRAM(S)/KG/MIN: 1 INJECTION, SOLUTION INTRAVENOUS at 19:44

## 2022-01-01 RX ADMIN — Medication 50 MILLIGRAM(S): at 17:12

## 2022-01-01 RX ADMIN — SODIUM CHLORIDE 3 MILLILITER(S): 9 INJECTION INTRAMUSCULAR; INTRAVENOUS; SUBCUTANEOUS at 21:30

## 2022-01-01 RX ADMIN — Medication 1 TABLET(S): at 18:16

## 2022-01-01 RX ADMIN — HEPARIN SODIUM 5000 UNIT(S): 5000 INJECTION INTRAVENOUS; SUBCUTANEOUS at 03:53

## 2022-01-01 RX ADMIN — Medication 80 MILLIGRAM(S): at 11:19

## 2022-01-01 RX ADMIN — AMIODARONE HYDROCHLORIDE 400 MILLIGRAM(S): 400 TABLET ORAL at 14:47

## 2022-01-01 RX ADMIN — Medication 2: at 16:55

## 2022-01-01 RX ADMIN — HEPARIN SODIUM 5000 UNIT(S): 5000 INJECTION INTRAVENOUS; SUBCUTANEOUS at 05:35

## 2022-01-01 RX ADMIN — DONEPEZIL HYDROCHLORIDE 10 MILLIGRAM(S): 10 TABLET, FILM COATED ORAL at 21:35

## 2022-01-01 RX ADMIN — Medication 50 MILLIGRAM(S): at 17:45

## 2022-01-01 RX ADMIN — CLOPIDOGREL BISULFATE 75 MILLIGRAM(S): 75 TABLET, FILM COATED ORAL at 12:00

## 2022-01-01 RX ADMIN — Medication 200 MILLIGRAM(S): at 18:30

## 2022-01-01 RX ADMIN — Medication 2.5 MG/HR: at 19:26

## 2022-01-01 RX ADMIN — Medication 3: at 11:54

## 2022-01-01 RX ADMIN — Medication 5 MILLIGRAM(S): at 14:24

## 2022-01-01 RX ADMIN — ATORVASTATIN CALCIUM 40 MILLIGRAM(S): 80 TABLET, FILM COATED ORAL at 22:32

## 2022-01-01 RX ADMIN — INSULIN GLARGINE 14 UNIT(S): 100 INJECTION, SOLUTION SUBCUTANEOUS at 23:10

## 2022-01-01 RX ADMIN — Medication 125 MICROGRAM(S): at 06:22

## 2022-01-01 RX ADMIN — TICAGRELOR 90 MILLIGRAM(S): 90 TABLET ORAL at 05:11

## 2022-01-01 RX ADMIN — TICAGRELOR 90 MILLIGRAM(S): 90 TABLET ORAL at 05:14

## 2022-01-01 RX ADMIN — AMIODARONE HYDROCHLORIDE 400 MILLIGRAM(S): 400 TABLET ORAL at 14:21

## 2022-01-01 RX ADMIN — Medication 5 UNIT(S): at 16:57

## 2022-01-01 RX ADMIN — ERTAPENEM SODIUM 120 MILLIGRAM(S): 1 INJECTION, POWDER, LYOPHILIZED, FOR SOLUTION INTRAMUSCULAR; INTRAVENOUS at 13:34

## 2022-01-01 RX ADMIN — Medication 81 MILLIGRAM(S): at 11:54

## 2022-01-01 RX ADMIN — Medication 125 MICROGRAM(S): at 06:36

## 2022-01-01 RX ADMIN — TICAGRELOR 90 MILLIGRAM(S): 90 TABLET ORAL at 17:59

## 2022-01-01 RX ADMIN — TICAGRELOR 90 MILLIGRAM(S): 90 TABLET ORAL at 06:41

## 2022-01-01 RX ADMIN — Medication 81 MILLIGRAM(S): at 11:10

## 2022-01-01 RX ADMIN — Medication 1: at 11:42

## 2022-01-01 RX ADMIN — Medication 5: at 17:44

## 2022-01-01 RX ADMIN — SODIUM CHLORIDE 3 MILLILITER(S): 9 INJECTION INTRAMUSCULAR; INTRAVENOUS; SUBCUTANEOUS at 21:36

## 2022-01-01 RX ADMIN — POLYETHYLENE GLYCOL 3350 17 GRAM(S): 17 POWDER, FOR SOLUTION ORAL at 11:27

## 2022-01-01 RX ADMIN — ATORVASTATIN CALCIUM 40 MILLIGRAM(S): 80 TABLET, FILM COATED ORAL at 22:00

## 2022-01-01 RX ADMIN — Medication 81 MILLIGRAM(S): at 11:03

## 2022-01-01 RX ADMIN — Medication 50 MILLIGRAM(S): at 17:34

## 2022-01-01 RX ADMIN — Medication 5 UNIT(S): at 08:05

## 2022-01-01 RX ADMIN — Medication 2: at 07:50

## 2022-01-01 RX ADMIN — Medication 125 MICROGRAM(S): at 05:06

## 2022-01-01 RX ADMIN — CLOPIDOGREL BISULFATE 75 MILLIGRAM(S): 75 TABLET, FILM COATED ORAL at 11:40

## 2022-01-01 RX ADMIN — Medication 81 MILLIGRAM(S): at 13:43

## 2022-01-01 RX ADMIN — HEPARIN SODIUM 1450 UNIT(S)/HR: 5000 INJECTION INTRAVENOUS; SUBCUTANEOUS at 15:21

## 2022-01-01 RX ADMIN — Medication 5 UNIT(S): at 17:03

## 2022-01-01 RX ADMIN — ATORVASTATIN CALCIUM 40 MILLIGRAM(S): 80 TABLET, FILM COATED ORAL at 21:37

## 2022-01-01 RX ADMIN — POLYETHYLENE GLYCOL 3350 17 GRAM(S): 17 POWDER, FOR SOLUTION ORAL at 13:46

## 2022-01-01 RX ADMIN — Medication 1 DROP(S): at 05:36

## 2022-01-01 RX ADMIN — Medication 7 UNIT(S): at 16:58

## 2022-01-01 RX ADMIN — HEPARIN SODIUM 1750 UNIT(S)/HR: 5000 INJECTION INTRAVENOUS; SUBCUTANEOUS at 00:22

## 2022-01-01 RX ADMIN — Medication 1: at 11:28

## 2022-01-01 RX ADMIN — OLANZAPINE 1.25 MILLIGRAM(S): 15 TABLET, FILM COATED ORAL at 12:07

## 2022-01-01 RX ADMIN — Medication 1 MILLIGRAM(S): at 22:34

## 2022-01-01 RX ADMIN — SENNA PLUS 2 TABLET(S): 8.6 TABLET ORAL at 23:56

## 2022-01-01 RX ADMIN — Medication 20 MILLIEQUIVALENT(S): at 09:57

## 2022-01-01 RX ADMIN — Medication 1 SUPPOSITORY(S): at 22:51

## 2022-01-01 RX ADMIN — BUMETANIDE 1 MILLIGRAM(S): 0.25 INJECTION INTRAMUSCULAR; INTRAVENOUS at 14:29

## 2022-01-01 RX ADMIN — POLYETHYLENE GLYCOL 3350 17 GRAM(S): 17 POWDER, FOR SOLUTION ORAL at 11:10

## 2022-01-01 RX ADMIN — Medication 4.15 MICROGRAM(S)/KG/MIN: at 07:00

## 2022-01-01 RX ADMIN — ERTAPENEM SODIUM 120 MILLIGRAM(S): 1 INJECTION, POWDER, LYOPHILIZED, FOR SOLUTION INTRAMUSCULAR; INTRAVENOUS at 23:04

## 2022-01-01 RX ADMIN — SODIUM CHLORIDE 3 MILLILITER(S): 9 INJECTION INTRAMUSCULAR; INTRAVENOUS; SUBCUTANEOUS at 06:10

## 2022-01-01 RX ADMIN — HALOPERIDOL DECANOATE 1 MILLIGRAM(S): 100 INJECTION INTRAMUSCULAR at 02:03

## 2022-01-01 RX ADMIN — INSULIN GLARGINE 20 UNIT(S): 100 INJECTION, SOLUTION SUBCUTANEOUS at 22:30

## 2022-01-01 RX ADMIN — Medication 3: at 11:40

## 2022-01-01 RX ADMIN — SENNA PLUS 2 TABLET(S): 8.6 TABLET ORAL at 21:22

## 2022-01-01 RX ADMIN — CEFEPIME 100 MILLIGRAM(S): 1 INJECTION, POWDER, FOR SOLUTION INTRAMUSCULAR; INTRAVENOUS at 14:20

## 2022-01-01 RX ADMIN — TICAGRELOR 90 MILLIGRAM(S): 90 TABLET ORAL at 18:16

## 2022-01-01 RX ADMIN — Medication 7 UNIT(S): at 07:29

## 2022-01-01 RX ADMIN — CLOPIDOGREL BISULFATE 75 MILLIGRAM(S): 75 TABLET, FILM COATED ORAL at 11:10

## 2022-01-01 RX ADMIN — Medication 1 SUPPOSITORY(S): at 22:22

## 2022-01-01 RX ADMIN — Medication 50 MILLIGRAM(S): at 05:14

## 2022-01-01 RX ADMIN — MILRINONE LACTATE 6.65 MICROGRAM(S)/KG/MIN: 1 INJECTION, SOLUTION INTRAVENOUS at 08:20

## 2022-01-01 RX ADMIN — Medication 1 SUPPOSITORY(S): at 21:59

## 2022-01-01 RX ADMIN — HEPARIN SODIUM 1650 UNIT(S)/HR: 5000 INJECTION INTRAVENOUS; SUBCUTANEOUS at 23:44

## 2022-01-01 RX ADMIN — QUETIAPINE FUMARATE 25 MILLIGRAM(S): 200 TABLET, FILM COATED ORAL at 21:54

## 2022-01-01 RX ADMIN — SODIUM CHLORIDE 3 MILLILITER(S): 9 INJECTION INTRAMUSCULAR; INTRAVENOUS; SUBCUTANEOUS at 05:20

## 2022-01-01 RX ADMIN — Medication 1 DROP(S): at 13:46

## 2022-01-01 RX ADMIN — Medication 2.5 MG/HR: at 17:42

## 2022-01-01 RX ADMIN — CEFEPIME 100 MILLIGRAM(S): 1 INJECTION, POWDER, FOR SOLUTION INTRAMUSCULAR; INTRAVENOUS at 11:37

## 2022-01-01 RX ADMIN — Medication 3: at 08:08

## 2022-01-01 RX ADMIN — Medication 2: at 16:47

## 2022-01-01 RX ADMIN — TICAGRELOR 90 MILLIGRAM(S): 90 TABLET ORAL at 06:07

## 2022-01-01 RX ADMIN — SODIUM CHLORIDE 3 MILLILITER(S): 9 INJECTION INTRAMUSCULAR; INTRAVENOUS; SUBCUTANEOUS at 07:30

## 2022-01-01 RX ADMIN — Medication 1 SUPPOSITORY(S): at 22:05

## 2022-01-01 RX ADMIN — QUETIAPINE FUMARATE 25 MILLIGRAM(S): 200 TABLET, FILM COATED ORAL at 22:53

## 2022-01-01 RX ADMIN — BUMETANIDE 1 MILLIGRAM(S): 0.25 INJECTION INTRAMUSCULAR; INTRAVENOUS at 18:32

## 2022-01-01 RX ADMIN — Medication 50 MILLIGRAM(S): at 17:51

## 2022-01-01 RX ADMIN — APIXABAN 5 MILLIGRAM(S): 2.5 TABLET, FILM COATED ORAL at 05:22

## 2022-01-01 RX ADMIN — Medication 1 DROP(S): at 21:39

## 2022-01-01 RX ADMIN — DONEPEZIL HYDROCHLORIDE 10 MILLIGRAM(S): 10 TABLET, FILM COATED ORAL at 21:53

## 2022-01-01 RX ADMIN — Medication 5 UNIT(S): at 07:55

## 2022-01-01 RX ADMIN — SODIUM CHLORIDE 3 MILLILITER(S): 9 INJECTION INTRAMUSCULAR; INTRAVENOUS; SUBCUTANEOUS at 06:40

## 2022-01-01 RX ADMIN — Medication 1: at 12:06

## 2022-01-01 RX ADMIN — INSULIN GLARGINE 16 UNIT(S): 100 INJECTION, SOLUTION SUBCUTANEOUS at 22:31

## 2022-01-01 RX ADMIN — Medication 2: at 17:16

## 2022-01-01 RX ADMIN — Medication 81 MILLIGRAM(S): at 12:14

## 2022-01-01 RX ADMIN — Medication 5 MILLIGRAM(S): at 14:47

## 2022-01-01 RX ADMIN — Medication 5 UNIT(S): at 16:46

## 2022-01-01 RX ADMIN — Medication 1 DROP(S): at 21:31

## 2022-01-01 RX ADMIN — Medication 1: at 21:29

## 2022-01-01 RX ADMIN — MILRINONE LACTATE 6.65 MICROGRAM(S)/KG/MIN: 1 INJECTION, SOLUTION INTRAVENOUS at 10:01

## 2022-01-01 RX ADMIN — Medication 1 DROP(S): at 15:48

## 2022-01-01 RX ADMIN — Medication 1 SPRAY(S): at 21:32

## 2022-01-01 RX ADMIN — BUMETANIDE 2.5 MG/HR: 0.25 INJECTION INTRAMUSCULAR; INTRAVENOUS at 05:07

## 2022-01-01 RX ADMIN — MOMETASONE FUROATE 2 PUFF(S): 220 INHALANT RESPIRATORY (INHALATION) at 12:15

## 2022-01-01 RX ADMIN — Medication 5 MILLIGRAM(S): at 12:48

## 2022-01-01 RX ADMIN — APIXABAN 5 MILLIGRAM(S): 2.5 TABLET, FILM COATED ORAL at 17:44

## 2022-01-01 RX ADMIN — CLOPIDOGREL BISULFATE 75 MILLIGRAM(S): 75 TABLET, FILM COATED ORAL at 12:13

## 2022-01-01 RX ADMIN — HEPARIN SODIUM 1650 UNIT(S)/HR: 5000 INJECTION INTRAVENOUS; SUBCUTANEOUS at 08:32

## 2022-01-01 RX ADMIN — OLANZAPINE 2.5 MILLIGRAM(S): 15 TABLET, FILM COATED ORAL at 03:41

## 2022-01-01 RX ADMIN — Medication 10 MILLIGRAM(S): at 21:38

## 2022-01-01 RX ADMIN — Medication 162 MILLIGRAM(S): at 18:39

## 2022-01-01 RX ADMIN — Medication 1 MILLIGRAM(S): at 21:32

## 2022-01-01 RX ADMIN — TICAGRELOR 90 MILLIGRAM(S): 90 TABLET ORAL at 05:25

## 2022-01-01 RX ADMIN — HALOPERIDOL DECANOATE 0.5 MILLIGRAM(S): 100 INJECTION INTRAMUSCULAR at 18:02

## 2022-01-01 RX ADMIN — HEPARIN SODIUM 1150 UNIT(S)/HR: 5000 INJECTION INTRAVENOUS; SUBCUTANEOUS at 01:31

## 2022-01-01 RX ADMIN — OLANZAPINE 1.25 MILLIGRAM(S): 15 TABLET, FILM COATED ORAL at 10:14

## 2022-01-01 RX ADMIN — Medication 2: at 17:35

## 2022-01-01 RX ADMIN — APIXABAN 5 MILLIGRAM(S): 2.5 TABLET, FILM COATED ORAL at 05:05

## 2022-01-01 RX ADMIN — TAMSULOSIN HYDROCHLORIDE 0.4 MILLIGRAM(S): 0.4 CAPSULE ORAL at 21:38

## 2022-01-01 RX ADMIN — Medication 3: at 17:17

## 2022-01-01 RX ADMIN — Medication 30 MILLILITER(S): at 20:42

## 2022-01-01 RX ADMIN — Medication 5 UNIT(S): at 07:45

## 2022-01-01 RX ADMIN — Medication 1 SPRAY(S): at 17:15

## 2022-01-01 RX ADMIN — Medication 10 MILLIGRAM(S): at 21:57

## 2022-01-01 RX ADMIN — SODIUM CHLORIDE 3 MILLILITER(S): 9 INJECTION INTRAMUSCULAR; INTRAVENOUS; SUBCUTANEOUS at 14:09

## 2022-01-01 RX ADMIN — Medication 1 TABLET(S): at 13:49

## 2022-01-01 RX ADMIN — MILRINONE LACTATE 9.97 MICROGRAM(S)/KG/MIN: 1 INJECTION, SOLUTION INTRAVENOUS at 17:10

## 2022-01-01 RX ADMIN — BUMETANIDE 1 MILLIGRAM(S): 0.25 INJECTION INTRAMUSCULAR; INTRAVENOUS at 03:40

## 2022-01-01 RX ADMIN — Medication 100 MILLIEQUIVALENT(S): at 10:55

## 2022-01-01 RX ADMIN — Medication 50 MILLIGRAM(S): at 17:16

## 2022-01-01 RX ADMIN — MILRINONE LACTATE 6.65 MICROGRAM(S)/KG/MIN: 1 INJECTION, SOLUTION INTRAVENOUS at 13:39

## 2022-01-01 RX ADMIN — Medication 1 DROP(S): at 05:03

## 2022-01-01 RX ADMIN — HEPARIN SODIUM 1450 UNIT(S)/HR: 5000 INJECTION INTRAVENOUS; SUBCUTANEOUS at 17:42

## 2022-01-01 RX ADMIN — BUMETANIDE 5 MG/HR: 0.25 INJECTION INTRAMUSCULAR; INTRAVENOUS at 13:39

## 2022-01-01 RX ADMIN — HEPARIN SODIUM 1550 UNIT(S)/HR: 5000 INJECTION INTRAVENOUS; SUBCUTANEOUS at 08:20

## 2022-01-01 RX ADMIN — CLOPIDOGREL BISULFATE 75 MILLIGRAM(S): 75 TABLET, FILM COATED ORAL at 12:06

## 2022-01-01 RX ADMIN — Medication 81 MILLIGRAM(S): at 12:09

## 2022-01-01 RX ADMIN — ATORVASTATIN CALCIUM 80 MILLIGRAM(S): 80 TABLET, FILM COATED ORAL at 07:41

## 2022-01-01 RX ADMIN — TAMSULOSIN HYDROCHLORIDE 0.4 MILLIGRAM(S): 0.4 CAPSULE ORAL at 21:24

## 2022-01-01 RX ADMIN — POLYETHYLENE GLYCOL 3350 17 GRAM(S): 17 POWDER, FOR SOLUTION ORAL at 14:01

## 2022-01-01 RX ADMIN — INSULIN GLARGINE 14 UNIT(S): 100 INJECTION, SOLUTION SUBCUTANEOUS at 22:33

## 2022-01-01 RX ADMIN — Medication 1 SPRAY(S): at 18:19

## 2022-01-01 RX ADMIN — Medication 5 UNIT(S): at 08:29

## 2022-01-01 RX ADMIN — CHLORHEXIDINE GLUCONATE 1 APPLICATION(S): 213 SOLUTION TOPICAL at 05:08

## 2022-01-01 RX ADMIN — HEPARIN SODIUM 1350 UNIT(S)/HR: 5000 INJECTION INTRAVENOUS; SUBCUTANEOUS at 14:34

## 2022-01-01 RX ADMIN — SODIUM CHLORIDE 3 MILLILITER(S): 9 INJECTION INTRAMUSCULAR; INTRAVENOUS; SUBCUTANEOUS at 06:30

## 2022-01-01 RX ADMIN — Medication 40 MILLIGRAM(S): at 04:23

## 2022-01-01 RX ADMIN — Medication 10 MILLIGRAM(S): at 21:37

## 2022-01-01 RX ADMIN — Medication 4: at 11:53

## 2022-01-01 RX ADMIN — Medication 50 MILLIGRAM(S): at 17:36

## 2022-01-01 RX ADMIN — SODIUM CHLORIDE 3 MILLILITER(S): 9 INJECTION INTRAMUSCULAR; INTRAVENOUS; SUBCUTANEOUS at 06:37

## 2022-01-01 RX ADMIN — PANTOPRAZOLE SODIUM 40 MILLIGRAM(S): 20 TABLET, DELAYED RELEASE ORAL at 05:22

## 2022-01-01 RX ADMIN — Medication 10 MILLIGRAM(S): at 22:00

## 2022-01-01 RX ADMIN — MOMETASONE FUROATE 2 PUFF(S): 220 INHALANT RESPIRATORY (INHALATION) at 10:49

## 2022-01-01 RX ADMIN — Medication 3 MILLIGRAM(S): at 01:30

## 2022-01-01 RX ADMIN — Medication 1: at 21:51

## 2022-01-01 RX ADMIN — Medication 1: at 12:32

## 2022-01-01 RX ADMIN — HEPARIN SODIUM 1650 UNIT(S)/HR: 5000 INJECTION INTRAVENOUS; SUBCUTANEOUS at 14:30

## 2022-01-01 RX ADMIN — TICAGRELOR 90 MILLIGRAM(S): 90 TABLET ORAL at 17:35

## 2022-01-01 RX ADMIN — TICAGRELOR 180 MILLIGRAM(S): 90 TABLET ORAL at 18:39

## 2022-01-01 RX ADMIN — Medication 4: at 16:56

## 2022-01-01 RX ADMIN — MILRINONE LACTATE 6.65 MICROGRAM(S)/KG/MIN: 1 INJECTION, SOLUTION INTRAVENOUS at 19:31

## 2022-01-01 RX ADMIN — Medication 2: at 12:24

## 2022-01-01 RX ADMIN — Medication 6 UNIT(S): at 07:48

## 2022-01-01 RX ADMIN — INSULIN GLARGINE 18 UNIT(S): 100 INJECTION, SOLUTION SUBCUTANEOUS at 21:50

## 2022-01-01 RX ADMIN — Medication 1 SPRAY(S): at 05:08

## 2022-01-01 RX ADMIN — APIXABAN 2.5 MILLIGRAM(S): 2.5 TABLET, FILM COATED ORAL at 17:08

## 2022-01-01 RX ADMIN — ATORVASTATIN CALCIUM 80 MILLIGRAM(S): 80 TABLET, FILM COATED ORAL at 21:53

## 2022-01-01 RX ADMIN — PANTOPRAZOLE SODIUM 40 MILLIGRAM(S): 20 TABLET, DELAYED RELEASE ORAL at 05:06

## 2022-01-01 RX ADMIN — Medication 100 MILLIEQUIVALENT(S): at 00:14

## 2022-01-01 RX ADMIN — Medication 50 MILLIGRAM(S): at 17:15

## 2022-01-01 RX ADMIN — INSULIN GLARGINE 20 UNIT(S): 100 INJECTION, SOLUTION SUBCUTANEOUS at 21:38

## 2022-01-01 RX ADMIN — Medication 0: at 08:19

## 2022-01-01 RX ADMIN — HEPARIN SODIUM 1600 UNIT(S)/HR: 5000 INJECTION INTRAVENOUS; SUBCUTANEOUS at 23:41

## 2022-01-01 RX ADMIN — PANTOPRAZOLE SODIUM 40 MILLIGRAM(S): 20 TABLET, DELAYED RELEASE ORAL at 06:30

## 2022-01-01 RX ADMIN — APIXABAN 5 MILLIGRAM(S): 2.5 TABLET, FILM COATED ORAL at 17:16

## 2022-01-01 RX ADMIN — Medication 1 DROP(S): at 05:16

## 2022-01-01 RX ADMIN — Medication 1 DROP(S): at 13:39

## 2022-01-01 RX ADMIN — HEPARIN SODIUM 1450 UNIT(S)/HR: 5000 INJECTION INTRAVENOUS; SUBCUTANEOUS at 08:36

## 2022-01-01 RX ADMIN — PIPERACILLIN AND TAZOBACTAM 200 GRAM(S): 4; .5 INJECTION, POWDER, LYOPHILIZED, FOR SOLUTION INTRAVENOUS at 12:08

## 2022-01-01 RX ADMIN — INSULIN GLARGINE 15 UNIT(S): 100 INJECTION, SOLUTION SUBCUTANEOUS at 21:30

## 2022-01-01 RX ADMIN — Medication 1 DROP(S): at 21:19

## 2022-01-01 RX ADMIN — Medication 7 UNIT(S): at 11:40

## 2022-01-01 RX ADMIN — Medication 100 GRAM(S): at 10:58

## 2022-01-01 RX ADMIN — Medication 10 MILLIGRAM(S): at 22:06

## 2022-01-01 RX ADMIN — ROBINUL 0.4 MILLIGRAM(S): 0.2 INJECTION INTRAMUSCULAR; INTRAVENOUS at 11:34

## 2022-01-01 RX ADMIN — PANTOPRAZOLE SODIUM 40 MILLIGRAM(S): 20 TABLET, DELAYED RELEASE ORAL at 06:48

## 2022-01-01 RX ADMIN — Medication 125 MICROGRAM(S): at 05:11

## 2022-01-01 RX ADMIN — Medication 81 MILLIGRAM(S): at 11:45

## 2022-01-01 RX ADMIN — SODIUM CHLORIDE 3 MILLILITER(S): 9 INJECTION INTRAMUSCULAR; INTRAVENOUS; SUBCUTANEOUS at 13:44

## 2022-01-01 RX ADMIN — CLOPIDOGREL BISULFATE 75 MILLIGRAM(S): 75 TABLET, FILM COATED ORAL at 11:34

## 2022-01-01 RX ADMIN — DEXMEDETOMIDINE HYDROCHLORIDE IN 0.9% SODIUM CHLORIDE 4.43 MICROGRAM(S)/KG/HR: 4 INJECTION INTRAVENOUS at 01:31

## 2022-01-01 RX ADMIN — MILRINONE LACTATE 6.65 MICROGRAM(S)/KG/MIN: 1 INJECTION, SOLUTION INTRAVENOUS at 08:05

## 2022-01-01 RX ADMIN — CLOPIDOGREL BISULFATE 75 MILLIGRAM(S): 75 TABLET, FILM COATED ORAL at 12:09

## 2022-01-01 RX ADMIN — INSULIN GLARGINE 18 UNIT(S): 100 INJECTION, SOLUTION SUBCUTANEOUS at 21:34

## 2022-01-01 RX ADMIN — Medication 1 SPRAY(S): at 05:26

## 2022-01-01 RX ADMIN — ATORVASTATIN CALCIUM 80 MILLIGRAM(S): 80 TABLET, FILM COATED ORAL at 21:38

## 2022-01-01 RX ADMIN — OLANZAPINE 1.25 MILLIGRAM(S): 15 TABLET, FILM COATED ORAL at 10:39

## 2022-01-01 RX ADMIN — Medication 1 MILLIGRAM(S): at 17:26

## 2022-01-01 RX ADMIN — Medication 1 MILLIGRAM(S): at 21:37

## 2022-01-01 RX ADMIN — Medication 2.5 MG/HR: at 07:35

## 2022-01-01 RX ADMIN — HYDROMORPHONE HYDROCHLORIDE 1 MILLIGRAM(S): 2 INJECTION INTRAMUSCULAR; INTRAVENOUS; SUBCUTANEOUS at 11:00

## 2022-01-01 RX ADMIN — MILRINONE LACTATE 6.65 MICROGRAM(S)/KG/MIN: 1 INJECTION, SOLUTION INTRAVENOUS at 20:10

## 2022-01-01 RX ADMIN — CLOPIDOGREL BISULFATE 75 MILLIGRAM(S): 75 TABLET, FILM COATED ORAL at 11:27

## 2022-01-01 RX ADMIN — Medication 1 TABLET(S): at 05:14

## 2022-01-01 RX ADMIN — Medication 1: at 17:05

## 2022-01-01 RX ADMIN — HEPARIN SODIUM 1800 UNIT(S)/HR: 5000 INJECTION INTRAVENOUS; SUBCUTANEOUS at 16:40

## 2022-01-01 RX ADMIN — HEPARIN SODIUM 1350 UNIT(S)/HR: 5000 INJECTION INTRAVENOUS; SUBCUTANEOUS at 13:38

## 2022-01-01 RX ADMIN — BUMETANIDE 5 MG/HR: 0.25 INJECTION INTRAMUSCULAR; INTRAVENOUS at 21:19

## 2022-01-01 RX ADMIN — CLOPIDOGREL BISULFATE 75 MILLIGRAM(S): 75 TABLET, FILM COATED ORAL at 11:37

## 2022-01-01 RX ADMIN — Medication 7 UNIT(S): at 17:05

## 2022-01-01 RX ADMIN — Medication 5 UNIT(S): at 16:54

## 2022-01-01 RX ADMIN — HEPARIN SODIUM 1000 UNIT(S)/HR: 5000 INJECTION INTRAVENOUS; SUBCUTANEOUS at 20:59

## 2022-01-01 RX ADMIN — Medication 1 DROP(S): at 14:21

## 2022-01-01 RX ADMIN — Medication 25 GRAM(S): at 00:13

## 2022-01-01 RX ADMIN — SODIUM CHLORIDE 3 MILLILITER(S): 9 INJECTION INTRAMUSCULAR; INTRAVENOUS; SUBCUTANEOUS at 06:15

## 2022-01-01 RX ADMIN — TAMSULOSIN HYDROCHLORIDE 0.4 MILLIGRAM(S): 0.4 CAPSULE ORAL at 21:35

## 2022-01-01 RX ADMIN — SODIUM CHLORIDE 3 MILLILITER(S): 9 INJECTION INTRAMUSCULAR; INTRAVENOUS; SUBCUTANEOUS at 14:14

## 2022-01-01 RX ADMIN — PANTOPRAZOLE SODIUM 40 MILLIGRAM(S): 20 TABLET, DELAYED RELEASE ORAL at 05:29

## 2022-01-01 RX ADMIN — INSULIN GLARGINE 20 UNIT(S): 100 INJECTION, SOLUTION SUBCUTANEOUS at 21:18

## 2022-01-01 RX ADMIN — Medication 5 UNIT(S): at 11:47

## 2022-01-01 RX ADMIN — Medication 75 MEQ/KG/HR: at 12:24

## 2022-01-01 RX ADMIN — HEPARIN SODIUM 1850 UNIT(S)/HR: 5000 INJECTION INTRAVENOUS; SUBCUTANEOUS at 16:48

## 2022-01-01 RX ADMIN — Medication 4.15 MICROGRAM(S)/KG/MIN: at 11:42

## 2022-01-01 RX ADMIN — MILRINONE LACTATE 6.65 MICROGRAM(S)/KG/MIN: 1 INJECTION, SOLUTION INTRAVENOUS at 19:15

## 2022-01-01 RX ADMIN — SODIUM CHLORIDE 3 MILLILITER(S): 9 INJECTION INTRAMUSCULAR; INTRAVENOUS; SUBCUTANEOUS at 22:06

## 2022-01-01 RX ADMIN — SODIUM CHLORIDE 3 MILLILITER(S): 9 INJECTION INTRAMUSCULAR; INTRAVENOUS; SUBCUTANEOUS at 05:22

## 2022-01-01 RX ADMIN — TICAGRELOR 90 MILLIGRAM(S): 90 TABLET ORAL at 17:12

## 2022-01-01 RX ADMIN — SODIUM CHLORIDE 3 MILLILITER(S): 9 INJECTION INTRAMUSCULAR; INTRAVENOUS; SUBCUTANEOUS at 06:45

## 2022-01-01 RX ADMIN — MORPHINE SULFATE 4 MILLIGRAM(S): 50 CAPSULE, EXTENDED RELEASE ORAL at 20:42

## 2022-01-01 RX ADMIN — Medication 3 MILLIGRAM(S): at 23:57

## 2022-01-01 RX ADMIN — AMIODARONE HYDROCHLORIDE 400 MILLIGRAM(S): 400 TABLET ORAL at 05:35

## 2022-01-01 RX ADMIN — Medication 100 MILLIEQUIVALENT(S): at 10:33

## 2022-01-01 RX ADMIN — POLYETHYLENE GLYCOL 3350 17 GRAM(S): 17 POWDER, FOR SOLUTION ORAL at 11:38

## 2022-01-01 RX ADMIN — Medication 100 MILLIEQUIVALENT(S): at 01:17

## 2022-01-01 RX ADMIN — Medication 1: at 11:48

## 2022-01-01 RX ADMIN — Medication 100 MILLIEQUIVALENT(S): at 01:18

## 2022-01-01 RX ADMIN — Medication 650 MILLIGRAM(S): at 12:47

## 2022-01-01 RX ADMIN — DONEPEZIL HYDROCHLORIDE 10 MILLIGRAM(S): 10 TABLET, FILM COATED ORAL at 21:18

## 2022-01-01 RX ADMIN — ATORVASTATIN CALCIUM 80 MILLIGRAM(S): 80 TABLET, FILM COATED ORAL at 23:57

## 2022-01-01 RX ADMIN — APIXABAN 5 MILLIGRAM(S): 2.5 TABLET, FILM COATED ORAL at 17:15

## 2022-01-01 RX ADMIN — Medication 5 UNIT(S): at 18:21

## 2022-01-01 RX ADMIN — HEPARIN SODIUM 4000 UNIT(S): 5000 INJECTION INTRAVENOUS; SUBCUTANEOUS at 17:01

## 2022-01-01 RX ADMIN — HEPARIN SODIUM 1450 UNIT(S)/HR: 5000 INJECTION INTRAVENOUS; SUBCUTANEOUS at 20:44

## 2022-01-01 RX ADMIN — APIXABAN 5 MILLIGRAM(S): 2.5 TABLET, FILM COATED ORAL at 17:11

## 2022-01-01 RX ADMIN — HEPARIN SODIUM 1650 UNIT(S)/HR: 5000 INJECTION INTRAVENOUS; SUBCUTANEOUS at 20:26

## 2022-01-01 RX ADMIN — Medication 20 MILLIEQUIVALENT(S): at 17:34

## 2022-01-01 RX ADMIN — Medication 200 MILLIGRAM(S): at 00:56

## 2022-01-01 RX ADMIN — Medication 100 MILLIEQUIVALENT(S): at 12:18

## 2022-01-01 RX ADMIN — Medication 1: at 16:54

## 2022-01-01 RX ADMIN — AMIODARONE HYDROCHLORIDE 400 MILLIGRAM(S): 400 TABLET ORAL at 05:22

## 2022-01-01 RX ADMIN — Medication 81 MILLIGRAM(S): at 11:58

## 2022-01-01 RX ADMIN — INSULIN GLARGINE 15 UNIT(S): 100 INJECTION, SOLUTION SUBCUTANEOUS at 01:15

## 2022-01-01 RX ADMIN — TICAGRELOR 90 MILLIGRAM(S): 90 TABLET ORAL at 08:43

## 2022-01-01 RX ADMIN — SENNA PLUS 2 TABLET(S): 8.6 TABLET ORAL at 21:38

## 2022-01-01 RX ADMIN — Medication 125 MICROGRAM(S): at 05:22

## 2022-01-01 RX ADMIN — HEPARIN SODIUM 1000 UNIT(S)/HR: 5000 INJECTION INTRAVENOUS; SUBCUTANEOUS at 00:27

## 2022-01-01 RX ADMIN — HEPARIN SODIUM 5300 UNIT(S): 5000 INJECTION INTRAVENOUS; SUBCUTANEOUS at 20:59

## 2022-01-01 RX ADMIN — DEXMEDETOMIDINE HYDROCHLORIDE IN 0.9% SODIUM CHLORIDE 4.43 MICROGRAM(S)/KG/HR: 4 INJECTION INTRAVENOUS at 07:36

## 2022-01-01 RX ADMIN — Medication 1: at 11:39

## 2022-01-01 RX ADMIN — PANTOPRAZOLE SODIUM 40 MILLIGRAM(S): 20 TABLET, DELAYED RELEASE ORAL at 08:43

## 2022-01-01 RX ADMIN — APIXABAN 5 MILLIGRAM(S): 2.5 TABLET, FILM COATED ORAL at 06:45

## 2022-01-01 RX ADMIN — FAMOTIDINE 20 MILLIGRAM(S): 10 INJECTION INTRAVENOUS at 20:41

## 2022-01-01 RX ADMIN — ATORVASTATIN CALCIUM 40 MILLIGRAM(S): 80 TABLET, FILM COATED ORAL at 22:08

## 2022-01-01 RX ADMIN — Medication 1: at 18:02

## 2022-01-01 RX ADMIN — ATORVASTATIN CALCIUM 40 MILLIGRAM(S): 80 TABLET, FILM COATED ORAL at 21:57

## 2022-01-01 RX ADMIN — TICAGRELOR 90 MILLIGRAM(S): 90 TABLET ORAL at 18:24

## 2022-01-01 RX ADMIN — SODIUM CHLORIDE 3 MILLILITER(S): 9 INJECTION INTRAMUSCULAR; INTRAVENOUS; SUBCUTANEOUS at 14:36

## 2022-01-01 RX ADMIN — SODIUM CHLORIDE 3 MILLILITER(S): 9 INJECTION INTRAMUSCULAR; INTRAVENOUS; SUBCUTANEOUS at 22:00

## 2022-01-01 RX ADMIN — Medication 50 MILLIGRAM(S): at 18:16

## 2022-01-01 RX ADMIN — HEPARIN SODIUM 1600 UNIT(S)/HR: 5000 INJECTION INTRAVENOUS; SUBCUTANEOUS at 10:51

## 2022-01-01 RX ADMIN — LORATADINE 10 MILLIGRAM(S): 10 TABLET ORAL at 13:29

## 2022-01-01 RX ADMIN — TICAGRELOR 90 MILLIGRAM(S): 90 TABLET ORAL at 06:22

## 2022-01-01 RX ADMIN — ATORVASTATIN CALCIUM 80 MILLIGRAM(S): 80 TABLET, FILM COATED ORAL at 21:31

## 2022-01-01 RX ADMIN — Medication 5 UNIT(S): at 12:32

## 2022-01-01 RX ADMIN — HEPARIN SODIUM 1500 UNIT(S)/HR: 5000 INJECTION INTRAVENOUS; SUBCUTANEOUS at 01:38

## 2022-01-01 RX ADMIN — ERTAPENEM SODIUM 120 MILLIGRAM(S): 1 INJECTION, POWDER, LYOPHILIZED, FOR SOLUTION INTRAMUSCULAR; INTRAVENOUS at 21:41

## 2022-01-01 RX ADMIN — HEPARIN SODIUM 1350 UNIT(S)/HR: 5000 INJECTION INTRAVENOUS; SUBCUTANEOUS at 05:06

## 2022-01-01 RX ADMIN — DONEPEZIL HYDROCHLORIDE 10 MILLIGRAM(S): 10 TABLET, FILM COATED ORAL at 21:38

## 2022-01-01 RX ADMIN — Medication 1: at 17:28

## 2022-01-01 RX ADMIN — HALOPERIDOL DECANOATE 1 MILLIGRAM(S): 100 INJECTION INTRAMUSCULAR at 02:22

## 2022-01-01 RX ADMIN — TICAGRELOR 90 MILLIGRAM(S): 90 TABLET ORAL at 18:02

## 2022-01-01 RX ADMIN — SODIUM CHLORIDE 3 MILLILITER(S): 9 INJECTION INTRAMUSCULAR; INTRAVENOUS; SUBCUTANEOUS at 13:30

## 2022-01-01 RX ADMIN — Medication 50 MILLIGRAM(S): at 06:41

## 2022-01-01 RX ADMIN — Medication 50 MILLIGRAM(S): at 05:05

## 2022-01-01 RX ADMIN — TICAGRELOR 90 MILLIGRAM(S): 90 TABLET ORAL at 17:09

## 2022-01-01 RX ADMIN — Medication 50 MILLIGRAM(S): at 06:30

## 2022-01-01 RX ADMIN — ONDANSETRON 4 MILLIGRAM(S): 8 TABLET, FILM COATED ORAL at 17:20

## 2022-01-01 RX ADMIN — Medication 1 SPRAY(S): at 17:00

## 2022-01-01 RX ADMIN — Medication 5 MILLIGRAM(S): at 05:07

## 2022-01-01 RX ADMIN — Medication 125 MICROGRAM(S): at 07:30

## 2022-01-01 RX ADMIN — Medication 7 UNIT(S): at 07:57

## 2022-01-01 RX ADMIN — CEFEPIME 100 MILLIGRAM(S): 1 INJECTION, POWDER, FOR SOLUTION INTRAMUSCULAR; INTRAVENOUS at 12:09

## 2022-01-01 RX ADMIN — SENNA PLUS 2 TABLET(S): 8.6 TABLET ORAL at 22:08

## 2022-01-01 RX ADMIN — Medication 250 MICROGRAM(S): at 19:02

## 2022-01-01 RX ADMIN — Medication 125 MICROGRAM(S): at 05:14

## 2022-01-01 RX ADMIN — CLOPIDOGREL BISULFATE 300 MILLIGRAM(S): 75 TABLET, FILM COATED ORAL at 06:02

## 2022-01-01 RX ADMIN — Medication 100 MILLIEQUIVALENT(S): at 08:58

## 2022-01-01 RX ADMIN — PANTOPRAZOLE SODIUM 40 MILLIGRAM(S): 20 TABLET, DELAYED RELEASE ORAL at 06:35

## 2022-01-01 RX ADMIN — Medication 2: at 07:44

## 2022-01-01 RX ADMIN — Medication 50 MILLIGRAM(S): at 17:38

## 2022-01-01 RX ADMIN — Medication 3: at 17:03

## 2022-01-01 RX ADMIN — MILRINONE LACTATE 6.65 MICROGRAM(S)/KG/MIN: 1 INJECTION, SOLUTION INTRAVENOUS at 18:08

## 2022-01-01 RX ADMIN — POLYETHYLENE GLYCOL 3350 17 GRAM(S): 17 POWDER, FOR SOLUTION ORAL at 12:00

## 2022-01-01 RX ADMIN — PANTOPRAZOLE SODIUM 40 MILLIGRAM(S): 20 TABLET, DELAYED RELEASE ORAL at 05:07

## 2022-01-01 RX ADMIN — Medication 40 MILLIGRAM(S): at 11:37

## 2022-01-01 RX ADMIN — SENNA PLUS 2 TABLET(S): 8.6 TABLET ORAL at 21:35

## 2022-01-01 RX ADMIN — HEPARIN SODIUM 5000 UNIT(S): 5000 INJECTION INTRAVENOUS; SUBCUTANEOUS at 21:18

## 2022-01-01 RX ADMIN — Medication 1 MILLIGRAM(S): at 21:56

## 2022-01-01 RX ADMIN — Medication 5 UNIT(S): at 11:40

## 2022-01-01 RX ADMIN — Medication 5 UNIT(S): at 08:03

## 2022-01-01 RX ADMIN — Medication 100 MILLIEQUIVALENT(S): at 07:00

## 2022-01-01 RX ADMIN — Medication 81 MILLIGRAM(S): at 11:37

## 2022-01-01 RX ADMIN — SODIUM CHLORIDE 3 MILLILITER(S): 9 INJECTION INTRAMUSCULAR; INTRAVENOUS; SUBCUTANEOUS at 14:11

## 2022-01-01 RX ADMIN — Medication 5 UNIT(S): at 18:03

## 2022-01-01 RX ADMIN — Medication 5 UNIT(S): at 11:57

## 2022-01-01 RX ADMIN — Medication 325 MILLIGRAM(S): at 01:59

## 2022-01-01 RX ADMIN — Medication 100 MILLIEQUIVALENT(S): at 13:17

## 2022-01-01 RX ADMIN — ONDANSETRON 4 MILLIGRAM(S): 8 TABLET, FILM COATED ORAL at 08:17

## 2022-01-01 RX ADMIN — SODIUM CHLORIDE 3 MILLILITER(S): 9 INJECTION INTRAMUSCULAR; INTRAVENOUS; SUBCUTANEOUS at 22:05

## 2022-01-01 RX ADMIN — Medication 2: at 08:03

## 2022-01-01 RX ADMIN — Medication 650 MILLIGRAM(S): at 06:03

## 2022-01-01 RX ADMIN — HEPARIN SODIUM 1350 UNIT(S)/HR: 5000 INJECTION INTRAVENOUS; SUBCUTANEOUS at 03:27

## 2022-01-01 RX ADMIN — INSULIN GLARGINE 15 UNIT(S): 100 INJECTION, SOLUTION SUBCUTANEOUS at 21:32

## 2022-01-01 RX ADMIN — MILRINONE LACTATE 6.65 MICROGRAM(S)/KG/MIN: 1 INJECTION, SOLUTION INTRAVENOUS at 21:19

## 2022-01-01 RX ADMIN — Medication 4.15 MICROGRAM(S)/KG/MIN: at 02:21

## 2022-01-01 RX ADMIN — Medication 1: at 17:15

## 2022-01-01 RX ADMIN — Medication 40 MILLIEQUIVALENT(S): at 07:00

## 2022-01-01 RX ADMIN — INSULIN GLARGINE 18 UNIT(S): 100 INJECTION, SOLUTION SUBCUTANEOUS at 22:07

## 2022-01-01 RX ADMIN — ERTAPENEM SODIUM 1000 MILLIGRAM(S): 1 INJECTION, POWDER, LYOPHILIZED, FOR SOLUTION INTRAMUSCULAR; INTRAVENOUS at 15:21

## 2022-01-01 RX ADMIN — Medication 5 UNIT(S): at 07:51

## 2022-01-01 RX ADMIN — OLANZAPINE 1.25 MILLIGRAM(S): 15 TABLET, FILM COATED ORAL at 03:17

## 2022-01-01 RX ADMIN — Medication 100 MILLIEQUIVALENT(S): at 09:29

## 2022-01-01 RX ADMIN — SODIUM CHLORIDE 3 MILLILITER(S): 9 INJECTION INTRAMUSCULAR; INTRAVENOUS; SUBCUTANEOUS at 05:49

## 2022-01-01 RX ADMIN — Medication 20 MILLIEQUIVALENT(S): at 05:25

## 2022-01-01 RX ADMIN — SODIUM CHLORIDE 3 MILLILITER(S): 9 INJECTION INTRAMUSCULAR; INTRAVENOUS; SUBCUTANEOUS at 15:05

## 2022-01-01 RX ADMIN — SODIUM CHLORIDE 3 MILLILITER(S): 9 INJECTION INTRAMUSCULAR; INTRAVENOUS; SUBCUTANEOUS at 05:00

## 2022-01-01 RX ADMIN — APIXABAN 5 MILLIGRAM(S): 2.5 TABLET, FILM COATED ORAL at 05:07

## 2022-01-01 RX ADMIN — CHLORHEXIDINE GLUCONATE 1 APPLICATION(S): 213 SOLUTION TOPICAL at 06:01

## 2022-01-01 RX ADMIN — Medication 650 MILLIGRAM(S): at 20:12

## 2022-01-01 RX ADMIN — MILRINONE LACTATE 6.65 MICROGRAM(S)/KG/MIN: 1 INJECTION, SOLUTION INTRAVENOUS at 07:35

## 2022-01-01 RX ADMIN — Medication 125 MICROGRAM(S): at 11:37

## 2022-01-01 RX ADMIN — Medication 50 MILLIEQUIVALENT(S): at 12:09

## 2022-01-01 RX ADMIN — ATORVASTATIN CALCIUM 80 MILLIGRAM(S): 80 TABLET, FILM COATED ORAL at 21:35

## 2022-01-01 RX ADMIN — HEPARIN SODIUM 12 UNIT(S)/HR: 5000 INJECTION INTRAVENOUS; SUBCUTANEOUS at 19:44

## 2022-01-01 RX ADMIN — TICAGRELOR 90 MILLIGRAM(S): 90 TABLET ORAL at 17:02

## 2022-01-01 RX ADMIN — Medication 100 GRAM(S): at 06:17

## 2022-01-01 RX ADMIN — POLYETHYLENE GLYCOL 3350 17 GRAM(S): 17 POWDER, FOR SOLUTION ORAL at 13:39

## 2022-01-01 RX ADMIN — Medication 5 UNIT(S): at 17:16

## 2022-01-01 RX ADMIN — Medication 200 MILLIGRAM(S): at 05:22

## 2022-01-01 RX ADMIN — Medication 125 MICROGRAM(S): at 05:21

## 2022-01-01 RX ADMIN — TAMSULOSIN HYDROCHLORIDE 0.4 MILLIGRAM(S): 0.4 CAPSULE ORAL at 21:18

## 2022-01-01 RX ADMIN — AMIODARONE HYDROCHLORIDE 400 MILLIGRAM(S): 400 TABLET ORAL at 21:36

## 2022-01-01 RX ADMIN — Medication 40 MILLIEQUIVALENT(S): at 08:57

## 2022-01-01 RX ADMIN — BUMETANIDE 5 MG/HR: 0.25 INJECTION INTRAMUSCULAR; INTRAVENOUS at 08:17

## 2022-01-01 RX ADMIN — Medication 50 MILLIGRAM(S): at 06:08

## 2022-01-01 RX ADMIN — Medication 1 SPRAY(S): at 18:03

## 2022-01-01 RX ADMIN — INSULIN GLARGINE 14 UNIT(S): 100 INJECTION, SOLUTION SUBCUTANEOUS at 21:33

## 2022-01-01 RX ADMIN — Medication 1 DROP(S): at 21:24

## 2022-01-01 RX ADMIN — Medication 2: at 22:59

## 2022-01-01 RX ADMIN — HEPARIN SODIUM 1500 UNIT(S)/HR: 5000 INJECTION INTRAVENOUS; SUBCUTANEOUS at 00:01

## 2022-01-01 RX ADMIN — Medication 5 UNIT(S): at 17:00

## 2022-01-01 RX ADMIN — Medication 8.31 MICROGRAM(S)/KG/MIN: at 19:45

## 2022-01-01 RX ADMIN — Medication 1: at 16:58

## 2022-01-01 RX ADMIN — Medication 50 MILLIGRAM(S): at 08:40

## 2022-01-01 RX ADMIN — POLYETHYLENE GLYCOL 3350 17 GRAM(S): 17 POWDER, FOR SOLUTION ORAL at 12:09

## 2022-01-01 RX ADMIN — OLANZAPINE 1.25 MILLIGRAM(S): 15 TABLET, FILM COATED ORAL at 12:10

## 2022-01-01 RX ADMIN — Medication 5 UNIT(S): at 17:06

## 2022-01-01 RX ADMIN — PANTOPRAZOLE SODIUM 40 MILLIGRAM(S): 20 TABLET, DELAYED RELEASE ORAL at 11:00

## 2022-01-01 RX ADMIN — Medication 7 UNIT(S): at 17:15

## 2022-01-01 RX ADMIN — INSULIN GLARGINE 20 UNIT(S): 100 INJECTION, SOLUTION SUBCUTANEOUS at 18:56

## 2022-01-01 RX ADMIN — Medication 7 UNIT(S): at 11:28

## 2022-01-01 RX ADMIN — Medication 1000 MILLIGRAM(S): at 22:03

## 2022-01-01 RX ADMIN — Medication 5 UNIT(S): at 13:37

## 2022-01-01 RX ADMIN — POLYETHYLENE GLYCOL 3350 17 GRAM(S): 17 POWDER, FOR SOLUTION ORAL at 08:39

## 2022-01-01 RX ADMIN — Medication 75 MEQ/KG/HR: at 19:27

## 2022-01-01 RX ADMIN — CHLORHEXIDINE GLUCONATE 1 APPLICATION(S): 213 SOLUTION TOPICAL at 05:22

## 2022-01-01 RX ADMIN — Medication 5 UNIT(S): at 11:51

## 2022-01-01 RX ADMIN — Medication 1 DROP(S): at 05:15

## 2022-01-01 RX ADMIN — Medication 5 MILLIGRAM(S): at 21:36

## 2022-01-01 RX ADMIN — MOMETASONE FUROATE 2 PUFF(S): 220 INHALANT RESPIRATORY (INHALATION) at 10:54

## 2022-01-01 RX ADMIN — CHLORHEXIDINE GLUCONATE 1 APPLICATION(S): 213 SOLUTION TOPICAL at 05:03

## 2022-01-01 RX ADMIN — MORPHINE SULFATE 4 MILLIGRAM(S): 50 CAPSULE, EXTENDED RELEASE ORAL at 13:38

## 2022-01-01 RX ADMIN — HEPARIN SODIUM 1550 UNIT(S)/HR: 5000 INJECTION INTRAVENOUS; SUBCUTANEOUS at 07:52

## 2022-01-01 RX ADMIN — Medication 1000 MILLIGRAM(S): at 22:06

## 2022-01-01 RX ADMIN — Medication 25 GRAM(S): at 09:56

## 2022-01-01 RX ADMIN — APIXABAN 2.5 MILLIGRAM(S): 2.5 TABLET, FILM COATED ORAL at 05:03

## 2022-01-01 RX ADMIN — HEPARIN SODIUM 1000 UNIT(S)/HR: 5000 INJECTION INTRAVENOUS; SUBCUTANEOUS at 03:51

## 2022-01-01 RX ADMIN — Medication 1 DROP(S): at 21:53

## 2022-01-01 RX ADMIN — Medication 100 MILLIEQUIVALENT(S): at 09:40

## 2022-01-01 RX ADMIN — BUMETANIDE 132 MILLIGRAM(S): 0.25 INJECTION INTRAMUSCULAR; INTRAVENOUS at 04:25

## 2022-01-01 RX ADMIN — Medication 50 MILLIGRAM(S): at 05:11

## 2022-01-01 RX ADMIN — Medication 4: at 12:23

## 2022-01-01 RX ADMIN — INSULIN GLARGINE 16 UNIT(S): 100 INJECTION, SOLUTION SUBCUTANEOUS at 21:22

## 2022-01-01 RX ADMIN — Medication 2: at 18:16

## 2022-01-01 RX ADMIN — Medication 1: at 17:34

## 2022-01-01 RX ADMIN — Medication 125 MICROGRAM(S): at 04:17

## 2022-01-01 RX ADMIN — PANTOPRAZOLE SODIUM 40 MILLIGRAM(S): 20 TABLET, DELAYED RELEASE ORAL at 07:09

## 2022-01-01 RX ADMIN — Medication 1 SPRAY(S): at 05:36

## 2022-01-01 RX ADMIN — SODIUM CHLORIDE 125 MILLILITER(S): 9 INJECTION INTRAMUSCULAR; INTRAVENOUS; SUBCUTANEOUS at 03:42

## 2022-01-01 RX ADMIN — Medication 5 UNIT(S): at 17:28

## 2022-01-01 RX ADMIN — Medication 5 UNIT(S): at 08:19

## 2022-01-01 RX ADMIN — Medication 1 MILLIGRAM(S): at 23:10

## 2022-01-01 RX ADMIN — INSULIN GLARGINE 18 UNIT(S): 100 INJECTION, SOLUTION SUBCUTANEOUS at 22:11

## 2022-01-01 RX ADMIN — Medication 1: at 12:10

## 2022-01-01 RX ADMIN — HEPARIN SODIUM 12 UNIT(S)/HR: 5000 INJECTION INTRAVENOUS; SUBCUTANEOUS at 23:38

## 2022-01-01 RX ADMIN — ERTAPENEM SODIUM 120 MILLIGRAM(S): 1 INJECTION, POWDER, LYOPHILIZED, FOR SOLUTION INTRAMUSCULAR; INTRAVENOUS at 22:07

## 2022-01-01 RX ADMIN — Medication 3: at 16:59

## 2022-01-01 RX ADMIN — CEFEPIME 100 MILLIGRAM(S): 1 INJECTION, POWDER, FOR SOLUTION INTRAMUSCULAR; INTRAVENOUS at 11:10

## 2022-01-01 RX ADMIN — Medication 125 MICROGRAM(S): at 05:41

## 2022-01-01 RX ADMIN — Medication 7 UNIT(S): at 07:38

## 2022-01-01 RX ADMIN — CLOPIDOGREL BISULFATE 75 MILLIGRAM(S): 75 TABLET, FILM COATED ORAL at 08:39

## 2022-01-01 RX ADMIN — APIXABAN 5 MILLIGRAM(S): 2.5 TABLET, FILM COATED ORAL at 17:51

## 2022-01-01 RX ADMIN — Medication 1 SUPPOSITORY(S): at 22:08

## 2022-01-01 RX ADMIN — APIXABAN 2.5 MILLIGRAM(S): 2.5 TABLET, FILM COATED ORAL at 05:07

## 2022-01-01 RX ADMIN — DONEPEZIL HYDROCHLORIDE 10 MILLIGRAM(S): 10 TABLET, FILM COATED ORAL at 21:31

## 2022-01-01 RX ADMIN — SODIUM CHLORIDE 1000 MILLILITER(S): 9 INJECTION INTRAMUSCULAR; INTRAVENOUS; SUBCUTANEOUS at 20:14

## 2022-01-01 RX ADMIN — TICAGRELOR 90 MILLIGRAM(S): 90 TABLET ORAL at 05:21

## 2022-01-01 RX ADMIN — Medication 200 MILLIGRAM(S): at 14:02

## 2022-01-01 RX ADMIN — HEPARIN SODIUM 4000 UNIT(S): 5000 INJECTION INTRAVENOUS; SUBCUTANEOUS at 19:03

## 2022-01-01 RX ADMIN — Medication 2: at 22:50

## 2022-01-01 RX ADMIN — Medication 10 MILLIGRAM(S): at 15:18

## 2022-01-01 RX ADMIN — POLYETHYLENE GLYCOL 3350 17 GRAM(S): 17 POWDER, FOR SOLUTION ORAL at 12:30

## 2022-01-01 RX ADMIN — OLANZAPINE 5 MILLIGRAM(S): 15 TABLET, FILM COATED ORAL at 20:55

## 2022-01-01 RX ADMIN — Medication 7 UNIT(S): at 17:39

## 2022-01-01 RX ADMIN — APIXABAN 5 MILLIGRAM(S): 2.5 TABLET, FILM COATED ORAL at 06:49

## 2022-01-01 RX ADMIN — Medication 100 MILLIEQUIVALENT(S): at 14:28

## 2022-01-01 RX ADMIN — Medication 7 UNIT(S): at 12:13

## 2022-01-01 RX ADMIN — SODIUM CHLORIDE 3 MILLILITER(S): 9 INJECTION INTRAMUSCULAR; INTRAVENOUS; SUBCUTANEOUS at 08:43

## 2022-01-01 RX ADMIN — Medication 2: at 11:47

## 2022-01-01 RX ADMIN — Medication 1 MILLIGRAM(S): at 23:01

## 2022-01-01 RX ADMIN — Medication 5 UNIT(S): at 11:49

## 2022-01-01 RX ADMIN — HEPARIN SODIUM 1000 UNIT(S)/HR: 5000 INJECTION INTRAVENOUS; SUBCUTANEOUS at 18:56

## 2022-01-01 RX ADMIN — Medication 4: at 13:08

## 2022-01-01 RX ADMIN — Medication 200 MILLIGRAM(S): at 14:23

## 2022-01-01 RX ADMIN — AMIODARONE HYDROCHLORIDE 618 MILLIGRAM(S): 400 TABLET ORAL at 16:45

## 2022-01-01 RX ADMIN — Medication 1 SPRAY(S): at 05:16

## 2022-01-01 RX ADMIN — Medication 81 MILLIGRAM(S): at 17:59

## 2022-01-01 RX ADMIN — Medication 1 DROP(S): at 14:20

## 2022-01-01 RX ADMIN — INSULIN GLARGINE 16 UNIT(S): 100 INJECTION, SOLUTION SUBCUTANEOUS at 22:07

## 2022-01-01 RX ADMIN — Medication 1: at 07:48

## 2022-01-01 RX ADMIN — Medication 81 MILLIGRAM(S): at 11:34

## 2022-01-01 RX ADMIN — PANTOPRAZOLE SODIUM 40 MILLIGRAM(S): 20 TABLET, DELAYED RELEASE ORAL at 08:18

## 2022-01-01 RX ADMIN — HEPARIN SODIUM 1300 UNIT(S)/HR: 5000 INJECTION INTRAVENOUS; SUBCUTANEOUS at 08:26

## 2022-01-01 RX ADMIN — Medication 4: at 08:06

## 2022-01-01 RX ADMIN — Medication 3: at 08:55

## 2022-01-01 RX ADMIN — Medication 50 MILLIGRAM(S): at 18:24

## 2022-01-01 RX ADMIN — Medication 250 MICROGRAM(S): at 03:31

## 2022-01-01 RX ADMIN — INSULIN GLARGINE 20 UNIT(S): 100 INJECTION, SOLUTION SUBCUTANEOUS at 21:36

## 2022-01-01 RX ADMIN — TICAGRELOR 90 MILLIGRAM(S): 90 TABLET ORAL at 07:30

## 2022-01-01 RX ADMIN — HEPARIN SODIUM 1800 UNIT(S)/HR: 5000 INJECTION INTRAVENOUS; SUBCUTANEOUS at 18:03

## 2022-01-01 RX ADMIN — Medication 1 PACKET(S): at 06:31

## 2022-01-01 RX ADMIN — SODIUM CHLORIDE 3 MILLILITER(S): 9 INJECTION INTRAMUSCULAR; INTRAVENOUS; SUBCUTANEOUS at 23:00

## 2022-01-01 RX ADMIN — AMIODARONE HYDROCHLORIDE 400 MILLIGRAM(S): 400 TABLET ORAL at 21:38

## 2022-01-01 RX ADMIN — ATORVASTATIN CALCIUM 40 MILLIGRAM(S): 80 TABLET, FILM COATED ORAL at 22:51

## 2022-01-01 RX ADMIN — Medication 50 MILLIGRAM(S): at 06:37

## 2022-01-01 RX ADMIN — HEPARIN SODIUM 1600 UNIT(S)/HR: 5000 INJECTION INTRAVENOUS; SUBCUTANEOUS at 08:38

## 2022-01-01 RX ADMIN — PANTOPRAZOLE SODIUM 40 MILLIGRAM(S): 20 TABLET, DELAYED RELEASE ORAL at 11:37

## 2022-01-01 RX ADMIN — Medication 1 MILLIGRAM(S): at 22:33

## 2022-01-01 RX ADMIN — Medication 1 DROP(S): at 05:08

## 2022-01-01 RX ADMIN — MILRINONE LACTATE 9.97 MICROGRAM(S)/KG/MIN: 1 INJECTION, SOLUTION INTRAVENOUS at 15:48

## 2022-01-01 RX ADMIN — Medication 2: at 17:04

## 2022-01-01 RX ADMIN — Medication 5 UNIT(S): at 12:03

## 2022-01-01 RX ADMIN — HEPARIN SODIUM 1450 UNIT(S)/HR: 5000 INJECTION INTRAVENOUS; SUBCUTANEOUS at 11:45

## 2022-01-01 RX ADMIN — AMIODARONE HYDROCHLORIDE 400 MILLIGRAM(S): 400 TABLET ORAL at 05:07

## 2022-01-01 RX ADMIN — HEPARIN SODIUM 1650 UNIT(S)/HR: 5000 INJECTION INTRAVENOUS; SUBCUTANEOUS at 17:57

## 2022-01-01 RX ADMIN — HEPARIN SODIUM 10 UNIT(S)/HR: 5000 INJECTION INTRAVENOUS; SUBCUTANEOUS at 19:31

## 2022-01-01 RX ADMIN — TICAGRELOR 90 MILLIGRAM(S): 90 TABLET ORAL at 17:45

## 2022-01-01 RX ADMIN — Medication 50 MILLIGRAM(S): at 06:49

## 2022-01-01 RX ADMIN — Medication 50 MILLIGRAM(S): at 05:22

## 2022-01-01 RX ADMIN — SODIUM CHLORIDE 3 MILLILITER(S): 9 INJECTION INTRAMUSCULAR; INTRAVENOUS; SUBCUTANEOUS at 14:54

## 2022-01-01 RX ADMIN — Medication 40 MILLIEQUIVALENT(S): at 11:27

## 2022-01-01 RX ADMIN — SODIUM CHLORIDE 3 MILLILITER(S): 9 INJECTION INTRAMUSCULAR; INTRAVENOUS; SUBCUTANEOUS at 06:35

## 2022-01-01 RX ADMIN — MOMETASONE FUROATE 2 PUFF(S): 220 INHALANT RESPIRATORY (INHALATION) at 14:21

## 2022-01-01 RX ADMIN — Medication 1 DROP(S): at 05:07

## 2022-01-01 RX ADMIN — APIXABAN 2.5 MILLIGRAM(S): 2.5 TABLET, FILM COATED ORAL at 21:54

## 2022-01-01 RX ADMIN — PANTOPRAZOLE SODIUM 40 MILLIGRAM(S): 20 TABLET, DELAYED RELEASE ORAL at 05:21

## 2022-01-01 RX ADMIN — TICAGRELOR 180 MILLIGRAM(S): 90 TABLET ORAL at 01:58

## 2022-01-01 RX ADMIN — POLYETHYLENE GLYCOL 3350 17 GRAM(S): 17 POWDER, FOR SOLUTION ORAL at 12:06

## 2022-01-01 RX ADMIN — TAMSULOSIN HYDROCHLORIDE 0.4 MILLIGRAM(S): 0.4 CAPSULE ORAL at 21:31

## 2022-01-01 RX ADMIN — Medication 50 MILLIGRAM(S): at 17:44

## 2022-01-01 RX ADMIN — SENNA PLUS 2 TABLET(S): 8.6 TABLET ORAL at 21:19

## 2022-01-01 RX ADMIN — HEPARIN SODIUM 1550 UNIT(S)/HR: 5000 INJECTION INTRAVENOUS; SUBCUTANEOUS at 01:01

## 2022-01-01 RX ADMIN — ATORVASTATIN CALCIUM 80 MILLIGRAM(S): 80 TABLET, FILM COATED ORAL at 21:18

## 2022-01-01 RX ADMIN — Medication 4: at 12:11

## 2022-01-01 RX ADMIN — Medication 40 MILLIEQUIVALENT(S): at 12:55

## 2022-01-01 RX ADMIN — APIXABAN 5 MILLIGRAM(S): 2.5 TABLET, FILM COATED ORAL at 17:34

## 2022-01-01 RX ADMIN — SODIUM CHLORIDE 3 MILLILITER(S): 9 INJECTION INTRAMUSCULAR; INTRAVENOUS; SUBCUTANEOUS at 15:00

## 2022-01-01 RX ADMIN — HEPARIN SODIUM 12 UNIT(S)/HR: 5000 INJECTION INTRAVENOUS; SUBCUTANEOUS at 07:36

## 2022-01-01 RX ADMIN — HALOPERIDOL DECANOATE 0.5 MILLIGRAM(S): 100 INJECTION INTRAMUSCULAR at 12:05

## 2022-01-01 RX ADMIN — Medication 7 UNIT(S): at 17:34

## 2022-01-01 RX ADMIN — Medication 1 DROP(S): at 21:36

## 2022-01-01 RX ADMIN — MILRINONE LACTATE 9.97 MICROGRAM(S)/KG/MIN: 1 INJECTION, SOLUTION INTRAVENOUS at 21:39

## 2022-01-01 RX ADMIN — MOMETASONE FUROATE 2 PUFF(S): 220 INHALANT RESPIRATORY (INHALATION) at 10:46

## 2022-01-01 RX ADMIN — Medication 50 MILLIGRAM(S): at 07:31

## 2022-01-01 RX ADMIN — INSULIN GLARGINE 15 UNIT(S): 100 INJECTION, SOLUTION SUBCUTANEOUS at 21:51

## 2022-01-01 RX ADMIN — Medication 2: at 07:43

## 2022-01-01 RX ADMIN — Medication 5 UNIT(S): at 11:45

## 2022-01-01 RX ADMIN — Medication 650 MILLIGRAM(S): at 05:33

## 2022-01-01 RX ADMIN — Medication 6 UNIT(S): at 11:48

## 2022-01-01 RX ADMIN — OLANZAPINE 1.25 MILLIGRAM(S): 15 TABLET, FILM COATED ORAL at 22:31

## 2022-01-01 RX ADMIN — MILRINONE LACTATE 6.65 MICROGRAM(S)/KG/MIN: 1 INJECTION, SOLUTION INTRAVENOUS at 08:18

## 2022-01-01 RX ADMIN — INSULIN GLARGINE 18 UNIT(S): 100 INJECTION, SOLUTION SUBCUTANEOUS at 23:19

## 2022-01-01 RX ADMIN — Medication 3 MILLIGRAM(S): at 21:23

## 2022-01-01 RX ADMIN — HEPARIN SODIUM 1800 UNIT(S)/HR: 5000 INJECTION INTRAVENOUS; SUBCUTANEOUS at 20:23

## 2022-01-01 RX ADMIN — HEPARIN SODIUM 0 UNIT(S)/HR: 5000 INJECTION INTRAVENOUS; SUBCUTANEOUS at 23:58

## 2022-01-01 RX ADMIN — Medication 1000 MILLIGRAM(S): at 23:15

## 2022-01-01 RX ADMIN — Medication 1 MILLIGRAM(S): at 21:36

## 2022-01-01 RX ADMIN — Medication 2: at 11:44

## 2022-01-01 RX ADMIN — BUMETANIDE 2 MILLIGRAM(S): 0.25 INJECTION INTRAMUSCULAR; INTRAVENOUS at 22:29

## 2022-01-01 RX ADMIN — Medication 1 SPRAY(S): at 05:03

## 2022-01-01 RX ADMIN — POTASSIUM PHOSPHATE, MONOBASIC POTASSIUM PHOSPHATE, DIBASIC 62.5 MILLIMOLE(S): 236; 224 INJECTION, SOLUTION INTRAVENOUS at 16:11

## 2022-01-01 RX ADMIN — Medication 50 MILLIGRAM(S): at 06:23

## 2022-01-01 RX ADMIN — Medication 2.5 MILLIGRAM(S): at 14:10

## 2022-01-01 RX ADMIN — Medication 5 UNIT(S): at 17:17

## 2022-01-01 RX ADMIN — LORATADINE 10 MILLIGRAM(S): 10 TABLET ORAL at 12:29

## 2022-01-01 RX ADMIN — Medication 125 MICROGRAM(S): at 06:48

## 2022-01-01 RX ADMIN — Medication 8.31 MICROGRAM(S)/KG/MIN: at 01:31

## 2022-01-01 RX ADMIN — Medication 125 MICROGRAM(S): at 13:14

## 2022-01-01 RX ADMIN — Medication 200 MILLIGRAM(S): at 05:08

## 2022-01-01 RX ADMIN — POLYETHYLENE GLYCOL 3350 17 GRAM(S): 17 POWDER, FOR SOLUTION ORAL at 11:43

## 2022-01-01 RX ADMIN — CEFEPIME 100 MILLIGRAM(S): 1 INJECTION, POWDER, FOR SOLUTION INTRAMUSCULAR; INTRAVENOUS at 12:05

## 2022-01-01 RX ADMIN — Medication 2: at 11:50

## 2022-01-01 RX ADMIN — Medication 1 DROP(S): at 21:30

## 2022-01-01 RX ADMIN — SODIUM CHLORIDE 3 MILLILITER(S): 9 INJECTION INTRAMUSCULAR; INTRAVENOUS; SUBCUTANEOUS at 15:13

## 2022-01-01 RX ADMIN — ATORVASTATIN CALCIUM 80 MILLIGRAM(S): 80 TABLET, FILM COATED ORAL at 21:20

## 2022-01-01 RX ADMIN — SODIUM CHLORIDE 3 MILLILITER(S): 9 INJECTION INTRAMUSCULAR; INTRAVENOUS; SUBCUTANEOUS at 23:30

## 2022-01-01 RX ADMIN — Medication 125 MICROGRAM(S): at 05:08

## 2022-01-01 RX ADMIN — SODIUM CHLORIDE 3 MILLILITER(S): 9 INJECTION INTRAMUSCULAR; INTRAVENOUS; SUBCUTANEOUS at 22:15

## 2022-01-01 RX ADMIN — Medication 8.31 MICROGRAM(S)/KG/MIN: at 19:26

## 2022-01-01 RX ADMIN — BUMETANIDE 5 MG/HR: 0.25 INJECTION INTRAMUSCULAR; INTRAVENOUS at 05:21

## 2022-01-01 RX ADMIN — ATORVASTATIN CALCIUM 40 MILLIGRAM(S): 80 TABLET, FILM COATED ORAL at 22:07

## 2022-01-01 RX ADMIN — POLYETHYLENE GLYCOL 3350 17 GRAM(S): 17 POWDER, FOR SOLUTION ORAL at 11:37

## 2022-01-01 RX ADMIN — Medication 4: at 13:37

## 2022-01-01 RX ADMIN — HYDROMORPHONE HYDROCHLORIDE 1 MILLIGRAM(S): 2 INJECTION INTRAMUSCULAR; INTRAVENOUS; SUBCUTANEOUS at 03:50

## 2022-01-01 RX ADMIN — MILRINONE LACTATE 9.97 MICROGRAM(S)/KG/MIN: 1 INJECTION, SOLUTION INTRAVENOUS at 21:35

## 2022-01-01 RX ADMIN — Medication 7 UNIT(S): at 18:39

## 2022-01-01 RX ADMIN — TAMSULOSIN HYDROCHLORIDE 0.4 MILLIGRAM(S): 0.4 CAPSULE ORAL at 21:53

## 2022-01-01 RX ADMIN — HALOPERIDOL DECANOATE 0.5 MILLIGRAM(S): 100 INJECTION INTRAMUSCULAR at 04:43

## 2022-01-01 RX ADMIN — Medication 5 MILLIGRAM(S): at 14:49

## 2022-01-01 RX ADMIN — Medication 5 MILLIGRAM(S): at 06:50

## 2022-01-01 RX ADMIN — APIXABAN 5 MILLIGRAM(S): 2.5 TABLET, FILM COATED ORAL at 17:38

## 2022-01-01 RX ADMIN — PANTOPRAZOLE SODIUM 40 MILLIGRAM(S): 20 TABLET, DELAYED RELEASE ORAL at 05:09

## 2022-01-01 RX ADMIN — BUMETANIDE 5 MG/HR: 0.25 INJECTION INTRAMUSCULAR; INTRAVENOUS at 10:02

## 2022-01-01 RX ADMIN — Medication 2: at 11:48

## 2022-01-01 RX ADMIN — Medication 250 MICROGRAM(S): at 23:43

## 2022-01-01 RX ADMIN — SODIUM CHLORIDE 3 MILLILITER(S): 9 INJECTION INTRAMUSCULAR; INTRAVENOUS; SUBCUTANEOUS at 06:02

## 2022-01-01 RX ADMIN — TICAGRELOR 90 MILLIGRAM(S): 90 TABLET ORAL at 17:36

## 2022-01-01 RX ADMIN — HEPARIN SODIUM 1650 UNIT(S)/HR: 5000 INJECTION INTRAVENOUS; SUBCUTANEOUS at 09:00

## 2022-01-01 RX ADMIN — Medication 50 MILLIGRAM(S): at 05:21

## 2022-01-01 RX ADMIN — Medication 7 UNIT(S): at 08:03

## 2022-01-01 RX ADMIN — Medication 1 DROP(S): at 05:22

## 2022-01-01 RX ADMIN — AMIODARONE HYDROCHLORIDE 400 MILLIGRAM(S): 400 TABLET ORAL at 17:00

## 2022-01-01 RX ADMIN — Medication 1 SPRAY(S): at 05:22

## 2022-01-01 RX ADMIN — TICAGRELOR 90 MILLIGRAM(S): 90 TABLET ORAL at 05:05

## 2022-01-01 RX ADMIN — INSULIN GLARGINE 14 UNIT(S): 100 INJECTION, SOLUTION SUBCUTANEOUS at 22:25

## 2022-01-01 RX ADMIN — Medication 4: at 22:26

## 2022-01-01 RX ADMIN — HEPARIN SODIUM 1450 UNIT(S)/HR: 5000 INJECTION INTRAVENOUS; SUBCUTANEOUS at 18:05

## 2022-01-01 RX ADMIN — Medication 7 UNIT(S): at 12:24

## 2022-01-01 RX ADMIN — Medication 1: at 12:03

## 2022-01-01 RX ADMIN — Medication 1 DROP(S): at 05:26

## 2022-01-01 RX ADMIN — Medication 1: at 07:55

## 2022-05-02 NOTE — ED ADULT NURSE REASSESSMENT NOTE - NS ED NURSE REASSESS COMMENT FT1
pt remains at baseline mental status- AxOx1 and awake. RR even and unlabored. NSR on monitor. Medicated as per MD orders. Awaiting heparin drip from pharmacy at this time. Awaiting bed assignment.

## 2022-05-02 NOTE — H&P ADULT - ASSESSMENT
82 y/o M with PMH of prostate cancer s/p seed implant, Hypertension, Type 2 diabetes on insulin (has not been taking insulin recently), CAD s/p stent (last stent placed 2 years ago), CVA s/p carotid stent 1 month ago, JACKELIN on CPAP, presents to the ED complaining of altered mental status and dysuria.

## 2022-05-02 NOTE — CONSULT NOTE ADULT - SUBJECTIVE AND OBJECTIVE BOX
Patient seen and evaluated at bedside    Chief Complaint:    HPI:  82 y/o male with PMH prostate ca s/p Seed implant, HTN, DM2 on insulin, CAD s/p stent (last one about 2 yr ago), CVA per son, and JACKELIN on CPAP who presents with confusion. Per son at bedside, patient was recently diagnosed last week with UTI but only took one day of abx. Patient has notably became confused (AAOx3 at baseline) and son noticed patient's breathing becoming more labored. At bedside, patient oriented to name and place only. He denies any chest pain, SOB, NV, diaphoresis, abd pain. Son said he was hospitalized for "mini strokes" and was on aspirin and plavix.     In the ED, HR 100s, rest of vitals stable. Given aspirin, brilinta, heparin, LR 1L, and insulin     EKG showed sinus tachy with ST elevations in I and aVL but aVL not more than 1mm, and notable ST changes in V3-V5 with elevations not meeting STEMI criteria     PMHx:   Diabetes mellitus    CAD (coronary artery disease)    Prostate ca    Sleep apnea    Hypertension    Hypercholesteremia        PSHx:   S/P cholecystectomy    S/P coronary angioplasty        Allergies:  ceftriaxone (Urticaria)  ciprofloxacin (Rash)  schrimp (Unknown)      Home Meds:    Current Medications:   heparin   Injectable 4000 Unit(s) IV Push once  heparin   Injectable 4000 Unit(s) IV Push every 6 hours PRN  heparin  Infusion.  Unit(s)/Hr IV Continuous <Continuous>      FAMILY HISTORY:  No pertinent family history in first degree relatives        Social History:  Smoking History:  Alcohol Use:  Drug Use:    REVIEW OF SYSTEMS:  Constitutional:     [x ] negative [ ] fevers [ ] chills [ ] weight loss [ ] weight gain  HEENT:                  [x ] negative [ ] dry eyes [ ] eye irritation [ ] postnasal drip [ ] nasal congestion  CV:                         [ x] negative  [ ] chest pain [ ] orthopnea [ ] palpitations [ ] murmur  Resp:                     [x ] negative [ ] cough [ ] shortness of breath [ ] dyspnea [ ] wheezing [ ] sputum [ ]hemoptysis  GI:                          [ x] negative [ ] nausea [ ] vomiting [ ] diarrhea [ ] constipation [ ] abd pain [ ] dysphagia   :                        [ x] negative [ ] dysuria [ ] nocturia [ ] hematuria [ ] increased urinary frequency  Musculoskeletal: [x ] negative [ ] back pain [ ] myalgias [ ] arthralgias [ ] fracture  Skin:                       [ x] negative [ ] rash [ ] itch  Neurological:        [ x] negative [ ] headache [ ] dizziness [ ] syncope [ ] weakness [ ] numbness  Psychiatric:           [ x] negative [ ] anxiety [ ] depression  Endocrine:            [ x] negative [ ] diabetes [ ] thyroid problem  Heme/Lymph:      [ x] negative [ ] anemia [ ] bleeding problem  Allergic/Immune: [ x] negative [ ] itchy eyes [ ] nasal discharge [ ] hives [ ] angioedema    [ x] All other systems negative  [ ] Unable to assess ROS due to      Physical Exam:  T(F): 98.2 (05-02), Max: 98.4 (05-02)  HR: 101 (05-02) (101 - 102)  BP: 114/68 (05-02) (91/65 - 114/68)  RR: 16 (05-02)  SpO2: 100% (05-02)  GENERAL: Eyes closed but answering questions   CHEST/LUNG: Clear to auscultation bilaterally; No wheeze, equal breath sounds bilaterally   HEART: Tachycardic rate and rhythm; No murmurs, rubs, or gallops  EXTREMITIES:  No clubbing, cyanosis, or edema  NEUROLOGY: AAOx2, non-focal   SKIN: Normal color, No rashes or lesions  LINES:    Cardiovascular Diagnostic Testing:    ECG: Personally reviewed:    Echo: Personally reviewed:    Stress Testing:    Cath:    Imaging:    CXR: Personally reviewed    Labs: Personally reviewed                        12.4   16.52 )-----------( 236      ( 02 May 2022 16:35 )             36.9     05-02    136  |  98  |  43<H>  ----------------------------<  519<HH>  4.1   |  23  |  2.04<H>    Ca    9.4      02 May 2022 16:35  Phos  2.6     05-02  Mg     2.10     05-02    TPro  7.5  /  Alb  3.2<L>  /  TBili  0.6  /  DBili  x   /  AST  100<H>  /  ALT  43<H>  /  AlkPhos  72  05-02    PT/INR - ( 02 May 2022 16:35 )   PT: 16.5 sec;   INR: 1.42 ratio         PTT - ( 02 May 2022 16:35 )  PTT:26.4 sec

## 2022-05-02 NOTE — H&P ADULT - NS ATTEND AMEND GEN_ALL_CORE FT
81 yr old male with a pmh of prostate cancer s/p seed implant, HTN, T2DM on insulin, CAD s/p stent (last stent placed 2 years ago), CVA s/p carotid stent 1 month ago, JACKELIN on CPAP who presents to the ED with AMS and dysuria. Per family pt has been confused today and has decreased oral intake for the past 2 days. Since his diagnosis of a UTI last week pt has not been taking any of his medication.   Denies  headache, dizziness, chest pain, palpitations, SOB, abdominal pain, joint pain, diarrhea/constipation    Vitals in the ED: T 98.2, , /84, RR 16 satting 100% RA  CXR interpreted by myself: clear lungs  EKG interpreted by cardiology: EKG shows concerning ST changes that is indicative of ischemia but not meeting STEMI criteria (aVL not more than 1mm and no reciprocal changes)    REVIEW OF SYSTEMS:    CONSTITUTIONAL: No weakness, fevers or chills  EYES/ENT: No visual changes;  No dysphagia; No sore throat; No rhinorrhea; No sinus pain/pressure  NECK: No pain or stiffness  RESPIRATORY: No cough, wheezing, hemoptysis; No shortness of breath  CARDIOVASCULAR: No chest pain or palpitations; No lower extremity edema  GASTROINTESTINAL: No abdominal or epigastric pain. No nausea, vomiting, or hematemesis; No diarrhea or constipation. No melena or hematochezia.  GENITOURINARY: + dysuria, no frequency or hematuria  NEUROLOGICAL: No numbness or weakness + AMS  MSK: ambulates without assistance   SKIN: No itching, burning, rashes, or lesions   All other review of systems is negative unless indicated above.    PHYSICAL EXAM:  GENERAL: NAD, well-developed, well-nourished  HEAD:  Atraumatic, Normocephalic  EYES: EOMI, PERRL, conjunctiva and sclera clear  NECK: Supple, No JVD  CHEST/LUNG: Clear to auscultation bilaterally; No wheezes, rales or rhonchi  HEART: Regular rate and rhythm; No murmurs, rubs, or gallops, (+)S1, S2  ABDOMEN: Soft, Nontender, Nondistended; Normal Bowel sounds   EXTREMITIES:  2+ Peripheral Pulses, No clubbing, cyanosis, or edema  PSYCH: normal mood and affect  NEUROLOGY: AAOx1- person, non-focal  SKIN: No rashes or lesions    NSTEMI  New  Trop 3089->3041, EKG as above  Cardiology consulted: ASA, Brillinta, Heparin drip, ECHO, trend trop to peak, Tele     Septic encephalopathy  New  WBC 16.5,   AMS with a dx of UTI last week for which he did not take his abx  UA ordered- nurse called to collect urine- will wait for ua for abx   Bcx and ucx ordered as well      T2DM  Chronic sever exacerbation  , BHB 0.6, pH 7.39, Anion gap of 15.  S/p lantus 20 units and admelog 7 units in the ED  Will continue with lantus 14 units nightly, LDCS   A1c in AM     SANDRA  New  Cr 2.04 – baseline 1.1  Likely secondary to poor oral intake   Hold nephrotoxic agents  LVF 75ml/hr  Monitor     Hypertension  Chronic stable   /84  Continue metoprolol  Holding ACEI due to SANDRA    JACKELIN on CPAP  Chronic stable  Clarify settings with family     Remainder as above

## 2022-05-02 NOTE — H&P ADULT - NSHPLABSRESULTS_GEN_ALL_CORE
Vital Signs Last 24 Hrs  T(C): 36.8 (02 May 2022 16:28), Max: 36.9 (02 May 2022 13:47)  T(F): 98.2 (02 May 2022 16:28), Max: 98.4 (02 May 2022 13:47)  HR: 102 (02 May 2022 19:03) (101 - 102)  BP: 115/84 (02 May 2022 19:03) (91/65 - 115/84)  BP(mean): --  RR: 16 (02 May 2022 19:03) (16 - 18)  SpO2: 100% (02 May 2022 19:03) (96% - 100%)                          12.4   16.52 )-----------( 236      ( 02 May 2022 16:35 )             36.9     PT/INR - ( 02 May 2022 16:35 )   PT: 16.5 sec;   INR: 1.42 ratio         PTT - ( 02 May 2022 16:35 )  PTT:26.4 sec    05-02    136  |  98  |  43<H>  ----------------------------<  519<HH>  4.1   |  23  |  2.04<H>    Ca    9.4      02 May 2022 16:35  Phos  2.6     05-02  Mg     2.10     05-02    TPro  7.5  /  Alb  3.2<L>  /  TBili  0.6  /  DBili  x   /  AST  100<H>  /  ALT  43<H>  /  AlkPhos  72  05-02    Troponin: 3089    16:35 - VBG - pH: 7.39  | pCO2: 45    | pO2: 26    | Lactate: 3.0      COVID 19 PCR, Influenza A&B and RSV: Negative.    CXR: FINDINGS:  The heart is difficult to evaluate on this view.  No focal consolidations. No pleural effusions or pneumothorax.  Degenerative changes of the spine and shoulder joints.    IMPRESSION:  No focal consolidations.    EKG: Sinus tachycardia at 101. QT/QTc 370/479 KELLY in leads V4,V5.

## 2022-05-02 NOTE — H&P ADULT - PROBLEM SELECTOR PLAN 2
Per son patient is on Novolog sliding scale and is suppose to be on Soliqua 28 units at bed time however patient has not been taking his medications. Son also reports poor appetite.  Patient with fingersticks of 487 on arrival.  BHB 0.6, pH 7.39, Anion gap of 15.  Patient s/p 7 units of admelog and 20 units of Lantus in ED.  Fingerstick prior to admelog 381.  Repeat fingerstick pending.  Will place patient on low dose insulin sliding scale.  Hemoglobin A1c in AM.  Monitor fingersticks closely. Per son patient is on Novolog sliding scale and is suppose to be on Soliqua 28 units at bed time however patient has not been taking his medications. Son also reports poor appetite.  Patient with fingersticks of 487 on arrival.  BHB 0.6, pH 7.39, Anion gap of 15.  Patient s/p 7 units of admelog and 20 units of Lantus in ED.  Fingerstick prior to admelog 381.  Repeat fingerstick pending.  Will place patient on low dose insulin sliding scale.  Hemoglobin A1c in AM.  Monitor fingersticks closely.  Repeat blood gas, BHB and BMP ordered. Per son patient is on Novolog sliding scale and is suppose to be on Soliqua 28 units at bed time however patient has not been taking his medications. Son also reports poor appetite.  Patient with fingersticks of 487 on arrival.  BHB 0.6, pH 7.39, Anion gap of 15.  Patient s/p 7 units of admelog and 20 units of Lantus in ED.  Fingerstick prior to admelog 381.  Repeat fingerstick 341.  Will place patient on low dose insulin sliding scale.  Hemoglobin A1c in AM.  Monitor fingersticks closely.  Repeat blood gas, BHB and BMP ordered. Per son patient is on Novolog sliding scale and is suppose to be on Soliqua 28 units at bed time however patient has not been taking his medications. Son also reports poor appetite.  Per pharmacy Soliqua is 1:1 conversion with lantus. Will reduce dose to 14 units at bedtime starting 5/3 at bedtime. Adjust Lantus dose as needed.  Patient with fingersticks of 487 on arrival.  BHB 0.6, pH 7.39, Anion gap of 15.  Patient s/p 7 units of admelog and 20 units of Lantus in ED.  Fingerstick prior to admelog 381.  Repeat fingerstick 341.  Will place patient on low dose insulin sliding scale.  Hemoglobin A1c in AM.  Monitor fingersticks closely.  Repeat blood gas, BHB and BMP ordered.

## 2022-05-02 NOTE — H&P ADULT - NSICDXPASTSURGICALHX_GEN_ALL_CORE_FT
PAST SURGICAL HISTORY:  H/O carotid endarterectomy April 2022, stent placed.    S/P cholecystectomy     S/P coronary angioplasty last stent 1 year ago

## 2022-05-02 NOTE — H&P ADULT - PROBLEM SELECTOR PLAN 1
Admit to tele, continue monitoring.  Patient with troponin of  3089. Repeat troponin ordered. Repeat EKG ordered.  Cardiology recs appreciated.  Per Cardiology EKG shows ST changes that are indicative of ischemia but not meeting STEMI criteria. Patient denies chest pain and shortness of breath.  Per Cardiology no need for emergent cath at this time.  Continue Aspirin 81 mg daily.  Continue Brillinta 90 mg BID  Continue Heparin drip.  Patient on tele.  Trend troponin to peak  F/u Echo results.  If patient has active chest pain, or has arrhthymias or becomes hemodynamically stable call Cardiology.  Hold atorvastatin due to LFT abnormality.  Continue home Metoprolol but hold for SBP <90.  Hold Losartan given SANDRA. Admit to tele, continue monitoring.  Patient with troponin of  3089. Repeat troponin ordered. Repeat EKG ordered.  Cardiology recs appreciated.  Per Cardiology EKG shows ST changes that are indicative of ischemia but not meeting STEMI criteria. Patient denies chest pain and shortness of breath.  Per Cardiology no need for emergent cath at this time.  Continue Aspirin 81 mg daily.  Continue Brillinta 90 mg BID  Continue Heparin drip.  Patient on tele.  Trend troponin to peak  F/u Echo results.  If patient has active chest pain, or has arrhthymias or becomes hemodynamically stable call Cardiology.  Hold atorvastatin due to LFT abnormality.  Continue home Metoprolol but hold for SBP <90.  Hold Telmisartan given SANDRA.

## 2022-05-02 NOTE — H&P ADULT - PROBLEM SELECTOR PLAN 10
Will need to find out settings. Will need find out settings from family. Will find out settings from family.

## 2022-05-02 NOTE — ED ADULT TRIAGE NOTE - CHIEF COMPLAINT QUOTE
daughter states" My dad has recent UTI and he did not take his meds completely and now he appears disoriented and weak". h/o HTN, DM. . patient awake ox2 unsure of time

## 2022-05-02 NOTE — H&P ADULT - PROBLEM SELECTOR PLAN 6
Continue metoprolol with hold parameters. Continue metoprolol with hold parameters.  Continue aspirin.

## 2022-05-02 NOTE — H&P ADULT - HISTORY OF PRESENT ILLNESS
82 y/o M with PMH of prostate cancer s/p seed implant, Hypertension, Type 2 diabetes on insulin (has not been taking insulin recently), CAD s/p stent (last stent placed 2 years ago), CVA s/p carotid stent 1 month ago, JACKELIN on CPAP, presents to the ED complaining of altered mental status and dysuria. Patient is AAOx1 oriented only to person. Per patient's son Benja patient has been confused today. He reports that patient was diagnosed with UTI last week. Son states that patient was prescribed ciprofloxacin but reports patient only took 1 tablet last Friday. Son reports patient has not been taking his insulin and endorses poor PO intake over the last 2 days. Patient denies chest pain, shortness of breath but endorses dysuria. Patient denies fever, chills, abdominal pain.     In ED patient was found to have Troponin of 3089. Serum glucose of 519, BHB of 0.6, pH 7.39. EKG showed KELLY in leads V5,V6. Cardiology was consulted. Per Cardiology EKG showed ST elevation in I and aVL, with aVL not more than 1 mm and ST changes in V3-V5 however elevations did not meet STEMI criteria. Patient was started on Heparin drip. Patient received admelog 7 units and Lantus 20 units.

## 2022-05-02 NOTE — ED PROVIDER NOTE - OBJECTIVE STATEMENT
80 yo M PMHx of prostate cancer s/p seed implant, HTN, DM2 on insulin, CAD s/p stent, JACKELIN on CPAP presents with dysuria and AMS x 1 day. Patient was diagnosed with last Wednesday and given ciprofloxacin but only took 1 days dose. Also has not been taking diabetes meds. On triage, FSG in 400s. Endorses drinking water, decreased appetite, dysuria. No fevers, chills, nausea, vomiting, abdominal pain, CP.

## 2022-05-02 NOTE — ED PROVIDER NOTE - PROGRESS NOTE DETAILS
sinus tachy stemi elevation v4,v5. subtle v6 I, aVL no depressions. spoke with cardiology gjven concern for stemi (in context of AMS and hyperglycemia.) card does not believed in stemi. rec repeat ekg in several hours, trend trop. no rec for dapt or heparin at this time. sinus tachy stemi elevation v4,v5. subtle v6 I, aVL no depressions. spoke with cardiology gjven concern for stemi (in context of AMS and hyperglycemia.) card does not believed ekg currently represent stemi. rec repeat ekg in several hours, trend trop. no rec for dapt or heparin at this time.

## 2022-05-02 NOTE — H&P ADULT - PROBLEM SELECTOR PLAN 4
Patient meets sepsis criteria due to leukocytosis and tachycardia.  UA ordered. Patient meets sepsis criteria due to leukocytosis and tachycardia.  UA ordered.  Blood and urine cultures ordered.

## 2022-05-02 NOTE — ED PROVIDER NOTE - CLINICAL SUMMARY MEDICAL DECISION MAKING FREE TEXT BOX
82 yo M PMHx of DM2, incomplete UTI tx presents with AMS, dysuria, hypotensive and tachycardic, FSG in 400s. Will obtain labs to discern urosepsis 80 yo M PMHx of DM2, incomplete UTI tx presents with AMS, dysuria, hypotensive and tachycardic, FSG in 400s. Will obtain labs to discern urosepsis vs. DKA.

## 2022-05-02 NOTE — CONSULT NOTE ADULT - ASSESSMENT
82 y/o male with PMH prostate ca s/p Seed implant, HTN, DM2 on insulin, CAD s/p stent (last one about 2 yr ago), CVA per son, and JACKELIN on CPAP who presents with confusion. Cardiology consulted for NSTEMI.     #NSTEMI   EKG shows concerning ST changes that is indicative of ischemia but not meeting STEMI criteria (aVL not more than 1mm and no reciprocal changes). Patient denies any chest pain or SOB. Hemodynamically stable. Troponin elevation likely ACS, less likely PE. No need for emergent cath at this time.   -Continue aspirin 81mg daily   -Continue brilinta 90mg BID   -Continue heparin gtt   -Tele   -Trend troponin to peak   -F/u echo results   -If patient having active chest pain, hemodynamically unstable, or having arrhthymias please call cardiology   -Hold atorvastatin given LFT abnormality   -Can continue home metoprolol but hold for SBP <90   -Hold losartan given SANDRA

## 2022-05-02 NOTE — ED PROVIDER NOTE - CARE PLAN
1 Principal Discharge DX:	Non-ST elevation MI (NSTEMI)  Secondary Diagnosis:	Uncontrolled type 2 diabetes mellitus with hyperglycemia  Secondary Diagnosis:	SANDRA (acute kidney injury)

## 2022-05-02 NOTE — ED ADULT NURSE NOTE - OBJECTIVE STATEMENT
pt presenting to room 9 AxOx1- oriented to self, brought in by daughter for increasing confusion. PMH HTN, DM, prostate ca- not on chemo or radiation. As per son pt has been receiving antibiotics for UTI but has not been compliant with taking medication. On arrival to ED pt's breathing is even and unlabored. Pallor/diaphoresis not noted. Pt denies CP, SOB, fever, N/V. FS elevated with MD aware. IV established with 20g to LAC. Labs drawn and sent. MD at bedside for eval. Pt sinus tachy at 101 on monitor. BP stable. Will continue to monitor.

## 2022-05-02 NOTE — H&P ADULT - PROBLEM SELECTOR PLAN 3
Patient AAOx1 since today. Oriented only to self.  Per Son patient was unable to recognize family members earlier today.  Encephalopathy possibly in setting of UTI.   Patient is suppose to be on Ciprofloxacin for UTI but only took 1 tablet.  UA pending.  Follow up urine culture. Patient AAOx1 since today. Oriented only to self.  Per Son patient was unable to recognize family members earlier today.  Encephalopathy possibly in setting of UTI.   Patient is suppose to be on Ciprofloxacin for UTI but only took 1 tablet.  UA pending.  Follow up urine culture.  Patient restless and trying to get out of bed. Melatonin 3 mg ordered.

## 2022-05-02 NOTE — ED PROVIDER NOTE - PHYSICAL EXAMINATION
GENERAL: no acute distress, non-toxic appearing  HEAD: normocephalic, atraumatic  HEENT: normal conjunctiva, oral mucosa moist, neck supple  CARDIAC: regular rate and rhythm, normal S1 and S2,  no appreciable murmurs  PULM: clear to ascultation bilaterally, no crackles, rales, rhonchi, or wheezing  GI: abdomen nondistended, soft, nontender, no guarding or rebound tenderness  : no CVA tenderness, no suprapubic tenderness  NEURO: alert and oriented x 3, normal speech, moving all extremities  MSK: no visible deformities, no peripheral edema, calf tenderness/redness/swelling  SKIN: no visible rashes, dry, well-perfused  PSYCH: appropriate mood and affect

## 2022-05-02 NOTE — ED PROVIDER NOTE - ATTENDING CONTRIBUTION TO CARE
80 yo  m past medical history prostate ca, htn, diabetes, cad s/p stent, s/p carotid stent, sylvia presents with family for Ams x 1 day, dysuria. patient dx with uti last wednesday, rx'd cipro 500mg  but only took 1 day. daughter reports pt drinking water but decreased po solids. denies fever chill, cp, abd pain n/v, new back pain. vs reviewed. fs elevated. exam as above. Ddx includes, but not limited to, uti, dka, assess for alt causes of hyperglycemia. plan: labs, ekg, cxr, symptom relief prn, reassess.

## 2022-05-02 NOTE — H&P ADULT - PROBLEM SELECTOR PLAN 5
Patient with Cr of 2.04, Patient with Cr of 2.04,  Hold Losartan.  Pt s/p NS in ED.  Follow up AM Cr. Patient with Cr of 2.04, baseline 1.13.  Hold Telmisartan.  Pt s/p NS in ED.  NS at 75 cc/hr for 10 hours ordered.  Follow up AM Cr.

## 2022-05-02 NOTE — H&P ADULT - PROBLEM SELECTOR PLAN 7
Lactate 3.0.  Patient s/p 1L NS in ED.  Follow up repeat BMP in AM. Lactate 3.0.  Patient s/p 1L NS in ED.  NS at 75 cc/hr for 10 hours ordered.  Follow up repeat BMP in AM.

## 2022-05-02 NOTE — ED PROVIDER NOTE - NS ED ROS FT
GENERAL: no fever, no chills, no weight loss  CARDIAC: no chest pain, no palpitations   PULMONARY: no cough, no shortness of breath, no wheezing  GI: no abdominal pain, no nausea, no vomiting, no diarrhea, no constipation, no melena, no hematochezia, no hematemesis  : +changes in urination, +dysuria, no frequency, no hematuria, no discharge  SKIN: no rashes  NEURO: +confusion, no headache, no numbness, no weakness  MSK: no joint pain, no muscle pain, no back pain, no calf pain

## 2022-05-03 NOTE — CHART NOTE - NSCHARTNOTEFT_GEN_A_CORE
Patient with few episodes of incontinence. Bladder scan showing 613 ml. Guevara ordered to prevent repeated straight cath. UA and Urine culture to be collected. Start antibiotics if UA positive.

## 2022-05-03 NOTE — PROGRESS NOTE ADULT - ASSESSMENT
80 y/o male with PMH prostate ca s/p Seed implant, HTN, DM2 on insulin, CAD s/p stent (last one about 2 yr ago), CVA per son, and JACKELIN on CPAP who presents with confusion. Cardiology consulted for NSTEMI.     #NSTEMI   EKG shows concerning ST changes that is indicative of ischemia but not meeting STEMI criteria (aVL not more than 1mm and no reciprocal changes). Patient denies any chest pain or SOB. Hemodynamically stable. Troponin elevation likely ACS, less likely PE. No need for emergent cath at this time.   -Continue aspirin 81mg daily   -Continue brilinta 90mg BID   -Continue heparin gtt for 48 hours from start   -Tele   -Trend troponin to peak-peaked at 3089  -F/u echo results-notable WMA but unclear if new, moderate to severe LV dysfunction w/ severe MR   -If patient having active chest pain, hemodynamically unstable, or having arrhthymias please call cardiology   -Will discuss with interventional cardiology again regarding planning of cath given patient's AMS and possible active infection   -Hold atorvastatin given LFT abnormality   -Can continue home metoprolol but hold for SBP <90   -Hold losartan given SANDRA

## 2022-05-03 NOTE — PROGRESS NOTE ADULT - SUBJECTIVE AND OBJECTIVE BOX
Patient seen and examined at bedside.    Overnight Events:     No AEON     Denies any chest pain, SOB, palpitations     AAOx3 (knew name, knew hospital but not name, knew year but not month)       Review Of Systems: No chest pain, shortness of breath, or palpitations            Current Meds:  aspirin enteric coated 81 milliGRAM(s) Oral daily  dextrose 5%. 1000 milliLiter(s) IV Continuous <Continuous>  dextrose 5%. 1000 milliLiter(s) IV Continuous <Continuous>  dextrose 50% Injectable 25 Gram(s) IV Push once  dextrose 50% Injectable 12.5 Gram(s) IV Push once  dextrose 50% Injectable 25 Gram(s) IV Push once  dextrose Oral Gel 15 Gram(s) Oral once PRN  glucagon  Injectable 1 milliGRAM(s) IntraMuscular once  heparin   Injectable 4000 Unit(s) IV Push every 6 hours PRN  heparin  Infusion.  Unit(s)/Hr IV Continuous <Continuous>  insulin glargine Injectable (LANTUS) 14 Unit(s) SubCutaneous at bedtime  insulin lispro (ADMELOG) corrective regimen sliding scale   SubCutaneous three times a day before meals  insulin lispro (ADMELOG) corrective regimen sliding scale   SubCutaneous at bedtime  metoprolol tartrate 50 milliGRAM(s) Oral two times a day  sodium chloride 0.9% lock flush 3 milliLiter(s) IV Push every 8 hours  sodium chloride 0.9%. 1000 milliLiter(s) IV Continuous <Continuous>  tamsulosin 0.4 milliGRAM(s) Oral at bedtime  ticagrelor 90 milliGRAM(s) Oral every 12 hours      Vitals:  T(F): 97.8 (05-03), Max: 98.4 (05-02)  HR: 98 (05-03) (79 - 109)  BP: 125/89 (05-03) (91/65 - 137/96)  RR: 17 (05-03)  SpO2: 100% (05-03)  I&O's Summary      Physical Exam:  GENERAL: In no acute distress   CHEST/LUNG: Clear to auscultation bilaterally; No wheeze, equal breath sounds bilaterally   HEART: Tachycardic rate and rhythm; No murmurs, rubs, or gallops  EXTREMITIES:  No clubbing, cyanosis, or edema  NEUROLOGY: AAOx3 as above, non-focal   SKIN: Normal color, No rashes or lesions  LINES:                          13.3   15.73 )-----------( 304      ( 03 May 2022 07:39 )             39.5     05-03    139  |  101  |  36<H>  ----------------------------<  296<H>  3.5   |  22  |  1.66<H>    Ca    9.6      03 May 2022 01:10  Phos  1.5     05-03  Mg     2.00     05-03    TPro  7.5  /  Alb  3.2<L>  /  TBili  0.6  /  DBili  x   /  AST  100<H>  /  ALT  43<H>  /  AlkPhos  72  05-02    PT/INR - ( 02 May 2022 16:35 )   PT: 16.5 sec;   INR: 1.42 ratio         PTT - ( 03 May 2022 07:39 )  PTT:45.5 sec  CARDIAC MARKERS ( 02 May 2022 21:51 )  3041 ng/L / x     / x     / 956 U/L / x     / 8.5 ng/mL  CARDIAC MARKERS ( 02 May 2022 16:37 )  x     / x     / x     / 863 U/L / x     / 9.9 ng/mL  CARDIAC MARKERS ( 02 May 2022 16:35 )  3089 ng/L / x     / x     / x     / x     / x                  New ECG(s): Personally reviewed    Echo:    Stress Testing:     Cath:    Imaging:    Interpretation of Telemetry:

## 2022-05-03 NOTE — CHART NOTE - NSCHARTNOTEFT_GEN_A_CORE
called by the nurse earlier as pt was in RAfib in 180s. pt looks comfortable. denies cp, sob, dizziness, palpitations.  vs: bp- 119/77 RR-33 HR- 170-180s.  lopressor 5 mg ivp x 1 given - not much change in HR  cardizem 10 mg ivp x 1- pt converted to SR in 70s- cardiology and medical attening made aware  awaiting ID house c/s for UTI tx  cont 1:1 for agitation.  continue heparin gtt

## 2022-05-03 NOTE — PROGRESS NOTE ADULT - SUBJECTIVE AND OBJECTIVE BOX
Patient is a 81y old  Male who presents with a chief complaint of NSTEMI, Hyperglycemia, SANDRA, Encephalopathy (03 May 2022 08:11)      SUBJECTIVE / OVERNIGHT EVENTS:    Events noted.  Confused  No N/V    MEDICATIONS  (STANDING):  aspirin enteric coated 81 milliGRAM(s) Oral daily  dextrose 5%. 1000 milliLiter(s) (100 mL/Hr) IV Continuous <Continuous>  dextrose 5%. 1000 milliLiter(s) (50 mL/Hr) IV Continuous <Continuous>  dextrose 50% Injectable 25 Gram(s) IV Push once  dextrose 50% Injectable 12.5 Gram(s) IV Push once  dextrose 50% Injectable 25 Gram(s) IV Push once  doxazosin 1 milliGRAM(s) Oral at bedtime  glucagon  Injectable 1 milliGRAM(s) IntraMuscular once  heparin  Infusion.  Unit(s)/Hr (10 mL/Hr) IV Continuous <Continuous>  insulin glargine Injectable (LANTUS) 14 Unit(s) SubCutaneous at bedtime  insulin lispro (ADMELOG) corrective regimen sliding scale   SubCutaneous three times a day before meals  insulin lispro (ADMELOG) corrective regimen sliding scale   SubCutaneous at bedtime  metoprolol tartrate 50 milliGRAM(s) Oral two times a day  sodium chloride 0.9% lock flush 3 milliLiter(s) IV Push every 8 hours  sodium chloride 0.9%. 1000 milliLiter(s) (75 mL/Hr) IV Continuous <Continuous>  ticagrelor 90 milliGRAM(s) Oral every 12 hours    MEDICATIONS  (PRN):  dextrose Oral Gel 15 Gram(s) Oral once PRN Blood Glucose LESS THAN 70 milliGRAM(s)/deciliter  heparin   Injectable 4000 Unit(s) IV Push every 6 hours PRN For aPTT less than 40        CAPILLARY BLOOD GLUCOSE      POCT Blood Glucose.: 315 mg/dL (04 May 2022 00:18)  POCT Blood Glucose.: 351 mg/dL (03 May 2022 22:41)  POCT Blood Glucose.: 432 mg/dL (03 May 2022 22:22)  POCT Blood Glucose.: 399 mg/dL (03 May 2022 17:29)  POCT Blood Glucose.: 343 mg/dL (03 May 2022 12:47)  POCT Blood Glucose.: 292 mg/dL (03 May 2022 08:43)  POCT Blood Glucose.: 257 mg/dL (03 May 2022 02:30)    I&O's Summary      T(C): 36.6 (22 @ 23:39), Max: 36.9 (22 @ 21:56)  HR: 82 (22 @ 23:39) (77 - 160)  BP: 105/50 (22 @ 23:39) (98/60 - 137/96)  RR: 16 (22 @ 21:56) (16 - 20)  SpO2: 95% (22 @ 21:56) (95% - 100%)    PHYSICAL EXAM:    NECK: Supple, No JVD  CHEST/LUNG: Clear to auscultation bilaterally; No wheezing.  HEART: Regular rate and rhythm; No murmurs, rubs, or gallops  ABDOMEN: Soft, Nontender, Nondistended; Bowel sounds present  EXTREMITIES:   No edema  NEUROLOGY: AA      LABS:                        13.3   15.73 )-----------( 304      ( 03 May 2022 07:39 )             39.5     05-    140  |  102  |  36<H>  ----------------------------<  290<H>  3.6   |  20<L>  |  1.56<H>    Ca    9.8      03 May 2022 07:39  Phos  1.8     -  Mg     2.00     -    TPro  7.5  /  Alb  3.2<L>  /  TBili  0.6  /  DBili  x   /  AST  100<H>  /  ALT  43<H>  /  AlkPhos  72  05-02    PT/INR - ( 03 May 2022 23:20 )   PT: 15.7 sec;   INR: 1.35 ratio         PTT - ( 03 May 2022 23:20 )  PTT:49.9 sec  CARDIAC MARKERS ( 02 May 2022 21:51 )  x     / x     / 956 U/L / x     / 8.5 ng/mL  CARDIAC MARKERS ( 02 May 2022 16:37 )  x     / x     / 863 U/L / x     / 9.9 ng/mL      Urinalysis Basic - ( 03 May 2022 07:45 )    Color: Yellow / Appearance: Slightly Turbid / S.016 / pH: x  Gluc: x / Ketone: Negative  / Bili: Negative / Urobili: <2 mg/dL   Blood: x / Protein: 100 mg/dL / Nitrite: Negative   Leuk Esterase: Large / RBC: 25-50 /HPF / WBC >50 /HPF   Sq Epi: x / Non Sq Epi: x / Bacteria: x      CAPILLARY BLOOD GLUCOSE      POCT Blood Glucose.: 315 mg/dL (04 May 2022 00:18)  POCT Blood Glucose.: 351 mg/dL (03 May 2022 22:41)  POCT Blood Glucose.: 432 mg/dL (03 May 2022 22:22)  POCT Blood Glucose.: 399 mg/dL (03 May 2022 17:29)  POCT Blood Glucose.: 343 mg/dL (03 May 2022 12:47)  POCT Blood Glucose.: 292 mg/dL (03 May 2022 08:43)  POCT Blood Glucose.: 257 mg/dL (03 May 2022 02:30)        RADIOLOGY & ADDITIONAL TESTS:    Imaging Personally Reviewed:    Consultant(s) Notes Reviewed:      Care Discussed with Consultants/Other Providers:    Sae Vallecillo MD, CMD, FACP    [id number] [place] [address]  Office Tel: [phone]  Cell: [phone]

## 2022-05-03 NOTE — PROGRESS NOTE ADULT - ASSESSMENT
80 y/o M with PMH of prostate cancer s/p seed implant, Hypertension, Type 2 diabetes on insulin (has not been taking insulin recently), CAD s/p stent (last stent placed 2 years ago), CVA s/p carotid stent 1 month ago, JACKELIN on CPAP, presents to the ED complaining of altered mental status and dysuria.

## 2022-05-03 NOTE — CHART NOTE - NSCHARTNOTEFT_GEN_A_CORE
Called by Dr. Vallecillo for ID consult.    Pt with likely ACS and urine retention with leukocytosis.    No fever. Guevara to be placed per chart.    Hold off abx for now. Leukocytosis could be reactive to ACS/urine retention.     If fever or instability, can given ertapenem 1 gm iv q24. Montior cbc.     Full consult to follow in AM.    D/w Dr. Avel Craft  Attending Physician   Division of Infectious Disease  Office #[phone]  Available on Microsoft Teams also  After 5pm/weekend or no response, call #[phone]

## 2022-05-04 NOTE — CONSULT NOTE ADULT - PROBLEM SELECTOR RECOMMENDATION 2
-likely from ACS/urine retention   -better  -hold off abx  -low concern for infectious process at this time   -no fever

## 2022-05-04 NOTE — CHART NOTE - NSCHARTNOTEFT_GEN_A_CORE
ACP called by the nurse earlier as pt was in RAfib in 160s. pt looks comfortable. denies cp, sob, dizziness, palpitations.    vs: bp- 90/54 RR-16 HR- 160s.    lopressor 2.5 mg ivp x 1 given due to soft BP - not much change in HR    Cardiology team contacted: Instructed to give 125mcg Digoxin now, re-eval patient in 2-3 hours and give another 125mcg Dig then. Can give gentle IVF hydration for soft BP.   Patient HR still 160s s/p medications, will continue to closely monitor and follow cardiology medications.     Nurse made aware of the plan. Attending Dr. Vallecillo notified.     Yesica Braden PA-C  [id number] ACP called by the nurse earlier as pt was in RAfib in 160s. pt looks comfortable. denies cp, sob, dizziness, palpitations.    vs: bp- 90/54 RR-16 HR- 160s.    lopressor 2.5 mg ivp x 1 given due to soft BP - not much change in HR    Cardiology team (Dr. Donald) contacted: Instructed to give 125mcg Digoxin now, re-eval patient in 2-3 hours and give another 125mcg Dig then. Can give gentle IVF hydration for soft BP.   Patient HR still 160s s/p medications, will continue to closely monitor and follow cardiology medications.     Nurse made aware of the plan. Attending Dr. Vallecillo notified.     Yesica Braden PA-C  [id number]

## 2022-05-04 NOTE — CONSULT NOTE ADULT - SUBJECTIVE AND OBJECTIVE BOX
CHIDI RUBIO 81y Male  MRN-[id number]    Patient is a 81y old  Male who presents with a chief complaint of NSTEMI, Hyperglycemia, SANDRA, Encephalopathy (04 May 2022 07:47)      HPI:  80 y/o M with PMH of prostate cancer s/p seed implant, Hypertension, Type 2 diabetes on insulin (has not been taking insulin recently), CAD s/p stent (last stent placed 2 years ago), CVA s/p carotid stent 1 month ago, JACKELIN on CPAP, presents to the ED complaining of altered mental status and dysuria. Patient is AAOx1 oriented only to person. Per patient's son Benja patient has been confused today. He reports that patient was diagnosed with UTI last week. Son states that patient was prescribed ciprofloxacin but reports patient only took 1 tablet last Friday. Son reports patient has not been taking his insulin and endorses poor PO intake over the last 2 days. Patient denies chest pain, shortness of breath but endorses dysuria. Patient denies fever, chills, abdominal pain.     In ED patient was found to have Troponin of 3089. Serum glucose of 519, BHB of 0.6, pH 7.39. EKG showed KELLY in leads V5,V6. Cardiology was consulted. Per Cardiology EKG showed ST elevation in I and aVL, with aVL not more than 1 mm and ST changes in V3-V5 however elevations did not meet STEMI criteria. Patient was started on Heparin drip. Patient received admelog 7 units and Lantus 20 units. (02 May 2022 20:03)      PAST MEDICAL & SURGICAL HISTORY:  Diabetes mellitus    CAD (coronary artery disease)    Prostate ca  s/p SEED 5 yrs ago    Sleep apnea  on cpap at home    Hypertension    Hypercholesteremia    S/P cholecystectomy    S/P coronary angioplasty  last stent 1 year ago    H/O carotid endarterectomy  2022, stent placed.        Allergies    ceftriaxone (Urticaria)  ciprofloxacin (Rash)  schrimp (Unknown)    Intolerances        ANTIMICROBIALS:      MEDICATIONS  (STANDING):  aspirin enteric coated 81 milliGRAM(s) Oral daily  dextrose 5%. 1000 milliLiter(s) (100 mL/Hr) IV Continuous <Continuous>  dextrose 5%. 1000 milliLiter(s) (50 mL/Hr) IV Continuous <Continuous>  dextrose 50% Injectable 25 Gram(s) IV Push once  dextrose 50% Injectable 12.5 Gram(s) IV Push once  dextrose 50% Injectable 25 Gram(s) IV Push once  doxazosin 1 milliGRAM(s) Oral at bedtime  glucagon  Injectable 1 milliGRAM(s) IntraMuscular once  heparin  Infusion.  Unit(s)/Hr (10 mL/Hr) IV Continuous <Continuous>  insulin glargine Injectable (LANTUS) 14 Unit(s) SubCutaneous at bedtime  insulin lispro (ADMELOG) corrective regimen sliding scale   SubCutaneous three times a day before meals  insulin lispro (ADMELOG) corrective regimen sliding scale   SubCutaneous at bedtime  metoprolol tartrate 50 milliGRAM(s) Oral two times a day  sodium chloride 0.9% lock flush 3 milliLiter(s) IV Push every 8 hours  sodium chloride 0.9%. 1000 milliLiter(s) (75 mL/Hr) IV Continuous <Continuous>  ticagrelor 90 milliGRAM(s) Oral every 12 hours      Social History  Smoking:  Etoh:  Drug use:      FAMILY HISTORY:  FH: type 2 diabetes        Vital Signs Last 24 Hrs  T(C): 36.5 (04 May 2022 09:00), Max: 36.9 (03 May 2022 21:56)  T(F): 97.7 (04 May 2022 09:00), Max: 98.4 (03 May 2022 21:56)  HR: 99 (04 May 2022 09:00) (77 - 160)  BP: 100/67 (04 May 2022 09:00) (98/60 - 119/77)  BP(mean): --  RR: 18 (04 May 2022 09:00) (16 - 18)  SpO2: 100% (04 May 2022 09:00) (95% - 100%)    CBC Full  -  ( 04 May 2022 06:28 )  WBC Count : 11.95 K/uL  RBC Count : 3.82 M/uL  Hemoglobin : 10.8 g/dL  Hematocrit : 33.2 %  Platelet Count - Automated : 259 K/uL  Mean Cell Volume : 86.9 fL  Mean Cell Hemoglobin : 28.3 pg  Mean Cell Hemoglobin Concentration : 32.5 gm/dL  Auto Neutrophil # : x  Auto Lymphocyte # : x  Auto Monocyte # : x  Auto Eosinophil # : x  Auto Basophil # : x  Auto Neutrophil % : x  Auto Lymphocyte % : x  Auto Monocyte % : x  Auto Eosinophil % : x  Auto Basophil % : x    05-03    140  |  102  |  36<H>  ----------------------------<  290<H>  3.6   |  20<L>  |  1.56<H>    Ca    9.8      03 May 2022 07:39  Phos  1.8       Mg     2.00         TPro  7.5  /  Alb  3.2<L>  /  TBili  0.6  /  DBili  x   /  AST  100<H>  /  ALT  43<H>  /  AlkPhos  72      LIVER FUNCTIONS - ( 02 May 2022 16:35 )  Alb: 3.2 g/dL / Pro: 7.5 g/dL / ALK PHOS: 72 U/L / ALT: 43 U/L / AST: 100 U/L / GGT: x           Urinalysis Basic - ( 03 May 2022 07:45 )    Color: Yellow / Appearance: Slightly Turbid / S.016 / pH: x  Gluc: x / Ketone: Negative  / Bili: Negative / Urobili: <2 mg/dL   Blood: x / Protein: 100 mg/dL / Nitrite: Negative   Leuk Esterase: Large / RBC: 25-50 /HPF / WBC >50 /HPF   Sq Epi: x / Non Sq Epi: x / Bacteria: x        MICROBIOLOGY:  .Blood Blood-Peripheral  22   No growth to date.  --  --              v              RADIOLOGY   CHIDI RUBIO 81y Male  MRN-[id number]    Patient is a 81y old  Male who presents with a chief complaint of NSTEMI, Hyperglycemia, SANDRA, Encephalopathy (04 May 2022 07:47)      HPI:  82 y/o M with PMH of prostate cancer s/p seed implant, Hypertension, Type 2 diabetes on insulin (has not been taking insulin recently), CAD s/p stent (last stent placed 2 years ago), CVA s/p carotid stent 1 month ago, JACKELIN on CPAP, presents to the ED complaining of altered mental status and dysuria. Patient is AAOx1 oriented only to person. Per patient's son Benja patient has been confused today. He reports that patient was diagnosed with UTI last week. Son states that patient was prescribed ciprofloxacin but reports patient only took 1 tablet last Friday. Son reports patient has not been taking his insulin and endorses poor PO intake over the last 2 days. Patient denies chest pain, shortness of breath but endorses dysuria. Patient denies fever, chills, abdominal pain.     In ED patient was found to have Troponin of 3089. Serum glucose of 519, BHB of 0.6, pH 7.39. EKG showed KELLY in leads V5,V6. Cardiology was consulted. Per Cardiology EKG showed ST elevation in I and aVL, with aVL not more than 1 mm and ST changes in V3-V5 however elevations did not meet STEMI criteria. Patient was started on Heparin drip. Patient received admelog 7 units and Lantus 20 units. (02 May 2022 20:03)    ID called to r/o UTI. Pt was in urine retention in ER and parnell placed Also noted with some gross hematuria.    No fever    PAST MEDICAL & SURGICAL HISTORY:  Diabetes mellitus    CAD (coronary artery disease)    Prostate ca  s/p SEED 5 yrs ago    Sleep apnea  on cpap at home    Hypertension    Hypercholesteremia    S/P cholecystectomy    S/P coronary angioplasty  last stent 1 year ago    H/O carotid endarterectomy  2022, stent placed.        Allergies    ceftriaxone (Urticaria)  ciprofloxacin (Rash)  schrimp (Unknown)    Intolerances        ANTIMICROBIALS:      MEDICATIONS  (STANDING):  aspirin enteric coated 81 milliGRAM(s) Oral daily  dextrose 5%. 1000 milliLiter(s) (100 mL/Hr) IV Continuous <Continuous>  dextrose 5%. 1000 milliLiter(s) (50 mL/Hr) IV Continuous <Continuous>  dextrose 50% Injectable 25 Gram(s) IV Push once  dextrose 50% Injectable 12.5 Gram(s) IV Push once  dextrose 50% Injectable 25 Gram(s) IV Push once  doxazosin 1 milliGRAM(s) Oral at bedtime  glucagon  Injectable 1 milliGRAM(s) IntraMuscular once  heparin  Infusion.  Unit(s)/Hr (10 mL/Hr) IV Continuous <Continuous>  insulin glargine Injectable (LANTUS) 14 Unit(s) SubCutaneous at bedtime  insulin lispro (ADMELOG) corrective regimen sliding scale   SubCutaneous three times a day before meals  insulin lispro (ADMELOG) corrective regimen sliding scale   SubCutaneous at bedtime  metoprolol tartrate 50 milliGRAM(s) Oral two times a day  sodium chloride 0.9% lock flush 3 milliLiter(s) IV Push every 8 hours  sodium chloride 0.9%. 1000 milliLiter(s) (75 mL/Hr) IV Continuous <Continuous>  ticagrelor 90 milliGRAM(s) Oral every 12 hours      Social History  Smoking: former smoker  Etoh: no  Drug use: no      FAMILY HISTORY:  FH: type 2 diabetes        Vital Signs Last 24 Hrs  T(C): 36.5 (04 May 2022 09:00), Max: 36.9 (03 May 2022 21:56)  T(F): 97.7 (04 May 2022 09:00), Max: 98.4 (03 May 2022 21:56)  HR: 99 (04 May 2022 09:00) (77 - 160)  BP: 100/67 (04 May 2022 09:00) (98/60 - 119/77)  BP(mean): --  RR: 18 (04 May 2022 09:00) (16 - 18)  SpO2: 100% (04 May 2022 09:00) (95% - 100%)    CBC Full  -  ( 04 May 2022 06:28 )  WBC Count : 11.95 K/uL  RBC Count : 3.82 M/uL  Hemoglobin : 10.8 g/dL  Hematocrit : 33.2 %  Platelet Count - Automated : 259 K/uL  Mean Cell Volume : 86.9 fL  Mean Cell Hemoglobin : 28.3 pg  Mean Cell Hemoglobin Concentration : 32.5 gm/dL  Auto Neutrophil # : x  Auto Lymphocyte # : x  Auto Monocyte # : x  Auto Eosinophil # : x  Auto Basophil # : x  Auto Neutrophil % : x  Auto Lymphocyte % : x  Auto Monocyte % : x  Auto Eosinophil % : x  Auto Basophil % : x        140  |  102  |  36<H>  ----------------------------<  290<H>  3.6   |  20<L>  |  1.56<H>    Ca    9.8      03 May 2022 07:39  Phos  1.8       Mg     2.00         TPro  7.5  /  Alb  3.2<L>  /  TBili  0.6  /  DBili  x   /  AST  100<H>  /  ALT  43<H>  /  AlkPhos  72      LIVER FUNCTIONS - ( 02 May 2022 16:35 )  Alb: 3.2 g/dL / Pro: 7.5 g/dL / ALK PHOS: 72 U/L / ALT: 43 U/L / AST: 100 U/L / GGT: x           Urinalysis Basic - ( 03 May 2022 07:45 )    Color: Yellow / Appearance: Slightly Turbid / S.016 / pH: x  Gluc: x / Ketone: Negative  / Bili: Negative / Urobili: <2 mg/dL   Blood: x / Protein: 100 mg/dL / Nitrite: Negative   Leuk Esterase: Large / RBC: 25-50 /HPF / WBC >50 /HPF   Sq Epi: x / Non Sq Epi: x / Bacteria: x        MICROBIOLOGY:  .Blood Blood-Peripheral  22   No growth to date.  --  --        RADIOLOGY  < from: Xray Chest 1 View AP/PA (22 @ 15:17) >  No focal consolidations.    < end of copied text >

## 2022-05-04 NOTE — CONSULT NOTE ADULT - PROBLEM SELECTOR RECOMMENDATION 9
-s/p parnell placement  -creatinine better  -slight hematuria probably from trauma - monitor for now

## 2022-05-04 NOTE — PROGRESS NOTE ADULT - SUBJECTIVE AND OBJECTIVE BOX
CHIDI RUBIO  81y  Male      Patient is a 81y old  Male who presents with a chief complaint of NSTEMI, Hyperglycemia, SANDRA, Encephalopathy (04 May 2022 11:58)  Patient was seen and examined.chart reviewed..Admitted with NSTEMI  Patient is comfortable,nad.Above noted.Developed rapid a.fib-given iv metoprolol.Digoxin.on AC  no cp,no sob,no fever,no cough at present.urinary retention-placed parnell    REVIEW OF SYSTEMS:  as aboveINTERVAL HPI/OVERNIGHT EVENTS:  T(C): 36.8 (22 @ 17:06), Max: 36.8 (22 @ 17:06)  HR: 89 (22 19:02) (82 - 170)  BP: 111/64 (22 @ 19:02) (96/65 - 113/62)  RR: 18 (22 17:06) (18 - 18)  SpO2: 100% (22 17:06) (100% - 100%)  Wt(kg): --  I&O's Summary    03 May 2022 07:01  -  04 May 2022 07:00  --------------------------------------------------------  IN: 0 mL / OUT: 625 mL / NET: -625 mL    04 May 2022 07:01  -  04 May 2022 22:18  --------------------------------------------------------  IN: 0 mL / OUT: 375 mL / NET: -375 mL      T(C): 36.8 (22 @ 17:06), Max: 36.8 (22 @ 17:06)  HR: 89 (22 @ 19:02) (82 - 170)  BP: 111/64 (22 @ 19:02) (96/65 - 113/62)  RR: 18 (05-04-22 @ 17:06) (18 - 18)  SpO2: 100% (22 @ 17:06) (100% - 100%)  Wt(kg): --Vital Signs Last 24 Hrs  T(C): 36.8 (04 May 2022 17:06), Max: 36.8 (04 May 2022 17:06)  T(F): 98.3 (04 May 2022 17:06), Max: 98.3 (04 May 2022 17:06)  HR: 89 (04 May 2022 19:02) (82 - 170)  BP: 111/64 (04 May 2022 19:02) (96/65 - 113/62)  BP(mean): --  RR: 18 (04 May 2022 17:06) (18 - 18)  SpO2: 100% (04 May 2022 17:06) (100% - 100%)    LABS:                        11.6   10.58 )-----------( 254      ( 04 May 2022 15:32 )             35.6     05-04    140  |  105  |  38<H>  ----------------------------<  388<H>  3.8   |  24  |  1.46<H>    Ca    8.7      04 May 2022 15:32  Phos  2.1     05-04  Mg     2.00     05-04      PT/INR - ( 04 May 2022 15:32 )   PT: 14.8 sec;   INR: 1.27 ratio         PTT - ( 04 May 2022 15:32 )  PTT:36.0 sec  Urinalysis Basic - ( 03 May 2022 07:45 )    Color: Yellow / Appearance: Slightly Turbid / S.016 / pH: x  Gluc: x / Ketone: Negative  / Bili: Negative / Urobili: <2 mg/dL   Blood: x / Protein: 100 mg/dL / Nitrite: Negative   Leuk Esterase: Large / RBC: 25-50 /HPF / WBC >50 /HPF   Sq Epi: x / Non Sq Epi: x / Bacteria: x      CAPILLARY BLOOD GLUCOSE      POCT Blood Glucose.: 331 mg/dL (04 May 2022 22:14)  POCT Blood Glucose.: 345 mg/dL (04 May 2022 16:54)  POCT Blood Glucose.: 426 mg/dL (04 May 2022 16:53)  POCT Blood Glucose.: 276 mg/dL (04 May 2022 11:35)  POCT Blood Glucose.: 189 mg/dL (04 May 2022 07:59)  POCT Blood Glucose.: 315 mg/dL (04 May 2022 00:18)  POCT Blood Glucose.: 351 mg/dL (03 May 2022 22:41)  POCT Blood Glucose.: 432 mg/dL (03 May 2022 22:22)        Urinalysis Basic - ( 03 May 2022 07:45 )    Color: Yellow / Appearance: Slightly Turbid / S.016 / pH: x  Gluc: x / Ketone: Negative  / Bili: Negative / Urobili: <2 mg/dL   Blood: x / Protein: 100 mg/dL / Nitrite: Negative   Leuk Esterase: Large / RBC: 25-50 /HPF / WBC >50 /HPF   Sq Epi: x / Non Sq Epi: x / Bacteria: x        PAST MEDICAL & SURGICAL HISTORY:  Diabetes mellitus    CAD (coronary artery disease)    Prostate ca  s/p SEED 5 yrs ago    Sleep apnea  on cpap at home    Hypertension    Hypercholesteremia    S/P cholecystectomy    S/P coronary angioplasty  last stent 1 year ago    H/O carotid endarterectomy  2022, stent placed.        MEDICATIONS  (STANDING):  aspirin enteric coated 81 milliGRAM(s) Oral daily  dextrose 5%. 1000 milliLiter(s) (100 mL/Hr) IV Continuous <Continuous>  dextrose 5%. 1000 milliLiter(s) (50 mL/Hr) IV Continuous <Continuous>  dextrose 50% Injectable 25 Gram(s) IV Push once  dextrose 50% Injectable 12.5 Gram(s) IV Push once  dextrose 50% Injectable 25 Gram(s) IV Push once  digoxin  Injectable 250 MICROGram(s) IV Push every 4 hours  doxazosin 1 milliGRAM(s) Oral at bedtime  glucagon  Injectable 1 milliGRAM(s) IntraMuscular once  heparin  Infusion.  Unit(s)/Hr (10 mL/Hr) IV Continuous <Continuous>  insulin glargine Injectable (LANTUS) 14 Unit(s) SubCutaneous at bedtime  insulin lispro (ADMELOG) corrective regimen sliding scale   SubCutaneous three times a day before meals  insulin lispro (ADMELOG) corrective regimen sliding scale   SubCutaneous at bedtime  metoprolol tartrate 50 milliGRAM(s) Oral two times a day  sodium chloride 0.9% lock flush 3 milliLiter(s) IV Push every 8 hours  sodium chloride 0.9%. 1000 milliLiter(s) (75 mL/Hr) IV Continuous <Continuous>  ticagrelor 90 milliGRAM(s) Oral every 12 hours    MEDICATIONS  (PRN):  dextrose Oral Gel 15 Gram(s) Oral once PRN Blood Glucose LESS THAN 70 milliGRAM(s)/deciliter  heparin   Injectable 4000 Unit(s) IV Push every 6 hours PRN For aPTT less than 40        RADIOLOGY & ADDITIONAL TESTS:    Imaging Personally Reviewed:  [ ] YES  [ ] NO    Consultant(s) Notes Reviewed:  [ ] YES  [ ] NO    PHYSICAL EXAM:  GENERAL: Alert and awake lying in bed in no distress  HEAD:  Atraumatic, Normocephalic  EYES: EOMI, [place], conjunctiva and sclera clear  NECK: Supple, No JVD, Normal thyroid  NERVOUS SYSTEM:  Alert & Oriented X2, Motor and sensory systems are intact,   CHEST/LUNG: Bilateral clear breath sounds, no rhochi, no wheezing, no crepitations,  HEART: Regular rate and rhythm; No murmurs, rubs, or gallops  ABDOMEN: Soft, Nontender, Nondistended; Bowel sounds present  EXTREMITIES:   Peripheral Pulses are palpable, no  edema        Care Discussed with Consultants/Other Providers [ ] YES  [ ] NO      Code Status: [] Full Code [] DNR [] DNI [] Goals of Care:   Disposition: [] ICU [] Stroke Unit [] RCU []PCU []Floor [] Discharge Home         BOLA CaseyP

## 2022-05-04 NOTE — PROVIDER CONTACT NOTE (OTHER) - ASSESSMENT
pt is asymptomatic. BP is 94/66,  on tele monitor. denies any chest pain, shortness of breath. pt is asymptomatic. BP is 94/66,  on tele monitor. denies any chest pain, shortness of breath, headache or dizziness.

## 2022-05-04 NOTE — PROGRESS NOTE ADULT - SUBJECTIVE AND OBJECTIVE BOX
Patient seen and examined at bedside.    Overnight Events:     Yesterday, went into Afib w/ RVR, HDS, given metop and dilt IV and converted back to sinus     This morning, AAOx2, denied chest pain, SOB, but would not answer further ROS     Review Of Systems: No chest pain, shortness of breath, or palpitations            Current Meds:  aspirin enteric coated 81 milliGRAM(s) Oral daily  dextrose 5%. 1000 milliLiter(s) IV Continuous <Continuous>  dextrose 5%. 1000 milliLiter(s) IV Continuous <Continuous>  dextrose 50% Injectable 25 Gram(s) IV Push once  dextrose 50% Injectable 12.5 Gram(s) IV Push once  dextrose 50% Injectable 25 Gram(s) IV Push once  dextrose Oral Gel 15 Gram(s) Oral once PRN  doxazosin 1 milliGRAM(s) Oral at bedtime  glucagon  Injectable 1 milliGRAM(s) IntraMuscular once  heparin   Injectable 4000 Unit(s) IV Push every 6 hours PRN  heparin  Infusion.  Unit(s)/Hr IV Continuous <Continuous>  insulin glargine Injectable (LANTUS) 14 Unit(s) SubCutaneous at bedtime  insulin lispro (ADMELOG) corrective regimen sliding scale   SubCutaneous three times a day before meals  insulin lispro (ADMELOG) corrective regimen sliding scale   SubCutaneous at bedtime  metoprolol tartrate 50 milliGRAM(s) Oral two times a day  sodium chloride 0.9% lock flush 3 milliLiter(s) IV Push every 8 hours  sodium chloride 0.9%. 1000 milliLiter(s) IV Continuous <Continuous>  ticagrelor 90 milliGRAM(s) Oral every 12 hours      Vitals:  T(F): 97.5 (05-04), Max: 98.4 (05-03)  HR: 83 (05-04) (77 - 160)  BP: 104/62 (05-04) (98/60 - 121/77)  RR: 18 (05-04)  SpO2: 100% (05-04)  I&O's Summary    03 May 2022 07:01  -  04 May 2022 07:00  --------------------------------------------------------  IN: 0 mL / OUT: 625 mL / NET: -625 mL        Physical Exam:  GENERAL: In no acute distress   CHEST/LUNG: Clear to auscultation bilaterally; No wheeze, equal breath sounds bilaterally   HEART: Regular rate and rhythm; No murmurs, rubs, or gallops  EXTREMITIES:  No clubbing, cyanosis, or edema  NEUROLOGY: AAOx3 as above, non-focal   SKIN: Normal color, No rashes or lesions  LINES:                          10.8   11.95 )-----------( 259      ( 04 May 2022 06:28 )             33.2     05-03    140  |  102  |  36<H>  ----------------------------<  290<H>  3.6   |  20<L>  |  1.56<H>    Ca    9.8      03 May 2022 07:39  Phos  1.8     05-03  Mg     2.00     05-03    TPro  7.5  /  Alb  3.2<L>  /  TBili  0.6  /  DBili  x   /  AST  100<H>  /  ALT  43<H>  /  AlkPhos  72  05-02    PT/INR - ( 03 May 2022 23:20 )   PT: 15.7 sec;   INR: 1.35 ratio         PTT - ( 03 May 2022 23:20 )  PTT:49.9 sec  CARDIAC MARKERS ( 03 May 2022 19:00 )  2667 ng/L / x     / x     / x     / x     / x      CARDIAC MARKERS ( 03 May 2022 07:39 )  3101 ng/L / x     / x     / x     / x     / x      CARDIAC MARKERS ( 02 May 2022 21:51 )  3041 ng/L / x     / x     / 956 U/L / x     / 8.5 ng/mL  CARDIAC MARKERS ( 02 May 2022 16:37 )  x     / x     / x     / 863 U/L / x     / 9.9 ng/mL  CARDIAC MARKERS ( 02 May 2022 16:35 )  3089 ng/L / x     / x     / x     / x     / x                  New ECG(s): Personally reviewed    Echo:    Stress Testing:     Cath:    Imaging:    Interpretation of Telemetry:

## 2022-05-04 NOTE — CONSULT NOTE ADULT - ASSESSMENT
80 y/o M with PMH of prostate cancer s/p seed implant, Hypertension, Type 2 diabetes on insulin (has not been taking insulin recently), CAD s/p stent (last stent placed 2 years ago), CVA s/p carotid stent 1 month ago, JACKELIN on CPAP, presents to the ED complaining of altered mental status and dysuria with leukocytosis, urine retention, ACS    Man Craft  Attending Physician   Division of Infectious Disease  Office #[phone]  Available on Microsoft Teams also  After 5pm/weekend or no response, call #[phone]

## 2022-05-04 NOTE — PROVIDER CONTACT NOTE (OTHER) - ACTION/TREATMENT ORDERED:
ACP notified and aware. EKG done. Zoll at bedside. Vitals monitored. Metoprolol STAT and digoxin STAT given. ACP notified and aware. EKG done. Zoll at bedside. Vitals monitored. Metoprolol IV Push STAT and digoxin IV push STAT given. Will continue to monitor.

## 2022-05-04 NOTE — PROGRESS NOTE ADULT - ASSESSMENT
80 y/o male with PMH prostate ca s/p Seed implant, HTN, DM2 on insulin, CAD s/p stent (last one about 2 yr ago), CVA per son, and JACKELIN on CPAP who presents with confusion. Cardiology consulted for NSTEMI.     #NSTEMI   EKG shows concerning ST changes that is indicative of ischemia but not meeting STEMI criteria (aVL not more than 1mm and no reciprocal changes). Patient denies any chest pain or SOB. Hemodynamically stable. Troponin elevation likely ACS, less likely PE. No need for emergent cath at this time.   -Continue aspirin 81mg daily   -Continue brilinta 90mg BID   -Continue heparin gtt    -Tele   -F/u echo results-notable WMA but unclear if new, moderate to severe LV dysfunction w/ severe MR   -If patient having active chest pain, hemodynamically unstable, or having arrhthymias please call cardiology   -Will cath patient when AMS resolves and possible active infection   -Hold atorvastatin given LFT abnormality   -Can continue home metoprolol but hold for SBP <90   -Hold losartan given SANDRA     #Afib  CHADSVASC 8, currently in sinus   -Continue heparin gtt   -Metoprolol  82 y/o male with PMH prostate ca s/p Seed implant, HTN, DM2 on insulin, CAD s/p stent (last one about 2 yr ago), CVA per son, and JACKELIN on CPAP who presents with confusion. Cardiology consulted for NSTEMI.     #NSTEMI   EKG shows concerning ST changes that is indicative of ischemia but not meeting STEMI criteria (aVL not more than 1mm and no reciprocal changes). Patient denies any chest pain or SOB. Hemodynamically stable. Troponin elevation likely ACS, less likely PE. No need for emergent cath at this time.   -Continue aspirin 81mg daily   -Continue brilinta 90mg BID   -Continue heparin gtt    -Tele   -F/u echo results-notable WMA but unclear if new, moderate to severe LV dysfunction w/ severe MR   -If patient having active chest pain, hemodynamically unstable, or having arrhthymias please call cardiology   -Will cath patient when AMS resolves and possible active infection   -Hold atorvastatin given LFT abnormality   -Can continue home metoprolol but hold for SBP <90   -Hold losartan given SANDRA     #Afib  CHADSVASC 8, currently in sinus   -Continue heparin gtt   -Metoprolol 50mg BID   -Would load digoxin 1mg today with 3 more doses of 250mcg q4 hrs, then start 125mcg once daily   -If HR continues to sustain despite digoxin, can give bolus amiodarone 150mg

## 2022-05-04 NOTE — PATIENT PROFILE ADULT - FALL HARM RISK - HARM RISK INTERVENTIONS
Assistance OOB with selected safe patient handling equipment/Communicate Risk of Fall with Harm to all staff/Reinforce activity limits and safety measures with patient and family/Tailored Fall Risk Interventions/Use of alarms - bed, chair and/or voice tab/Visual Cue: Yellow wristband and red socks/Bed in lowest position, wheels locked, appropriate side rails in place/Call bell, personal items and telephone in reach/Instruct patient to call for assistance before getting out of bed or chair/Non-slip footwear when patient is out of bed/[place] to call system/Physically safe environment - no spills, clutter or unnecessary equipment/Purposeful Proactive Rounding/Room/bathroom lighting operational, light cord in reach

## 2022-05-05 NOTE — BH CONSULTATION LIAISON ASSESSMENT NOTE - NSBHCHARTREVIEWVS_PSY_A_CORE FT
Vital Signs Last 24 Hrs  T(C): 36.9 (05 May 2022 06:13), Max: 37.1 (05 May 2022 02:00)  T(F): 98.4 (05 May 2022 06:13), Max: 98.8 (05 May 2022 02:00)  HR: 86 (05 May 2022 06:13) (86 - 160)  BP: 118/71 (05 May 2022 06:13) (99/69 - 118/71)  BP(mean): --  RR: 18 (05 May 2022 06:13) (18 - 18)  SpO2: 100% (05 May 2022 06:13) (100% - 100%)

## 2022-05-05 NOTE — PROGRESS NOTE ADULT - ASSESSMENT
80 y/o M with PMH of prostate cancer s/p seed implant, Hypertension, Type 2 diabetes on insulin (has not been taking insulin recently), CAD s/p stent (last stent placed 2 years ago), CVA s/p carotid stent 1 month ago, JACKELIN on CPAP, presents to the ED complaining of altered mental status and dysuria with leukocytosis, urine retention, ACS    Man Craft  Attending Physician   Division of Infectious Disease  Office #[phone]  Available on Microsoft Teams also  After 5pm/weekend or no response, call #[phone]

## 2022-05-05 NOTE — PROGRESS NOTE ADULT - SUBJECTIVE AND OBJECTIVE BOX
CHIDI RUBIO 81y [id number]    Patient is a 81y old  Male who presents with a chief complaint of NSTEMI, Hyperglycemia, SANDRA, Encephalopathy (05 May 2022 07:19)      Follow Up/CC:  ID following for leukocytosis     Interval History/ROS: no fever    Allergies    ceftriaxone (Urticaria)  ciprofloxacin (Rash)  schrimp (Unknown)    Intolerances        ANTIMICROBIALS:      MEDICATIONS  (STANDING):  aspirin enteric coated 81 milliGRAM(s) Oral daily  dextrose 5%. 1000 milliLiter(s) (100 mL/Hr) IV Continuous <Continuous>  dextrose 5%. 1000 milliLiter(s) (50 mL/Hr) IV Continuous <Continuous>  dextrose 50% Injectable 25 Gram(s) IV Push once  dextrose 50% Injectable 12.5 Gram(s) IV Push once  dextrose 50% Injectable 25 Gram(s) IV Push once  doxazosin 1 milliGRAM(s) Oral at bedtime  glucagon  Injectable 1 milliGRAM(s) IntraMuscular once  heparin  Infusion.  Unit(s)/Hr (10 mL/Hr) IV Continuous <Continuous>  insulin glargine Injectable (LANTUS) 14 Unit(s) SubCutaneous at bedtime  insulin lispro (ADMELOG) corrective regimen sliding scale   SubCutaneous three times a day before meals  insulin lispro (ADMELOG) corrective regimen sliding scale   SubCutaneous at bedtime  metoprolol tartrate 50 milliGRAM(s) Oral two times a day  potassium chloride  10 mEq/100 mL IVPB 10 milliEquivalent(s) IV Intermittent every 1 hour  potassium phosphate IVPB 15 milliMole(s) IV Intermittent once  sodium chloride 0.9% lock flush 3 milliLiter(s) IV Push every 8 hours  sodium chloride 0.9%. 1000 milliLiter(s) (75 mL/Hr) IV Continuous <Continuous>  ticagrelor 90 milliGRAM(s) Oral every 12 hours    MEDICATIONS  (PRN):  dextrose Oral Gel 15 Gram(s) Oral once PRN Blood Glucose LESS THAN 70 milliGRAM(s)/deciliter  heparin   Injectable 4000 Unit(s) IV Push every 6 hours PRN For aPTT less than 40        Vital Signs Last 24 Hrs  T(C): 36.9 (05 May 2022 06:13), Max: 37.1 (05 May 2022 02:00)  T(F): 98.4 (05 May 2022 06:13), Max: 98.8 (05 May 2022 02:00)  HR: 86 (05 May 2022 06:13) (86 - 125)  BP: 118/71 (05 May 2022 06:13) (100/71 - 118/71)  BP(mean): --  RR: 18 (05 May 2022 06:13) (18 - 18)  SpO2: 100% (05 May 2022 06:13) (100% - 100%)    CBC Full  -  ( 05 May 2022 07:04 )  WBC Count : 8.17 K/uL  RBC Count : 4.05 M/uL  Hemoglobin : 11.7 g/dL  Hematocrit : 35.5 %  Platelet Count - Automated : 257 K/uL  Mean Cell Volume : 87.7 fL  Mean Cell Hemoglobin : 28.9 pg  Mean Cell Hemoglobin Concentration : 33.0 gm/dL  Auto Neutrophil # : x  Auto Lymphocyte # : x  Auto Monocyte # : x  Auto Eosinophil # : x  Auto Basophil # : x  Auto Neutrophil % : x  Auto Lymphocyte % : x  Auto Monocyte % : x  Auto Eosinophil % : x  Auto Basophil % : x    05-05    142  |  107  |  26<H>  ----------------------------<  256<H>  3.5   |  22  |  1.23    Ca    8.5      05 May 2022 07:04  Phos  2.1     05-05  Mg     1.80     05-05            MICROBIOLOGY:  .Blood Blood-Peripheral  05-02-22   No growth to date.  --  --      RADIOLOGY    < from: Xray Chest 1 View AP/PA (05.02.22 @ 15:17) >  No focal consolidations.    < end of copied text >

## 2022-05-05 NOTE — BH CONSULTATION LIAISON ASSESSMENT NOTE - HPI (INCLUDE ILLNESS QUALITY, SEVERITY, DURATION, TIMING, CONTEXT, MODIFYING FACTORS, ASSOCIATED SIGNS AND SYMPTOMS)
Patient is an 82 y/o M with PMH of prostate cancer s/p seed implant, Hypertension, Type 2 diabetes on insulin (has not been taking insulin recently), CAD s/p stent (last stent placed 2 years ago), CVA s/p carotid stent 1 month ago, JACKELIN on CPAP, presents to the ED complaining of altered mental status and dysuria.  +NSTEMI- on hep gtt with plan for eventual ischemic work up. Patient comes from home, lives with children, takes care of self with ADLS and IADLs. retired . Active member of his [employer]. No psychiatric hx.   Psychiatry consulted for agitation and aggression.  attempted to hit staff today. + confusion.    Patient was seen and assessed at bedside. Patient is alert, oriented x 1. Unsure of where he is, why he is here, date. Patient is illogical and loose in thought process and pattern. Often talks about being on the commission. He is noted to be restless, pulling at lines. Difficult to redirect.  Patient is loose on exam, allowed for physical exam at this time, however did not allow medical team provider to approach earlier today. Does not report AH or VH. Denies SI and HI.    Spoke with son and daughter listed above. As per family, patient is able to care for self at home, oriented. Has no hx of delirium on previous hospital stays. No psych hx.   Discussed options of treatment for agitation. BBW discussed. Family in agreement with antipsychotic medication - aware this should be cleared by cardiology and primary medical team.

## 2022-05-05 NOTE — BH CONSULTATION LIAISON ASSESSMENT NOTE - CURRENT MEDICATION
MEDICATIONS  (STANDING):  aspirin enteric coated 81 milliGRAM(s) Oral daily  dextrose 5%. 1000 milliLiter(s) (100 mL/Hr) IV Continuous <Continuous>  dextrose 5%. 1000 milliLiter(s) (50 mL/Hr) IV Continuous <Continuous>  dextrose 50% Injectable 25 Gram(s) IV Push once  dextrose 50% Injectable 12.5 Gram(s) IV Push once  dextrose 50% Injectable 25 Gram(s) IV Push once  doxazosin 1 milliGRAM(s) Oral at bedtime  glucagon  Injectable 1 milliGRAM(s) IntraMuscular once  heparin  Infusion.  Unit(s)/Hr (10 mL/Hr) IV Continuous <Continuous>  insulin glargine Injectable (LANTUS) 14 Unit(s) SubCutaneous at bedtime  insulin lispro (ADMELOG) corrective regimen sliding scale   SubCutaneous three times a day before meals  insulin lispro (ADMELOG) corrective regimen sliding scale   SubCutaneous at bedtime  metoprolol tartrate 50 milliGRAM(s) Oral two times a day  potassium chloride  10 mEq/100 mL IVPB 10 milliEquivalent(s) IV Intermittent every 1 hour  potassium phosphate IVPB 15 milliMole(s) IV Intermittent once  sodium chloride 0.9% lock flush 3 milliLiter(s) IV Push every 8 hours  sodium chloride 0.9%. 1000 milliLiter(s) (75 mL/Hr) IV Continuous <Continuous>  ticagrelor 90 milliGRAM(s) Oral every 12 hours    MEDICATIONS  (PRN):  dextrose Oral Gel 15 Gram(s) Oral once PRN Blood Glucose LESS THAN 70 milliGRAM(s)/deciliter  heparin   Injectable 4000 Unit(s) IV Push every 6 hours PRN For aPTT less than 40

## 2022-05-05 NOTE — PROGRESS NOTE ADULT - ASSESSMENT
80 y/o male with PMH prostate ca s/p Seed implant, HTN, DM2 on insulin, CAD s/p stent (last one about 2 yr ago), CVA per son, and JACKELIN on CPAP who presents with confusion. Cardiology consulted for NSTEMI.     #NSTEMI   EKG shows concerning ST changes that is indicative of ischemia but not meeting STEMI criteria (aVL not more than 1mm and no reciprocal changes). Patient denies any chest pain or SOB. Hemodynamically stable. Troponin elevation likely ACS, less likely PE. No need for emergent cath at this time.   -Continue aspirin 81mg daily   -Continue brilinta 90mg BID   -Continue heparin gtt    -Tele   -F/u echo results-notable WMA but unclear if new, moderate to severe LV dysfunction w/ severe MR   -If patient having active chest pain, hemodynamically unstable, or having arrhthymias please call cardiology   -Will cath patient when AMS resolves   -Hold atorvastatin given LFT abnormality   -Can continue home metoprolol but hold for SBP <90   -Hold losartan given SANDRA     #Afib  CHADSVASC 8, currently in sinus   -Continue heparin gtt   -Metoprolol 50mg BID   -Digoxin 125mcg once daily   -Please obtain digoxin level in am

## 2022-05-05 NOTE — PROGRESS NOTE ADULT - SUBJECTIVE AND OBJECTIVE BOX
Patient seen and examined at bedside.    Overnight Events:     No AEON     Denies any chest pain, SOB, palpitations, lightheadedness, dizziness     AAOx2 (does not know hospital name)                 Current Meds:  aspirin enteric coated 81 milliGRAM(s) Oral daily  dextrose 5%. 1000 milliLiter(s) IV Continuous <Continuous>  dextrose 5%. 1000 milliLiter(s) IV Continuous <Continuous>  dextrose 50% Injectable 25 Gram(s) IV Push once  dextrose 50% Injectable 12.5 Gram(s) IV Push once  dextrose 50% Injectable 25 Gram(s) IV Push once  dextrose Oral Gel 15 Gram(s) Oral once PRN  doxazosin 1 milliGRAM(s) Oral at bedtime  glucagon  Injectable 1 milliGRAM(s) IntraMuscular once  heparin   Injectable 4000 Unit(s) IV Push every 6 hours PRN  heparin  Infusion.  Unit(s)/Hr IV Continuous <Continuous>  insulin glargine Injectable (LANTUS) 14 Unit(s) SubCutaneous at bedtime  insulin lispro (ADMELOG) corrective regimen sliding scale   SubCutaneous three times a day before meals  insulin lispro (ADMELOG) corrective regimen sliding scale   SubCutaneous at bedtime  metoprolol tartrate 50 milliGRAM(s) Oral two times a day  sodium chloride 0.9% lock flush 3 milliLiter(s) IV Push every 8 hours  sodium chloride 0.9%. 1000 milliLiter(s) IV Continuous <Continuous>  ticagrelor 90 milliGRAM(s) Oral every 12 hours      Vitals:  T(F): 98.4 (05-05), Max: 98.8 (05-05)  HR: 86 (05-05) (86 - 170)  BP: 118/71 (05-05) (96/65 - 118/71)  RR: 18 (05-05)  SpO2: 100% (05-05)  I&O's Summary    04 May 2022 07:01  -  05 May 2022 07:00  --------------------------------------------------------  IN: 0 mL / OUT: 1375 mL / NET: -1375 mL        Physical Exam:  GENERAL: In no acute distress   CHEST/LUNG: Clear to auscultation bilaterally; No wheeze, equal breath sounds bilaterally   HEART: Regular rate and rhythm; No murmurs, rubs, or gallops  EXTREMITIES:  No clubbing, cyanosis, or edema  NEUROLOGY: AAOx3 as above, non-focal   SKIN: Normal color, No rashes or lesions  LINES:                          11.7   8.17  )-----------( 257      ( 05 May 2022 07:04 )             35.5     05-04    140  |  107  |  31<H>  ----------------------------<  316<H>  3.6   |  21<L>  |  1.31<H>    Ca    8.5      04 May 2022 23:59  Phos  2.0     05-04  Mg     1.70     05-04      PT/INR - ( 04 May 2022 15:32 )   PT: 14.8 sec;   INR: 1.27 ratio         PTT - ( 05 May 2022 06:36 )  PTT:80.1 sec  CARDIAC MARKERS ( 03 May 2022 19:00 )  2667 ng/L / x     / x     / x     / x     / x      CARDIAC MARKERS ( 03 May 2022 07:39 )  3101 ng/L / x     / x     / x     / x     / x      CARDIAC MARKERS ( 02 May 2022 21:51 )  3041 ng/L / x     / x     / 956 U/L / x     / 8.5 ng/mL  CARDIAC MARKERS ( 02 May 2022 16:37 )  x     / x     / x     / 863 U/L / x     / 9.9 ng/mL  CARDIAC MARKERS ( 02 May 2022 16:35 )  3089 ng/L / x     / x     / x     / x     / x                  New ECG(s): Personally reviewed    Echo:    Stress Testing:     Cath:    Imaging:    Interpretation of Telemetry:

## 2022-05-05 NOTE — SWALLOW BEDSIDE ASSESSMENT ADULT - COMMENTS
As per Internal Medicine 5/4/22, pt is a "80 y/o M with PMH of prostate cancer s/p seed implant, Hypertension, Type 2 diabetes on insulin (has not been taking insulin recently), CAD s/p stent (last stent placed 2 years ago), CVA s/p carotid stent 1 month ago, JACKELIN on CPAP, presents to the ED complaining of altered mental status and dysuria."    WBC is WFL. CXR 5/2/22 revealed "No focal consolidations."    Patient seen at bedside, awake/alert and oriented, during clinical swallow evaluation this AM. Patient able to follow directives and answer questions. Patient is receiving supplemental O2 via nasal cannula. Patient was cooperative and accepted PO trials.

## 2022-05-05 NOTE — PROGRESS NOTE ADULT - SUBJECTIVE AND OBJECTIVE BOX
OMAIRA RUBIOD  81y  Male      Patient is a 81y old  Male who presents with a chief complaint of NSTEMI, Hyperglycemia, SANDRA, Encephalopathy (05 May 2022 12:30)  -Above noted.Patient remains confused at times.no sob,no cp,no fever,no cough.  FS runs high    REVIEW OF SYSTEMS:  as above  INTERVAL HPI/OVERNIGHT EVENTS:  T(C): 36.8 (05-05-22 @ 17:14), Max: 37.1 (05-05-22 @ 02:00)  HR: 93 (05-05-22 @ 17:14) (86 - 98)  BP: 117/82 (05-05-22 @ 17:14) (104/73 - 118/71)  RR: 16 (05-05-22 @ 17:14) (16 - 18)  SpO2: 99% (05-05-22 @ 17:14) (99% - 100%)  Wt(kg): --  I&O's Summary    04 May 2022 07:01  -  05 May 2022 07:00  --------------------------------------------------------  IN: 0 mL / OUT: 1375 mL / NET: -1375 mL    05 May 2022 07:01  -  05 May 2022 20:16  --------------------------------------------------------  IN: 0 mL / OUT: 600 mL / NET: -600 mL      T(C): 36.8 (05-05-22 @ 17:14), Max: 37.1 (05-05-22 @ 02:00)  HR: 93 (05-05-22 @ 17:14) (86 - 98)  BP: 117/82 (05-05-22 @ 17:14) (104/73 - 118/71)  RR: 16 (05-05-22 @ 17:14) (16 - 18)  SpO2: 99% (05-05-22 @ 17:14) (99% - 100%)  Wt(kg): --Vital Signs Last 24 Hrs  T(C): 36.8 (05 May 2022 17:14), Max: 37.1 (05 May 2022 02:00)  T(F): 98.3 (05 May 2022 17:14), Max: 98.8 (05 May 2022 02:00)  HR: 93 (05 May 2022 17:14) (86 - 98)  BP: 117/82 (05 May 2022 17:14) (104/73 - 118/71)  BP(mean): --  RR: 16 (05 May 2022 17:14) (16 - 18)  SpO2: 99% (05 May 2022 17:14) (99% - 100%)    LABS:                        11.7   8.17  )-----------( 257      ( 05 May 2022 07:04 )             35.5     05-05    142  |  107  |  26<H>  ----------------------------<  256<H>  3.5   |  22  |  1.23    Ca    8.5      05 May 2022 07:04  Phos  2.1     05-05  Mg     1.80     05-05      PT/INR - ( 04 May 2022 15:32 )   PT: 14.8 sec;   INR: 1.27 ratio         PTT - ( 05 May 2022 14:11 )  PTT:53.4 sec    CAPILLARY BLOOD GLUCOSE      POCT Blood Glucose.: 317 mg/dL (05 May 2022 16:30)  POCT Blood Glucose.: 303 mg/dL (05 May 2022 12:17)  POCT Blood Glucose.: 270 mg/dL (05 May 2022 07:45)  POCT Blood Glucose.: 331 mg/dL (04 May 2022 22:14)            PAST MEDICAL & SURGICAL HISTORY:  Diabetes mellitus    CAD (coronary artery disease)    Prostate ca  s/p SEED 5 yrs ago    Sleep apnea  on cpap at home    Hypertension    Hypercholesteremia    S/P cholecystectomy    S/P coronary angioplasty  last stent 1 year ago    H/O carotid endarterectomy  April 2022, stent placed.        MEDICATIONS  (STANDING):  aspirin enteric coated 81 milliGRAM(s) Oral daily  dextrose 5%. 1000 milliLiter(s) (100 mL/Hr) IV Continuous <Continuous>  dextrose 5%. 1000 milliLiter(s) (50 mL/Hr) IV Continuous <Continuous>  dextrose 50% Injectable 25 Gram(s) IV Push once  dextrose 50% Injectable 12.5 Gram(s) IV Push once  dextrose 50% Injectable 25 Gram(s) IV Push once  doxazosin 1 milliGRAM(s) Oral at bedtime  ertapenem  IVPB 1000 milliGRAM(s) IV Intermittent every 24 hours  glucagon  Injectable 1 milliGRAM(s) IntraMuscular once  heparin  Infusion.  Unit(s)/Hr (10 mL/Hr) IV Continuous <Continuous>  insulin glargine Injectable (LANTUS) 14 Unit(s) SubCutaneous at bedtime  insulin lispro (ADMELOG) corrective regimen sliding scale   SubCutaneous three times a day before meals  insulin lispro (ADMELOG) corrective regimen sliding scale   SubCutaneous at bedtime  metoprolol tartrate 50 milliGRAM(s) Oral two times a day  sodium chloride 0.9% lock flush 3 milliLiter(s) IV Push every 8 hours  sodium chloride 0.9%. 1000 milliLiter(s) (75 mL/Hr) IV Continuous <Continuous>  ticagrelor 90 milliGRAM(s) Oral every 12 hours    MEDICATIONS  (PRN):  dextrose Oral Gel 15 Gram(s) Oral once PRN Blood Glucose LESS THAN 70 milliGRAM(s)/deciliter  heparin   Injectable 4000 Unit(s) IV Push every 6 hours PRN For aPTT less than 40        RADIOLOGY & ADDITIONAL TESTS:    Imaging Personally Reviewed:  [ ] YES  [ ] NO    Consultant(s) Notes Reviewed:  [x ] YES  [ ] NO    PHYSICAL EXAM:  GENERAL: Alert and awake lying in bed in no distress,confused  HEAD:  Atraumatic, Normocephalic  EYES: EOMI, [place], conjunctiva and sclera clear  NECK: Supple, No JVD, Normal thyroid  NERVOUS SYSTEM:  Alert & Oriented 23, Motor and sensory systems are intact,   CHEST/LUNG: Bilateral clear breath sounds, no rhochi, no wheezing, no crepitations,  HEART: Regular rate and rhythm; No murmurs, rubs, or gallops  ABDOMEN: Soft, Nontender, Nondistended; Bowel sounds present  EXTREMITIES:   Peripheral Pulses are palpable, no  edema        Care Discussed with Consultants/Other Providers [ ] YES  [ ] NO      Code Status: [] Full Code [] DNR [] DNI [] Goals of Care:   Disposition: [] ICU [] Stroke Unit [] RCU []PCU []Floor [] Discharge Home         ROSCOE Casey

## 2022-05-05 NOTE — BH CONSULTATION LIAISON ASSESSMENT NOTE - NSBHPSYCHOLCOGABN_PSY_A_CORE
2 RN skin check complete with CHARLES Ji.  Devices in place: SCD, Cortrak, silicone nasal cannula.              Skin assessed under the mentioned devices.              Preventative measures in place including Sacral mepilex.  Following areas of concern:   ·           Heels red/blanching  -           Scattered bruising/abrasions to lower extremities  -           Large head laceration, staples in place, open to air.   Wound consult placedYES/NO: no    Wound reported YES/NO: N\A  Appropriate LDAs opened YES/NO: yes   disoriented to time/disoriented to place/disoriented to situation

## 2022-05-05 NOTE — BH CONSULTATION LIAISON ASSESSMENT NOTE - NSBHCONSULTFOLLOWAFTERCARE_PSY_A_CORE FT
CL will follow during hospitalization, however if patient imrpoves and safe DC plan has been created, no contraindications for dc     [facility] [phone] (M-F 9am-7pm)   [facility] -[phone]   CL will follow during hospitalization, however if patient improves and safe DC plan has been created, no contraindications for dc     [facility] [phone] (M-F 9am-7pm)   [facility] -[phone]

## 2022-05-05 NOTE — CHART NOTE - NSCHARTNOTEFT_GEN_A_CORE
Discussed with cardiology: ok for prn Zyprexa as needed for agitation as QTC <500.   Discussed case with ID: will start Ertapenem 1 g QD x3 days.  CTH ordered to further evaluate AMS.

## 2022-05-05 NOTE — BH CONSULTATION LIAISON ASSESSMENT NOTE - RISK ASSESSMENT
risk: male gender, ams  Protective: no SI or HI, no hx of SA, family support, lives with family, [employer]

## 2022-05-05 NOTE — BH CONSULTATION LIAISON ASSESSMENT NOTE - CASE SUMMARY
met with the patient along with Rosalinda Harp NP impression and plan discussed and agreed upon. Confused, disoriented, confabulating, illogical. Calmer after earlier prn zyprexa.   Care coordinated with family  Care coordinated with medicine acp

## 2022-05-05 NOTE — BH CONSULTATION LIAISON ASSESSMENT NOTE - NSSUICPROTFACT_PSY_ALL_CORE
Responsibility to children, family, or others/Supportive social network of family or friends/Cultural, spiritual and/or moral attitudes against suicide

## 2022-05-05 NOTE — BH CONSULTATION LIAISON ASSESSMENT NOTE - NSBHCHARTREVIEWLAB_PSY_A_CORE FT
11.7   8.17  )-----------( 257      ( 05 May 2022 07:04 )             35.5   05-05    142  |  107  |  26<H>  ----------------------------<  256<H>  3.5   |  22  |  1.23    Ca    8.5      05 May 2022 07:04  Phos  2.1     05-05  Mg     1.80     05-05

## 2022-05-05 NOTE — SWALLOW BEDSIDE ASSESSMENT ADULT - SWALLOW EVAL: DIAGNOSIS
1) Mild Oral Stage Dysphagia for soft and bite sized marked by adequate oral containment, slow mastication and reduced collection with delayed posterior transport. Adequate oral clearance post swallow. 2) Functional oral stage of swallow for puree, mildly thick liquids, and thin liquids marked by adequate bolus containment, manipulation, and transfer with adequate oral clearance post swallow. 3) Functional pharyngeal stage of swallow for soft and bite sized, puree, mildly thick liquids, and thin liquids marked by laryngeal elevation upon palpation and initiation of the pharyngeal swallow. No overt s/sx of penetration/aspiration for all consistencies.

## 2022-05-05 NOTE — BH CONSULTATION LIAISON ASSESSMENT NOTE - SUMMARY
Patient is an 82 y/o M with PMH of prostate cancer s/p seed implant, Hypertension, Type 2 diabetes on insulin (has not been taking insulin recently), CAD s/p stent (last stent placed 2 years ago), CVA s/p carotid stent 1 month ago, JACKELIN on CPAP, presents to the ED complaining of altered mental status and dysuria.  +NSTEMI- on hep gtt with plan for eventual ischemic work up. Patient comes from home, lives with children, takes care of self with ADLS and IADLs. retired . Active member of his [employer]. No psychiatric hx.   Psychiatry consulted for agitation and aggression.  attempted to hit staff today. + confusion.    PLAN  - defer observation status to primary team - no SI or HI - patient may benefit from increased level of observation due to confusion  - ensure b12, folate, tsh checked  - ongoing medical work up/treatment  - PRN for agitation/aggression   --- zyprexa 2.5mg q6hrs PRN, IF DEEMED OKAY BY CARDIOLOGY due to active cardiac issues   - In order to enhance patient's overall well-being and clinical course, please try avoiding benzodiazepines, anticholinergics, and antihistamines (Can cause worsening confusion/delirium). Additionally, continue reorientation, supportive care, maintaining regular sleep/wake cycle, and optimizing nutritional/medical factors.  Patient is an 82 y/o M with PMH of prostate cancer s/p seed implant, Hypertension, Type 2 diabetes on insulin (has not been taking insulin recently), CAD s/p stent (last stent placed 2 years ago), CVA s/p carotid stent 1 month ago, JACKELIN on CPAP, presents to the ED complaining of altered mental status and dysuria.  +NSTEMI- on hep gtt with plan for eventual ischemic work up. Patient comes from home, lives with children, takes care of self with ADLS and IADLs. retired . Active member of his [employer]. No psychiatric hx.   Psychiatry consulted for agitation and aggression.  attempted to hit staff today. + confusion.    PLAN  - defer observation status to primary team - no SI or HI - patient may benefit from increased level of observation due to confusion  - ensure b12, folate, tsh, ua/uc checked.  - ongoing medical work up/treatment  - PRN for agitation/aggression   --- zyprexa 2.5mg q6hrs PRN IM/PO IF DEEMED OKAY BY CARDIOLOGY due to active cardiac issues and if qtc<500  - In order to enhance patient's overall well-being and clinical course, please try avoiding benzodiazepines, anticholinergics, and antihistamines (Can cause worsening confusion/delirium). Additionally, continue reorientation, supportive care, maintaining regular sleep/wake cycle, and optimizing nutritional/medical factors.

## 2022-05-06 NOTE — BH CONSULTATION LIAISON PROGRESS NOTE - NSBHASSESSMENTFT_PSY_ALL_CORE
Patient is an 82 y/o M with PMH of prostate cancer s/p seed implant, Hypertension, Type 2 diabetes on insulin (has not been taking insulin recently), CAD s/p stent (last stent placed 2 years ago), CVA s/p carotid stent 1 month ago, JACKELIN on CPAP, presents to the ED complaining of altered mental status and dysuria.  +NSTEMI- on hep gtt with plan for eventual ischemic work up. Patient comes from home, lives with children, takes care of self with ADLS and IADLs. retired . Active member of his [employer]. No psychiatric hx.   Psychiatry consulted for agitation and aggression.  attempted to hit staff today. + confusion.    5/6: patient seen, received Zyprexa prn for attempting to pull out parnell, pleasantly confused     PLAN  - defer observation status to primary team - no SI or HI - patient may benefit from increased level of observation due to confusion  - ensure b12, folate, tsh, ua/uc checked.  - ongoing medical work up/treatment  - PRN for agitation/aggression   --- zyprexa 2.5mg q6hrs PRN IM/PO IF DEEMED OKAY BY CARDIOLOGY due to active cardiac issues and if qtc<500  - In order to enhance patient's overall well-being and clinical course, please try avoiding benzodiazepines, anticholinergics, and antihistamines (Can cause worsening confusion/delirium). Additionally, continue reorientation, supportive care, maintaining regular sleep/wake cycle, and optimizing nutritional/medical factors.

## 2022-05-06 NOTE — PROGRESS NOTE ADULT - ASSESSMENT
80 y/o M with PMH of prostate cancer s/p seed implant, Hypertension, Type 2 diabetes on insulin (has not been taking insulin recently), CAD s/p stent (last stent placed 2 years ago), CVA s/p carotid stent 1 month ago, JACKELIN on CPAP, presents to the ED complaining of altered mental status and dysuria with leukocytosis, urine retention, ACS    Man Craft  Attending Physician   Division of Infectious Disease  Office #[phone]  Available on Microsoft Teams also  After 5pm/weekend or no response, call #[phone]

## 2022-05-06 NOTE — BH CONSULTATION LIAISON PROGRESS NOTE - NSBHFUPINTERVALHXFT_PSY_A_CORE
Patient seen for follow up given received prn for agitation, patient lying in bed watching television, PCA at bedside maintaining 1:1 constant observation, patient is calm, frequently smiling, appears happy, pleasantly confused, states, "Somebody's chasing her." Follow up interview limited given patient's confusion and altered mental status at this time.

## 2022-05-06 NOTE — PROGRESS NOTE ADULT - SUBJECTIVE AND OBJECTIVE BOX
CHIDI RUBIO  81y  Male      Patient is a 81y old  Male who presents with a chief complaint of NSTEMI, Hyperglycemia, SANDRA, Encephalopathy (06 May 2022 10:00)  Patient is comfortable,nad.seen earlier.,agitated at times.no sob,no cp,no fever,no cough    REVIEW OF SYSTEMS:  CONSTITUTIONAL: No fever  RESPIRATORY: No cough, hemoptysis or shortness of breath  CARDIOVASCULAR: No chest pain, palpitations, dizziness, or leg swelling  GASTROINTESTINAL: No abdominal pain. nausea, vomiting, hematemesis    INTERVAL HPI/OVERNIGHT EVENTS:  T(C): 36.6 (05-06-22 @ 18:27), Max: 36.8 (05-05-22 @ 22:50)  HR: 100 (05-06-22 @ 18:27) (65 - 100)  BP: 140/62 (05-06-22 @ 18:27) (111/83 - 140/62)  RR: 18 (05-06-22 @ 18:27) (17 - 18)  SpO2: 100% (05-06-22 @ 18:27) (94% - 100%)  Wt(kg): --  I&O's Summary    05 May 2022 07:01  -  06 May 2022 07:00  --------------------------------------------------------  IN: 0 mL / OUT: 600 mL / NET: -600 mL    06 May 2022 07:01  -  06 May 2022 21:39  --------------------------------------------------------  IN: 0 mL / OUT: 600 mL / NET: -600 mL      T(C): 36.6 (05-06-22 @ 18:27), Max: 36.8 (05-05-22 @ 22:50)  HR: 100 (05-06-22 @ 18:27) (65 - 100)  BP: 140/62 (05-06-22 @ 18:27) (111/83 - 140/62)  RR: 18 (05-06-22 @ 18:27) (17 - 18)  SpO2: 100% (05-06-22 @ 18:27) (94% - 100%)  Wt(kg): --Vital Signs Last 24 Hrs  T(C): 36.6 (06 May 2022 18:27), Max: 36.8 (05 May 2022 22:50)  T(F): 97.8 (06 May 2022 18:27), Max: 98.3 (05 May 2022 22:50)  HR: 100 (06 May 2022 18:27) (65 - 100)  BP: 140/62 (06 May 2022 18:27) (111/83 - 140/62)  BP(mean): --  RR: 18 (06 May 2022 18:27) (17 - 18)  SpO2: 100% (06 May 2022 18:27) (94% - 100%)    LABS:                        11.7   8.16  )-----------( 286      ( 06 May 2022 05:13 )             35.1     05-06    144  |  108<H>  |  16  ----------------------------<  216<H>  3.5   |  24  |  1.21    Ca    8.6      06 May 2022 05:13  Phos  2.7     05-06  Mg     1.80     05-06      PTT - ( 06 May 2022 14:48 )  PTT:75.9 sec    CAPILLARY BLOOD GLUCOSE      POCT Blood Glucose.: 299 mg/dL (06 May 2022 17:03)  POCT Blood Glucose.: 348 mg/dL (06 May 2022 11:24)  POCT Blood Glucose.: 211 mg/dL (06 May 2022 07:20)  POCT Blood Glucose.: 221 mg/dL (05 May 2022 22:45)            PAST MEDICAL & SURGICAL HISTORY:  Diabetes mellitus    CAD (coronary artery disease)    Prostate ca  s/p SEED 5 yrs ago    Sleep apnea  on cpap at home    Hypertension    Hypercholesteremia    S/P cholecystectomy    S/P coronary angioplasty  last stent 1 year ago    H/O carotid endarterectomy  April 2022, stent placed.        MEDICATIONS  (STANDING):  aspirin enteric coated 81 milliGRAM(s) Oral daily  dextrose 5%. 1000 milliLiter(s) (100 mL/Hr) IV Continuous <Continuous>  dextrose 5%. 1000 milliLiter(s) (50 mL/Hr) IV Continuous <Continuous>  dextrose 50% Injectable 25 Gram(s) IV Push once  dextrose 50% Injectable 12.5 Gram(s) IV Push once  dextrose 50% Injectable 25 Gram(s) IV Push once  digoxin     Tablet 125 MICROGram(s) Oral daily  doxazosin 1 milliGRAM(s) Oral at bedtime  ertapenem  IVPB 1000 milliGRAM(s) IV Intermittent every 24 hours  glucagon  Injectable 1 milliGRAM(s) IntraMuscular once  heparin  Infusion.  Unit(s)/Hr (10 mL/Hr) IV Continuous <Continuous>  insulin glargine Injectable (LANTUS) 14 Unit(s) SubCutaneous at bedtime  insulin lispro (ADMELOG) corrective regimen sliding scale   SubCutaneous three times a day before meals  insulin lispro (ADMELOG) corrective regimen sliding scale   SubCutaneous at bedtime  metoprolol tartrate 50 milliGRAM(s) Oral two times a day  sodium chloride 0.9% lock flush 3 milliLiter(s) IV Push every 8 hours  sodium chloride 0.9%. 1000 milliLiter(s) (75 mL/Hr) IV Continuous <Continuous>  ticagrelor 90 milliGRAM(s) Oral every 12 hours    MEDICATIONS  (PRN):  dextrose Oral Gel 15 Gram(s) Oral once PRN Blood Glucose LESS THAN 70 milliGRAM(s)/deciliter  heparin   Injectable 4000 Unit(s) IV Push every 6 hours PRN For aPTT less than 40        RADIOLOGY & ADDITIONAL TESTS:    Imaging Personally Reviewed:  [ ] YES  [ ] NO    Consultant(s) Notes Reviewed:  [ x] YES  [ ] NO    PHYSICAL EXAM:  GENERAL: Alert and awake lying in bed in no distress  HEAD:  Atraumatic, Normocephalic  EYES: EOMI, [place], conjunctiva and sclera clear  NECK: Supple, No JVD, Normal thyroid  NERVOUS SYSTEM:  Alert & Oriented X1, Motor and sensory systems are intact,   CHEST/LUNG: Bilateral clear breath sounds, no rhochi, no wheezing, no crepitations,  HEART: Regular rate and rhythm; No murmurs, rubs, or gallops  ABDOMEN: Soft, Nontender, Nondistended; Bowel sounds present  EXTREMITIES:   Peripheral Pulses are palpable, no  edema        Care Discussed with Consultants/Other Providers [ x] YES  [ ] NO      Code Status: [] Full Code [] DNR [] DNI [] Goals of Care:   Disposition: [] ICU [] Stroke Unit [] RCU []PCU []Floor [] Discharge Home         ROSCOE Casey

## 2022-05-06 NOTE — PROGRESS NOTE ADULT - SUBJECTIVE AND OBJECTIVE BOX
Patient seen and examined at bedside.    Overnight Events:     No AEON     AAOx1 (did not know location or time), denied any chest pain, SOB        Current Meds:  aspirin enteric coated 81 milliGRAM(s) Oral daily  dextrose 5%. 1000 milliLiter(s) IV Continuous <Continuous>  dextrose 5%. 1000 milliLiter(s) IV Continuous <Continuous>  dextrose 50% Injectable 25 Gram(s) IV Push once  dextrose 50% Injectable 12.5 Gram(s) IV Push once  dextrose 50% Injectable 25 Gram(s) IV Push once  dextrose Oral Gel 15 Gram(s) Oral once PRN  digoxin     Tablet 125 MICROGram(s) Oral daily  doxazosin 1 milliGRAM(s) Oral at bedtime  ertapenem  IVPB 1000 milliGRAM(s) IV Intermittent every 24 hours  glucagon  Injectable 1 milliGRAM(s) IntraMuscular once  heparin   Injectable 4000 Unit(s) IV Push every 6 hours PRN  heparin  Infusion.  Unit(s)/Hr IV Continuous <Continuous>  insulin glargine Injectable (LANTUS) 14 Unit(s) SubCutaneous at bedtime  insulin lispro (ADMELOG) corrective regimen sliding scale   SubCutaneous three times a day before meals  insulin lispro (ADMELOG) corrective regimen sliding scale   SubCutaneous at bedtime  metoprolol tartrate 50 milliGRAM(s) Oral two times a day  potassium chloride  10 mEq/100 mL IVPB 10 milliEquivalent(s) IV Intermittent every 1 hour  sodium chloride 0.9% lock flush 3 milliLiter(s) IV Push every 8 hours  sodium chloride 0.9%. 1000 milliLiter(s) IV Continuous <Continuous>  ticagrelor 90 milliGRAM(s) Oral every 12 hours      Vitals:  T(F): 97.7 (05-06), Max: 98.3 (05-05)  HR: 93 (05-06) (65 - 98)  BP: 111/83 (05-06) (104/73 - 134/67)  RR: 18 (05-06)  SpO2: 94% (05-06)  I&O's Summary    05 May 2022 07:01  -  06 May 2022 07:00  --------------------------------------------------------  IN: 0 mL / OUT: 600 mL / NET: -600 mL        Physical Exam:  GENERAL: In no acute distress   CHEST/LUNG: Clear to auscultation bilaterally; No wheeze, equal breath sounds bilaterally   HEART: Regular rate and rhythm; No murmurs, rubs, or gallops  EXTREMITIES:  No clubbing, cyanosis, or edema  NEUROLOGY: AAOx1, non-focal   SKIN: Normal color, No rashes or lesions  LINES:                          11.7   8.16  )-----------( 286      ( 06 May 2022 05:13 )             35.1     05-06    144  |  108<H>  |  16  ----------------------------<  216<H>  3.5   |  24  |  1.21    Ca    8.6      06 May 2022 05:13  Phos  2.7     05-06  Mg     1.80     05-06      PT/INR - ( 04 May 2022 15:32 )   PT: 14.8 sec;   INR: 1.27 ratio         PTT - ( 06 May 2022 05:13 )  PTT:74.3 sec  CARDIAC MARKERS ( 03 May 2022 19:00 )  2667 ng/L / x     / x     / x     / x     / x      CARDIAC MARKERS ( 03 May 2022 07:39 )  3101 ng/L / x     / x     / x     / x     / x      CARDIAC MARKERS ( 02 May 2022 21:51 )  3041 ng/L / x     / x     / 956 U/L / x     / 8.5 ng/mL  CARDIAC MARKERS ( 02 May 2022 16:37 )  x     / x     / x     / 863 U/L / x     / 9.9 ng/mL  CARDIAC MARKERS ( 02 May 2022 16:35 )  3089 ng/L / x     / x     / x     / x     / x                  New ECG(s): Personally reviewed    Echo:    Stress Testing:     Cath:    Imaging:    Interpretation of Telemetry:

## 2022-05-06 NOTE — BH CONSULTATION LIAISON PROGRESS NOTE - NSBHCONSULTFOLLOWAFTERCARE_PSY_A_CORE FT
CL will follow during hospitalization, however if patient improves and safe DC plan has been created, no contraindications for dc     [facility] [phone] (M-F 9am-7pm)   [facility] [phone]

## 2022-05-06 NOTE — BH CONSULTATION LIAISON PROGRESS NOTE - NSBHCHARTREVIEWLAB_PSY_A_CORE FT
CBC Full  -  ( 06 May 2022 05:13 )  WBC Count : 8.16 K/uL  RBC Count : 4.11 M/uL  Hemoglobin : 11.7 g/dL  Hematocrit : 35.1 %  Platelet Count - Automated : 286 K/uL  Mean Cell Volume : 85.4 fL  Mean Cell Hemoglobin : 28.5 pg  Mean Cell Hemoglobin Concentration : 33.3 gm/dL  Auto Neutrophil # : x  Auto Lymphocyte # : x  Auto Monocyte # : x  Auto Eosinophil # : x  Auto Basophil # : x  Auto Neutrophil % : x  Auto Lymphocyte % : x  Auto Monocyte % : x  Auto Eosinophil % : x  Auto Basophil % : x  05-06    144  |  108<H>  |  16  ----------------------------<  216<H>  3.5   |  24  |  1.21    Ca    8.6      06 May 2022 05:13  Phos  2.7     05-06  Mg     1.80     05-06

## 2022-05-06 NOTE — PROGRESS NOTE ADULT - SUBJECTIVE AND OBJECTIVE BOX
CHIDI RUBIO 81y [id number]    Patient is a 81y old  Male who presents with a chief complaint of NSTEMI, Hyperglycemia, SANDRA, Encephalopathy (06 May 2022 07:30)      Follow Up/CC:  ID following for ?UTI     Interval History/ROS: no fever, put on abx for ?UTI causing AMS    Allergies    ceftriaxone (Urticaria)  ciprofloxacin (Rash)  schrimp (Unknown)    Intolerances        ANTIMICROBIALS:  ertapenem  IVPB 1000 every 24 hours      MEDICATIONS  (STANDING):  aspirin enteric coated 81 milliGRAM(s) Oral daily  dextrose 5%. 1000 milliLiter(s) (50 mL/Hr) IV Continuous <Continuous>  dextrose 5%. 1000 milliLiter(s) (100 mL/Hr) IV Continuous <Continuous>  dextrose 50% Injectable 25 Gram(s) IV Push once  dextrose 50% Injectable 12.5 Gram(s) IV Push once  dextrose 50% Injectable 25 Gram(s) IV Push once  digoxin     Tablet 125 MICROGram(s) Oral daily  doxazosin 1 milliGRAM(s) Oral at bedtime  ertapenem  IVPB 1000 milliGRAM(s) IV Intermittent every 24 hours  glucagon  Injectable 1 milliGRAM(s) IntraMuscular once  heparin  Infusion.  Unit(s)/Hr (10 mL/Hr) IV Continuous <Continuous>  insulin glargine Injectable (LANTUS) 14 Unit(s) SubCutaneous at bedtime  insulin lispro (ADMELOG) corrective regimen sliding scale   SubCutaneous three times a day before meals  insulin lispro (ADMELOG) corrective regimen sliding scale   SubCutaneous at bedtime  metoprolol tartrate 50 milliGRAM(s) Oral two times a day  potassium chloride  10 mEq/100 mL IVPB 10 milliEquivalent(s) IV Intermittent every 1 hour  sodium chloride 0.9% lock flush 3 milliLiter(s) IV Push every 8 hours  sodium chloride 0.9%. 1000 milliLiter(s) (75 mL/Hr) IV Continuous <Continuous>  ticagrelor 90 milliGRAM(s) Oral every 12 hours    MEDICATIONS  (PRN):  dextrose Oral Gel 15 Gram(s) Oral once PRN Blood Glucose LESS THAN 70 milliGRAM(s)/deciliter  heparin   Injectable 4000 Unit(s) IV Push every 6 hours PRN For aPTT less than 40        Vital Signs Last 24 Hrs  T(C): 36.5 (06 May 2022 06:24), Max: 36.8 (05 May 2022 17:14)  T(F): 97.7 (06 May 2022 06:24), Max: 98.3 (05 May 2022 17:14)  HR: 93 (06 May 2022 06:24) (65 - 98)  BP: 111/83 (06 May 2022 06:24) (104/73 - 134/67)  BP(mean): --  RR: 18 (06 May 2022 06:24) (16 - 18)  SpO2: 94% (06 May 2022 06:24) (94% - 99%)    CBC Full  -  ( 06 May 2022 05:13 )  WBC Count : 8.16 K/uL  RBC Count : 4.11 M/uL  Hemoglobin : 11.7 g/dL  Hematocrit : 35.1 %  Platelet Count - Automated : 286 K/uL  Mean Cell Volume : 85.4 fL  Mean Cell Hemoglobin : 28.5 pg  Mean Cell Hemoglobin Concentration : 33.3 gm/dL  Auto Neutrophil # : x  Auto Lymphocyte # : x  Auto Monocyte # : x  Auto Eosinophil # : x  Auto Basophil # : x  Auto Neutrophil % : x  Auto Lymphocyte % : x  Auto Monocyte % : x  Auto Eosinophil % : x  Auto Basophil % : x    05-06    144  |  108<H>  |  16  ----------------------------<  216<H>  3.5   |  24  |  1.21    Ca    8.6      06 May 2022 05:13  Phos  2.7     05-06  Mg     1.80     05-06            MICROBIOLOGY:  .Blood Blood-Peripheral  05-02-22   No growth to date.  --  --      RADIOLOGY

## 2022-05-06 NOTE — BH CONSULTATION LIAISON PROGRESS NOTE - NSBHCHARTREVIEWVS_PSY_A_CORE FT
Vital Signs Last 24 Hrs  T(C): 36.4 (06 May 2022 11:17), Max: 36.8 (05 May 2022 17:14)  T(F): 97.5 (06 May 2022 11:17), Max: 98.3 (05 May 2022 17:14)  HR: 100 (06 May 2022 11:17) (65 - 100)  BP: 137/75 (06 May 2022 11:17) (104/73 - 137/75)  BP(mean): --  RR: 18 (06 May 2022 11:17) (16 - 18)  SpO2: 99% (06 May 2022 11:17) (94% - 99%)

## 2022-05-06 NOTE — BH CONSULTATION LIAISON PROGRESS NOTE - CURRENT MEDICATION
MEDICATIONS  (STANDING):  aspirin enteric coated 81 milliGRAM(s) Oral daily  dextrose 5%. 1000 milliLiter(s) (100 mL/Hr) IV Continuous <Continuous>  dextrose 5%. 1000 milliLiter(s) (50 mL/Hr) IV Continuous <Continuous>  dextrose 50% Injectable 25 Gram(s) IV Push once  dextrose 50% Injectable 12.5 Gram(s) IV Push once  dextrose 50% Injectable 25 Gram(s) IV Push once  digoxin     Tablet 125 MICROGram(s) Oral daily  doxazosin 1 milliGRAM(s) Oral at bedtime  ertapenem  IVPB 1000 milliGRAM(s) IV Intermittent every 24 hours  glucagon  Injectable 1 milliGRAM(s) IntraMuscular once  heparin  Infusion.  Unit(s)/Hr (10 mL/Hr) IV Continuous <Continuous>  insulin glargine Injectable (LANTUS) 14 Unit(s) SubCutaneous at bedtime  insulin lispro (ADMELOG) corrective regimen sliding scale   SubCutaneous three times a day before meals  insulin lispro (ADMELOG) corrective regimen sliding scale   SubCutaneous at bedtime  metoprolol tartrate 50 milliGRAM(s) Oral two times a day  sodium chloride 0.9% lock flush 3 milliLiter(s) IV Push every 8 hours  sodium chloride 0.9%. 1000 milliLiter(s) (75 mL/Hr) IV Continuous <Continuous>  ticagrelor 90 milliGRAM(s) Oral every 12 hours    MEDICATIONS  (PRN):  dextrose Oral Gel 15 Gram(s) Oral once PRN Blood Glucose LESS THAN 70 milliGRAM(s)/deciliter  heparin   Injectable 4000 Unit(s) IV Push every 6 hours PRN For aPTT less than 40

## 2022-05-06 NOTE — PROGRESS NOTE ADULT - ASSESSMENT
80 y/o male with PMH prostate ca s/p Seed implant, HTN, DM2 on insulin, CAD s/p stent (last one about 2 yr ago), CVA per son, and JACKELIN on CPAP who presents with confusion. Cardiology consulted for NSTEMI.     #NSTEMI   #HFrEF   EKG shows concerning ST changes that is indicative of ischemia but not meeting STEMI criteria (aVL not more than 1mm and no reciprocal changes). Patient denies any chest pain or SOB. Hemodynamically stable. Troponin elevation likely ACS, less likely PE. No need for emergent cath at this time.   -Continue aspirin 81mg daily   -Continue brilinta 90mg BID   -Continue heparin gtt    -Tele   -F/u echo results-notable WMA but unclear if new, moderate to severe LV dysfunction w/ severe MR   -If patient having active chest pain, hemodynamically unstable, or having arrhthymias please call cardiology   -Will cath patient when AMS resolves   -Hold atorvastatin given LFT abnormality   -Can continue home metoprolol but hold for SBP <90   -Can restart losartan as patient's SANDRA resolved, hold for SBP <90     #Afib  CHADSVASC 8, currently in sinus s/p dig load   -Continue heparin gtt   -Metoprolol 50mg BID   -Digoxin 125mcg once daily

## 2022-05-07 NOTE — PROGRESS NOTE ADULT - SUBJECTIVE AND OBJECTIVE BOX
CHIDI RUBIO  81y  Male      Patient is a 81y old  Male who presents with a chief complaint of NSTEMI, Hyperglycemia, SANDRA, Encephalopathy (07 May 2022 09:08)  comfortable,nad.Mental status appears improving  no cp,no sob.no fever,no cough    REVIEW OF SYSTEMS:  CONSTITUTIONAL: No fever  RESPIRATORY: No cough, hemoptysis or shortness of breath  CARDIOVASCULAR: No chest pain, palpitations, dizziness, or leg swelling  GASTROINTESTINAL: No abdominal pain. nausea, vomiting, hematemesis    INTERVAL HPI/OVERNIGHT EVENTS:  T(C): 36.8 (05-07-22 @ 10:34), Max: 37.2 (05-06-22 @ 23:27)  HR: 85 (05-07-22 @ 10:34) (85 - 100)  BP: 114/65 (05-07-22 @ 10:34) (111/77 - 144/75)  RR: 18 (05-07-22 @ 10:34) (17 - 18)  SpO2: 100% (05-07-22 @ 10:34) (99% - 100%)  Wt(kg): --  I&O's Summary    06 May 2022 07:01  -  07 May 2022 07:00  --------------------------------------------------------  IN: 0 mL / OUT: 1250 mL / NET: -1250 mL      T(C): 36.8 (05-07-22 @ 10:34), Max: 37.2 (05-06-22 @ 23:27)  HR: 85 (05-07-22 @ 10:34) (85 - 100)  BP: 114/65 (05-07-22 @ 10:34) (111/77 - 144/75)  RR: 18 (05-07-22 @ 10:34) (17 - 18)  SpO2: 100% (05-07-22 @ 10:34) (99% - 100%)  Wt(kg): --Vital Signs Last 24 Hrs  T(C): 36.8 (07 May 2022 10:34), Max: 37.2 (06 May 2022 23:27)  T(F): 98.2 (07 May 2022 10:34), Max: 99 (06 May 2022 23:27)  HR: 85 (07 May 2022 10:34) (85 - 100)  BP: 114/65 (07 May 2022 10:34) (111/77 - 144/75)  BP(mean): --  RR: 18 (07 May 2022 10:34) (17 - 18)  SpO2: 100% (07 May 2022 10:34) (99% - 100%)    LABS:                        12.1   7.82  )-----------( 293      ( 07 May 2022 08:10 )             36.2     05-07    143  |  109<H>  |  12  ----------------------------<  256<H>  3.9   |  24  |  1.04    Ca    8.6      07 May 2022 08:10  Phos  2.7     05-07  Mg     1.80     05-07      PTT - ( 07 May 2022 11:38 )  PTT:54.0 sec    CAPILLARY BLOOD GLUCOSE      POCT Blood Glucose.: 324 mg/dL (07 May 2022 11:40)  POCT Blood Glucose.: 300 mg/dL (07 May 2022 11:39)  POCT Blood Glucose.: 249 mg/dL (07 May 2022 07:44)  POCT Blood Glucose.: 324 mg/dL (06 May 2022 22:48)  POCT Blood Glucose.: 299 mg/dL (06 May 2022 17:03)            PAST MEDICAL & SURGICAL HISTORY:  Diabetes mellitus    CAD (coronary artery disease)    Prostate ca  s/p SEED 5 yrs ago    Sleep apnea  on cpap at home    Hypertension    Hypercholesteremia    S/P cholecystectomy    S/P coronary angioplasty  last stent 1 year ago    H/O carotid endarterectomy  April 2022, stent placed.        MEDICATIONS  (STANDING):  aspirin enteric coated 81 milliGRAM(s) Oral daily  atorvastatin 40 milliGRAM(s) Oral at bedtime  dextrose 5%. 1000 milliLiter(s) (100 mL/Hr) IV Continuous <Continuous>  dextrose 5%. 1000 milliLiter(s) (50 mL/Hr) IV Continuous <Continuous>  dextrose 50% Injectable 25 Gram(s) IV Push once  dextrose 50% Injectable 12.5 Gram(s) IV Push once  dextrose 50% Injectable 25 Gram(s) IV Push once  digoxin     Tablet 125 MICROGram(s) Oral daily  doxazosin 1 milliGRAM(s) Oral at bedtime  ertapenem  IVPB 1000 milliGRAM(s) IV Intermittent every 24 hours  glucagon  Injectable 1 milliGRAM(s) IntraMuscular once  heparin  Infusion.  Unit(s)/Hr (10 mL/Hr) IV Continuous <Continuous>  insulin glargine Injectable (LANTUS) 14 Unit(s) SubCutaneous at bedtime  insulin lispro (ADMELOG) corrective regimen sliding scale   SubCutaneous three times a day before meals  insulin lispro (ADMELOG) corrective regimen sliding scale   SubCutaneous at bedtime  metoprolol tartrate 50 milliGRAM(s) Oral two times a day  sodium chloride 0.9% lock flush 3 milliLiter(s) IV Push every 8 hours  sodium chloride 0.9%. 1000 milliLiter(s) (75 mL/Hr) IV Continuous <Continuous>  ticagrelor 90 milliGRAM(s) Oral every 12 hours    MEDICATIONS  (PRN):  dextrose Oral Gel 15 Gram(s) Oral once PRN Blood Glucose LESS THAN 70 milliGRAM(s)/deciliter  heparin   Injectable 4000 Unit(s) IV Push every 6 hours PRN For aPTT less than 40        RADIOLOGY & ADDITIONAL TESTS:    Imaging Personally Reviewed:  [ ] YES  [ ] NO    Consultant(s) Notes Reviewed:  [ ] YES  [ ] NO    PHYSICAL EXAM:  GENERAL: Alert and awake lying in bed in no distress  HEAD:  Atraumatic, Normocephalic  EYES: EOMI, [place], conjunctiva and sclera clear  NECK: Supple, No JVD, Normal thyroid  NERVOUS SYSTEM:  Alert & Oriented X3, Motor and sensory systems are intact,   CHEST/LUNG: Bilateral clear breath sounds, no rhochi, no wheezing, no crepitations,  HEART: Regular rate and rhythm; No murmurs, rubs, or gallops  ABDOMEN: Soft, Nontender, Nondistended; Bowel sounds present  EXTREMITIES:   Peripheral Pulses are palpable, no  edema        Care Discussed with Consultants/Other Providers [ ] YES  [ ] NO      Code Status: [] Full Code [] DNR [] DNI [] Goals of Care:   Disposition: [] ICU [] Stroke Unit [] RCU []PCU []Floor [] Discharge Home         ROSCOE Casey

## 2022-05-07 NOTE — PROGRESS NOTE ADULT - SUBJECTIVE AND OBJECTIVE BOX
Ever Le  [phone]  All Cardiology service information can be found 24/7 on [email or web address], password: [email or web address]    Overnight Events: No events overnight. Pt denies chest pain, sob, or palpitations.         Current Meds:  aspirin enteric coated 81 milliGRAM(s) Oral daily  dextrose 5%. 1000 milliLiter(s) IV Continuous <Continuous>  dextrose 5%. 1000 milliLiter(s) IV Continuous <Continuous>  dextrose 50% Injectable 25 Gram(s) IV Push once  dextrose 50% Injectable 12.5 Gram(s) IV Push once  dextrose 50% Injectable 25 Gram(s) IV Push once  dextrose Oral Gel 15 Gram(s) Oral once PRN  digoxin     Tablet 125 MICROGram(s) Oral daily  doxazosin 1 milliGRAM(s) Oral at bedtime  ertapenem  IVPB 1000 milliGRAM(s) IV Intermittent every 24 hours  glucagon  Injectable 1 milliGRAM(s) IntraMuscular once  heparin   Injectable 4000 Unit(s) IV Push every 6 hours PRN  heparin  Infusion.  Unit(s)/Hr IV Continuous <Continuous>  insulin glargine Injectable (LANTUS) 14 Unit(s) SubCutaneous at bedtime  insulin lispro (ADMELOG) corrective regimen sliding scale   SubCutaneous three times a day before meals  insulin lispro (ADMELOG) corrective regimen sliding scale   SubCutaneous at bedtime  metoprolol tartrate 50 milliGRAM(s) Oral two times a day  sodium chloride 0.9% lock flush 3 milliLiter(s) IV Push every 8 hours  sodium chloride 0.9%. 1000 milliLiter(s) IV Continuous <Continuous>  ticagrelor 90 milliGRAM(s) Oral every 12 hours      Vitals:  T(F): 97.5 (05-07), Max: 99 (05-06)  HR: 89 (05-07) (89 - 100)  BP: 111/77 (05-07) (111/77 - 144/75)  RR: 17 (05-07)  SpO2: 99% (05-07)  I&O's Summary    06 May 2022 07:01  -  07 May 2022 07:00  --------------------------------------------------------  IN: 0 mL / OUT: 1250 mL / NET: -1250 mL        Physical Exam:  Appearance: No acute distress  Eyes: pink conjunctiva  HEENT: Normal oral mucosa  Cardiovascular: RRR, S1, S2  Respiratory: Clear to auscultation bilaterally  Gastrointestinal: soft, non-tender, non-distended with normal bowel sounds  Musculoskeletal: No clubbing; no joint deformity   Neurologic: Non-focal  Lymphatic: No lymphadenopathy  Psychiatry: AAOx0                          12.1   7.82  )-----------( 293      ( 07 May 2022 08:10 )             36.2     05-07    143  |  109<H>  |  12  ----------------------------<  256<H>  3.9   |  24  |  1.04    Ca    8.6      07 May 2022 08:10  Phos  2.7     05-07  Mg     1.80     05-07      PTT - ( 06 May 2022 23:57 )  PTT:80.1 sec  CARDIAC MARKERS ( 03 May 2022 19:00 )  2667 ng/L / x     / x     / x     / x     / x      CARDIAC MARKERS ( 03 May 2022 07:39 )  3101 ng/L / x     / x     / x     / x     / x      CARDIAC MARKERS ( 02 May 2022 21:51 )  3041 ng/L / x     / x     / 956 U/L / x     / 8.5 ng/mL  CARDIAC MARKERS ( 02 May 2022 16:37 )  x     / x     / x     / 863 U/L / x     / 9.9 ng/mL  CARDIAC MARKERS ( 02 May 2022 16:35 )  3089 ng/L / x     / x     / x     / x     / x            Interpretation of Telemetry:

## 2022-05-07 NOTE — PROGRESS NOTE ADULT - ASSESSMENT
80 y/o male with PMH prostate ca s/p Seed implant, HTN, DM2 on insulin, CAD s/p stent (last one about 2 yr ago), CVA per son, and JACKELIN on CPAP who presents with confusion. Cardiology consulted for NSTEMI.     #NSTEMI   #HFrEF   EKG shows concerning ST changes that is indicative of ischemia but not meeting STEMI criteria (aVL not more than 1mm and no reciprocal changes). Patient denies any chest pain or SOB. Hemodynamically stable. Troponin elevation likely ACS, less likely PE. No need for emergent cath at this time.   -Continue aspirin 81mg daily   -Continue brilinta 90mg BID   -Continue heparin gtt    -Tele   -F/u echo results-notable WMA but unclear if new, moderate to severe LV dysfunction w/ severe MR   -If patient having active chest pain, hemodynamically unstable, or having arrhthymias please call cardiology   -Will cath patient when AMS resolves   -Start lipitor 40mg   -continue home metoprolol and losartan    #Afib  CHADSVASC 8, currently in sinus s/p dig load   -Continue heparin gtt   -Metoprolol 50mg BID   -Digoxin 125mcg once daily     Ever Le PGY6  All Cardiology service information can be found 24/7 on [email or web address], password: [email or web address]

## 2022-05-08 NOTE — PROGRESS NOTE ADULT - SUBJECTIVE AND OBJECTIVE BOX
CHIDI RUBIO  81y  Male      Patient is a 81y old  Male who presents with a chief complaint of NSTEMI, Hyperglycemia, SANDRA, Encephalopathy (08 May 2022 14:16)  Patient is confused,agitated at times per Family at bedside.Reported Blood in the stool.no bloody diarrhoea  no sob,no cp,no fever,no FEVER    REVIEW OF SYSTEMS:  Unable to eval.sec.to Patient's Mental status  INTERVAL HPI/OVERNIGHT EVENTS:  T(C): 36.7 (05-08-22 @ 16:00), Max: 36.9 (05-07-22 @ 21:30)  HR: 71 (05-08-22 @ 17:12) (71 - 85)  BP: 129/59 (05-08-22 @ 17:12) (118/67 - 136/92)  RR: 18 (05-08-22 @ 16:00) (17 - 18)  SpO2: 100% (05-08-22 @ 16:00) (98% - 100%)  Wt(kg): --  I&O's Summary    07 May 2022 07:01  -  08 May 2022 07:00  --------------------------------------------------------  IN: 0 mL / OUT: 300 mL / NET: -300 mL      T(C): 36.7 (05-08-22 @ 16:00), Max: 36.9 (05-07-22 @ 21:30)  HR: 71 (05-08-22 @ 17:12) (71 - 85)  BP: 129/59 (05-08-22 @ 17:12) (118/67 - 136/92)  RR: 18 (05-08-22 @ 16:00) (17 - 18)  SpO2: 100% (05-08-22 @ 16:00) (98% - 100%)  Wt(kg): --Vital Signs Last 24 Hrs  T(C): 36.7 (08 May 2022 16:00), Max: 36.9 (07 May 2022 21:30)  T(F): 98.1 (08 May 2022 16:00), Max: 98.4 (07 May 2022 21:30)  HR: 71 (08 May 2022 17:12) (71 - 85)  BP: 129/59 (08 May 2022 17:12) (118/67 - 136/92)  BP(mean): --  RR: 18 (08 May 2022 16:00) (17 - 18)  SpO2: 100% (08 May 2022 16:00) (98% - 100%)    LABS:                        11.7   6.18  )-----------( 313      ( 08 May 2022 07:29 )             36.1     05-08    144  |  110<H>  |  10  ----------------------------<  235<H>  3.8   |  22  |  0.95    Ca    8.7      08 May 2022 07:29  Phos  2.7     05-08  Mg     1.70     05-08      PTT - ( 08 May 2022 16:01 )  PTT:44.2 sec    CAPILLARY BLOOD GLUCOSE      POCT Blood Glucose.: 152 mg/dL (08 May 2022 17:02)  POCT Blood Glucose.: 255 mg/dL (08 May 2022 11:34)  POCT Blood Glucose.: 204 mg/dL (08 May 2022 07:18)  POCT Blood Glucose.: 244 mg/dL (07 May 2022 21:17)            PAST MEDICAL & SURGICAL HISTORY:  Diabetes mellitus    CAD (coronary artery disease)    Prostate ca  s/p SEED 5 yrs ago    Sleep apnea  on cpap at home    Hypertension    Hypercholesteremia    S/P cholecystectomy    S/P coronary angioplasty  last stent 1 year ago    H/O carotid endarterectomy  April 2022, stent placed.        MEDICATIONS  (STANDING):  aspirin enteric coated 81 milliGRAM(s) Oral daily  atorvastatin 40 milliGRAM(s) Oral at bedtime  dextrose 5%. 1000 milliLiter(s) (100 mL/Hr) IV Continuous <Continuous>  dextrose 5%. 1000 milliLiter(s) (50 mL/Hr) IV Continuous <Continuous>  dextrose 50% Injectable 25 Gram(s) IV Push once  dextrose 50% Injectable 12.5 Gram(s) IV Push once  dextrose 50% Injectable 25 Gram(s) IV Push once  digoxin     Tablet 125 MICROGram(s) Oral daily  doxazosin 1 milliGRAM(s) Oral at bedtime  glucagon  Injectable 1 milliGRAM(s) IntraMuscular once  heparin  Infusion.  Unit(s)/Hr (10 mL/Hr) IV Continuous <Continuous>  insulin glargine Injectable (LANTUS) 16 Unit(s) SubCutaneous at bedtime  insulin lispro (ADMELOG) corrective regimen sliding scale   SubCutaneous three times a day before meals  insulin lispro (ADMELOG) corrective regimen sliding scale   SubCutaneous at bedtime  insulin lispro Injectable (ADMELOG) 5 Unit(s) SubCutaneous three times a day before meals  metoprolol tartrate 50 milliGRAM(s) Oral two times a day  sodium chloride 0.9% lock flush 3 milliLiter(s) IV Push every 8 hours  sodium chloride 0.9%. 1000 milliLiter(s) (75 mL/Hr) IV Continuous <Continuous>  ticagrelor 90 milliGRAM(s) Oral every 12 hours    MEDICATIONS  (PRN):  dextrose Oral Gel 15 Gram(s) Oral once PRN Blood Glucose LESS THAN 70 milliGRAM(s)/deciliter  heparin   Injectable 4000 Unit(s) IV Push every 6 hours PRN For aPTT less than 40        RADIOLOGY & ADDITIONAL TESTS:    Imaging Personally Reviewed:  [ ] YES  [ ] NO    Consultant(s) Notes Reviewed:  [x ] YES  [ ] NO    PHYSICAL EXAM:  GENERAL: Alert and awake lying in bed in no distress  HEAD:  Atraumatic, Normocephalic  EYES: EOMI, [place], conjunctiva and sclera clear  NECK: Supple, No JVD, Normal thyroid  NERVOUS SYSTEM:  Alert & Oriented X1, Motor and sensory systems are intact,   CHEST/LUNG: Bilateral clear breath sounds, no rhochi, no wheezing, no crepitations,  HEART: Regular rate and rhythm; No murmurs, rubs, or gallops  ABDOMEN: Soft, Nontender, Nondistended; Bowel sounds present  EXTREMITIES:   Peripheral Pulses are palpable, no  edema        Care Discussed with Consultants/Other Providers [ ] YES  [ ] NO      Code Status: [] Full Code [] DNR [] DNI [] Goals of Care:   Disposition: [] ICU [] Stroke Unit [] RCU []PCU []Floor [] Discharge Home         ROSCOE Casey

## 2022-05-08 NOTE — PROVIDER CONTACT NOTE (OTHER) - ASSESSMENT
Pt is A&Ox1. Vitals in flowsheet. No signs or symptoms of distress noted or verbalized. Pt is confused & keeps pulling at parnell catheter line.

## 2022-05-08 NOTE — CONSULT NOTE ADULT - ASSESSMENT
80 y/o M with PMH of prostate cancer s/p seed implant, Hypertension, Type 2 diabetes on insulin (has not been taking insulin recently), CAD s/p stent (last stent placed 2 years ago), CVA s/p carotid stent 1 month ago, JACKELIN on CPAP, presents to the ED complaining of altered mental status and dysuria also found to have nstemi    endocrine called for further assistance   a1c 9.2   home meds per med recc:  home meds per med rec are metformin, farigxa and basaglar 30 u      1. DM  While inpatient  BG target 100-180 mg/dl, remains above goal  - Lantus 16    units qhs  - start Admelog to 5 units SC Premeal/TIDAC  (low dose ISS is only for correction, if hyperglycemia is present start premeal insulin)  - Admelog  Low dose Correction Scale Premeal & seperate low dose  Correction Scale Bedtime   - Hypoglycemia protocol in place   - Carb Consistent Diet   - Nutrition consult when his clinical status is improved         dc plannin) confirm home meds as above  2) if labs are stable at dc than dc on home DM meds at dc (GFR /LFTS)   would likely hold SGLT2 bc Farigxa is associated with UTI and pt presenting with dysuria   3) confirm once clinically improving re: pts DM education and understanding of DM /insulin and glycemic goals  4) should follouwp with pcp and endocrine outpt  If patient wishes to follow up with [facility]   [facility]  [address] [place] [address], [address], [place] [address]  [phone]   Please call to schedule appointment with CDE/Nutritionist and MD appointment next available           2. HTN  goal BP in DM <130/80  mamagement as per the primary team          3. HLD  pt is on lipitor 40mg             Emma Alvarenga MD  Attending Physician   Department of Endocrinology, Diabetes and Metabolism     Pager  [phone] [please provide 10 digit call back number]  MARTY 9-5  [email or web address]  Nights and weekends: [phone]  Please note that this patient may be followed by a different provider tomorrow.   If no answer or after hours, please contact [phone].  For final dc reccomendations, please call [phone] or page the endocrine fellow on call.

## 2022-05-08 NOTE — PROVIDER CONTACT NOTE (OTHER) - ACTION/TREATMENT ORDERED:
ANASTASIIA Escudero notified & aware. Frequent rounding maintained. Continued monitoring of urine output

## 2022-05-08 NOTE — CONSULT NOTE ADULT - SUBJECTIVE AND OBJECTIVE BOX
Reason For Consult: DM    HPI:  80 y/o M with PMH of prostate cancer s/p seed implant, Hypertension, Type 2 diabetes on insulin (has not been taking insulin recently), CAD s/p stent (last stent placed 2 years ago), CVA s/p carotid stent 1 month ago, JACKELIN on CPAP, presents to the ED complaining of altered mental status and dysuria also found to have nstemi    endocrine called for further assistance   pt is not able to give a hx as he is confused, believe its 2021, and can take part in a meaningful hx.  home meds per med rec are metformin, farigxa and basaglar 30 units  a1c 9.2            PAST MEDICAL & SURGICAL HISTORY:  Diabetes mellitus    CAD (coronary artery disease)    Prostate ca  s/p SEED 5 yrs ago    Sleep apnea  on cpap at home    Hypertension    Hypercholesteremia    S/P cholecystectomy    S/P coronary angioplasty  last stent 1 year ago    H/O carotid endarterectomy  April 2022, stent placed.        FAMILY HISTORY:  FH: type 2 diabetes        Social History:   does not use cigarettes or alcohol. Ambulates with cane at baseline.    Outpatient Medications:  Home Medications:   * Patient Currently Takes Medications as of 23-Sep-2021 12:07 documented in Structured Notes  · 	hydrocortisone 25 mg rectal suppository: 1 suppository(ies) rectal once a day (at bedtime)  · 	sulfamethoxazole-trimethoprim 800 mg-160 mg oral tablet: 1 tab(s) orally 2 times a day  · 	tamsulosin 0.4 mg oral capsule: 1 cap(s) orally once a day  · 	Farxiga 5 mg oral tablet: 1 tab(s) orally once a day  · 	losartan 50 mg oral tablet: 1 tab(s) orally once a day  · 	atorvastatin 40 mg oral tablet: 1 tab(s) orally once a day  · 	metFORMIN 850 mg oral tablet: 1 tab(s) orally once a day (in the morning)  · 	aspirin 81 mg oral tablet, chewable: 1 tab(s) orally once a day  · 	isosorbide mononitrate 60 mg oral tablet, extended release: 1 tab(s) orally once a day (in the morning)  · 	metoprolol tartrate 50 mg oral tablet: 1 tab(s) orally 2 times a day  · 	clopidogrel 75 mg oral tablet: 1 tab(s) orally once a day  · 	Basaglar KwikPen 100 units/mL subcutaneous solution: 30  subcutaneous once a day (at bedtime)    MEDICATIONS  (STANDING):  aspirin enteric coated 81 milliGRAM(s) Oral daily  atorvastatin 40 milliGRAM(s) Oral at bedtime  dextrose 5%. 1000 milliLiter(s) (50 mL/Hr) IV Continuous <Continuous>  dextrose 5%. 1000 milliLiter(s) (100 mL/Hr) IV Continuous <Continuous>  dextrose 50% Injectable 25 Gram(s) IV Push once  dextrose 50% Injectable 12.5 Gram(s) IV Push once  dextrose 50% Injectable 25 Gram(s) IV Push once  digoxin     Tablet 125 MICROGram(s) Oral daily  doxazosin 1 milliGRAM(s) Oral at bedtime  glucagon  Injectable 1 milliGRAM(s) IntraMuscular once  heparin  Infusion.  Unit(s)/Hr (10 mL/Hr) IV Continuous <Continuous>  insulin glargine Injectable (LANTUS) 16 Unit(s) SubCutaneous at bedtime  insulin lispro (ADMELOG) corrective regimen sliding scale   SubCutaneous three times a day before meals  insulin lispro (ADMELOG) corrective regimen sliding scale   SubCutaneous at bedtime  insulin lispro Injectable (ADMELOG) 5 Unit(s) SubCutaneous three times a day before meals  metoprolol tartrate 50 milliGRAM(s) Oral two times a day  sodium chloride 0.9% lock flush 3 milliLiter(s) IV Push every 8 hours  sodium chloride 0.9%. 1000 milliLiter(s) (75 mL/Hr) IV Continuous <Continuous>  ticagrelor 90 milliGRAM(s) Oral every 12 hours    MEDICATIONS  (PRN):  dextrose Oral Gel 15 Gram(s) Oral once PRN Blood Glucose LESS THAN 70 milliGRAM(s)/deciliter  heparin   Injectable 4000 Unit(s) IV Push every 6 hours PRN For aPTT less than 40      Allergies    ceftriaxone (Urticaria)  ciprofloxacin (Rash)  schrimp (Unknown)    Intolerances      Review of Systems:    UNABLE TO OBTAIN    PHYSICAL EXAM:  VITALS: T(C): 36.9 (05-08-22 @ 07:30)  T(F): 98.4 (05-08-22 @ 07:30), Max: 99.3 (05-07-22 @ 18:00)  HR: 80 (05-08-22 @ 09:56) (76 - 91)  BP: 118/67 (05-08-22 @ 09:56) (118/67 - 126/71)  RR:  (17 - 18)  SpO2:  (98% - 100%)  Wt(kg): --  GENERAL: disoriented, restless   RESPIRATORY: Clear to auscultation bilaterally; No rales, rhonchi, wheezing, or rubs  CARDIOVASCULAR: Regular rate and rhythm; No murmurs; no peripheral edema  GI: Soft, nontender, non distended, normal bowel sounds  SKIN: Dry, intact, No rashes or lesions  MUSCULOSKELETAL: Full range of motion, normal strength  PSYCH: Alert and oriented x 0      POCT Blood Glucose.: 255 mg/dL (05-08-22 @ 11:34)  POCT Blood Glucose.: 204 mg/dL (05-08-22 @ 07:18)  POCT Blood Glucose.: 244 mg/dL (05-07-22 @ 21:17)  POCT Blood Glucose.: 222 mg/dL (05-07-22 @ 16:49)  POCT Blood Glucose.: 324 mg/dL (05-07-22 @ 11:40)  POCT Blood Glucose.: 300 mg/dL (05-07-22 @ 11:39)  POCT Blood Glucose.: 249 mg/dL (05-07-22 @ 07:44)  POCT Blood Glucose.: 324 mg/dL (05-06-22 @ 22:48)  POCT Blood Glucose.: 299 mg/dL (05-06-22 @ 17:03)  POCT Blood Glucose.: 348 mg/dL (05-06-22 @ 11:24)  POCT Blood Glucose.: 211 mg/dL (05-06-22 @ 07:20)  POCT Blood Glucose.: 221 mg/dL (05-05-22 @ 22:45)  POCT Blood Glucose.: 317 mg/dL (05-05-22 @ 16:30)                            11.7   6.18  )-----------( 313      ( 08 May 2022 07:29 )             36.1       05-08    144  |  110<H>  |  10  ----------------------------<  235<H>  3.8   |  22  |  0.95    eGFR: 80    Ca    8.7      05-08  Mg     1.70     05-08  Phos  2.7     05-08        Thyroid Function Tests:  05-03 @ 07:39 TSH 2.34 FreeT4 -- T3 -- Anti TPO -- Anti Thyroglobulin Ab -- TSI --          05-03 Chol 131 Direct LDL -- LDL calculated 64 HDL 38<L> Trig 143    Radiology:

## 2022-05-09 NOTE — DIETITIAN INITIAL EVALUATION ADULT - OTHER INFO
80 y/o M admitted with altered mental status and dysuria.  PMH of prostate cancer s/p seed implant, Hypertension, Type 2 diabetes on insulin (has not been taking insulin recently), CAD s/p stent (last stent placed 2 years ago), CVA s/p carotid stent 1 month ago, JACKELIN on CPAP.    Pt disoriented x 4, as per PCA on 1;1 at bedside reports 75% intake of meals with No GI distress (nausea/vomiting/diarrhea/constipation.) at present. Pt noted with trending wt. loss as per Madina COLE.  Noted 6.8% wt. loss in 1 months.  Wt- 220.4# (4/6/22), current wt- 205.7# (5/9/22) via bed scale.

## 2022-05-09 NOTE — CONSULT NOTE ADULT - ATTENDING COMMENTS
ECG likely LVH.  Little suspicion for ACS  Agree with echo.
Toxic metabolic septic encephalopathy most likely.   Need further history of his baseline mental status and progress since admission.     Thank you

## 2022-05-09 NOTE — CHART NOTE - NSCHARTNOTEFT_GEN_A_CORE
Called by RN patient noted to have brown stool with blood streaked,   Hemodynamically stable .   discussed with Dr. gaytan will call GI for consult , send stool for occult stat, STAT CBC and type and screen ordered,   Heparin gtt discontinued , will dc eliquis for now ( ordered for Afib) , continue plavix for now   - cardiology notified of above events.  continue protonix

## 2022-05-09 NOTE — DIETITIAN INITIAL EVALUATION ADULT - PERTINENT LABORATORY DATA
05-09    144  |  110<H>  |  10  ----------------------------<  237<H>  3.9   |  26  |  0.98    Ca    8.7      09 May 2022 07:07  Phos  2.7     05-09  Mg     1.90     05-09    POCT Blood Glucose.: 222 mg/dL (05-09-22 @ 11:13)  A1C with Estimated Average Glucose Result: 9.2 % (05-03-22 @ 07:39)  A1C with Estimated Average Glucose Result: 8.8 % (09-21-21 @ 06:49)

## 2022-05-09 NOTE — CONSULT NOTE ADULT - ASSESSMENT
INCOMPLETE NOTE. Assessment: 82 yo male with a PMHx of Prostate Cancer (s/p seed implant), HTN, DM, CAD (s/p stent, on ASA + Brilinta), h/o stroke s/p carotid endarterectomy (04/2022, stent placed), and JACKELIN (on CPAP) who presented to the ED on 5/2 for AMS and dysuria. Patient found to have NSTEMI on admission. Was started on Heparin drip. Patient was also treated for UTI during this admission. Neurology consulted for persistent AMS.    Impression: Global encephalopathy likely of toxic-metabolic etiology with superimposed hospital-induced delirium.    Recommendations:  [] Neuro checks per routine.  [] Infectious workup per ID/Primary team.  [] No neurologic contraindication to c/w DAPT and Heparin drip as per Cardiology.  [] Psychiatry following.  INCOMPLETE NOTE.    Case to be seen and discussed with neurology attending Dr. Matta. Assessment: 82 yo male with a PMHx of Prostate Cancer (s/p seed implant), HTN, DM, CAD (s/p stent, on ASA + Brilinta), h/o stroke s/p carotid endarterectomy (04/2022, stent placed), and JACKELIN (on CPAP) who presented to the ED on 5/2 for AMS and dysuria. Patient found to have NSTEMI on admission. Was started on Heparin drip. Patient was also treated for UTI during this admission. Neurology consulted for persistent AMS.    Impression: Global encephalopathy likely of toxic-metabolic etiology with superimposed hospital-induced delirium.    Recommendations:  [] Neuro checks per routine.  [] Infectious workup per ID/Primary team.  [] No neurologic contraindication to c/w DAPT and Heparin drip as per Cardiology.  [] Psychiatry following.  [] Need to obtain collateral info from family regarding baseline mental status.  [] Would send: B12, MMA, Homocysteine, Folate, TSH.  [] Rest of care per primary team.    Case seen and discussed with neurology attending Dr. Matta.

## 2022-05-09 NOTE — DIETITIAN INITIAL EVALUATION ADULT - ADD RECOMMEND
Honor food and beverage preferences within diet restriction of patient in an effort to maximize level of nutrient intake.     Monitor PO intake, tolerance to nutrition supplement, weight trends, nutrition related lab values, BMs/GI distress, hydration status, skin integrity.

## 2022-05-09 NOTE — PROGRESS NOTE ADULT - SUBJECTIVE AND OBJECTIVE BOX
CHIDI RUBIO 81y [id number]    Patient is a 81y old  Male who presents with a chief complaint of NSTEMI, Hyperglycemia, SANDRA, Encephalopathy (09 May 2022 10:24)      Follow Up/CC:  ID following for    Interval History/ROS:    Allergies    ceftriaxone (Urticaria)  ciprofloxacin (Rash)  schrimp (Unknown)    Intolerances        ANTIMICROBIALS:      MEDICATIONS  (STANDING):  aspirin enteric coated 81 milliGRAM(s) Oral daily  atorvastatin 40 milliGRAM(s) Oral at bedtime  dextrose 5%. 1000 milliLiter(s) (100 mL/Hr) IV Continuous <Continuous>  dextrose 5%. 1000 milliLiter(s) (50 mL/Hr) IV Continuous <Continuous>  dextrose 50% Injectable 25 Gram(s) IV Push once  dextrose 50% Injectable 12.5 Gram(s) IV Push once  dextrose 50% Injectable 25 Gram(s) IV Push once  digoxin     Tablet 125 MICROGram(s) Oral daily  doxazosin 1 milliGRAM(s) Oral at bedtime  glucagon  Injectable 1 milliGRAM(s) IntraMuscular once  heparin  Infusion.  Unit(s)/Hr (10 mL/Hr) IV Continuous <Continuous>  insulin glargine Injectable (LANTUS) 16 Unit(s) SubCutaneous at bedtime  insulin lispro (ADMELOG) corrective regimen sliding scale   SubCutaneous three times a day before meals  insulin lispro (ADMELOG) corrective regimen sliding scale   SubCutaneous at bedtime  insulin lispro Injectable (ADMELOG) 5 Unit(s) SubCutaneous three times a day before meals  metoprolol tartrate 50 milliGRAM(s) Oral two times a day  sodium chloride 0.9% lock flush 3 milliLiter(s) IV Push every 8 hours  sodium chloride 0.9%. 1000 milliLiter(s) (75 mL/Hr) IV Continuous <Continuous>  ticagrelor 90 milliGRAM(s) Oral every 12 hours    MEDICATIONS  (PRN):  dextrose Oral Gel 15 Gram(s) Oral once PRN Blood Glucose LESS THAN 70 milliGRAM(s)/deciliter  heparin   Injectable 4000 Unit(s) IV Push every 6 hours PRN For aPTT less than 40        Vital Signs Last 24 Hrs  T(C): 36.7 (09 May 2022 12:09), Max: 36.8 (08 May 2022 21:56)  T(F): 98.1 (09 May 2022 12:09), Max: 98.2 (08 May 2022 21:56)  HR: 74 (09 May 2022 12:09) (71 - 83)  BP: 111/76 (09 May 2022 12:09) (111/76 - 136/92)  BP(mean): --  RR: 16 (09 May 2022 12:09) (16 - 18)  SpO2: 100% (09 May 2022 12:09) (94% - 100%)    CBC Full  -  ( 09 May 2022 07:07 )  WBC Count : 6.24 K/uL  RBC Count : 4.24 M/uL  Hemoglobin : 12.0 g/dL  Hematocrit : 36.4 %  Platelet Count - Automated : 325 K/uL  Mean Cell Volume : 85.8 fL  Mean Cell Hemoglobin : 28.3 pg  Mean Cell Hemoglobin Concentration : 33.0 gm/dL  Auto Neutrophil # : x  Auto Lymphocyte # : x  Auto Monocyte # : x  Auto Eosinophil # : x  Auto Basophil # : x  Auto Neutrophil % : x  Auto Lymphocyte % : x  Auto Monocyte % : x  Auto Eosinophil % : x  Auto Basophil % : x    05-09    144  |  110<H>  |  10  ----------------------------<  237<H>  3.9   |  26  |  0.98    Ca    8.7      09 May 2022 07:07  Phos  2.7     05-09  Mg     1.90     05-09            MICROBIOLOGY:  .Blood Blood-Peripheral  05-02-22   No Growth Final  --  --      .Blood Blood-Peripheral  05-02-22   No Growth Final  --  --              v            RADIOLOGY     CHIDI RUBIO 81y [id number]    Patient is a 81y old  Male who presents with a chief complaint of NSTEMI, Hyperglycemia, SANDRA, Encephalopathy (09 May 2022 10:24)      Follow Up/CC:  ID following for hematuria    Interval History/ROS: no fever, still confused     Allergies    ceftriaxone (Urticaria)  ciprofloxacin (Rash)  schrimp (Unknown)    Intolerances        ANTIMICROBIALS:      MEDICATIONS  (STANDING):  aspirin enteric coated 81 milliGRAM(s) Oral daily  atorvastatin 40 milliGRAM(s) Oral at bedtime  dextrose 5%. 1000 milliLiter(s) (100 mL/Hr) IV Continuous <Continuous>  dextrose 5%. 1000 milliLiter(s) (50 mL/Hr) IV Continuous <Continuous>  dextrose 50% Injectable 25 Gram(s) IV Push once  dextrose 50% Injectable 12.5 Gram(s) IV Push once  dextrose 50% Injectable 25 Gram(s) IV Push once  digoxin     Tablet 125 MICROGram(s) Oral daily  doxazosin 1 milliGRAM(s) Oral at bedtime  glucagon  Injectable 1 milliGRAM(s) IntraMuscular once  heparin  Infusion.  Unit(s)/Hr (10 mL/Hr) IV Continuous <Continuous>  insulin glargine Injectable (LANTUS) 16 Unit(s) SubCutaneous at bedtime  insulin lispro (ADMELOG) corrective regimen sliding scale   SubCutaneous three times a day before meals  insulin lispro (ADMELOG) corrective regimen sliding scale   SubCutaneous at bedtime  insulin lispro Injectable (ADMELOG) 5 Unit(s) SubCutaneous three times a day before meals  metoprolol tartrate 50 milliGRAM(s) Oral two times a day  sodium chloride 0.9% lock flush 3 milliLiter(s) IV Push every 8 hours  sodium chloride 0.9%. 1000 milliLiter(s) (75 mL/Hr) IV Continuous <Continuous>  ticagrelor 90 milliGRAM(s) Oral every 12 hours    MEDICATIONS  (PRN):  dextrose Oral Gel 15 Gram(s) Oral once PRN Blood Glucose LESS THAN 70 milliGRAM(s)/deciliter  heparin   Injectable 4000 Unit(s) IV Push every 6 hours PRN For aPTT less than 40        Vital Signs Last 24 Hrs  T(C): 36.7 (09 May 2022 12:09), Max: 36.8 (08 May 2022 21:56)  T(F): 98.1 (09 May 2022 12:09), Max: 98.2 (08 May 2022 21:56)  HR: 74 (09 May 2022 12:09) (71 - 83)  BP: 111/76 (09 May 2022 12:09) (111/76 - 136/92)  BP(mean): --  RR: 16 (09 May 2022 12:09) (16 - 18)  SpO2: 100% (09 May 2022 12:09) (94% - 100%)    CBC Full  -  ( 09 May 2022 07:07 )  WBC Count : 6.24 K/uL  RBC Count : 4.24 M/uL  Hemoglobin : 12.0 g/dL  Hematocrit : 36.4 %  Platelet Count - Automated : 325 K/uL  Mean Cell Volume : 85.8 fL  Mean Cell Hemoglobin : 28.3 pg  Mean Cell Hemoglobin Concentration : 33.0 gm/dL  Auto Neutrophil # : x  Auto Lymphocyte # : x  Auto Monocyte # : x  Auto Eosinophil # : x  Auto Basophil # : x  Auto Neutrophil % : x  Auto Lymphocyte % : x  Auto Monocyte % : x  Auto Eosinophil % : x  Auto Basophil % : x    05-09    144  |  110<H>  |  10  ----------------------------<  237<H>  3.9   |  26  |  0.98    Ca    8.7      09 May 2022 07:07  Phos  2.7     05-09  Mg     1.90     05-09            MICROBIOLOGY:  .Blood Blood-Peripheral  05-02-22   No Growth Final  --  --      .Blood Blood-Peripheral  05-02-22   No Growth Final  --  --              v            RADIOLOGY

## 2022-05-09 NOTE — CONSULT NOTE ADULT - SUBJECTIVE AND OBJECTIVE BOX
CHIDI RUBIO  Male  MRN-[id number]    HPI: INCOMPLETE NOTE.  82 y/o M with PMH of prostate cancer s/p seed implant, Hypertension, Type 2 diabetes on insulin (has not been taking insulin recently), CAD s/p stent (last stent placed 2 years ago), CVA s/p carotid stent 1 month ago, JACKELIN on CPAP, presents to the ED complaining of altered mental status and dysuria. Patient is AAOx1 oriented only to person. Per patient's son Benja patient has been confused today. He reports that patient was diagnosed with UTI last week. Son states that patient was prescribed ciprofloxacin but reports patient only took 1 tablet last Friday. Son reports patient has not been taking his insulin and endorses poor PO intake over the last 2 days. Patient denies chest pain, shortness of breath but endorses dysuria. Patient denies fever, chills, abdominal pain.     In ED patient was found to have Troponin of 3089. Serum glucose of 519, BHB of 0.6, pH 7.39. EKG showed KELLY in leads V5,V6. Cardiology was consulted. Per Cardiology EKG showed ST elevation in I and aVL, with aVL not more than 1 mm and ST changes in V3-V5 however elevations did not meet STEMI criteria. Patient was started on Heparin drip. Patient received admelog 7 units and Lantus 20 units. (02 May 2022 20:03)      NIHSS:  MRS:     ROS: All negative except as mentioned in HPI.    PAST MEDICAL & SURGICAL HISTORY:  Diabetes mellitus    CAD (coronary artery disease)    Prostate ca  s/p SEED 5 yrs ago    Sleep apnea  on cpap at home    Hypertension    Hypercholesteremia    S/P cholecystectomy    S/P coronary angioplasty  last stent 1 year ago    H/O carotid endarterectomy  April 2022, stent placed.    FAMILY HISTORY:    SOCIAL HISTORY:    MEDICATIONS  (STANDING):  aspirin enteric coated 81 milliGRAM(s) Oral daily  atorvastatin 40 milliGRAM(s) Oral at bedtime  dextrose 5%. 1000 milliLiter(s) (50 mL/Hr) IV Continuous <Continuous>  dextrose 5%. 1000 milliLiter(s) (100 mL/Hr) IV Continuous <Continuous>  dextrose 50% Injectable 25 Gram(s) IV Push once  dextrose 50% Injectable 12.5 Gram(s) IV Push once  dextrose 50% Injectable 25 Gram(s) IV Push once  digoxin     Tablet 125 MICROGram(s) Oral daily  doxazosin 1 milliGRAM(s) Oral at bedtime  glucagon  Injectable 1 milliGRAM(s) IntraMuscular once  heparin  Infusion.  Unit(s)/Hr (10 mL/Hr) IV Continuous <Continuous>  insulin glargine Injectable (LANTUS) 16 Unit(s) SubCutaneous at bedtime  insulin lispro (ADMELOG) corrective regimen sliding scale   SubCutaneous three times a day before meals  insulin lispro (ADMELOG) corrective regimen sliding scale   SubCutaneous at bedtime  insulin lispro Injectable (ADMELOG) 5 Unit(s) SubCutaneous three times a day before meals  metoprolol tartrate 50 milliGRAM(s) Oral two times a day  sodium chloride 0.9% lock flush 3 milliLiter(s) IV Push every 8 hours  sodium chloride 0.9%. 1000 milliLiter(s) (75 mL/Hr) IV Continuous <Continuous>  ticagrelor 90 milliGRAM(s) Oral every 12 hours    MEDICATIONS  (PRN):  dextrose Oral Gel 15 Gram(s) Oral once PRN Blood Glucose LESS THAN 70 milliGRAM(s)/deciliter  heparin   Injectable 4000 Unit(s) IV Push every 6 hours PRN For aPTT less than 40    Allergies    ceftriaxone (Urticaria)  ciprofloxacin (Rash)  schrimp (Unknown)    Vital Signs Last 24 Hrs  T(C): 36.7 (09 May 2022 12:09), Max: 36.8 (08 May 2022 21:56)  T(F): 98.1 (09 May 2022 12:09), Max: 98.2 (08 May 2022 21:56)  HR: 74 (09 May 2022 12:09) (71 - 83)  BP: 111/76 (09 May 2022 12:09) (111/76 - 136/92)  RR: 16 (09 May 2022 12:09) (16 - 18)  SpO2: 100% (09 May 2022 12:09) (94% - 100%)    General Exam:  Constitutional:  Head:  Extremities:    Neuro    LABS:               12.0   6.24  )-----------( 325      ( 09 May 2022 07:07 )             36.4     05-09    144  |  110<H>  |  10  ----------------------------<  237<H>  3.9   |  26  |  0.98    Ca    8.7      09 May 2022 07:07  Phos  2.7     05-09  Mg     1.90     05-09    PTT - ( 09 May 2022 07:07 )  PTT:82.0 sec    RADIOLOGY:    -05/07 CTH: No CT evidence of acute intracranial hemorrhage, brain edema, or mass effect.           CHIDI RUBIO  Male  MRN-[id number]    HPI: 80 yo male with a PMHx of Prostate Cancer (s/p seed implant), HTN, DM, CAD (s/p stent, on ASA + Brilinta), h/o stroke s/p carotid endarterectomy (04/2022, stent placed), and JACKELIN (on CPAP) who presented to the ED on 5/2 for AMS and dysuria. At the time of admission, patient's son reported he had been diagnosed with a UTI the week prior to admission. He was prescribed Ciprofloxacin but only took one tablet. Patient found to have NSTEMI on admission. Was started on Heparin drip. Patient was also treated for UTI during this admission. Neurology consulted for persistent AMS. Patient unable to state why he is in the hospital. Reports feeling tired. 1:1 at bedside notes his mental status has been waxing and waning, and that he has not had an appetite. When the patient is asked why he has not been eating, he states he just hasn't felt like eating much and again reiterates that he is very tired. Denies HA, dizziness, vision changes, numbness/tingling, weakness.    ROS: All negative except as mentioned in HPI.    PAST MEDICAL & SURGICAL HISTORY:  Diabetes mellitus    CAD (coronary artery disease)    Prostate ca  s/p SEED 5 yrs ago    Sleep apnea  on cpap at home    Hypertension    Hypercholesteremia    S/P cholecystectomy    S/P coronary angioplasty  last stent 1 year ago    H/O carotid endarterectomy  April 2022, stent placed.    MEDICATIONS  (STANDING):  aspirin enteric coated 81 milliGRAM(s) Oral daily  atorvastatin 40 milliGRAM(s) Oral at bedtime  dextrose 5%. 1000 milliLiter(s) (50 mL/Hr) IV Continuous <Continuous>  dextrose 5%. 1000 milliLiter(s) (100 mL/Hr) IV Continuous <Continuous>  dextrose 50% Injectable 25 Gram(s) IV Push once  dextrose 50% Injectable 12.5 Gram(s) IV Push once  dextrose 50% Injectable 25 Gram(s) IV Push once  digoxin     Tablet 125 MICROGram(s) Oral daily  doxazosin 1 milliGRAM(s) Oral at bedtime  glucagon  Injectable 1 milliGRAM(s) IntraMuscular once  heparin  Infusion.  Unit(s)/Hr (10 mL/Hr) IV Continuous <Continuous>  insulin glargine Injectable (LANTUS) 16 Unit(s) SubCutaneous at bedtime  insulin lispro (ADMELOG) corrective regimen sliding scale   SubCutaneous three times a day before meals  insulin lispro (ADMELOG) corrective regimen sliding scale   SubCutaneous at bedtime  insulin lispro Injectable (ADMELOG) 5 Unit(s) SubCutaneous three times a day before meals  metoprolol tartrate 50 milliGRAM(s) Oral two times a day  sodium chloride 0.9% lock flush 3 milliLiter(s) IV Push every 8 hours  sodium chloride 0.9%. 1000 milliLiter(s) (75 mL/Hr) IV Continuous <Continuous>  ticagrelor 90 milliGRAM(s) Oral every 12 hours    MEDICATIONS  (PRN):  dextrose Oral Gel 15 Gram(s) Oral once PRN Blood Glucose LESS THAN 70 milliGRAM(s)/deciliter  heparin   Injectable 4000 Unit(s) IV Push every 6 hours PRN For aPTT less than 40    Allergies    ceftriaxone (Urticaria)  ciprofloxacin (Rash)  schrimp (Unknown)    Vital Signs Last 24 Hrs  T(C): 36.7 (09 May 2022 12:09), Max: 36.8 (08 May 2022 21:56)  T(F): 98.1 (09 May 2022 12:09), Max: 98.2 (08 May 2022 21:56)  HR: 74 (09 May 2022 12:09) (71 - 83)  BP: 111/76 (09 May 2022 12:09) (111/76 - 136/92)  RR: 16 (09 May 2022 12:09) (16 - 18)  SpO2: 100% (09 May 2022 12:09) (94% - 100%)    General Exam:  Constitutional: Lying in bed, NAD.  Head: Normocephalic, atraumatic.  Extremities: No edema.    Neuro Exam:   MS: Alert, eyes open spontaneously. Oriented to self only. States location is "a transportation building," and does not pick hospital when given options. States month is "March, April, May, July" and year is "1922." Speech is fluent, not slurred. Follows simple commands, but sometimes requires repeated prompting.  CN: PERRL. BTT intact bilaterally. EOMI. V1-V3 intact. Face symmetric. Tongue midline.   Motor: All extremities antigravity without drift.   Sensory: Intact to LT throughout.  Reflexes: 2+ throughout upper extremities, 1+ throughout lower extremities. Babinski absent bilaterally.   Coordination/Gait: Unable to assess.    LABS:               12.0   6.24  )-----------( 325      ( 09 May 2022 07:07 )             36.4     05-09    144  |  110<H>  |  10  ----------------------------<  237<H>  3.9   |  26  |  0.98    Ca    8.7      09 May 2022 07:07  Phos  2.7     05-09  Mg     1.90     05-09    PTT - ( 09 May 2022 07:07 )  PTT:82.0 sec    RADIOLOGY:    -05/07 CTH: No CT evidence of acute intracranial hemorrhage, brain edema, or mass effect.

## 2022-05-09 NOTE — PROGRESS NOTE ADULT - SUBJECTIVE AND OBJECTIVE BOX
CHIDI RUBIO  81y  Male      Patient is a 81y old  Male who presents with a chief complaint of Non-ST elevation myocardial infarction (NSTEMI)  Reported Rectal bleeding.no abd pain   (09 May 2022 15:04)      REVIEW OF SYSTEMS:  CONSTITUTIONAL: No fever  RESPIRATORY: No cough, hemoptysis or shortness of breath  CARDIOVASCULAR: No chest pain, palpitations, dizziness, or leg swelling  GASTROINTESTINAL: No abdominal pain. nausea, vomiting, hematemesis    INTERVAL HPI/OVERNIGHT EVENTS:  T(C): 36.8 (05-09-22 @ 21:46), Max: 36.8 (05-09-22 @ 21:46)  HR: 75 (05-09-22 @ 21:46) (74 - 84)  BP: 119/69 (05-09-22 @ 21:46) (111/76 - 135/73)  RR: 18 (05-09-22 @ 21:46) (16 - 18)  SpO2: 100% (05-09-22 @ 21:46) (99% - 100%)  Wt(kg): --  I&O's Summary    08 May 2022 07:01  -  09 May 2022 07:00  --------------------------------------------------------  IN: 0 mL / OUT: 500 mL / NET: -500 mL    09 May 2022 07:01  -  09 May 2022 23:33  --------------------------------------------------------  IN: 0 mL / OUT: 900 mL / NET: -900 mL      T(C): 36.8 (05-09-22 @ 21:46), Max: 36.8 (05-09-22 @ 21:46)  HR: 75 (05-09-22 @ 21:46) (74 - 84)  BP: 119/69 (05-09-22 @ 21:46) (111/76 - 135/73)  RR: 18 (05-09-22 @ 21:46) (16 - 18)  SpO2: 100% (05-09-22 @ 21:46) (99% - 100%)  Wt(kg): --Vital Signs Last 24 Hrs  T(C): 36.8 (09 May 2022 21:46), Max: 36.8 (09 May 2022 21:46)  T(F): 98.2 (09 May 2022 21:46), Max: 98.2 (09 May 2022 21:46)  HR: 75 (09 May 2022 21:46) (74 - 84)  BP: 119/69 (09 May 2022 21:46) (111/76 - 135/73)  BP(mean): --  RR: 18 (09 May 2022 21:46) (16 - 18)  SpO2: 100% (09 May 2022 21:46) (99% - 100%)    LABS:                        12.3   6.68  )-----------( 357      ( 09 May 2022 19:08 )             37.2     05-09    144  |  110<H>  |  10  ----------------------------<  237<H>  3.9   |  26  |  0.98    Ca    8.7      09 May 2022 07:07  Phos  2.7     05-09  Mg     1.90     05-09      PTT - ( 09 May 2022 22:39 )  PTT:27.6 sec    CAPILLARY BLOOD GLUCOSE      POCT Blood Glucose.: 143 mg/dL (09 May 2022 22:15)  POCT Blood Glucose.: 133 mg/dL (09 May 2022 16:23)  POCT Blood Glucose.: 222 mg/dL (09 May 2022 11:13)  POCT Blood Glucose.: 192 mg/dL (09 May 2022 07:34)            PAST MEDICAL & SURGICAL HISTORY:  Diabetes mellitus    CAD (coronary artery disease)    Prostate ca  s/p SEED 5 yrs ago    Sleep apnea  on cpap at home    Hypertension    Hypercholesteremia    S/P cholecystectomy    S/P coronary angioplasty  last stent 1 year ago    H/O carotid endarterectomy  April 2022, stent placed.        MEDICATIONS  (STANDING):  atorvastatin 40 milliGRAM(s) Oral at bedtime  dextrose 5%. 1000 milliLiter(s) (100 mL/Hr) IV Continuous <Continuous>  dextrose 5%. 1000 milliLiter(s) (50 mL/Hr) IV Continuous <Continuous>  dextrose 50% Injectable 25 Gram(s) IV Push once  dextrose 50% Injectable 12.5 Gram(s) IV Push once  dextrose 50% Injectable 25 Gram(s) IV Push once  digoxin     Tablet 125 MICROGram(s) Oral daily  doxazosin 1 milliGRAM(s) Oral at bedtime  glucagon  Injectable 1 milliGRAM(s) IntraMuscular once  insulin glargine Injectable (LANTUS) 16 Unit(s) SubCutaneous at bedtime  insulin lispro (ADMELOG) corrective regimen sliding scale   SubCutaneous three times a day before meals  insulin lispro (ADMELOG) corrective regimen sliding scale   SubCutaneous at bedtime  insulin lispro Injectable (ADMELOG) 5 Unit(s) SubCutaneous three times a day before meals  metoprolol tartrate 50 milliGRAM(s) Oral two times a day  pantoprazole    Tablet 40 milliGRAM(s) Oral before breakfast  sodium chloride 0.9% lock flush 3 milliLiter(s) IV Push every 8 hours  sodium chloride 0.9%. 1000 milliLiter(s) (75 mL/Hr) IV Continuous <Continuous>    MEDICATIONS  (PRN):  dextrose Oral Gel 15 Gram(s) Oral once PRN Blood Glucose LESS THAN 70 milliGRAM(s)/deciliter        RADIOLOGY & ADDITIONAL TESTS:    Imaging Personally Reviewed:  [ ] YES  [ ] NO    Consultant(s) Notes Reviewed:  [ ] YES  [ ] NO    PHYSICAL EXAM:  GENERAL: Alert and awake lying in bed in no distress  HEAD:  Atraumatic, Normocephalic  EYES: EOMI, [place], conjunctiva and sclera clear  NECK: Supple, No JVD, Normal thyroid  NERVOUS SYSTEM:  Alert & Oriented X3, Motor and sensory systems are intact,   CHEST/LUNG: Bilateral clear breath sounds, no rhochi, no wheezing, no crepitations,  HEART: Regular rate and rhythm; No murmurs, rubs, or gallops  ABDOMEN: Soft, Nontender, Nondistended; Bowel sounds present  EXTREMITIES:   Peripheral Pulses are palpable, no  edema        Care Discussed with Consultants/Other Providers [ ] YES  [ ] NO      Code Status: [] Full Code [] DNR [] DNI [] Goals of Care:   Disposition: [] ICU [] Stroke Unit [] RCU []PCU []Floor [] Discharge Home         BOLA CaseyP

## 2022-05-09 NOTE — DIETITIAN INITIAL EVALUATION ADULT - PERTINENT MEDS FT
MEDICATIONS  (STANDING):  aspirin enteric coated 81 milliGRAM(s) Oral daily  atorvastatin 40 milliGRAM(s) Oral at bedtime  dextrose 5%. 1000 milliLiter(s) (50 mL/Hr) IV Continuous <Continuous>  dextrose 5%. 1000 milliLiter(s) (100 mL/Hr) IV Continuous <Continuous>  dextrose 50% Injectable 25 Gram(s) IV Push once  dextrose 50% Injectable 12.5 Gram(s) IV Push once  dextrose 50% Injectable 25 Gram(s) IV Push once  digoxin     Tablet 125 MICROGram(s) Oral daily  doxazosin 1 milliGRAM(s) Oral at bedtime  glucagon  Injectable 1 milliGRAM(s) IntraMuscular once  heparin  Infusion.  Unit(s)/Hr (10 mL/Hr) IV Continuous <Continuous>  insulin glargine Injectable (LANTUS) 16 Unit(s) SubCutaneous at bedtime  insulin lispro (ADMELOG) corrective regimen sliding scale   SubCutaneous three times a day before meals  insulin lispro (ADMELOG) corrective regimen sliding scale   SubCutaneous at bedtime  insulin lispro Injectable (ADMELOG) 5 Unit(s) SubCutaneous three times a day before meals  metoprolol tartrate 50 milliGRAM(s) Oral two times a day  sodium chloride 0.9% lock flush 3 milliLiter(s) IV Push every 8 hours  sodium chloride 0.9%. 1000 milliLiter(s) (75 mL/Hr) IV Continuous <Continuous>  ticagrelor 90 milliGRAM(s) Oral every 12 hours    MEDICATIONS  (PRN):  dextrose Oral Gel 15 Gram(s) Oral once PRN Blood Glucose LESS THAN 70 milliGRAM(s)/deciliter  heparin   Injectable 4000 Unit(s) IV Push every 6 hours PRN For aPTT less than 40

## 2022-05-09 NOTE — PROGRESS NOTE ADULT - ASSESSMENT
This is a 82yo male with PMHx prostate ca s/p Seed implant, HTN, DM2 on insulin, CAD s/p stent (last one about 2 yr ago), CVA per son, and JACKELIN on CPAP who presents with confusion. Cardiology consulted for NSTEMI.     #NSTEMI   #HFrEF   - TTE with notable WMA but unclear if new, moderate to severe LV dysfunction w/ severe MR. Euvolemic   - Plan for medical management at this time due to other comorbidities   - Continue Aspirin 81mg daily   - Stop Brilinta. Load with Plavix 300mg once this evening and start Plavix 75mg daily starting tomorrow   - Completed 48hrs on Heparin gtt  - Monitor on Telemetry    - Continue Lipitor 40mg   - Continue home metoprolol and losartan  - Start PPI while on DAPT, can stop after one year once no longer on DAPT     #Afib  CHADSVASC 8, currently in sinus s/p dig load   - Continue heparin gtt, switch to DOAC closer to discharge if no contraindications to bleeding and after discussion with family on risks and benefits of anticoagulation   - Metoprolol 50mg BID   - Digoxin 125mcg once daily       Quincy Atkins MD  Cardiology Fellow - PGY 4  Text or Call: [phone]  For all New Consults and Questions:  [email or web address]   Login: [email or web address] This is a 80yo male with PMHx prostate ca s/p Seed implant, HTN, DM2 on insulin, CAD s/p stent (last one about 2 yr ago), CVA per son, and JACKELIN on CPAP who presents with confusion. Cardiology consulted for NSTEMI.     #NSTEMI   #HFrEF   - TTE with notable WMA but unclear if new, moderate to severe LV dysfunction w/ severe MR. Euvolemic   - Plan for medical management at this time due to other comorbidities   - Continue Aspirin 81mg daily   - Stop Brilinta. Load with Plavix 300mg once this evening and start Plavix 75mg daily starting tomorrow   - Completed 48hrs on Heparin gtt  - Monitor on Telemetry    - Continue Lipitor 40mg   - Continue home metoprolol and losartan  - Start PPI while on DAPT, can stop after one year once no longer on DAPT     #Afib  CHADSVASC 8, currently in sinus s/p dig load   - Continue heparin gtt, switch to DOAC closer to discharge if no contraindications to bleeding and after discussion with family on risks and benefits of anticoagulation   - Metoprolol 50mg BID   - Digoxin 125mcg once daily     Will follow as needed.     Quincy Atkins MD  Cardiology Fellow - PGY 4  Text or Call: [phone]  For all New Consults and Questions:  [email or web address]   Login: [email or web address] [employer] This is a 80yo male with PMHx prostate ca s/p Seed implant, HTN, DM2 on insulin, CAD s/p stent (last one about 2 yr ago), CVA per son, and JACKELIN on CPAP who presents with confusion. Cardiology consulted for NSTEMI.     #NSTEMI   #HFrEF   - TTE with notable WMA but unclear if new, moderate to severe LV dysfunction w/ severe MR. Euvolemic   - Plan for medical management at this time due to other comorbidities   - Stop Aspirin given he will be on Plavix and a DOAC  - Stop Brilinta. Load with Plavix 300mg once this evening and start Plavix 75mg daily starting tomorrow   - Completed 48hrs on Heparin gtt  - Monitor on Telemetry    - Continue Lipitor 40mg   - Continue home metoprolol and losartan  - Start PPI while on DAPT, can stop after one year once no longer on DAPT     #Afib  CHADSVASC 8, currently in sinus s/p dig load   - Can switch to DOAC if no contraindications to bleeding and after discussion with family on risks and benefits of anticoagulation   - Metoprolol 50mg BID   - Digoxin 125mcg once daily     Will follow as needed.     Quincy Atkins MD  Cardiology Fellow - PGY 4  Text or Call: [phone]  For all New Consults and Questions:  [email or web address]   Login: [email or web address]

## 2022-05-09 NOTE — CHART NOTE - NSCHARTNOTEFT_GEN_A_CORE
d/w Dr. Casey. Cardiology recommendation noted. Heparin gtt discontinued and patient switched to eliquis . Patient has already received brilinta dose for this evening . Brilinta has been discontinued and plavix load ordered for 6am with maintenance dosing . Placed on protonix for GI prophylaxis .

## 2022-05-09 NOTE — PROGRESS NOTE ADULT - ASSESSMENT
82 y/o M with PMH of prostate cancer s/p seed implant, Hypertension, Type 2 diabetes on insulin (has not been taking insulin recently), CAD s/p stent (last stent placed 2 years ago), CVA s/p carotid stent 1 month ago, JACKELIN on CPAP, presents to the ED complaining of altered mental status and dysuria with leukocytosis, urine retention, ACS    Man Craft  Attending Physician   Division of Infectious Disease  Office #[phone]  Available on Microsoft Teams also  After 5pm/weekend or no response, call #[phone]

## 2022-05-09 NOTE — DIETITIAN INITIAL EVALUATION ADULT - NS FNS DIET ORDER
Diet, Consistent Carbohydrate/No Snacks:   DASH/TLC {Sodium & Cholesterol Restricted} (DASH)  Minced and Moist (MINCEDMOIST) (05-02-22 @ 21:48)

## 2022-05-09 NOTE — PROGRESS NOTE ADULT - ATTENDING COMMENTS
Agree with above. Will review echo.
Patient seen and examined during morning rounds.  Assessment and recommendations were reviewed with our Cardiology Fellow during rounds, and are as outlined above.
significantly elevated cardiac enzymes  pt is a poor historian with varying mental status  WMA on TTE, unknown chronicity    -opt for medical management of ACS  -favor DOAC + clopidogrel for antiplt and AC strategy  -bblocker, ARB, statin

## 2022-05-09 NOTE — PROGRESS NOTE ADULT - SUBJECTIVE AND OBJECTIVE BOX
Patient seen and examined at bedside.    Overnight Events: No acute events. On 1:1 safety monitor. Oriented to person only. Was pulling at lines yesterday per report. Denies chest pain.        Current Meds:  aspirin enteric coated 81 milliGRAM(s) Oral daily  atorvastatin 40 milliGRAM(s) Oral at bedtime  dextrose 5%. 1000 milliLiter(s) IV Continuous <Continuous>  dextrose 5%. 1000 milliLiter(s) IV Continuous <Continuous>  dextrose 50% Injectable 25 Gram(s) IV Push once  dextrose 50% Injectable 12.5 Gram(s) IV Push once  dextrose 50% Injectable 25 Gram(s) IV Push once  dextrose Oral Gel 15 Gram(s) Oral once PRN  digoxin     Tablet 125 MICROGram(s) Oral daily  doxazosin 1 milliGRAM(s) Oral at bedtime  glucagon  Injectable 1 milliGRAM(s) IntraMuscular once  heparin   Injectable 4000 Unit(s) IV Push every 6 hours PRN  heparin  Infusion.  Unit(s)/Hr IV Continuous <Continuous>  insulin glargine Injectable (LANTUS) 16 Unit(s) SubCutaneous at bedtime  insulin lispro (ADMELOG) corrective regimen sliding scale   SubCutaneous three times a day before meals  insulin lispro (ADMELOG) corrective regimen sliding scale   SubCutaneous at bedtime  insulin lispro Injectable (ADMELOG) 5 Unit(s) SubCutaneous three times a day before meals  metoprolol tartrate 50 milliGRAM(s) Oral two times a day  sodium chloride 0.9% lock flush 3 milliLiter(s) IV Push every 8 hours  sodium chloride 0.9%. 1000 milliLiter(s) IV Continuous <Continuous>  ticagrelor 90 milliGRAM(s) Oral every 12 hours      Vitals:  T(F): 97.6 (05-09), Max: 98.2 (05-08)  HR: 81 (05-09) (71 - 83)  BP: 122/70 (05-09) (122/70 - 136/92)  RR: 18 (05-09)  SpO2: 99% (05-09)  I&O's Summary    08 May 2022 07:01  -  09 May 2022 07:00  --------------------------------------------------------  IN: 0 mL / OUT: 500 mL / NET: -500 mL        Physical Exam:  Appearance: No acute distress  Eyes: pink conjunctiva  HEENT: Normal oral mucosa  Cardiovascular: RRR, S1, S2  Respiratory: Clear to auscultation bilaterally  Gastrointestinal: soft, non-tender, non-distended with normal bowel sounds  Musculoskeletal: No clubbing; no joint deformity   Neurologic: Non-focal  Lymphatic: No lymphadenopathy  Psychiatry: AAOx0                          12.0   6.24  )-----------( 325      ( 09 May 2022 07:07 )             36.4     05-09    144  |  110<H>  |  10  ----------------------------<  237<H>  3.9   |  26  |  0.98    Ca    8.7      09 May 2022 07:07  Phos  2.7     05-09  Mg     1.90     05-09      PTT - ( 09 May 2022 07:07 )  PTT:82.0 sec  CARDIAC MARKERS ( 03 May 2022 19:00 )  2667 ng/L / x     / x     / x     / x     / x      CARDIAC MARKERS ( 03 May 2022 07:39 )  3101 ng/L / x     / x     / x     / x     / x      CARDIAC MARKERS ( 02 May 2022 21:51 )  3041 ng/L / x     / x     / 956 U/L / x     / 8.5 ng/mL  CARDIAC MARKERS ( 02 May 2022 16:37 )  x     / x     / x     / 863 U/L / x     / 9.9 ng/mL  CARDIAC MARKERS ( 02 May 2022 16:35 )  3089 ng/L / x     / x     / x     / x     / x         Patient seen and examined at bedside.    Overnight Events: No acute events. On 1:1 safety monitor. Oriented to person only. Was pulling at lines yesterday per report. Denies chest pain.        Current Meds:  aspirin enteric coated 81 milliGRAM(s) Oral daily  atorvastatin 40 milliGRAM(s) Oral at bedtime  dextrose 5%. 1000 milliLiter(s) IV Continuous <Continuous>  dextrose 5%. 1000 milliLiter(s) IV Continuous <Continuous>  dextrose 50% Injectable 25 Gram(s) IV Push once  dextrose 50% Injectable 12.5 Gram(s) IV Push once  dextrose 50% Injectable 25 Gram(s) IV Push once  dextrose Oral Gel 15 Gram(s) Oral once PRN  digoxin     Tablet 125 MICROGram(s) Oral daily  doxazosin 1 milliGRAM(s) Oral at bedtime  glucagon  Injectable 1 milliGRAM(s) IntraMuscular once  heparin   Injectable 4000 Unit(s) IV Push every 6 hours PRN  heparin  Infusion.  Unit(s)/Hr IV Continuous <Continuous>  insulin glargine Injectable (LANTUS) 16 Unit(s) SubCutaneous at bedtime  insulin lispro (ADMELOG) corrective regimen sliding scale   SubCutaneous three times a day before meals  insulin lispro (ADMELOG) corrective regimen sliding scale   SubCutaneous at bedtime  insulin lispro Injectable (ADMELOG) 5 Unit(s) SubCutaneous three times a day before meals  metoprolol tartrate 50 milliGRAM(s) Oral two times a day  sodium chloride 0.9% lock flush 3 milliLiter(s) IV Push every 8 hours  sodium chloride 0.9%. 1000 milliLiter(s) IV Continuous <Continuous>  ticagrelor 90 milliGRAM(s) Oral every 12 hours      Vitals:  T(F): 97.6 (05-09), Max: 98.2 (05-08)  HR: 81 (05-09) (71 - 83)  BP: 122/70 (05-09) (122/70 - 136/92)  RR: 18 (05-09)  SpO2: 99% (05-09)  I&O's Summary    08 May 2022 07:01  -  09 May 2022 07:00  --------------------------------------------------------  IN: 0 mL / OUT: 500 mL / NET: -500 mL    REVIEW OF SYSTEMS:  Constitutional:     [x ] negative [ ] fevers [ ] chills [ ] weight loss [ ] weight gain  HEENT:                  [x ] negative [ ] dry eyes [ ] eye irritation [ ] postnasal drip [ ] nasal congestion  CV:                         [ x] negative  [ ] chest pain [ ] orthopnea [ ] palpitations [ ] murmur  Resp:                     [ ] negative [ ] cough [x ] shortness of breath [ ] dyspnea [ ] wheezing [ ] sputum [ ]hemoptysis  GI:                          [ x] negative [ ] nausea [ ] vomiting [ ] diarrhea [ ] constipation [ ] abd pain [ ] dysphagia   :                        [ x] negative [ ] dysuria [ ] nocturia [ ] hematuria [ ] increased urinary frequency  Musculoskeletal: [x ] negative [ ] back pain [ ] myalgias [ ] arthralgias [ ] fracture  Skin:                       [ x] negative [ ] rash [ ] itch  Neurological:        [ x] negative [ ] headache [ ] dizziness [ ] syncope [ ] weakness [ ] numbness  Psychiatric:           [ x] negative [ ] anxiety [ ] depression  Endocrine:            [ x] negative [ ] diabetes [ ] thyroid problem  Heme/Lymph:      [ x] negative [ ] anemia [ ] bleeding problem  Allergic/Immune: [ x] negative [ ] itchy eyes [ ] nasal discharge [ ] hives [ ] angioedema    [ ] All other systems negative  [x ] Unable to assess ROS due to disoriented    Physical Exam:  Appearance: No acute distress  Eyes: pink conjunctiva  HEENT: Normal oral mucosa  Cardiovascular: RRR, S1, S2  Respiratory: Clear to auscultation bilaterally  Gastrointestinal: soft, non-tender, non-distended with normal bowel sounds  Musculoskeletal: No clubbing; no joint deformity   Neurologic: Non-focal  Lymphatic: No lymphadenopathy  Psychiatry: AAOx0                          12.0   6.24  )-----------( 325      ( 09 May 2022 07:07 )             36.4     05-09    144  |  110<H>  |  10  ----------------------------<  237<H>  3.9   |  26  |  0.98    Ca    8.7      09 May 2022 07:07  Phos  2.7     05-09  Mg     1.90     05-09      PTT - ( 09 May 2022 07:07 )  PTT:82.0 sec  CARDIAC MARKERS ( 03 May 2022 19:00 )  2667 ng/L / x     / x     / x     / x     / x      CARDIAC MARKERS ( 03 May 2022 07:39 )  3101 ng/L / x     / x     / x     / x     / x      CARDIAC MARKERS ( 02 May 2022 21:51 )  3041 ng/L / x     / x     / 956 U/L / x     / 8.5 ng/mL  CARDIAC MARKERS ( 02 May 2022 16:37 )  x     / x     / x     / 863 U/L / x     / 9.9 ng/mL  CARDIAC MARKERS ( 02 May 2022 16:35 )  3089 ng/L / x     / x     / x     / x     / x

## 2022-05-10 NOTE — PROGRESS NOTE ADULT - SUBJECTIVE AND OBJECTIVE BOX
CHIDI RUBIO 81y [id number]    Patient is a 81y old  Male who presents with a chief complaint of NSTEMI, Hyperglycemia, SANDRA, Encephalopathy (09 May 2022 17:33)      Follow Up/CC:  ID following for    Interval History/ROS:    Allergies    ceftriaxone (Urticaria)  ciprofloxacin (Rash)  schrimp (Unknown)    Intolerances        ANTIMICROBIALS:      MEDICATIONS  (STANDING):  atorvastatin 40 milliGRAM(s) Oral at bedtime  dextrose 5%. 1000 milliLiter(s) (100 mL/Hr) IV Continuous <Continuous>  dextrose 5%. 1000 milliLiter(s) (50 mL/Hr) IV Continuous <Continuous>  dextrose 50% Injectable 25 Gram(s) IV Push once  dextrose 50% Injectable 12.5 Gram(s) IV Push once  dextrose 50% Injectable 25 Gram(s) IV Push once  digoxin     Tablet 125 MICROGram(s) Oral daily  doxazosin 1 milliGRAM(s) Oral at bedtime  glucagon  Injectable 1 milliGRAM(s) IntraMuscular once  hydrocortisone hemorrhoidal Suppository 1 Suppository(s) Rectal at bedtime  insulin glargine Injectable (LANTUS) 16 Unit(s) SubCutaneous at bedtime  insulin lispro (ADMELOG) corrective regimen sliding scale   SubCutaneous three times a day before meals  insulin lispro (ADMELOG) corrective regimen sliding scale   SubCutaneous at bedtime  insulin lispro Injectable (ADMELOG) 5 Unit(s) SubCutaneous three times a day before meals  metoprolol tartrate 50 milliGRAM(s) Oral two times a day  pantoprazole    Tablet 40 milliGRAM(s) Oral before breakfast  polyethylene glycol 3350 17 Gram(s) Oral daily  senna 2 Tablet(s) Oral at bedtime  sodium chloride 0.9% lock flush 3 milliLiter(s) IV Push every 8 hours  sodium chloride 0.9%. 1000 milliLiter(s) (75 mL/Hr) IV Continuous <Continuous>    MEDICATIONS  (PRN):  dextrose Oral Gel 15 Gram(s) Oral once PRN Blood Glucose LESS THAN 70 milliGRAM(s)/deciliter        Vital Signs Last 24 Hrs  T(C): 36.4 (10 May 2022 05:20), Max: 36.8 (09 May 2022 21:46)  T(F): 97.6 (10 May 2022 05:20), Max: 98.2 (09 May 2022 21:46)  HR: 77 (10 May 2022 05:20) (74 - 84)  BP: 130/75 (10 May 2022 05:20) (111/76 - 135/73)  BP(mean): --  RR: 18 (10 May 2022 05:20) (16 - 18)  SpO2: 100% (10 May 2022 05:20) (100% - 100%)    CBC Full  -  ( 10 May 2022 07:09 )  WBC Count : 5.79 K/uL  RBC Count : 4.22 M/uL  Hemoglobin : 11.9 g/dL  Hematocrit : 36.9 %  Platelet Count - Automated : 346 K/uL  Mean Cell Volume : 87.4 fL  Mean Cell Hemoglobin : 28.2 pg  Mean Cell Hemoglobin Concentration : 32.2 gm/dL  Auto Neutrophil # : x  Auto Lymphocyte # : x  Auto Monocyte # : x  Auto Eosinophil # : x  Auto Basophil # : x  Auto Neutrophil % : x  Auto Lymphocyte % : x  Auto Monocyte % : x  Auto Eosinophil % : x  Auto Basophil % : x    05-10    144  |  109<H>  |  9   ----------------------------<  139<H>  4.0   |  26  |  1.02    Ca    8.9      10 May 2022 07:09  Phos  2.7     05-10  Mg     1.90     05-10            MICROBIOLOGY:  .Blood Blood-Peripheral  05-02-22   No Growth Final  --  --      .Blood Blood-Peripheral  05-02-22   No Growth Final  --  --              v            RADIOLOGY     CHIDI RUBIO 81y [id number]    Patient is a 81y old  Male who presents with a chief complaint of NSTEMI, Hyperglycemia, SANDRA, Encephalopathy (09 May 2022 17:33)      Follow Up/CC:  ID following for uti    Interval History/ROS: no fever    Allergies    ceftriaxone (Urticaria)  ciprofloxacin (Rash)  schrimp (Unknown)    Intolerances        ANTIMICROBIALS:      MEDICATIONS  (STANDING):  atorvastatin 40 milliGRAM(s) Oral at bedtime  dextrose 5%. 1000 milliLiter(s) (100 mL/Hr) IV Continuous <Continuous>  dextrose 5%. 1000 milliLiter(s) (50 mL/Hr) IV Continuous <Continuous>  dextrose 50% Injectable 25 Gram(s) IV Push once  dextrose 50% Injectable 12.5 Gram(s) IV Push once  dextrose 50% Injectable 25 Gram(s) IV Push once  digoxin     Tablet 125 MICROGram(s) Oral daily  doxazosin 1 milliGRAM(s) Oral at bedtime  glucagon  Injectable 1 milliGRAM(s) IntraMuscular once  hydrocortisone hemorrhoidal Suppository 1 Suppository(s) Rectal at bedtime  insulin glargine Injectable (LANTUS) 16 Unit(s) SubCutaneous at bedtime  insulin lispro (ADMELOG) corrective regimen sliding scale   SubCutaneous three times a day before meals  insulin lispro (ADMELOG) corrective regimen sliding scale   SubCutaneous at bedtime  insulin lispro Injectable (ADMELOG) 5 Unit(s) SubCutaneous three times a day before meals  metoprolol tartrate 50 milliGRAM(s) Oral two times a day  pantoprazole    Tablet 40 milliGRAM(s) Oral before breakfast  polyethylene glycol 3350 17 Gram(s) Oral daily  senna 2 Tablet(s) Oral at bedtime  sodium chloride 0.9% lock flush 3 milliLiter(s) IV Push every 8 hours  sodium chloride 0.9%. 1000 milliLiter(s) (75 mL/Hr) IV Continuous <Continuous>    MEDICATIONS  (PRN):  dextrose Oral Gel 15 Gram(s) Oral once PRN Blood Glucose LESS THAN 70 milliGRAM(s)/deciliter        Vital Signs Last 24 Hrs  T(C): 36.4 (10 May 2022 05:20), Max: 36.8 (09 May 2022 21:46)  T(F): 97.6 (10 May 2022 05:20), Max: 98.2 (09 May 2022 21:46)  HR: 77 (10 May 2022 05:20) (74 - 84)  BP: 130/75 (10 May 2022 05:20) (111/76 - 135/73)  BP(mean): --  RR: 18 (10 May 2022 05:20) (16 - 18)  SpO2: 100% (10 May 2022 05:20) (100% - 100%)    CBC Full  -  ( 10 May 2022 07:09 )  WBC Count : 5.79 K/uL  RBC Count : 4.22 M/uL  Hemoglobin : 11.9 g/dL  Hematocrit : 36.9 %  Platelet Count - Automated : 346 K/uL  Mean Cell Volume : 87.4 fL  Mean Cell Hemoglobin : 28.2 pg  Mean Cell Hemoglobin Concentration : 32.2 gm/dL  Auto Neutrophil # : x  Auto Lymphocyte # : x  Auto Monocyte # : x  Auto Eosinophil # : x  Auto Basophil # : x  Auto Neutrophil % : x  Auto Lymphocyte % : x  Auto Monocyte % : x  Auto Eosinophil % : x  Auto Basophil % : x    05-10    144  |  109<H>  |  9   ----------------------------<  139<H>  4.0   |  26  |  1.02    Ca    8.9      10 May 2022 07:09  Phos  2.7     05-10  Mg     1.90     05-10            MICROBIOLOGY:  .Blood Blood-Peripheral  05-02-22   No Growth Final  --  --      .Blood Blood-Peripheral  05-02-22   No Growth Final  --  --              v            RADIOLOGY

## 2022-05-10 NOTE — CONSULT NOTE ADULT - SUBJECTIVE AND OBJECTIVE BOX
Chief Complaint:  Patient is a 81y old  Male who presents with a chief complaint of NSTEMI, Hyperglycemia, SANDRA, Encephalopathy (10 May 2022 10:47)    Diabetes mellitus    CAD (coronary artery disease)    Prostate ca    Sleep apnea    Hypertension    Hypercholesteremia    S/P cholecystectomy    S/P coronary angioplasty    H/O carotid endarterectomy       HPI:  82 y/o M with PMH of prostate cancer s/p seed implant, Hypertension, Type 2 diabetes on insulin (has not been taking insulin recently), CAD s/p stent (last stent placed 2 years ago), CVA s/p carotid stent 1 month ago, JACKELIN on CPAP, presents to the ED complaining of altered mental status and dysuria. Patient is AAOx1 oriented only to person. Per patient's son Benja patient has been confused today. He reports that patient was diagnosed with UTI last week. Son states that patient was prescribed ciprofloxacin but reports patient only took 1 tablet last Friday. Son reports patient has not been taking his insulin and endorses poor PO intake over the last 2 days. Patient denies chest pain, shortness of breath but endorses dysuria. Patient denies fever, chills, abdominal pain. GI consulted for strained stool with blood streaked. Pt confused, abd soft, pt appears comfortable    In ED patient was found to have Troponin of 3089. Serum glucose of 519, BHB of 0.6, pH 7.39. EKG showed KELLY in leads V5,V6. Cardiology was consulted. Per Cardiology EKG showed ST elevation in I and aVL, with aVL not more than 1 mm and ST changes in V3-V5 however elevations did not meet STEMI criteria. Patient was started on Heparin drip. Patient received admelog 7 units and Lantus 20 units. (02 May 2022 20:03)      ceftriaxone (Urticaria)  ciprofloxacin (Rash)  schrimp (Unknown)      atorvastatin 40 milliGRAM(s) Oral at bedtime  dextrose 5%. 1000 milliLiter(s) IV Continuous <Continuous>  dextrose 5%. 1000 milliLiter(s) IV Continuous <Continuous>  dextrose 50% Injectable 25 Gram(s) IV Push once  dextrose 50% Injectable 12.5 Gram(s) IV Push once  dextrose 50% Injectable 25 Gram(s) IV Push once  dextrose Oral Gel 15 Gram(s) Oral once PRN  digoxin     Tablet 125 MICROGram(s) Oral daily  doxazosin 1 milliGRAM(s) Oral at bedtime  glucagon  Injectable 1 milliGRAM(s) IntraMuscular once  hydrocortisone hemorrhoidal Suppository 1 Suppository(s) Rectal at bedtime  insulin glargine Injectable (LANTUS) 16 Unit(s) SubCutaneous at bedtime  insulin lispro (ADMELOG) corrective regimen sliding scale   SubCutaneous three times a day before meals  insulin lispro (ADMELOG) corrective regimen sliding scale   SubCutaneous at bedtime  insulin lispro Injectable (ADMELOG) 5 Unit(s) SubCutaneous three times a day before meals  metoprolol tartrate 50 milliGRAM(s) Oral two times a day  pantoprazole    Tablet 40 milliGRAM(s) Oral before breakfast  polyethylene glycol 3350 17 Gram(s) Oral daily  senna 2 Tablet(s) Oral at bedtime  sodium chloride 0.9% lock flush 3 milliLiter(s) IV Push every 8 hours  sodium chloride 0.9%. 1000 milliLiter(s) IV Continuous <Continuous>        FAMILY HISTORY:  FH: type 2 diabetes          Review of Systems:  *confused, limited ROS    General:  No wt loss, fevers, chills, night sweats, fatigue  Eyes:  Good vision, no reported pain  ENT:  No sore throat, pain, runny nose, dysphagia  CV:  No pain, palpitations, no lightheadedness  Resp:  No dyspnea, cough, tachypnea, wheezing  GI: as above  :  No pain, bleeding, incontinence, nocturia  Muscle:  No pain, weakness  Neuro:  No weakness, tingling, memory problems  Psych:  No fatigue, insomnia, mood problems, depression  Endocrine:  No polyuria, polydypsia, cold/heat intolerance  Heme:  No petechiae, ecchymosis, easy bruisability  Skin:  No rash, tattoos, scars, edema    Relevant Family History:   n/c    Relevant Social History: n/c      Physical Exam:    Vital Signs:  Vital Signs Last 24 Hrs  T(C): 36.4 (10 May 2022 05:20), Max: 36.8 (09 May 2022 21:46)  T(F): 97.6 (10 May 2022 05:20), Max: 98.2 (09 May 2022 21:46)  HR: 77 (10 May 2022 05:20) (74 - 84)  BP: 130/75 (10 May 2022 05:20) (111/76 - 135/73)  BP(mean): --  RR: 18 (10 May 2022 05:20) (16 - 18)  SpO2: 100% (10 May 2022 05:20) (100% - 100%)  Daily     Daily     General:  Appears stated age, well-groomed, nad  HEENT:  NC/AT,  conjunctivae clear and pink, no thyromegaly, nodules, adenopathy, no JVD  Chest:  Full & symmetric excursion, no increased effort, breath sounds clear  Cardiovascular:  Regular rhythm, S1, S2, no murmur/rub/S3/S4, no abdominal bruit, no edema  Abdomen:  Soft, non-tender, non-distended, normoactive bowel sounds,  no masses ,no hepatosplenomeagaly, no signs of chronic liver disease  Extremities:  no cyanosis,clubbing or edema  Skin:  No rash/erythema/ecchymoses/petechiae/wounds/abscess/warm/dry  Neuro/Psych:  A&Ox 1, no asterixis, no tremor, no encephalopathy    Laboratory:                            11.9   5.79  )-----------( 346      ( 10 May 2022 07:09 )             36.9     05-10    144  |  109<H>  |  9   ----------------------------<  139<H>  4.0   |  26  |  1.02    Ca    8.9      10 May 2022 07:09  Phos  2.7     05-10  Mg     1.90     05-10        PTT - ( 09 May 2022 22:39 )  PTT:27.6 sec      Imaging:      < from: Xray Chest 1 View AP/PA (05.02.22 @ 15:17) >    ACC: [id number] EXAM:  XR CHEST AP OR PA 1V                          PROCEDURE DATE:  05/02/2022          INTERPRETATION:  EXAMINATION: XR CHEST    CLINICAL INDICATION: DKA. Evaluate for provoking infection.    TECHNIQUE: Single frontal, portable view of the chest was obtained.    COMPARISON: Chest x-ray 9/20/2021    FINDINGS:  The heart is difficult to evaluate on this view.  No focal consolidations. No pleural effusions or pneumothorax.  Degenerative changes of the spine and shoulder joints.    IMPRESSION:  No focal consolidations.    --- End of Report ---          RYAN RODRIGUEZ MD; Resident Radiologist  This document has been electronically signed.  RYAN STOREY MD; Attending Radiologist  This document has been electronically signed. May 2 2022  4:46PM    < end of copied text >         Chief Complaint:  Patient is a 81y old  Male who presents with a chief complaint of NSTEMI, Hyperglycemia, SANDRA, Encephalopathy (10 May 2022 10:47)    Diabetes mellitus  CAD (coronary artery disease)  Prostate ca  Sleep apnea  Hypertension  Hypercholesteremia  S/P cholecystectomy  S/P coronary angioplasty  H/O carotid endarterectomy    HPI:  80 y/o M with PMH of prostate cancer s/p seed implant, Hypertension, Type 2 diabetes on insulin (has not been taking insulin recently), CAD s/p stent (last stent placed 2 years ago), CVA s/p carotid stent 1 month ago, JACKELIN on CPAP, presents to the ED complaining of altered mental status and dysuria. Patient is AAOx1 oriented only to person. Per patient's son Benja patient has been confused today. He reports that patient was diagnosed with UTI last week. Son states that patient was prescribed ciprofloxacin but reports patient only took 1 tablet last Friday. Son reports patient has not been taking his insulin and endorses poor PO intake over the last 2 days. Patient denies chest pain, shortness of breath but endorses dysuria. Patient denies fever, chills, abdominal pain. GI consulted for strained stool with blood streaked. Pt confused, abd soft, pt appears comfortable    In ED patient was found to have Troponin of 3089. Serum glucose of 519, BHB of 0.6, pH 7.39. EKG showed KELLY in leads V5,V6. Cardiology was consulted. Per Cardiology EKG showed ST elevation in I and aVL, with aVL not more than 1 mm and ST changes in V3-V5 however elevations did not meet STEMI criteria. Patient was started on Heparin drip. Patient received admelog 7 units and Lantus 20 units. (02 May 2022 20:03)      ceftriaxone (Urticaria)  ciprofloxacin (Rash)  schrimp (Unknown)      atorvastatin 40 milliGRAM(s) Oral at bedtime  dextrose 5%. 1000 milliLiter(s) IV Continuous <Continuous>  dextrose 5%. 1000 milliLiter(s) IV Continuous <Continuous>  dextrose 50% Injectable 25 Gram(s) IV Push once  dextrose 50% Injectable 12.5 Gram(s) IV Push once  dextrose 50% Injectable 25 Gram(s) IV Push once  dextrose Oral Gel 15 Gram(s) Oral once PRN  digoxin     Tablet 125 MICROGram(s) Oral daily  doxazosin 1 milliGRAM(s) Oral at bedtime  glucagon  Injectable 1 milliGRAM(s) IntraMuscular once  hydrocortisone hemorrhoidal Suppository 1 Suppository(s) Rectal at bedtime  insulin glargine Injectable (LANTUS) 16 Unit(s) SubCutaneous at bedtime  insulin lispro (ADMELOG) corrective regimen sliding scale   SubCutaneous three times a day before meals  insulin lispro (ADMELOG) corrective regimen sliding scale   SubCutaneous at bedtime  insulin lispro Injectable (ADMELOG) 5 Unit(s) SubCutaneous three times a day before meals  metoprolol tartrate 50 milliGRAM(s) Oral two times a day  pantoprazole    Tablet 40 milliGRAM(s) Oral before breakfast  polyethylene glycol 3350 17 Gram(s) Oral daily  senna 2 Tablet(s) Oral at bedtime  sodium chloride 0.9% lock flush 3 milliLiter(s) IV Push every 8 hours  sodium chloride 0.9%. 1000 milliLiter(s) IV Continuous <Continuous>        FAMILY HISTORY:  FH: type 2 diabetes          Review of Systems:  *confused, limited ROS    General:  No wt loss, fevers, chills, night sweats, fatigue  Eyes:  Good vision, no reported pain  ENT:  No sore throat, pain, runny nose, dysphagia  CV:  No pain, palpitations, no lightheadedness  Resp:  No dyspnea, cough, tachypnea, wheezing  GI: as above  :  No pain, bleeding, incontinence, nocturia  Muscle:  No pain, weakness  Neuro:  No weakness, tingling, memory problems  Psych:  No fatigue, insomnia, mood problems, depression  Endocrine:  No polyuria, polydypsia, cold/heat intolerance  Heme:  No petechiae, ecchymosis, easy bruisability  Skin:  No rash, tattoos, scars, edema    Relevant Family History:   n/c    Relevant Social History: n/c      Physical Exam:    Vital Signs:  Vital Signs Last 24 Hrs  T(C): 36.4 (10 May 2022 05:20), Max: 36.8 (09 May 2022 21:46)  T(F): 97.6 (10 May 2022 05:20), Max: 98.2 (09 May 2022 21:46)  HR: 77 (10 May 2022 05:20) (74 - 84)  BP: 130/75 (10 May 2022 05:20) (111/76 - 135/73)  BP(mean): --  RR: 18 (10 May 2022 05:20) (16 - 18)  SpO2: 100% (10 May 2022 05:20) (100% - 100%)  Daily     Daily     General:  Appears stated age, well-groomed, nad  HEENT:  NC/AT,  conjunctivae clear and pink, no thyromegaly, nodules, adenopathy, no JVD  Chest:  Full & symmetric excursion, no increased effort, breath sounds clear  Cardiovascular:  Regular rhythm, S1, S2, no murmur/rub/S3/S4, no abdominal bruit, no edema  Abdomen:  Soft, non-tender, non-distended, normoactive bowel sounds,  no masses ,no hepatosplenomeagaly, no signs of chronic liver disease  Extremities:  no cyanosis,clubbing or edema  Skin:  No rash/erythema/ecchymoses/petechiae/wounds/abscess/warm/dry  Neuro/Psych:  A&Ox 1, no asterixis, no tremor, no encephalopathy    Laboratory:                            11.9   5.79  )-----------( 346      ( 10 May 2022 07:09 )             36.9     05-10    144  |  109<H>  |  9   ----------------------------<  139<H>  4.0   |  26  |  1.02    Ca    8.9      10 May 2022 07:09  Phos  2.7     05-10  Mg     1.90     05-10        PTT - ( 09 May 2022 22:39 )  PTT:27.6 sec      Imaging:      < from: Xray Chest 1 View AP/PA (05.02.22 @ 15:17) >    ACC: [id number] EXAM:  XR CHEST AP OR PA 1V                          PROCEDURE DATE:  05/02/2022          INTERPRETATION:  EXAMINATION: XR CHEST    CLINICAL INDICATION: DKA. Evaluate for provoking infection.    TECHNIQUE: Single frontal, portable view of the chest was obtained.    COMPARISON: Chest x-ray 9/20/2021    FINDINGS:  The heart is difficult to evaluate on this view.  No focal consolidations. No pleural effusions or pneumothorax.  Degenerative changes of the spine and shoulder joints.    IMPRESSION:  No focal consolidations.    --- End of Report ---          RYAN RODRIGUEZ MD; Resident Radiologist  This document has been electronically signed.  RYAN STOREY MD; Attending Radiologist  This document has been electronically signed. May 2 2022  4:46PM    < end of copied text >

## 2022-05-10 NOTE — PROGRESS NOTE ADULT - SUBJECTIVE AND OBJECTIVE BOX
History:  Spoke with son at bedside  Patient is on Soliqua, Novolog scale and metformin at home.  Administers himself.  Lives with family  Son takes ozempic and knows how to inject with pen.  Ozempic pen is very similar to insulin pens.  Dosing dial is different but is same concept.  Patient altered, son was advised patient ate all his meals today    MEDICATIONS  (STANDING):  apixaban 5 milliGRAM(s) Oral two times a day  atorvastatin 40 milliGRAM(s) Oral at bedtime  dextrose 5%. 1000 milliLiter(s) (50 mL/Hr) IV Continuous <Continuous>  dextrose 5%. 1000 milliLiter(s) (100 mL/Hr) IV Continuous <Continuous>  dextrose 50% Injectable 25 Gram(s) IV Push once  dextrose 50% Injectable 12.5 Gram(s) IV Push once  dextrose 50% Injectable 25 Gram(s) IV Push once  digoxin     Tablet 125 MICROGram(s) Oral daily  doxazosin 1 milliGRAM(s) Oral at bedtime  glucagon  Injectable 1 milliGRAM(s) IntraMuscular once  hydrocortisone hemorrhoidal Suppository 1 Suppository(s) Rectal at bedtime  insulin glargine Injectable (LANTUS) 16 Unit(s) SubCutaneous at bedtime  insulin lispro (ADMELOG) corrective regimen sliding scale   SubCutaneous three times a day before meals  insulin lispro (ADMELOG) corrective regimen sliding scale   SubCutaneous at bedtime  insulin lispro Injectable (ADMELOG) 5 Unit(s) SubCutaneous three times a day before meals  metoprolol tartrate 50 milliGRAM(s) Oral two times a day  pantoprazole    Tablet 40 milliGRAM(s) Oral before breakfast  polyethylene glycol 3350 17 Gram(s) Oral daily  senna 2 Tablet(s) Oral at bedtime  sodium chloride 0.9% lock flush 3 milliLiter(s) IV Push every 8 hours    MEDICATIONS  (PRN):  dextrose Oral Gel 15 Gram(s) Oral once PRN Blood Glucose LESS THAN 70 milliGRAM(s)/deciliter      Allergies    ceftriaxone (Urticaria)  ciprofloxacin (Rash)  schrimp (Unknown)    Intolerances      Review of Systems:  Constitutional: No fever  Eyes: No blurry vision  ALL OTHER SYSTEMS REVIEWED AND NEGATIVE        PHYSICAL EXAM:  VITALS: T(C): 36.9 (05-10-22 @ 11:34)  T(F): 98.5 (05-10-22 @ 11:34), Max: 98.5 (05-10-22 @ 11:34)  HR: 70 (05-10-22 @ 11:34) (70 - 84)  BP: 114/73 (05-10-22 @ 11:34) (114/73 - 135/73)  RR:  (17 - 18)  SpO2:  (100% - 100%)  Wt(kg): --  GENERAL: NAD, well-developed  GI: Soft, nontender, non distended  SKIN: Dry, intact, No rashes or lesions  PSYCH: Alert, cannot assess orientation, altered ms      CAPILLARY BLOOD GLUCOSE  POCT Blood Glucose.: 164 mg/dL (10 May 2022 16:45)  POCT Blood Glucose.: 187 mg/dL (10 May 2022 11:07)  POCT Blood Glucose.: 141 mg/dL (10 May 2022 07:42)  POCT Blood Glucose.: 143 mg/dL (09 May 2022 22:15)      05-10    144  |  109<H>  |  9   ----------------------------<  139<H>  4.0   |  26  |  1.02    eGFR: 73    Ca    8.9      05-10  Mg     1.90     05-10  Phos  2.7     05-10      A1C with Estimated Average Glucose in AM (05.03.22 @ 07:39)    A1C with Estimated Average Glucose Result: 9.2 %    Estimated Average Glucose: 217          Thyroid Function Tests:  05-03 @ 07:39 TSH 2.34 FreeT4 -- T3 -- Anti TPO -- Anti Thyroglobulin Ab -- TSI --

## 2022-05-10 NOTE — PROGRESS NOTE ADULT - ASSESSMENT
80 y/o M with PMH of prostate cancer s/p seed implant, Hypertension, Type 2 diabetes on insulin (has not been taking insulin recently), CAD s/p stent (last stent placed 2 years ago), CVA s/p carotid stent 1 month ago, JACKELIN on CPAP, presents to the ED complaining of altered mental status and dysuria also found to have nstemi    endocrine called for further assistance   a1c 9.2   HOME MEDS: Soliqua, Novolog, Metformin      1. DM  While inpatient  BG target 100-180 mg/dl, remains above goal  - Continue Lantus 16 units qhs  - Continue Admelog to 5 units SC Premeal/TIDAC    - Admelog  Low dose Correction Scale Premeal & seperate low dose  Correction Scale Bedtime   - Hypoglycemia protocol in place   - Carb Consistent Diet   - Nutrition consult when his clinical status is improved         dc planning:  TBD  Patient had UTI, so would not prescribe SGLT2 as there is a risk for UTI's   Dc plan will likely be a Basal PO +/- Glp1 agonist.   Will f/u with full plan pending clinical improvement and insulin requirements  Son is able to help with insulin PEN injections if needed  If patient wishes to follow up with [facility]   [facility]  [address] [place] [address], [address], [place] [address]  [phone]   Please call to schedule appointment with CDE/Nutritionist and MD appointment next available           2. HTN  goal BP in DM <130/80  mamagement as per the primary team          3. HLD  pt is on lipitor 40mg           Kylah Ocasio  Nurse Practitioner  Division of Endocrinology & Diabetes  Pager # [phone]      If after 6PM or before 9AM, or on weekends/holidays, please call endocrine answering service for assistance ([phone]).  For nonurgent matters email [email or web address] for assistance.

## 2022-05-10 NOTE — PROGRESS NOTE ADULT - ASSESSMENT
82 y/o M with PMH of prostate cancer s/p seed implant, Hypertension, Type 2 diabetes on insulin (has not been taking insulin recently), CAD s/p stent (last stent placed 2 years ago), CVA s/p carotid stent 1 month ago, JACKELIN on CPAP, presents to the ED complaining of altered mental status and dysuria with leukocytosis, urine retention, ACS    Man Craft  Attending Physician   Division of Infectious Disease  Office #[phone]  Available on Microsoft Teams also  After 5pm/weekend or no response, call #[phone]

## 2022-05-10 NOTE — CONSULT NOTE ADULT - REASON FOR ADMISSION
NSTEMI, Hyperglycemia, SANDRA, Encephalopathy

## 2022-05-10 NOTE — CONSULT NOTE ADULT - ASSESSMENT
82 y/o M with PMH of prostate cancer s/p seed implant, Hypertension, Type 2 diabetes on insulin (has not been taking insulin recently), CAD s/p stent (last stent placed 2 years ago), CVA s/p carotid stent 1 month ago, JACKELIN on CPAP, presents to the ED w/AMS noted to have UTI. GI consulted for rectal bleed    Rectal Bleed   H/H at baseline   suspect hemorrhoidal irritation d/t hard stools/straining   Anusol Suppository x 5 days started  Senna QHS with Miralax BID w/enema PRNs   Monitor stool color  No GI objection to a/c or antiplt therapy w/Protonix daily  High fiber diet  will follow closely while a/c resumed     UTI   management per medicine team recs    I reviewed the overnight course of events on the unit, re-confirming the patient history. I discussed the care with the patient and their family. The plan of care was discussed with the physician assistant and modifications were made to the notation where appropriate. Differential diagnosis and plan of care discussed with patient after the evaluation. Advanced care planning was discussed with patient and family.  Advanced care planning forms were reviewed and discussed.  Risks, benefits and alternatives of gastroenterologic procedures were discussed in detail and all questions were answered. 35 minutes spent on total encounter of which more than fifty percent of the encounter was spent counseling and/or coordinating care by the attending physician.  82 y/o M with PMH of prostate cancer s/p seed implant, Hypertension, Type 2 diabetes on insulin (has not been taking insulin recently), CAD s/p stent (last stent placed 2 years ago), CVA s/p carotid stent 1 month ago, JACKELIN on CPAP, presents to the ED w/AMS noted to have UTI. GI consulted for rectal bleed    Rectal Bleed   H/H at baseline   suspect hemorrhoidal irritation d/t hard stools/straining   Anusol Suppository x 5 days started  Senna QHS with Miralax BID w/enema PRNs   Monitor stool color  No GI objection to a/c or antiplt therapy w/Protonix daily  High fiber diet  will follow closely while a/c resumed     AFib   rate control per cardiology recs  No GI objection to a/c or antiplt therapy w/Protonix daily  monitor cbc and stool color while on blood thinners    UTI   management per medicine team recs    I reviewed the overnight course of events on the unit, re-confirming the patient history. I discussed the care with the patient and their family. The plan of care was discussed with the physician assistant and modifications were made to the notation where appropriate. Differential diagnosis and plan of care discussed with patient after the evaluation. Advanced care planning was discussed with patient and family.  Advanced care planning forms were reviewed and discussed.  Risks, benefits and alternatives of gastroenterologic procedures were discussed in detail and all questions were answered. 35 minutes spent on total encounter of which more than fifty percent of the encounter was spent counseling and/or coordinating care by the attending physician.

## 2022-05-11 NOTE — PROGRESS NOTE ADULT - SUBJECTIVE AND OBJECTIVE BOX
CHIDI RUBIO  81y  Male      Patient is a 81y old  Male who presents with a chief complaint of NSTEMI, Hyperglycemia, SANDRA, Encephalopathy (11 May 2022 17:07)  Patient is more alert,awake,nad,responsive to questions    REVIEW OF SYSTEMS:  CONSTITUTIONAL: No fever  RESPIRATORY: No cough, hemoptysis or shortness of breath  CARDIOVASCULAR: No chest pain, palpitations, dizziness, or leg swelling  GASTROINTESTINAL: No abdominal pain. nausea, vomiting, hematemesis  INTERVAL HPI/OVERNIGHT EVENTS:  T(C): 36.8 (05-11-22 @ 17:00), Max: 36.8 (05-10-22 @ 21:00)  HR: 85 (05-11-22 @ 17:00) (74 - 86)  BP: 121/60 (05-11-22 @ 17:00) (101/51 - 121/72)  RR: 18 (05-11-22 @ 17:00) (18 - 18)  SpO2: 99% (05-11-22 @ 17:00) (96% - 100%)  Wt(kg): --  I&O's Summary    10 May 2022 07:01  -  11 May 2022 07:00  --------------------------------------------------------  IN: 0 mL / OUT: 1375 mL / NET: -1375 mL    11 May 2022 07:01  -  11 May 2022 19:44  --------------------------------------------------------  IN: 0 mL / OUT: 200 mL / NET: -200 mL      T(C): 36.8 (05-11-22 @ 17:00), Max: 36.8 (05-10-22 @ 21:00)  HR: 85 (05-11-22 @ 17:00) (74 - 86)  BP: 121/60 (05-11-22 @ 17:00) (101/51 - 121/72)  RR: 18 (05-11-22 @ 17:00) (18 - 18)  SpO2: 99% (05-11-22 @ 17:00) (96% - 100%)  Wt(kg): --Vital Signs Last 24 Hrs  T(C): 36.8 (11 May 2022 17:00), Max: 36.8 (10 May 2022 21:00)  T(F): 98.3 (11 May 2022 17:00), Max: 98.3 (11 May 2022 17:00)  HR: 85 (11 May 2022 17:00) (74 - 86)  BP: 121/60 (11 May 2022 17:00) (101/51 - 121/72)  BP(mean): --  RR: 18 (11 May 2022 17:00) (18 - 18)  SpO2: 99% (11 May 2022 17:00) (96% - 100%)    LABS:                        11.8   5.65  )-----------( 377      ( 11 May 2022 05:18 )             35.6     05-11    145  |  109<H>  |  9   ----------------------------<  146<H>  3.6   |  26  |  1.09    Ca    8.8      11 May 2022 05:18  Phos  2.7     05-10  Mg     1.90     05-10    TPro  6.9  /  Alb  2.9<L>  /  TBili  0.4  /  DBili  x   /  AST  17  /  ALT  13  /  AlkPhos  66  05-11    PTT - ( 09 May 2022 22:39 )  PTT:27.6 sec    CAPILLARY BLOOD GLUCOSE      POCT Blood Glucose.: 251 mg/dL (11 May 2022 17:08)  POCT Blood Glucose.: 241 mg/dL (11 May 2022 11:37)  POCT Blood Glucose.: 139 mg/dL (11 May 2022 07:08)  POCT Blood Glucose.: 162 mg/dL (10 May 2022 20:40)            PAST MEDICAL & SURGICAL HISTORY:  Diabetes mellitus      CAD (coronary artery disease)      Prostate ca  s/p SEED 5 yrs ago      Sleep apnea  on cpap at home      Hypertension      Hypercholesteremia      S/P cholecystectomy      S/P coronary angioplasty  last stent 1 year ago      H/O carotid endarterectomy  April 2022, stent placed.          MEDICATIONS  (STANDING):  apixaban 5 milliGRAM(s) Oral two times a day  atorvastatin 40 milliGRAM(s) Oral at bedtime  clopidogrel Tablet 75 milliGRAM(s) Oral daily  dextrose 5%. 1000 milliLiter(s) (100 mL/Hr) IV Continuous <Continuous>  dextrose 5%. 1000 milliLiter(s) (50 mL/Hr) IV Continuous <Continuous>  dextrose 50% Injectable 25 Gram(s) IV Push once  dextrose 50% Injectable 12.5 Gram(s) IV Push once  dextrose 50% Injectable 25 Gram(s) IV Push once  digoxin     Tablet 125 MICROGram(s) Oral daily  doxazosin 1 milliGRAM(s) Oral at bedtime  glucagon  Injectable 1 milliGRAM(s) IntraMuscular once  hydrocortisone hemorrhoidal Suppository 1 Suppository(s) Rectal at bedtime  insulin glargine Injectable (LANTUS) 16 Unit(s) SubCutaneous at bedtime  insulin lispro (ADMELOG) corrective regimen sliding scale   SubCutaneous three times a day before meals  insulin lispro (ADMELOG) corrective regimen sliding scale   SubCutaneous at bedtime  metoprolol tartrate 50 milliGRAM(s) Oral two times a day  pantoprazole    Tablet 40 milliGRAM(s) Oral before breakfast  polyethylene glycol 3350 17 Gram(s) Oral daily  senna 2 Tablet(s) Oral at bedtime  sodium chloride 0.9% lock flush 3 milliLiter(s) IV Push every 8 hours    MEDICATIONS  (PRN):  dextrose Oral Gel 15 Gram(s) Oral once PRN Blood Glucose LESS THAN 70 milliGRAM(s)/deciliter        RADIOLOGY & ADDITIONAL TESTS:    Imaging Personally Reviewed:  [ ] YES  [ ] NO    Consultant(s) Notes Reviewed:  [ x] YES  [ ] NO    PHYSICAL EXAM:  GENERAL: Alert and awake lying in bed in no distress  HEAD:  Atraumatic, Normocephalic  EYES: EOMI, [place], conjunctiva and sclera clear  NECK: Supple, No JVD, Normal thyroid  NERVOUS SYSTEM:  Alert & Oriented X3, Motor and sensory systems are intact,   CHEST/LUNG: Bilateral clear breath sounds, no rhochi, no wheezing, no crepitations,  HEART: Regular rate and rhythm; No murmurs, rubs, or gallops  ABDOMEN: Soft, Nontender, Nondistended; Bowel sounds present  EXTREMITIES:   Peripheral Pulses are palpable, no  edema        Care Discussed with Consultants/Other Providers [ ] YES  [ ] NO      Code Status: [] Full Code [] DNR [] DNI [] Goals of Care:   Disposition: [] ICU [] Stroke Unit [] RCU []PCU []Floor [] Discharge Home         BOLA CaseyP

## 2022-05-11 NOTE — PROGRESS NOTE ADULT - ASSESSMENT
82 y/o M with PMH of prostate cancer s/p seed implant, Hypertension, Type 2 diabetes on insulin (has not been taking insulin recently), CAD s/p stent (last stent placed 2 years ago), CVA s/p carotid stent 1 month ago, JACKELIN on CPAP, presents to the ED w/AMS noted to have UTI. GI consulted for rectal bleed    Rectal Bleed   H/H stable and at baseline   suspect hemorrhoidal irritation d/t hard stools/straining   Anusol Suppository x 5 days   Senna QHS with Miralax BID w/enema PRNs   Monitor stool color  No GI objection to a/c or antiplt therapy w/Protonix daily  High fiber diet    AFib   rate control per cardiology recs  No GI objection to a/c or antiplt therapy w/Protonix daily  monitor cbc and stool color while on blood thinners    UTI   management per medicine team recs    I reviewed the overnight course of events on the unit, re-confirming the patient history. I discussed the care with the patient and their family. The plan of care was discussed with the physician assistant and modifications were made to the notation where appropriate. Differential diagnosis and plan of care discussed with patient after the evaluation. Advanced care planning was discussed with patient and family.  Advanced care planning forms were reviewed and discussed.  Risks, benefits and alternatives of gastroenterologic procedures were discussed in detail and all questions were answered. 35 minutes spent on total encounter of which more than fifty percent of the encounter was spent counseling and/or coordinating care by the attending physician.

## 2022-05-11 NOTE — PHYSICAL THERAPY INITIAL EVALUATION ADULT - PATIENT PROFILE REVIEW, REHAB EVAL
PT evaluate and treat orders received: bed in chair mode. Consult with RN nAa CHASE, pt may participate in PT evaluation./yes

## 2022-05-11 NOTE — PHYSICAL THERAPY INITIAL EVALUATION ADULT - ADDITIONAL COMMENTS
Pt is not a reliable historian, information should be confirmed with care coordination assessment. Pt reports living in a house with + stairs to negotiate. Prior to admission pt endorses ambulating with a cane.

## 2022-05-11 NOTE — CHART NOTE - NSCHARTNOTEFT_GEN_A_CORE
Pt was seen and examined at bedside foud to be alert, awake, forgetful. I had extensive d/w pts son Benja today regarding hospital course plan of care with understanding. Per ID recs pt was ordered for CT C/A/P w/cont to r/o source of infection 2/2 metab encephalopathy vs malignant w/u (son consented and in agreement). PT was ordered and recs Rehab, son in agreement and would like to d/w SW for choices. Pt was OOB to chair today, able to follow commands, ate breakfast and lunch. Per Card recs added Eliquis on 5/10 for AFib and continuing with Plavix for CAD, pt tolerating well, no signs of bleeding. Dispo pending CT C/A/P and ID clearance.

## 2022-05-11 NOTE — PROGRESS NOTE ADULT - ASSESSMENT
82 y/o M with PMH of prostate cancer s/p seed implant, Hypertension, Type 2 diabetes on insulin (has not been taking insulin recently), CAD s/p stent (last stent placed 2 years ago), CVA s/p carotid stent 1 month ago, JACKELIN on CPAP, presents to the ED complaining of altered mental status and dysuria also found to have nstemi    endocrine called for further assistance   a1c 9.2   HOME MEDS: Soliqua, Novolog, Metformin    1. DM  Target A1c 6-7%; A1c currently A1c 9.2 %  While inpatient  BG target 100-180 mg/dl, remains above goal  - Continue Lantus 16 units qhs (please give 80% of dose if NPO   - Increase Admelog to 6 units SC Premeal/TIDAC (please hold if NPO/not eating)  -Continue Admelog  Low dose Correction Scale Premeal & seperate low dose  Correction Scale Bedtime   - Hypoglycemia protocol in place   - Carb Consistent Diet   - Nutrition consult when his clinical status is improved   -provider to RN for insulin pen teaching to son     dc planning:  TBD  Patient had UTI, so would not prescribe SGLT2 as there is a risk for UTI's   Dc plan will likely be a Basal with GLP1 (soliqua) +PO  Will f/u with full plan pending clinical improvement and insulin requirements  Son is able to help with insulin PEN injections   If patient wishes to follow up with [facility] Endocrinology   [facility]  [address] [place] [address], [address], [place] [address]  [phone]   Please call to schedule appointment with CDE/Nutritionist and MD appointment next available     2. HTN  goal BP in DM <130/80  mamagement as per the primary team    3. HLD  pt is on lipitor 40mg   outpt lipid profile     Perlita Quintana  Nurse Practitioner  Division of Endocrinology & Diabetes  In house pager #[phone]    If before 9AM or after 6PM, or on weekends/holidays, please call endocrine answering service for assistance ([phone]).For nonurgent matters email [email or web address] for assistance.  82 y/o M with PMH of prostate cancer s/p seed implant, Hypertension, Type 2 diabetes on insulin (has not been taking insulin recently), CAD s/p stent (last stent placed 2 years ago), CVA s/p carotid stent 1 month ago, JACKELIN on CPAP, presents to the ED complaining of altered mental status and dysuria also found to have nstemi    endocrine called for further assistance   a1c 9.2   HOME MEDS: Soliqua, Novolog, Metformin    1. DM 2  Target A1c 6-7%; A1c currently A1c 9.2 %  While inpatient  BG target 100-180 mg/dl, remains above goal  - Continue Lantus 16 units qhs (please give 80% of dose if NPO   - Increase Admelog to 6 units SC Premeal/TIDAC (please hold if NPO/not eating)  -Continue Admelog  Low dose Correction Scale Premeal & seperate low dose  Correction Scale Bedtime   - Hypoglycemia protocol in place   - Carb Consistent Diet   - Nutrition consult when his clinical status is improved   -provider to RN for insulin pen teaching to son (RN made aware)    dc planning:  TBD  Patient had UTI, so would not prescribe SGLT2 as there is a risk for UTI's   Dc plan will likely be a Basal with GLP1 (soliqua) +PO  Will f/u with full plan pending clinical improvement and insulin requirements  Son is able to help with insulin PEN injections   If patient wishes to follow up with [facility] Endocrinology   [facility]  [address] [place] [address], [address], [place] [address]  [phone]   Please call to schedule appointment with CDE/Nutritionist and MD appointment next available   Discussed plan with paulo Steiner [phone]      2. HTN  goal BP in DM <130/80  mamagement as per the primary team    3. HLD  pt is on lipitor 40mg   outpt lipid profile     Perlita Quintana  Nurse Practitioner  Division of Endocrinology & Diabetes  In house pager #[phone]    If before 9AM or after 6PM, or on weekends/holidays, please call endocrine answering service for assistance ([phone]).For nonurgent matters email [email or web address] for assistance.

## 2022-05-11 NOTE — PROGRESS NOTE ADULT - SUBJECTIVE AND OBJECTIVE BOX
Chief Complaint:     History:    MEDICATIONS  (STANDING):  apixaban 5 milliGRAM(s) Oral two times a day  atorvastatin 40 milliGRAM(s) Oral at bedtime  clopidogrel Tablet 75 milliGRAM(s) Oral daily  dextrose 5%. 1000 milliLiter(s) (100 mL/Hr) IV Continuous <Continuous>  dextrose 5%. 1000 milliLiter(s) (50 mL/Hr) IV Continuous <Continuous>  dextrose 50% Injectable 25 Gram(s) IV Push once  dextrose 50% Injectable 12.5 Gram(s) IV Push once  dextrose 50% Injectable 25 Gram(s) IV Push once  digoxin     Tablet 125 MICROGram(s) Oral daily  doxazosin 1 milliGRAM(s) Oral at bedtime  glucagon  Injectable 1 milliGRAM(s) IntraMuscular once  hydrocortisone hemorrhoidal Suppository 1 Suppository(s) Rectal at bedtime  insulin glargine Injectable (LANTUS) 16 Unit(s) SubCutaneous at bedtime  insulin lispro (ADMELOG) corrective regimen sliding scale   SubCutaneous three times a day before meals  insulin lispro (ADMELOG) corrective regimen sliding scale   SubCutaneous at bedtime  insulin lispro Injectable (ADMELOG) 5 Unit(s) SubCutaneous three times a day before meals  metoprolol tartrate 50 milliGRAM(s) Oral two times a day  pantoprazole    Tablet 40 milliGRAM(s) Oral before breakfast  polyethylene glycol 3350 17 Gram(s) Oral daily  senna 2 Tablet(s) Oral at bedtime  sodium chloride 0.9% lock flush 3 milliLiter(s) IV Push every 8 hours    MEDICATIONS  (PRN):  dextrose Oral Gel 15 Gram(s) Oral once PRN Blood Glucose LESS THAN 70 milliGRAM(s)/deciliter      Allergies    ceftriaxone (Urticaria)  ciprofloxacin (Rash)  schrimp (Unknown)    Intolerances      Review of Systems:  Constitutional: No fever  Eyes: No blurry vision  Neuro: No tremors  HEENT: No pain  Cardiovascular: No chest pain, palpitations  Respiratory: No SOB, no cough  GI: No nausea, vomiting, abdominal pain  : No dysuria  Skin: no rash  Psych: no depression  Endocrine: no polyuria, polydipsia  Hem/lymph: no swelling  Osteoporosis: no fractures    ALL OTHER SYSTEMS REVIEWED AND NEGATIVE    UNABLE TO OBTAIN    PHYSICAL EXAM:  VITALS: T(C): 36.5 (05-11-22 @ 13:50)  T(F): 97.7 (05-11-22 @ 13:50), Max: 98.2 (05-10-22 @ 21:00)  HR: 79 (05-11-22 @ 13:50) (74 - 90)  BP: 101/51 (05-11-22 @ 13:50) (101/51 - 121/72)  RR:  (18 - 18)  SpO2:  (96% - 100%)  Wt(kg): --  GENERAL: NAD, well-groomed, well-developed  EYES: No proptosis, no lid lag, anicteric  HEENT:  Atraumatic, Normocephalic, moist mucous membranes  THYROID: Normal size, no palpable nodules  RESPIRATORY: Clear to auscultation bilaterally; No rales, rhonchi, wheezing  CARDIOVASCULAR: Regular rate and rhythm; No murmurs; no peripheral edema  GI: Soft, nontender, non distended  SKIN: Dry, intact, No rashes or lesions  MUSCULOSKELETAL: Full range of motion, normal strength  NEURO: extraocular movements intact, no tremor  PSYCH: Alert and oriented x 0      CAPILLARY BLOOD GLUCOSE  POCT Blood Glucose.: 241 mg/dL (11 May 2022 11:37)  POCT Blood Glucose.: 139 mg/dL (11 May 2022 07:08)  POCT Blood Glucose.: 162 mg/dL (10 May 2022 20:40)    A1C with Estimated Average Glucose in AM (05.03.22 @ 07:39)    A1C with Estimated Average Glucose Result: 9.2      05-11    145  |  109<H>  |  9   ----------------------------<  146<H>  3.6   |  26  |  1.09    eGFR: 68    Ca    8.8      05-11  Mg     1.90     05-10  Phos  2.7     05-10    TPro  6.9  /  Alb  2.9<L>  /  TBili  0.4  /  DBili  x   /  AST  17  /  ALT  13  /  AlkPhos  66  05-11          Thyroid Function Tests:  05-03 @ 07:39 TSH 2.34 FreeT4 -- T3 -- Anti TPO -- Anti Thyroglobulin Ab -- TSI --                       Chief Complaint:     History:    MEDICATIONS  (STANDING):  apixaban 5 milliGRAM(s) Oral two times a day  atorvastatin 40 milliGRAM(s) Oral at bedtime  clopidogrel Tablet 75 milliGRAM(s) Oral daily  dextrose 5%. 1000 milliLiter(s) (100 mL/Hr) IV Continuous <Continuous>  dextrose 5%. 1000 milliLiter(s) (50 mL/Hr) IV Continuous <Continuous>  dextrose 50% Injectable 25 Gram(s) IV Push once  dextrose 50% Injectable 12.5 Gram(s) IV Push once  dextrose 50% Injectable 25 Gram(s) IV Push once  digoxin     Tablet 125 MICROGram(s) Oral daily  doxazosin 1 milliGRAM(s) Oral at bedtime  glucagon  Injectable 1 milliGRAM(s) IntraMuscular once  hydrocortisone hemorrhoidal Suppository 1 Suppository(s) Rectal at bedtime  insulin glargine Injectable (LANTUS) 16 Unit(s) SubCutaneous at bedtime  insulin lispro (ADMELOG) corrective regimen sliding scale   SubCutaneous three times a day before meals  insulin lispro (ADMELOG) corrective regimen sliding scale   SubCutaneous at bedtime  insulin lispro Injectable (ADMELOG) 5 Unit(s) SubCutaneous three times a day before meals  metoprolol tartrate 50 milliGRAM(s) Oral two times a day  pantoprazole    Tablet 40 milliGRAM(s) Oral before breakfast  polyethylene glycol 3350 17 Gram(s) Oral daily  senna 2 Tablet(s) Oral at bedtime  sodium chloride 0.9% lock flush 3 milliLiter(s) IV Push every 8 hours    MEDICATIONS  (PRN):  dextrose Oral Gel 15 Gram(s) Oral once PRN Blood Glucose LESS THAN 70 milliGRAM(s)/deciliter      Allergies: ceftriaxone (Urticaria)  ciprofloxacin (Rash)  schrimp (Unknown)    Review of Systems:  UNABLE TO OBTAIN    PHYSICAL EXAM:  VITALS: T(C): 36.5 (05-11-22 @ 13:50)  T(F): 97.7 (05-11-22 @ 13:50), Max: 98.2 (05-10-22 @ 21:00)  HR: 79 (05-11-22 @ 13:50) (74 - 90)  BP: 101/51 (05-11-22 @ 13:50) (101/51 - 121/72)  RR:  (18 - 18)  SpO2:  (96% - 100%)  Wt(kg): --  RESPIRATORY: No labored breathing   GI: Soft, nontender, non distended  PSYCH: Alert and oriented x 0      CAPILLARY BLOOD GLUCOSE  POCT Blood Glucose.: 241 mg/dL (11 May 2022 11:37)  POCT Blood Glucose.: 139 mg/dL (11 May 2022 07:08)  POCT Blood Glucose.: 162 mg/dL (10 May 2022 20:40)    A1C with Estimated Average Glucose in AM (05.03.22 @ 07:39)    A1C with Estimated Average Glucose Result: 9.2      05-11    145  |  109<H>  |  9   ----------------------------<  146<H>  3.6   |  26  |  1.09    eGFR: 68    Ca    8.8      05-11  Mg     1.90     05-10  Phos  2.7     05-10    TPro  6.9  /  Alb  2.9<L>  /  TBili  0.4  /  DBili  x   /  AST  17  /  ALT  13  /  AlkPhos  66  05-11      Thyroid Function Tests:  05-03 @ 07:39 TSH 2.34 FreeT4 -- T3 -- Anti TPO -- Anti Thyroglobulin Ab -- TSI --      Diet, Consistent Carbohydrate/No Snacks:   DASH/TLC Sodium & Cholesterol Restricted (DASH)  Minced and Moist (MINCEDMOIST) (05-02-22 @ 21:48) [Active]                   Chief Complaint: DM 2    History: Pt seen at bedside. As per RN pt eating well. As per Rn pt has no signs of nausea and vomiting/hypoglycemia     MEDICATIONS  (STANDING):  apixaban 5 milliGRAM(s) Oral two times a day  atorvastatin 40 milliGRAM(s) Oral at bedtime  clopidogrel Tablet 75 milliGRAM(s) Oral daily  dextrose 5%. 1000 milliLiter(s) (100 mL/Hr) IV Continuous <Continuous>  dextrose 5%. 1000 milliLiter(s) (50 mL/Hr) IV Continuous <Continuous>  dextrose 50% Injectable 25 Gram(s) IV Push once  dextrose 50% Injectable 12.5 Gram(s) IV Push once  dextrose 50% Injectable 25 Gram(s) IV Push once  digoxin     Tablet 125 MICROGram(s) Oral daily  doxazosin 1 milliGRAM(s) Oral at bedtime  glucagon  Injectable 1 milliGRAM(s) IntraMuscular once  hydrocortisone hemorrhoidal Suppository 1 Suppository(s) Rectal at bedtime  insulin glargine Injectable (LANTUS) 16 Unit(s) SubCutaneous at bedtime  insulin lispro (ADMELOG) corrective regimen sliding scale   SubCutaneous three times a day before meals  insulin lispro (ADMELOG) corrective regimen sliding scale   SubCutaneous at bedtime  insulin lispro Injectable (ADMELOG) 5 Unit(s) SubCutaneous three times a day before meals  metoprolol tartrate 50 milliGRAM(s) Oral two times a day  pantoprazole    Tablet 40 milliGRAM(s) Oral before breakfast  polyethylene glycol 3350 17 Gram(s) Oral daily  senna 2 Tablet(s) Oral at bedtime  sodium chloride 0.9% lock flush 3 milliLiter(s) IV Push every 8 hours    MEDICATIONS  (PRN):  dextrose Oral Gel 15 Gram(s) Oral once PRN Blood Glucose LESS THAN 70 milliGRAM(s)/deciliter      Allergies: ceftriaxone (Urticaria)  ciprofloxacin (Rash)  schrimp (Unknown)    Review of Systems:  UNABLE TO OBTAIN    PHYSICAL EXAM:  VITALS: T(C): 36.5 (05-11-22 @ 13:50)  T(F): 97.7 (05-11-22 @ 13:50), Max: 98.2 (05-10-22 @ 21:00)  HR: 79 (05-11-22 @ 13:50) (74 - 90)  BP: 101/51 (05-11-22 @ 13:50) (101/51 - 121/72)  RR:  (18 - 18)  SpO2:  (96% - 100%)  Wt(kg): --  RESPIRATORY: No labored breathing   GI: Soft, nontender, non distended  PSYCH: Alert and oriented x 0      CAPILLARY BLOOD GLUCOSE  POCT Blood Glucose.: 241 mg/dL (11 May 2022 11:37)  POCT Blood Glucose.: 139 mg/dL (11 May 2022 07:08)  POCT Blood Glucose.: 162 mg/dL (10 May 2022 20:40)    A1C with Estimated Average Glucose in AM (05.03.22 @ 07:39)    A1C with Estimated Average Glucose Result: 9.2      05-11    145  |  109<H>  |  9   ----------------------------<  146<H>  3.6   |  26  |  1.09    eGFR: 68    Ca    8.8      05-11  Mg     1.90     05-10  Phos  2.7     05-10    TPro  6.9  /  Alb  2.9<L>  /  TBili  0.4  /  DBili  x   /  AST  17  /  ALT  13  /  AlkPhos  66  05-11      Thyroid Function Tests:  05-03 @ 07:39 TSH 2.34 FreeT4 -- T3 -- Anti TPO -- Anti Thyroglobulin Ab -- TSI --      Diet, Consistent Carbohydrate/No Snacks:   DASH/TLC Sodium & Cholesterol Restricted (DASH)  Minced and Moist (MINCEDMOIST) (05-02-22 @ 21:48) [Active]

## 2022-05-11 NOTE — PHYSICAL THERAPY INITIAL EVALUATION ADULT - GENERAL OBSERVATIONS, REHAB EVAL
Pt received semisupine in bed, +parnell, +pulse oximeter, in NAD. Pt agreeable to participate in PT evaluation. Pt left semisupine in bed, all lines intact and RN aware.

## 2022-05-11 NOTE — PROGRESS NOTE ADULT - SUBJECTIVE AND OBJECTIVE BOX
CHIDI RUBIO 81y [id number]    Patient is a 81y old  Male who presents with a chief complaint of NSTEMI, Hyperglycemia, SANDRA, Encephalopathy (10 May 2022 19:44)      Follow Up/CC:  ID following for    Interval History/ROS:    Allergies    ceftriaxone (Urticaria)  ciprofloxacin (Rash)  schrimp (Unknown)    Intolerances        ANTIMICROBIALS:      MEDICATIONS  (STANDING):  apixaban 5 milliGRAM(s) Oral two times a day  atorvastatin 40 milliGRAM(s) Oral at bedtime  clopidogrel Tablet 75 milliGRAM(s) Oral daily  dextrose 5%. 1000 milliLiter(s) (100 mL/Hr) IV Continuous <Continuous>  dextrose 5%. 1000 milliLiter(s) (50 mL/Hr) IV Continuous <Continuous>  dextrose 50% Injectable 25 Gram(s) IV Push once  dextrose 50% Injectable 12.5 Gram(s) IV Push once  dextrose 50% Injectable 25 Gram(s) IV Push once  digoxin     Tablet 125 MICROGram(s) Oral daily  doxazosin 1 milliGRAM(s) Oral at bedtime  glucagon  Injectable 1 milliGRAM(s) IntraMuscular once  hydrocortisone hemorrhoidal Suppository 1 Suppository(s) Rectal at bedtime  insulin glargine Injectable (LANTUS) 16 Unit(s) SubCutaneous at bedtime  insulin lispro (ADMELOG) corrective regimen sliding scale   SubCutaneous three times a day before meals  insulin lispro (ADMELOG) corrective regimen sliding scale   SubCutaneous at bedtime  insulin lispro Injectable (ADMELOG) 5 Unit(s) SubCutaneous three times a day before meals  metoprolol tartrate 50 milliGRAM(s) Oral two times a day  pantoprazole    Tablet 40 milliGRAM(s) Oral before breakfast  polyethylene glycol 3350 17 Gram(s) Oral daily  senna 2 Tablet(s) Oral at bedtime  sodium chloride 0.9% lock flush 3 milliLiter(s) IV Push every 8 hours    MEDICATIONS  (PRN):  dextrose Oral Gel 15 Gram(s) Oral once PRN Blood Glucose LESS THAN 70 milliGRAM(s)/deciliter        Vital Signs Last 24 Hrs  T(C): 36.5 (11 May 2022 05:05), Max: 36.8 (10 May 2022 21:00)  T(F): 97.7 (11 May 2022 05:05), Max: 98.2 (10 May 2022 21:00)  HR: 86 (11 May 2022 05:05) (74 - 90)  BP: 121/72 (11 May 2022 05:05) (115/70 - 121/72)  BP(mean): --  RR: 18 (11 May 2022 05:05) (18 - 18)  SpO2: 96% (11 May 2022 05:05) (96% - 100%)    CBC Full  -  ( 11 May 2022 05:18 )  WBC Count : 5.65 K/uL  RBC Count : 4.10 M/uL  Hemoglobin : 11.8 g/dL  Hematocrit : 35.6 %  Platelet Count - Automated : 377 K/uL  Mean Cell Volume : 86.8 fL  Mean Cell Hemoglobin : 28.8 pg  Mean Cell Hemoglobin Concentration : 33.1 gm/dL  Auto Neutrophil # : x  Auto Lymphocyte # : x  Auto Monocyte # : x  Auto Eosinophil # : x  Auto Basophil # : x  Auto Neutrophil % : x  Auto Lymphocyte % : x  Auto Monocyte % : x  Auto Eosinophil % : x  Auto Basophil % : x    05-11    145  |  109<H>  |  9   ----------------------------<  146<H>  3.6   |  26  |  1.09    Ca    8.8      11 May 2022 05:18  Phos  2.7     05-10  Mg     1.90     05-10    TPro  6.9  /  Alb  2.9<L>  /  TBili  0.4  /  DBili  x   /  AST  17  /  ALT  13  /  AlkPhos  66  05-11    LIVER FUNCTIONS - ( 11 May 2022 05:18 )  Alb: 2.9 g/dL / Pro: 6.9 g/dL / ALK PHOS: 66 U/L / ALT: 13 U/L / AST: 17 U/L / GGT: x               MICROBIOLOGY:  .Blood Blood-Peripheral  05-02-22   No Growth Final  --  --      .Blood Blood-Peripheral  05-02-22   No Growth Final  --  --        RADIOLOGY    < from: CT Head No Cont (05.07.22 @ 10:39) >  No CT evidence of acute intracranial hemorrhage, brain edema, or mass   effect.    < end of copied text >

## 2022-05-11 NOTE — PHYSICAL THERAPY INITIAL EVALUATION ADULT - PERTINENT HX OF CURRENT PROBLEM, REHAB EVAL
Pt is an 81 year old male presenting with altered mental status and dysuria. Admitted with NSTEMI, Hyperglycemia, SANDRA, Encephalopathy. PMH listed below.

## 2022-05-11 NOTE — PROGRESS NOTE ADULT - ASSESSMENT
80 y/o M with PMH of prostate cancer s/p seed implant, Hypertension, Type 2 diabetes on insulin (has not been taking insulin recently), CAD s/p stent (last stent placed 2 years ago), CVA s/p carotid stent 1 month ago, JACKELIN on CPAP, presents to the ED complaining of altered mental status and dysuria with leukocytosis, urine retention, ACS    Man Craft  Attending Physician   Division of Infectious Disease  Office #[phone]  Available on Microsoft Teams also  After 5pm/weekend or no response, call #[phone]

## 2022-05-11 NOTE — PROGRESS NOTE ADULT - SUBJECTIVE AND OBJECTIVE BOX
INTERVAL HPI/OVERNIGHT EVENTS:    no rectal bleeding   eating at time of visit   cbc stable    MEDICATIONS  (STANDING):  apixaban 5 milliGRAM(s) Oral two times a day  atorvastatin 40 milliGRAM(s) Oral at bedtime  clopidogrel Tablet 75 milliGRAM(s) Oral daily  dextrose 5%. 1000 milliLiter(s) (100 mL/Hr) IV Continuous <Continuous>  dextrose 5%. 1000 milliLiter(s) (50 mL/Hr) IV Continuous <Continuous>  dextrose 50% Injectable 25 Gram(s) IV Push once  dextrose 50% Injectable 12.5 Gram(s) IV Push once  dextrose 50% Injectable 25 Gram(s) IV Push once  digoxin     Tablet 125 MICROGram(s) Oral daily  doxazosin 1 milliGRAM(s) Oral at bedtime  glucagon  Injectable 1 milliGRAM(s) IntraMuscular once  hydrocortisone hemorrhoidal Suppository 1 Suppository(s) Rectal at bedtime  insulin glargine Injectable (LANTUS) 16 Unit(s) SubCutaneous at bedtime  insulin lispro (ADMELOG) corrective regimen sliding scale   SubCutaneous three times a day before meals  insulin lispro (ADMELOG) corrective regimen sliding scale   SubCutaneous at bedtime  insulin lispro Injectable (ADMELOG) 5 Unit(s) SubCutaneous three times a day before meals  metoprolol tartrate 50 milliGRAM(s) Oral two times a day  pantoprazole    Tablet 40 milliGRAM(s) Oral before breakfast  polyethylene glycol 3350 17 Gram(s) Oral daily  senna 2 Tablet(s) Oral at bedtime  sodium chloride 0.9% lock flush 3 milliLiter(s) IV Push every 8 hours    MEDICATIONS  (PRN):  dextrose Oral Gel 15 Gram(s) Oral once PRN Blood Glucose LESS THAN 70 milliGRAM(s)/deciliter      Allergies    ceftriaxone (Urticaria)  ciprofloxacin (Rash)  schrimp (Unknown)    Intolerances        Review of Systems:    General:  No wt loss, fevers, chills, night sweats, fatigue   Eyes:  Good vision, no reported pain  ENT:  No sore throat, pain, runny nose, dysphagia  CV:  No pain, palpitations, hypo/hypertension  Resp:  No dyspnea, cough, tachypnea, wheezing  GI:  No pain, No nausea, No vomiting, No diarrhea, No constipation, No weight loss, No fever, No pruritis, No rectal bleeding, No melena, No dysphagia  :  No pain, bleeding, incontinence, nocturia  Muscle:  No pain, weakness  Neuro:  No weakness, tingling, memory problems  Psych:  No fatigue, insomnia, mood problems, depression  Endocrine:  No polyuria, polydypsia, cold/heat intolerance  Heme:  No petechiae, ecchymosis, easy bruisability  Skin:  No rash, tattoos, scars, edema      Vital Signs Last 24 Hrs  T(C): 36.5 (11 May 2022 05:05), Max: 36.8 (10 May 2022 21:00)  T(F): 97.7 (11 May 2022 05:05), Max: 98.2 (10 May 2022 21:00)  HR: 86 (11 May 2022 05:05) (74 - 90)  BP: 121/72 (11 May 2022 05:05) (115/70 - 121/72)  BP(mean): --  RR: 18 (11 May 2022 05:05) (18 - 18)  SpO2: 96% (11 May 2022 05:05) (96% - 100%)    PHYSICAL EXAM:    Constitutional: NAD  HEENT: EOMI, throat clear  Neck: No LAD, supple  Respiratory: CTA and P  Cardiovascular: S1 and S2, RRR, no M  Gastrointestinal: BS+, soft, NT/ND, neg HSM,  Extremities: No peripheral edema, neg clubbing, cyanosis  Vascular: 2+ peripheral pulses  Neurological: A/O x 1, no focal deficits  Psychiatric: Normal mood, normal affect  Skin: No rashes      LABS:                        11.8   5.65  )-----------( 377      ( 11 May 2022 05:18 )             35.6     05-11    145  |  109<H>  |  9   ----------------------------<  146<H>  3.6   |  26  |  1.09    Ca    8.8      11 May 2022 05:18  Phos  2.7     05-10  Mg     1.90     05-10    TPro  6.9  /  Alb  2.9<L>  /  TBili  0.4  /  DBili  x   /  AST  17  /  ALT  13  /  AlkPhos  66  05-11    PTT - ( 09 May 2022 22:39 )  PTT:27.6 sec      RADIOLOGY & ADDITIONAL TESTS:

## 2022-05-12 NOTE — PROGRESS NOTE ADULT - SUBJECTIVE AND OBJECTIVE BOX
CHIDI RUBIO 81y [id number]    Patient is a 81y old  Male who presents with a chief complaint of NSTEMI, Hyperglycemia, SANDRA, Encephalopathy (12 May 2022 11:08)      Follow Up/CC:  ID following for AMS    Interval History/ROS: no fever    Allergies    ceftriaxone (Urticaria)  ciprofloxacin (Rash)  schrimp (Unknown)    Intolerances        ANTIMICROBIALS:  ertapenem  IVPB        MEDICATIONS  (STANDING):  apixaban 5 milliGRAM(s) Oral two times a day  atorvastatin 40 milliGRAM(s) Oral at bedtime  bisacodyl 10 milliGRAM(s) Oral at bedtime  clopidogrel Tablet 75 milliGRAM(s) Oral daily  dextrose 5%. 1000 milliLiter(s) (100 mL/Hr) IV Continuous <Continuous>  dextrose 5%. 1000 milliLiter(s) (50 mL/Hr) IV Continuous <Continuous>  dextrose 50% Injectable 25 Gram(s) IV Push once  dextrose 50% Injectable 12.5 Gram(s) IV Push once  dextrose 50% Injectable 25 Gram(s) IV Push once  digoxin     Tablet 125 MICROGram(s) Oral daily  doxazosin 1 milliGRAM(s) Oral at bedtime  ertapenem  IVPB      glucagon  Injectable 1 milliGRAM(s) IntraMuscular once  hydrocortisone hemorrhoidal Suppository 1 Suppository(s) Rectal at bedtime  insulin glargine Injectable (LANTUS) 16 Unit(s) SubCutaneous at bedtime  insulin lispro (ADMELOG) corrective regimen sliding scale   SubCutaneous three times a day before meals  insulin lispro (ADMELOG) corrective regimen sliding scale   SubCutaneous at bedtime  insulin lispro Injectable (ADMELOG) 6 Unit(s) SubCutaneous three times a day before meals  metoprolol tartrate 50 milliGRAM(s) Oral two times a day  pantoprazole    Tablet 40 milliGRAM(s) Oral before breakfast  polyethylene glycol 3350 17 Gram(s) Oral daily  sodium chloride 0.9% lock flush 3 milliLiter(s) IV Push every 8 hours    MEDICATIONS  (PRN):  dextrose Oral Gel 15 Gram(s) Oral once PRN Blood Glucose LESS THAN 70 milliGRAM(s)/deciliter        Vital Signs Last 24 Hrs  T(C): 36.5 (12 May 2022 13:44), Max: 37.2 (11 May 2022 21:19)  T(F): 97.7 (12 May 2022 13:44), Max: 99 (11 May 2022 21:19)  HR: 80 (12 May 2022 13:44) (78 - 152)  BP: 123/61 (12 May 2022 13:44) (96/61 - 128/78)  BP(mean): --  RR: 18 (12 May 2022 13:44) (16 - 18)  SpO2: 99% (12 May 2022 13:44) (95% - 100%)    CBC Full  -  ( 11 May 2022 05:18 )  WBC Count : 5.65 K/uL  RBC Count : 4.10 M/uL  Hemoglobin : 11.8 g/dL  Hematocrit : 35.6 %  Platelet Count - Automated : 377 K/uL  Mean Cell Volume : 86.8 fL  Mean Cell Hemoglobin : 28.8 pg  Mean Cell Hemoglobin Concentration : 33.1 gm/dL  Auto Neutrophil # : x  Auto Lymphocyte # : x  Auto Monocyte # : x  Auto Eosinophil # : x  Auto Basophil # : x  Auto Neutrophil % : x  Auto Lymphocyte % : x  Auto Monocyte % : x  Auto Eosinophil % : x  Auto Basophil % : x    05-12    143  |  110<H>  |  10  ----------------------------<  170<H>  3.7   |  22  |  1.06    Ca    8.6      12 May 2022 06:54  Phos  2.8     05-12  Mg     1.80     05-12    TPro  6.9  /  Alb  2.9<L>  /  TBili  0.4  /  DBili  x   /  AST  17  /  ALT  13  /  AlkPhos  66  05-11    LIVER FUNCTIONS - ( 11 May 2022 05:18 )  Alb: 2.9 g/dL / Pro: 6.9 g/dL / ALK PHOS: 66 U/L / ALT: 13 U/L / AST: 17 U/L / GGT: x               MICROBIOLOGY:  .Blood Blood-Peripheral  05-02-22   No Growth Final  --  --      .Blood Blood-Peripheral  05-02-22   No Growth Final  --  --      RADIOLOGY    < from: CT Chest w/ IV Cont (05.11.22 @ 19:53) >  Patchy attenuation of the right renal upper pole suggesting   pyelonephritis.    Circumferential bladder wall thickening with perivesicular inflammation   suggesting cystitis.    Stool-filled rectum with mild mural thickening which may represent early   stercoral colitis    Tree-in-bud opacities in the right upper lobe, consistent with   infectious/inflammatory etiology.    < end of copied text >

## 2022-05-12 NOTE — PROGRESS NOTE ADULT - ASSESSMENT
80 y/o M with PMH of prostate cancer s/p seed implant, Hypertension, Type 2 diabetes on insulin (has not been taking insulin recently), CAD s/p stent (last stent placed 2 years ago), CVA s/p carotid stent 1 month ago, JACKELIN on CPAP, presents to the ED complaining of altered mental status and dysuria also found to have nstemi    endocrine called for further assistance   a1c 9.2   HOME MEDS: Soliqua, Novolog, Metformin    1. DM 2  Target A1c 6-7%; A1c currently A1c 9.2 %  While inpatient  BG target 100-180 mg/dl, remains above goal  Glucose above goal today  - Increase Lantus to 18 units qhs (please give 80% of dose if NPO   - Increase Admelog to 7 units SC Premeal/TIDAC (please hold if NPO/not eating)  -Continue Admelog  Low dose Correction Scale Premeal & seperate low dose  Correction Scale Bedtime   - Hypoglycemia protocol in place   - Carb Consistent Diet   - Nutrition consult when his clinical status is improved   -provider to RN for insulin pen teaching to son (RN made aware)    dc planning:  TBD  Patient had UTI, so would not prescribe SGLT2 as there is a risk for UTI's   Dc plan will likely be a Basal with GLP1 (soliqua) +PO.   Will f/u with full plan pending clinical improvement and insulin requirements  Son is able to help with insulin PEN injections   As per Son pt has a private Endocrinolgist at [facility]. Dr. Huber. Pt will continue to follow up with private Endocrinologist.  Discussed plan with paulo Steiner [phone]    2. HTN  goal BP in DM <130/80  mamagement as per the primary team    3. HLD  pt is on lipitor 40mg   outpt lipid profile     Perlita Quintana  Nurse Practitioner  Division of Endocrinology & Diabetes  In house pager #[phone]    If before 9AM or after 6PM, or on weekends/holidays, please call endocrine answering service for assistance ([phone]).For nonurgent matters email [email or web address] for assistance.  82 y/o M with PMH of prostate cancer s/p seed implant, Hypertension, Type 2 diabetes on insulin (has not been taking insulin recently), CAD s/p stent (last stent placed 2 years ago), CVA s/p carotid stent 1 month ago, JACKELIN on CPAP, presents to the ED complaining of altered mental status and dysuria also found to have nstemi    endocrine called for further assistance   a1c 9.2   HOME MEDS: Soliqua 28units sq qhs , Novolog correction scale above 200 (2 Units) Above 300 (3 units), Metformin 500mg p.o. qd    1. DM 2  Target A1c 6-7%; A1c currently A1c 9.2 %  While inpatient  BG target 100-180 mg/dl, remains above goal  Glucose above goal today  - Increase Lantus to 18 units qhs (please give 80% of dose if NPO   - Increase Admelog to 7 units SC Premeal/TIDAC (please hold if NPO/not eating)  -Continue Admelog  Low dose Correction Scale Premeal & seperate low dose  Correction Scale Bedtime   - Hypoglycemia protocol in place   - Carb Consistent Diet   - Nutrition consult when his clinical status is improved   -provider to RN for insulin pen teaching to son (RN made aware)    dc planning:  TBD  Patient had UTI, so would not prescribe SGLT2 as there is a risk for UTI's   Dc plan will likely be a Basal with GLP1 (soliqua) +PO.   Will f/u with full plan pending clinical improvement and insulin requirements  Son is able to help with insulin PEN injections   As per Son pt has a private Endocrinolgist at [facility]. Dr. Huber. Pt will continue to follow up with private Endocrinologist.  Discussed plan with paulo Steiner [phone]    2. HTN  goal BP in DM <130/80  mamagement as per the primary team    3. HLD  pt is on lipitor 40mg   outpt lipid profile     Perlita Quintana  Nurse Practitioner  Division of Endocrinology & Diabetes  In house pager #[phone]    If before 9AM or after 6PM, or on weekends/holidays, please call endocrine answering service for assistance ([phone]).For nonurgent matters email [email or web address] for assistance.

## 2022-05-12 NOTE — PROGRESS NOTE ADULT - SUBJECTIVE AND OBJECTIVE BOX
INTERVAL HPI/OVERNIGHT EVENTS:    no rectal bleeding reported per staff       MEDICATIONS  (STANDING):  apixaban 5 milliGRAM(s) Oral two times a day  atorvastatin 40 milliGRAM(s) Oral at bedtime  bisacodyl 10 milliGRAM(s) Oral at bedtime  clopidogrel Tablet 75 milliGRAM(s) Oral daily  dextrose 5%. 1000 milliLiter(s) (50 mL/Hr) IV Continuous <Continuous>  dextrose 5%. 1000 milliLiter(s) (100 mL/Hr) IV Continuous <Continuous>  dextrose 50% Injectable 25 Gram(s) IV Push once  dextrose 50% Injectable 12.5 Gram(s) IV Push once  dextrose 50% Injectable 25 Gram(s) IV Push once  digoxin     Tablet 125 MICROGram(s) Oral daily  doxazosin 1 milliGRAM(s) Oral at bedtime  glucagon  Injectable 1 milliGRAM(s) IntraMuscular once  hydrocortisone hemorrhoidal Suppository 1 Suppository(s) Rectal at bedtime  insulin glargine Injectable (LANTUS) 16 Unit(s) SubCutaneous at bedtime  insulin lispro (ADMELOG) corrective regimen sliding scale   SubCutaneous three times a day before meals  insulin lispro (ADMELOG) corrective regimen sliding scale   SubCutaneous at bedtime  insulin lispro Injectable (ADMELOG) 6 Unit(s) SubCutaneous three times a day before meals  metoprolol tartrate 50 milliGRAM(s) Oral two times a day  pantoprazole    Tablet 40 milliGRAM(s) Oral before breakfast  polyethylene glycol 3350 17 Gram(s) Oral daily  sodium chloride 0.9% lock flush 3 milliLiter(s) IV Push every 8 hours    MEDICATIONS  (PRN):  dextrose Oral Gel 15 Gram(s) Oral once PRN Blood Glucose LESS THAN 70 milliGRAM(s)/deciliter      Allergies    ceftriaxone (Urticaria)  ciprofloxacin (Rash)  schrimp (Unknown)    Intolerances        Review of Systems:  *limited d/t Confusion     General:  No wt loss, fevers, chills, night sweats, fatigue   Eyes:  Good vision, no reported pain  ENT:  No sore throat, pain, runny nose, dysphagia  CV:  No pain, palpitations, hypo/hypertension  Resp:  No dyspnea, cough, tachypnea, wheezing  GI:  No pain, No nausea, No vomiting, No diarrhea, No constipation, No weight loss, No fever, No pruritis, No rectal bleeding, No melena, No dysphagia  :  No pain, bleeding, incontinence, nocturia  Muscle:  No pain, weakness  Neuro:  No weakness, tingling, memory problems  Psych:  No fatigue, insomnia, mood problems, depression  Endocrine:  No polyuria, polydypsia, cold/heat intolerance  Heme:  No petechiae, ecchymosis, easy bruisability  Skin:  No rash, tattoos, scars, edema      Vital Signs Last 24 Hrs  T(C): 36.7 (12 May 2022 08:30), Max: 37.2 (11 May 2022 21:19)  T(F): 98.1 (12 May 2022 08:30), Max: 99 (11 May 2022 21:19)  HR: 86 (12 May 2022 09:37) (78 - 152)  BP: 122/76 (12 May 2022 09:37) (96/61 - 128/78)  BP(mean): --  RR: 16 (12 May 2022 09:37) (16 - 18)  SpO2: 99% (12 May 2022 09:37) (95% - 100%)    PHYSICAL EXAM:    Constitutional: NAD  HEENT: EOMI, throat clear  Neck: No LAD, supple  Respiratory: CTA and P  Cardiovascular: S1 and S2, RRR, no M  Gastrointestinal: BS+, soft, NT/ND, neg HSM,  Extremities: No peripheral edema, neg clubbing, cyanosis  Vascular: 2+ peripheral pulses  Neurological: A/O x 1, no focal deficits  Psychiatric: Normal mood, normal affect ; (+) Confusion   Skin: No rashes      LABS:                        11.8   5.65  )-----------( 377      ( 11 May 2022 05:18 )             35.6     05-12    143  |  110<H>  |  10  ----------------------------<  170<H>  3.7   |  22  |  1.06    Ca    8.6      12 May 2022 06:54  Phos  2.8     05-12  Mg     1.80     05-12    TPro  6.9  /  Alb  2.9<L>  /  TBili  0.4  /  DBili  x   /  AST  17  /  ALT  13  /  AlkPhos  66  05-11          RADIOLOGY & ADDITIONAL TESTS:

## 2022-05-12 NOTE — CHART NOTE - NSCHARTNOTEFT_GEN_A_CORE
Notified by RN that patient's HR was sustaining in 140's, asymptomatic. Patient hs HX of Afib RVR s/p Dig loading now on maintenance dose which AM dose was given early.  Denies any chest pain at this time, will continue to monitor.

## 2022-05-12 NOTE — CHART NOTE - NSCHARTNOTEFT_GEN_A_CORE
Per admission notes- pt on CPAP machine at home -->per d/w paulo Yousif at bedside, pt not using CPAP at home. Pt was recs awhile ago but refuses to use it.

## 2022-05-12 NOTE — PROVIDER CONTACT NOTE (OTHER) - ACTION/TREATMENT ORDERED:
Provider was notified, came to assess pt, said to still give pt suppository. Suppository was given, continue to monitor.

## 2022-05-12 NOTE — PROGRESS NOTE ADULT - ASSESSMENT
82 y/o M with PMH of prostate cancer s/p seed implant, Hypertension, Type 2 diabetes on insulin (has not been taking insulin recently), CAD s/p stent (last stent placed 2 years ago), CVA s/p carotid stent 1 month ago, JACKELIN on CPAP, presents to the ED w/AMS noted to have UTI. GI consulted for rectal bleed    Rectal Bleed   H/H stable and at baseline   suspect hemorrhoidal irritation d/t hard stools/straining   Anusol Suppository x 5 days   Senna QHS and Miralax BID; give Enema as needed  Monitor stool color  No GI objection to a/c or antiplt therapy w/Protonix daily  High fiber diet    AFib   rate control per cardiology recs  No GI objection to a/c or antiplt therapy w/Protonix daily  monitor cbc and stool color while on blood thinners    UTI   management per medicine team recs    I reviewed the overnight course of events on the unit, re-confirming the patient history. I discussed the care with the patient and their family. The plan of care was discussed with the physician assistant and modifications were made to the notation where appropriate. Differential diagnosis and plan of care discussed with patient after the evaluation. Advanced care planning was discussed with patient and family.  Advanced care planning forms were reviewed and discussed.  Risks, benefits and alternatives of gastroenterologic procedures were discussed in detail and all questions were answered. 35 minutes spent on total encounter of which more than fifty percent of the encounter was spent counseling and/or coordinating care by the attending physician.

## 2022-05-12 NOTE — PROVIDER CONTACT NOTE (OTHER) - RECOMMENDATIONS
ANASTASIIA Escudero notified. Frequent rounding maintained.
Provider notified. Lantus and Admelog given as per order. Recheck FS.
ANASTASIIA Lindo notified.
Provider notified. Continue to monitor patient. Pending further intervention.
notify provider

## 2022-05-12 NOTE — PROVIDER CONTACT NOTE (OTHER) - ASSESSMENT
Patient is asymptomatic and resting comfortably in bed. Patient denies chest pain, SOB, headache, dizziness, palpitations. Patients vital signs include T: 98.2F  BP 96/61 O2 100 RR 18.

## 2022-05-12 NOTE — PROGRESS NOTE ADULT - ASSESSMENT
80 y/o M with PMH of prostate cancer s/p seed implant, Hypertension, Type 2 diabetes on insulin (has not been taking insulin recently), CAD s/p stent (last stent placed 2 years ago), CVA s/p carotid stent 1 month ago, JACKELIN on CPAP, presents to the ED complaining of altered mental status and dysuria with leukocytosis, urine retention, ACS    Man Craft  Attending Physician   Division of Infectious Disease  Office #[phone]  Available on [employer] Teams also  After 5pm/weekend or no response, call #[phone]

## 2022-05-12 NOTE — PROGRESS NOTE ADULT - SUBJECTIVE AND OBJECTIVE BOX
CHIDI RUBIO  81y  Male      Patient is a 81y old  Male who presents with a chief complaint of NSTEMI, Hyperglycemia, SANDAR, Encephalopathy (12 May 2022 17:10)  Patient is comfortable,nad.Above noted.Had episode of rapid a.fib,treated with iv cardiazem,dig..HR is improving in 80's    REVIEW OF SYSTEMS:  CONSTITUTIONAL: No fever  RESPIRATORY: No cough, hemoptysis or shortness of breath  CARDIOVASCULAR: No chest pain, palpitations, dizziness, or leg swelling  GASTROINTESTINAL: No abdominal pain. nausea, vomiting, hematemesis  GENITOURINARY: No dysuria, frequency, hematuria   NEUROLOGICAL: No headaches, no dizziness  MUSCULOSKELETAL: No joint pain or swelling;     INTERVAL HPI/OVERNIGHT EVENTS:  T(C): 36.8 (05-12-22 @ 17:55), Max: 37.2 (05-11-22 @ 21:19)  HR: 89 (05-12-22 @ 17:55) (78 - 152)  BP: 120/65 (05-12-22 @ 17:55) (96/61 - 128/78)  RR: 17 (05-12-22 @ 17:55) (16 - 18)  SpO2: 99% (05-12-22 @ 17:55) (95% - 100%)  Wt(kg): --  I&O's Summary    11 May 2022 07:01  -  12 May 2022 07:00  --------------------------------------------------------  IN: 0 mL / OUT: 475 mL / NET: -475 mL    12 May 2022 07:01  -  12 May 2022 21:05  --------------------------------------------------------  IN: 720 mL / OUT: 600 mL / NET: 120 mL      T(C): 36.8 (05-12-22 @ 17:55), Max: 37.2 (05-11-22 @ 21:19)  HR: 89 (05-12-22 @ 17:55) (78 - 152)  BP: 120/65 (05-12-22 @ 17:55) (96/61 - 128/78)  RR: 17 (05-12-22 @ 17:55) (16 - 18)  SpO2: 99% (05-12-22 @ 17:55) (95% - 100%)  Wt(kg): --Vital Signs Last 24 Hrs  T(C): 36.8 (12 May 2022 17:55), Max: 37.2 (11 May 2022 21:19)  T(F): 98.2 (12 May 2022 17:55), Max: 99 (11 May 2022 21:19)  HR: 89 (12 May 2022 17:55) (78 - 152)  BP: 120/65 (12 May 2022 17:55) (96/61 - 128/78)  BP(mean): --  RR: 17 (12 May 2022 17:55) (16 - 18)  SpO2: 99% (12 May 2022 17:55) (95% - 100%)    LABS:                        11.8   5.65  )-----------( 377      ( 11 May 2022 05:18 )             35.6     05-12    143  |  110<H>  |  10  ----------------------------<  170<H>  3.7   |  22  |  1.06    Ca    8.6      12 May 2022 06:54  Phos  2.8     05-12  Mg     1.80     05-12    TPro  6.9  /  Alb  2.9<L>  /  TBili  0.4  /  DBili  x   /  AST  17  /  ALT  13  /  AlkPhos  66  05-11        CAPILLARY BLOOD GLUCOSE      POCT Blood Glucose.: 242 mg/dL (12 May 2022 16:59)  POCT Blood Glucose.: 225 mg/dL (12 May 2022 11:32)  POCT Blood Glucose.: 178 mg/dL (12 May 2022 07:06)            PAST MEDICAL & SURGICAL HISTORY:  Diabetes mellitus      CAD (coronary artery disease)      Prostate ca  s/p SEED 5 yrs ago      Sleep apnea  on cpap at home      Hypertension      Hypercholesteremia      S/P cholecystectomy      S/P coronary angioplasty  last stent 1 year ago      H/O carotid endarterectomy  April 2022, stent placed.          MEDICATIONS  (STANDING):  apixaban 5 milliGRAM(s) Oral two times a day  atorvastatin 40 milliGRAM(s) Oral at bedtime  bisacodyl 10 milliGRAM(s) Oral at bedtime  clopidogrel Tablet 75 milliGRAM(s) Oral daily  dextrose 5%. 1000 milliLiter(s) (100 mL/Hr) IV Continuous <Continuous>  dextrose 5%. 1000 milliLiter(s) (50 mL/Hr) IV Continuous <Continuous>  dextrose 50% Injectable 25 Gram(s) IV Push once  dextrose 50% Injectable 12.5 Gram(s) IV Push once  dextrose 50% Injectable 25 Gram(s) IV Push once  digoxin     Tablet 125 MICROGram(s) Oral daily  doxazosin 1 milliGRAM(s) Oral at bedtime  ertapenem  IVPB      glucagon  Injectable 1 milliGRAM(s) IntraMuscular once  hydrocortisone hemorrhoidal Suppository 1 Suppository(s) Rectal at bedtime  insulin glargine Injectable (LANTUS) 18 Unit(s) SubCutaneous at bedtime  insulin lispro (ADMELOG) corrective regimen sliding scale   SubCutaneous three times a day before meals  insulin lispro (ADMELOG) corrective regimen sliding scale   SubCutaneous at bedtime  insulin lispro Injectable (ADMELOG) 7 Unit(s) SubCutaneous three times a day before meals  metoprolol tartrate 50 milliGRAM(s) Oral two times a day  pantoprazole    Tablet 40 milliGRAM(s) Oral before breakfast  polyethylene glycol 3350 17 Gram(s) Oral daily  sodium chloride 0.9% lock flush 3 milliLiter(s) IV Push every 8 hours    MEDICATIONS  (PRN):  dextrose Oral Gel 15 Gram(s) Oral once PRN Blood Glucose LESS THAN 70 milliGRAM(s)/deciliter        RADIOLOGY & ADDITIONAL TESTS:    Imaging Personally Reviewed:  [ ] YES  [ ] NO    Consultant(s) Notes Reviewed:  [ x] YES  [ ] NO    PHYSICAL EXAM:  GENERAL: Alert and awake lying in bed in no distress  HEAD:  Atraumatic, Normocephalic  EYES: EOMI, [place], conjunctiva and sclera clear  NECK: Supple, No JVD, Normal thyroid  NERVOUS SYSTEM:  Alert & Oriented X1, Motor and sensory systems are intact,   CHEST/LUNG: Bilateral clear breath sounds, no rhochi, no wheezing, no crepitations,  HEART: irregular with mvr  ABDOMEN: Soft, Nontender, Nondistended; Bowel sounds present  EXTREMITIES:   Peripheral Pulses are palpable, no  edema        Care Discussed with Consultants/Other Providers [x ] YES  [ ] NO      Code Status: [] Full Code [] DNR [] DNI [] Goals of Care:   Disposition: [] ICU [] Stroke Unit [] RCU []PCU []Floor [] Discharge Home         [email or web address]

## 2022-05-12 NOTE — PROGRESS NOTE ADULT - SUBJECTIVE AND OBJECTIVE BOX
Chief Complaint:  DM 2    History: Pt seen at bedside. Pt tolerating oral intake. Pt reports an adequate appetite. As per RN pt has no signs of nausea and vomiting/any signs of hypoglycemia.     MEDICATIONS  (STANDING):  apixaban 5 milliGRAM(s) Oral two times a day  atorvastatin 40 milliGRAM(s) Oral at bedtime  bisacodyl 10 milliGRAM(s) Oral at bedtime  clopidogrel Tablet 75 milliGRAM(s) Oral daily  dextrose 5%. 1000 milliLiter(s) (50 mL/Hr) IV Continuous <Continuous>  dextrose 5%. 1000 milliLiter(s) (100 mL/Hr) IV Continuous <Continuous>  dextrose 50% Injectable 25 Gram(s) IV Push once  dextrose 50% Injectable 12.5 Gram(s) IV Push once  dextrose 50% Injectable 25 Gram(s) IV Push once  digoxin     Tablet 125 MICROGram(s) Oral daily  doxazosin 1 milliGRAM(s) Oral at bedtime  ertapenem  IVPB      glucagon  Injectable 1 milliGRAM(s) IntraMuscular once  hydrocortisone hemorrhoidal Suppository 1 Suppository(s) Rectal at bedtime  insulin glargine Injectable (LANTUS) 16 Unit(s) SubCutaneous at bedtime  insulin lispro (ADMELOG) corrective regimen sliding scale   SubCutaneous three times a day before meals  insulin lispro (ADMELOG) corrective regimen sliding scale   SubCutaneous at bedtime  insulin lispro Injectable (ADMELOG) 7 Unit(s) SubCutaneous three times a day before meals  metoprolol tartrate 50 milliGRAM(s) Oral two times a day  pantoprazole    Tablet 40 milliGRAM(s) Oral before breakfast  polyethylene glycol 3350 17 Gram(s) Oral daily  sodium chloride 0.9% lock flush 3 milliLiter(s) IV Push every 8 hours    MEDICATIONS  (PRN):  dextrose Oral Gel 15 Gram(s) Oral once PRN Blood Glucose LESS THAN 70 milliGRAM(s)/deciliter    Allergies: ceftriaxone (Urticaria)  ciprofloxacin (Rash)  schrimp (Unknown)    Review of Systems:  UNABLE TO OBTAIN    PHYSICAL EXAM:  VITALS: T(C): 36.5 (05-12-22 @ 13:44)  T(F): 97.7 (05-12-22 @ 13:44), Max: 99 (05-11-22 @ 21:19)  HR: 80 (05-12-22 @ 13:44) (78 - 152)  BP: 123/61 (05-12-22 @ 13:44) (96/61 - 128/78)  RR:  (16 - 18)  SpO2:  (95% - 100%)  Wt(kg): --  GENERAL: NAD, well-groomed, well-developed  RESPIRATORY: No labored breathing   GI: Soft, nontender, non distended  PSYCH: Alert and oriented x 0    CAPILLARY BLOOD GLUCOSE  POCT Blood Glucose.: 242 mg/dL (12 May 2022 16:59)  POCT Blood Glucose.: 225 mg/dL (12 May 2022 11:32)  POCT Blood Glucose.: 178 mg/dL (12 May 2022 07:06)  POCT Blood Glucose.: 180 mg/dL (11 May 2022 20:59)    A1C with Estimated Average Glucose in AM (05.03.22 @ 07:39)    A1C with Estimated Average Glucose Result: 9.2    Lipid Profile in AM (05.03.22 @ 07:39)    Cholesterol, Serum: 131 mg/dL    Triglycerides, Serum: 143 mg/dL    HDL Cholesterol, Serum: 38 mg/dL    Non HDL Cholesterol: 93L    LDL Cholesterol Calculated: 64 mg/dL    05-12    143  |  110<H>  |  10  ----------------------------<  170<H>  3.7   |  22  |  1.06    eGFR: 70    Ca    8.6      05-12  Mg     1.80     05-12  Phos  2.8     05-12    TPro  6.9  /  Alb  2.9<L>  /  TBili  0.4  /  DBili  x   /  AST  17  /  ALT  13  /  AlkPhos  66  05-11      Thyroid Function Tests:  05-03 @ 07:39 TSH 2.34 FreeT4 -- T3 -- Anti TPO -- Anti Thyroglobulin Ab -- TSI --      Diet, Consistent Carbohydrate/No Snacks:   DASH/TLC Sodium & Cholesterol Restricted (DASH)  Minced and Moist (MINCEDMOIST) (05-02-22 @ 21:48) [Active]

## 2022-05-12 NOTE — CHART NOTE - NSCHARTNOTEFT_GEN_A_CORE
Notified by night Provider that pt was sustaining in rapid Afib 's since 4:00am. Pt was given Cardizem 5mg IVP x1 with no improvement, then was given Metoprolol 5mg IVP x1 dose with no improvement. Pt was asymptomatic, denied CP, palp. Per d/w primary RN, pt's HR still in 140's, resting comfortably. Encouraged RN to administer Metoprolol 50mg po this am. Pt was seen & examined at bedside, denies chest pain, SOB, palp, dizziness. After po Metoprolol pt broke into NSR HR 82. Will continue to monitor pts clinical status.    Patient is a 81y old  Male who presents with a chief complaint of NSTEMI, Hyperglycemia, SANDRA, Encephalopathy (11 May 2022 19:44)    Vital Signs Last 24 Hrs  T(C): 36.7 (12 May 2022 08:30), Max: 37.2 (11 May 2022 21:19)  T(F): 98.1 (12 May 2022 08:30), Max: 99 (11 May 2022 21:19)  HR: 86 (12 May 2022 09:37) (78 - 152)  BP: 122/76 (12 May 2022 09:37) (96/61 - 128/78)  BP(mean): --  RR: 16 (12 May 2022 09:37) (16 - 18)  SpO2: 99% (12 May 2022 09:37) (95% - 100%)                          11.8   5.65  )-----------( 377      ( 11 May 2022 05:18 )             35.6     05-12    143  |  110<H>  |  10  ----------------------------<  170<H>  3.7   |  22  |  1.06    Ca    8.6      12 May 2022 06:54  Phos  2.8     05-12  Mg     1.80     05-12    TPro  6.9  /  Alb  2.9<L>  /  TBili  0.4  /  DBili  x   /  AST  17  /  ALT  13  /  AlkPhos  66  05-11    LIVER FUNCTIONS - ( 11 May 2022 05:18 )  Alb: 2.9 g/dL / Pro: 6.9 g/dL / ALK PHOS: 66 U/L / ALT: 13 U/L / AST: 17 U/L / GGT: x                                   CAPILLARY BLOOD GLUCOSE      POCT Blood Glucose.: 178 mg/dL (12 May 2022 07:06)  POCT Blood Glucose.: 180 mg/dL (11 May 2022 20:59)  POCT Blood Glucose.: 251 mg/dL (11 May 2022 17:08)  POCT Blood Glucose.: 241 mg/dL (11 May 2022 11:37)    apixaban 5 milliGRAM(s) Oral two times a day  atorvastatin 40 milliGRAM(s) Oral at bedtime  bisacodyl 10 milliGRAM(s) Oral at bedtime  clopidogrel Tablet 75 milliGRAM(s) Oral daily  dextrose 5%. 1000 milliLiter(s) IV Continuous <Continuous>  dextrose 5%. 1000 milliLiter(s) IV Continuous <Continuous>  dextrose 50% Injectable 25 Gram(s) IV Push once  dextrose 50% Injectable 12.5 Gram(s) IV Push once  dextrose 50% Injectable 25 Gram(s) IV Push once  dextrose Oral Gel 15 Gram(s) Oral once PRN  digoxin     Tablet 125 MICROGram(s) Oral daily  doxazosin 1 milliGRAM(s) Oral at bedtime  glucagon  Injectable 1 milliGRAM(s) IntraMuscular once  hydrocortisone hemorrhoidal Suppository 1 Suppository(s) Rectal at bedtime  insulin glargine Injectable (LANTUS) 16 Unit(s) SubCutaneous at bedtime  insulin lispro (ADMELOG) corrective regimen sliding scale   SubCutaneous three times a day before meals  insulin lispro (ADMELOG) corrective regimen sliding scale   SubCutaneous at bedtime  insulin lispro Injectable (ADMELOG) 6 Unit(s) SubCutaneous three times a day before meals  metoprolol tartrate 50 milliGRAM(s) Oral two times a day  pantoprazole    Tablet 40 milliGRAM(s) Oral before breakfast  polyethylene glycol 3350 17 Gram(s) Oral daily  sodium chloride 0.9% lock flush 3 milliLiter(s) IV Push every 8 hours      REVIEW OF SYSTEMS:    RESPIRATORY: No cough, wheezing, chills or hemoptysis; No shortness of breath  CARDIOVASCULAR: No chest pain, palpitations, dizziness, or leg swelling  GASTROINTESTINAL: No abdominal or epigastric pain. No nausea, vomiting, or hematemesis; No diarrhea or constipation. No melena or hematochezia.  GENITOURINARY: No dysuria, frequency, hematuria, or incontinence  NEUROLOGICAL: No headaches, memory loss, loss of strength, numbness, or tremors    PHYSICAL EXAM:    GENERAL: NAD, well-groomed, well-developed  NERVOUS SYSTEM:  Alert & Oriented X1-2, Good concentration; Motor Strength 5/5 B/L upper and lower extremities  CHEST/LUNG: Clear to percussion bilaterally; No rales, rhonchi, wheezing, or rubs  HEART: Regular rate and rhythm; S1, S2 present  ABDOMEN: Soft, Nontender, Nondistended; Bowel sounds present, had small BM this am    RADIOLOGY :      Assessment: Patient 81y with Non-ST elevation myocardial infarction (NSTEMI), Rapid A-Fib s/p Dig load, now on maintenance. As of this morning in Rapid A-fib    Diabetes mellitus    CAD (coronary artery disease)    Prostate ca    Sleep apnea    Hypertension    Hypercholesteremia    S/P cholecystectomy    S/P coronary angioplasty    H/O carotid endarterectomy        Plan:  - Continue current treatment with Digoxin, BB  - Follow up labs  - D/w Dr Vallecillo aware and agree with the plan  - Will continue to follow up

## 2022-05-13 NOTE — PROGRESS NOTE ADULT - SUBJECTIVE AND OBJECTIVE BOX
Chief Complaint:     History:    MEDICATIONS  (STANDING):  apixaban 5 milliGRAM(s) Oral two times a day  atorvastatin 40 milliGRAM(s) Oral at bedtime  bisacodyl 10 milliGRAM(s) Oral at bedtime  clopidogrel Tablet 75 milliGRAM(s) Oral daily  dextrose 5%. 1000 milliLiter(s) (100 mL/Hr) IV Continuous <Continuous>  dextrose 5%. 1000 milliLiter(s) (50 mL/Hr) IV Continuous <Continuous>  dextrose 50% Injectable 25 Gram(s) IV Push once  dextrose 50% Injectable 12.5 Gram(s) IV Push once  dextrose 50% Injectable 25 Gram(s) IV Push once  digoxin     Tablet 125 MICROGram(s) Oral daily  doxazosin 1 milliGRAM(s) Oral at bedtime  ertapenem  IVPB      ertapenem  IVPB 1000 milliGRAM(s) IV Intermittent every 24 hours  glucagon  Injectable 1 milliGRAM(s) IntraMuscular once  hydrocortisone hemorrhoidal Suppository 1 Suppository(s) Rectal at bedtime  insulin glargine Injectable (LANTUS) 18 Unit(s) SubCutaneous at bedtime  insulin lispro (ADMELOG) corrective regimen sliding scale   SubCutaneous three times a day before meals  insulin lispro (ADMELOG) corrective regimen sliding scale   SubCutaneous at bedtime  insulin lispro Injectable (ADMELOG) 7 Unit(s) SubCutaneous three times a day before meals  metoprolol tartrate 50 milliGRAM(s) Oral two times a day  pantoprazole    Tablet 40 milliGRAM(s) Oral before breakfast  polyethylene glycol 3350 17 Gram(s) Oral daily  sodium chloride 0.9% lock flush 3 milliLiter(s) IV Push every 8 hours    MEDICATIONS  (PRN):  dextrose Oral Gel 15 Gram(s) Oral once PRN Blood Glucose LESS THAN 70 milliGRAM(s)/deciliter      Allergies    ceftriaxone (Urticaria)  ciprofloxacin (Rash)  schrimp (Unknown)    Intolerances      Review of Systems:  Constitutional: No fever  Eyes: No blurry vision  Neuro: No tremors  HEENT: No pain  Cardiovascular: No chest pain, palpitations  Respiratory: No SOB, no cough  GI: No nausea, vomiting, abdominal pain  : No dysuria  Skin: no rash  Psych: no depression  Endocrine: no polyuria, polydipsia  Hem/lymph: no swelling  Osteoporosis: no fractures    ALL OTHER SYSTEMS REVIEWED AND NEGATIVE    UNABLE TO OBTAIN    PHYSICAL EXAM:  VITALS: T(C): 36.9 (05-13-22 @ 06:25)  T(F): 98.4 (05-13-22 @ 06:25), Max: 98.8 (05-12-22 @ 21:35)  HR: 86 (05-13-22 @ 06:25) (80 - 89)  BP: 127/61 (05-13-22 @ 06:25) (116/67 - 127/61)  RR:  (17 - 18)  SpO2:  (99% - 100%)  Wt(kg): --  GENERAL: NAD, well-groomed, well-developed  EYES: No proptosis, no lid lag, anicteric  HEENT:  Atraumatic, Normocephalic, moist mucous membranes  THYROID: Normal size, no palpable nodules  RESPIRATORY: Clear to auscultation bilaterally; No rales, rhonchi, wheezing  CARDIOVASCULAR: Regular rate and rhythm; No murmurs; no peripheral edema  GI: Soft, nontender, non distended  SKIN: Dry, intact, No rashes or lesions  MUSCULOSKELETAL: Full range of motion, normal strength  NEURO: extraocular movements intact, no tremor  PSYCH: Alert and oriented x 3, normal affect, normal mood      CAPILLARY BLOOD GLUCOSE      POCT Blood Glucose.: 167 mg/dL (13 May 2022 11:11)  POCT Blood Glucose.: 143 mg/dL (13 May 2022 06:53)  POCT Blood Glucose.: 205 mg/dL (12 May 2022 21:44)  POCT Blood Glucose.: 242 mg/dL (12 May 2022 16:59)      05-13    146<H>  |  110<H>  |  10  ----------------------------<  143<H>  3.5   |  25  |  1.00    eGFR: 75    Ca    8.6      05-13  Mg     1.80     05-13  Phos  2.6     05-13    TPro  6.9  /  Alb  2.9<L>  /  TBili  0.4  /  DBili  x   /  AST  17  /  ALT  13  /  AlkPhos  66  05-11          Thyroid Function Tests:  05-03 @ 07:39 TSH 2.34 FreeT4 -- T3 -- Anti TPO -- Anti Thyroglobulin Ab -- TSI --                       Chief Complaint:  DM 2    History: Pt seen at bedside. Pt tolerating oral intake. Pt reports an adequate appetite. Pt denies nausea and vomiting/any signs of hypoglycemia.     MEDICATIONS  (STANDING):  apixaban 5 milliGRAM(s) Oral two times a day  atorvastatin 40 milliGRAM(s) Oral at bedtime  bisacodyl 10 milliGRAM(s) Oral at bedtime  clopidogrel Tablet 75 milliGRAM(s) Oral daily  dextrose 5%. 1000 milliLiter(s) (100 mL/Hr) IV Continuous <Continuous>  dextrose 5%. 1000 milliLiter(s) (50 mL/Hr) IV Continuous <Continuous>  dextrose 50% Injectable 25 Gram(s) IV Push once  dextrose 50% Injectable 12.5 Gram(s) IV Push once  dextrose 50% Injectable 25 Gram(s) IV Push once  digoxin     Tablet 125 MICROGram(s) Oral daily  doxazosin 1 milliGRAM(s) Oral at bedtime  ertapenem  IVPB      ertapenem  IVPB 1000 milliGRAM(s) IV Intermittent every 24 hours  glucagon  Injectable 1 milliGRAM(s) IntraMuscular once  hydrocortisone hemorrhoidal Suppository 1 Suppository(s) Rectal at bedtime  insulin glargine Injectable (LANTUS) 18 Unit(s) SubCutaneous at bedtime  insulin lispro (ADMELOG) corrective regimen sliding scale   SubCutaneous three times a day before meals  insulin lispro (ADMELOG) corrective regimen sliding scale   SubCutaneous at bedtime  insulin lispro Injectable (ADMELOG) 7 Unit(s) SubCutaneous three times a day before meals  metoprolol tartrate 50 milliGRAM(s) Oral two times a day  pantoprazole    Tablet 40 milliGRAM(s) Oral before breakfast  polyethylene glycol 3350 17 Gram(s) Oral daily  sodium chloride 0.9% lock flush 3 milliLiter(s) IV Push every 8 hours    MEDICATIONS  (PRN):  dextrose Oral Gel 15 Gram(s) Oral once PRN Blood Glucose LESS THAN 70 milliGRAM(s)/deciliter    Allergies: ceftriaxone (Urticaria)  ciprofloxacin (Rash)  schrimp (Unknown)      Review of Systems:  HEENT: No pain  Cardiovascular: No chest pain, palpitations  Respiratory: No SOB, no cough  GI: No nausea, vomiting, abdominal pain  Endocrine: no polyuria, polydipsia    PHYSICAL EXAM:  VITALS: T(C): 36.9 (05-13-22 @ 06:25)  T(F): 98.4 (05-13-22 @ 06:25), Max: 98.8 (05-12-22 @ 21:35)  HR: 86 (05-13-22 @ 06:25) (80 - 89)  BP: 127/61 (05-13-22 @ 06:25) (116/67 - 127/61)  RR:  (17 - 18)  SpO2:  (99% - 100%)  Wt(kg): --  GENERAL: NAD, well-groomed, well-developed  RESPIRATORY: No labored breathing  GI: Soft, nontender, non distended  PSYCH: Alert and oriented x 3, normal affect, normal mood      CAPILLARY BLOOD GLUCOSE  POCT Blood Glucose.: 167 mg/dL (13 May 2022 11:11)  POCT Blood Glucose.: 143 mg/dL (13 May 2022 06:53)  POCT Blood Glucose.: 205 mg/dL (12 May 2022 21:44)  POCT Blood Glucose.: 242 mg/dL (12 May 2022 16:59)    A1C with Estimated Average Glucose in AM (05.03.22 @ 07:39)    A1C with Estimated Average Glucose Result: 9.2    Lipid Profile in AM (05.03.22 @ 07:39)    Cholesterol, Serum: 131 mg/dL    Triglycerides, Serum: 143 mg/dL    HDL Cholesterol, Serum: 38 mg/dL    Non HDL Cholesterol: 93    LDL Cholesterol Calculated: 64 mg/dL      05-13    146<H>  |  110<H>  |  10  ----------------------------<  143<H>  3.5   |  25  |  1.00    eGFR: 75    Ca    8.6      05-13  Mg     1.80     05-13  Phos  2.6     05-13    TPro  6.9  /  Alb  2.9<L>  /  TBili  0.4  /  DBili  x   /  AST  17  /  ALT  13  /  AlkPhos  66  05-11    Thyroid Function Tests:  05-03 @ 07:39 TSH 2.34 FreeT4 -- T3 -- Anti TPO -- Anti Thyroglobulin Ab -- TSI --    Diet, Consistent Carbohydrate/No Snacks:   DASH/TLC Sodium & Cholesterol Restricted (DASH)  Minced and Moist (MINCEDMOIST) (05-02-22 @ 21:48) [Active]

## 2022-05-13 NOTE — DISCHARGE NOTE PROVIDER - NSRESEARCHGRANT_OVERRIDEREC_GEN_A_CORE
History of bleeding in the three months prior to treatment/Other (please specify)/IMPROVE-DD Application Not Available

## 2022-05-13 NOTE — DISCHARGE NOTE PROVIDER - NSDCCPCAREPLAN_GEN_ALL_CORE_FT
PRINCIPAL DISCHARGE DIAGNOSIS  Diagnosis: Non-ST elevation MI (NSTEMI)  Assessment and Plan of Treatment: Patient was found to have experienced a heart attack. Given medical conditions, catheterization was deferred and patient started on Eliquis & Plavix. Continue Eliquis & Plavix as prescibed. Monitor for any chest pain, tingling or numbness in the face and arms, and increased fatigue with exertion.  Follow up with PCP & primary cardiologist for further management and monitoring within 1 week of discharge.      SECONDARY DISCHARGE DIAGNOSES  Diagnosis: Uncontrolled type 2 diabetes mellitus with hyperglycemia  Assessment and Plan of Treatment: HgbA1c 9.2  Continue medication regimen and a consistent carbohydrate diet. Monitor blood glucose levels throughout the day before meals and at bedtime. Record blood sugars. If sugars are more than 400 or less than 70 contact PCP immediately. Monitor for signs/symptoms of low blood glucose, such as, dizziness, altered mental status, or cool/clammy skin. In addition, monitor for signs/symptoms of high blood glucose, such as, feeling hot, dry, fatigued, or with increased thirst/urination. Make regular podiatry appointments in order to have feet checked for wounds and uncontrolled toe nail growth to prevent infections, as well as, appointments with an ophthalmologist to monitor vision.   ** Continue Metformin 500mg BID, Lantus 18U qHS, Admelog 7U TID  ** Follow up with primary endocrinologist at [facility], Dr. Huber.    Diagnosis: SANDRA (acute kidney injury)  Assessment and Plan of Treatment: In order to prevent further disease progression, continue to follow recommendations made by your primary provider/nephrologist. Continue a diet that is low in sodium and avoid foods that are concentrated in potassium and phosphorus. Continue your medications/supplementations as directed and avoid over-the-counter drugs that are harmful to kidneys, such as, Non-Steroidal Anti-Inflammatory Drugs (NSAIDs). Follow-up as outpatient to monitor your kidney function, as well as, vitamin D, Calcium, potassium, and phosphorus levels.     PRINCIPAL DISCHARGE DIAGNOSIS  Diagnosis: Non-ST elevation MI (NSTEMI)  Assessment and Plan of Treatment: Patient was found to have experienced a heart attack. Given medical conditions, catheterization was deferred and patient started on Eliquis & Plavix. Continue Eliquis & Plavix as prescibed. Monitor for any chest pain, tingling or numbness in the face and arms, and increased fatigue with exertion.  Follow up with PCP & primary cardiologist for further management and monitoring within 1 week of discharge.      SECONDARY DISCHARGE DIAGNOSES  Diagnosis: Uncontrolled type 2 diabetes mellitus with hyperglycemia  Assessment and Plan of Treatment: HgbA1c 9.2  Continue medication regimen and a consistent carbohydrate diet. Monitor blood glucose levels throughout the day before meals and at bedtime. Record blood sugars. If sugars are more than 400 or less than 70 contact PCP immediately. Monitor for signs/symptoms of low blood glucose, such as, dizziness, altered mental status, or cool/clammy skin. In addition, monitor for signs/symptoms of high blood glucose, such as, feeling hot, dry, fatigued, or with increased thirst/urination. Make regular podiatry appointments in order to have feet checked for wounds and uncontrolled toe nail growth to prevent infections, as well as, appointments with an ophthalmologist to monitor vision.   ** Continue Metformin 500mg BID, Lantus 18U qHS, Admelog 7U TID  ** Follow up with primary endocrinologist at [facility], Dr. Huber.    Diagnosis: CAD (coronary artery disease)  Assessment and Plan of Treatment: Patient was found to have experienced a heart attack. Given medical conditions, catheterization was deferred and patient started on Eliquis & Plavix. Continue Eliquis & Plavix as prescibed. Monitor for any chest pain, tingling or numbness in the face and arms, and increased fatigue with exertion.  Follow up with PCP & primary cardiologist for further management and monitoring within 1 week of discharge.    Diagnosis: Atrial fibrillation  Assessment and Plan of Treatment: Please continue medications as directed and follow-up with primary provider/cardiologist to further manage care. Monitor for signs/symptoms of uncontrolled atrial fibrillation, such as, increased heart rate, palpitations, chest pain, dizziness, or shortness of breath - Return to emergency room if these signs/symptoms are present.    Diagnosis: SANDRA (acute kidney injury)  Assessment and Plan of Treatment: Likely in setting of pyelonephritis & urinary retention. Continue antibiotics and Guevara. Outpatient follow up with urology/primary care provider for trial of void within 1 - 2 weeks  ** Antibiotics to complete 5/20/2022.    Diagnosis: Hypertension  Assessment and Plan of Treatment: Continue blood pressure medication regimen as directed. Monitor for any visual changes, headaches or dizziness.  Monitor blood pressure regularly.  Follow up with primary care provider or cardiologist for further management for high blood pressure.    Diagnosis: JACKELIN on CPAP  Assessment and Plan of Treatment: Did not use CPAP while in the hospital. Family reports non-compliance at home. Unable to clarify settings. Outpatient pulmonology follow up.    Diagnosis: Urine retention  Assessment and Plan of Treatment: Continue doxazosin & Guevara. Outpatient follow up with urology/primary care provider for trial of void within 1 - 2 weeks.    Diagnosis: Pyelonephritis  Assessment and Plan of Treatment: Continue Cefpodoxim 200mg BID through 5/20/2022. Outpatient PCP & urology follow up.     PRINCIPAL DISCHARGE DIAGNOSIS  Diagnosis: Non-ST elevation MI (NSTEMI)  Assessment and Plan of Treatment: Patient was found to have experienced a heart attack. Given medical conditions, catheterization was deferred and patient started on Eliquis & Plavix. Continue Eliquis & Plavix as prescibed. Monitor for any chest pain, tingling or numbness in the face and arms, and increased fatigue with exertion.  Follow up with PCP & primary cardiologist for further management and monitoring within 1 week of discharge.      SECONDARY DISCHARGE DIAGNOSES  Diagnosis: Uncontrolled type 2 diabetes mellitus with hyperglycemia  Assessment and Plan of Treatment: HgbA1c 9.2  Continue medication regimen and a consistent carbohydrate diet. Monitor blood glucose levels throughout the day before meals and at bedtime. Record blood sugars. If sugars are more than 400 or less than 70 contact PCP immediately. Monitor for signs/symptoms of low blood glucose, such as, dizziness, altered mental status, or cool/clammy skin. In addition, monitor for signs/symptoms of high blood glucose, such as, feeling hot, dry, fatigued, or with increased thirst/urination. Make regular podiatry appointments in order to have feet checked for wounds and uncontrolled toe nail growth to prevent infections, as well as, appointments with an ophthalmologist to monitor vision.   ** Continue Metformin 500mg BID, Lantus 18U qHS, Admelog 7U TID  ** Follow up with primary endocrinologist at [facility], Dr. Huber.    Diagnosis: CAD (coronary artery disease)  Assessment and Plan of Treatment: Patient was found to have experienced a heart attack. Given medical conditions, catheterization was deferred and patient started on Eliquis & Plavix. Continue Eliquis & Plavix as prescibed. Monitor for any chest pain, tingling or numbness in the face and arms, and increased fatigue with exertion.  Follow up with PCP & primary cardiologist for further management and monitoring within 1 week of discharge.    Diagnosis: Atrial fibrillation  Assessment and Plan of Treatment: Please continue medications as directed and follow-up with primary provider/cardiologist to further manage care. Monitor for signs/symptoms of uncontrolled atrial fibrillation, such as, increased heart rate, palpitations, chest pain, dizziness, or shortness of breath - Return to emergency room if these signs/symptoms are present.    Diagnosis: SANDRA (acute kidney injury)  Assessment and Plan of Treatment: Likely in setting of pyelonephritis & urinary retention. Continue antibiotics and doxazosin. Outpatient follow up with primary care provider within 1 - 2 weeks for further management recommendaitons. Urology referral if retention reoccurs.  ** Antibiotics to complete 5/20/2022.    Diagnosis: Hypertension  Assessment and Plan of Treatment: Continue blood pressure medication regimen as directed. Monitor for any visual changes, headaches or dizziness.  Monitor blood pressure regularly.  Follow up with primary care provider or cardiologist for further management for high blood pressure.    Diagnosis: JACKELIN on CPAP  Assessment and Plan of Treatment: Did not use CPAP while in the hospital. Family reports intermittent compliance at home. Unable to clarify settings at time of discharge, paulo Steiner working to obtain record. Outpatient pulmonology follow up.    Diagnosis: Urine retention  Assessment and Plan of Treatment: Continue doxazosin. Outpatient follow up with primary care provider for trial of void within 1 - 2 weeks for further management recommendations. Improved, outpatient follow up with primary care provider within 1 - 2 weeks for further management recommendaitons    Diagnosis: Pyelonephritis  Assessment and Plan of Treatment: Continue Cefpodoxim 200mg BID through 5/20/2022. Outpatient PCP follow up.     PRINCIPAL DISCHARGE DIAGNOSIS  Diagnosis: Non-ST elevation MI (NSTEMI)  Assessment and Plan of Treatment: Patient was found to have experienced a heart attack. Given medical conditions, catheterization was deferred and patient started on Eliquis & Plavix. Continue Eliquis & Plavix as prescibed. Monitor for any chest pain, tingling or numbness in the face and arms, and increased fatigue with exertion.  Follow up with PCP & primary cardiologist for further management and monitoring within 1 week of discharge.      SECONDARY DISCHARGE DIAGNOSES  Diagnosis: Uncontrolled type 2 diabetes mellitus with hyperglycemia  Assessment and Plan of Treatment: HgbA1c 9.2  Continue medication regimen and a consistent carbohydrate diet. Monitor blood glucose levels throughout the day before meals and at bedtime. Record blood sugars. If sugars are more than 400 or less than 70 contact PCP immediately. Monitor for signs/symptoms of low blood glucose, such as, dizziness, altered mental status, or cool/clammy skin. In addition, monitor for signs/symptoms of high blood glucose, such as, feeling hot, dry, fatigued, or with increased thirst/urination. Make regular podiatry appointments in order to have feet checked for wounds and uncontrolled toe nail growth to prevent infections, as well as, appointments with an ophthalmologist to monitor vision.   ** Continue Metformin 500mg qD, Lantus 18U qHS, Admelog 7U TID  ** Follow up with primary endocrinologist at [facility], Dr. Huber.  ** On discharge from rehab please transition patient to Soliqua (long acting insulin) & NovoLOG (short acting) as these are the two covered by his insurance.    Diagnosis: CAD (coronary artery disease)  Assessment and Plan of Treatment: Patient was found to have experienced a heart attack. Given medical conditions, catheterization was deferred and patient started on Eliquis & Plavix. Continue Eliquis & Plavix as prescibed. Monitor for any chest pain, tingling or numbness in the face and arms, and increased fatigue with exertion.  Follow up with PCP & primary cardiologist for further management and monitoring within 1 week of discharge.    Diagnosis: Atrial fibrillation  Assessment and Plan of Treatment: Please continue medications as directed and follow-up with primary provider/cardiologist to further manage care. Monitor for signs/symptoms of uncontrolled atrial fibrillation, such as, increased heart rate, palpitations, chest pain, dizziness, or shortness of breath - Return to emergency room if these signs/symptoms are present.    Diagnosis: SANDRA (acute kidney injury)  Assessment and Plan of Treatment: Likely in setting of pyelonephritis & urinary retention. Continue antibiotics and doxazosin. Outpatient follow up with primary care provider within 1 - 2 weeks for further management recommendaitons. Urology referral if retention reoccurs.  ** Antibiotics to complete 5/20/2022.    Diagnosis: Hypertension  Assessment and Plan of Treatment: Continue blood pressure medication regimen as directed. Monitor for any visual changes, headaches or dizziness.  Monitor blood pressure regularly.  Follow up with primary care provider or cardiologist for further management for high blood pressure.    Diagnosis: JACKELIN on CPAP  Assessment and Plan of Treatment: Did not use CPAP while in the hospital. Family reports intermittent compliance at home. Unable to clarify settings at time of discharge, son Jatin working to obtain record. Outpatient pulmonology follow up.    Diagnosis: Urine retention  Assessment and Plan of Treatment: Continue doxazosin. Outpatient follow up with primary care provider for trial of void within 1 - 2 weeks for further management recommendations. Improved, outpatient follow up with primary care provider within 1 - 2 weeks for further management recommendaitons    Diagnosis: Pyelonephritis  Assessment and Plan of Treatment: Continue Cefpodoxim 200mg BID through 5/20/2022. Outpatient PCP follow up.

## 2022-05-13 NOTE — PROGRESS NOTE ADULT - ASSESSMENT
82 y/o M with PMH of prostate cancer s/p seed implant, Hypertension, Type 2 diabetes on insulin (has not been taking insulin recently), CAD s/p stent (last stent placed 2 years ago), CVA s/p carotid stent 1 month ago, JACKELIN on CPAP, presents to the ED complaining of altered mental status and dysuria also found to have nstemi    endocrine called for further assistance   a1c 9.2   HOME MEDS: Soliqua 28units sq qhs , Novolog correction scale above 200 (2 Units) Above 300 (3 units), Metformin 500mg p.o. qd    1. DM 2  Target A1c 6-7%; A1c currently A1c 9.2 %  While inpatient  BG target 100-180 mg/dl, remains above goal  Glucose above goal today  - Continue Lantus 18 units SQ qhs (please give 80% of dose if NPO   - Continue Admelog  7 units SCQ Premeal/TIDAC (please hold if NPO/not eating)  -Continue Admelog  Low dose Correction Scale Premeal & seperate low dose  Correction Scale Bedtime   - Hypoglycemia protocol in place   - Carb Consistent Diet   - Nutrition consult when his clinical status is improved   -provider to RN for insulin pen teaching to son (RN made aware)    Dc Planning:  Dc to Rehab on Lantus 17 unit sq qhs and Admelog 7 units TID ac (please hold if not eating)  Upon discharge from Rehab please change Lantus to Soliqua Pen and Novolog Flex Pen (as this is pt home regimen); plus metformin 500mg p.o. qd (please take with food); Doses to be determined based on insulin requirements at rehab  Please note Soliqua basal plus GLP1 please do not prescribe GLP1 seperately  Patient had UTI, so would not prescribe SGLT2 as there is a risk for UTI's   Son David was taught by Rn and feels comfortable administering insulin pen to pt. He states he is able to give his dad insulin 4 times a day if needed  As per Son pt has a private Endocrinolgist at [facility]. Dr. Huber. Pt will continue to follow up with private Endocrinologist.  Discussed plan with paulo Steiner [phone]    2. HTN  goal BP in DM <130/80  mamagement as per the primary team    3. HLD  pt is on lipitor 40mg   outpt lipid profile     Perlita Quintana  Nurse Practitioner  Division of Endocrinology & Diabetes  In house pager #[phone]    If before 9AM or after 6PM, or on weekends/holidays, please call endocrine answering service for assistance ([phone]).For nonurgent matters email [email or web address] for assistance.  82 y/o M with PMH of prostate cancer s/p seed implant, Hypertension, Type 2 diabetes on insulin (has not been taking insulin recently), CAD s/p stent (last stent placed 2 years ago), CVA s/p carotid stent 1 month ago, JACKELIN on CPAP, presents to the ED complaining of altered mental status and dysuria also found to have nstemi    endocrine called for further assistance   a1c 9.2   HOME MEDS: Soliqua 28units sq qhs , Novolog correction scale above 200 (2 Units) Above 300 (3 units), Metformin 500mg p.o. qd    1. DM 2  Target A1c 6-7%; A1c currently A1c 9.2 %  While inpatient  BG target 100-180 mg/dl, remains above goal  Glucose stable today  - Continue Lantus 18 units SQ qhs (please give 80% of dose if NPO   - Continue Admelog 7 units SCQ Premeal/TIDAC (please hold if NPO/not eating)  -Continue Admelog  Low dose Correction Scale Premeal & seperate low dose  Correction Scale Bedtime   - Hypoglycemia protocol in place   - Carb Consistent Diet   - Nutrition consult when his clinical status is improved   -provider to RN for insulin pen teaching to son (RN made aware); Please teach daily    Dc Planning:  Dc to Rehab on Basal/Bolus (doses TBD)  Upon discharge from Rehab please change Lantus to Soliqua Pen and Novolog Flex Pen (as this is pt home regimen); plus metformin 500mg p.o. BID  (please take with food); Doses to be determined based on insulin requirements at rehab  Please note Soliqua basal plus GLP1 please do not prescribe GLP1 seperately  Patient had UTI, so would not prescribe SGLT2 as there is a risk for UTI's   Son David was taught by Rn and feels comfortable administering insulin pen to pt. He states he is able to give his dad insulin 4 times a day if needed  As per Son pt has a private Endocrinolgist at [facility]. Dr. Huber. Pt will continue to follow up with private Endocrinologist.  Discussed plan with paulo Steiner [phone]    2. HTN  goal BP in DM <130/80  mamagement as per the primary team    3. HLD  pt is on lipitor 40mg   outpt lipid profile     Perlita Quintana  Nurse Practitioner  Division of Endocrinology & Diabetes  In house pager #[phone]    If before 9AM or after 6PM, or on weekends/holidays, please call endocrine answering service for assistance ([phone]).For nonurgent matters email [email or web address] for assistance.  80 y/o M with PMH of prostate cancer s/p seed implant, Hypertension, Type 2 diabetes on insulin (has not been taking insulin recently), CAD s/p stent (last stent placed 2 years ago), CVA s/p carotid stent 1 month ago, JACKELIN on CPAP, presents to the ED complaining of altered mental status and dysuria also found to have nstemi    endocrine called for further assistance   a1c 9.2   HOME MEDS: Soliqua 28units sq qhs , Novolog correction scale above 200 (2 Units) Above 300 (3 units), Metformin 500mg p.o. qd    1. DM 2  Target A1c 6-7%; A1c currently A1c 9.2 %  While inpatient  BG target 100-180 mg/dl, remains above goal  Glucose stable today  - Continue Lantus 18 units SQ qhs (please give 80% of dose if NPO   - Continue Admelog 7 units SQ Premeal/TIDAC (please hold if NPO/not eating)  -Continue Admelog  Low dose Correction Scale Premeal & seperate low dose  Correction Scale Bedtime   - Hypoglycemia protocol in place   - Carb Consistent Diet   - Nutrition consult when his clinical status is improved   -provider to RN for insulin pen teaching to son (RN made aware); Please teach daily    Dc Planning:  Dc to Rehab on Basal/Bolus (doses TBD)  Upon discharge from Rehab please change Lantus to Soliqua Pen and Novolog Flex Pen (as this is pt home regimen); plus metformin 500mg p.o. BID  (please take with food); Doses to be determined based on insulin requirements at rehab  Please note Soliqua basal plus GLP1 please do not prescribe GLP1 seperately  Patient had UTI, so would not prescribe SGLT2 as there is a risk for UTI's   Son David was taught by Rn and feels comfortable administering insulin pen to pt. He states he is able to give his dad insulin 4 times a day if needed  As per Son pt has a private Endocrinolgist at [facility]. Dr. Huber. Pt will continue to follow up with private Endocrinologist.  Discussed plan with paulo Steiner [phone]    2. HTN  goal BP in DM <130/80  mamagement as per the primary team    3. HLD  pt is on lipitor 40mg   outpt lipid profile     Perlita Quintana  Nurse Practitioner  Division of Endocrinology & Diabetes  In house pager #[phone]    If before 9AM or after 6PM, or on weekends/holidays, please call endocrine answering service for assistance ([phone]).For nonurgent matters email [email or web address] for assistance.

## 2022-05-13 NOTE — PROGRESS NOTE ADULT - SUBJECTIVE AND OBJECTIVE BOX
INTERVAL HPI/OVERNIGHT EVENTS:    Events noted       MEDICATIONS  (STANDING):  apixaban 5 milliGRAM(s) Oral two times a day  atorvastatin 40 milliGRAM(s) Oral at bedtime  bisacodyl 10 milliGRAM(s) Oral at bedtime  clopidogrel Tablet 75 milliGRAM(s) Oral daily  dextrose 5%. 1000 milliLiter(s) (50 mL/Hr) IV Continuous <Continuous>  dextrose 5%. 1000 milliLiter(s) (100 mL/Hr) IV Continuous <Continuous>  dextrose 50% Injectable 25 Gram(s) IV Push once  dextrose 50% Injectable 12.5 Gram(s) IV Push once  dextrose 50% Injectable 25 Gram(s) IV Push once  digoxin     Tablet 125 MICROGram(s) Oral daily  doxazosin 1 milliGRAM(s) Oral at bedtime  glucagon  Injectable 1 milliGRAM(s) IntraMuscular once  hydrocortisone hemorrhoidal Suppository 1 Suppository(s) Rectal at bedtime  insulin glargine Injectable (LANTUS) 16 Unit(s) SubCutaneous at bedtime  insulin lispro (ADMELOG) corrective regimen sliding scale   SubCutaneous three times a day before meals  insulin lispro (ADMELOG) corrective regimen sliding scale   SubCutaneous at bedtime  insulin lispro Injectable (ADMELOG) 6 Unit(s) SubCutaneous three times a day before meals  metoprolol tartrate 50 milliGRAM(s) Oral two times a day  pantoprazole    Tablet 40 milliGRAM(s) Oral before breakfast  polyethylene glycol 3350 17 Gram(s) Oral daily  sodium chloride 0.9% lock flush 3 milliLiter(s) IV Push every 8 hours    MEDICATIONS  (PRN):  dextrose Oral Gel 15 Gram(s) Oral once PRN Blood Glucose LESS THAN 70 milliGRAM(s)/deciliter      Allergies    ceftriaxone (Urticaria)  ciprofloxacin (Rash)  schrimp (Unknown)    Intolerances        Review of Systems:  *limited d/t Confusion     General:  No wt loss, fevers, chills, night sweats, fatigue   Eyes:  Good vision, no reported pain  ENT:  No sore throat, pain, runny nose, dysphagia  CV:  No pain, palpitations, hypo/hypertension  Resp:  No dyspnea, cough, tachypnea, wheezing  GI:  No pain, No nausea, No vomiting, No diarrhea, No constipation, No weight loss, No fever, No pruritis, No rectal bleeding, No melena, No dysphagia  :  No pain, bleeding, incontinence, nocturia  Muscle:  No pain, weakness  Neuro:  No weakness, tingling, memory problems  Psych:  No fatigue, insomnia, mood problems, depression  Endocrine:  No polyuria, polydypsia, cold/heat intolerance  Heme:  No petechiae, ecchymosis, easy bruisability  Skin:  No rash, tattoos, scars, edema      Vital Signs Last 24 Hrs  T(C): 36.7 (12 May 2022 08:30), Max: 37.2 (11 May 2022 21:19)  T(F): 98.1 (12 May 2022 08:30), Max: 99 (11 May 2022 21:19)  HR: 86 (12 May 2022 09:37) (78 - 152)  BP: 122/76 (12 May 2022 09:37) (96/61 - 128/78)  BP(mean): --  RR: 16 (12 May 2022 09:37) (16 - 18)  SpO2: 99% (12 May 2022 09:37) (95% - 100%)    PHYSICAL EXAM:    Constitutional: NAD  HEENT: EOMI, throat clear  Neck: No LAD, supple  Respiratory: CTA and P  Cardiovascular: S1 and S2, RRR, no M  Gastrointestinal: BS+, soft, NT/ND, neg HSM,  Extremities: No peripheral edema, neg clubbing, cyanosis  Vascular: 2+ peripheral pulses  Neurological: A/O x 1, no focal deficits  Psychiatric: Normal mood, normal affect ; (+) Confusion   Skin: No rashes      LABS:                        11.8   5.65  )-----------( 377      ( 11 May 2022 05:18 )             35.6     05-12    143  |  110<H>  |  10  ----------------------------<  170<H>  3.7   |  22  |  1.06    Ca    8.6      12 May 2022 06:54  Phos  2.8     05-12  Mg     1.80     05-12    TPro  6.9  /  Alb  2.9<L>  /  TBili  0.4  /  DBili  x   /  AST  17  /  ALT  13  /  AlkPhos  66  05-11          RADIOLOGY & ADDITIONAL TESTS:

## 2022-05-13 NOTE — PROGRESS NOTE ADULT - SUBJECTIVE AND OBJECTIVE BOX
CHIDI RUBIO  81y  Male      Patient is a 81y old  Male who presents with a chief complaint of NSTEMI, Hyperglycemia, SANDRA, Encephalopathy (13 May 2022 11:58)  Patient is comfortable,more alert,calm,nad    REVIEW OF SYSTEMS:  CONSTITUTIONAL: No fever  RESPIRATORY: No cough, hemoptysis or shortness of breath  CARDIOVASCULAR: No chest pain, palpitations, dizziness, or leg swelling  GASTROINTESTINAL: No abdominal pain. nausea, vomiting, hematemesis  GENITOURINARY: No dysuria, frequency, hematuria   NEUROLOGICAL: No headaches, no dizziness  MUSCULOSKELETAL: No joint pain or swelling;     INTERVAL HPI/OVERNIGHT EVENTS:  T(C): 36.7 (05-13-22 @ 17:00), Max: 37.1 (05-12-22 @ 21:35)  HR: 88 (05-13-22 @ 17:00) (80 - 94)  BP: 121/62 (05-13-22 @ 17:00) (113/60 - 127/61)  RR: 18 (05-13-22 @ 17:00) (17 - 18)  SpO2: 100% (05-13-22 @ 17:00) (100% - 100%)  Wt(kg): --  I&O's Summary    12 May 2022 07:01  -  13 May 2022 07:00  --------------------------------------------------------  IN: 720 mL / OUT: 600 mL / NET: 120 mL    13 May 2022 07:01  -  13 May 2022 18:15  --------------------------------------------------------  IN: 410 mL / OUT: 500 mL / NET: -90 mL      T(C): 36.7 (05-13-22 @ 17:00), Max: 37.1 (05-12-22 @ 21:35)  HR: 88 (05-13-22 @ 17:00) (80 - 94)  BP: 121/62 (05-13-22 @ 17:00) (113/60 - 127/61)  RR: 18 (05-13-22 @ 17:00) (17 - 18)  SpO2: 100% (05-13-22 @ 17:00) (100% - 100%)  Wt(kg): --Vital Signs Last 24 Hrs  T(C): 36.7 (13 May 2022 17:00), Max: 37.1 (12 May 2022 21:35)  T(F): 98 (13 May 2022 17:00), Max: 98.8 (12 May 2022 21:35)  HR: 88 (13 May 2022 17:00) (80 - 94)  BP: 121/62 (13 May 2022 17:00) (113/60 - 127/61)  BP(mean): --  RR: 18 (13 May 2022 17:00) (17 - 18)  SpO2: 100% (13 May 2022 17:00) (100% - 100%)    LABS:                        11.7   5.84  )-----------( 329      ( 13 May 2022 06:00 )             35.3     05-13    146<H>  |  110<H>  |  10  ----------------------------<  143<H>  3.5   |  25  |  1.00    Ca    8.6      13 May 2022 06:00  Phos  2.6     05-13  Mg     1.80     05-13          CAPILLARY BLOOD GLUCOSE      POCT Blood Glucose.: 178 mg/dL (13 May 2022 16:42)  POCT Blood Glucose.: 167 mg/dL (13 May 2022 11:11)  POCT Blood Glucose.: 143 mg/dL (13 May 2022 06:53)  POCT Blood Glucose.: 205 mg/dL (12 May 2022 21:44)            PAST MEDICAL & SURGICAL HISTORY:  Diabetes mellitus      CAD (coronary artery disease)      Prostate ca  s/p SEED 5 yrs ago      Sleep apnea  on cpap at home      Hypertension      Hypercholesteremia      S/P cholecystectomy      S/P coronary angioplasty  last stent 1 year ago      H/O carotid endarterectomy  April 2022, stent placed.          MEDICATIONS  (STANDING):  apixaban 5 milliGRAM(s) Oral two times a day  atorvastatin 40 milliGRAM(s) Oral at bedtime  bisacodyl 10 milliGRAM(s) Oral at bedtime  clopidogrel Tablet 75 milliGRAM(s) Oral daily  dextrose 5%. 1000 milliLiter(s) (100 mL/Hr) IV Continuous <Continuous>  dextrose 5%. 1000 milliLiter(s) (50 mL/Hr) IV Continuous <Continuous>  dextrose 50% Injectable 25 Gram(s) IV Push once  dextrose 50% Injectable 12.5 Gram(s) IV Push once  dextrose 50% Injectable 25 Gram(s) IV Push once  digoxin     Tablet 125 MICROGram(s) Oral daily  doxazosin 1 milliGRAM(s) Oral at bedtime  ertapenem  IVPB      ertapenem  IVPB 1000 milliGRAM(s) IV Intermittent every 24 hours  glucagon  Injectable 1 milliGRAM(s) IntraMuscular once  hydrocortisone hemorrhoidal Suppository 1 Suppository(s) Rectal at bedtime  insulin glargine Injectable (LANTUS) 18 Unit(s) SubCutaneous at bedtime  insulin lispro (ADMELOG) corrective regimen sliding scale   SubCutaneous three times a day before meals  insulin lispro (ADMELOG) corrective regimen sliding scale   SubCutaneous at bedtime  insulin lispro Injectable (ADMELOG) 7 Unit(s) SubCutaneous three times a day before meals  metoprolol tartrate 50 milliGRAM(s) Oral two times a day  pantoprazole    Tablet 40 milliGRAM(s) Oral before breakfast  polyethylene glycol 3350 17 Gram(s) Oral daily  sodium chloride 0.9% lock flush 3 milliLiter(s) IV Push every 8 hours    MEDICATIONS  (PRN):  dextrose Oral Gel 15 Gram(s) Oral once PRN Blood Glucose LESS THAN 70 milliGRAM(s)/deciliter        RADIOLOGY & ADDITIONAL TESTS:    Imaging Personally Reviewed:  [ ] YES  [ ] NO    Consultant(s) Notes Reviewed:  [ ] YES  [ ] NO    PHYSICAL EXAM:  GENERAL: Alert and awake lying in bed in no distress  HEAD:  Atraumatic, Normocephalic  EYES: EOMI, [place], conjunctiva and sclera clear  NECK: Supple, No JVD, Normal thyroid  NERVOUS SYSTEM:  Alert & Oriented X3, Motor and sensory systems are intact,   CHEST/LUNG: Bilateral clear breath sounds, no rhochi, no wheezing, no crepitations,  HEART: Regular rate and rhythm; No murmurs, rubs, or gallops  ABDOMEN: Soft, Nontender, Nondistended; Bowel sounds present  EXTREMITIES:   Peripheral Pulses are palpable, no  edema        Care Discussed with Consultants/Other Providers [x ] YES  [ ] NO      Code Status: [] Full Code [] DNR [] DNI [] Goals of Care:   Disposition: [] ICU [] Stroke Unit [] RCU []PCU []Floor [] Discharge Home         [email or web address] [email or web address]

## 2022-05-13 NOTE — DISCHARGE NOTE PROVIDER - HOSPITAL COURSE
82 y/o M with PMH of prostate cancer s/p seed implant, Hypertension, Type 2 diabetes on insulin (has not been taking insulin recently), CAD s/p stent (last stent placed 2 years ago), CVA s/p carotid stent 1 month ago, JACKELIN on CPAP, presents to the ED complaining of altered mental status and dysuria.     NSTEMI   - Troponin peaked @ 3101; EKG without ST-elevations  - Cardiology consulted  - s/p Heparin gtt --> c/w Eliquis/Plavix  - TTE w/ notable wall motion abnormality, unclear if new; mod - severe LV dysfunction w/ severe MR; Medical management 2/2 comorbidities  - c/w Metoprolol/Eliquis/Statin/Plavix  - PPI started for 1 year while on Eliquis    Afib --> Cardiology consulted; s/p dig load, continue with dig maintenance & metoprolol; CHADSVASC 8 --> c/w Eliquis    Rectal Bleed/Hemorrhoids --> GI consulted, defer endoscopic work up, favor hemorrhoidal bleed; Anusol x10D, mesalamine x7D; Senna/Miralax/Dulcolax PRN; No objection to plavix/AC    Type 2 diabetes mellitus with hyperglycemia  - HgbA1c 9.2; BHB 0.6, pH 7.39, Anion gap of 15 on admission  - Endocrinology consulted  - c/w Basal/bolus and ISS per endocrinology  - Improved --> discharge on Basal/Bolus with plan for outpatient endocrine follow up after rehab    Encephalopathy/Pyelonephritis  - Patient AAOx1-2 at best  - BCx negative, HIV test - negative  - CT C/A/P showed Pyelonephritis, Cystitis  - per Neuro, Toxic metabolic septic encephalopathy most likely in setting of UTI/Pyelonephritis  - ID consulted- s/p Erta --> plan for Cefpodoxime through 5/20  - Urinary retention - Guevara placed and plan for discharge with it in place, outpatient follow up  - No neurologic contraindication to c/w Eliquis, Plavix  - Vit B12 -781, MMA, Homocysteine- wnl, Folate- 9.9, TSH- 2.34    SANDRA --> Likely 2/2 urinary retention/infection; Improved; continue to HOLD ARB on discharge    CAD/HTN --> c/w Metoprolol, Plavix, statin    JACKELIN --> previously on CPAP but hasn't used it while in hospital, outpatient follow up    On ___ this case was reviewed with Dr. Vallecillo, the patient is medically stable and optimized for discharge. All medications were reviewed and prescriptions were adjusted accordingly. 82 y/o M with PMH of prostate cancer s/p seed implant, Hypertension, Type 2 diabetes on insulin (has not been taking insulin recently), CAD s/p stent (last stent placed 2 years ago), CVA s/p carotid stent 1 month ago, JACKELIN on CPAP, presents to the ED complaining of altered mental status and dysuria.     NSTEMI   - Troponin peaked @ 3101; EKG without ST-elevations  - Cardiology consulted  - s/p Heparin gtt --> c/w Eliquis/Plavix  - TTE w/ notable wall motion abnormality, unclear if new; mod - severe LV dysfunction w/ severe MR; Medical management 2/2 comorbidities  - c/w Metoprolol/Eliquis/Statin/Plavix  - PPI started for 1 year while on Eliquis    Afib --> Cardiology consulted; s/p dig load, continue with dig maintenance & metoprolol; CHADSVASC 8 --> c/w Eliquis    Rectal Bleed/Hemorrhoids --> GI consulted, defer endoscopic work up, favor hemorrhoidal bleed; Anusol x10D, mesalamine x7D; Senna/Miralax/Dulcolax PRN; No objection to plavix/AC    Type 2 diabetes mellitus with hyperglycemia  - HgbA1c 9.2; BHB 0.6, pH 7.39, Anion gap of 15 on admission  - Endocrinology consulted  - c/w Basal/bolus and ISS per endocrinology  - Improved --> discharge on Basal/Bolus with plan for outpatient endocrine follow up after rehab    Encephalopathy/Pyelonephritis  - Patient AAOx1-2 at best  - BCx negative, HIV test - negative  - CT C/A/P showed Pyelonephritis, Cystitis  - per Neuro, Toxic metabolic septic encephalopathy most likely in setting of UTI/Pyelonephritis  - ID consulted- s/p Erta --> plan for Cefpodoxime through 5/20  - Urinary retention - Guevara placed and TOV attempted 5/16 ____ passed vs failed  - No neurologic contraindication to c/w Eliquis, Plavix  - Vit B12 -781, MMA, Homocysteine- wnl, Folate- 9.9, TSH- 2.34    SANDRA --> Likely 2/2 urinary retention/infection; Improved; continue to HOLD ARB on discharge    CAD/HTN --> c/w Metoprolol, Plavix, statin    JACKELIN --> previously on CPAP but hasn't used it while in hospital, outpatient follow up    On ___ this case was reviewed with Dr. Vallecillo, the patient is medically stable and optimized for discharge. All medications were reviewed and prescriptions were adjusted accordingly. 82 y/o M with PMH of prostate cancer s/p seed implant, Hypertension, Type 2 diabetes on insulin (has not been taking insulin recently), CAD s/p stent (last stent placed 2 years ago), CVA s/p carotid stent 1 month ago, JACKELIN on CPAP, presents to the ED complaining of altered mental status and dysuria.     NSTEMI   - Troponin peaked @ 3101; EKG without ST-elevations  - Cardiology consulted  - s/p Heparin gtt --> c/w Eliquis/Plavix  - TTE w/ notable wall motion abnormality, unclear if new; mod - severe LV dysfunction w/ severe MR; Medical management 2/2 comorbidities  - c/w Metoprolol/Eliquis/Statin/Plavix  - PPI started for 1 year while on Eliquis    Afib --> Cardiology consulted; s/p dig load, continue with dig maintenance & metoprolol; CHADSVASC 8 --> c/w Eliquis    Rectal Bleed/Hemorrhoids --> GI consulted, defer endoscopic work up, favor hemorrhoidal bleed; Anusol x10D, mesalamine x7D; Senna/Miralax/Dulcolax PRN; No objection to plavix/AC    Type 2 diabetes mellitus with hyperglycemia  - HgbA1c 9.2; BHB 0.6, pH 7.39, Anion gap of 15 on admission  - Endocrinology consulted  - c/w Basal/bolus and ISS per endocrinology  - Improved --> discharge on Basal/Bolus with plan for outpatient endocrine follow up after rehab    Encephalopathy/Pyelonephritis  - Patient AAOx1-2 at best  - BCx negative, HIV test - negative  - CT C/A/P showed Pyelonephritis, Cystitis  - per Neuro, Toxic metabolic septic encephalopathy most likely in setting of UTI/Pyelonephritis  - ID consulted- s/p Erta --> plan for Cefpodoxime through 5/20  - Urinary retention - Guevara placed and TOV attempted 5/16 & passed  - No neurologic contraindication to c/w Eliquis, Plavix  - Vit B12 -781, MMA, Homocysteine- wnl, Folate- 9.9, TSH- 2.34    SANDRA --> Likely 2/2 urinary retention/infection; Improved; continue to HOLD ARB on discharge    CAD/HTN --> c/w Metoprolol, Plavix, statin    JACKELIN --> previously on CPAP but hasn't used it while in hospital, outpatient follow up    On 5/17 this case was reviewed with Dr. Vallecillo, the patient is medically stable and optimized for discharge. All medications were reviewed and prescriptions were adjusted accordingly.

## 2022-05-13 NOTE — PROGRESS NOTE ADULT - ASSESSMENT
80 y/o M with PMH of prostate cancer s/p seed implant, Hypertension, Type 2 diabetes on insulin (has not been taking insulin recently), CAD s/p stent (last stent placed 2 years ago), CVA s/p carotid stent 1 month ago, JACKELIN on CPAP, presents to the ED complaining of altered mental status and dysuria with leukocytosis, urine retention, ACS    Man Craft  Attending Physician   Division of Infectious Disease  Office #[phone]  Available on [employer] Teams also  After 5pm/weekend or no response, call #[phone]

## 2022-05-13 NOTE — DISCHARGE NOTE PROVIDER - NSDCMRMEDTOKEN_GEN_ALL_CORE_FT
apixaban 5 mg oral tablet: 1 tab(s) orally 2 times a day  atorvastatin 80 mg oral tablet: 1 tab(s) orally once a day  cefpodoxime 200 mg oral tablet: 1 tab(s) orally every 12 hours through 5/20 then stop  clopidogrel 75 mg oral tablet: 1 tab(s) orally once a day  digoxin 125 mcg (0.125 mg) oral tablet: 1 tab(s) orally once a day  doxazosin 1 mg oral tablet: 1 tab(s) orally once a day (at bedtime)  hydrocortisone 25 mg rectal suppository: 1 suppository(ies) rectal once a day (at bedtime) through 5/20 then stop  insulin glargine 100 units/mL subcutaneous solution: 18 unit(s) subcutaneous once a day (at bedtime)  insulin lispro 100 units/mL injectable solution: 7 unit(s) injectable 3 times a day (with meals)  mesalamine 1000 mg rectal suppository: 1 suppository(ies) rectal once a day (at bedtime) through 5/20 then stop  metFORMIN 500 mg oral tablet: 1 tab(s) orally 2 times a day  metoprolol tartrate 50 mg oral tablet: 1 tab(s) orally 2 times a day  pantoprazole 40 mg oral delayed release tablet: 1 tab(s) orally once a day (before a meal)  polyethylene glycol 3350 oral powder for reconstitution: 17 gram(s) orally once a day  senna oral tablet: 2 tab(s) orally once a day   apixaban 5 mg oral tablet: 1 tab(s) orally 2 times a day  atorvastatin 80 mg oral tablet: 1 tab(s) orally once a day  cefpodoxime 200 mg oral tablet: 1 tab(s) orally every 12 hours through 5/20 then stop  clopidogrel 75 mg oral tablet: 1 tab(s) orally once a day  digoxin 125 mcg (0.125 mg) oral tablet: 1 tab(s) orally once a day  doxazosin 1 mg oral tablet: 1 tab(s) orally once a day (at bedtime)  hydrocortisone 25 mg rectal suppository: 1 suppository(ies) rectal once a day (at bedtime) through 5/20 then stop  insulin glargine 100 units/mL subcutaneous solution: 18 unit(s) subcutaneous once a day (at bedtime)  insulin lispro 100 units/mL injectable solution: 7 unit(s) injectable 3 times a day (with meals)  mesalamine 1000 mg rectal suppository: 1 suppository(ies) rectal once a day (at bedtime) through 5/20 then stop  metFORMIN 500 mg oral tablet: 1 tab(s) orally once a day  metoprolol tartrate 50 mg oral tablet: 1 tab(s) orally 2 times a day  pantoprazole 40 mg oral delayed release tablet: 1 tab(s) orally once a day (before a meal)  polyethylene glycol 3350 oral powder for reconstitution: 17 gram(s) orally once a day  senna oral tablet: 2 tab(s) orally once a day

## 2022-05-13 NOTE — DISCHARGE NOTE PROVIDER - PROVIDER TOKENS
PROVIDER:[TOKEN:[[id number]],FOLLOWUP:[1 week],ESTABLISHEDPATIENT:[T]],FREE:[LAST:[Huber],FIRST:[Arben],PHONE:[phone]:[(   )    -],ADDRESS:[[facility]],FOLLOWUP:[1 month],ESTABLISHEDPATIENT:[T]]

## 2022-05-13 NOTE — PROGRESS NOTE ADULT - ASSESSMENT
82 y/o M with PMH of prostate cancer s/p seed implant, Hypertension, Type 2 diabetes on insulin (has not been taking insulin recently), CAD s/p stent (last stent placed 2 years ago), CVA s/p carotid stent 1 month ago, JACKELIN on CPAP, presents to the ED w/AMS noted to have UTI. GI consulted for rectal bleed    Rectal Bleed   H/H stable and at baseline   suspect hemorrhoidal irritation d/t hard stools/straining   Anusol Suppository x 10 days   Miralax BID; give Enema as needed  Monitor stool color  No GI objection to a/c or antiplt therapy w/Protonix daily  High fiber diet    AFib   rate control per cardiology recs  No GI objection to a/c or antiplt therapy w/Protonix daily  monitor cbc and stool color while on blood thinners    UTI   management per medicine team recs    I reviewed the overnight course of events on the unit, re-confirming the patient history. I discussed the care with the patient and their family. The plan of care was discussed with the physician assistant and modifications were made to the notation where appropriate. Differential diagnosis and plan of care discussed with patient after the evaluation. Advanced care planning was discussed with patient and family.  Advanced care planning forms were reviewed and discussed.  Risks, benefits and alternatives of gastroenterologic procedures were discussed in detail and all questions were answered. 35 minutes spent on total encounter of which more than fifty percent of the encounter was spent counseling and/or coordinating care by the attending physician.

## 2022-05-13 NOTE — DISCHARGE NOTE PROVIDER - NSFOLLOWUPCLINICS_GEN_ALL_ED_FT
[facility]  Urology  [address] [place] [address]  Phone: [phone]  Fax:   Follow Up Time: 2 weeks

## 2022-05-13 NOTE — DISCHARGE NOTE PROVIDER - CARE PROVIDER_API CALL
STACY CARRILLO  Internal Medicine  [address] [place] [address]  Phone: [phone]  Fax: ()-  Established Patient  Follow Up Time: 1 week    Arben Huber  [facility]  Phone: [phone]  Fax: (   )    -  Established Patient  Follow Up Time: 1 month

## 2022-05-13 NOTE — PROGRESS NOTE ADULT - SUBJECTIVE AND OBJECTIVE BOX
CHIDI RUBIO 81y [id number]    Patient is a 81y old  Male who presents with a chief complaint of NSTEMI, Hyperglycemia, SANDRA, Encephalopathy (13 May 2022 11:44)      Follow Up/CC:  ID following for uti    Interval History/ROS: no fever, no complaints    Allergies    ceftriaxone (Urticaria)  ciprofloxacin (Rash)  schrimp (Unknown)    Intolerances        ANTIMICROBIALS:  ertapenem  IVPB    ertapenem  IVPB 1000 every 24 hours      MEDICATIONS  (STANDING):  apixaban 5 milliGRAM(s) Oral two times a day  atorvastatin 40 milliGRAM(s) Oral at bedtime  bisacodyl 10 milliGRAM(s) Oral at bedtime  clopidogrel Tablet 75 milliGRAM(s) Oral daily  dextrose 5%. 1000 milliLiter(s) (100 mL/Hr) IV Continuous <Continuous>  dextrose 5%. 1000 milliLiter(s) (50 mL/Hr) IV Continuous <Continuous>  dextrose 50% Injectable 25 Gram(s) IV Push once  dextrose 50% Injectable 12.5 Gram(s) IV Push once  dextrose 50% Injectable 25 Gram(s) IV Push once  digoxin     Tablet 125 MICROGram(s) Oral daily  doxazosin 1 milliGRAM(s) Oral at bedtime  ertapenem  IVPB      ertapenem  IVPB 1000 milliGRAM(s) IV Intermittent every 24 hours  glucagon  Injectable 1 milliGRAM(s) IntraMuscular once  hydrocortisone hemorrhoidal Suppository 1 Suppository(s) Rectal at bedtime  insulin glargine Injectable (LANTUS) 18 Unit(s) SubCutaneous at bedtime  insulin lispro (ADMELOG) corrective regimen sliding scale   SubCutaneous three times a day before meals  insulin lispro (ADMELOG) corrective regimen sliding scale   SubCutaneous at bedtime  insulin lispro Injectable (ADMELOG) 7 Unit(s) SubCutaneous three times a day before meals  metoprolol tartrate 50 milliGRAM(s) Oral two times a day  pantoprazole    Tablet 40 milliGRAM(s) Oral before breakfast  polyethylene glycol 3350 17 Gram(s) Oral daily  sodium chloride 0.9% lock flush 3 milliLiter(s) IV Push every 8 hours    MEDICATIONS  (PRN):  dextrose Oral Gel 15 Gram(s) Oral once PRN Blood Glucose LESS THAN 70 milliGRAM(s)/deciliter        Vital Signs Last 24 Hrs  T(C): 36.9 (13 May 2022 06:25), Max: 37.1 (12 May 2022 21:35)  T(F): 98.4 (13 May 2022 06:25), Max: 98.8 (12 May 2022 21:35)  HR: 86 (13 May 2022 06:25) (80 - 89)  BP: 127/61 (13 May 2022 06:25) (116/67 - 127/61)  BP(mean): --  RR: 17 (13 May 2022 06:25) (17 - 18)  SpO2: 100% (13 May 2022 06:25) (99% - 100%)    CBC Full  -  ( 13 May 2022 06:00 )  WBC Count : 5.84 K/uL  RBC Count : 4.14 M/uL  Hemoglobin : 11.7 g/dL  Hematocrit : 35.3 %  Platelet Count - Automated : 329 K/uL  Mean Cell Volume : 85.3 fL  Mean Cell Hemoglobin : 28.3 pg  Mean Cell Hemoglobin Concentration : 33.1 gm/dL  Auto Neutrophil # : x  Auto Lymphocyte # : x  Auto Monocyte # : x  Auto Eosinophil # : x  Auto Basophil # : x  Auto Neutrophil % : x  Auto Lymphocyte % : x  Auto Monocyte % : x  Auto Eosinophil % : x  Auto Basophil % : x    05-13    146<H>  |  110<H>  |  10  ----------------------------<  143<H>  3.5   |  25  |  1.00    Ca    8.6      13 May 2022 06:00  Phos  2.6     05-13  Mg     1.80     05-13            MICROBIOLOGY:  .Blood Blood-Peripheral  05-02-22   No Growth Final  --  --      .Blood Blood-Peripheral  05-02-22   No Growth Final  --  --              v            RADIOLOGY

## 2022-05-14 NOTE — PROGRESS NOTE ADULT - TIME BILLING
-D/W ACP,RN  d/w Family at bedside.Answered all questions and plan of care explained.  -GI Consult
-a.fib-cont.with digoxin. AC CardiologY F/U  -Rectal bleeding.hgb-stable.GI f/u noted
-D/W ACP,RN  d/w Family at bedside.Answered all questions and plan of care explained.
-a.fib-cont.with digoxin. AC CardiologY F/U
-D/W ACP,RN  d/w Family at bedside.Answered all questions and plan of care explained.  -monitor H/H
-D/W ACP,RN  d/w Family at bedside.Answered all questions and plan of care explained.
-D/W ACP,RN  d/w Family at bedside.Answered all questions and plan of care explained.  -monitor H/H
-D/W ACP,RN  d/w Family at bedside.Answered all questions and plan of care explained.
-a.fib-cont.with digoxin. AC CardiologY F/U   d/c planning to Rehab  d/w Family at bedside

## 2022-05-14 NOTE — PROVIDER CONTACT NOTE (OTHER) - NAME OF MD/NP/PA/DO NOTIFIED:
[facility] [id number]
ANASTASIIA Ba
Kimmie Ortez
ANASTASIIA Escudero
Crystal Cunningham
ANASTASIIA Groves
Yesica Braden ACP
austen Rojas

## 2022-05-14 NOTE — PROGRESS NOTE ADULT - SUBJECTIVE AND OBJECTIVE BOX
JOANNECHIDI  81y  Male      Patient is a 81y old  Male who presents with a chief complaint of NSTEMI, Hyperglycemia, SANDRA, Encephalopathy (13 May 2022 18:14)  Above noted.\Reported blood tinged stool,no cp,no sob,no abd.pain    REVIEW OF SYSTEMS:  CONSTITUTIONAL: No fever  RESPIRATORY: No cough, hemoptysis or shortness of breath  CARDIOVASCULAR: No chest pain, palpitations, dizziness, or leg swelling  GASTROINTESTINAL: No abdominal pain. nausea, vomiting, hematemesis  GENITOURINARY: No dysuria, frequency, hematuria   NEUROLOGICAL: No headaches, no dizziness  MUSCULOSKELETAL: No joint pain or swelling;     INTERVAL HPI/OVERNIGHT EVENTS:  T(C): 36.7 (05-14-22 @ 11:30), Max: 36.8 (05-13-22 @ 22:22)  HR: 71 (05-14-22 @ 11:30) (71 - 88)  BP: 117/81 (05-14-22 @ 11:30) (117/81 - 133/73)  RR: 18 (05-14-22 @ 11:30) (18 - 18)  SpO2: 100% (05-14-22 @ 11:30) (99% - 100%)  Wt(kg): --  I&O's Summary    13 May 2022 07:01  -  14 May 2022 07:00  --------------------------------------------------------  IN: 410 mL / OUT: 500 mL / NET: -90 mL    14 May 2022 07:01  -  14 May 2022 16:16  --------------------------------------------------------  IN: 0 mL / OUT: 400 mL / NET: -400 mL      T(C): 36.7 (05-14-22 @ 11:30), Max: 36.8 (05-13-22 @ 22:22)  HR: 71 (05-14-22 @ 11:30) (71 - 88)  BP: 117/81 (05-14-22 @ 11:30) (117/81 - 133/73)  RR: 18 (05-14-22 @ 11:30) (18 - 18)  SpO2: 100% (05-14-22 @ 11:30) (99% - 100%)  Wt(kg): --Vital Signs Last 24 Hrs  T(C): 36.7 (14 May 2022 11:30), Max: 36.8 (13 May 2022 22:22)  T(F): 98.1 (14 May 2022 11:30), Max: 98.2 (13 May 2022 22:22)  HR: 71 (14 May 2022 11:30) (71 - 88)  BP: 117/81 (14 May 2022 11:30) (117/81 - 133/73)  BP(mean): --  RR: 18 (14 May 2022 11:30) (18 - 18)  SpO2: 100% (14 May 2022 11:30) (99% - 100%)    LABS:                        11.2   5.69  )-----------( 306      ( 14 May 2022 14:47 )             34.0     05-14    149<H>  |  111<H>  |  10  ----------------------------<  122<H>  4.3   |  22  |  1.14    Ca    9.0      14 May 2022 05:15  Phos  3.0     05-14  Mg     1.90     05-14          CAPILLARY BLOOD GLUCOSE      POCT Blood Glucose.: 197 mg/dL (14 May 2022 11:18)  POCT Blood Glucose.: 140 mg/dL (14 May 2022 07:34)  POCT Blood Glucose.: 141 mg/dL (13 May 2022 23:15)  POCT Blood Glucose.: 102 mg/dL (13 May 2022 21:44)  POCT Blood Glucose.: 178 mg/dL (13 May 2022 16:42)            PAST MEDICAL & SURGICAL HISTORY:  Diabetes mellitus      CAD (coronary artery disease)      Prostate ca  s/p SEED 5 yrs ago      Sleep apnea  on cpap at home      Hypertension      Hypercholesteremia      S/P cholecystectomy      S/P coronary angioplasty  last stent 1 year ago      H/O carotid endarterectomy  April 2022, stent placed.          MEDICATIONS  (STANDING):  apixaban 5 milliGRAM(s) Oral two times a day  atorvastatin 40 milliGRAM(s) Oral at bedtime  bisacodyl 10 milliGRAM(s) Oral at bedtime  cefpodoxime 200 milliGRAM(s) Oral every 12 hours  clopidogrel Tablet 75 milliGRAM(s) Oral daily  dextrose 5%. 1000 milliLiter(s) (100 mL/Hr) IV Continuous <Continuous>  dextrose 5%. 1000 milliLiter(s) (50 mL/Hr) IV Continuous <Continuous>  dextrose 50% Injectable 25 Gram(s) IV Push once  dextrose 50% Injectable 12.5 Gram(s) IV Push once  dextrose 50% Injectable 25 Gram(s) IV Push once  digoxin     Tablet 125 MICROGram(s) Oral daily  doxazosin 1 milliGRAM(s) Oral at bedtime  glucagon  Injectable 1 milliGRAM(s) IntraMuscular once  hydrocortisone hemorrhoidal Suppository 1 Suppository(s) Rectal at bedtime  insulin glargine Injectable (LANTUS) 18 Unit(s) SubCutaneous at bedtime  insulin lispro (ADMELOG) corrective regimen sliding scale   SubCutaneous at bedtime  insulin lispro (ADMELOG) corrective regimen sliding scale   SubCutaneous three times a day before meals  insulin lispro Injectable (ADMELOG) 7 Unit(s) SubCutaneous three times a day before meals  metoprolol tartrate 50 milliGRAM(s) Oral two times a day  pantoprazole    Tablet 40 milliGRAM(s) Oral before breakfast  polyethylene glycol 3350 17 Gram(s) Oral daily  sodium chloride 0.9% lock flush 3 milliLiter(s) IV Push every 8 hours    MEDICATIONS  (PRN):  dextrose Oral Gel 15 Gram(s) Oral once PRN Blood Glucose LESS THAN 70 milliGRAM(s)/deciliter        RADIOLOGY & ADDITIONAL TESTS:    Imaging Personally Reviewed:  [ ] YES  [ ] NO    Consultant(s) Notes Reviewed:  x[ ] YES  [ ] NO    PHYSICAL EXAM:  GENERAL: Alert and awake lying in bed in no distress  HEAD:  Atraumatic, Normocephalic  EYES: EOMI, [place], conjunctiva and sclera clear  NECK: Supple, No JVD, Normal thyroid  NERVOUS SYSTEM:  Alert & Oriented X3, Motor and sensory systems are intact,   CHEST/LUNG: Bilateral clear breath sounds, no rhochi, no wheezing, no crepitations,  HEART: Regular rate and rhythm; No murmurs, rubs, or gallops  ABDOMEN: Soft, Nontender, Nondistended; Bowel sounds present  EXTREMITIES:   Peripheral Pulses are palpable, no  edema        Care Discussed with Consultants/Other Providers [ ] YES  [ ] NO      Code Status: [] Full Code [] DNR [] DNI [] Goals of Care:   Disposition: [] ICU [] Stroke Unit [] RCU []PCU []Floor [] Discharge Home         KACY Casey.FACP

## 2022-05-14 NOTE — CHART NOTE - NSCHARTNOTEFT_GEN_A_CORE
Notified by RN that pt had bowel movement with blood clots. Stool was noted to be dark red to light brown color with blood clots.   Discussed with Dr. Guillen (GI) and repeat CBC STAT was ordered. Pt was examined at bed side and was without any complaints.   Vitals were stable. Will continue to monitor.

## 2022-05-14 NOTE — PROVIDER CONTACT NOTE (OTHER) - SITUATION
Patient had glucose level 432 on FS.
Pt has blood tinged urine in parnell catheter bag.
bright red blood noticed in the stool, stool consistency is soft.
patient heart rate going between 160-180 not sustaining at a certain number
Pt is bleeding from rectum
pt's HR is 170 sustaining on tele monitor
Patient has rapid AFIB rhythm on telemetry monitor to .
There appeared to be blood in the patient's stool. This was noticed while completing incontinence care.

## 2022-05-14 NOTE — PROVIDER CONTACT NOTE (OTHER) - ACTION/TREATMENT ORDERED:
ACP notified and aware. pts vitals monitored. STAT CBC ordered and collected. Will continue to monitor.

## 2022-05-14 NOTE — PROVIDER CONTACT NOTE (OTHER) - BACKGROUND
NSTEMI, Urine retention
Patient is 82yo male admitted for encephalopathy, uncontrolled DM, NSTEMI,
pt admitted for NSTEMI. PMH hypertension, CAD, type 2 DM, SANDRA, sepsis. PSH s/p coronary angioplasty and s/p cholecystectomy.
Patient 81M admitted for NSTEMI, uncontrolled DM,  altered mental status.
NSTEMI
patient admitted for non-ST elevation of MI. PMH of DM, CAD, sleep apnea, hypertension, urine retention, leukocytosis, prostate cancer.
Patient is 80yo male admitted for encephalopathy, uncontrolled DM, NSTEMI,

## 2022-05-14 NOTE — PROGRESS NOTE ADULT - SUBJECTIVE AND OBJECTIVE BOX
INTERVAL HPI/OVERNIGHT EVENTS:  No new overnight event.  No N/V/D.  Tolerating diet.  some episode of brbpr streak  hgb dropped 1 gr    Allergies    ceftriaxone (Urticaria)  ciprofloxacin (Rash)  schrimp (Unknown)    Intolerances    General:  No wt loss, fevers, chills, night sweats, fatigue,   Eyes:  Good vision, no reported pain  ENT:  No sore throat, pain, runny nose, dysphagia  CV:  No pain, palpitations, hypo/hypertension  Resp:  No dyspnea, cough, tachypnea, wheezing  GI:  No pain, No nausea, No vomiting, No diarrhea, No constipation, No weight loss, No fever, No pruritis, No rectal bleeding, No tarry stools, No dysphagia,  :  No pain, bleeding, incontinence, nocturia  Muscle:  No pain, weakness  Neuro:  No weakness, tingling, memory problems  Psych:  No fatigue, insomnia, mood problems, depression  Endocrine:  No polyuria, polydipsia, cold/heat intolerance  Heme:  No petechiae, ecchymosis, easy bruisability  Skin:  No rash, tattoos, scars, edema      PHYSICAL EXAM:   Vital Signs:  Vital Signs Last 24 Hrs  T(C): 36.8 (14 May 2022 17:15), Max: 36.8 (13 May 2022 22:22)  T(F): 98.3 (14 May 2022 17:15), Max: 98.3 (14 May 2022 17:15)  HR: 79 (14 May 2022 17:15) (71 - 88)  BP: 131/73 (14 May 2022 17:15) (117/81 - 133/73)  BP(mean): --  RR: 18 (14 May 2022 17:15) (18 - 18)  SpO2: 100% (14 May 2022 17:15) (99% - 100%)  Daily     Daily I&O's Summary    13 May 2022 07:01  -  14 May 2022 07:00  --------------------------------------------------------  IN: 410 mL / OUT: 500 mL / NET: -90 mL    14 May 2022 07:01  -  14 May 2022 18:06  --------------------------------------------------------  IN: 0 mL / OUT: 400 mL / NET: -400 mL        GENERAL:  Appears stated age, well-groomed, well-nourished, no distress  HEENT:  NC/AT,  conjunctivae clear and pink, no thyromegaly, nodules, adenopathy, no JVD, sclera -anicteric  CHEST:  Full & symmetric excursion, no increased effort, breath sounds clear  HEART:  Regular rhythm, S1, S2, no murmur/rub/S3/S4, no abdominal bruit, no edema  ABDOMEN:  Soft, non-tender, non-distended, normoactive bowel sounds,  no masses ,no hepato-splenomegaly, no signs of chronic liver disease  EXTEREMITIES:  no cyanosis,clubbing or edema  SKIN:  No rash/erythema/ecchymoses/petechiae/wounds/abscess/warm/dry  NEURO:  Alert, oriented, no asterixis, no tremor, no encephalopathy      LABS:                        11.2   5.69  )-----------( 306      ( 14 May 2022 14:47 )             34.0     05-14    149<H>  |  111<H>  |  10  ----------------------------<  122<H>  4.3   |  22  |  1.14    Ca    9.0      14 May 2022 05:15  Phos  3.0     05-14  Mg     1.90     05-14          amylase   lipase  RADIOLOGY & ADDITIONAL TESTS:

## 2022-05-14 NOTE — PROVIDER CONTACT NOTE (OTHER) - DATE AND TIME:
03-May-2022 14:21
12-May-2022 04:10
12-May-2022 22:25
03-May-2022 22:47
14-May-2022 13:45
09-May-2022 18:29
04-May-2022 12:30
08-May-2022 16:45

## 2022-05-14 NOTE — PROVIDER CONTACT NOTE (OTHER) - ASSESSMENT
bright red blood noticed in the stool. no other signs of bleeding noticed, patient is not feeling dizzy or lightheaded. VSS. pt has a parnell.

## 2022-05-15 NOTE — PROGRESS NOTE ADULT - SUBJECTIVE AND OBJECTIVE BOX
Patient is a 81y old  Male who presents with a chief complaint of NSTEMI, Hyperglycemia, SANDRA, Encephalopathy (14 May 2022 18:06)      SUBJECTIVE / OVERNIGHT EVENTS:    Events noted.  Awake  No N/V    MEDICATIONS  (STANDING):  apixaban 5 milliGRAM(s) Oral two times a day  atorvastatin 40 milliGRAM(s) Oral at bedtime  bisacodyl 10 milliGRAM(s) Oral at bedtime  cefpodoxime 200 milliGRAM(s) Oral every 12 hours  clopidogrel Tablet 75 milliGRAM(s) Oral daily  dextrose 5%. 1000 milliLiter(s) (100 mL/Hr) IV Continuous <Continuous>  dextrose 5%. 1000 milliLiter(s) (50 mL/Hr) IV Continuous <Continuous>  dextrose 50% Injectable 25 Gram(s) IV Push once  dextrose 50% Injectable 12.5 Gram(s) IV Push once  dextrose 50% Injectable 25 Gram(s) IV Push once  digoxin     Tablet 125 MICROGram(s) Oral daily  doxazosin 1 milliGRAM(s) Oral at bedtime  glucagon  Injectable 1 milliGRAM(s) IntraMuscular once  insulin glargine Injectable (LANTUS) 18 Unit(s) SubCutaneous at bedtime  insulin lispro (ADMELOG) corrective regimen sliding scale   SubCutaneous at bedtime  insulin lispro (ADMELOG) corrective regimen sliding scale   SubCutaneous three times a day before meals  insulin lispro Injectable (ADMELOG) 7 Unit(s) SubCutaneous three times a day before meals  mesalamine Suppository 1000 milliGRAM(s) Rectal at bedtime  metoprolol tartrate 50 milliGRAM(s) Oral two times a day  pantoprazole    Tablet 40 milliGRAM(s) Oral before breakfast  polyethylene glycol 3350 17 Gram(s) Oral daily  sodium chloride 0.9% lock flush 3 milliLiter(s) IV Push every 8 hours    MEDICATIONS  (PRN):  dextrose Oral Gel 15 Gram(s) Oral once PRN Blood Glucose LESS THAN 70 milliGRAM(s)/deciliter        CAPILLARY BLOOD GLUCOSE      POCT Blood Glucose.: 115 mg/dL (15 May 2022 11:21)  POCT Blood Glucose.: 137 mg/dL (15 May 2022 07:40)  POCT Blood Glucose.: 173 mg/dL (14 May 2022 21:22)  POCT Blood Glucose.: 200 mg/dL (14 May 2022 16:52)    I&O's Summary    14 May 2022 07:01  -  15 May 2022 07:00  --------------------------------------------------------  IN: 0 mL / OUT: 750 mL / NET: -750 mL    15 May 2022 07:01  -  15 May 2022 16:06  --------------------------------------------------------  IN: 0 mL / OUT: 400 mL / NET: -400 mL        T(C): 36.8 (05-15-22 @ 12:30), Max: 36.8 (05-14-22 @ 17:15)  HR: 76 (05-15-22 @ 12:30) (74 - 79)  BP: 132/78 (05-15-22 @ 12:30) (127/62 - 138/77)  RR: 18 (05-15-22 @ 12:30) (17 - 18)  SpO2: 99% (05-15-22 @ 12:30) (99% - 100%)    PHYSICAL EXAM:    NECK: Supple, No JVD  CHEST/LUNG: Clear to auscultation bilaterally; No wheezing.  HEART: Regular rate and rhythm; No murmurs, rubs, or gallops  ABDOMEN: Soft, Nontender, Nondistended; Bowel sounds present  EXTREMITIES:   No edema  NEUROLOGY: Awake      LABS:                        12.1   5.36  )-----------( 335      ( 15 May 2022 06:30 )             37.3     05-15    144  |  110<H>  |  9   ----------------------------<  137<H>  3.8   |  25  |  1.10    Ca    8.9      15 May 2022 06:30  Phos  2.7     05-15  Mg     1.80     05-15              CAPILLARY BLOOD GLUCOSE      POCT Blood Glucose.: 115 mg/dL (15 May 2022 11:21)  POCT Blood Glucose.: 137 mg/dL (15 May 2022 07:40)  POCT Blood Glucose.: 173 mg/dL (14 May 2022 21:22)  POCT Blood Glucose.: 200 mg/dL (14 May 2022 16:52)        RADIOLOGY & ADDITIONAL TESTS:    Imaging Personally Reviewed:    Consultant(s) Notes Reviewed:      Care Discussed with Consultants/Other Providers:    Sae Vallecillo MD, CMD, FACP    [id number] [place] [address]  Office Tel: [phone]  Cell: [phone]

## 2022-05-15 NOTE — PROGRESS NOTE ADULT - ASSESSMENT
80 y/o M with PMH of prostate cancer s/p seed implant, Hypertension, Type 2 diabetes on insulin (has not been taking insulin recently), CAD s/p stent (last stent placed 2 years ago), CVA s/p carotid stent 1 month ago, JACKELIN on CPAP, presents to the ED w/AMS noted to have UTI. GI consulted for rectal bleed    Rectal Bleed   H/H stable and at baseline   suspect hemorrhoidal irritation d/t hard stools/straining   Anusol Suppository x 10 days   Miralax BID; give Enema as needed  Monitor stool color  No GI objection to a/c or antiplt therapy w/Protonix daily  High fiber diet    AFib   rate control per cardiology recs  No GI objection to a/c or antiplt therapy w/Protonix daily  monitor cbc and stool color while on blood thinners    UTI   management per medicine team recs    I reviewed the overnight course of events on the unit, re-confirming the patient history. I discussed the care with the patient and their family. The plan of care was discussed with the physician assistant and modifications were made to the notation where appropriate. Differential diagnosis and plan of care discussed with patient after the evaluation. Advanced care planning was discussed with patient and family.  Advanced care planning forms were reviewed and discussed.  Risks, benefits and alternatives of gastroenterologic procedures were discussed in detail and all questions were answered. 35 minutes spent on total encounter of which more than fifty percent of the encounter was spent counseling and/or coordinating care by the attending physician.

## 2022-05-15 NOTE — PROGRESS NOTE ADULT - SUBJECTIVE AND OBJECTIVE BOX
INTERVAL HPI/OVERNIGHT EVENTS:  No new overnight event.  No N/V/D.  Tolerating diet.  no bleeding today    Allergies    ceftriaxone (Urticaria)  ciprofloxacin (Rash)  schrimp (Unknown)    Intolerances      General: no wt loss, fevers, chills, night sweats, fatigue,   Eyes:  Good vision, no reported pain  ENT:  No sore throat, pain, runny nose, dysphagia  CV:  No pain, palpitations, hypo/hypertension  Resp:  No dyspnea, cough, tachypnea, wheezing  GI:  No pain, No nausea, No vomiting, No diarrhea, No constipation, No weight loss, No fever, No pruritis, No rectal bleeding, No tarry stools, No dysphagia,  :  No pain, bleeding, incontinence, nocturia  Muscle:  No pain, weakness  Neuro:  No weakness, tingling, memory problems  Psych:  No fatigue, insomnia, mood problems, depression  Endocrine:  No polyuria, polydipsia, cold/heat intolerance  Heme:  No petechiae, ecchymosis, easy bruisability  Skin:  No rash, tattoos, scars, edema      PHYSICAL EXAM:   Vital Signs:  Vital Signs Last 24 Hrs  T(C): 37.1 (15 May 2022 17:45), Max: 37.1 (15 May 2022 17:45)  T(F): 98.7 (15 May 2022 17:45), Max: 98.7 (15 May 2022 17:45)  HR: 78 (15 May 2022 17:45) (74 - 79)  BP: 131/79 (15 May 2022 17:45) (127/62 - 138/77)  BP(mean): --  RR: 18 (15 May 2022 17:45) (17 - 18)  SpO2: 98% (15 May 2022 17:45) (98% - 100%)  Daily     Daily I&O's Summary    14 May 2022 07:01  -  15 May 2022 07:00  --------------------------------------------------------  IN: 0 mL / OUT: 750 mL / NET: -750 mL    15 May 2022 07:01  -  15 May 2022 19:19  --------------------------------------------------------  IN: 0 mL / OUT: 800 mL / NET: -800 mL        GENERAL:  Appears stated age, well-groomed, well-nourished, no distress  HEENT:  NC/AT,  conjunctivae clear and pink, no thyromegaly, nodules, adenopathy, no JVD, sclera -anicteric  CHEST:  Full & symmetric excursion, no increased effort, breath sounds clear  HEART:  Regular rhythm, S1, S2, no murmur/rub/S3/S4, no abdominal bruit, no edema  ABDOMEN:  Soft, non-tender, non-distended, normoactive bowel sounds,  no masses ,no hepato-splenomegaly, no signs of chronic liver disease  EXTEREMITIES:  no cyanosis,clubbing or edema  SKIN:  No rash/erythema/ecchymoses/petechiae/wounds/abscess/warm/dry  NEURO:  Alert, oriented, no asterixis, no tremor, no encephalopathy      LABS:                        12.1   5.36  )-----------( 335      ( 15 May 2022 06:30 )             37.3     05-15    144  |  110<H>  |  9   ----------------------------<  137<H>  3.8   |  25  |  1.10    Ca    8.9      15 May 2022 06:30  Phos  2.7     05-15  Mg     1.80     05-15          amylase   lipase  RADIOLOGY & ADDITIONAL TESTS:

## 2022-05-16 NOTE — PROGRESS NOTE ADULT - SUBJECTIVE AND OBJECTIVE BOX
CHIDI RUBIO 81y [id number]    Patient is a 81y old  Male who presents with a chief complaint of NSTEMI, Hyperglycemia, SANDRA, Encephalopathy (16 May 2022 12:52)      Follow Up/CC:  ID following for uti     Interval History/ROS: no fever, on 1:1    Allergies    ceftriaxone (Urticaria)  ciprofloxacin (Rash)  schrimp (Unknown)    Intolerances        ANTIMICROBIALS:  cefpodoxime 200 every 12 hours      MEDICATIONS  (STANDING):  apixaban 5 milliGRAM(s) Oral two times a day  atorvastatin 40 milliGRAM(s) Oral at bedtime  bisacodyl 10 milliGRAM(s) Oral at bedtime  cefpodoxime 200 milliGRAM(s) Oral every 12 hours  clopidogrel Tablet 75 milliGRAM(s) Oral daily  dextrose 5%. 1000 milliLiter(s) (100 mL/Hr) IV Continuous <Continuous>  dextrose 5%. 1000 milliLiter(s) (50 mL/Hr) IV Continuous <Continuous>  dextrose 50% Injectable 25 Gram(s) IV Push once  dextrose 50% Injectable 12.5 Gram(s) IV Push once  dextrose 50% Injectable 25 Gram(s) IV Push once  digoxin     Tablet 125 MICROGram(s) Oral daily  doxazosin 1 milliGRAM(s) Oral at bedtime  glucagon  Injectable 1 milliGRAM(s) IntraMuscular once  hydrocortisone hemorrhoidal Suppository 1 Suppository(s) Rectal at bedtime  insulin glargine Injectable (LANTUS) 18 Unit(s) SubCutaneous at bedtime  insulin lispro (ADMELOG) corrective regimen sliding scale   SubCutaneous three times a day before meals  insulin lispro (ADMELOG) corrective regimen sliding scale   SubCutaneous at bedtime  insulin lispro Injectable (ADMELOG) 7 Unit(s) SubCutaneous three times a day before meals  mesalamine Suppository 1000 milliGRAM(s) Rectal at bedtime  metoprolol tartrate 50 milliGRAM(s) Oral two times a day  pantoprazole    Tablet 40 milliGRAM(s) Oral before breakfast  polyethylene glycol 3350 17 Gram(s) Oral daily  sodium chloride 0.9% lock flush 3 milliLiter(s) IV Push every 8 hours    MEDICATIONS  (PRN):  dextrose Oral Gel 15 Gram(s) Oral once PRN Blood Glucose LESS THAN 70 milliGRAM(s)/deciliter        Vital Signs Last 24 Hrs  T(C): 37 (16 May 2022 12:52), Max: 37.1 (15 May 2022 17:45)  T(F): 98.6 (16 May 2022 12:52), Max: 98.7 (15 May 2022 17:45)  HR: 89 (16 May 2022 12:52) (78 - 89)  BP: 136/78 (16 May 2022 12:52) (105/50 - 136/78)  BP(mean): --  RR: 18 (16 May 2022 12:52) (16 - 18)  SpO2: 100% (16 May 2022 12:52) (98% - 100%)    CBC Full  -  ( 16 May 2022 07:10 )  WBC Count : 6.18 K/uL  RBC Count : 4.16 M/uL  Hemoglobin : 11.8 g/dL  Hematocrit : 36.7 %  Platelet Count - Automated : 320 K/uL  Mean Cell Volume : 88.2 fL  Mean Cell Hemoglobin : 28.4 pg  Mean Cell Hemoglobin Concentration : 32.2 gm/dL  Auto Neutrophil # : x  Auto Lymphocyte # : x  Auto Monocyte # : x  Auto Eosinophil # : x  Auto Basophil # : x  Auto Neutrophil % : x  Auto Lymphocyte % : x  Auto Monocyte % : x  Auto Eosinophil % : x  Auto Basophil % : x    05-16    143  |  108<H>  |  8   ----------------------------<  133<H>  4.0   |  24  |  1.09    Ca    9.0      16 May 2022 07:10  Phos  3.0     05-16  Mg     1.80     05-16            MICROBIOLOGY:  .Blood Blood-Peripheral  05-02-22   No Growth Final  --  --      .Blood Blood-Peripheral  05-02-22   No Growth Final  --  --              v            RADIOLOGY

## 2022-05-16 NOTE — PROVIDER CONTACT NOTE (MEDICATION) - RECOMMENDATIONS
I call ACP, Colby Billingsley, and suggested to give the pt ativan to calm the pt down. Colby did not want to give ativan due to Mr. Molina's age. Colby suggested reasoning with Mr. Sheikh and try to calm him.

## 2022-05-16 NOTE — PROGRESS NOTE ADULT - PROBLEM SELECTOR PLAN 6
Continue metoprolol with hold parameters.  Continue .,plavix
Continue metoprolol with hold parameters.  Continue aspirin.
Continue metoprolol with hold parameters.  Continue .,plavix
Continue metoprolol with hold parameters.  Continue aspirin.
Continue metoprolol with hold parameters.  Continue .,plavix
Continue metoprolol with hold parameters.  Continue aspirin.
Continue metoprolol with hold parameters.  Continue .,plavix
Continue metoprolol with hold parameters.  Continue .,plavix
Continue metoprolol with hold parameters.  Continue aspirin.
Continue metoprolol with hold parameters.  Continue aspirin.
Continue metoprolol with hold parameters.  Continue .,plavix
Continue metoprolol with hold parameters.  Continue aspirin.

## 2022-05-16 NOTE — PROGRESS NOTE ADULT - PROBLEM SELECTOR PLAN 4
Patient meets sepsis criteria due to leukocytosis and tachycardia.  IV Abx
Patient meets sepsis criteria due to leukocytosis and tachycardia.   s/p IV Abx
Patient meets sepsis criteria due to leukocytosis and tachycardia.   s/p IV Abx
Patient meets sepsis criteria due to leukocytosis and tachycardia.  IV Abx
Patient meets sepsis criteria due to leukocytosis and tachycardia.  IV Abx
Patient meets sepsis criteria due to leukocytosis and tachycardia.   s/p IV Abx
Patient meets sepsis criteria due to leukocytosis and tachycardia.  IV Abx
Patient meets sepsis criteria due to leukocytosis and tachycardia.  IV Abx
Patient meets sepsis criteria due to leukocytosis and tachycardia.   s/p IV Abx
Patient meets sepsis criteria due to leukocytosis and tachycardia.  IV Abx

## 2022-05-16 NOTE — PROGRESS NOTE ADULT - PROBLEM SELECTOR PLAN 11
-Eliquis-hold for now.
On Heparin infusion.
-Eliquis-.
-Eliquis-hold for now.
-Eliquis-hold for now.
On Heparin infusion.
-Eliquis-hold for now.
-Eliquis-hold for now.
On Heparin infusion.
-Eliquis-hold for now.
-Eliquis-hold for now.
On Heparin infusion.

## 2022-05-16 NOTE — PROGRESS NOTE ADULT - PROBLEM SELECTOR PLAN 7
Lactate 3.0.  Patient s/p 1L NS in ED.

## 2022-05-16 NOTE — PROVIDER CONTACT NOTE (MEDICATION) - SITUATION
At 11:10 pt David Molina became confused and combative. Pt began to remove clothing as well as tele monitor. Pt then began to argue and kick the PCA. Pt began to try and get out of bed, trying to climb over the bed railing. Pt also managed to pull and break the TV remote from the wall.

## 2022-05-16 NOTE — PROGRESS NOTE ADULT - NEGATIVE CARDIOVASCULAR SYMPTOMS
no chest pain
no chest pain/no palpitations
no chest pain/no palpitations

## 2022-05-16 NOTE — PROGRESS NOTE ADULT - NEUROLOGICAL DETAILS
responds to verbal commands
responds to verbal commands
disoriented
disoriented
responds to verbal commands
disoriented
responds to verbal commands
responds to verbal commands/disoriented

## 2022-05-16 NOTE — PROGRESS NOTE ADULT - RS GEN PE MLT RESP DETAILS PC
respirations non-labored/clear to auscultation bilaterally/no wheezes
clear to auscultation bilaterally/no wheezes

## 2022-05-16 NOTE — PROGRESS NOTE ADULT - NSPROGADDITIONALINFOA_GEN_ALL_CORE
D/w LAURENT (Leah)
D/w LAURENT (Leah)
D/w ACP     Please call the ID service [phone] with questions or concerns over the weekend.
Will sign off, recall ID if needed #[id number].
Please call the ID service [phone] with questions or concerns over the weekend.
Will sign off, recall ID if needed #[phone].

## 2022-05-16 NOTE — PROGRESS NOTE ADULT - PROBLEM SELECTOR PLAN 1
Tele  Cardio f/u noted.cath when stable  ASA/.PLAVIX WILL BE STARTED.HOLD ELIQUIS.OFF HEPARIN.GI CONSULT TO R/O GI BLEEDING
Tele  Cardio f/u noted.cath when stable  -cont plavix Eliquis GI .Cardiology f/u noted.
Tele  Cardio f/u noted.cath when stable  ASA/Brillinta/IV Heparin
Tele  Cardio f/u noted.cath when stable  -cont AC GI Consult appreciated
-parnell placed  -no fever  -wbc normal  -creatinine better  -restart invanz given CT findings
Tele  Cardio f/u noted.cath when stable  ASA/Brillinta/IV Heparin
-parnell placed  -no fever  -wbc normal  -creatinine better
Tele  Cardio f/u noted.cath when stable  -cont plavix Eliquis GI .Cardiology f/u noted.
-parnell placed  -no fever  -wbc normal  -creatinine better  -s/p invanz for ?UTI given persistent confusion
Tele  Cardio f/u noted.cath when stable  ASA/Brillinta/IV Heparin
Tele  Cardio eval noted  ASA/Brillinta/IV Heparin
Tele  Cardio f/u noted.cath when stable  ASA/.PLAVIX WILL BE STARTED.HOLD ELIQUIS.OFF HEPARIN.GI CONSULT TO R/O GI BLEEDING
Tele  Cardio f/u noted.cath when stable  -cont plavix Eliquis GI .Cardiology f/u noted.
Tele  Cardio eval noted  ASA/Brillinta/IV Heparin
-parnell placed  -no fever  -wbc normal  -creatinine better  -on invanz  -on cefpodoxime 200 mg po bid
-parnell placed  -no fever  -wbc normal  -creatinine better  -s/p invanz for ?UTI given persistent confusion
Tele  Cardio f/u noted.cath when stable  -cont plavix Eliquis GI .Cardiology f/u noted.
-parnell placed  -no fever  -wbc normal  -creatinine better  -s/p invanz for ?UTI given persistent confusion
-parnell placed  -no fever  -wbc normal  -creatinine better  -on invanz now for ?UTI given persistent confusion
-parnell placed  -no fever  -wbc normal  -creatinine better  -on invanz  -cefpodoxime 200 mg po bid x 7 more days from tomorrow

## 2022-05-16 NOTE — PROGRESS NOTE ADULT - PROBLEM SELECTOR PROBLEM 5
SANDRA (acute kidney injury)

## 2022-05-16 NOTE — PROGRESS NOTE ADULT - PROBLEM SELECTOR PLAN 3
-c/w Invanz.f/u cultures  seen by Psych-1;1 observation,Likely delirium,montor  -Neurology consult APPRECIATED
-s/p parnell.f/u cultures,ID f/u  -c/w Invanz.f/u cultures  -CTH-pending  seen by Psych-1;1 observation,Likely delirium,montor  -Neurology consult APPRECIATED
-c/w Invanz.f/u cultures  seen by Psych-1;1 observation,Likely delirium,montor  -Neurology consult APPRECIATED
-s/p soheila.f/u cultures,ID f/u  -c/w Invanz.f/u cultures  -[facility]-pending
-s/p parnell.f/u cultures,ID f/u  -c/w Invanz.f/u cultures  -CTH-pending  seen by Psych-1;1 observation,Likely delirium,montor  -Neurology consult APPRECIATED
-s/p parnell.f/u cultures,ID f/u  -c/w Invanz.f/u cultures  -CTH-pending  seen by Psych-1;1 observation,Likely delirium,montor  -Neurology consult APPRECIATED
-s/p soheila.f/u cultures,ID f/u  -c/w Invanz.f/u cultures  -CTH-pending  seen by Psych-1;1 observation,Likely delirium,lonnie
Patient AAOx1 since today. Oriented only to self.  Per Son patient was unable to recognize family members earlier today.  Encephalopathy possibly in setting of UTI.   IV abx
-CT head negative  -bcx negative  -s/p invanz for possible UTI  -check HIV test  -MRI brain  -neuro to see  -check CT C/A/P with contrast to r/o other causes
-s/p parnell.f/u cultures,ID f/u  -c/w Invanz.f/u cultures  -CTH-pending  seen by Psych-1;1 observation,Likely delirium,montor  -Neurology consult APPRECIATED
-s/p parnell.f/u cultures,ID f/u  -s/p  Invanz.f/u cultures  -ID f/u noted
-s/p parnell.f/u cultures,ID f/u  -c/w Invanz.f/u cultures  -CTH-pending  seen by Psych-1;1 observation,Likely delirium,montor  -Neurology consult APPRECIATED
-CT head negative  -bcx negative  -HIV negative  -MRI brain if not improving   -CT C/A/P with pyelo/cystitis/pna - on invanz
-s/p parnell.f/u cultures,ID f/u  -c/w Invanz.f/u cultures  -CTH-pending  seen by Psych-1;1 observation,Likely delirium,montor  -Neurology consult APPRECIATED
-s/p parnell.f/u cultures,ID cONSULT APPRECIATED.OBSERVE OFF ANTIBIOTICS
-s/p soheila.f/u cultures,ID f/u  -c/w Invanz.f/u cultures  -CTH-pending  seen by Psych-1;1 observation,Likely delirium,lonnie
-CT head negative  -bcx negative  -s/p invanz for possible UTI  -check HIV test  -MRI brain  -neuro to see
-CT head negative  -bcx negative  -s/p invanz for possible UTI  -check HIV test  -MRI brain  -neuro to see  -check CT C/A/P with contrast to r/o other causes
-CT head negative  -bcx negative  -HIV negative  -MRI brain if not improving   -CT C/A/P with pyelo/cystitis/pna - on invanz
-CT head negative  -bcx negative  -HIV negative  -MRI brain if not improving   -CT C/A/P with pyelo/cystitis/pna - on invanz

## 2022-05-16 NOTE — PROGRESS NOTE ADULT - SUBJECTIVE AND OBJECTIVE BOX
Patient is a 81y old  Male who presents with a chief complaint of NSTEMI, Hyperglycemia, SANDRA, Encephalopathy (14 May 2022 18:06)      SUBJECTIVE / OVERNIGHT EVENTS:    Events noted.  Awake  No N/V    MEDICATIONS  (STANDING):  apixaban 5 milliGRAM(s) Oral two times a day  atorvastatin 40 milliGRAM(s) Oral at bedtime  bisacodyl 10 milliGRAM(s) Oral at bedtime  cefpodoxime 200 milliGRAM(s) Oral every 12 hours  clopidogrel Tablet 75 milliGRAM(s) Oral daily  dextrose 5%. 1000 milliLiter(s) (100 mL/Hr) IV Continuous <Continuous>  dextrose 5%. 1000 milliLiter(s) (50 mL/Hr) IV Continuous <Continuous>  dextrose 50% Injectable 25 Gram(s) IV Push once  dextrose 50% Injectable 12.5 Gram(s) IV Push once  dextrose 50% Injectable 25 Gram(s) IV Push once  digoxin     Tablet 125 MICROGram(s) Oral daily  doxazosin 1 milliGRAM(s) Oral at bedtime  glucagon  Injectable 1 milliGRAM(s) IntraMuscular once  insulin glargine Injectable (LANTUS) 18 Unit(s) SubCutaneous at bedtime  insulin lispro (ADMELOG) corrective regimen sliding scale   SubCutaneous at bedtime  insulin lispro (ADMELOG) corrective regimen sliding scale   SubCutaneous three times a day before meals  insulin lispro Injectable (ADMELOG) 7 Unit(s) SubCutaneous three times a day before meals  mesalamine Suppository 1000 milliGRAM(s) Rectal at bedtime  metoprolol tartrate 50 milliGRAM(s) Oral two times a day  pantoprazole    Tablet 40 milliGRAM(s) Oral before breakfast  polyethylene glycol 3350 17 Gram(s) Oral daily  sodium chloride 0.9% lock flush 3 milliLiter(s) IV Push every 8 hours    MEDICATIONS  (PRN):  dextrose Oral Gel 15 Gram(s) Oral once PRN Blood Glucose LESS THAN 70 milliGRAM(s)/deciliter        CAPILLARY BLOOD GLUCOSE      POCT Blood Glucose.: 115 mg/dL (15 May 2022 11:21)  POCT Blood Glucose.: 137 mg/dL (15 May 2022 07:40)  POCT Blood Glucose.: 173 mg/dL (14 May 2022 21:22)  POCT Blood Glucose.: 200 mg/dL (14 May 2022 16:52)    I&O's Summary    14 May 2022 07:01  -  15 May 2022 07:00  --------------------------------------------------------  IN: 0 mL / OUT: 750 mL / NET: -750 mL    15 May 2022 07:01  -  15 May 2022 16:06  --------------------------------------------------------  IN: 0 mL / OUT: 400 mL / NET: -400 mL        T(C): 36.8 (05-15-22 @ 12:30), Max: 36.8 (05-14-22 @ 17:15)  HR: 76 (05-15-22 @ 12:30) (74 - 79)  BP: 132/78 (05-15-22 @ 12:30) (127/62 - 138/77)  RR: 18 (05-15-22 @ 12:30) (17 - 18)  SpO2: 99% (05-15-22 @ 12:30) (99% - 100%)    PHYSICAL EXAM:    NECK: Supple, No JVD  CHEST/LUNG: Clear to auscultation bilaterally; No wheezing.  HEART: Regular rate and rhythm; No murmurs, rubs, or gallops  ABDOMEN: Soft, Nontender, Nondistended; Bowel sounds present  EXTREMITIES:   No edema  NEUROLOGY: Awake      LABS:                        12.1   5.36  )-----------( 335      ( 15 May 2022 06:30 )             37.3     05-15    144  |  110<H>  |  9   ----------------------------<  137<H>  3.8   |  25  |  1.10    Ca    8.9      15 May 2022 06:30  Phos  2.7     05-15  Mg     1.80     05-15              CAPILLARY BLOOD GLUCOSE      POCT Blood Glucose.: 115 mg/dL (15 May 2022 11:21)  POCT Blood Glucose.: 137 mg/dL (15 May 2022 07:40)  POCT Blood Glucose.: 173 mg/dL (14 May 2022 21:22)  POCT Blood Glucose.: 200 mg/dL (14 May 2022 16:52)        RADIOLOGY & ADDITIONAL TESTS:    Imaging Personally Reviewed:    Consultant(s) Notes Reviewed:      Care Discussed with Consultants/Other Providers:    Sae Vallecillo MD, CMD, FACP    [address] [place] [address]  Office Tel: [phone]  Cell: [phone]

## 2022-05-16 NOTE — PROGRESS NOTE ADULT - PROBLEM SELECTOR PROBLEM 10
JACKELIN on CPAP

## 2022-05-16 NOTE — PROGRESS NOTE ADULT - GASTROINTESTINAL DETAILS
soft/nontender/no guarding/no rigidity
soft/nontender/no guarding/no rigidity
soft/nontender/no distention/no rebound tenderness/no guarding
soft/nontender/no guarding
soft/nontender/no distention/no rebound tenderness/no guarding/no rigidity
soft/nontender/no distention/no rebound tenderness/no guarding
soft/nontender/no distention/no rebound tenderness/no guarding
soft/nontender/no distention/no rebound tenderness/no guarding/no rigidity

## 2022-05-16 NOTE — PROGRESS NOTE ADULT - PROBLEM SELECTOR PLAN 5
CR-1.46 IMPROVING  S/P IVF
Patient with Cr of 2.04, baseline 1.13.  IVF  BMP
CR-1.46 IMPROVING  S/P IVF

## 2022-05-16 NOTE — PROGRESS NOTE ADULT - COMMENTS
poor historian
poor historian but denies complaints
poor historian, cannot get ros
poor historian but denies complaints
poor historian

## 2022-05-16 NOTE — PROGRESS NOTE ADULT - PROBLEM SELECTOR PLAN 2
Uncontrolled  FSSS  Endo eval appreciated
Uncontrolled  FSSS  a1c-9  Endocrine consult-House End[facility]
Uncontrolled  FSSS with coverage  a1c-9  Endocrine f/u noted.
-resolved  -no fever  -monitor cbc
Uncontrolled  FSSS with coverage  a1c-9  Endocrine consult-appreciated.
Uncontrolled  FSSS with coverage  a1c-9  Endocrine consult-appreciated.
Uncontrolled  FSSS with coverage  a1c-9  Endocrine f/u noted.
-resolved  -no fever  -monitor cbc
Uncontrolled  FSSS with coverage  a1c-9  Endocrine f/u noted.
-resolved  -no fever  -monitor cbc
Uncontrolled  FSSS  Endo eval appreciated
Uncontrolled  FSSS with coverage  a1c-9  Endocrine consult-appreciated.
-resolved  -no fever  -monitor cbc
Uncontrolled  FSSS with coverage  a1c-9  Endocrine f/u noted.
Uncontrolled  FSSS with coverage  a1c-9  Endocrine f/u noted.
Uncontrolled  FSSS  a1c-9  Endocrine consult-House End[facility]
Uncontrolled  FSSS with coverage  a1c-9  Endocrine f/u noted.
-resolved  -no fever  -monitor cbc
Uncontrolled  FSSS  a1c-9  Endocrine consult-House Endocrine
-resolved  -no fever  -monitor cbc

## 2022-05-16 NOTE — PROGRESS NOTE ADULT - CONSTITUTIONAL DETAILS
no distress/obese
no distress
no distress/obese
no distress
no distress/obese

## 2022-05-16 NOTE — PROGRESS NOTE ADULT - PROBLEM SELECTOR PROBLEM 9
Prostate cancer

## 2022-05-16 NOTE — PROGRESS NOTE ADULT - ASSESSMENT
80 y/o M with PMH of prostate cancer s/p seed implant, Hypertension, Type 2 diabetes on insulin (has not been taking insulin recently), CAD s/p stent (last stent placed 2 years ago), CVA s/p carotid stent 1 month ago, JACKELIN on CPAP, presents to the ED complaining of altered mental status and dysuria with leukocytosis, urine retention, ACS    Man Craft  Attending Physician   Division of Infectious Disease  Office #[phone]  Available on Microsoft Teams also  After 5pm/weekend or no response, call #[phone]

## 2022-05-16 NOTE — PROGRESS NOTE ADULT - PROBLEM SELECTOR PLAN 9
Continue monitoring.

## 2022-05-16 NOTE — PROGRESS NOTE ADULT - ASSESSMENT
80 y/o M with PMH of prostate cancer s/p seed implant, Hypertension, Type 2 diabetes on insulin (has not been taking insulin recently), CAD s/p stent (last stent placed 2 years ago), CVA s/p carotid stent 1 month ago, JACKELIN on CPAP, presents to the ED w/AMS noted to have UTI. GI consulted for rectal bleed    Rectal Bleed   H/H stable and at baseline   suspect hemorrhoidal irritation d/t hard stools/straining   Anusol Suppository QHS x 10d total  Senna QHS and Miralax BID; give Enema as needed  Monitor stool color  No GI objection to a/c or antiplt therapy w/Protonix daily  High fiber diet    AFib   rate control per cardiology recs  No GI objection to a/c or antiplt therapy w/Protonix daily  monitor cbc and stool color while on blood thinners    UTI   management per medicine team recs    I reviewed the overnight course of events on the unit, re-confirming the patient history. I discussed the care with the patient and their family. The plan of care was discussed with the physician assistant and modifications were made to the notation where appropriate. Differential diagnosis and plan of care discussed with patient after the evaluation. Advanced care planning was discussed with patient and family.  Advanced care planning forms were reviewed and discussed.  Risks, benefits and alternatives of gastroenterologic procedures were discussed in detail and all questions were answered. 35 minutes spent on total encounter of which more than fifty percent of the encounter was spent counseling and/or coordinating care by the attending physician.

## 2022-05-16 NOTE — PROGRESS NOTE ADULT - PROBLEM SELECTOR PLAN 8
Continue Metoprolol with hold parameters.  DASH/TLC diet.

## 2022-05-16 NOTE — PROGRESS NOTE ADULT - SUBJECTIVE AND OBJECTIVE BOX
INTERVAL HPI/OVERNIGHT EVENTS:    confused/comfortable   without new gi events   cbc stable      MEDICATIONS  (STANDING):  apixaban 5 milliGRAM(s) Oral two times a day  atorvastatin 40 milliGRAM(s) Oral at bedtime  bisacodyl 10 milliGRAM(s) Oral at bedtime  clopidogrel Tablet 75 milliGRAM(s) Oral daily  dextrose 5%. 1000 milliLiter(s) (50 mL/Hr) IV Continuous <Continuous>  dextrose 5%. 1000 milliLiter(s) (100 mL/Hr) IV Continuous <Continuous>  dextrose 50% Injectable 25 Gram(s) IV Push once  dextrose 50% Injectable 12.5 Gram(s) IV Push once  dextrose 50% Injectable 25 Gram(s) IV Push once  digoxin     Tablet 125 MICROGram(s) Oral daily  doxazosin 1 milliGRAM(s) Oral at bedtime  glucagon  Injectable 1 milliGRAM(s) IntraMuscular once  hydrocortisone hemorrhoidal Suppository 1 Suppository(s) Rectal at bedtime  insulin glargine Injectable (LANTUS) 16 Unit(s) SubCutaneous at bedtime  insulin lispro (ADMELOG) corrective regimen sliding scale   SubCutaneous three times a day before meals  insulin lispro (ADMELOG) corrective regimen sliding scale   SubCutaneous at bedtime  insulin lispro Injectable (ADMELOG) 6 Unit(s) SubCutaneous three times a day before meals  metoprolol tartrate 50 milliGRAM(s) Oral two times a day  pantoprazole    Tablet 40 milliGRAM(s) Oral before breakfast  polyethylene glycol 3350 17 Gram(s) Oral daily  sodium chloride 0.9% lock flush 3 milliLiter(s) IV Push every 8 hours    MEDICATIONS  (PRN):  dextrose Oral Gel 15 Gram(s) Oral once PRN Blood Glucose LESS THAN 70 milliGRAM(s)/deciliter      Allergies    ceftriaxone (Urticaria)  ciprofloxacin (Rash)  schrimp (Unknown)    Intolerances        Review of Systems:  *limited d/t Confusion     General:  No wt loss, fevers, chills, night sweats, fatigue   Eyes:  Good vision, no reported pain  ENT:  No sore throat, pain, runny nose, dysphagia  CV:  No pain, palpitations, hypo/hypertension  Resp:  No dyspnea, cough, tachypnea, wheezing  GI:  No pain, No nausea, No vomiting, No diarrhea, No constipation, No weight loss, No fever, No pruritis, No rectal bleeding, No melena, No dysphagia  :  No pain, bleeding, incontinence, nocturia  Muscle:  No pain, weakness  Neuro:  No weakness, tingling, memory problems  Psych:  No fatigue, insomnia, mood problems, depression  Endocrine:  No polyuria, polydypsia, cold/heat intolerance  Heme:  No petechiae, ecchymosis, easy bruisability  Skin:  No rash, tattoos, scars, edema      Vital Signs Last 24 Hrs  T(C): 36.7 (16 May 2022 05:20), Max: 37.1 (15 May 2022 17:45)  T(F): 98.1 (16 May 2022 05:20), Max: 98.7 (15 May 2022 17:45)  HR: 87 (16 May 2022 05:20) (78 - 88)  BP: 131/71 (16 May 2022 05:20) (105/50 - 131/79)  BP(mean): --  RR: 17 (16 May 2022 05:20) (16 - 18)  SpO2: 98% (16 May 2022 05:20) (98% - 100%)    PHYSICAL EXAM:    Constitutional: NAD  HEENT: EOMI, throat clear  Neck: No LAD, supple  Respiratory: CTA and P  Cardiovascular: S1 and S2, RRR, no M  Gastrointestinal: BS+, soft, NT/ND, neg HSM,  Extremities: No peripheral edema, neg clubbing, cyanosis  Vascular: 2+ peripheral pulses  Neurological: A/O x 1, no focal deficits  Psychiatric: Normal mood, normal affect ; (+) Confusion   Skin: No rashes      LABS:                        11.8   6.18  )-----------( 320      ( 16 May 2022 07:10 )             36.7     05-16    143  |  108<H>  |  8   ----------------------------<  133<H>  4.0   |  24  |  1.09    Ca    9.0      16 May 2022 07:10  Phos  3.0     05-16  Mg     1.80     05-16                RADIOLOGY & ADDITIONAL TESTS:

## 2022-05-16 NOTE — PROGRESS NOTE ADULT - PROBLEM SELECTOR PROBLEM 7
Lactate blood increase

## 2022-05-16 NOTE — CHART NOTE - NSCHARTNOTEFT_GEN_A_CORE
Spoke w/ patient's family member, son Jatin ([phone]), and updated to current care plan. All other medical questions were answered to the best of my ability and teach-back offered with demonstrated understanding.    Per Jatin, patient does not have a chronic Guevara & it was placed in the hospital. Furthermore, he does not know the CPAP settings off-hand but will try to obtain them for us and let the floor staff know.    Colby Billingsley PA-C  Medicine ACP, pgr [phone].

## 2022-05-16 NOTE — PROGRESS NOTE ADULT - PROBLEM SELECTOR PLAN 10
CPAP at night prn
CPAP at night prn-patient refused
CPAP at night prn
CPAP at night prn-patient refused
CPAP at night prn
CPAP at night prn-patient refused
CPAP at night prn

## 2022-05-17 NOTE — DISCHARGE NOTE NURSING/CASE MANAGEMENT/SOCIAL WORK - NSDCCRNAME_GEN_ALL_CORE_FT
[facility] ([address] [place], [place] [id number])   Transportation arranged with [facility] ([phone]) for 3pm ; Trip # [id number]

## 2022-05-17 NOTE — PROGRESS NOTE ADULT - NS ATTEND AMEND GEN_ALL_CORE FT
DM2 agree with basal bolus insulin plus metformin for dc as outlined.    Tiffani Mahmood MD  Division of Endocrinology  Pager: [phone]    If after 6PM or before 9AM, or on weekends/holidays, please call endocrine answering service for assistance ([phone]).  For nonurgent matters email [email or web address] for assistance.

## 2022-05-17 NOTE — PROGRESS NOTE ADULT - ASSESSMENT
80 y/o M with PMH of prostate cancer s/p seed implant, Hypertension, Type 2 diabetes on insulin (has not been taking insulin recently), CAD s/p stent (last stent placed 2 years ago), CVA s/p carotid stent 1 month ago, JACKELIN on CPAP, presents to the ED complaining of altered mental status and dysuria also found to have nstemi    endocrine called for further assistance   a1c 9.2   HOME MEDS: Soliqua 28units sq qhs , Novolog correction scale above 200 (2 Units) Above 300 (3 units), Metformin 500mg p.o. qd    1. DM 2  Target A1c 6-7%; A1c currently A1c 9.2 %  While inpatient  BG target 100-180 mg/dl, remains above goal  Glucose stable today  - Continue Lantus 18 units SQ qhs (please give 80% of dose if NPO   - Continue Admelog 7 units SQ Premeal/TIDAC (please hold if NPO/not eating)  - Continue Admelog  Low dose Correction Scale Premeal & seperate low dose  Correction Scale Bedtime   - Hypoglycemia protocol in place   - Carb Consistent Diet   - Nutrition consult when his clinical status is improved   -provider to RN for insulin pen teaching to son (RN made aware); Please teach daily    Dc Planning:  Dc to Rehab on Lantus 18 units sq qhs and Novolog 7 units TID AC (please hold if not eating) and metformin 500mg p.o. daily (please take with food)  Upon discharge from Rehab please change Lantus to Soliqua Pen and Novolog Flex Pen (as this is pt home regimen); plus metformin 500mg P.O. Daily  (please take with food); Doses to be determined based on insulin requirements at rehab  Please note Soliqua basal plus GLP1 please do not prescribe GLP1 separately  Patient had UTI, so would not prescribe SGLT2 as there is a risk for UTI's   Son David was taught by RN and feels comfortable administering insulin pen to pt. He states he is able to give his dad insulin 4 times a day if needed  As per Son pt has a private Endocrinologist at [facility]. Dr. Huber. Pt will continue to follow up with private Endocrinologist.  Discussed plan with paulo Steiner [phone]    2. HTN  goal BP in DM <130/80  mamagement as per the primary team    3. HLD  pt is on lipitor 40mg   outpt lipid profile     Perlita Quintana  Nurse Practitioner  Division of Endocrinology & Diabetes  In house pager #[phone]    If before 9AM or after 6PM, or on weekends/holidays, please call endocrine answering service for assistance ([phone]).For nonurgent matters email [email or web address] for assistance.

## 2022-05-17 NOTE — PROGRESS NOTE ADULT - REASON FOR ADMISSION
NSTEMI, Hyperglycemia, SANDRA, Encephalopathy

## 2022-05-17 NOTE — PROGRESS NOTE ADULT - PROBLEM SELECTOR PROBLEM 1
NSTEMI (non-ST elevation myocardial infarction)
Urine retention
Type 2 diabetes mellitus with hyperglycemia
Type 2 diabetes mellitus with hyperglycemia
Urine retention
Type 2 diabetes mellitus with hyperglycemia
NSTEMI (non-ST elevation myocardial infarction)
Urine retention
Urine retention
NSTEMI (non-ST elevation myocardial infarction)
Urine retention

## 2022-05-17 NOTE — PROGRESS NOTE ADULT - ASSESSMENT
80 y/o M with PMH of prostate cancer s/p seed implant, Hypertension, Type 2 diabetes on insulin (has not been taking insulin recently), CAD s/p stent (last stent placed 2 years ago), CVA s/p carotid stent 1 month ago, JACKELIN on CPAP, presents to the ED w/AMS noted to have UTI. GI consulted for rectal bleed    Rectal Bleed   H/H stable and at baseline   suspect hemorrhoidal irritation d/t hard stools/straining   Anusol Suppository QHS x 10d total  Canasa supp. x 7d course  Senna QHS and Miralax BID; give Enema as needed  Monitor stool color  No GI objection to a/c or antiplt therapy w/Protonix daily  High fiber diet    AFib   rate control per cardiology recs  No GI objection to a/c or antiplt therapy w/Protonix daily  monitor cbc and stool color while on blood thinners    UTI   management per medicine team recs    I reviewed the overnight course of events on the unit, re-confirming the patient history. I discussed the care with the patient and their family. The plan of care was discussed with the physician assistant and modifications were made to the notation where appropriate. Differential diagnosis and plan of care discussed with patient after the evaluation. Advanced care planning was discussed with patient and family.  Advanced care planning forms were reviewed and discussed.  Risks, benefits and alternatives of gastroenterologic procedures were discussed in detail and all questions were answered. 35 minutes spent on total encounter of which more than fifty percent of the encounter was spent counseling and/or coordinating care by the attending physician.

## 2022-05-17 NOTE — PROGRESS NOTE ADULT - SUBJECTIVE AND OBJECTIVE BOX
INTERVAL HPI/OVERNIGHT EVENTS:    agitated this morning, am labs were not unobtainable   without new gi events; no rectal bleeding reported per d/w bedside 1:1 PCA    MEDICATIONS  (STANDING):  apixaban 5 milliGRAM(s) Oral two times a day  atorvastatin 40 milliGRAM(s) Oral at bedtime  bisacodyl 10 milliGRAM(s) Oral at bedtime  cefpodoxime 200 milliGRAM(s) Oral every 12 hours  clopidogrel Tablet 75 milliGRAM(s) Oral daily  dextrose 5%. 1000 milliLiter(s) (100 mL/Hr) IV Continuous <Continuous>  dextrose 5%. 1000 milliLiter(s) (50 mL/Hr) IV Continuous <Continuous>  dextrose 50% Injectable 25 Gram(s) IV Push once  dextrose 50% Injectable 12.5 Gram(s) IV Push once  dextrose 50% Injectable 25 Gram(s) IV Push once  digoxin     Tablet 125 MICROGram(s) Oral daily  doxazosin 1 milliGRAM(s) Oral at bedtime  glucagon  Injectable 1 milliGRAM(s) IntraMuscular once  hydrocortisone hemorrhoidal Suppository 1 Suppository(s) Rectal at bedtime  insulin glargine Injectable (LANTUS) 18 Unit(s) SubCutaneous at bedtime  insulin lispro (ADMELOG) corrective regimen sliding scale   SubCutaneous three times a day before meals  insulin lispro (ADMELOG) corrective regimen sliding scale   SubCutaneous at bedtime  insulin lispro Injectable (ADMELOG) 7 Unit(s) SubCutaneous three times a day before meals  mesalamine Suppository 1000 milliGRAM(s) Rectal at bedtime  metoprolol tartrate 50 milliGRAM(s) Oral two times a day  pantoprazole    Tablet 40 milliGRAM(s) Oral before breakfast  polyethylene glycol 3350 17 Gram(s) Oral daily  sodium chloride 0.9% lock flush 3 milliLiter(s) IV Push every 8 hours    MEDICATIONS  (PRN):  dextrose Oral Gel 15 Gram(s) Oral once PRN Blood Glucose LESS THAN 70 milliGRAM(s)/deciliter      Allergies    ceftriaxone (Urticaria)  ciprofloxacin (Rash)  schrimp (Unknown)    Intolerances        Review of Systems: *confusion     General:  No wt loss, fevers, chills, night sweats, fatigue   Eyes:  Good vision, no reported pain  ENT:  No sore throat, pain, runny nose, dysphagia  CV:  No pain, palpitations, hypo/hypertension  Resp:  No dyspnea, cough, tachypnea, wheezing  GI:  No pain, No nausea, No vomiting, No diarrhea, No constipation, No weight loss, No fever, No pruritis, No rectal bleeding, No melena, No dysphagia  :  No pain, bleeding, incontinence, nocturia  Muscle:  No pain, weakness  Neuro:  No weakness, tingling, memory problems  Psych:  No fatigue, insomnia, mood problems, depression  Endocrine:  No polyuria, polydypsia, cold/heat intolerance  Heme:  No petechiae, ecchymosis, easy bruisability  Skin:  No rash, tattoos, scars, edema      Vital Signs Last 24 Hrs  T(C): 36.9 (17 May 2022 05:03), Max: 37 (16 May 2022 12:52)  T(F): 98.4 (17 May 2022 05:03), Max: 98.6 (16 May 2022 12:52)  HR: 83 (17 May 2022 05:03) (82 - 89)  BP: 127/72 (17 May 2022 05:03) (127/70 - 136/78)  BP(mean): --  RR: 18 (17 May 2022 05:03) (18 - 18)  SpO2: 100% (17 May 2022 05:03) (99% - 100%)    PHYSICAL EXAM:    Constitutional: NAD  HEENT: EOMI, throat clear  Neck: No LAD, supple  Respiratory: CTA and P  Cardiovascular: S1 and S2, RRR, no M  Gastrointestinal: BS+, soft, NT/ND, neg HSM,  Extremities: No peripheral edema, neg clubbing, cyanosis  Vascular: 2+ peripheral pulses  Neurological: A/O x 1-2, no focal deficits  Psychiatric: Normal mood, normal affect  Skin: No rashes      LABS:                        11.8   6.18  )-----------( 320      ( 16 May 2022 07:10 )             36.7     05-16    143  |  108<H>  |  8   ----------------------------<  133<H>  4.0   |  24  |  1.09    Ca    9.0      16 May 2022 07:10  Phos  3.0     05-16  Mg     1.80     05-16            RADIOLOGY & ADDITIONAL TESTS:

## 2022-05-17 NOTE — CHART NOTE - NSCHARTNOTESELECT_GEN_ALL_CORE
ACP NP/Event Note
Event Note
Nutrition Follow Up/Nutrition Services
ACP NP/Event Note
ACP NP/Event Note
ACP/Event Note
Event Note

## 2022-05-17 NOTE — CHART NOTE - NSCHARTNOTEFT_GEN_A_CORE
Spoke w/ patient's family member, paulo Steiner @ bedside, and updated to current care plan. All other medical questions were answered to the best of my ability and teach-back offered with demonstrated understanding.    Colby Billingsley PA-C  Medicine ACP, pgr [phone]  Available on [employer] Teams

## 2022-05-17 NOTE — PROGRESS NOTE ADULT - PROBLEM SELECTOR PROBLEM 2
Hypertension
Leukocytosis
Hypertension
Hypertension
Leukocytosis
Hypertension
Hypertension
Type 2 diabetes mellitus with hyperglycemia
Leukocytosis
Type 2 diabetes mellitus with hyperglycemia
Leukocytosis
Type 2 diabetes mellitus with hyperglycemia
Leukocytosis
Type 2 diabetes mellitus with hyperglycemia
Leukocytosis

## 2022-05-17 NOTE — PROGRESS NOTE ADULT - SUBJECTIVE AND OBJECTIVE BOX
Chief Complaint: DM 2    History: Pt seen at bedside. Pt tolerating oral intake. As per RN pt is eating adequately. As per RN pt has no signs of nausea and vomiting/any signs of hypoglycemia.     MEDICATIONS  (STANDING):  apixaban 5 milliGRAM(s) Oral two times a day  atorvastatin 40 milliGRAM(s) Oral at bedtime  bisacodyl 10 milliGRAM(s) Oral at bedtime  cefpodoxime 200 milliGRAM(s) Oral every 12 hours  clopidogrel Tablet 75 milliGRAM(s) Oral daily  dextrose 5%. 1000 milliLiter(s) (100 mL/Hr) IV Continuous <Continuous>  dextrose 5%. 1000 milliLiter(s) (50 mL/Hr) IV Continuous <Continuous>  dextrose 50% Injectable 25 Gram(s) IV Push once  dextrose 50% Injectable 12.5 Gram(s) IV Push once  dextrose 50% Injectable 25 Gram(s) IV Push once  digoxin     Tablet 125 MICROGram(s) Oral daily  doxazosin 1 milliGRAM(s) Oral at bedtime  glucagon  Injectable 1 milliGRAM(s) IntraMuscular once  hydrocortisone hemorrhoidal Suppository 1 Suppository(s) Rectal at bedtime  insulin glargine Injectable (LANTUS) 18 Unit(s) SubCutaneous at bedtime  insulin lispro (ADMELOG) corrective regimen sliding scale   SubCutaneous three times a day before meals  insulin lispro (ADMELOG) corrective regimen sliding scale   SubCutaneous at bedtime  insulin lispro Injectable (ADMELOG) 7 Unit(s) SubCutaneous three times a day before meals  mesalamine Suppository 1000 milliGRAM(s) Rectal at bedtime  metoprolol tartrate 50 milliGRAM(s) Oral two times a day  pantoprazole    Tablet 40 milliGRAM(s) Oral before breakfast  polyethylene glycol 3350 17 Gram(s) Oral daily  sodium chloride 0.9% lock flush 3 milliLiter(s) IV Push every 8 hours    MEDICATIONS  (PRN):  dextrose Oral Gel 15 Gram(s) Oral once PRN Blood Glucose LESS THAN 70 milliGRAM(s)/deciliter    Allergies: ceftriaxone (Urticaria)  ciprofloxacin (Rash)  shrimp (Unknown)    Review of Systems:  Unable to obtain     PHYSICAL EXAM:  VITALS: T(C): 36.5 (05-17-22 @ 14:13)  T(F): 97.7 (05-17-22 @ 14:13), Max: 98.4 (05-16-22 @ 22:00)  HR: 83 (05-17-22 @ 14:13) (82 - 88)  BP: 148/71 (05-17-22 @ 14:13) (127/70 - 148/71)  RR:  (18 - 18)  SpO2:  (99% - 100%)  Wt(kg): --  RESPIRATORY: No labored breathing   GI: Soft, nontender, non distended  PSYCH: Alert and oriented x 1-2      CAPILLARY BLOOD GLUCOSE  POCT Blood Glucose.: 181 mg/dL (17 May 2022 11:16)  POCT Blood Glucose.: 112 mg/dL (17 May 2022 07:07)  POCT Blood Glucose.: 130 mg/dL (16 May 2022 20:39)  POCT Blood Glucose.: 147 mg/dL (16 May 2022 16:50)    A1C with Estimated Average Glucose in AM (05.03.22 @ 07:39)    A1C with Estimated Average Glucose Result: 9.2      05-16    143  |  108<H>  |  8   ----------------------------<  133<H>  4.0   |  24  |  1.09    eGFR: 68    Ca    9.0      05-16  Mg     1.80     05-16  Phos  3.0     05-16            Thyroid Function Tests:  05-03 @ 07:39 TSH 2.34 FreeT4 -- T3 -- Anti TPO -- Anti Thyroglobulin Ab -- TSI --

## 2022-05-17 NOTE — CHART NOTE - NSCHARTNOTEFT_GEN_A_CORE
On 5/17 this case was reviewed with Dr. Vallecillo, the patient is medically stable and optimized for discharge to rehab. All medications were reviewed and prescriptions were adjusted accordingly.     Colby Billingsley PA-C  Medicine ACP, pgr [phone]  Available on [employer] Teams

## 2022-05-17 NOTE — PROGRESS NOTE ADULT - PROBLEM SELECTOR PROBLEM 3
HLD (hyperlipidemia)
Infectious encephalopathy
Infectious encephalopathy
Encephalopathy
HLD (hyperlipidemia)
Infectious encephalopathy
HLD (hyperlipidemia)
Infectious encephalopathy
Encephalopathy
Infectious encephalopathy
Encephalopathy
Encephalopathy

## 2022-05-17 NOTE — DISCHARGE NOTE NURSING/CASE MANAGEMENT/SOCIAL WORK - PATIENT PORTAL LINK FT
You can access the FollowMyHealth Patient Portal offered by [facility] by registering at the following website: [email or web address]. By joining [employer]’s FollowMyHealth portal, you will also be able to view your health information using other applications (apps) compatible with our system.

## 2022-05-17 NOTE — PROGRESS NOTE ADULT - PROVIDER SPECIALTY LIST ADULT
Cardiology
Endocrinology
Gastroenterology
Infectious Disease
Cardiology
Gastroenterology
Gastroenterology
Infectious Disease
Cardiology
Endocrinology
Gastroenterology
Gastroenterology
Cardiology
Gastroenterology
Gastroenterology
Internal Medicine
Endocrinology
Internal Medicine
Endocrinology
Internal Medicine
Endocrinology
Infectious Disease
Internal Medicine
Infectious Disease
Infectious Disease
Internal Medicine
Infectious Disease
Internal Medicine

## 2022-05-17 NOTE — CHART NOTE - NSCHARTNOTEFT_GEN_A_CORE
Patient last seen by RDN on 5/9, now for nutrition follow up. Spoke with pt and obtained subjective information from extensive chart review.     Current Diet : Diet, Consistent Carbohydrate/No Snacks:   DASH/TLC {Sodium & Cholesterol Restricted} (DASH)  Minced and Moist (MINCEDMOIST) (05-02-22 @ 21:48)  PO intake:  ~%     Current Weight: No current weights available  Height (cm): 175.3 (05-02 @ 13:47)  Weight (kg): 84.5 (05-02 @ 13:47)  BMI (kg/m2): 27.5 (05-02 @ 13:47)    Nutrition Interval Events: Pt says she has been eating pretty well. No GI distress with last BM 5/16. Pt awaiting d/c to Rehab. No edema nor pressure injuries identified at this time. Pt without additional nutrition related issues or concerns at this time. RDN services to remain available as needed.     __________________ Pertinent Medications__________________   MEDICATIONS  (STANDING):  apixaban 5 milliGRAM(s) Oral two times a day  atorvastatin 40 milliGRAM(s) Oral at bedtime  bisacodyl 10 milliGRAM(s) Oral at bedtime  cefpodoxime 200 milliGRAM(s) Oral every 12 hours  clopidogrel Tablet 75 milliGRAM(s) Oral daily  dextrose 5%. 1000 milliLiter(s) (100 mL/Hr) IV Continuous <Continuous>  dextrose 5%. 1000 milliLiter(s) (50 mL/Hr) IV Continuous <Continuous>  dextrose 50% Injectable 25 Gram(s) IV Push once  dextrose 50% Injectable 12.5 Gram(s) IV Push once  dextrose 50% Injectable 25 Gram(s) IV Push once  digoxin     Tablet 125 MICROGram(s) Oral daily  doxazosin 1 milliGRAM(s) Oral at bedtime  glucagon  Injectable 1 milliGRAM(s) IntraMuscular once  hydrocortisone hemorrhoidal Suppository 1 Suppository(s) Rectal at bedtime  insulin glargine Injectable (LANTUS) 18 Unit(s) SubCutaneous at bedtime  insulin lispro (ADMELOG) corrective regimen sliding scale   SubCutaneous three times a day before meals  insulin lispro (ADMELOG) corrective regimen sliding scale   SubCutaneous at bedtime  insulin lispro Injectable (ADMELOG) 7 Unit(s) SubCutaneous three times a day before meals  mesalamine Suppository 1000 milliGRAM(s) Rectal at bedtime  metoprolol tartrate 50 milliGRAM(s) Oral two times a day  pantoprazole    Tablet 40 milliGRAM(s) Oral before breakfast  polyethylene glycol 3350 17 Gram(s) Oral daily  sodium chloride 0.9% lock flush 3 milliLiter(s) IV Push every 8 hours    MEDICATIONS  (PRN):  dextrose Oral Gel 15 Gram(s) Oral once PRN Blood Glucose LESS THAN 70 milliGRAM(s)/deciliter      __________________ Pertinent Labs__________________   05-16 Na143 mmol/L Glu 133 mg/dL<H> K+ 4.0 mmol/L Cr  1.09 mg/dL BUN 8 mg/dL 05-16 Phos 3.0 mg/dL 05-11 Alb 2.9 g/dL<L> 05-03 Chol 131 mg/dL LDL --    HDL 38 mg/dL<L> Trig 143 mg/dL      Estimated Needs:   [x] no change since previous assessment      Previous Nutrition Diagnosis:     Altered Nutrition Related Labs    Nutrition Diagnosis is [x] ongoing        Goal(s):  1. Patient to meet > 75% estimated energy needs    Recommendations:   1. Continue nutrition plan of care as ordered.    Monitoring and Evaluation:   1. Monitor weights, labs, BMs, skin integrity, PO intake and edema.  2. RD services to remain available. Request obtaining new weight to assess trend and determine adequacy of PO intake.

## 2022-06-14 NOTE — BH CONSULTATION LIAISON PROGRESS NOTE - NSBHMSEAFFRANGE_PSY_A_CORE
[facility] [employer] Progress Note      2022    Talat Camara    :  1943    MRN:  [id number]    Referring MD: Danny Sarah MD  [address], [address], [address],  [address]      Subjective: Patient feels well today, no fevers, no confusion, cough much better, eating and drinking well. No complaints at this time. ECOG PS:  (1) Restricted in physically strenuous activity, ambulatory and able to do work of light nature    KPS: 80% Normal activity with effort; some signs or symptoms of disease    Isolation:  None     Medications    Scheduled Meds:    Continuous Infusions:    PRN Meds:. ROS:  As noted above, otherwise remainder of 10-point ROS negative      Physical Exam:     Vital Signs:  BP (!) 124/55   Pulse 64   Temp 97.6 °F (36.4 °C) (Oral)   Resp 16   Wt 175 lb 7.8 oz (79.6 kg)   SpO2 98%   BMI 23.80 kg/m²     Weight:    Wt Readings from Last 3 Encounters:   22 175 lb 7.8 oz (79.6 kg)   22 176 lb 5.9 oz (80 kg)   22 177 lb 11.1 oz (80.6 kg)       General: Awake, alert and oriented    HEENT: normocephalic, alopecia, PERRL, no scleral erythema or icterus, Oral mucosa moist and intact, throat clear.    NECK: supple without palpable adenopathy  BACK: Straight, negative CVAT  SKIN: warm dry and intact without lesions rashes or masses  CHEST: CTA bilaterally without use of accessory muscles  CV: Normal S1 S2, RRR, no MRG  ABD: NT ND normoactive BS, no palpable masses or hepatosplenomegaly  EXTREMITIES: chronic edema in LLE, denies calf tenderness  NEURO: CN II - XII grossly intact  CATHETER:  Left IJ PAC (22, [facility]): CDI    Laboratory Data:  CBC:   Recent Labs     22  0915 22  0915 22  0850   WBC 4.1 4.1 4.3   HGB 9.2* 9.1* 9.4*   HCT 27.1* 27.0* 27.5*   MCV 95.8 96.5 95.6   * 101* 110*     BMP/Mag:  Recent Labs     22  0915 22  0853 22  0850    139 139   K 3.8 4.0 3.9    102 103   CO2 26 26 27 1. Previously described lymphadenopathy in FDG activity in the left retroperitoneum, left iliac region and in left upper thigh has completely resolved. No residual lymphadenopathy or FDG avid lymph node identified      PLAN:   S/p lymphodepleting Chemotherapy w/ Fludarabine and cyclophosphamide & CAR-T w/ Vercristofer Rater (5/23/22)      XBN + 79     2.  CRS / Neuro:   - Ferritin & CRP trending down   - H/o Grade 1 CRS  - Monitor CRP and Ferrtin closely       Lab Results   Component Value Date    CRP 5.4 (H) 06/14/2022    CRP 8.0 (H) 06/13/2022    CRP 13.6 (H) 06/12/2022     Lab Results   Component Value Date    FERRITIN 556.5 (H) 06/14/2022    FERRITIN 562.0 (H) 06/13/2022    FERRITIN 577.7 (H) 06/12/2022     - Neuro checks w/ CARTOX 10-point assessment Q4hrs    Toxicity Grading    CRS stGstrstastdstest:st st1st ICANS stGstrstastdstest:st st1st ICE Score:  CAR-T Score: 10    3.  ID:  Afebrile, recently admitted with sepsis (POA) r/t Enterobacter bacteremia, fever from CRS d/t CAR-T was ruled out. - CXR & UA 6/13/22: Negative   - Restart Levaquin and Diflucan ppx if ANC < 1.5  - RR Viral Panel (5/28/22): Parainfluenza 3 Virus  - Blood Cx x 2 bottles (6/7/22): Enterobacter   - LA (6/7/22): 2.6 & Procalcitonin - 47, 90 (6/8/22); trended down  - Cont Acyclovir ppx  - Cont daily Ertapenem (6/10/22) Day +  8/14 of total therapy     Abx history:  Merrem x 2 days (6/8/22-6/9/22)  Vanco x 2 days (6/7/22)  Vickki Lowers & Cefepime Day x 1 day (6/7/22)     4.  Heme: Anemia and thrombocytopenia from chemotherapy   - Transfuse for Hgb < 7 and Platelets < 62B  - No transfusion today     5.  Metabolic: Hyperglycemia otherwise stable electrolytes & renal fx  TLS:  No evidence, s/p allopurinol (stopped  6/2/22)  -1 L NS bolus daily     6. Cardiac:   - H/o A. Fib w/ RVR (5/28/22)  - Echo (3/21/22):  Centric mild left ventricular hypertrophy.  Left ventricular ejection fraction 55 to 60% with no regional wall motion abnormalities noted.  Intermittent diastolic dysfunction. A. Fib:    - Cont Metoprolol 25 mg BID (started 5/29/22)  - No indication for AC at this time (per cardiology)     7. GI / Nutrition:    Nutrition: Appetite and oral intake is good. - S/p taking Astragalus supplement (stopped 5/18/22 d/t interaction with Cytoxan) - can resume on d/c   - Cont low microbial diet   - Follow closely with dietary       - Disposition:  Will be seen daily in Outpatient Infusion for Invanz, CAR-T assessment, labs and MD visit.     JOAQUIM Yadav - NP Full

## 2022-08-16 NOTE — ED ADULT NURSE NOTE - OBJECTIVE STATEMENT
80 y/o M presents to ED room 9 A&Ox4 c/o constipation. Pt last true BM was 10 days ago, pt states he moved a very "small about" of bowels on sunday and it was black in color. abd appears distended, nontender upon palpation. pt endorses diffuse pain an abd and epigastric area since last night, and SOB. as per pt, eating and drinking makes pain worse in stomach. denies N/V/D, headaches, dizziness, dysuria. PMHx of DM, HTN and prostate CA with seeds. labd drawn and sent. pt placed on continuous monitor. awaiting further orders. safety maintained, call bell within reach. son at bedside.

## 2022-08-16 NOTE — ED ADULT NURSE REASSESSMENT NOTE - NS ED NURSE REASSESS COMMENT FT1
Break RN note- patient resting quietly in bed, breathing even and nonlabored. No acute distressed. Cardiac monitor in place- sinus tach. Patient appears comfortable. Safety maintained. Patient stable upon exiting the room.

## 2022-08-16 NOTE — ED PROVIDER NOTE - NS ED ROS FT
CONSTITUTIONAL - No fever, No diaphoresis, No weight change  SKIN - No rash  HEMATOLOGIC - No abnormal bleeding or bruising  EYES -  No blurred vision  ENT - No sore throat, No neck pain, No rhinorrhea  RESPIRATORY - No cough  CARDIAC -No chest pain, No palpitations  GI - as per HPI  - No dysuria, no frequency, no hematuria.   MUSCULOSKELETAL - No joint pain, No swelling, No back pain  NEUROLOGIC - No numbness, No focal weakness, No headache, No dizziness

## 2022-08-16 NOTE — ED PROVIDER NOTE - PROGRESS NOTE DETAILS
Pt walked to bathroom and became acutely SOB, RR increased HR 140s sinus tachy on EKG. Pt walked to bathroom and became acutely SOB, RR increased HR 140s sinus tachy on EKG. will put in cta. Jojo FIGUEROA PGY2: rpt labs drawn from IV with ivf running in same arm values likely not accurate 2/2 dilution. will get rpt labs off new line placed in other arm. trop elevated to 400s will rpt trop and ekg. Jojo FIGUEROA PGY2: spoke to cards who recommends treating like NSTEMI in setting of elevated trop. will start asa brilinta and heparain. awaiting cta. Jojo FIGUEROA PGY2: pt again had HR into 140s after getting up to bathroom. rpt ekg c/f delta waves. spoke to cardiology who recommends against any meds instead wait for HR to go back down.     Wet read from CTA no PE will admit to hospitalist. Jojo FIGUEROA PGY2: pt again had HR into 140s after getting up to bathroom. rpt ekg c/f delta waves. spoke to cardiology who recommends against any meds instead wait for HR to go back down.     Wet read from CTA no PE will admit to hospitalist. spoke to hospitalist who will admit pt.

## 2022-08-16 NOTE — ED PROVIDER NOTE - OBJECTIVE STATEMENT
Patient is an 83 yo M with PMH HTN, DM, CAD s/p stent, CVA s/p carotid stent, JACKELIN on CPAP, prostate CA s/p seeding, presenting with constipation. Constipation began 10 days ago, was initially passing gas, now with no gas passage over last 3 days, passed gas once here in ED. Past 3 days with burning abdominal pain, intermittent, nonradiating, unrelated to meals. Associated with increased belching, no vomiting. Stool passage past 10 days has been limited to very small amounts of black sticky stool, none past 3 days. Espouses abdominal distension in this time and reduced appetite. History of gallbladder removal, uncomplicated. Denies fever, chills, dysuria, CP.

## 2022-08-16 NOTE — ED ADULT TRIAGE NOTE - CHIEF COMPLAINT QUOTE
Pt c/o constipation, abd distention and SOB due to it (as well as rhinorrhea). LBM 10 days ago, states if he goes, it's in scant amounts. Denies urinary complaints. Hx of prostate cancer.

## 2022-08-16 NOTE — ED PROVIDER NOTE - PHYSICAL EXAMINATION
CONSTITUTIONAL: Well-developed; well-nourished; in no acute distress.   SKIN: warm, dry  HEAD: Normocephalic; atraumatic.  EYES: no conjunctival injection. no scleral icterus  ENT: No nasal discharge; airway clear.  NECK: Supple; good ROM  CARD: S1, S2 normal; no murmurs, gallops, or rubs. Regular rate and rhythm.   RESP: No wheezes, rales or rhonchi. Good air movement bilaterally.   ABD: soft, +distension, +diffuse TTP mild, no rebound  Rectal: no stool in rectal vault, good tone, no gross blood  EXT: No cyanosis or edema.   NEURO: Alert, oriented, grossly unremarkable  PSYCH: Cooperative, appropriate.

## 2022-08-16 NOTE — ED ADULT NURSE REASSESSMENT NOTE - NS ED NURSE REASSESS COMMENT FT1
Pt currently has a HR in the 140s. Pt is a&ox3, spontaneous respirations and equal chest rise. Sinus tachycardia on the cardiac monitor. Pt is endorsing 7/10 epigastric pain as well as some SOB. Pt is currently on 2L o2 per nasal cannula for comfort. IV morphine, IV pepcid and maalox given per MD's order. Pt's daughter at bedside. Bed locked and in lowest position, call bell within reach and side rails up for safety. Will continue to closely monitor pt.

## 2022-08-16 NOTE — ED PROVIDER NOTE - CLINICAL SUMMARY MEDICAL DECISION MAKING FREE TEXT BOX
Patient is an 83 yo M with PMH HTN, DM, CAD s/p stent, CVA s/p carotid stent, JACKELIN on CPAP, prostate CA s/p seeding, presenting with constipation. Patient now espousing obstipation, must rule out bowel obstruction with CTAP. Other likely causes of symptoms include functional constipation vs. mechanical impaction from prostate, though less likely with no stool in rectal vault on exam. Labs to evaluate for anemia, electrolytes, lactate given constipation and decreased appetite. Currently with vss. To reassess following labs and imaging.

## 2022-08-16 NOTE — ED ADULT NURSE REASSESSMENT NOTE - NS ED NURSE REASSESS COMMENT FT1
upon reassessment of pt, , pt felt SOB and c/o abd pain. MD made aware. stat EKG ordered. medicated pt as per MD orders. awaiting further instruction

## 2022-08-16 NOTE — ED PROVIDER NOTE - ATTENDING CONTRIBUTION TO CARE
Attending note:   After face to face evaluation of this patient, I concur with above noted hx, pe, and care plan for this patient.  Newsome: 82 yom with HTN, DM, CAD with stent complaining of 10 days of constipation. Pt prior has multiple BMs daily. Pt also no longer passing gas all day but may have done so while in ED. Pt notes some abdominal pain, No CP, Pt states abd is larger and causing SOB. Pt denies fever or urinary sxs. Pt is in no distress, clear lungs, RRR, abd soft with mild distention, +BS, +tn mainly at epigastrium. no pitting edema.  Pt with constipation, hx of abd surgery and concern for SBO - labs, CT, UA, pain meds and reassess.

## 2022-08-17 PROBLEM — Z00.00 ENCOUNTER FOR PREVENTIVE HEALTH EXAMINATION: Status: ACTIVE | Noted: 2022-01-01

## 2022-08-17 NOTE — H&P ADULT - NS ATTEND AMEND GEN_ALL_CORE FT
82 year old male with CAD s/p prior stents x 3, afib (on Eliquis), DM2, HTN, CVA, carotid stenosis s/p stent, JACKELIN on CPAP, prostate ca presented with dyspnea and constipation found to have elevated troponins concerning for NSTEMI and also clinical signs of hypervolemia, hospital course complicated by SVT which has since stabilized. Cardiology following, input appreciated. Started on heparin drip and s/p asa/brilinta. Receiving IV Lasix with clinical improvement. Awaiting TTE. Also with ureteritis on CT, +symptoms and UA with possible infection, started on bactrim given prior cipro/ctx allergy (previously tolerated bactrim), f/u UCx.  Remains full code.

## 2022-08-17 NOTE — CONSULT NOTE ADULT - PROBLEM SELECTOR RECOMMENDATION 9
Elevated Troponin  Admit to Telemetry   Heparin gtt ACS protocol  Aspirin Load 324mg now then Aspirin 81mg daily  Brilinta Load 180mg now then Brilinta 90mg BID  Atorvastatin 80mg now then Atorvastatin 80mg daily  Lasix 40mg IVP now  Strict I+O  Trend Troponin CK, CK-MG q8h with serial ECG  Daily CBC, BMP, PT/INR/PTT  HgA1c, TSH, Lipid Profile and T+S  TTE to evaluate LV and Valvular function  Obtain records from private Cardiologist Dr. Blanchard  Elevated Troponin with SOB and history of ACS   Initial read of CTA Chest negative for PE  Admit to Telemetry   Heparin gtt ACS protocol  Aspirin Load 324mg now then Aspirin 81mg daily  Brilinta Load 180mg now then Brilinta 90mg BID  Atorvastatin 80mg now then Atorvastatin 80mg daily  Lasix 40mg IVP now  Strict I+O  Monitor electrolytes q12h and replete to maintain K >4 and Mag >2  Trend Troponin CK, CK-MG q8h with serial ECG  Daily CBC, BMP, PT/INR/PTT  HgA1c, TSH, Lipid Profile and T+S  TTE to evaluate LV and Valvular function  Obtain records from private Cardiologist Dr. Blanchard   Cardiology to follow

## 2022-08-17 NOTE — H&P ADULT - PROBLEM SELECTOR PLAN 3
- Reported 10 days of decreased BM with small black sticky stools followed by 3 days of no BM  - Occult stool negative  - CT of A/P without obstruction or significant stool burden    #Left ureteritis  - CT A/P did reveal possible left ureteritis and UA with large leuks and pyuria - will treat with ceftriaxone for now.   - Urine Cx sent - Reported 10 days of decreased BM with small black sticky stools followed by 3 days of no BM  - Occult stool negative  - CT of A/P without obstruction or significant stool burden    #Left ureteritis  - CT A/P did reveal possible left ureteritis and UA with large leuks and pyuria - will ...  - Urine Cx sent - Reported 10 days of decreased BM with small black sticky stools followed by 3 days of no BM  - Occult stool negative  - CT of A/P without obstruction or significant stool burden    #Left ureteritis  - CTAP suggesting left ureteritis. Reports some burning with urination. UA with LE. Will start bactrim at this time, f/u urine culture (developed rash/hives during prior hospitalization after receiving cipro and CTX, tolerated bactrim in the past without issue).

## 2022-08-17 NOTE — H&P ADULT - ASSESSMENT
82 y.o M pmh of HTN, DM2, CAD s/p Stent x3, Atrial fibrillation on Eliqius, CVA 2/2 symptomatic carotid stenosis s/p stent, JACKELIN on CPAP, prostate ca s/p seeding presents for for constipation and SOB found to have NSTEMI and acute heart failure c/b Afib c RVR.

## 2022-08-17 NOTE — H&P ADULT - HISTORY OF PRESENT ILLNESS
82 y.o M pmh of HTN, DM2, CAD s/p Stent x3, CVA, JACKELIN on CPAP, prostate ca s/p seeding presents for for constipation and SOB. Patient states that ~1 week ago he had spicy oxtail, the following day when he would try to go the bathroom he would have a hard time having a BM and discomfort urinating with small bit of blood in it. his stomach was uncomfortable, burning type pain,  during this time He tried to take peptobismol and tums to help but did not provide relief.  He also noted some difficulty breathing during this time, he thinks the hot sauce triggered it. SOB with mild exertion. Some chest pain, felt like a "restriction". Developed more belching and worse SOB at rest and Since symptoms were not resolving came to ER> While in the ER hewas found to have be tachycardic to 140.  +dysuria, generally weak, Denies fever, chills, nausea, vomtiing, diarrhea, brbrpr, melena, dizziness, fall, syncope, head trauma, visual change, hearing change, edema, weight gain, recent travel, recent abx. .  82 y.o M pmh of HTN, DM2, CAD s/p Stent x3, CVA 2/2 symptomatic carotid stenosis s/p stent, JACKELIN on CPAP, prostate ca s/p seeding presents for for constipation and SOB. Patient states that ~1 week ago he had spicy oxtail, the following day when he would try to go the bathroom he would have a hard time having a BM and discomfort urinating with small bit of blood in it. his stomach was uncomfortable, burning type pain,  during this time He tried to take peptobismol and tums to help but did not provide relief.  He also noted some difficulty breathing during this time, he thinks the hot sauce triggered it. SOB with mild exertion. Some chest pain, felt like a "restriction". Developed more belching and worse SOB at rest and Since symptoms were not resolving came to ER> While in the ER hewas found to have be tachycardic to 140.  +dysuria, generally weak, Denies fever, chills, nausea, vomtiing, diarrhea, brbrpr, melena, dizziness, fall, syncope, head trauma, visual change, hearing change, edema, weight gain, recent travel, recent abx. .

## 2022-08-17 NOTE — ED ADULT NURSE REASSESSMENT NOTE - NS ED NURSE REASSESS COMMENT FT1
Late Entry: Received patient in bed AOX2. Patient came yesterday for constipation also with chest pain. Continuous heparin drip @10ml/hr via right AC. PMH htn, DM. continuous O2 @2l/m. Late Entry: Received patient in bed AOX2. Patient came yesterday for constipation also with chest pain. Continuous heparin drip @10ml/hr via right AC. PMH htn, DM. continuous O2 @2lpm.  Patient is a hard stick coag was sent late. APRIL Dobbins had to  A stick patient to send the blood. Report was given to nurse Andrea. Patient is at echo. will be sent to the Floor.

## 2022-08-17 NOTE — ED ADULT NURSE REASSESSMENT NOTE - NS ED NURSE REASSESS COMMENT FT1
Pt's son notifies this RN that pt is getting out of bed to use the urinal. RN reports to bedside and notices that pt is very anxious and trying to use urinal while walking to the restroom. Pt is tachypneic. Pt placed on 2L O2 per nasal cannula. Pt attempting to move his bowels now but is only passing gas. He endorsed 7/10 generalized abdominal pain and appears to be slightly confused. RN assisted pt back to bed; Placed pt back on cardiac monitor and noticed pt to be sinus tachycardic in the 130s-140s. MD called to bedside. Pt remains tachycardic, tachypneic and is clammy. Repeat EKG done and IV dilaudid given per MD's order. Heparin infusion going at 1000 U/hr.  Pt is now asleep. VSS on 2L per nasal cannula. Son remains at bedside. Safety precautions maintained. Will continue to closely monitor pt.

## 2022-08-17 NOTE — CHART NOTE - NSCHARTNOTEFT_GEN_A_CORE
Cardiology on call fellow called for concern of cardiogenic shock.    Patient evaluated at bedside. Patient eating his dinner, sitting up talking with his son. According to the son, patient is at his baseline at the moment. Denies active CP, palpitation, SOB at rest, visual disturbance, abdominal pain. He still has urinary symptoms. Feels better than when he came in.  Patient normotensive at bedside. Sinus rhythm. Slightly dyspneic when he completes sentences off O2 supp.    Lactate elevated from 4 to 6 yet no other labs drawn to concern hypoperfusion. No other labs such as CBC, BMP, LFT available from today.   Patient's extremities are not cold.   Patient did not get echo done.    # Cardiogenic shock eval.  - Patient mentating at baseline confirmed with son at bedside. Eating and having conversation with writer and son. Unclear what primary team ACP found that is concerned for decline in mental status.  - Patient normotensive with /82 at bedside in ED.   - Tele showed pAFib with HR going upt o130s, now sinus.  - Lactate elevated yet no other labs available. please draw basic labs and finish basic work up including Cr and LFTs.  - Writer called echo to expedite. TTE will be performed to assess cardiac function.  - continue ACS protocol for now.  - no indication for emergent procedure or CCU admission at this time. Please reconsult once more work up is complete or clinically changes on bedside evaluation.    José Luis Lopes MD  Cardiology Fellow - PGY 5  Text or Call: [phone]  For all New Consults and Questions:  [email or web address]   Login: [email or web address] Cardiology on call fellow called for concern of cardiogenic shock.    Patient evaluated at bedside. Patient eating his dinner, sitting up talking with his son. According to the son, patient is at his baseline at the moment. Denies active CP, palpitation, SOB at rest, visual disturbance, abdominal pain. He still has urinary symptoms. Feels better than when he came in. Feels dyspneic when he finishes long sentences.  Patient normotensive at bedside. Sinus rhythm. Slightly dyspneic when he completes sentences off O2 supp.    Lactate elevated from 4 to 6 yet no other labs drawn to concern hypoperfusion. No other labs such as CBC, BMP, LFT available from today.   Patient's extremities are not cold.   Patient did not get echo done.    # Cardiogenic shock eval.  - Patient mentating at baseline confirmed with son at bedside. Eating and having conversation with writer and son. Unclear what primary team ACP found that is concerned for decline in mental status.  - Patient normotensive with /82 at bedside in ED.   - Tele showed pAFib with HR going up ub160o, now sinus.  - Lactate elevated yet no other labs available. please draw basic labs and finish basic work up including Cr and LFTs.  - Writer called echo to expedite. TTE will be performed to assess cardiac function.  - continue ACS protocol for now.  - continue with diuresis. Ensure I/O by primary team. Urinary output and how patient respond to medication that is given needs to be monitored.  - Patient also has infectious process ongoing with ureteritis on CT scan + UA and  symptoms that is ongoing.  - no indication for emergent procedure or CCU admission at this time. Please reconsult once more work up is complete or clinically changes on bedside evaluation.    José Luis Lopes MD  Cardiology Fellow - PGY 5  Text or Call: [phone]  For all New Consults and Questions:  [email or web address]   Login: [email or web address]

## 2022-08-17 NOTE — CONSULT NOTE ADULT - SUBJECTIVE AND OBJECTIVE BOX
HISTORY OF PRESENT ILLNESS:    Patient is an 82 year old man with past medical history of CAD/MI with Stents (last 2020 [facility]), Afib on Eliquis, HTN, DM, CVA s/p carotid stent 4/2022, JACKELIN on CPAP, prostate CA s/p seeding, presenting with constipation. Constipation began 8/13/2022 ago.  While in ED, patient became SOB and HR 140s while walking to bathroom.  Troponins obtained 895 with follow up 865 (troponin of 403 reported to be dilutional).  On interview, patient denies chest pain, reports SOB with activity for 4 days, denies diaphoresis, palpitations, syncope.  Review of history shows, recent admission for encephalopathy from urinary/renal infection complicated by NSTEMI treated medically.  TTE in 5/2022 showed severe LV dysfunction with inferior and inferolateral wall dysfunction. Patient went for dental procedure and was reported to have signs of minor strokes seen on CT scan and received carotid artery stent at [facility].     Allergies    ceftriaxone (Urticaria)  ciprofloxacin (Rash)  schrimp (Unknown)    Intolerances    	    MEDICATIONS:  heparin   Injectable 5000 Unit(s) IV Push once  heparin   Injectable 5300 Unit(s) IV Push every 6 hours PRN  heparin  Infusion.  Unit(s)/Hr IV Continuous <Continuous>          aluminum hydroxide/magnesium hydroxide/simethicone Suspension 30 milliLiter(s) Oral every 4 hours PRN          PAST MEDICAL & SURGICAL HISTORY:  Diabetes mellitus      CAD (coronary artery disease)      Prostate ca  s/p SEED 5 yrs ago      Sleep apnea  on cpap at home      Hypertension      Hypercholesteremia      S/P cholecystectomy      S/P coronary angioplasty  last stent 1 year ago      H/O carotid endarterectomy  April 2022, stent placed.          FAMILY HISTORY:  FH: type 2 diabetes        SOCIAL HISTORY:    [ ] Non-smoker  [ ] Smoker  [ ] Alcohol    REVIEW OF SYSTEMS:  See HPI, otherwise complete 10 point review of systems negative    PHYSICAL EXAM:  T(C): 36.4 (08-17-22 @ 00:52), Max: 37.1 (08-16-22 @ 19:15)  HR: 100 (08-17-22 @ 00:52) (68 - 145)  BP: 138/84 (08-17-22 @ 00:52) (107/74 - 157/98)  RR: 18 (08-17-22 @ 00:52) (16 - 31)  SpO2: 100% (08-17-22 @ 00:52) (96% - 100%)  Wt(kg): --  I&O's Summary      Appearance: No Acute Distress	  HEENT:  Normal oral mucosa, PERRL, EOMI	  Cardiovascular: Normal S1 S2, No JVD, No murmurs/rubs/gallops  Respiratory: Lungs clear to auscultation bilaterally  Gastrointestinal:  Soft, Non-tender, + BS	  Skin: No rashes, No ecchymoses, No cyanosis	  Neurologic: Non-focal  Extremities: No clubbing, cyanosis or edema  Vascular: Peripheral pulses palpable 2+ bilaterally  Psychiatry: A & O x 3, Mood & affect appropriate    LABS:	 	    CBC Full  -  ( 16 Aug 2022 22:37 )  WBC Count : 7.48 K/uL  Hemoglobin : 11.1 g/dL  Hematocrit : 34.6 %  Platelet Count - Automated : 191 K/uL  Mean Cell Volume : 84.0 fL  Mean Cell Hemoglobin : 26.9 pg  Mean Cell Hemoglobin Concentration : 32.1 gm/dL  Auto Neutrophil # : 5.74 K/uL  Auto Lymphocyte # : 0.91 K/uL  Auto Monocyte # : 0.74 K/uL  Auto Eosinophil # : 0.02 K/uL  Auto Basophil # : 0.04 K/uL  Auto Neutrophil % : 76.7 %  Auto Lymphocyte % : 12.2 %  Auto Monocyte % : 9.9 %  Auto Eosinophil % : 0.3 %  Auto Basophil % : 0.5 %    08-16    138  |  104  |  20  ----------------------------<  265<H>  3.8   |  22  |  1.16  08-16    139  |  105  |  20  ----------------------------<  271<H>  4.5   |  18<L>  |  1.13    Ca    8.7      16 Aug 2022 22:37  Ca    9.5      16 Aug 2022 19:12    TPro  7.6  /  Alb  3.5  /  TBili  0.9  /  DBili  x   /  AST  63<H>  /  ALT  38  /  AlkPhos  89  08-16      proBNP: Serum Pro-Brain Natriuretic Peptide: 69495 pg/mL (08-16 @ 22:37)    Lipid Profile:   HgA1c:   TSH:       CARDIAC MARKERS:            TELEMETRY: 	    ECG:  	  RADIOLOGY:  OTHER: 	    PREVIOUS DIAGNOSTIC TESTING:    [ ] Echocardiogram:  [ ] Catheterization:  [ ] Stress Test:  	  	       HISTORY OF PRESENT ILLNESS:    Patient is an 82 year old man with past medical history of CAD/MI with Stents (last  [place]), Afib on Eliquis, HTN, DM, CVA s/p carotid stent 2022, JACKELIN on CPAP, prostate CA s/p seeding, presenting with constipation. Constipation began 2022.  While in ED, patient became SOB and HR 140s while walking to bathroom.  Troponins obtained 895 with follow up 865 (troponin of 403 reported to be dilutional).  On interview, patient denies chest pain, reports SOB with activity for 4 days, denies diaphoresis, palpitations, syncope.  Review of history shows, recent admission for encephalopathy from urinary/renal infection complicated by NSTEMI treated medically.  TTE in 2022 showed severe LV dysfunction with inferior and inferolateral wall dysfunction. Patient recently seen to have signs of minor strokes seen on CT scan and received carotid artery stent at [facility] (2022). On assessment, patient A+OX3, ST 106bpm, /84, Temp 97.6F, RR 22, SATing 95% on 2L NC, Lung sound with bibasilar rales, no JVD, no pedal or sacral edema. Labs show no leukocytosis, Anemia with H+H 11.1 and 34.6, Creatinine 1.16.  Troponin 895 -> 865 (reported dilutional read of 406)  and CK-MB 8.5. ECG 140bpm ST with q waves in inferior leads with CA interval measuring 0.09 sec. CT Chest with pulmonary edema.    Allergies    ceftriaxone (Urticaria)  ciprofloxacin (Rash)  schrimp (Unknown)    Intolerances    	    MEDICATIONS:  heparin   Injectable 5000 Unit(s) IV Push once  heparin   Injectable 5300 Unit(s) IV Push every 6 hours PRN  heparin  Infusion.  Unit(s)/Hr IV Continuous <Continuous>          aluminum hydroxide/magnesium hydroxide/simethicone Suspension 30 milliLiter(s) Oral every 4 hours PRN          PAST MEDICAL & SURGICAL HISTORY:  Diabetes mellitus      CAD (coronary artery disease)      Prostate ca  s/p SEED 5 yrs ago      Sleep apnea  on cpap at home      Hypertension      Hypercholesteremia      S/P cholecystectomy      S/P coronary angioplasty  last stent 1 year ago      H/O carotid endarterectomy  2022, stent placed.          FAMILY HISTORY:  FH: type 2 diabetes        SOCIAL HISTORY:    Retired , lives with adult son, remote Smoking, no etoh    REVIEW OF SYSTEMS:    CONSTITUTIONAL: Constipation and SOB x 4 days  EYES/ENT: No visual changes;  No vertigo or throat pain   NECK: No pain or stiffness  RESPIRATORY: No cough, wheezing, hemoptysis; No shortness of breath  CARDIOVASCULAR: No chest pain or palpitations  GASTROINTESTINAL: Abdominal pain, constipation x 4 days.   GENITOURINARY: No dysuria, frequency or hematuria  NEUROLOGICAL: No numbness or weakness  SKIN: No itching, burning, rashes, or lesions   All other review of systems is negative unless indicated above.    PHYSICAL EXAM:  T(C): 36.4 (22 @ 00:52), Max: 37.1 (22 @ 19:15)  HR: 100 (22 @ 00:52) (68 - 145)  BP: 138/84 (22 @ 00:52) (107/74 - 157/98)  RR: 18 (22 @ 00:52) (16 - 31)  SpO2: 100% (22 @ 00:52) (96% - 100%)  Wt(kg): --  I&O's Summary      Appearance: No Acute Distress	  HEENT:  Normal oral mucosa, PERRL, EOMI	  Cardiovascular: ST, No JVD, No murmurs/rubs/gallops  Respiratory: Lungs with bibasilar rales  Gastrointestinal:  Distended, Non-tender, + BS	  Skin: No rashes, No ecchymoses, No cyanosis	  Neurologic: Non-focal  Extremities: No clubbing, cyanosis or edema  Vascular: Peripheral pulses palpable 2+ bilaterally  Psychiatry: A & O x 3, Mood & affect appropriate    LABS:	 	    CBC Full  -  ( 16 Aug 2022 22:37 )  WBC Count : 7.48 K/uL  Hemoglobin : 11.1 g/dL  Hematocrit : 34.6 %  Platelet Count - Automated : 191 K/uL  Mean Cell Volume : 84.0 fL  Mean Cell Hemoglobin : 26.9 pg  Mean Cell Hemoglobin Concentration : 32.1 gm/dL  Auto Neutrophil # : 5.74 K/uL  Auto Lymphocyte # : 0.91 K/uL  Auto Monocyte # : 0.74 K/uL  Auto Eosinophil # : 0.02 K/uL  Auto Basophil # : 0.04 K/uL  Auto Neutrophil % : 76.7 %  Auto Lymphocyte % : 12.2 %  Auto Monocyte % : 9.9 %  Auto Eosinophil % : 0.3 %  Auto Basophil % : 0.5 %        138  |  104  |  20  ----------------------------<  265<H>  3.8   |  22  |  1.16      139  |  105  |  20  ----------------------------<  271<H>  4.5   |  18<L>  |  1.13    Ca    8.7      16 Aug 2022 22:37  Ca    9.5      16 Aug 2022 19:12    TPro  7.6  /  Alb  3.5  /  TBili  0.9  /  DBili  x   /  AST  63<H>  /  ALT  38  /  AlkPhos  89        proBNP: Serum Pro-Brain Natriuretic Peptide: 60798 pg/mL ( @ 22:37)    Lipid Profile:   HgA1c:   TSH:       CARDIAC MARKERS:            TELEMETRY: 	  - 140  ECbpm ST with q waves in inferior leads with CA interval measuring 0.09 sec  RADIOLOGY:  < from: Xray Chest 1 View-PORTABLE IMMEDIATE (Xray Chest 1 View-PORTABLE IMMEDIATE .) (22 @ 22:33) >  ******PRELIMINARY REPORT******      ******PRELIMINARY REPORT******       ACC: [id number] EXAM:  XR CHEST PORTABLE IMMED 1V                          PROCEDURE DATE:  2022    ******PRELIMINARY REPORT******      ******PRELIMINARY REPORT******           INTERPRETATION:  INDICATION: Shortness of breath.    COMPARISON: Chest x-ray 2022.    FINDINGS:  Lines/Devices: None.  Heart/Vascular: The heart size is normal.  Pulmonary: Mild vascular congestion and patchy right midlung opacity. No   pleural effusion or pneumothorax.  Bones: No acute findings.    Impression:  Mild vascular congestion and patchy right midlung opacity, may represent   pulmonary edema.          ******PRELIMINARY REPORT******      ******PRELIMINARY REPORT******       KISHORE VEGA MD; Resident Radiologist  This document is a PRELIMINARY interpretation and is pending final   attending approval. Aug 16 2022 11:50PM    < end of copied text >      OTHER: 	    PREVIOUS DIAGNOSTIC TESTING:    [ ] Echocardiogram:  < from: Transthoracic Echocardiogram (22 @ 17:37) >    Patient name: CHIDI RUBIO  YOB: 1940   Age: 81 (M)   MR#: [id number]  Study Date: 2022  Location: O/PSonographer: Giovanna Jones [address] Sonographer: Elijah Donald M.D.  Study quality: Technically Fair  Referring Physician: Not Available Doctor, MD  Blood Pressure: 11/80 mmHg  Height: 178 cm  Weight: 98 kg  BSA: 2.2 m2  ------------------------------------------------------------------------  PROCEDURE: Transthoracic echocardiogram with 2-D, M-Mode  and complete spectral and color flow Doppler.  INDICATION: Angina pectoris, unspecified (I20.9)  ------------------------------------------------------------------------  DIMENSIONS:  Dimensions:     Normal Values:  LA:     4.2 cm    2.0 - 4.0 cm  Ao:     3.1 cm    2.0 - 3.8 cm  SEPTUM: 0.7 cm    0.6 - 1.2 cm  PWT:    0.9 cm    0.6 - 1.1 cm  LVIDd:  6.5 cm    3.0 - 5.6 cm  LVIDs:  4.9 cm    1.8 - 4.0 cm  Derived Variables:  LVMI: 100 g/m2  RWT: 0.27  Fractional short: 25 %  ------------------------------------------------------------------------  OBSERVATIONS:  Mitral Valve: Mitral annular calcification, otherwise  normal mitral valve. Severe mitral regurgitation.  Aortic Root: Normal aortic root.  Aortic Valve: Calcified trileaflet aortic valve with normal  opening.  Left Atrium: Normal left atrium.  LA volume index = 34  cc/m2.  Left Ventricle: Moderate to severe segmental left  ventricular systolic dysfunction. Inferior and  inferolateral wall hypokinesis. Normal left ventricular  internal dimensions and wall thicknesses.  Right Heart: Normal right atrium. The right ventricle is  not well visualized. Normal tricuspid valve. Mild tricuspid  regurgitation. Normal pulmonic valve.  Pericardium/PleuraNormal pericardium with no pericardial  effusion.  ------------------------------------------------------------------------  CONCLUSIONS:  1. Mitral annular calcification, otherwise normal mitral  valve. Severe mitral regurgitation.  2. Moderate to severe segmental left ventricular systolic  dysfunction. Inferior and inferolateral wall hypokinesis.  3. The right ventricle is not well visualized.  ------------------------------------------------------------------------  Confirmed on  2022 - :41:22 by NILESH Cedillo  ------------------------------------------------------------------------    < end of copied text >    [ ] Catheterization:  [ ] Stress Test:

## 2022-08-17 NOTE — H&P ADULT - CONVERSATION DETAILS
Obtained collateral history and discussed patients current diagnosis including NSTEMI and Heart failure. Discussed treatment options of diuresis and possible need for angiogram this admission. Explained guarded prognosis given multiple comorbidities including dementia, HTN, DM, CVA and CAD. Discussed with both patient and HCP (son Jatin); patient would ideally prefer comfort in the event he was to have cardiac arrest but would like to defer to his son. Jatin would like all measures to be performed to save his fathers life if needed however if he will not have a good quality of life does not want him to suffer. Explained impact cardiac arrest and subsequent treatment likely impacts quality of life but at this time will remain full code. Further discussion of goals of care was recommended to Jatin to ensure he and his father have the same goals.

## 2022-08-17 NOTE — H&P ADULT - NSHPPHYSICALEXAM_GEN_ALL_CORE
PHYSICAL EXAM:  GENERAL: Mild respiratory distress, well-developed  HEAD:  Atraumatic, Normocephalic  EYES: EOMI, PERRLA, conjunctiva and sclera clear  NECK: Supple, No JVD  CHEST/LUNG: +rales  HEART: Tachycardic and irregular; No murmurs, rubs, or gallops  ABDOMEN: Soft, Nontender, Nondistended; Bowel sounds present  EXTREMITIES:  2+ Peripheral Pulses, No clubbing, cyanosis, or edema  PSYCH: AAOx3 but poor historian, anxious  NEUROLOGY: non-focal  SKIN: No rashes or lesions

## 2022-08-17 NOTE — H&P ADULT - NSHPLABSRESULTS_GEN_ALL_CORE
11.1   7.48  )-----------( 191      ( 16 Aug 2022 22:37 )             34.6     08-16    138  |  104  |  20  ----------------------------<  265<H>  3.8   |  22  |  1.16    Ca    8.7      16 Aug 2022 22:37    TPro  7.6  /  Alb  3.5  /  TBili  0.9  /  DBili  x   /  AST  63<H>  /  ALT  38  /  AlkPhos  89  08-16    Trop 895 > 403 > 865  CKMB 8.5    BNP 94637    Lactate 2.7> 2.1    Urinalysis + Microscopic Examination (08.16.22 @ 20:16)    Color: Orange    Glucose Qualitative, Urine: 500 mg/dL    Blood, Urine: Large    Urine Appearance: Turbid    Urobilinogen: <2 mg/dL    Specific Gravity: 1.020    Protein, Urine: 300 mg/dL    pH Urine: 6.0    Leukocyte Esterase Concentration: Large    Nitrite: Negative    Ketone - Urine: Negative    Bilirubin: Negative    Red Blood Cell - Urine: >50 /HPF    White Blood Cell - Urine: >50 /HPF    Bacteria: Many    CTA Chest - No pulmonary embolism.  Small bilateral pleural effusions with minimal intralobular septal thickening, likely reflecting mild pulmonary interstitial edema.  Nonspecific 4 mm left upper lobe juxtapleural nodule with surrounding groundglass opacity. Recommend attention on follow-up.    CT A/P - No bowel obstruction.  Suggestion left ureteritis.

## 2022-08-17 NOTE — H&P ADULT - PROBLEM SELECTOR PLAN 1
- Admit to medicine on telemetry  - Collins for several days with acute worsening while in ED with observed SVT.   - Troponin 895 > 865 and CKMB 8.5 - one additional troponin with CK/CKMB ordered  - EKG without significant ischemic changes  - Appreciate cardiology evaluation - started on Heparin gtt, s/p ASA and Brilinta load now ordered for maintenance doses  - TTE ordered to eval for WMA or new decreased LVEF  - Given concern for acute heart failure - s/p lasix 40mg IV x1  - Metoprolol 50mg BID started and Atorvastatin 80mg started  - Check lipid profile, a1c, TSH  - Will attempt to get outpatient records from Dr. Blanchard. - Admit to medicine on telemetry  - Collins for several days with acute worsening while in ED with observed SVT.   - Troponin 895 > 865 and CKMB 8.5 - one additional troponin with CK/CKMB ordered  - EKG without significant ischemic changes  - Appreciate cardiology evaluation - started on Heparin gtt, s/p ASA and Brilinta load now ordered for maintenance doses  - TTE ordered to eval for WMA or new decreased LVEF  - Given concern for acute heart failure - s/p lasix 40mg IV x1 - will conitnue with lasix 40mg IV BID and give additional dose now.   - Metoprolol 50mg BID started and Atorvastatin 80mg started  - Check lipid profile, a1c, TSH    Addendum: Acute heart failure - on IV lasix  TTE as above  Up- trending lactate - d/w cardiology team - not clinically in low flow state, continue with diuresis. In addition not clinically septic appearing - will trend.

## 2022-08-17 NOTE — H&P ADULT - PROBLEM SELECTOR PLAN 7
- hx of stroke  - Continue atorvastatin, asa and brilinta as above  - Continue aggressive glycemic control

## 2022-08-17 NOTE — ED ADULT NURSE REASSESSMENT NOTE - NS ED NURSE REASSESS COMMENT FT1
USIV placed by FRANNIE Tsang. 20g IV noted L upper arm. Heparin resumed. As per APRIL Medina, rpt. APTT to be drawn at 0100. NAD noted. respirations even and unlabored on 3L NC. NSR on cardiac monitor.

## 2022-08-17 NOTE — H&P ADULT - NSHPREVIEWOFSYSTEMS_GEN_ALL_CORE
CONSTITUTIONAL: see above  EYES: No eye pain; No blurry vision  ENMT:  No difficulty hearing; No sinus or throat pain  NECK: No pain or stiffness  RESPIRATORY: see above  CARDIOVASCULAR: see above  GASTROINTESTINAL: see above  GENITOURINARY: No dysuria; No frequency; No hematuria; No incontinence  NEUROLOGICAL: No headaches; No loss of strength; No numbness  SKIN: No itching/burning; No rashes or lesions   MUSCULOSKELETAL: No joint pain or swelling; No muscle, back, or extremity pain  HEME/LYMPH: No easy bruising, or bleeding gums

## 2022-08-17 NOTE — CHART NOTE - NSCHARTNOTEFT_GEN_A_CORE
Called by lab for patient with lactate of 6.5, increasing from 1.6 this AM. Patient seen and appears in mild respiratory distress, sitting forward and requires a breath to complete long sentences. Extremities remain cool and patient remains tachycardic. /73. Repeat CBC/CMP/trops were drawn by myself and sent. CCU fellow called to evaluate for concern of low flow state. Dr. chavarria notified of laboratory result and in agreement with CCU eval. [facility] follow up consult and results.

## 2022-08-17 NOTE — ED ADULT NURSE REASSESSMENT NOTE - NS ED NURSE REASSESS COMMENT FT1
pt. found to have ripped IV access out with heparin drip infusing. APRIL Medina (ACP) aware. IV access to be reobtained and restarted as per protocol.

## 2022-08-17 NOTE — ED ADULT NURSE REASSESSMENT NOTE - NS ED NURSE REASSESS COMMENT FT1
Pt seen at the end of the bed stating he has to urinate. RN assisted pt back into bed and assisted pt with the urinal. Pt unable to urinate at this time but was incontinent of urine to his depends. RN changed pt and repositioned him the bed. RN re-educated pt on the importance of staying in bed due to him being on the heparin drip. Pt verbalized his understanding. Pt anxious, tachypneic and tachycardic. Pt denies CP or abdominal pain. He states "I am anxious because of my breathing... I feel like I'm drowning." RN reassured pt that his vitals are stable and that he is on 2L of oxygen per nasal cannula. Morning labs drawn and sent per orders. Pt is still able to tolerate PO meds with water. Bed locked and in lowest position, call bell within reach and side rails up for safety. Will continue to closely monitor pt.

## 2022-08-17 NOTE — ED ADULT NURSE REASSESSMENT NOTE - NS ED NURSE REASSESS COMMENT FT1
Clinical impact RN contacted. USIV to be placed. cardiac monitor reapplied. pt. sitting comfortably in bed.

## 2022-08-17 NOTE — ED ADULT NURSE REASSESSMENT NOTE - NS ED NURSE REASSESS COMMENT FT1
Report received from FRANNIE Vazquez. pt. remains A&Ox4, awake and resting. pt. offers no new complains at this time. no acute distress noted. respirations even and unlabored. NSr on cardiac monitor. VS as noted by PCA. Heparin infusion running without complications.

## 2022-08-17 NOTE — CONSULT NOTE ADULT - ASSESSMENT
82M PMH CAD/MI with Stents, Afib on Eliquis, HTN, DM, CVA s/p carotid stent 4/2022, JACKELIN on CPAP, prostate CA s/p seeding, presenting with constipation found to have SVT 140s with SOB and elevated troponins 895 ACS versus PE

## 2022-08-17 NOTE — ED ADULT NURSE REASSESSMENT NOTE - NS ED NURSE REASSESS COMMENT FT1
Pt reports SOB after using the restroom. Pt sitting at the edge of the bed. Spo2 94% on RA and HR 130s at this time. Cardiac monitor shows sinus tachycardia. RN placed pt on 2L O2 per nasal cannula and assisted him back into bed with HOB elevated. Pt denies CP, epigastric pain, n/v at this time. Pt educated pt on calling for assistance instead of getting OOB on his own. Pt verbalized his understanding.  Pt able to take his PO meds by mouth with water. Pt to receive IV heparin. Bed locked and in lowest position, call bell within reach and side rail up for safety. Son at bedside. Will continue to closely monitor pt.

## 2022-08-18 NOTE — DIETITIAN INITIAL EVALUATION ADULT - PERTINENT MEDS FT
MEDICATIONS  (STANDING):  aspirin enteric coated 81 milliGRAM(s) Oral daily  atorvastatin 80 milliGRAM(s) Oral at bedtime  chlorhexidine 2% Cloths 1 Application(s) Topical <User Schedule>  dextrose 5%. 1000 milliLiter(s) (50 mL/Hr) IV Continuous <Continuous>  dextrose 5%. 1000 milliLiter(s) (100 mL/Hr) IV Continuous <Continuous>  dextrose 50% Injectable 25 Gram(s) IV Push once  dextrose 50% Injectable 12.5 Gram(s) IV Push once  dextrose 50% Injectable 25 Gram(s) IV Push once  digoxin     Tablet 125 MICROGram(s) Oral daily  donepezil 10 milliGRAM(s) Oral at bedtime  furosemide   Injectable 40 milliGRAM(s) IV Push two times a day  glucagon  Injectable 1 milliGRAM(s) IntraMuscular once  heparin  Infusion.  Unit(s)/Hr (10 mL/Hr) IV Continuous <Continuous>  insulin glargine Injectable (LANTUS) 15 Unit(s) SubCutaneous at bedtime  insulin lispro (ADMELOG) corrective regimen sliding scale   SubCutaneous three times a day before meals  insulin lispro (ADMELOG) corrective regimen sliding scale   SubCutaneous at bedtime  insulin lispro Injectable (ADMELOG) 5 Unit(s) SubCutaneous three times a day before meals  ketorolac 0.5% Ophthalmic Solution 1 Drop(s) Left EYE three times a day  loratadine 10 milliGRAM(s) Oral daily  melatonin 3 milliGRAM(s) Oral at bedtime  metoprolol tartrate 50 milliGRAM(s) Oral two times a day  mometasone 220 MICROgram(s) Inhaler 2 Puff(s) Inhalation daily  norepinephrine Infusion 0.05 MICROgram(s)/kG/Min (8.31 mL/Hr) IV Continuous <Continuous>  pantoprazole    Tablet 40 milliGRAM(s) Oral before breakfast  piperacillin/tazobactam IVPB.. 3.375 Gram(s) IV Intermittent every 8 hours  polyethylene glycol 3350 17 Gram(s) Oral daily  senna 2 Tablet(s) Oral at bedtime  sodium bicarbonate  Infusion 0.127 mEq/kG/Hr (75 mL/Hr) IV Continuous <Continuous>  ticagrelor 90 milliGRAM(s) Oral every 12 hours    MEDICATIONS  (PRN):  acetaminophen     Tablet .. 650 milliGRAM(s) Oral every 6 hours PRN Temp greater or equal to 38C (100.4F), Mild Pain (1 - 3)  aluminum hydroxide/magnesium hydroxide/simethicone Suspension 30 milliLiter(s) Oral every 4 hours PRN Dyspepsia  dextrose Oral Gel 15 Gram(s) Oral once PRN Blood Glucose LESS THAN 70 milliGRAM(s)/deciliter  melatonin 3 milliGRAM(s) Oral at bedtime PRN Insomnia

## 2022-08-18 NOTE — CHART NOTE - NSCHARTNOTEFT_GEN_A_CORE
MRR was called for unresponsiveness.    Pt was found to be unresponsive to by nurse s/p 1mg Haldol IVP x2. Pt nonresponsive on initial assessment, improved to responsive to verbal stimuli however AOx0 without intervention. Tachycardic to the 130s, normotensive, afebrile. PEERLA, moves all four limbs spontaneously, no rigidity. ECG showed shortened DC with delta waves. RRT Ordered CBC, BMP, coags, procalcitonin, cardiac enzymes, fall precautions, enhanced care, bed alarm. MRR was called for unresponsiveness.    Pt was found to be unresponsive to by nurse s/p 1mg Haldol IVP x2. Pt nonresponsive on initial assessment, improved to responsive to verbal stimuli however AOx0 without intervention. Tachycardic to the 130s, normotensive, afebrile. PEERLA, moves all four limbs spontaneously, no rigidity. ECG prolonged Qtc of 544ms showed shortened MD with delta waves. RRT Ordered CBC, BMP, coags, procalcitonin, cardiac enzymes, fall precautions, enhanced care, bed alarm. Will hold QTc prolonging drugs, MRR was called for unresponsiveness.    Upon arrival patient was found to be responsive to verbal stimuli but oriented x0. Vitals were 102/59 mmHg, 100% on 3L NC, afebrile, tachycardic into the 130s, finger stick of 101. On physical exam pt found to be diaphoretic, PERRLA, moves extremities spontaneously, no respiratory distress muscle rigidity. POCUS showed LV dysfunction. EKG showed regular narrow complex tachycardia and revealed QTc of 550s. During course of rapid, patient's mental status returned to baseline, speaking in full sentences, AOx1-2 although confused and agitated.   Patient remained tachycardic to 130s, however BP stable, and already noted to have SVT during this admission as per cardiology. Tachycardia may be secondary to patient's pain or discomfort; patient expressed back pain during RRT and requested to be positioned on his side. Given these reasons, did not start rate-controlling medications. Primary team at bedside, will follow up on labs.  Recommended monitor HR, no additional haldol, no other QTc prolonging agents.    d/w Dr. mayberry reccs appreciated     -MICU consult  -Avoid QT prolonging agents   -Fall precautions, bed alarm, enhanced care  -CT Abdomen and pelvis non-con, Abdominal X-ray  -Follow up VBG, BCx, procalcitonin, folate, b12, drugs of abuse panel, serum alcohol level, prolactin

## 2022-08-18 NOTE — DIETITIAN INITIAL EVALUATION ADULT - OTHER INFO
81 y/o male PMH CAD s/p prior stents x 3, afib (on Eliquis), DM2, HTN, CVA, carotid stenosis s/p stent, JACKELIN on CPAP, prostate ca presented with dyspnea and constipation found to have elevated troponins and EKG concerning for NSTEMI. Started on heparin gtt and s/p ASA/brillinta. MICU consulted for AMS and elevated lactate. Earlier today, patient was agitated and code grey was called, received haldol 1mg IV. Then RRT called around 3am for AMS. Pt is AOx4 at baseline, was found to be AOx0 and not responding verbally. HR noted to be elevated to 130-140s, in SVT. Mental status improved over the rapid. Labs drawn during the rapid remarkable for new leukocytosis to 13, lactate to 13.5, procalc 0.61. Pt was transferred to CCU for cardiac evaluation of causes of hypoperfusion. Pt just transferred to CCU at time of visit and was being evaluated by multiple providers therefore unable to interview. Review of [facility] records indicated pt with weight loss over the past 3 months. Recorded wt in 5/22 was 93.5 kg (206 lbs) with current dosing wt of 88.6 kg (195 lbs) reflecting a 5.4% loss. Pt noted with constipation on admission with no BMs since; bowel regimen ordered. Hb A1C 8.3% - reinforce diet principles of Consistent Carbohydrate therapeutic diet when pt is more medically stable. Consider No Conc Phos restriction if Phos lab value remains elevated. Consider Glucerna Therapeutic Nutrition Shake 240mls 2x daily (440kcals, 20g protein) as part of fluid restriction to optimize nutrition. RDN services to remain available as needed.

## 2022-08-18 NOTE — CONSULT NOTE ADULT - ASSESSMENT
82M with PMH CAD s/p prior stents x 3, afib (on Eliquis), DM2, HTN, CVA, carotid stenosis s/p stent, JACKELIN on CPAP, prostate ca presented with dyspnea and constipation found to have elevated troponins and EKG concerning for NSTEMI, started on heparin gtt and s/p ASA/brillinta. MICU consulted for AMS and elevated lactate.    #elevated lactate   -RRT today for AMS, labs drawn during the rapid remarkable for new leukocytosis to 13, lactate to 13.5, procalc 0.61.  -Bedside ECHO showed known inferolateral hypokinesis, and possibly new anterolateral hypokinesis with low output corresponding with EKG changes  -Elevated lactate possibly 2/2 hypoperfusion given NSTEMI vs sepsis. However, unclear source of infection (pt being treated with bactrim for UTI for positive UA but negative UCx)  -Recommend CCU consult for cardiac causes of hypoperfusion  -Recommend obtaining blood cultures, broaden antibiotics to orlando Lim   MICU consult resident

## 2022-08-18 NOTE — PROCEDURE NOTE - NSPOSTCAREGUIDE_GEN_A_CORE
Verbal/written post procedure instructions were given to patient/caregiver/Care for catheter as per unit/ICU protocols
No

## 2022-08-18 NOTE — PATIENT PROFILE ADULT - FALL HARM RISK - HARM RISK INTERVENTIONS

## 2022-08-18 NOTE — DIETITIAN INITIAL EVALUATION ADULT - NS FNS DIET ORDER
Diet, Regular:   Consistent Carbohydrate {Evening Snack} (CSTCHOSN)  DASH/TLC {Sodium & Cholesterol Restricted} (DASH)  1500mL Fluid Restriction ([id number])  Low Sodium (08-17-22 @ 12:18)

## 2022-08-18 NOTE — CONSULT NOTE ADULT - SUBJECTIVE AND OBJECTIVE BOX
Patient is a 82y old  Male who presents with a chief complaint of NSTEMI (18 Aug 2022 13:52)      HPI:  82 year old Male with PMH of HTN, HLD T2DM, JACKELIN (on BiPAP), prostate CA s/p radiation/seed implant, s/p /stent (2022), AFib (on Eliquis), CAD s/p PCI/stent X3 (most recent ), and HFrEF (EF 25%;LVIDd 6.8, severe MR, RV dysfunction and severe hypokinetic apical/lateral wall). Patient presented with c/o SOB and  abdominal distention/constipation  X10 days.     PAST MEDICAL & SURGICAL HISTORY:  Diabetes mellitus      CAD (coronary artery disease)      Prostate ca  s/p SEED 5 yrs ago      Sleep apnea  on cpap at home      Hypertension      Hypercholesteremia      S/P cholecystectomy      S/P coronary angioplasty  last stent 1 year ago      H/O carotid endarterectomy  2022, stent placed.          FAMILY HISTORY:  FH: type 2 diabetes        Home Medications:  apixaban 5 mg oral tablet: 1 tab(s) orally 2 times a day (17 Aug 2022 09:24)  atorvastatin 80 mg oral tablet: 1 tab(s) orally once a day (17 Aug 2022 09:24)  Basaglar KwikPen 100 units/mL subcutaneous solution: 18 unit(s) subcutaneous once a day (at bedtime) (17 Aug 2022 09:24)  Claritin 10 mg oral tablet: 1 tab(s) orally once a day (17 Aug 2022 09:24)  clopidogrel 75 mg oral tablet: 1 tab(s) orally once a day (17 Aug 2022 09:24)  digoxin 125 mcg (0.125 mg) oral tablet: 1 tab(s) orally once a day (17 Aug 2022 09:24)  donepezil 10 mg oral tablet: 1 tab(s) orally once a day (at bedtime) (17 Aug 2022 09:24)  fluticasone 100 mcg/inh inhalation powder: 1 puff(s) inhaled 2 times a day (17 Aug 2022 09:24)  ketorolac 0.4% ophthalmic solution: 1 drop(s) in the left eye 3 times a day (17 Aug 2022 09:24)  metoprolol tartrate 50 mg oral tablet: 1 tab(s) orally 2 times a day (17 Aug 2022 09:24)  NovoLOG FlexPen 100 units/mL injectable solution: 7 unit(s) injectable 3 times a day (17 Aug 2022 09:24)  pantoprazole 40 mg oral delayed release tablet: 1 tab(s) orally once a day (before a meal) (17 Aug 2022 09:24)  polyethylene glycol 3350 oral powder for reconstitution: 17 gram(s) orally once a day (17 Aug 2022 09:24)  senna oral tablet: 2 tab(s) orally once a day (17 Aug 2022 09:24)      Medications:  acetaminophen     Tablet .. 650 milliGRAM(s) Oral every 6 hours PRN  aluminum hydroxide/magnesium hydroxide/simethicone Suspension 30 milliLiter(s) Oral every 4 hours PRN  aspirin enteric coated 81 milliGRAM(s) Oral daily  atorvastatin 80 milliGRAM(s) Oral at bedtime  chlorhexidine 2% Cloths 1 Application(s) Topical <User Schedule>  dextrose 5%. 1000 milliLiter(s) IV Continuous <Continuous>  dextrose 5%. 1000 milliLiter(s) IV Continuous <Continuous>  dextrose 50% Injectable 25 Gram(s) IV Push once  dextrose 50% Injectable 12.5 Gram(s) IV Push once  dextrose 50% Injectable 25 Gram(s) IV Push once  dextrose Oral Gel 15 Gram(s) Oral once PRN  digoxin     Tablet 125 MICROGram(s) Oral daily  donepezil 10 milliGRAM(s) Oral at bedtime  furosemide   Injectable 40 milliGRAM(s) IV Push two times a day  glucagon  Injectable 1 milliGRAM(s) IntraMuscular once  heparin  Infusion.  Unit(s)/Hr IV Continuous <Continuous>  insulin glargine Injectable (LANTUS) 15 Unit(s) SubCutaneous at bedtime  insulin lispro (ADMELOG) corrective regimen sliding scale   SubCutaneous three times a day before meals  insulin lispro (ADMELOG) corrective regimen sliding scale   SubCutaneous at bedtime  insulin lispro Injectable (ADMELOG) 5 Unit(s) SubCutaneous three times a day before meals  ketorolac 0.5% Ophthalmic Solution 1 Drop(s) Left EYE three times a day  loratadine 10 milliGRAM(s) Oral daily  melatonin 3 milliGRAM(s) Oral at bedtime PRN  mometasone 220 MICROgram(s) Inhaler 2 Puff(s) Inhalation daily  norepinephrine Infusion 0.05 MICROgram(s)/kG/Min IV Continuous <Continuous>  pantoprazole    Tablet 40 milliGRAM(s) Oral before breakfast  piperacillin/tazobactam IVPB.. 3.375 Gram(s) IV Intermittent every 8 hours  polyethylene glycol 3350 17 Gram(s) Oral daily  senna 2 Tablet(s) Oral at bedtime  sodium bicarbonate  Infusion 0.127 mEq/kG/Hr IV Continuous <Continuous>  ticagrelor 90 milliGRAM(s) Oral every 12 hours      Review of systems:  10 point review of systems completed and are negative unless noted in HPI    Vitals:  T(C): 36.7 (22 @ 11:00), Max: 36.7 (22 @ 09:30)  HR: 77 (22 @ 13:00) (64 - 100)  BP: 71/59 (22 @ 11:00) (71/59 - 112/72)  BP(mean): 64 (22 @ 11:00) (64 - 68)  RR: 20 (22 @ 13:00) (16 - 28)  SpO2: 100% (22 @ 12:45) (97% - 100%)    Daily     Daily Weight in k.6 (18 Aug 2022 10:39)    Weight (kg): 88.6 ( @ 01:44)    I&O's Summary    18 Aug 2022 07:01  -  18 Aug 2022 15:05  --------------------------------------------------------  IN: 50 mL / OUT: 55 mL / NET: -5 mL        Physical Exam:  Appearance: No Acute Distress  Neck: JVP  Cardiovascular: Normal S1 S2  Respiratory: Clear to auscultation bilaterally  Gastrointestinal: Soft, Non-tender	  Skin: No cyanosis	  Neurologic: Non-focal  Extremities: No LE edema    Labs:                        11.7   13.63 )-----------( 261      ( 18 Aug 2022 03:22 )             38.3         137  |  100  |  42<H>  ----------------------------<  182<H>  4.6   |  10<LL>  |  2.24<H>    Ca    9.0      18 Aug 2022 11:40  Phos  6.5     08-  Mg     2.00     -18    TPro  6.4  /  Alb  3.4  /  TBili  1.2  /  DBili  x   /  AST  93<H>  /  ALT  56<H>  /  AlkPhos  103  08-18    PT/INR - ( 17 Aug 2022 18:15 )   PT: 18.9 sec;   INR: 1.62 ratio     PTT - ( 18 Aug 2022 10:24 )  PTT:61.1 sec    CARDIAC MARKERS ( 18 Aug 2022 11:40 )  x     / x     / 467 U/L / x     / 9.4 ng/mL  CARDIAC MARKERS ( 18 Aug 2022 03:22 )  x     / x     / 462 U/L / x     / 5.6 ng/mL  CARDIAC MARKERS ( 17 Aug 2022 18:15 )  x     / x     / x     / x     / 5.0 ng/mL  CARDIAC MARKERS ( 16 Aug 2022 22:37 )  x     / x     / 270 U/L / x     / 8.5 ng/mL    Blood Gas Arterial, Lactate: 10.2:     Blood Gas Venous - Lactate: 13.8:     Serum Pro-Brain Natriuretic Peptide: 53775:       TELEMETRY: Sinus Rhythm    Echocardiogram:  TTE with Doppler (w/Cont) (22 @ 19:13)   ------------------------------------------------------------------------  DIMENSIONS:  Dimensions:     Normal Values:  LA:     3.8 cm    2.0 - 4.0 cm  Ao:     3.0 cm    2.0 - 3.8 cm  SEPTUM: 0.7 cm    0.6 - 1.2 cm  PWT:    0.8 cm    0.6 - 1.1 cm  LVIDd:  6.8 cm    3.0 - 5.6 cm  LVIDs:    ---     1.8 - 4.0 cm  Derived Variables:  LVMI: 105 g/m2  RWT: 0.23  Ejection Fraction (Visual Estimate): 25 %  ------------------------------------------------------------------------  OBSERVATIONS:  Mitral Valve: Thickened, tethered mitral valve. Severe  mitral regurgitation.  Aortic Root: Normal aortic root.  Aortic Valve: Calcified trileaflet aortic valve with normal  opening. No aortic valve regurgitation seen.  Left Atrium: Normal left atrium.  LA volume index = 28  cc/m2.  Left Ventricle: Endocardial visualization enhanced with  intravenous injection of echo contrast (Definity). Severe  global left ventricular systolic dysfunction.The mid to  distal septum, apical, lateral walls are severely  hypokinetic.  Increased E/e'  is consistent with elevated  left ventricular filling pressure.  Right Heart: Normal right atrium. Right ventricular  enlargement with decreased right ventricular systolic  function. Normal tricuspid valve. Mild tricuspid  regurgitation. Normal pulmonic valve.  Pericardium/PleuraNormal pericardium with no pericardial  effusion.  Hemodynamic: Estimated right ventricular systolic pressure  equals 42 mm Hg, assuming right atrial pressure equals 10  mm Hg, consistent with mild pulmonary hypertension.  ------------------------------------------------------------------------  CONCLUSIONS:  1. Thickened, tethered mitral valve. Severe mitral  regurgitation.  2. Calcified trileaflet aortic valvewith normal opening.  3. Normal left atrium.  LA volume index = 28 cc/m2.  4. Endocardial visualization enhanced with intravenous  injection of echo contrast (Definity). Severe global left  ventricular systolic dysfunction.The mid to distal septum,  apical, lateral walls are severely hypokinetic.  5. Right ventricular enlargement with decreased right  ventricular systolic function.      EK Lead ECG (22 @ 06:46)     Ventricular Rate 154 BPM    Atrial Rate 144 BPM    QRS Duration 102 ms    Q-T Interval 308 ms    QTC Calculation(Bazett) 493 ms    R Axis -13 degrees    T Axis 169 degrees    Diagnosis Line Atrial fibrillation with rapid ventricular response  Marked ST abnormality, possible anterior subendocardial injury  Abnormal ECG       Xray Chest 1 View-PORTABLE IMMEDIATE (Xray Chest 1 View-PORTABLE IMMEDIATE .) (22 @ 22:33)     FINDINGS:  Lines/Devices: None.  Heart/Vascular:The heart size is normal.  Pulmonary: Mild vascular congestion and patchy right midlung opacity. No   pleural effusion or pneumothorax.  Bones: No acute findings.    Impression:  Mild vascular congestion and patchy right midlung opacity, may represent   pulmonary edema.    Further information may be obtained from the patient's subsequent CT of   the chest.    Cardiac Catheterization (22 @ 13:27)     Hemodynamic Pressures:   Phase          Location          [mmHg]               [mmHg]  [mmHg]           HR  Baseline      RV                  s      58  ed      20         68  Baseline      PA                  s      60                d      28  m   39         69  Baseline      PCW                 a      33              v      48  m   37         111  Baseline      RA                  a      20                v      19  m   17         78    Oxygen Saturations   Phase          Location        Hb             Sat            pO2  Content        Group  Baseline        PA                       11              50  7.4   PA  Baseline        AO                       11             100  15.4   SA    Oxygen Saturation Average, Phase: Baseline   PV Hb                PV Sat                 PV pO2  PV Content  SAHb      11.30 g/dL SA Sat     100.00 %   SA pO2  SA Content     15.37 %  PA Hb      11.00 g/dL PA Sat     49.70 %    PA pO2  PA Content     7.44 %  MV Hb                MV Sat                 MV pO2  MV Content  Strokework:   Phase: Baseline   LVSW:                                           LVSW (index):   RVSW:                     13.96 g*m             RVSW (index):  6.83 g*m/m2  Flow Calculations, Phase: Baseline   CO                    3.22 l/min    HR  O2Insp  CI     1.58 l/min/m2    PO2  O2Exp  SV                                  VO2                 255.35 ml/min  O2(ExAir)  SVI                                 A-VO2  Hb                 11.30 gm/d  Shunts:   Qs                    3.22 l/min    Qs Index       1.58  l/min/m2  Qp                    3.22 l/min    Qp Index              1.58  l/min/m2    Qp/Qs  EPBF                                EPBF Index   L-R                                 L-R Index   R-L                                 R-L Index   Systemic Resistances, Phase: Baseline   SVR                                              TVR   SVRI                                             TVRI   SVR                                              TVR   SVRI    TVRI   Pulmonary Resistances:   PVR              49.69 dyn*s/cm5                 TPR  969.03 dyn*s/cm5       Patient is a 82y old  Male who presents with a chief complaint of NSTEMI (18 Aug 2022 13:52)      HPI:  82 year old Male with PMH of HTN, HLD T2DM, JACKELIN (on BiPAP), prostate CA s/p radiation/seed implant, s/p /stent (2022), AFib (on Eliquis), CAD s/p PCI/stent X3 (most recent ), and HFrEF (EF 25%;LVIDd 6.8, severe MR, RV dysfunction and severe hypokinetic apical/lateral wall). Patient presented with c/o SOB and  abdominal distention/constipation  X10 days. Initially with elevated troponin levels and EKG concerning for NSTEMI; started on heparin gtt and s/p ASA/brillinta. RRT called this morning for AMS/not responding to verbal commands and elevated lactate. He subsequently developed severe bradycardia and weak thready pulse; started on levo drip and evaluated by MICU and referred to CCU. Sent to cath lab for RHC and IABP for cardiogenic shock.         PAST MEDICAL & SURGICAL HISTORY:  Diabetes mellitus      CAD (coronary artery disease)      Prostate ca  s/p SEED 5 yrs ago      Sleep apnea  on cpap at home      Hypertension      Hypercholesteremia      S/P cholecystectomy      S/P coronary angioplasty  last stent 1 year ago      H/O carotid endarterectomy  2022, stent placed.          FAMILY HISTORY:  FH: type 2 diabetes        Home Medications:  apixaban 5 mg oral tablet: 1 tab(s) orally 2 times a day (17 Aug 2022 09:24)  atorvastatin 80 mg oral tablet: 1 tab(s) orally once a day (17 Aug 2022 09:24)  Basaglar KwikPen 100 units/mL subcutaneous solution: 18 unit(s) subcutaneous once a day (at bedtime) (17 Aug 2022 09:24)  Claritin 10 mg oral tablet: 1 tab(s) orally once a day (17 Aug 2022 09:24)  clopidogrel 75 mg oral tablet: 1 tab(s) orally once a day (17 Aug 2022 09:24)  digoxin 125 mcg (0.125 mg) oral tablet: 1 tab(s) orally once a day (17 Aug 2022 09:24)  donepezil 10 mg oral tablet: 1 tab(s) orally once a day (at bedtime) (17 Aug 2022 09:24)  fluticasone 100 mcg/inh inhalation powder: 1 puff(s) inhaled 2 times a day (17 Aug 2022 09:24)  ketorolac 0.4% ophthalmic solution: 1 drop(s) in the left eye 3 times a day (17 Aug 2022 09:24)  metoprolol tartrate 50 mg oral tablet: 1 tab(s) orally 2 times a day (17 Aug 2022 09:24)  NovoLOG FlexPen 100 units/mL injectable solution: 7 unit(s) injectable 3 times a day (17 Aug 2022 09:24)  pantoprazole 40 mg oral delayed release tablet: 1 tab(s) orally once a day (before a meal) (17 Aug 2022 09:24)  polyethylene glycol 3350 oral powder for reconstitution: 17 gram(s) orally once a day (17 Aug 2022 09:24)  senna oral tablet: 2 tab(s) orally once a day (17 Aug 2022 09:24)      Medications:  acetaminophen     Tablet .. 650 milliGRAM(s) Oral every 6 hours PRN  aluminum hydroxide/magnesium hydroxide/simethicone Suspension 30 milliLiter(s) Oral every 4 hours PRN  aspirin enteric coated 81 milliGRAM(s) Oral daily  atorvastatin 80 milliGRAM(s) Oral at bedtime  chlorhexidine 2% Cloths 1 Application(s) Topical <User Schedule>  dextrose 5%. 1000 milliLiter(s) IV Continuous <Continuous>  dextrose 5%. 1000 milliLiter(s) IV Continuous <Continuous>  dextrose 50% Injectable 25 Gram(s) IV Push once  dextrose 50% Injectable 12.5 Gram(s) IV Push once  dextrose 50% Injectable 25 Gram(s) IV Push once  dextrose Oral Gel 15 Gram(s) Oral once PRN  digoxin     Tablet 125 MICROGram(s) Oral daily  donepezil 10 milliGRAM(s) Oral at bedtime  furosemide   Injectable 40 milliGRAM(s) IV Push two times a day  glucagon  Injectable 1 milliGRAM(s) IntraMuscular once  heparin  Infusion.  Unit(s)/Hr IV Continuous <Continuous>  insulin glargine Injectable (LANTUS) 15 Unit(s) SubCutaneous at bedtime  insulin lispro (ADMELOG) corrective regimen sliding scale   SubCutaneous three times a day before meals  insulin lispro (ADMELOG) corrective regimen sliding scale   SubCutaneous at bedtime  insulin lispro Injectable (ADMELOG) 5 Unit(s) SubCutaneous three times a day before meals  ketorolac 0.5% Ophthalmic Solution 1 Drop(s) Left EYE three times a day  loratadine 10 milliGRAM(s) Oral daily  melatonin 3 milliGRAM(s) Oral at bedtime PRN  mometasone 220 MICROgram(s) Inhaler 2 Puff(s) Inhalation daily  norepinephrine Infusion 0.05 MICROgram(s)/kG/Min IV Continuous <Continuous>  pantoprazole    Tablet 40 milliGRAM(s) Oral before breakfast  piperacillin/tazobactam IVPB.. 3.375 Gram(s) IV Intermittent every 8 hours  polyethylene glycol 3350 17 Gram(s) Oral daily  senna 2 Tablet(s) Oral at bedtime  sodium bicarbonate  Infusion 0.127 mEq/kG/Hr IV Continuous <Continuous>  ticagrelor 90 milliGRAM(s) Oral every 12 hours      Review of systems:  10 point review of systems completed and are negative unless noted in HPI    Vitals:  T(C): 36.7 (22 @ 11:00), Max: 36.7 (22 @ 09:30)  HR: 77 (22 @ 13:00) (64 - 100)  BP: 71/59 (22 @ 11:00) (71/59 - 112/72)  BP(mean): 64 (22 @ 11:00) (64 - 68)  RR: 20 (22 @ 13:00) (16 - 28)  SpO2: 100% (22 @ 12:45) (97% - 100%)    Daily     Daily Weight in k.6 (18 Aug 2022 10:39)    Weight (kg): 88.6 ( @ 01:44)    I&O's Summary    18 Aug 2022 07:01  -  18 Aug 2022 15:05  --------------------------------------------------------  IN: 50 mL / OUT: 55 mL / NET: -5 mL        Physical Exam:  Appearance: No Acute Distress  Neck: JVP  Cardiovascular: Normal S1 S2  Respiratory: Clear to auscultation bilaterally  Gastrointestinal: Soft, Non-tender	  Skin: No cyanosis	  Neurologic: Non-focal  Extremities: No LE edema    Labs:                        11.7   13.63 )-----------( 261      ( 18 Aug 2022 03:22 )             38.3     08-18    137  |  100  |  42<H>  ----------------------------<  182<H>  4.6   |  10<LL>  |  2.24<H>    Ca    9.0      18 Aug 2022 11:40  Phos  6.5       Mg     2.00     08-18    TPro  6.4  /  Alb  3.4  /  TBili  1.2  /  DBili  x   /  AST  93<H>  /  ALT  56<H>  /  AlkPhos  103  08-18    PT/INR - ( 17 Aug 2022 18:15 )   PT: 18.9 sec;   INR: 1.62 ratio     PTT - ( 18 Aug 2022 10:24 )  PTT:61.1 sec    CARDIAC MARKERS ( 18 Aug 2022 11:40 )  x     / x     / 467 U/L / x     / 9.4 ng/mL  CARDIAC MARKERS ( 18 Aug 2022 03:22 )  x     / x     / 462 U/L / x     / 5.6 ng/mL  CARDIAC MARKERS ( 17 Aug 2022 18:15 )  x     / x     / x     / x     / 5.0 ng/mL  CARDIAC MARKERS ( 16 Aug 2022 22:37 )  x     / x     / 270 U/L / x     / 8.5 ng/mL    Blood Gas Arterial, Lactate: 10.2:     Blood Gas Venous - Lactate: 13.8:     Serum Pro-Brain Natriuretic Peptide: 17600:       TELEMETRY: Sinus Rhythm    Echocardiogram:  TTE with Doppler (w/Cont) (22 @ 19:13)   ------------------------------------------------------------------------  DIMENSIONS:  Dimensions:     Normal Values:  LA:     3.8 cm    2.0 - 4.0 cm  Ao:     3.0 cm    2.0 - 3.8 cm  SEPTUM: 0.7 cm    0.6 - 1.2 cm  PWT:    0.8 cm    0.6 - 1.1 cm  LVIDd:  6.8 cm    3.0 - 5.6 cm  LVIDs:    ---     1.8 - 4.0 cm  Derived Variables:  LVMI: 105 g/m2  RWT: 0.23  Ejection Fraction (Visual Estimate): 25 %  ------------------------------------------------------------------------  OBSERVATIONS:  Mitral Valve: Thickened, tethered mitral valve. Severe  mitral regurgitation.  Aortic Root: Normal aortic root.  Aortic Valve: Calcified trileaflet aortic valve with normal  opening. No aortic valve regurgitation seen.  Left Atrium: Normal left atrium.  LA volume index = 28  cc/m2.  Left Ventricle: Endocardial visualization enhanced with  intravenous injection of echo contrast (Definity). Severe  global left ventricular systolic dysfunction.The mid to  distal septum, apical, lateral walls are severely  hypokinetic.  Increased E/e'  is consistent with elevated  left ventricular filling pressure.  Right Heart: Normal right atrium. Right ventricular  enlargement with decreased right ventricular systolic  function. Normal tricuspid valve. Mild tricuspid  regurgitation. Normal pulmonic valve.  Pericardium/PleuraNormal pericardium with no pericardial  effusion.  Hemodynamic: Estimated right ventricular systolic pressure  equals 42 mm Hg, assuming right atrial pressure equals 10  mm Hg, consistent with mild pulmonary hypertension.  ------------------------------------------------------------------------  CONCLUSIONS:  1. Thickened, tethered mitral valve. Severe mitral  regurgitation.  2. Calcified trileaflet aortic valvewith normal opening.  3. Normal left atrium.  LA volume index = 28 cc/m2.  4. Endocardial visualization enhanced with intravenous  injection of echo contrast (Definity). Severe global left  ventricular systolic dysfunction.The mid to distal septum,  apical, lateral walls are severely hypokinetic.  5. Right ventricular enlargement with decreased right  ventricular systolic function.      EK Lead ECG (22 @ 06:46)     Ventricular Rate 154 BPM    Atrial Rate 144 BPM    QRS Duration 102 ms    Q-T Interval 308 ms    QTC Calculation(Bazett) 493 ms    R Axis -13 degrees    T Axis 169 degrees    Diagnosis Line Atrial fibrillation with rapid ventricular response  Marked ST abnormality, possible anterior subendocardial injury  Abnormal ECG       Xray Chest 1 View-PORTABLE IMMEDIATE (Xray Chest 1 View-PORTABLE IMMEDIATE .) (22 @ 22:33)     FINDINGS:  Lines/Devices: None.  Heart/Vascular:The heart size is normal.  Pulmonary: Mild vascular congestion and patchy right midlung opacity. No   pleural effusion or pneumothorax.  Bones: No acute findings.    Impression:  Mild vascular congestion and patchy right midlung opacity, may represent   pulmonary edema.    Further information may be obtained from the patient's subsequent CT of   the chest.    Cardiac Catheterization (22 @ 13:27)     Hemodynamic Pressures:   Phase          Location          [mmHg]               [mmHg]  [mmHg]           HR  Baseline      RV                  s      58  ed      20         68  Baseline      PA                  s      60                d      28  m   39         69  Baseline      PCW                 a      33              v      48  m   37         111  Baseline      RA                  a      20                v      19  m   17         78    Oxygen Saturations   Phase          Location        Hb             Sat            pO2  Content        Group  Baseline        PA                       11              50  7.4   PA  Baseline        AO                       11             100  15.4   SA    Oxygen Saturation Average, Phase: Baseline   PV Hb                PV Sat                 PV pO2  PV Content  SAHb      11.30 g/dL SA Sat     100.00 %   SA pO2  SA Content     15.37 %  PA Hb      11.00 g/dL PA Sat     49.70 %    PA pO2  PA Content     7.44 %  MV Hb                MV Sat                 MV pO2  MV Content  Strokework:   Phase: Baseline   LVSW:                                           LVSW (index):   RVSW:                     13.96 g*m             RVSW (index):  6.83 g*m/m2  Flow Calculations, Phase: Baseline   CO                    3.22 l/min    HR  O2Insp  CI     1.58 l/min/m2    PO2  O2Exp  SV                                  VO2                 255.35 ml/min  O2(ExAir)  SVI                                 A-VO2  Hb                 11.30 gm/d  Shunts:   Qs                    3.22 l/min    Qs Index       1.58  l/min/m2  Qp                    3.22 l/min    Qp Index              1.58  l/min/m2    Qp/Qs  EPBF                                EPBF Index   L-R                                 L-R Index   R-L                                 R-L Index   Systemic Resistances, Phase: Baseline   SVR                                              TVR   SVRI                                             TVRI   SVR                                              TVR   SVRI    TVRI   Pulmonary Resistances:   PVR              49.69 dyn*s/cm5                 TPR  969.03 dyn*s/cm5       Patient is a 82y old  Male who presents with a chief complaint of NSTEMI (18 Aug 2022 13:52)      HPI:  82 year old Male with PMH of HTN, HLD T2DM, JACKELIN (on BiPAP), prostate CA s/p radiation/seed implant, s/p /stent (2022), AFib (on Eliquis), CAD s/p PCI/stent X3 (most recent ), and HFrEF (EF 25%;LVIDd 6.8, severe MR, RV dysfunction and severe hypokinetic apical/lateral wall). Patient presented with c/o SOB and  abdominal distention/constipation  X10 days. Initially with elevated troponin levels and EKG concerning for NSTEMI; started on heparin gtt and s/p ASA/brillinta. RRT called this morning for AMS/not responding to verbal commands and elevated lactate. He subsequently developed severe bradycardia/weak thready pulse and hypotension; started on Levo drip and evaluated by MICU and referred to CCU. Sent to cath lab for RHC and IABP for cardiogenic shock.         PAST MEDICAL & SURGICAL HISTORY:  Diabetes mellitus      CAD (coronary artery disease)      Prostate ca  s/p SEED 5 yrs ago      Sleep apnea  on cpap at home      Hypertension      Hypercholesteremia      S/P cholecystectomy      S/P coronary angioplasty  last stent 1 year ago      H/O carotid endarterectomy  2022, stent placed.          FAMILY HISTORY:  FH: type 2 diabetes        Home Medications:  apixaban 5 mg oral tablet: 1 tab(s) orally 2 times a day (17 Aug 2022 09:24)  atorvastatin 80 mg oral tablet: 1 tab(s) orally once a day (17 Aug 2022 09:24)  Basaglar KwikPen 100 units/mL subcutaneous solution: 18 unit(s) subcutaneous once a day (at bedtime) (17 Aug 2022 09:24)  Claritin 10 mg oral tablet: 1 tab(s) orally once a day (17 Aug 2022 09:24)  clopidogrel 75 mg oral tablet: 1 tab(s) orally once a day (17 Aug 2022 09:24)  digoxin 125 mcg (0.125 mg) oral tablet: 1 tab(s) orally once a day (17 Aug 2022 09:24)  donepezil 10 mg oral tablet: 1 tab(s) orally once a day (at bedtime) (17 Aug 2022 09:24)  fluticasone 100 mcg/inh inhalation powder: 1 puff(s) inhaled 2 times a day (17 Aug 2022 09:24)  ketorolac 0.4% ophthalmic solution: 1 drop(s) in the left eye 3 times a day (17 Aug 2022 09:24)  metoprolol tartrate 50 mg oral tablet: 1 tab(s) orally 2 times a day (17 Aug 2022 09:24)  NovoLOG FlexPen 100 units/mL injectable solution: 7 unit(s) injectable 3 times a day (17 Aug 2022 09:24)  pantoprazole 40 mg oral delayed release tablet: 1 tab(s) orally once a day (before a meal) (17 Aug 2022 09:)  polyethylene glycol 3350 oral powder for reconstitution: 17 gram(s) orally once a day (17 Aug 2022 09:24)  senna oral tablet: 2 tab(s) orally once a day (17 Aug 2022 09:)      Medications:  acetaminophen     Tablet .. 650 milliGRAM(s) Oral every 6 hours PRN  aluminum hydroxide/magnesium hydroxide/simethicone Suspension 30 milliLiter(s) Oral every 4 hours PRN  aspirin enteric coated 81 milliGRAM(s) Oral daily  atorvastatin 80 milliGRAM(s) Oral at bedtime  chlorhexidine 2% Cloths 1 Application(s) Topical <User Schedule>  dextrose 5%. 1000 milliLiter(s) IV Continuous <Continuous>  dextrose 5%. 1000 milliLiter(s) IV Continuous <Continuous>  dextrose 50% Injectable 25 Gram(s) IV Push once  dextrose 50% Injectable 12.5 Gram(s) IV Push once  dextrose 50% Injectable 25 Gram(s) IV Push once  dextrose Oral Gel 15 Gram(s) Oral once PRN  digoxin     Tablet 125 MICROGram(s) Oral daily  donepezil 10 milliGRAM(s) Oral at bedtime  furosemide   Injectable 40 milliGRAM(s) IV Push two times a day  glucagon  Injectable 1 milliGRAM(s) IntraMuscular once  heparin  Infusion.  Unit(s)/Hr IV Continuous <Continuous>  insulin glargine Injectable (LANTUS) 15 Unit(s) SubCutaneous at bedtime  insulin lispro (ADMELOG) corrective regimen sliding scale   SubCutaneous three times a day before meals  insulin lispro (ADMELOG) corrective regimen sliding scale   SubCutaneous at bedtime  insulin lispro Injectable (ADMELOG) 5 Unit(s) SubCutaneous three times a day before meals  ketorolac 0.5% Ophthalmic Solution 1 Drop(s) Left EYE three times a day  loratadine 10 milliGRAM(s) Oral daily  melatonin 3 milliGRAM(s) Oral at bedtime PRN  mometasone 220 MICROgram(s) Inhaler 2 Puff(s) Inhalation daily  norepinephrine Infusion 0.05 MICROgram(s)/kG/Min IV Continuous <Continuous>  pantoprazole    Tablet 40 milliGRAM(s) Oral before breakfast  piperacillin/tazobactam IVPB.. 3.375 Gram(s) IV Intermittent every 8 hours  polyethylene glycol 3350 17 Gram(s) Oral daily  senna 2 Tablet(s) Oral at bedtime  sodium bicarbonate  Infusion 0.127 mEq/kG/Hr IV Continuous <Continuous>  ticagrelor 90 milliGRAM(s) Oral every 12 hours      Review of systems:  10 point review of systems completed and are negative unless noted in HPI    Vitals:  T(C): 36.7 (22 @ 11:00), Max: 36.7 (22 @ 09:30)  HR: 77 (22 @ 13:00) (64 - 100)  BP: 71/59 (22 @ 11:00) (71/59 - 112/72)  BP(mean): 64 (22 @ 11:00) (64 - 68)  RR: 20 (22 @ 13:00) (16 - 28)  SpO2: 100% (22 @ 12:45) (97% - 100%)    Daily     Daily Weight in k.6 (18 Aug 2022 10:39)    Weight (kg): 88.6 ( @ 01:44)    I&O's Summary    18 Aug 2022 07:01  -  18 Aug 2022 15:05  --------------------------------------------------------  IN: 50 mL / OUT: 55 mL / NET: -5 mL        Physical Exam:  Appearance: No Acute Distress  Neck: JVP  Cardiovascular: Normal S1 S2  Respiratory: Clear to auscultation bilaterally  Gastrointestinal: Soft, Non-tender	  Skin: No cyanosis	  Neurologic: Non-focal  Extremities: No LE edema    Labs:                        11.7   13.63 )-----------( 261      ( 18 Aug 2022 03:22 )             38.3     08-18    137  |  100  |  42<H>  ----------------------------<  182<H>  4.6   |  10<LL>  |  2.24<H>    Ca    9.0      18 Aug 2022 11:40  Phos  6.5       Mg     2.00     08-18    TPro  6.4  /  Alb  3.4  /  TBili  1.2  /  DBili  x   /  AST  93<H>  /  ALT  56<H>  /  AlkPhos  103  08-18    PT/INR - ( 17 Aug 2022 18:15 )   PT: 18.9 sec;   INR: 1.62 ratio     PTT - ( 18 Aug 2022 10:24 )  PTT:61.1 sec    CARDIAC MARKERS ( 18 Aug 2022 11:40 )  x     / x     / 467 U/L / x     / 9.4 ng/mL  CARDIAC MARKERS ( 18 Aug 2022 03:22 )  x     / x     / 462 U/L / x     / 5.6 ng/mL  CARDIAC MARKERS ( 17 Aug 2022 18:15 )  x     / x     / x     / x     / 5.0 ng/mL  CARDIAC MARKERS ( 16 Aug 2022 22:37 )  x     / x     / 270 U/L / x     / 8.5 ng/mL    Blood Gas Arterial, Lactate: 10.2:     Blood Gas Venous - Lactate: 13.8:     Serum Pro-Brain Natriuretic Peptide: [id number]:       TELEMETRY: Sinus Rhythm    Echocardiogram:  TTE with Doppler (w/Cont) (22 @ 19:13)   ------------------------------------------------------------------------  DIMENSIONS:  Dimensions:     Normal Values:  LA:     3.8 cm    2.0 - 4.0 cm  Ao:     3.0 cm    2.0 - 3.8 cm  SEPTUM: 0.7 cm    0.6 - 1.2 cm  PWT:    0.8 cm    0.6 - 1.1 cm  LVIDd:  6.8 cm    3.0 - 5.6 cm  LVIDs:    ---     1.8 - 4.0 cm  Derived Variables:  LVMI: 105 g/m2  RWT: 0.23  Ejection Fraction (Visual Estimate): 25 %  ------------------------------------------------------------------------  OBSERVATIONS:  Mitral Valve: Thickened, tethered mitral valve. Severe  mitral regurgitation.  Aortic Root: Normal aortic root.  Aortic Valve: Calcified trileaflet aortic valve with normal  opening. No aortic valve regurgitation seen.  Left Atrium: Normal left atrium.  LA volume index = 28  cc/m2.  Left Ventricle: Endocardial visualization enhanced with  intravenous injection of echo contrast (Definity). Severe  global left ventricular systolic dysfunction.The mid to  distal septum, apical, lateral walls are severely  hypokinetic.  Increased E/e'  is consistent with elevated  left ventricular filling pressure.  Right Heart: Normal right atrium. Right ventricular  enlargement with decreased right ventricular systolic  function. Normal tricuspid valve. Mild tricuspid  regurgitation. Normal pulmonic valve.  Pericardium/PleuraNormal pericardium with no pericardial  effusion.  Hemodynamic: Estimated right ventricular systolic pressure  equals 42 mm Hg, assuming right atrial pressure equals 10  mm Hg, consistent with mild pulmonary hypertension.  ------------------------------------------------------------------------  CONCLUSIONS:  1. Thickened, tethered mitral valve. Severe mitral  regurgitation.  2. Calcified trileaflet aortic valvewith normal opening.  3. Normal left atrium.  LA volume index = 28 cc/m2.  4. Endocardial visualization enhanced with intravenous  injection of echo contrast (Definity). Severe global left  ventricular systolic dysfunction.The mid to distal septum,  apical, lateral walls are severely hypokinetic.  5. Right ventricular enlargement with decreased right  ventricular systolic function.      EK Lead ECG (22 @ 06:46)     Ventricular Rate 154 BPM    Atrial Rate 144 BPM    QRS Duration 102 ms    Q-T Interval 308 ms    QTC Calculation(Bazett) 493 ms    R Axis -13 degrees    T Axis 169 degrees    Diagnosis Line Atrial fibrillation with rapid ventricular response  Marked ST abnormality, possible anterior subendocardial injury  Abnormal ECG       Xray Chest 1 View-PORTABLE IMMEDIATE (Xray Chest 1 View-PORTABLE IMMEDIATE .) (22 @ 22:33)     FINDINGS:  Lines/Devices: None.  Heart/Vascular:The heart size is normal.  Pulmonary: Mild vascular congestion and patchy right midlung opacity. No   pleural effusion or pneumothorax.  Bones: No acute findings.    Impression:  Mild vascular congestion and patchy right midlung opacity, may represent   pulmonary edema.    Further information may be obtained from the patient's subsequent CT of   the chest.    Cardiac Catheterization (22 @ 13:27)     Hemodynamic Pressures:   Phase          Location          [mmHg]               [mmHg]  [mmHg]           HR  Baseline      RV                  s      58  ed      20         68  Baseline      PA                  s      60                d      28  m   39         69  Baseline      PCW                 a      33              v      48  m   37         111  Baseline      RA                  a      20                v      19  m   17         78    Oxygen Saturations   Phase          Location        Hb             Sat            pO2  Content        Group  Baseline        PA                       11              50  7.4   PA  Baseline        AO                       11             100  15.4   SA    Oxygen Saturation Average, Phase: Baseline   PV Hb                PV Sat                 PV pO2  PV Content  SAHb      11.30 g/dL SA Sat     100.00 %   SA pO2  SA Content     15.37 %  PA Hb      11.00 g/dL PA Sat     49.70 %    PA pO2  PA Content     7.44 %  MV Hb                MV Sat                 MV pO2  MV Content  Strokework:   Phase: Baseline   LVSW:                                           LVSW (index):   RVSW:                     13.96 g*m             RVSW (index):  6.83 g*m/m2  Flow Calculations, Phase: Baseline   CO                    3.22 l/min    HR  O2Insp  CI     1.58 l/min/m2    PO2  O2Exp  SV                                  VO2                 255.35 ml/min  O2(ExAir)  SVI                                 A-VO2  Hb                 11.30 gm/d  Shunts:   Qs                    3.22 l/min    Qs Index       1.58  l/min/m2  Qp                    3.22 l/min    Qp Index              1.58  l/min/m2    Qp/Qs  EPBF                                EPBF Index   L-R                                 L-R Index   R-L                                 R-L Index   Systemic Resistances, Phase: Baseline   SVR                                              TVR   SVRI                                             TVRI   SVR                                              TVR   SVRI    TVRI   Pulmonary Resistances:   PVR              49.69 dyn*s/cm5                 TPR  969.03 dyn*s/cm5       Patient is a 82y old  Male who presents with a chief complaint of NSTEMI (18 Aug 2022 13:52)      HPI:  82 year old Male with PMH of HTN, HLD T2DM, JACKELIN (on BiPAP), prostate CA s/p radiation/seed implant, CVA s/p /stent (2022), AFib (on Eliquis), CAD s/p PCI/stent X3 (most recent  at [facility]), and HFrEF (EF 25%;LVIDd 6.8, severe MR, RV dysfunction and severe hypokinetic apical/lateral wall). Patient presented with c/o SOB and  abdominal distention/constipation  X10 days. Initially with elevated troponin levels and EKG concerning for NSTEMI; started on heparin gtt and s/p ASA/Brillinta. RRT called this morning for AMS/not responding to verbal commands and elevated lactate. He subsequently developed severe bradycardia/weak thready pulse and hypotension; started on Levo drip and evaluated by MICU who referred to CCU. He was sent to cath lab for RHC and IABP was placed for cardiogenic shock.         PAST MEDICAL & SURGICAL HISTORY:  Diabetes mellitus      CAD (coronary artery disease)      Prostate ca  s/p SEED 5 yrs ago      Sleep apnea  on cpap at home      Hypertension      Hypercholesteremia      S/P cholecystectomy      S/P coronary angioplasty  last stent 1 year ago      H/O carotid endarterectomy  2022, stent placed.          FAMILY HISTORY:  FH: type 2 diabetes        Home Medications:  apixaban 5 mg oral tablet: 1 tab(s) orally 2 times a day (17 Aug 2022 09:24)  atorvastatin 80 mg oral tablet: 1 tab(s) orally once a day (17 Aug 2022 09:24)  Basaglar KwikPen 100 units/mL subcutaneous solution: 18 unit(s) subcutaneous once a day (at bedtime) (17 Aug 2022 09:24)  Claritin 10 mg oral tablet: 1 tab(s) orally once a day (17 Aug 2022 09:24)  clopidogrel 75 mg oral tablet: 1 tab(s) orally once a day (17 Aug 2022 09:24)  digoxin 125 mcg (0.125 mg) oral tablet: 1 tab(s) orally once a day (17 Aug 2022 09:24)  donepezil 10 mg oral tablet: 1 tab(s) orally once a day (at bedtime) (17 Aug 2022 09:24)  fluticasone 100 mcg/inh inhalation powder: 1 puff(s) inhaled 2 times a day (17 Aug 2022 09:24)  ketorolac 0.4% ophthalmic solution: 1 drop(s) in the left eye 3 times a day (17 Aug 2022 09:24)  metoprolol tartrate 50 mg oral tablet: 1 tab(s) orally 2 times a day (17 Aug 2022 09:24)  NovoLOG FlexPen 100 units/mL injectable solution: 7 unit(s) injectable 3 times a day (17 Aug 2022 09:24)  pantoprazole 40 mg oral delayed release tablet: 1 tab(s) orally once a day (before a meal) (17 Aug 2022 09:)  polyethylene glycol 3350 oral powder for reconstitution: 17 gram(s) orally once a day (17 Aug 2022 09:)  senna oral tablet: 2 tab(s) orally once a day (17 Aug 2022 09:)      Medications:  acetaminophen     Tablet .. 650 milliGRAM(s) Oral every 6 hours PRN  aluminum hydroxide/magnesium hydroxide/simethicone Suspension 30 milliLiter(s) Oral every 4 hours PRN  aspirin enteric coated 81 milliGRAM(s) Oral daily  atorvastatin 80 milliGRAM(s) Oral at bedtime  chlorhexidine 2% Cloths 1 Application(s) Topical <User Schedule>  dextrose 5%. 1000 milliLiter(s) IV Continuous <Continuous>  dextrose 5%. 1000 milliLiter(s) IV Continuous <Continuous>  dextrose 50% Injectable 25 Gram(s) IV Push once  dextrose 50% Injectable 12.5 Gram(s) IV Push once  dextrose 50% Injectable 25 Gram(s) IV Push once  dextrose Oral Gel 15 Gram(s) Oral once PRN  digoxin     Tablet 125 MICROGram(s) Oral daily  donepezil 10 milliGRAM(s) Oral at bedtime  furosemide   Injectable 40 milliGRAM(s) IV Push two times a day  glucagon  Injectable 1 milliGRAM(s) IntraMuscular once  heparin  Infusion.  Unit(s)/Hr IV Continuous <Continuous>  insulin glargine Injectable (LANTUS) 15 Unit(s) SubCutaneous at bedtime  insulin lispro (ADMELOG) corrective regimen sliding scale   SubCutaneous three times a day before meals  insulin lispro (ADMELOG) corrective regimen sliding scale   SubCutaneous at bedtime  insulin lispro Injectable (ADMELOG) 5 Unit(s) SubCutaneous three times a day before meals  ketorolac 0.5% Ophthalmic Solution 1 Drop(s) Left EYE three times a day  loratadine 10 milliGRAM(s) Oral daily  melatonin 3 milliGRAM(s) Oral at bedtime PRN  mometasone 220 MICROgram(s) Inhaler 2 Puff(s) Inhalation daily  norepinephrine Infusion 0.05 MICROgram(s)/kG/Min IV Continuous <Continuous>  pantoprazole    Tablet 40 milliGRAM(s) Oral before breakfast  piperacillin/tazobactam IVPB.. 3.375 Gram(s) IV Intermittent every 8 hours  polyethylene glycol 3350 17 Gram(s) Oral daily  senna 2 Tablet(s) Oral at bedtime  sodium bicarbonate  Infusion 0.127 mEq/kG/Hr IV Continuous <Continuous>  ticagrelor 90 milliGRAM(s) Oral every 12 hours      Review of systems:  10 point review of systems completed and are negative unless noted in HPI    Vitals:  T(C): 36.7 (22 @ 11:00), Max: 36.7 (22 @ 09:30)  HR: 77 (22 @ 13:00) (64 - 100)  BP: 71/59 (22 @ 11:00) (71/59 - 112/72)  BP(mean): 64 (22 @ 11:00) (64 - 68)  RR: 20 (22 @ 13:00) (16 - 28)  SpO2: 100% (22 @ 12:45) (97% - 100%)    Daily     Daily Weight in k.6 (18 Aug 2022 10:39)    Weight (kg): 88.6 ( @ 01:44)    I&O's Summary    18 Aug 2022 07:01  -  18 Aug 2022 15:05  --------------------------------------------------------  IN: 50 mL / OUT: 55 mL / NET: -5 mL        Physical Exam:  Appearance: No Acute Distress  Neck: JVP~12  Cardiovascular: Normal S1 S2  Respiratory: Clear to auscultation bilaterally  Gastrointestinal: Soft, Non-tender	  Skin: No cyanosis	  Neurologic: Non-focal  Extremities: No LE edema; slightly cool to touch/mottled    Labs:                        11.7   13.63 )-----------( 261      ( 18 Aug 2022 03:22 )             38.3     08-18    137  |  100  |  42<H>  ----------------------------<  182<H>  4.6   |  10<LL>  |  2.24<H>    Ca    9.0      18 Aug 2022 11:40  Phos  6.5     08-18  Mg     2.00     08-18    TPro  6.4  /  Alb  3.4  /  TBili  1.2  /  DBili  x   /  AST  93<H>  /  ALT  56<H>  /  AlkPhos  103  08-18    PT/INR - ( 17 Aug 2022 18:15 )   PT: 18.9 sec;   INR: 1.62 ratio     PTT - ( 18 Aug 2022 10:24 )  PTT:61.1 sec    CARDIAC MARKERS ( 18 Aug 2022 11:40 )  x     / x     / 467 U/L / x     / 9.4 ng/mL  CARDIAC MARKERS ( 18 Aug 2022 03:22 )  x     / x     / 462 U/L / x     / 5.6 ng/mL  CARDIAC MARKERS ( 17 Aug 2022 18:15 )  x     / x     / x     / x     / 5.0 ng/mL  CARDIAC MARKERS ( 16 Aug 2022 22:37 )  x     / x     / 270 U/L / x     / 8.5 ng/mL    Blood Gas Arterial, Lactate: 10.2:     Blood Gas Venous - Lactate: 13.8:     Serum Pro-Brain Natriuretic Peptide: [id number]:       TELEMETRY: Sinus Rhythm    Echocardiogram:  TTE with Doppler (w/Cont) (22 @ 19:13)   ------------------------------------------------------------------------  DIMENSIONS:  Dimensions:     Normal Values:  LA:     3.8 cm    2.0 - 4.0 cm  Ao:     3.0 cm    2.0 - 3.8 cm  SEPTUM: 0.7 cm    0.6 - 1.2 cm  PWT:    0.8 cm    0.6 - 1.1 cm  LVIDd:  6.8 cm    3.0 - 5.6 cm  LVIDs:    ---     1.8 - 4.0 cm  Derived Variables:  LVMI: 105 g/m2  RWT: 0.23  Ejection Fraction (Visual Estimate): 25 %  ------------------------------------------------------------------------  OBSERVATIONS:  Mitral Valve: Thickened, tethered mitral valve. Severe  mitral regurgitation.  Aortic Root: Normal aortic root.  Aortic Valve: Calcified trileaflet aortic valve with normal  opening. No aortic valve regurgitation seen.  Left Atrium: Normal left atrium.  LA volume index = 28  cc/m2.  Left Ventricle: Endocardial visualization enhanced with  intravenous injection of echo contrast (Definity). Severe  global left ventricular systolic dysfunction.The mid to  distal septum, apical, lateral walls are severely  hypokinetic.  Increased E/e'  is consistent with elevated  left ventricular filling pressure.  Right Heart: Normal right atrium. Right ventricular  enlargement with decreased right ventricular systolic  function. Normal tricuspid valve. Mild tricuspid  regurgitation. Normal pulmonic valve.  Pericardium/PleuraNormal pericardium with no pericardial  effusion.  Hemodynamic: Estimated right ventricular systolic pressure  equals 42 mm Hg, assuming right atrial pressure equals 10  mm Hg, consistent with mild pulmonary hypertension.  ------------------------------------------------------------------------  CONCLUSIONS:  1. Thickened, tethered mitral valve. Severe mitral  regurgitation.  2. Calcified trileaflet aortic valvewith normal opening.  3. Normal left atrium.  LA volume index = 28 cc/m2.  4. Endocardial visualization enhanced with intravenous  injection of echo contrast (Definity). Severe global left  ventricular systolic dysfunction.The mid to distal septum,  apical, lateral walls are severely hypokinetic.  5. Right ventricular enlargement with decreased right  ventricular systolic function.      EK Lead ECG (22 @ 06:46)     Ventricular Rate 154 BPM    Atrial Rate 144 BPM    QRS Duration 102 ms    Q-T Interval 308 ms    QTC Calculation(Bazett) 493 ms    R Axis -13 degrees    T Axis 169 degrees    Diagnosis Line Atrial fibrillation with rapid ventricular response  Marked ST abnormality, possible anterior subendocardial injury  Abnormal ECG       Xray Chest 1 View-PORTABLE IMMEDIATE (Xray Chest 1 View-PORTABLE IMMEDIATE .) (22 @ 22:33)     FINDINGS:  Lines/Devices: None.  Heart/Vascular:The heart size is normal.  Pulmonary: Mild vascular congestion and patchy right midlung opacity. No   pleural effusion or pneumothorax.  Bones: No acute findings.    Impression:  Mild vascular congestion and patchy right midlung opacity, may represent   pulmonary edema.    Further information may be obtained from the patient's subsequent CT of   the chest.    Cardiac Catheterization (22 @ 13:27)     Hemodynamic Pressures:   Phase          Location          [mmHg]               [mmHg]  [mmHg]           HR  Baseline      RV                  s      58  ed      20         68  Baseline      PA                  s      60                d      28  m   39         69  Baseline      PCW                 a      33              v      48  m   37         111  Baseline      RA                  a      20                v      19  m   17         78    Oxygen Saturations   Phase          Location        Hb             Sat            pO2  Content        Group  Baseline        PA                       11              50  7.4   PA  Baseline        AO                       11             100  15.4   SA    Oxygen Saturation Average, Phase: Baseline   PV Hb                PV Sat                 PV pO2  PV Content  SAHb      11.30 g/dL SA Sat     100.00 %   SA pO2  SA Content     15.37 %  PA Hb      11.00 g/dL PA Sat     49.70 %    PA pO2  PA Content     7.44 %  MV Hb                MV Sat                 MV pO2  MV Content  Strokework:   Phase: Baseline   LVSW:                                           LVSW (index):   RVSW:                     13.96 g*m             RVSW (index):  6.83 g*m/m2  Flow Calculations, Phase: Baseline   CO                    3.22 l/min    HR  O2Insp  CI     1.58 l/min/m2    PO2  O2Exp  SV                                  VO2                 255.35 ml/min  O2(ExAir)  SVI                                 A-VO2  Hb                 11.30 gm/d  Shunts:   Qs                    3.22 l/min    Qs Index       1.58  l/min/m2  Qp                    3.22 l/min    Qp Index              1.58  l/min/m2    Qp/Qs  EPBF                                EPBF Index   L-R                                 L-R Index   R-L                                 R-L Index   Systemic Resistances, Phase: Baseline   SVR                                              TVR   SVRI                                             TVRI   SVR                                              TVR   SVRI    TVRI   Pulmonary Resistances:   PVR              49.69 dyn*s/cm5                 TPR  969.03 dyn*s/cm5       HEART FAILURE CONSULT NOTE    Patient is a 82y old  Male who presents with a chief complaint of NSTEMI (18 Aug 2022 13:52)    HPI:  82 year old Male with PMH of HTN, HLD T2DM, JACKELIN (on BiPAP), prostate CA s/p radiation/seed implant, CVA s/p /stent (2022), AFib (on Eliquis), CAD s/p PCI/stent X3 (most recent  at [facility]), and HFrEF (EF 25%;LVIDd 6.8, severe MR, RV dysfunction and severe hypokinetic apical/lateral wall). Patient presented with c/o SOB and  abdominal distention/constipation  X10 days. Initially with elevated troponin levels and EKG concerning for NSTEMI; started on heparin gtt and s/p ASA/Brillinta. RRT called this morning for AMS/not responding to verbal commands and elevated lactate. He subsequently developed severe bradycardia/weak thready pulse and hypotension; started on Levo drip and evaluated by MICU who referred to CCU. He was sent to cath lab for RHC and IABP was placed for cardiogenic shock.         PAST MEDICAL & SURGICAL HISTORY:  Diabetes mellitus  CAD (coronary artery disease), s/p multiple PCI last in  - details unknown.  Prostate ca s/p SEED 5 yrs ago  Sleep apnea on cpap at home  Hypertension  Hypercholesteremia  S/P cholecystectomy  H/O carotid endarterectomy, 2022, stent placed.      FAMILY HISTORY:  FH: type 2 diabetes      Home Medications:  apixaban 5 mg oral tablet: 1 tab(s) orally 2 times a day (17 Aug 2022 09:24)  atorvastatin 80 mg oral tablet: 1 tab(s) orally once a day (17 Aug 2022 09:24)  Basaglar KwikPen 100 units/mL subcutaneous solution: 18 unit(s) subcutaneous once a day (at bedtime) (17 Aug 2022 09:24)  Claritin 10 mg oral tablet: 1 tab(s) orally once a day (17 Aug 2022 09:24)  clopidogrel 75 mg oral tablet: 1 tab(s) orally once a day (17 Aug 2022 09:24)  digoxin 125 mcg (0.125 mg) oral tablet: 1 tab(s) orally once a day (17 Aug 2022 09:24)  donepezil 10 mg oral tablet: 1 tab(s) orally once a day (at bedtime) (17 Aug 2022 09:24)  fluticasone 100 mcg/inh inhalation powder: 1 puff(s) inhaled 2 times a day (17 Aug 2022 09:24)  ketorolac 0.4% ophthalmic solution: 1 drop(s) in the left eye 3 times a day (17 Aug 2022 09:24)  metoprolol tartrate 50 mg oral tablet: 1 tab(s) orally 2 times a day (17 Aug 2022 09:24)  NovoLOG FlexPen 100 units/mL injectable solution: 7 unit(s) injectable 3 times a day (17 Aug 2022 09:24)  pantoprazole 40 mg oral delayed release tablet: 1 tab(s) orally once a day (before a meal) (17 Aug 2022 09:24)  polyethylene glycol 3350 oral powder for reconstitution: 17 gram(s) orally once a day (17 Aug 2022 09:24)  senna oral tablet: 2 tab(s) orally once a day (17 Aug 2022 09:24)      Medications:  acetaminophen     Tablet .. 650 milliGRAM(s) Oral every 6 hours PRN  aluminum hydroxide/magnesium hydroxide/simethicone Suspension 30 milliLiter(s) Oral every 4 hours PRN  aspirin enteric coated 81 milliGRAM(s) Oral daily  atorvastatin 80 milliGRAM(s) Oral at bedtime  chlorhexidine 2% Cloths 1 Application(s) Topical <User Schedule>  dextrose 5%. 1000 milliLiter(s) IV Continuous <Continuous>  dextrose 5%. 1000 milliLiter(s) IV Continuous <Continuous>  dextrose 50% Injectable 25 Gram(s) IV Push once  dextrose 50% Injectable 12.5 Gram(s) IV Push once  dextrose 50% Injectable 25 Gram(s) IV Push once  dextrose Oral Gel 15 Gram(s) Oral once PRN  digoxin     Tablet 125 MICROGram(s) Oral daily  donepezil 10 milliGRAM(s) Oral at bedtime  furosemide   Injectable 40 milliGRAM(s) IV Push two times a day  glucagon  Injectable 1 milliGRAM(s) IntraMuscular once  heparin  Infusion.  Unit(s)/Hr IV Continuous <Continuous>  insulin glargine Injectable (LANTUS) 15 Unit(s) SubCutaneous at bedtime  insulin lispro (ADMELOG) corrective regimen sliding scale   SubCutaneous three times a day before meals  insulin lispro (ADMELOG) corrective regimen sliding scale   SubCutaneous at bedtime  insulin lispro Injectable (ADMELOG) 5 Unit(s) SubCutaneous three times a day before meals  ketorolac 0.5% Ophthalmic Solution 1 Drop(s) Left EYE three times a day  loratadine 10 milliGRAM(s) Oral daily  melatonin 3 milliGRAM(s) Oral at bedtime PRN  mometasone 220 MICROgram(s) Inhaler 2 Puff(s) Inhalation daily  norepinephrine Infusion 0.05 MICROgram(s)/kG/Min IV Continuous <Continuous>  pantoprazole    Tablet 40 milliGRAM(s) Oral before breakfast  piperacillin/tazobactam IVPB.. 3.375 Gram(s) IV Intermittent every 8 hours  polyethylene glycol 3350 17 Gram(s) Oral daily  senna 2 Tablet(s) Oral at bedtime  sodium bicarbonate  Infusion 0.127 mEq/kG/Hr IV Continuous <Continuous>  ticagrelor 90 milliGRAM(s) Oral every 12 hours      Review of systems:  10 point review of systems completed and are negative unless noted in HPI    Vitals:  T(C): 36.7 (22 @ 11:00), Max: 36.7 (22 @ 09:30)  HR: 77 (22 @ 13:00) (64 - 100)  BP: 71/59 (22 @ 11:00) (71/59 - 112/72)  BP(mean): 64 (22 @ 11:00) (64 - 68)  RR: 20 (22 @ 13:00) (16 - 28)  SpO2: 100% (22 @ 12:45) (97% - 100%)    Daily     Daily Weight in k.6 (18 Aug 2022 10:39)    Weight (kg): 88.6 ( @ 01:44)    I&O's Summary    18 Aug 2022 07:01  -  18 Aug 2022 15:05  --------------------------------------------------------  IN: 50 mL / OUT: 55 mL / NET: -5 mL        Physical Exam:  Appearance: No Acute Distress  Neck: JVP~12  Cardiovascular: Normal S1 S2  Respiratory: Clear to auscultation bilaterally  Gastrointestinal: Soft, Non-tender	  Skin: No cyanosis	  Neurologic: Non-focal  Extremities: No LE edema; slightly cool to touch/mottled    Labs:                        11.7   13.63 )-----------( 261      ( 18 Aug 2022 03:22 )             38.3     08-18    137  |  100  |  42<H>  ----------------------------<  182<H>  4.6   |  10<LL>  |  2.24<H>    Ca    9.0      18 Aug 2022 11:40  Phos  6.5       Mg     2.00     18    TPro  6.4  /  Alb  3.4  /  TBili  1.2  /  DBili  x   /  AST  93<H>  /  ALT  56<H>  /  AlkPhos  103  18    PT/INR - ( 17 Aug 2022 18:15 )   PT: 18.9 sec;   INR: 1.62 ratio     PTT - ( 18 Aug 2022 10:24 )  PTT:61.1 sec    CARDIAC MARKERS ( 18 Aug 2022 11:40 )  x     / x     / 467 U/L / x     / 9.4 ng/mL  CARDIAC MARKERS ( 18 Aug 2022 03:22 )  x     / x     / 462 U/L / x     / 5.6 ng/mL  CARDIAC MARKERS ( 17 Aug 2022 18:15 )  x     / x     / x     / x     / 5.0 ng/mL  CARDIAC MARKERS ( 16 Aug 2022 22:37 )  x     / x     / 270 U/L / x     / 8.5 ng/mL    Blood Gas Arterial, Lactate: 10.2:     Blood Gas Venous - Lactate: 13.8:     Serum Pro-Brain Natriuretic Peptide: [id number]:       TELEMETRY: Sinus Rhythm    Echocardiogram:  TTE with Doppler (w/Cont) (22 @ 19:13)   ------------------------------------------------------------------------  DIMENSIONS:  Dimensions:     Normal Values:  LA:     3.8 cm    2.0 - 4.0 cm  Ao:     3.0 cm    2.0 - 3.8 cm  SEPTUM: 0.7 cm    0.6 - 1.2 cm  PWT:    0.8 cm    0.6 - 1.1 cm  LVIDd:  6.8 cm    3.0 - 5.6 cm  LVIDs:    ---     1.8 - 4.0 cm  Derived Variables:  LVMI: 105 g/m2  RWT: 0.23  Ejection Fraction (Visual Estimate): 25 %  ------------------------------------------------------------------------  OBSERVATIONS:  Mitral Valve: Thickened, tethered mitral valve. Severe  mitral regurgitation.  Aortic Root: Normal aortic root.  Aortic Valve: Calcified trileaflet aortic valve with normal  opening. No aortic valve regurgitation seen.  Left Atrium: Normal left atrium.  LA volume index = 28  cc/m2.  Left Ventricle: Endocardial visualization enhanced with  intravenous injection of echo contrast (Definity). Severe  global left ventricular systolic dysfunction.The mid to  distal septum, apical, lateral walls are severely  hypokinetic.  Increased E/e'  is consistent with elevated  left ventricular filling pressure.  Right Heart: Normal right atrium. Right ventricular  enlargement with decreased right ventricular systolic  function. Normal tricuspid valve. Mild tricuspid  regurgitation. Normal pulmonic valve.  Pericardium/PleuraNormal pericardium with no pericardial  effusion.  Hemodynamic: Estimated right ventricular systolic pressure  equals 42 mm Hg, assuming right atrial pressure equals 10  mm Hg, consistent with mild pulmonary hypertension.  ------------------------------------------------------------------------  CONCLUSIONS:  1. Thickened, tethered mitral valve. Severe mitral  regurgitation.  2. Calcified trileaflet aortic valvewith normal opening.  3. Normal left atrium.  LA volume index = 28 cc/m2.  4. Endocardial visualization enhanced with intravenous  injection of echo contrast (Definity). Severe global left  ventricular systolic dysfunction.The mid to distal septum,  apical, lateral walls are severely hypokinetic.  5. Right ventricular enlargement with decreased right  ventricular systolic function.      EK Lead ECG (22 @ 06:46)     Ventricular Rate 154 BPM    Atrial Rate 144 BPM    QRS Duration 102 ms    Q-T Interval 308 ms    QTC Calculation(Bazett) 493 ms    R Axis -13 degrees    T Axis 169 degrees    Diagnosis Line Atrial fibrillation with rapid ventricular response  Marked ST abnormality, possible anterior subendocardial injury  Abnormal ECG       Xray Chest 1 View-PORTABLE IMMEDIATE (Xray Chest 1 View-PORTABLE IMMEDIATE .) (22 @ 22:33)     FINDINGS:  Lines/Devices: None.  Heart/Vascular:The heart size is normal.  Pulmonary: Mild vascular congestion and patchy right midlung opacity. No   pleural effusion or pneumothorax.  Bones: No acute findings.    Impression:  Mild vascular congestion and patchy right midlung opacity, may represent   pulmonary edema.    Further information may be obtained from the patient's subsequent CT of   the chest.    Cardiac Catheterization (22 @ 13:27)     Hemodynamic Pressures:   Phase          Location          [mmHg]               [mmHg]  [mmHg]           HR  Baseline      RV                  s      58  ed      20         68  Baseline      PA                  s      60                d      28  m   39         69  Baseline      PCW                 a      33              v      48  m   37         111  Baseline      RA                  a      20                v      19  m   17         78    Oxygen Saturations   Phase          Location        Hb             Sat            pO2  Content        Group  Baseline        PA                       11              50  7.4   PA  Baseline        AO                       11             100  15.4   SA    Oxygen Saturation Average, Phase: Baseline   PV Hb                PV Sat                 PV pO2  PV Content  SAHb      11.30 g/dL SA Sat     100.00 %   SA pO2  SA Content     15.37 %  PA Hb      11.00 g/dL PA Sat     49.70 %    PA pO2  PA Content     7.44 %  MV Hb                MV Sat                 MV pO2  MV Content  Strokework:   Phase: Baseline   LVSW:                                           LVSW (index):   RVSW:                     13.96 g*m             RVSW (index):  6.83 g*m/m2  Flow Calculations, Phase: Baseline   CO                    3.22 l/min    HR  O2Insp  CI     1.58 l/min/m2    PO2  O2Exp  SV                                  VO2                 255.35 ml/min  O2(ExAir)  SVI                                 A-VO2  Hb                 11.30 gm/d  Shunts:   Qs                    3.22 l/min    Qs Index       1.58  l/min/m2  Qp                    3.22 l/min    Qp Index              1.58  l/min/m2    Qp/Qs  EPBF                                EPBF Index   L-R                                 L-R Index   R-L                                 R-L Index   Systemic Resistances, Phase: Baseline   SVR                                              TVR   SVRI                                             TVRI   SVR                                              TVR   SVRI    TVRI   Pulmonary Resistances:   PVR              49.69 dyn*s/cm5                 TPR  969.03 dyn*s/cm5

## 2022-08-18 NOTE — CONSULT NOTE ADULT - SUBJECTIVE AND OBJECTIVE BOX
CHIEF COMPLAINT: AMS    HPI:  82M with PMH CAD s/p prior stents x 3, afib (on Eliquis), DM2, HTN, CVA, carotid stenosis s/p stent, JACKELIN on CPAP, prostate ca presented with dyspnea and constipation found to have elevated troponins and EKG concerning for NSTEMI. Started on heparin gtt and s/p ASA/brillinta.    MICU consulted for AMS and elevated lactate. Earlier today, patient was agitated and code grey was called, received haldol 1mg IV. Then RRT called around 3am for AMS. Pt is AOx4 at baseline, was found to be AOx0 and not responding verbally. HR noted to be elevated to 130-140s, in SVT. Mental status improved over the rapid. Labs drawn during the rapid remarkable for new leukocytosis to 13, lactate to 13.5, procalc 0.61. On assessment, pt AOx4, denied chest pain, palpitations, abdominal pain, diarrhea, cough.       PAST MEDICAL & SURGICAL HISTORY:  Diabetes mellitus      CAD (coronary artery disease)      Prostate ca  s/p SEED 5 yrs ago      Sleep apnea  on cpap at home      Hypertension      Hypercholesteremia      S/P cholecystectomy      S/P coronary angioplasty  last stent 1 year ago      H/O carotid endarterectomy  2022, stent placed.    FAMILY HISTORY:  FH: type 2 diabetes    Allergies    ceftriaxone (Urticaria)  ciprofloxacin (Rash)  schrimp (Unknown)    REVIEW OF SYSTEMS:    CONSTITUTIONAL: No weakness, fevers or chills  EYES/ENT: No visual changes;  No vertigo or throat pain   NECK: No pain or stiffness  RESPIRATORY: No cough, wheezing, hemoptysis; No shortness of breath  CARDIOVASCULAR: No chest pain or palpitations  GASTROINTESTINAL: No abdominal or epigastric pain. No nausea, vomiting, or hematemesis; No diarrhea or constipation. No melena or hematochezia.  GENITOURINARY: No dysuria, frequency or hematuria  NEUROLOGICAL: No numbness or weakness  SKIN: No itching, rashes      OBJECTIVE:  ICU Vital Signs Last 24 Hrs  T(C): 36.3 (17 Aug 2022 23:20), Max: 37.1 (17 Aug 2022 09:00)  T(F): 97.3 (17 Aug 2022 23:20), Max: 98.7 (17 Aug 2022 09:00)  HR: 93 (17 Aug 2022 23:20) (75 - 103)  BP: 112/72 (17 Aug 2022 23:20) (106/78 - 130/98)  BP(mean): 92 (17 Aug 2022 07:55) (92 - 92)  ABP: --  ABP(mean): --  RR: 18 (17 Aug 2022 23:20) (15 - 28)  SpO2: 100% (17 Aug 2022 23:20) (97% - 100%)    O2 Parameters below as of 17 Aug 2022 23:20  Patient On (Oxygen Delivery Method): nasal cannula  O2 Flow (L/min): 2            CAPILLARY BLOOD GLUCOSE      POCT Blood Glucose.: 101 mg/dL (18 Aug 2022 03:04)      PHYSICAL EXAM:  GENERAL: NAD, well-groomed, well-developed  HEAD:  Atraumatic, Normocephalic  EYES: EOMI, PERRLA, conjunctiva and sclera clear  ENMT: No tonsillar erythema, exudates, or enlargement; Moist mucous membranes  NECK: Supple, No JVD, Normal thyroid  HEART: Regular rate and rhythm; No murmurs, rubs, or gallops  RESPIRATORY: CTA B/L, No W/R/R  ABDOMEN: Soft, Nontender, Nondistended; Bowel sounds present  NEUROLOGY: A&Ox3, nonfocal, moving all extremities  EXTREMITIES:  2+ Peripheral Pulses, No clubbing, cyanosis, or edema  SKIN: warm, dry, normal color, no rash or abnormal lesions    HOSPITAL MEDICATIONS:  MEDICATIONS  (STANDING):  aspirin enteric coated 81 milliGRAM(s) Oral daily  atorvastatin 80 milliGRAM(s) Oral at bedtime  dextrose 5%. 1000 milliLiter(s) (50 mL/Hr) IV Continuous <Continuous>  dextrose 5%. 1000 milliLiter(s) (100 mL/Hr) IV Continuous <Continuous>  dextrose 50% Injectable 25 Gram(s) IV Push once  dextrose 50% Injectable 12.5 Gram(s) IV Push once  dextrose 50% Injectable 25 Gram(s) IV Push once  digoxin     Tablet 125 MICROGram(s) Oral daily  donepezil 10 milliGRAM(s) Oral at bedtime  furosemide   Injectable 40 milliGRAM(s) IV Push two times a day  glucagon  Injectable 1 milliGRAM(s) IntraMuscular once  heparin  Infusion.  Unit(s)/Hr (10 mL/Hr) IV Continuous <Continuous>  insulin glargine Injectable (LANTUS) 15 Unit(s) SubCutaneous at bedtime  insulin lispro (ADMELOG) corrective regimen sliding scale   SubCutaneous three times a day before meals  insulin lispro (ADMELOG) corrective regimen sliding scale   SubCutaneous at bedtime  insulin lispro Injectable (ADMELOG) 5 Unit(s) SubCutaneous three times a day before meals  ketorolac 0.5% Ophthalmic Solution 1 Drop(s) Left EYE three times a day  loratadine 10 milliGRAM(s) Oral daily  melatonin 3 milliGRAM(s) Oral at bedtime  metoprolol tartrate 50 milliGRAM(s) Oral two times a day  mometasone 220 MICROgram(s) Inhaler 2 Puff(s) Inhalation daily  pantoprazole    Tablet 40 milliGRAM(s) Oral before breakfast  polyethylene glycol 3350 17 Gram(s) Oral daily  senna 2 Tablet(s) Oral at bedtime  ticagrelor 90 milliGRAM(s) Oral every 12 hours  trimethoprim  160 mG/sulfamethoxazole 800 mG 1 Tablet(s) Oral two times a day    MEDICATIONS  (PRN):  acetaminophen     Tablet .. 650 milliGRAM(s) Oral every 6 hours PRN Temp greater or equal to 38C (100.4F), Mild Pain (1 - 3)  aluminum hydroxide/magnesium hydroxide/simethicone Suspension 30 milliLiter(s) Oral every 4 hours PRN Dyspepsia  dextrose Oral Gel 15 Gram(s) Oral once PRN Blood Glucose LESS THAN 70 milliGRAM(s)/deciliter  heparin   Injectable 5300 Unit(s) IV Push every 6 hours PRN For aPTT less than 40  melatonin 3 milliGRAM(s) Oral at bedtime PRN Insomnia      LABS:                        11.7   13.63 )-----------( 261      ( 18 Aug 2022 03:22 )             38.3     08-18    141  |  98  |  31<H>  ----------------------------<  83  3.0<L>   |  12<L>  |  1.91<H>    Ca    9.3      18 Aug 2022 03:22  Phos  6.1     08-18  Mg     2.00     08-18    TPro  7.2  /  Alb  3.9  /  TBili  1.1  /  DBili  x   /  AST  63<H>  /  ALT  43<H>  /  AlkPhos  94  08-18    PT/INR - ( 17 Aug 2022 18:15 )   PT: 18.9 sec;   INR: 1.62 ratio         PTT - ( 18 Aug 2022 03:26 )  PTT:41.2 sec  Urinalysis Basic - ( 16 Aug 2022 20:16 )    Color: Orange / Appearance: Turbid / S.020 / pH: x  Gluc: x / Ketone: Negative  / Bili: Negative / Urobili: <2 mg/dL   Blood: x / Protein: 300 mg/dL / Nitrite: Negative   Leuk Esterase: Large / RBC: >50 /HPF / WBC >50 /HPF   Sq Epi: x / Non Sq Epi: x / Bacteria: Many        Venous Blood Gas:   @ 04:58  7.07/50/36/14/32.7  VBG Lactate: 13.8  Venous Blood Gas:   @ 22:37  7.36/46/23/26/25.1  VBG Lactate: 2.1  Venous Blood Gas:   @ 20:58  7.29/24/26/12/48.2  VBG Lactate: 1.6  Venous Blood Gas:   @ 19:12  7.37/42/21/24/26.7  VBG Lactate: 2.7    RADIOLOGY:   abd xray with distension    EKG:  new ST depressions anterolateral leads

## 2022-08-18 NOTE — CONSULT NOTE ADULT - PROBLEM SELECTOR RECOMMENDATION 9
IABP 1:1  Milrinone 0.25mcg/kg/min  Lasix 5mg/hr; urine output improving   Levo 0.05mcg/kg/min (wean off)  Repeat SG reading in 2 hours and as needed     Hold Beta Blocker and Dig  Strict I/O  Monitor lytes replete K>4.0 and Mg>2.0  Trend Lactate  Management per CCU team  Pending final recommendations from HF attending

## 2022-08-18 NOTE — CONSULT NOTE ADULT - ASSESSMENT
82 year old Male with PMH of HTN, HLD T2DM, JACKELIN (on BiPAP), prostate CA s/p radiation/seed implant, s/p /stent, (2022), AFib (on Eliquis), CAD s/p PCI/stent X3 (most recent ), and HFrEF (EF 25%;LVIDd 6.8, severe MR, RV dysfunction and severe hypokinetic apical/lateral wall). Patient presented with c/o SOB and  abdominal distention/constipation  X10 days. RRT called this morning for AMS, hypoxia, severe bradycardia, weak thready pulse    evaluated by MICU and referred to CCU. Sent to cath lab for RHC and IABP for cardiogenic shock.       Pertinent  Labs:  Troponin- 270/462/467   Lactate- 3.2/ 4.5/6.7/12/ 13.8     BNP-  19,745  EKG- AFib subendo injury anterior wall  CXR- pulmonary edema     RHC:   RA 17  PA 60/28   PCWP 37  CO/CI 3.2/1.58  PA Sat 49.7%  SVR  82 year old Male with PMH of HTN, HLD T2DM, JACKELIN (on BiPAP), prostate CA s/p radiation/seed implant, s/p /stent, (2022), AFib (on Eliquis), CAD s/p PCI/stent X3 (most recent ), and HFrEF (EF 25%;LVIDd 6.8, severe MR, RV dysfunction and severe hypokinetic apical/lateral wall). Patient presented with c/o SOB and  abdominal distention/constipation  X10 days. Initially with elevated troponin  levels and EKG concerning for NSTEMI; started on heparin gtt and s/p ASA/brillinta. RRT called this morning for AMS/not responding to verbal commands and elevated lactate. He subsequently developed severe bradycardia and weak thready pulse; started on levo drip and evaluated by MICU and referred to CCU. Sent to cath lab for RHC and IABP for cardiogenic shock.       Pertinent  Labs:  Troponin- 270/462/467   Lactate- 3.2/ 4.5/6.7/12/ 13.8     BNP-  19,745  EKG- AFib subendo injury anterior wall  CXR- pulmonary edema     RHC:   RA 17  PA 60/28   PCWP 37  CO/CI 3.2/1.58  PA Sat 49.7%  SVR         82 year old Male with PMH of HTN, HLD T2DM, JACKELIN (on BiPAP), prostate CA s/p radiation/seed implant, s/p /stent, (2022), AFib (on Eliquis), CAD s/p PCI/stent X3 (most recent ), and HFrEF (EF 25%;LVIDd 6.8, severe MR, RV dysfunction and severe hypokinetic apical/lateral wall). Patient presented with c/o SOB and  abdominal distention/constipation  X10 days. Initially with elevated troponin  levels and EKG concerning for NSTEMI; started on heparin gtt and s/p ASA/brillinta. RRT called this morning for AMS/not responding to verbal commands and elevated lactate. He subsequently developed severe bradycardia/weak thready pulse and hypotension; started on Levo drip and evaluated by MICU and referred to CCU. Sent to cath lab for RHC and IABP for cardiogenic shock.       Pertinent  Labs:  Troponin- 270/462/467   Lactate- 3.2/ 4.5/6.7/12/ 13.8     BNP-  19,745  EKG- AFib subendo injury anterior wall  CXR- pulmonary edema     RHC:   RA 17  PA 60/28   PCWP 37  CO/CI 3.2/1.58  PA Sat 49.7%  SVR         82 year old Male with PMH of HTN, HLD T2DM, JACKELIN (on BiPAP), prostate CA s/p radiation/seed implant, s/p /stent, (2022), AFib (on Eliquis), CAD s/p PCI/stent X3 (most recent ), and HFrEF (EF 25%;LVIDd 6.8, severe MR, RV dysfunction and severe hypokinetic apical/lateral wall). Patient presented with c/o SOB and  abdominal distention/constipation  X10 days. Initially with elevated troponin  levels and EKG concerning for NSTEMI; started on heparin gtt and s/p ASA/brillinta. RRT called this morning for AMS/not responding to verbal commands and elevated lactate. He subsequently developed severe bradycardia/weak thready pulse and hypotension; started on Levo drip and evaluated by MICU and referred to CCU. Sent to cath lab for RHC and IABP for cardiogenic shock.       Pertinent  Labs:  Troponin- 270/462/467   Lactate- 3.2/ 4.5/6.7/12/ 13.8     BNP-  19,745  EKG- AFib subendo injury anterior wall  CXR- pulmonary edema     RHC:   RA 17  PA 60/28   PCWP 37  CO/CI 3.2/1.58  PA Sat 49.7%  SVR   (pre IABP)         RA 17   CO/CI 3.0/1.4   (mixed VO2 44.6%)  SVR 1733  (post IABP)                                               Started on Milrinone 0.25mcg/kg/min and Lasix 5mg/hr; Remains on Levo 0.15mcg/kg/min       82 year old Male with PMH of HTN, HLD T2DM, JACKELIN (on BiPAP), prostate CA s/p radiation/seed implant, CVA s/p /stent (2022), AFib (on Eliquis), CAD s/p PCI/stent X3 (most recent  at [facility]), and HFrEF (EF 25%;LVIDd 6.8, severe MR, RV dysfunction and severe hypokinetic apical/lateral wall). Patient presented with c/o SOB and  abdominal distention/constipation  X10 days. Initially with elevated troponin levels and EKG concerning for NSTEMI; started on heparin gtt and s/p ASA/Brillinta. RRT called this morning for AMS/not responding to verbal commands and elevated lactate. He subsequently developed severe bradycardia/weak thready pulse and hypotension; started on Levo drip and evaluated by MICU who referred to CCU. He was sent to cath lab for RHC and IABP was placed for cardiogenic shock.       Pertinent  Labs:  Troponin- 270/462/467   Lactate- 3.2/ 4.5/6.7/12/ 13.8     BNP-  19,745  EKG- AFib subendo injury anterior wall  CXR- pulmonary edema     RHC:   RA 17  PA 60/28   PCWP 37  CO/CI 3.2/1.58  PA Sat 49.7%  SVR   (pre IABP)         RA 17   CO/CI 3.0/1.4   (mixed VO2 44.6%)  SVR 1733  (post IABP)                                               Started on Milrinone 0.25mcg/kg/min and Lasix 5mg/hr; Remains on Levo  0.05mcg/kg/min (continue to wean MAP 65)

## 2022-08-18 NOTE — CONSULT NOTE ADULT - NS ATTEND AMEND GEN_ALL_CORE FT
Patient was seen and examined at bedside with the advanced care provider.      Briefly, Mr. Molina is a 81 yo M with a cardiac history of ischemic CM with recent PCI in 2021, afib, and recent carotid artery stenting presented with ~1 week of shortness of breath and abdominal pain concerning for low output cardiac failure.  He has been treated for NSTEMI however, developed acute mental status change this AM with severe metabolic acidosis, liver and kidney injury consistent with cardiogenic shock.  I have reviewed his echocardiograms from 5/2022 and yesterday that show similar severely depressed biventricular function with infero/inferolateral wall akinesis, with dilated LV ( LVEFF 6.8cm on 8/17), and severe MR.  He underwent urgent RHC today with RA 17, RV 58/20, PA 60/28 (30), PCWP 33, CO 3.2, CI 1.58, PA sat 49%, HB 11 on levophed.  IABP was placed.  Since coming out of the cath lab levo has been weaned to 0.05 and milrinone has been initiated.  Continue to wean levophed as tolerated. Diuresis to achieve CVP 10-12.  Trend CMP, lactate and MVO2 every 4 hours.  Keep MVO2 > 65.  No post IABP Xray in EMR.  Please be sure to verify radiographic placement daily of IABP and PAC.  Hold further beta blocker therapy.  If he goes into Afib with RVR would use amiodarone.      Unfortunately, he does not have an exit strategy available off of [facility].  He is not a candidate for transplant/LVAD due to age and multi-organ dysfunction.  In addition, he has had a number of previous strokes and most recent carotid stenting that would also be a relative contra-indication for LVAD.      Please obtain cardiac records from his [facility] and [facility] cardiologists.  He likely will need repeat left heart catherization to evaluate cardiac stents given shock.  However, no new wall motion abnormality seen on echocardiogram with no ST elevations on EKG to suggest urgent revascularization is needed.    We will continue to follow along with you.
The patient was seen and examined with the Cardiology Consultation Teaching Service.     He is an 82-year-old man with coronary artery disease, prior MI and PCI, stroke, carotid artery stenting and atrial fibrillation who presented to the emergency room complaining of constipation. While the patient was in the ER, he developed acute tachycardia and dyspnea. No chest pain was experienced.     Prior to coming to the ER, the patient had not been experiencing chest pain, dyspnea, orthopnea or PND. He does not report palpitations, dizziness or lower extremity edema.     During our interview, the patient started to become dyspneic and anxious-appearing. His vital signs, including his oxygen saturation remain normal. We instructed him to take deep breaths and reassured him that he was ok. His breathing returned to normal without further intervention within seconds.     PMH/PSH:  Coronary artery disease   Myocardial infarction  Percutaneous coronary intervention, 2020  Carotid artery stenosis with stenting, 4/2022  Stroke  Atrial fibrillation on apixaban  Diabetes  Hypertension  Obstructive sleep apnea on CPAP  Prostate cancer    Comfortable-appearing man in no acute distress  Alert and oriented  Afebrile  Vital signs stable  JVP is not elevated  Lungs sound clear this morning  Soft systolic murmur  Soft, non-tender, non-distended abdomen  Extremities are warm and perfused  No peripheral edema     Normocytic anemia  GFR 62  AST 63    hs-troponin 895, 403 (likely spurious), 865  pro-BNP 19K  Lactate 2.1    HbA1c 8.3    ECG demonstrates sinus tachycardia with non-specific T wave flattening    Echocardiography in May 2022 demonstrated moderate-severe LV systolic dysfunction involving the inferior and inferolateral walls, severe MR, normal LA size    CXR with mild pulmonary congestion  CT abdomen/pelvis demonstrates possible left ureteritis; no evidence of bowel obstruction seen  Chest CT without pulmonary embolism, mild pulmonary congestion    Impression and Recommendations:   82-year-old man with coronary artery disease, prior MI and PCI, stroke, carotid artery stenting and atrial fibrillation who presented to the emergency room complaining of constipation, who developed acute tachycardia and dyspnea in the ER, found to have evidence of pulmonary congestion on lung imaging, and elevated hs-troponin.    The patient appears to have developed acute pulmonary edema, but it is unclear what triggered this episode in the emergency room. The patient has underlying LV dysfunction and severe MR while predispose him to this. Although the patient's troponins are significantly elevated, they are flat, and the ECG is not suggestive of ischemia. The patient has not experienced chest pain throughout. Still, given his known coronary disease, and the fact that ischemia can cause acute pulmonary edema, it is reasonable to continue treatment for acute coronary syndrome at this time.     Although he has had recent echocardiography, I would obtain a repeat study given his dramatic clinical presentation. I would continue to aggressively diurese this patient. Discussed this patient with the cardiac catheterization laboratory at this time, and there is no immediate plan for emergency catheterization. Continue to hold apixaban while on unfractionated heparin for the possibility of coronary angiography during this hospitalization.    Cardiology Teaching Service to follow closely with you.    Vinnie Umaña MD  Cardiology  [phone]

## 2022-08-18 NOTE — DISCHARGE NOTE PROVIDER - NSDCMRMEDTOKEN_GEN_ALL_CORE_FT
apixaban 5 mg oral tablet: 1 tab(s) orally 2 times a day  atorvastatin 80 mg oral tablet: 1 tab(s) orally once a day  Basaglar KwikPen 100 units/mL subcutaneous solution: 18 unit(s) subcutaneous once a day (at bedtime)  Claritin 10 mg oral tablet: 1 tab(s) orally once a day  clopidogrel 75 mg oral tablet: 1 tab(s) orally once a day  digoxin 125 mcg (0.125 mg) oral tablet: 1 tab(s) orally once a day  donepezil 10 mg oral tablet: 1 tab(s) orally once a day (at bedtime)  fluticasone 100 mcg/inh inhalation powder: 1 puff(s) inhaled 2 times a day  ketorolac 0.4% ophthalmic solution: 1 drop(s) in the left eye 3 times a day  metoprolol tartrate 50 mg oral tablet: 1 tab(s) orally 2 times a day  NovoLOG FlexPen 100 units/mL injectable solution: 7 unit(s) injectable 3 times a day  pantoprazole 40 mg oral delayed release tablet: 1 tab(s) orally once a day (before a meal)  polyethylene glycol 3350 oral powder for reconstitution: 17 gram(s) orally once a day  senna oral tablet: 2 tab(s) orally once a day

## 2022-08-18 NOTE — DISCHARGE NOTE PROVIDER - HOSPITAL COURSE
82M with PMH CAD s/p prior stents x 3, afib (on Eliquis), DM2, HTN, CVA, carotid stenosis s/p stent, JACKELIN on CPAP, prostate ca presented with dyspnea and constipation found to have elevated troponins and EKG concerning for NSTEMI. Started on heparin gtt and s/p ASA/brillinta.    MICU consulted for AMS and elevated lactate. Earlier today, patient was agitated and code grey was called, received haldol 1mg IV. Then RRT called around 3am for AMS. Pt is AOx4 at baseline, was found to be AOx0 and not responding verbally. HR noted to be elevated to 130-140s, in SVT. Mental status improved over the rapid. Labs drawn during the rapid remarkable for new leukocytosis to 13, lactate to 13.5, procalc 0.61.    Pt was transferred to CCU for workup of cardiac cause of hypoperfusion. Pt had a cath. 82M with PMH CAD s/p prior stents x 3, afib (on Eliquis), DM2, HTN, CVA, carotid stenosis s/p stent, JACKELIN on CPAP, prostate ca presented with dyspnea and constipation found to have elevated troponins and EKG concerning for NSTEMI. Started on heparin gtt and s/p ASA/brillinta.    MICU consulted for AMS and elevated lactate. Earlier today, patient was agitated and code grey was called, received haldol 1mg IV. Then RRT called around 3am for AMS. Pt is AOx4 at baseline, was found to be AOx0 and not responding verbally. HR noted to be elevated to 130-140s, in SVT. Mental status improved over the rapid. Labs drawn during the rapid remarkable for new leukocytosis to 13, lactate to 13.5, procalc 0.61. Pt was transferred to CCU for workup of cardiac cause of hypoperfusion. Pt had a cath and IABP placed. s/p IABP placment, Vikki CO 5.4 CI 2.6, CVP 7, MV02 65.9, . Pt was agitated overnight and was given zyprexa and started on precedex drip. Pt was also on levo, midodrine, and lasix drip. 82M with PMH CAD s/p prior stents x 3, afib (on Eliquis), DM2, HTN, CVA, carotid stenosis s/p stent, JACKELIN on CPAP, prostate ca presented with dyspnea and constipation found to have elevated troponins and EKG concerning for NSTEMI. Started on heparin gtt and s/p ASA/brillinta.    MICU consulted for AMS and elevated lactate. Earlier today, patient was agitated and code grey was called, received haldol 1mg IV. Then RRT called around 3am for AMS. Pt is AOx4 at baseline, was found to be AOx0 and not responding verbally. HR noted to be elevated to 130-140s, in SVT. Mental status improved over the rapid. Labs drawn during the rapid remarkable for new leukocytosis to 13, lactate to 13.5, procalc 0.61. Pt was transferred to CCU for workup of cardiac cause of hypoperfusion. Pt had a cath and IABP placed. RHC showed elevated filling pressure and low CI s/p IABP. Overnight s/p IABP placement, Vikki CO 5.4 CI 2.6, CVP 7, MV02 65.9, . Pt was agitated overnight and was given zyprexa and started on precedex drip. Pt was also on levo, midodrine, and lasix drip. 82M with PMH CAD s/p prior stents x 3, afib (on Eliquis), DM2, HTN, CVA, carotid stenosis s/p stent, JACKELIN on CPAP, prostate ca presented with dyspnea and constipation found to have elevated troponins and EKG concerning for NSTEMI. Started on heparin gtt and s/p ASA/brillinta.    MICU consulted for AMS and elevated lactate. Earlier today, patient was agitated and code grey was called, received haldol 1mg IV. Then RRT called around 3am for AMS. Pt is AOx4 at baseline, was found to be AOx0 and not responding verbally. HR noted to be elevated to 130-140s, in SVT. Mental status improved over the rapid. Labs drawn during the rapid remarkable for new leukocytosis to 13, lactate to 13.5, procalc 0.61. Pt was transferred to CCU for workup of cardiac cause of hypoperfusion. Pt had a cath and IABP placed. RHC showed elevated filling pressure and low CI s/p IABP. Overnight s/p IABP placement, Vikki CO 5.4 CI 2.6, CVP 7, MV02 65.9, . Pt was agitated overnight and was given zyprexa and started on precedex drip. Pt was also on levo, midodrine, and lasix drip. There was concern pt may have been septic due to his SVR and was started on empiric cefepime. Pt was weaned off levo and lasix gtt and weaned off his IABP. There was concern pt had a stroke and a stroke code was called. CT scan of the head and neck was negative. MRI of the head was performed and it showed ___. 82M with PMH CAD s/p prior stents x 3, afib (on Eliquis), DM2, HTN, CVA, carotid stenosis s/p stent, JACKELIN on CPAP, prostate ca presented with dyspnea and constipation found to have elevated troponins and EKG concerning for NSTEMI. Started on heparin gtt and s/p ASA/brillinta.    MICU consulted for AMS and elevated lactate. Earlier today, patient was agitated and code grey was called, received haldol 1mg IV. Then RRT called around 3am for AMS. Pt is AOx4 at baseline, was found to be AOx0 and not responding verbally. HR noted to be elevated to 130-140s, in SVT. Mental status improved over the rapid. Labs drawn during the rapid remarkable for new leukocytosis to 13, lactate to 13.5, procalc 0.61. Pt was transferred to CCU for workup of cardiac cause of hypoperfusion. Pt had a cath and IABP placed. RHC showed elevated filling pressure and low CI s/p IABP. Overnight s/p IABP placement, Vikki CO 5.4 CI 2.6, CVP 7, MV02 65.9, . Pt was agitated overnight and was given zyprexa and started on precedex drip. Pt was also on levo, midodrine, and lasix drip. There was concern pt may have been septic due to his SVR and was started on empiric cefepime. Pt was weaned off levo and lasix gtt and weaned off his IABP. There was concern pt had a stroke and a stroke code was called. CT scan of the head and neck was negative. MRI of the head was performed and it showed ___. A video EEG study was also performed and it showed _____. Pt's CVP remained elevated and SVR remained high as well. Pt's milrinone was up-titrated to 0.375 and pt was started on a bumex drip.

## 2022-08-18 NOTE — DIETITIAN INITIAL EVALUATION ADULT - ADD RECOMMEND
1. Monitor weights, labs, BM's, skin integrity, p.o. intake. 2. Reinforce diet principles when pt more medically stable. 3. Follow pt as per protocol.

## 2022-08-18 NOTE — CONSULT NOTE ADULT - ATTENDING COMMENTS
Patient is a 81 yo M w/ Mod to Severe HFrEF, Severe MR, CAD s/p prior stents x 3, Afib (on Eliquis), T2DM, HTN, CVA, carotid stenosis s/p stent, JACKELIN on CPAP, prostate ca who was admitted on 8/17 after p/w dyspnea and constipation found to have elevated troponins and EKG concerning for NSTEMI. Patient was admitted to medical floors for ACS treatment with heparin gtt and s/p ASA/brillinta. MICU consulted for AMS and elevated lactate. As per chart review, patient was agitated earlier and code grey was called. Patient received haldol 1mg IV. RRT was then called around 3am after patient was found to be AAO x0 and non verbal with elevated HR in 130 to 140s. Course also c/b rising lactate. On MICU assessment, patient is now alert and verbal, back to neurological baseline    #Lactic Acidosis - HD stable, but rising lactate now 13.5. Likely 2/2 low flow state in setting of Mod to Severe HFrEF, Severe MR and elevated HR. Patient with witnessed episodes of endorsed palpitations and sudden acute dyspnea. POCUS with severely reduced LV systolic function. LVOT VTI ranging from 7 cm to 10 cm. Repeat EKG with ? new ST depressions in anterolateral leads. While procalcitonin is slightly elevated, no clear signs or symptoms of infection. CT CAP was notable for small questionable BLU nodular haziness and questionable L utereritis but urine culture negative. No evidence for hepatic thrombus. No documented use of metformin as home med.   - Recommend following up pan cultures and can empirically expand to Zosyn, but infection less likely  - Recommend CCU consult, suspect low flow state in setting of ?NSTEMI with already known Severe HFrEF, Severe MR , worsened intermittenly with SVT  - f/u TTE  - Agree with ACS treatment  - Trend Lactate and pH    #AMS - Transient. Likely 2/2 sedating effects of Haldol vs low flow during SVT?  - Avoid sedating agents   - Consider Carotid dopplers    Patient does not require MICU level of care at this time.  Please re-consult as needed.    Jai Le MD  Pulmonary & Critical Care Patient is a 81 yo M w/ Mod to Severe HFrEF, Severe MR, CAD s/p prior stents x 3, Afib (on Eliquis), T2DM, HTN, CVA, carotid stenosis s/p stent, JACKELIN on CPAP, prostate ca who was admitted on 8/17 after p/w dyspnea and constipation found to have elevated troponins and EKG concerning for NSTEMI. Patient was admitted to medical floors for ACS treatment with heparin gtt and s/p ASA/brillinta. MICU consulted for AMS and elevated lactate. As per chart review, patient was agitated earlier and code grey was called. Patient received haldol 1mg IV. RRT was then called around 3am after patient was found to be AAO x0 and non verbal with elevated HR in 130 to 140s. Course also c/b rising lactate. On MICU assessment, patient is now alert and verbal, back to neurological baseline    #Lactic Acidosis - HD stable, but rising lactate now 13.5. Likely 2/2 low flow state in setting of Mod to Severe HFrEF, Severe MR and elevated HR. Patient with witnessed episodes of endorsed palpitations and sudden acute dyspnea. POCUS with severely reduced LV systolic function. LVOT VTI ranging from 7 cm to 10 cm. Repeat EKG with ? new ST depressions in anterolateral leads with rising troponin. While procalcitonin is slightly elevated, no clear signs or symptoms of infection. CT CAP was notable for small questionable BLU nodular haziness and questionable L utereritis but urine culture negative. No evidence for hepatic thrombus. No documented use of metformin as home med.   - Recommend following up pan cultures and can empirically expand to Zosyn, but infection less likely  - Recommend CCU consult, suspect low flow state in setting of ?NSTEMI with already known Severe HFrEF, Severe MR , worsened intermittenly with SVT  - f/u TTE  - Agree with ACS treatment  - Trend Lactate and pH    #AMS - Transient. Likely 2/2 sedating effects of Haldol vs low flow during SVT?  - Avoid sedating agents   - Consider Carotid dopplers    Patient does not require MICU level of care at this time.  Please re-consult as needed.    Jai Le MD  Pulmonary & Critical Care

## 2022-08-18 NOTE — RAPID RESPONSE TEAM SUMMARY - NSSITUATIONBACKGROUNDRRT_GEN_ALL_CORE
RRT called for AMS.  In brief this is a 82 year old male with CAD s/p prior stents x 3, afib (on Eliquis), DM2, HTN, CVA, carotid stenosis s/p stent, JACKELIN on CPAP, prostate ca presented with dyspnea and constipation found to have elevated troponins concerning for NSTEMI and also clinical signs of hypervolemia, hospital course complicated by SVT which has since stabilized. Cardiology following, input appreciated. Started on heparin drip and s/p asa/brilinta. Receiving IV Lasix with clinical improvement.  Upon arrival to bedside, RN informed that patient was found to be alert but oriented x0, not responding verbally, staring briefly.  Vitals prior to arrival were /59, HR 76, RR 30, Spo2 100%, . On exam, patient found to be diaphoretic, moving extremities, alert, but not interacting. No apparent respiratory distress. On monitor, patient's HR was noted to be elevated up to 130-140s, but BP remained stable in 120s/80s. Of note, patient received haldol 1mg IV around 2:20am for agitation and code grey. IV access was pulled by patient. US guided access placed in LUE. POCUS showed LV dysfunction. Rectal temp unremarkable.  Labs drawn including cardiac enzymes, Prolactic, CK. EKG showed regular narrow complex tachycardia and revealed QTc of 550s. During course of rapid, patient's mental status returned to baseline, speaking in full sentences, AOx1-2 although confused and agitated.  Differential includes haldol-induced sedation vs. atypical neuroleptic malignant syndrome including autonomic dysfunction vs. seizure-like activity vs. TIA vs. CVA although return of mental status makes this less likely. Given tachycardia and diaphoresis, concern for ACS, however, patient admitted with NSTEMI and receiving medical management. Patient remained tachycardic to 130s, however BP stable, and already noted to have SVT during this admission as per cardiology. Tachycardia may be secondary to patient's pain or discomfort; patient expressed back pain during RRT and requested to be positioned on his side. Given these reasons, did not start rate-controlling medications. Primary team at bedside, will follow up on labs.  Recommended monitor HR, no additional haldol, no other QTc prolonging agents. Consider vEEG for possible seizure-like activity.  RRT called for AMS.  In brief this is a 82 year old male with CAD s/p prior stents x 3, afib (on Eliquis), DM2, HTN, CVA, carotid stenosis s/p stent, JACKELIN on CPAP, prostate ca presented with dyspnea and constipation found to have elevated troponins concerning for NSTEMI and also clinical signs of hypervolemia, hospital course complicated by SVT which has since stabilized. Cardiology following, input appreciated. Started on heparin drip and s/p asa/brilinta. Receiving IV Lasix with clinical improvement.  Upon arrival to bedside, RN informed that patient was found to be alert but oriented x0, not responding verbally, staring briefly.  Vitals prior to arrival were /59, HR 76, RR 30, Spo2 100% on previous 2LNC, . On exam, patient found to be diaphoretic, moving extremities, alert, but not interacting. No apparent respiratory distress. On monitor, patient's HR was noted to be elevated up to 130-140s, but BP remained stable in 120s/80s. Of note, patient received haldol 1mg IV around 2:20am for agitation and code grey. IV access was pulled by patient. US guided access placed in LUE. POCUS showed LV dysfunction. Rectal temp unremarkable.  Labs drawn including cardiac enzymes, Prolactic, CK. EKG showed regular narrow complex tachycardia and revealed QTc of 550s. During course of rapid, patient's mental status returned to baseline, speaking in full sentences, AOx1-2 although confused and agitated.  Differential includes haldol-induced sedation vs. atypical neuroleptic malignant syndrome including autonomic dysfunction vs. seizure-like activity vs. TIA vs. CVA although return of mental status makes this less likely. Given tachycardia and diaphoresis, concern for ACS, however, patient admitted with NSTEMI and receiving medical management. Patient remained tachycardic to 130s, however BP stable, and already noted to have SVT during this admission as per cardiology. Tachycardia may be secondary to patient's pain or discomfort; patient expressed back pain during RRT and requested to be positioned on his side. Given these reasons, did not start rate-controlling medications. Primary team at bedside, will follow up on labs.  Recommended monitor HR, no additional haldol, no other QTc prolonging agents. Consider vEEG for possible seizure-like activity.  RRT called for AMS.  In brief this is a 82 year old male with CAD s/p prior stents x 3, afib (on Eliquis), DM2, HTN, CVA, carotid stenosis s/p stent, JACKELIN on CPAP, prostate ca presented with dyspnea and constipation found to have elevated troponins concerning for NSTEMI and also clinical signs of hypervolemia, hospital course complicated by SVT which has since stabilized. Cardiology following, input appreciated. Started on heparin drip and s/p asa/brilinta. Receiving IV Lasix with clinical improvement.  Upon arrival to bedside, RN informed that patient was found to be alert but oriented x0, not responding verbally, staring briefly.  Vitals prior to arrival were /59, HR 76, RR 30, Spo2 100% on previous 2LNC, . On exam, patient found to be alert. diaphoretic, PERRL, moving extremities, but not interacting. No apparent respiratory distress. No rigidity noted. On monitor, patient's HR was noted to be elevated up to 130-140s, but BP remained stable in 120s/80s. Of note, patient received haldol 1mg IV around 2:20am for agitation and code grey. IV access was pulled by patient. US guided access placed in LUE. POCUS showed LV dysfunction. Rectal temp unremarkable.  Labs drawn including cardiac enzymes, Prolactic, CK. EKG showed regular narrow complex tachycardia and revealed QTc of 550s. During course of rapid, patient's mental status returned to baseline, speaking in full sentences, AOx1-2 although confused and agitated.  Differential includes haldol-induced sedation vs. atypical neuroleptic malignant syndrome including autonomic dysfunction vs. seizure-like activity vs. TIA vs. CVA although return of mental status makes this less likely. Given tachycardia and diaphoresis, concern for ACS, however, patient admitted with NSTEMI and receiving medical management. Patient remained tachycardic to 130s, however BP stable, and already noted to have SVT during this admission as per cardiology. Tachycardia may be secondary to patient's pain or discomfort; patient expressed back pain during RRT and requested to be positioned on his side. Given these reasons, did not start rate-controlling medications. Primary team at bedside, will follow up on labs.  Recommended monitor HR, no additional haldol, no other QTc prolonging agents. Consider vEEG for possible seizure-like activity.

## 2022-08-18 NOTE — DIETITIAN INITIAL EVALUATION ADULT - PERTINENT LABORATORY DATA
08-18    137  |  100  |  42<H>  ----------------------------<  182<H>  4.6   |  10<LL>  |  2.24<H>    Ca    9.0      18 Aug 2022 11:40  Phos  6.5     08-18  Mg     2.00     08-18    TPro  6.4  /  Alb  3.4  /  TBili  1.2  /  DBili  x   /  AST  93<H>  /  ALT  56<H>  /  AlkPhos  103  08-18  POCT Blood Glucose.: 137 mg/dL (08-18-22 @ 07:29)  A1C with Estimated Average Glucose Result: 8.3 % (08-17-22 @ 07:38)  A1C with Estimated Average Glucose Result: 9.2 % (05-03-22 @ 07:39)  A1C with Estimated Average Glucose Result: 8.8 % (09-21-21 @ 06:49)

## 2022-08-18 NOTE — CHART NOTE - NSCHARTNOTEFT_GEN_A_CORE
CCU Accept Note    CHIDI RUBIO  82y  Patient is a 82y old  Male who presents with a chief complaint of NSTEMI (18 Aug 2022 06:20)    ====================  HPI & Hospital Course:       Past Medical History: HTN, DM2, CAD s/p Stent x3, CVA 2/2 symptomatic carotid stenosis s/p stent, JACKELIN on CPAP, prostate ca s/p seeding     Past Surgical History:   - H/O carotid endarterectomy 2022, stent placed.  - S/P cholecystectomy   - S/P coronary angioplasty last stent 1 year ago.     Home Medications:  apixaban 5 mg oral tablet: 1 tab(s) orally 2 times a day (17 Aug 2022 09:24)  atorvastatin 80 mg oral tablet: 1 tab(s) orally once a day (17 Aug 2022 09:)  Basaglar KwikPen 100 units/mL subcutaneous solution: 18 unit(s) subcutaneous once a day (at bedtime) (17 Aug 2022 09:24)  Claritin 10 mg oral tablet: 1 tab(s) orally once a day (17 Aug 2022 09:24)  clopidogrel 75 mg oral tablet: 1 tab(s) orally once a day (17 Aug 2022 09:24)  digoxin 125 mcg (0.125 mg) oral tablet: 1 tab(s) orally once a day (17 Aug 2022 09:24)  donepezil 10 mg oral tablet: 1 tab(s) orally once a day (at bedtime) (17 Aug 2022 09:24)  fluticasone 100 mcg/inh inhalation powder: 1 puff(s) inhaled 2 times a day (17 Aug 2022 09:24)  ketorolac 0.4% ophthalmic solution: 1 drop(s) in the left eye 3 times a day (17 Aug 2022 09:24)  metoprolol tartrate 50 mg oral tablet: 1 tab(s) orally 2 times a day (17 Aug 2022 09:24)  NovoLOG FlexPen 100 units/mL injectable solution: 7 unit(s) injectable 3 times a day (17 Aug 2022 09:24)  pantoprazole 40 mg oral delayed release tablet: 1 tab(s) orally once a day (before a meal) (17 Aug 2022 09:24)  polyethylene glycol 3350 oral powder for reconstitution: 17 gram(s) orally once a day (17 Aug 2022 09:24)  senna oral tablet: 2 tab(s) orally once a day (17 Aug 2022 09:24)      Current Medications:   MEDICATIONS  (STANDING):  aspirin enteric coated 81 milliGRAM(s) Oral daily  atorvastatin 80 milliGRAM(s) Oral at bedtime  chlorhexidine 2% Cloths 1 Application(s) Topical <User Schedule>  dextrose 5%. 1000 milliLiter(s) (50 mL/Hr) IV Continuous <Continuous>  dextrose 5%. 1000 milliLiter(s) (100 mL/Hr) IV Continuous <Continuous>  dextrose 50% Injectable 25 Gram(s) IV Push once  dextrose 50% Injectable 12.5 Gram(s) IV Push once  dextrose 50% Injectable 25 Gram(s) IV Push once  digoxin     Tablet 125 MICROGram(s) Oral daily  donepezil 10 milliGRAM(s) Oral at bedtime  furosemide   Injectable 40 milliGRAM(s) IV Push two times a day  glucagon  Injectable 1 milliGRAM(s) IntraMuscular once  heparin  Infusion.  Unit(s)/Hr (10 mL/Hr) IV Continuous <Continuous>  insulin glargine Injectable (LANTUS) 15 Unit(s) SubCutaneous at bedtime  insulin lispro (ADMELOG) corrective regimen sliding scale   SubCutaneous three times a day before meals  insulin lispro (ADMELOG) corrective regimen sliding scale   SubCutaneous at bedtime  insulin lispro Injectable (ADMELOG) 5 Unit(s) SubCutaneous three times a day before meals  ketorolac 0.5% Ophthalmic Solution 1 Drop(s) Left EYE three times a day  loratadine 10 milliGRAM(s) Oral daily  melatonin 3 milliGRAM(s) Oral at bedtime  metoprolol tartrate 50 milliGRAM(s) Oral two times a day  mometasone 220 MICROgram(s) Inhaler 2 Puff(s) Inhalation daily  pantoprazole    Tablet 40 milliGRAM(s) Oral before breakfast  piperacillin/tazobactam IVPB. 3.375 Gram(s) IV Intermittent once  piperacillin/tazobactam IVPB.. 3.375 Gram(s) IV Intermittent every 8 hours  polyethylene glycol 3350 17 Gram(s) Oral daily  senna 2 Tablet(s) Oral at bedtime  sodium bicarbonate  Infusion 0.127 mEq/kG/Hr (75 mL/Hr) IV Continuous <Continuous>  ticagrelor 90 milliGRAM(s) Oral every 12 hours    MEDICATIONS  (PRN):  acetaminophen     Tablet .. 650 milliGRAM(s) Oral every 6 hours PRN Temp greater or equal to 38C (100.4F), Mild Pain (1 - 3)  aluminum hydroxide/magnesium hydroxide/simethicone Suspension 30 milliLiter(s) Oral every 4 hours PRN Dyspepsia  dextrose Oral Gel 15 Gram(s) Oral once PRN Blood Glucose LESS THAN 70 milliGRAM(s)/deciliter  melatonin 3 milliGRAM(s) Oral at bedtime PRN Insomnia      Allergies:     Family History:     Social History:     ====================  Vital Signs Last 24 Hrs  T(C): 36.7 (18 Aug 2022 11:00), Max: 36.7 (18 Aug 2022 09:30)  T(F): 98 (18 Aug 2022 11:00), Max: 98.1 (18 Aug 2022 09:30)  HR: 67 (18 Aug 2022 10:39) (67 - 100)  BP: 84/58 (18 Aug 2022 10:39) (84/58 - 112/72)  BP(mean): 68 (18 Aug 2022 10:39) (68 - 68)  RR: 26 (18 Aug 2022 10:39) (18 - 27)  SpO2: 100% (18 Aug 2022 10:39) (97% - 100%)    Parameters below as of 18 Aug 2022 09:30  Patient On (Oxygen Delivery Method): mask, nonrebreather        Adult Advanced Hemodynamics Last 24 Hrs  CVP(mm Hg): --  CVP(cm H2O): --  CO: --  CI: --  PA: --  PA(mean): --  PCWP: --  SVR: --  SVRI: --  PVR: --  PVRI: --    Physical Exam:   General: NAD  HEENT: PERRL, EOMI, normal sclera and conjunctiva, no oral lesions  Neck: Supple, no JVD  Lungs: CTA bilaterally  Heart: RRR, normal S1S2, no murmurs/rubs/gallops  Abdomen: Soft, ND/NT  Extremities: 2+ peripheral pulses, no cyanosis/clubbing/edema, full ROM  Skin: Warm, well-perfused  Neuro: A&O x3, no focal deficits      ====================  Labs & Imaging:   CBC Full  -  ( 18 Aug 2022 03:22 )  WBC Count : 13.63 K/uL  RBC Count : 4.40 M/uL  Hemoglobin : 11.7 g/dL  Hematocrit : 38.3 %  Platelet Count - Automated : 261 K/uL  Mean Cell Volume : 87.0 fL  Mean Cell Hemoglobin : 26.6 pg  Mean Cell Hemoglobin Concentration : 30.5 gm/dL  Auto Neutrophil # : x  Auto Lymphocyte # : x  Auto Monocyte # : x  Auto Eosinophil # : x  Auto Basophil # : x  Auto Neutrophil % : x  Auto Lymphocyte % : x  Auto Monocyte % : x  Auto Eosinophil % : x  Auto Basophil % : x    08-    141  |  98  |  31<H>  ----------------------------<  83  3.0<L>   |  12<L>  |  1.91<H>    Ca    9.3      18 Aug 2022 03:22  Phos  6.1     08-  Mg     2.00     08-18    TPro  7.2  /  Alb  3.9  /  TBili  1.1  /  DBili  x   /  AST  63<H>  /  ALT  43<H>  /  AlkPhos  94  08-18    PT/INR - ( 17 Aug 2022 18:15 )   PT: 18.9 sec;   INR: 1.62 ratio         PTT - ( 18 Aug 2022 10:24 )  PTT:61.1 sec    CARDIAC MARKERS ( 18 Aug 2022 03:22 )  x     / x     / 462 U/L / x     / 5.6 ng/mL  CARDIAC MARKERS ( 17 Aug 2022 18:15 )  x     / x     / x     / x     / 5.0 ng/mL  CARDIAC MARKERS ( 16 Aug 2022 22:37 )  x     / x     / 270 U/L / x     / 8.5 ng/mL        Culture - Urine (collected 16 Aug 2022 20:06)  Source: Clean Catch Clean Catch (Midstream)  Final Report (17 Aug 2022 23:27):    <10,000 CFU/mL Normal Urogenital Julianne      Urinalysis Basic - ( 16 Aug 2022 20:16 )    Color: Orange / Appearance: Turbid / S.020 / pH: x  Gluc: x / Ketone: Negative  / Bili: Negative / Urobili: <2 mg/dL   Blood: x / Protein: 300 mg/dL / Nitrite: Negative   Leuk Esterase: Large / RBC: >50 /HPF / WBC >50 /HPF   Sq Epi: x / Non Sq Epi: x / Bacteria: Many          ====================  Assessment & Plan:     ====================      Alison Lynch MD PGY2 CCU Accept Note    CHIDI RUBIO  82y  Patient is a 82y old  Male who presents with a chief complaint of NSTEMI (18 Aug 2022 06:20)    ====================  HPI & Hospital Course:     82M with PMH CAD s/p prior stents x 3, afib (on Eliquis), DM2, HTN, CVA, carotid stenosis s/p stent, JACKELIN on CPAP, prostate ca presented with dyspnea and constipation found to have elevated troponins and EKG concerning for NSTEMI. Started on heparin gtt and s/p ASA/brillinta. MICU consulted for AMS and elevated lactate. Earlier today, patient was agitated and code grey was called, received haldol 1mg IV. Then RRT called around 3am for AMS. Pt is AOx4 at baseline, was found to be AOx0 and not responding verbally. HR noted to be elevated to 130-140s, in SVT. Mental status improved over the rapid. Labs drawn during the rapid remarkable for new leukocytosis to 13, lactate to 13.5, procalc 0.61. Pt was transferred to CCU for cardiac evaluation of causes of hypoperfusion.    Past Medical History: HTN, DM2, CAD s/p Stent x3, CVA 2/2 symptomatic carotid stenosis s/p stent, JACKELIN on CPAP, prostate ca s/p seeding     Past Surgical History:   - H/O carotid endarterectomy 2022, stent placed.  - S/P cholecystectomy   - S/P coronary angioplasty last stent 1 year ago.     Home Medications:  apixaban 5 mg oral tablet: 1 tab(s) orally 2 times a day (17 Aug 2022 09:24)  atorvastatin 80 mg oral tablet: 1 tab(s) orally once a day (17 Aug 2022 09:24)  Basaglar KwikPen 100 units/mL subcutaneous solution: 18 unit(s) subcutaneous once a day (at bedtime) (17 Aug 2022 09:24)  Claritin 10 mg oral tablet: 1 tab(s) orally once a day (17 Aug 2022 09:24)  clopidogrel 75 mg oral tablet: 1 tab(s) orally once a day (17 Aug 2022 09:24)  digoxin 125 mcg (0.125 mg) oral tablet: 1 tab(s) orally once a day (17 Aug 2022 09:24)  donepezil 10 mg oral tablet: 1 tab(s) orally once a day (at bedtime) (17 Aug 2022 09:24)  fluticasone 100 mcg/inh inhalation powder: 1 puff(s) inhaled 2 times a day (17 Aug 2022 09:24)  ketorolac 0.4% ophthalmic solution: 1 drop(s) in the left eye 3 times a day (17 Aug 2022 09:24)  metoprolol tartrate 50 mg oral tablet: 1 tab(s) orally 2 times a day (17 Aug 2022 09:24)  NovoLOG FlexPen 100 units/mL injectable solution: 7 unit(s) injectable 3 times a day (17 Aug 2022 09:)  pantoprazole 40 mg oral delayed release tablet: 1 tab(s) orally once a day (before a meal) (17 Aug 2022 09:)  polyethylene glycol 3350 oral powder for reconstitution: 17 gram(s) orally once a day (17 Aug 2022 09:)  senna oral tablet: 2 tab(s) orally once a day (17 Aug 2022 09:)      Current Medications:   MEDICATIONS  (STANDING):  aspirin enteric coated 81 milliGRAM(s) Oral daily  atorvastatin 80 milliGRAM(s) Oral at bedtime  chlorhexidine 2% Cloths 1 Application(s) Topical <User Schedule>  dextrose 5%. 1000 milliLiter(s) (50 mL/Hr) IV Continuous <Continuous>  dextrose 5%. 1000 milliLiter(s) (100 mL/Hr) IV Continuous <Continuous>  dextrose 50% Injectable 25 Gram(s) IV Push once  dextrose 50% Injectable 12.5 Gram(s) IV Push once  dextrose 50% Injectable 25 Gram(s) IV Push once  digoxin     Tablet 125 MICROGram(s) Oral daily  donepezil 10 milliGRAM(s) Oral at bedtime  furosemide   Injectable 40 milliGRAM(s) IV Push two times a day  glucagon  Injectable 1 milliGRAM(s) IntraMuscular once  heparin  Infusion.  Unit(s)/Hr (10 mL/Hr) IV Continuous <Continuous>  insulin glargine Injectable (LANTUS) 15 Unit(s) SubCutaneous at bedtime  insulin lispro (ADMELOG) corrective regimen sliding scale   SubCutaneous three times a day before meals  insulin lispro (ADMELOG) corrective regimen sliding scale   SubCutaneous at bedtime  insulin lispro Injectable (ADMELOG) 5 Unit(s) SubCutaneous three times a day before meals  ketorolac 0.5% Ophthalmic Solution 1 Drop(s) Left EYE three times a day  loratadine 10 milliGRAM(s) Oral daily  melatonin 3 milliGRAM(s) Oral at bedtime  metoprolol tartrate 50 milliGRAM(s) Oral two times a day  mometasone 220 MICROgram(s) Inhaler 2 Puff(s) Inhalation daily  pantoprazole    Tablet 40 milliGRAM(s) Oral before breakfast  piperacillin/tazobactam IVPB. 3.375 Gram(s) IV Intermittent once  piperacillin/tazobactam IVPB.. 3.375 Gram(s) IV Intermittent every 8 hours  polyethylene glycol 3350 17 Gram(s) Oral daily  senna 2 Tablet(s) Oral at bedtime  sodium bicarbonate  Infusion 0.127 mEq/kG/Hr (75 mL/Hr) IV Continuous <Continuous>  ticagrelor 90 milliGRAM(s) Oral every 12 hours    MEDICATIONS  (PRN):  acetaminophen     Tablet .. 650 milliGRAM(s) Oral every 6 hours PRN Temp greater or equal to 38C (100.4F), Mild Pain (1 - 3)  aluminum hydroxide/magnesium hydroxide/simethicone Suspension 30 milliLiter(s) Oral every 4 hours PRN Dyspepsia  dextrose Oral Gel 15 Gram(s) Oral once PRN Blood Glucose LESS THAN 70 milliGRAM(s)/deciliter  melatonin 3 milliGRAM(s) Oral at bedtime PRN Insomnia      Allergies:     Family History:     Social History:     ====================  Vital Signs Last 24 Hrs  T(C): 36.7 (18 Aug 2022 11:00), Max: 36.7 (18 Aug 2022 09:30)  T(F): 98 (18 Aug 2022 11:00), Max: 98.1 (18 Aug 2022 09:30)  HR: 67 (18 Aug 2022 10:39) (67 - 100)  BP: 84/58 (18 Aug 2022 10:39) (84/58 - 112/72)  BP(mean): 68 (18 Aug 2022 10:39) (68 - 68)  RR: 26 (18 Aug 2022 10:39) (18 - 27)  SpO2: 100% (18 Aug 2022 10:39) (97% - 100%)    Parameters below as of 18 Aug 2022 09:30  Patient On (Oxygen Delivery Method): mask, nonrebreather        Adult Advanced Hemodynamics Last 24 Hrs  CVP(mm Hg): --  CVP(cm H2O): --  CO: --  CI: --  PA: --  PA(mean): --  PCWP: --  SVR: --  SVRI: --  PVR: --  PVRI: --    Physical Exam:   General: NAD  HEENT: PERRL, EOMI, normal sclera and conjunctiva, no oral lesions  Neck: Supple, no JVD  Lungs: CTA bilaterally  Heart: RRR, normal S1S2, no murmurs/rubs/gallops  Abdomen: Soft, ND/NT  Extremities: 2+ peripheral pulses, no cyanosis/clubbing/edema, full ROM  Skin: Warm, well-perfused  Neuro: A&O x3, no focal deficits      ====================  Labs & Imaging:   CBC Full  -  ( 18 Aug 2022 03:22 )  WBC Count : 13.63 K/uL  RBC Count : 4.40 M/uL  Hemoglobin : 11.7 g/dL  Hematocrit : 38.3 %  Platelet Count - Automated : 261 K/uL  Mean Cell Volume : 87.0 fL  Mean Cell Hemoglobin : 26.6 pg  Mean Cell Hemoglobin Concentration : 30.5 gm/dL  Auto Neutrophil # : x  Auto Lymphocyte # : x  Auto Monocyte # : x  Auto Eosinophil # : x  Auto Basophil # : x  Auto Neutrophil % : x  Auto Lymphocyte % : x  Auto Monocyte % : x  Auto Eosinophil % : x  Auto Basophil % : x        141  |  98  |  31<H>  ----------------------------<  83  3.0<L>   |  12<L>  |  1.91<H>    Ca    9.3      18 Aug 2022 03:22  Phos  6.1     08-  Mg     2.00     -    TPro  7.2  /  Alb  3.9  /  TBili  1.1  /  DBili  x   /  AST  63<H>  /  ALT  43<H>  /  AlkPhos  94  08-18    PT/INR - ( 17 Aug 2022 18:15 )   PT: 18.9 sec;   INR: 1.62 ratio         PTT - ( 18 Aug 2022 10:24 )  PTT:61.1 sec    CARDIAC MARKERS ( 18 Aug 2022 03:22 )  x     / x     / 462 U/L / x     / 5.6 ng/mL  CARDIAC MARKERS ( 17 Aug 2022 18:15 )  x     / x     / x     / x     / 5.0 ng/mL  CARDIAC MARKERS ( 16 Aug 2022 22:37 )  x     / x     / 270 U/L / x     / 8.5 ng/mL        Culture - Urine (collected 16 Aug 2022 20:06)  Source: Clean Catch Clean Catch (Midstream)  Final Report (17 Aug 2022 23:27):    <10,000 CFU/mL Normal Urogenital Julianne      Urinalysis Basic - ( 16 Aug 2022 20:16 )    Color: Orange / Appearance: Turbid / S.020 / pH: x  Gluc: x / Ketone: Negative  / Bili: Negative / Urobili: <2 mg/dL   Blood: x / Protein: 300 mg/dL / Nitrite: Negative   Leuk Esterase: Large / RBC: >50 /HPF / WBC >50 /HPF   Sq Epi: x / Non Sq Epi: x / Bacteria: Many          ====================  Assessment & Plan:     ==================== CCU Accept Note    CHIDI RUBIO  82y  Patient is a 82y old  Male who presents with a chief complaint of NSTEMI (18 Aug 2022 06:20)    ====================  HPI & Hospital Course:     82M with PMH CAD s/p prior stents x 3, afib (on Eliquis), DM2, HTN, CVA, carotid stenosis s/p stent, JACKELIN on CPAP, prostate ca presented with dyspnea and constipation found to have elevated troponins and EKG concerning for NSTEMI. Started on heparin gtt and s/p ASA/brillinta. MICU consulted for AMS and elevated lactate. Earlier today, patient was agitated and code grey was called, received haldol 1mg IV. Then RRT called around 3am for AMS. Pt is AOx4 at baseline, was found to be AOx0 and not responding verbally. HR noted to be elevated to 130-140s, in SVT. Mental status improved over the rapid. Labs drawn during the rapid remarkable for new leukocytosis to 13, lactate to 13.5, procalc 0.61. Pt was transferred to CCU for cardiac evaluation of causes of hypoperfusion.    Past Medical History: HTN, DM2, CAD s/p Stent x3, CVA 2/2 symptomatic carotid stenosis s/p stent, JACKELIN on CPAP, prostate ca s/p seeding     Past Surgical History:   - H/O carotid endarterectomy 2022, stent placed.  - S/P cholecystectomy   - S/P coronary angioplasty last stent 1 year ago.     Home Medications:  apixaban 5 mg oral tablet: 1 tab(s) orally 2 times a day (17 Aug 2022 09:24)  atorvastatin 80 mg oral tablet: 1 tab(s) orally once a day (17 Aug 2022 09:24)  Basaglar KwikPen 100 units/mL subcutaneous solution: 18 unit(s) subcutaneous once a day (at bedtime) (17 Aug 2022 09:24)  Claritin 10 mg oral tablet: 1 tab(s) orally once a day (17 Aug 2022 09:24)  clopidogrel 75 mg oral tablet: 1 tab(s) orally once a day (17 Aug 2022 09:24)  digoxin 125 mcg (0.125 mg) oral tablet: 1 tab(s) orally once a day (17 Aug 2022 09:24)  donepezil 10 mg oral tablet: 1 tab(s) orally once a day (at bedtime) (17 Aug 2022 09:24)  fluticasone 100 mcg/inh inhalation powder: 1 puff(s) inhaled 2 times a day (17 Aug 2022 09:24)  ketorolac 0.4% ophthalmic solution: 1 drop(s) in the left eye 3 times a day (17 Aug 2022 09:24)  metoprolol tartrate 50 mg oral tablet: 1 tab(s) orally 2 times a day (17 Aug 2022 09:24)  NovoLOG FlexPen 100 units/mL injectable solution: 7 unit(s) injectable 3 times a day (17 Aug 2022 09:)  pantoprazole 40 mg oral delayed release tablet: 1 tab(s) orally once a day (before a meal) (17 Aug 2022 09:)  polyethylene glycol 3350 oral powder for reconstitution: 17 gram(s) orally once a day (17 Aug 2022 09:)  senna oral tablet: 2 tab(s) orally once a day (17 Aug 2022 09:)      Current Medications:   MEDICATIONS  (STANDING):  aspirin enteric coated 81 milliGRAM(s) Oral daily  atorvastatin 80 milliGRAM(s) Oral at bedtime  chlorhexidine 2% Cloths 1 Application(s) Topical <User Schedule>  dextrose 5%. 1000 milliLiter(s) (50 mL/Hr) IV Continuous <Continuous>  dextrose 5%. 1000 milliLiter(s) (100 mL/Hr) IV Continuous <Continuous>  dextrose 50% Injectable 25 Gram(s) IV Push once  dextrose 50% Injectable 12.5 Gram(s) IV Push once  dextrose 50% Injectable 25 Gram(s) IV Push once  digoxin     Tablet 125 MICROGram(s) Oral daily  donepezil 10 milliGRAM(s) Oral at bedtime  furosemide   Injectable 40 milliGRAM(s) IV Push two times a day  glucagon  Injectable 1 milliGRAM(s) IntraMuscular once  heparin  Infusion.  Unit(s)/Hr (10 mL/Hr) IV Continuous <Continuous>  insulin glargine Injectable (LANTUS) 15 Unit(s) SubCutaneous at bedtime  insulin lispro (ADMELOG) corrective regimen sliding scale   SubCutaneous three times a day before meals  insulin lispro (ADMELOG) corrective regimen sliding scale   SubCutaneous at bedtime  insulin lispro Injectable (ADMELOG) 5 Unit(s) SubCutaneous three times a day before meals  ketorolac 0.5% Ophthalmic Solution 1 Drop(s) Left EYE three times a day  loratadine 10 milliGRAM(s) Oral daily  melatonin 3 milliGRAM(s) Oral at bedtime  metoprolol tartrate 50 milliGRAM(s) Oral two times a day  mometasone 220 MICROgram(s) Inhaler 2 Puff(s) Inhalation daily  pantoprazole    Tablet 40 milliGRAM(s) Oral before breakfast  piperacillin/tazobactam IVPB. 3.375 Gram(s) IV Intermittent once  piperacillin/tazobactam IVPB.. 3.375 Gram(s) IV Intermittent every 8 hours  polyethylene glycol 3350 17 Gram(s) Oral daily  senna 2 Tablet(s) Oral at bedtime  sodium bicarbonate  Infusion 0.127 mEq/kG/Hr (75 mL/Hr) IV Continuous <Continuous>  ticagrelor 90 milliGRAM(s) Oral every 12 hours    MEDICATIONS  (PRN):  acetaminophen     Tablet .. 650 milliGRAM(s) Oral every 6 hours PRN Temp greater or equal to 38C (100.4F), Mild Pain (1 - 3)  aluminum hydroxide/magnesium hydroxide/simethicone Suspension 30 milliLiter(s) Oral every 4 hours PRN Dyspepsia  dextrose Oral Gel 15 Gram(s) Oral once PRN Blood Glucose LESS THAN 70 milliGRAM(s)/deciliter  melatonin 3 milliGRAM(s) Oral at bedtime PRN Insomnia      Allergies:     Family History:     Social History:     ====================  Vital Signs Last 24 Hrs  T(C): 36.7 (18 Aug 2022 11:00), Max: 36.7 (18 Aug 2022 09:30)  T(F): 98 (18 Aug 2022 11:00), Max: 98.1 (18 Aug 2022 09:30)  HR: 67 (18 Aug 2022 10:39) (67 - 100)  BP: 84/58 (18 Aug 2022 10:39) (84/58 - 112/72)  BP(mean): 68 (18 Aug 2022 10:39) (68 - 68)  RR: 26 (18 Aug 2022 10:39) (18 - 27)  SpO2: 100% (18 Aug 2022 10:39) (97% - 100%)    Parameters below as of 18 Aug 2022 09:30  Patient On (Oxygen Delivery Method): mask, nonrebreather        Adult Advanced Hemodynamics Last 24 Hrs  CVP(mm Hg): --  CVP(cm H2O): --  CO: --  CI: --  PA: --  PA(mean): --  PCWP: --  SVR: --  SVRI: --  PVR: --  PVRI: --    PHYSICAL EXAM:  GENERAL: NAD, well-groomed, well-developed, on nonrebreather  HEAD:  Atraumatic, Normocephalic  EYES: EOMI, PERRLA, conjunctiva and sclera clear  ENMT: No tonsillar erythema, exudates, or enlargement; Moist mucous membranes  NECK: Supple, No JVD, Normal thyroid  HEART: Regular rate and rhythm; No murmurs, rubs, or gallops  RESPIRATORY: CTA B/L, No W/R/R  ABDOMEN: Soft, Nontender, Nondistended; Bowel sounds present  NEUROLOGY: A&Ox3, nonfocal, moving all extremities  EXTREMITIES:  2+ Peripheral Pulses, No clubbing, cyanosis, or edema  SKIN: warm, dry, normal color, no rash or abnormal lesions      ====================  Labs & Imaging:   CBC Full  -  ( 18 Aug 2022 03:22 )  WBC Count : 13.63 K/uL  RBC Count : 4.40 M/uL  Hemoglobin : 11.7 g/dL  Hematocrit : 38.3 %  Platelet Count - Automated : 261 K/uL  Mean Cell Volume : 87.0 fL  Mean Cell Hemoglobin : 26.6 pg  Mean Cell Hemoglobin Concentration : 30.5 gm/dL  Auto Neutrophil # : x  Auto Lymphocyte # : x  Auto Monocyte # : x  Auto Eosinophil # : x  Auto Basophil # : x  Auto Neutrophil % : x  Auto Lymphocyte % : x  Auto Monocyte % : x  Auto Eosinophil % : x  Auto Basophil % : x    08    141  |  98  |  31<H>  ----------------------------<  83  3.0<L>   |  12<L>  |  1.91<H>    Ca    9.3      18 Aug 2022 03:22  Phos  6.1     08-18  Mg     2.00     08-18    TPro  7.2  /  Alb  3.9  /  TBili  1.1  /  DBili  x   /  AST  63<H>  /  ALT  43<H>  /  AlkPhos  94  08-18    PT/INR - ( 17 Aug 2022 18:15 )   PT: 18.9 sec;   INR: 1.62 ratio         PTT - ( 18 Aug 2022 10:24 )  PTT:61.1 sec    CARDIAC MARKERS ( 18 Aug 2022 03:22 )  x     / x     / 462 U/L / x     / 5.6 ng/mL  CARDIAC MARKERS ( 17 Aug 2022 18:15 )  x     / x     / x     / x     / 5.0 ng/mL  CARDIAC MARKERS ( 16 Aug 2022 22:37 )  x     / x     / 270 U/L / x     / 8.5 ng/mL        Culture - Urine (collected 16 Aug 2022 20:06)  Source: Clean Catch Clean Catch (Midstream)  Final Report (17 Aug 2022 23:27):    <10,000 CFU/mL Normal Urogenital Julianne      Urinalysis Basic - ( 16 Aug 2022 20:16 )    Color: Orange / Appearance: Turbid / S.020 / pH: x  Gluc: x / Ketone: Negative  / Bili: Negative / Urobili: <2 mg/dL   Blood: x / Protein: 300 mg/dL / Nitrite: Negative   Leuk Esterase: Large / RBC: >50 /HPF / WBC >50 /HPF   Sq Epi: x / Non Sq Epi: x / Bacteria: Many          ====================  Assessment & Plan:     Patient is 82yMale with PMHx of *** who presents with ***.     NEURO:  #Mental status: baseline A&O x3  -Currently A&O x 3  -NTD    CV:  #NSTEMI (+trops, +EKG changes) | NSTEMI (+trops) | unstable angina (-trops): Typical/atypical symptoms, loaded with 325 mg ASA, 180mg Ticagrelor/ 600mg Clopidogrel, sublingual nitroglycerin 0.4mg, heparin bolus (check monogram)  - Revascularization (within 48hrs of symptoms) with PCI  - JOSE score: (if >140, revascularize early)  - Nitroglycerin gtt 5mcg/min UNLESS anterior MI for pain relief  - DAPT: ASA 81, Clopidogrel 75mg qD /Ticagrelor 90mg BID for 1 year  - Statin: atorvastatin 80mg qD  - Heparin gtt (for 48 hrs or until revascularization with PCI, check monogram)    s/p stent: stop heparin, nitro gtt  -c/w DAPT  - start B-blocker: metoprolol tartrate  - start ACE-I (wait until AM labs return)   - statin: atorvastatin 80mg qD  - check ECHO in 48hr if anterior wall MI    #CHF: EF __%, on JVD ***  -Bblocker: metoprolol succinate 25mg qD / carvedilol 3.125mg qD  -Diuretic: furosemide (lasix) 20-40-60-80 mg qD | torsemide | bumetanide (bumex) 1mg qD  -Trend I/Os and daily weights, and Cr    #Arrythmia:   - Monitor on tele    #AFib:   - CHADSVASC score:  - HAS-BLED score:  - a/c: DOAC (apixiban 5mg BID (Eliquis)| dabigatran 150mg BID (Pradexa)| Riveroxaban 20mg qD (Xeralto) > Coumadin  - If new: get echo (check for tachycardia, valvular AF, L atrial size which is large if chronic)  - Rate control: BB/Digoxin/Amio if low EF  - Monitor telemetry    #s/p Atrial fibrillation Ablation:  - CHADSVASC/HAS-BLED score:  - a/c: Coumadin, DOAC (eliquis/pradexa/xeralto) resume after 6hrs post procedure  - Bedrest x6hrs, resume head of bed @30 degrees afterwards  - 12-lead EKG   - Continuous telemetry monitoring for arrythmias  - TTE echo (to r/o tamponade and heart fx)  - Protonix 40mg qD (prophylactic prevent acidity b/c heat from ablation since L atrium is close to esophagusx 1mo)   - DASH diet: Soft  - Pro-BNP labs in AM (to determine underlying dilation of heart)    #s/p Atrial flutter Ablation:  - CHADSVASC/HAS-BLED score:  - a/c: Coumadin, DOAC (eliquis/pradexa/xeralto)  - Rate control: CCB or BB  - Continuous telemetry monitoring for arrythmias  - Bedrest x6hrs, resume head of bed @30 degrees afterwards  - 12-lead EKG now    #Hemodynamically stable/unstable:  - On pressors due to ____ shock (cardiogenic due to muscle or valve failure, obstructive due to peff, PE, PTX, hypovolemic, vasopegic)     PULM:  #COPD: *Home meds* on __L O2    RENAL:  #SANDRA:   -UO:  -Guevara:    GI:  #Transaminitis: Elevated LFTs  #Diet:  #Bowel regiment:    ENDO:  #DM2: HbA1c ***   - Insulin Sliding Scale    METABOLIC:  #Electrolyte abnormalities    HEMATOLOGIC:  #CBC results show  #Coag panel shows  #DVT prophylaxis with     ID:  #Pt without strong objective or clinical evidence of infection. Will observe off antibiotics    SKIN:  #Lines:  #Decubitus ulcers:        ==================== CCU Accept Note    CHIDI RUBIO  82y  Patient is a 82y old  Male who presents with a chief complaint of NSTEMI (18 Aug 2022 06:20)    ====================  HPI & Hospital Course:     82M with PMH CAD s/p prior stents x 3, afib (on Eliquis), DM2, HTN, CVA, carotid stenosis s/p stent, JACKELIN on CPAP, prostate ca presented with dyspnea and constipation found to have elevated troponins and EKG concerning for NSTEMI. Started on heparin gtt and s/p ASA/brillinta. MICU consulted for AMS and elevated lactate. Earlier today, patient was agitated and code grey was called, received haldol 1mg IV. Then RRT called around 3am for AMS. Pt is AOx4 at baseline, was found to be AOx0 and not responding verbally. HR noted to be elevated to 130-140s, in SVT. Mental status improved over the rapid. Labs drawn during the rapid remarkable for new leukocytosis to 13, lactate to 13.5, procalc 0.61. Pt was transferred to CCU for cardiac evaluation of causes of hypoperfusion.    Past Medical History: HTN, DM2, CAD s/p Stent x3, CVA 2/2 symptomatic carotid stenosis s/p stent, JACKELIN on CPAP, prostate ca s/p seeding     Past Surgical History:   - H/O carotid endarterectomy 2022, stent placed.  - S/P cholecystectomy   - S/P coronary angioplasty last stent 1 year ago.     Home Medications:  apixaban 5 mg oral tablet: 1 tab(s) orally 2 times a day (17 Aug 2022 09:24)  atorvastatin 80 mg oral tablet: 1 tab(s) orally once a day (17 Aug 2022 09:24)  Basaglar KwikPen 100 units/mL subcutaneous solution: 18 unit(s) subcutaneous once a day (at bedtime) (17 Aug 2022 09:24)  Claritin 10 mg oral tablet: 1 tab(s) orally once a day (17 Aug 2022 09:24)  clopidogrel 75 mg oral tablet: 1 tab(s) orally once a day (17 Aug 2022 09:24)  digoxin 125 mcg (0.125 mg) oral tablet: 1 tab(s) orally once a day (17 Aug 2022 09:24)  donepezil 10 mg oral tablet: 1 tab(s) orally once a day (at bedtime) (17 Aug 2022 09:24)  fluticasone 100 mcg/inh inhalation powder: 1 puff(s) inhaled 2 times a day (17 Aug 2022 09:24)  ketorolac 0.4% ophthalmic solution: 1 drop(s) in the left eye 3 times a day (17 Aug 2022 09:24)  metoprolol tartrate 50 mg oral tablet: 1 tab(s) orally 2 times a day (17 Aug 2022 09:24)  NovoLOG FlexPen 100 units/mL injectable solution: 7 unit(s) injectable 3 times a day (17 Aug 2022 09:)  pantoprazole 40 mg oral delayed release tablet: 1 tab(s) orally once a day (before a meal) (17 Aug 2022 09:)  polyethylene glycol 3350 oral powder for reconstitution: 17 gram(s) orally once a day (17 Aug 2022 09:)  senna oral tablet: 2 tab(s) orally once a day (17 Aug 2022 09:)      Current Medications:   MEDICATIONS  (STANDING):  aspirin enteric coated 81 milliGRAM(s) Oral daily  atorvastatin 80 milliGRAM(s) Oral at bedtime  chlorhexidine 2% Cloths 1 Application(s) Topical <User Schedule>  dextrose 5%. 1000 milliLiter(s) (50 mL/Hr) IV Continuous <Continuous>  dextrose 5%. 1000 milliLiter(s) (100 mL/Hr) IV Continuous <Continuous>  dextrose 50% Injectable 25 Gram(s) IV Push once  dextrose 50% Injectable 12.5 Gram(s) IV Push once  dextrose 50% Injectable 25 Gram(s) IV Push once  digoxin     Tablet 125 MICROGram(s) Oral daily  donepezil 10 milliGRAM(s) Oral at bedtime  furosemide   Injectable 40 milliGRAM(s) IV Push two times a day  glucagon  Injectable 1 milliGRAM(s) IntraMuscular once  heparin  Infusion.  Unit(s)/Hr (10 mL/Hr) IV Continuous <Continuous>  insulin glargine Injectable (LANTUS) 15 Unit(s) SubCutaneous at bedtime  insulin lispro (ADMELOG) corrective regimen sliding scale   SubCutaneous three times a day before meals  insulin lispro (ADMELOG) corrective regimen sliding scale   SubCutaneous at bedtime  insulin lispro Injectable (ADMELOG) 5 Unit(s) SubCutaneous three times a day before meals  ketorolac 0.5% Ophthalmic Solution 1 Drop(s) Left EYE three times a day  loratadine 10 milliGRAM(s) Oral daily  melatonin 3 milliGRAM(s) Oral at bedtime  metoprolol tartrate 50 milliGRAM(s) Oral two times a day  mometasone 220 MICROgram(s) Inhaler 2 Puff(s) Inhalation daily  pantoprazole    Tablet 40 milliGRAM(s) Oral before breakfast  piperacillin/tazobactam IVPB. 3.375 Gram(s) IV Intermittent once  piperacillin/tazobactam IVPB.. 3.375 Gram(s) IV Intermittent every 8 hours  polyethylene glycol 3350 17 Gram(s) Oral daily  senna 2 Tablet(s) Oral at bedtime  sodium bicarbonate  Infusion 0.127 mEq/kG/Hr (75 mL/Hr) IV Continuous <Continuous>  ticagrelor 90 milliGRAM(s) Oral every 12 hours    MEDICATIONS  (PRN):  acetaminophen     Tablet .. 650 milliGRAM(s) Oral every 6 hours PRN Temp greater or equal to 38C (100.4F), Mild Pain (1 - 3)  aluminum hydroxide/magnesium hydroxide/simethicone Suspension 30 milliLiter(s) Oral every 4 hours PRN Dyspepsia  dextrose Oral Gel 15 Gram(s) Oral once PRN Blood Glucose LESS THAN 70 milliGRAM(s)/deciliter  melatonin 3 milliGRAM(s) Oral at bedtime PRN Insomnia      Allergies:     Family History:     Social History:     ====================  Vital Signs Last 24 Hrs  T(C): 36.7 (18 Aug 2022 11:00), Max: 36.7 (18 Aug 2022 09:30)  T(F): 98 (18 Aug 2022 11:00), Max: 98.1 (18 Aug 2022 09:30)  HR: 67 (18 Aug 2022 10:39) (67 - 100)  BP: 84/58 (18 Aug 2022 10:39) (84/58 - 112/72)  BP(mean): 68 (18 Aug 2022 10:39) (68 - 68)  RR: 26 (18 Aug 2022 10:39) (18 - 27)  SpO2: 100% (18 Aug 2022 10:39) (97% - 100%)    Parameters below as of 18 Aug 2022 09:30  Patient On (Oxygen Delivery Method): mask, nonrebreather        Adult Advanced Hemodynamics Last 24 Hrs  CVP(mm Hg): --  CVP(cm H2O): --  CO: --  CI: --  PA: --  PA(mean): --  PCWP: --  SVR: --  SVRI: --  PVR: --  PVRI: --    PHYSICAL EXAM:  GENERAL: NAD, well-groomed, well-developed, on nonrebreather  HEAD:  Atraumatic, Normocephalic  EYES: EOMI, PERRLA, conjunctiva and sclera clear  ENMT: No tonsillar erythema, exudates, or enlargement; Moist mucous membranes  NECK: Supple, No JVD, Normal thyroid  HEART: Regular rate and rhythm; No murmurs, rubs, or gallops  RESPIRATORY: CTA B/L, No W/R/R  ABDOMEN: Soft, Nontender, Nondistended; Bowel sounds present  NEUROLOGY: A&Ox3, nonfocal, moving all extremities  EXTREMITIES:  2+ Peripheral Pulses, No clubbing, cyanosis, or edema  SKIN: warm, dry, normal color, no rash or abnormal lesions      ====================  Labs & Imaging:   CBC Full  -  ( 18 Aug 2022 03:22 )  WBC Count : 13.63 K/uL  RBC Count : 4.40 M/uL  Hemoglobin : 11.7 g/dL  Hematocrit : 38.3 %  Platelet Count - Automated : 261 K/uL  Mean Cell Volume : 87.0 fL  Mean Cell Hemoglobin : 26.6 pg  Mean Cell Hemoglobin Concentration : 30.5 gm/dL  Auto Neutrophil # : x  Auto Lymphocyte # : x  Auto Monocyte # : x  Auto Eosinophil # : x  Auto Basophil # : x  Auto Neutrophil % : x  Auto Lymphocyte % : x  Auto Monocyte % : x  Auto Eosinophil % : x  Auto Basophil % : x    08    141  |  98  |  31<H>  ----------------------------<  83  3.0<L>   |  12<L>  |  1.91<H>    Ca    9.3      18 Aug 2022 03:22  Phos  6.1     08-18  Mg     2.00     08-18    TPro  7.2  /  Alb  3.9  /  TBili  1.1  /  DBili  x   /  AST  63<H>  /  ALT  43<H>  /  AlkPhos  94  08-18    PT/INR - ( 17 Aug 2022 18:15 )   PT: 18.9 sec;   INR: 1.62 ratio         PTT - ( 18 Aug 2022 10:24 )  PTT:61.1 sec    CARDIAC MARKERS ( 18 Aug 2022 03:22 )  x     / x     / 462 U/L / x     / 5.6 ng/mL  CARDIAC MARKERS ( 17 Aug 2022 18:15 )  x     / x     / x     / x     / 5.0 ng/mL  CARDIAC MARKERS ( 16 Aug 2022 22:37 )  x     / x     / 270 U/L / x     / 8.5 ng/mL        Culture - Urine (collected 16 Aug 2022 20:06)  Source: Clean Catch Clean Catch (Midstream)  Final Report (17 Aug 2022 23:27):    <10,000 CFU/mL Normal Urogenital Julianne      Urinalysis Basic - ( 16 Aug 2022 20:16 )    Color: Orange / Appearance: Turbid / S.020 / pH: x  Gluc: x / Ketone: Negative  / Bili: Negative / Urobili: <2 mg/dL   Blood: x / Protein: 300 mg/dL / Nitrite: Negative   Leuk Esterase: Large / RBC: >50 /HPF / WBC >50 /HPF   Sq Epi: x / Non Sq Epi: x / Bacteria: Many          ====================  Assessment & Plan:     Patient is 82yMale with PMHx of *** who presents with ***.     NEURO:  #Mental status: baseline A&O x3  -Currently A&O x 3  -neuro exam q4h    CV:  #NSTEMI   - PMH CAD s/p prior stents x 3, afib (on Eliquis), HTN, CVA, carotid stenosis s/p stent  - presented with dyspnea and constipation found to have elevated troponins and EKG concerning for NSTEMI.   - Started on heparin gtt and s/p ASA/brillinta  - atorvastatin 80mg    #CHF: EF 25%  - Echo from  showed severe mitral regurgitation, severe global LV systolic dysfunction, and RV enlargement + decreased systolic function  - 50mg metoprolol BID  - 40mg IV lasix BID  -Trend I/Os and daily weights, and Cr    #Arrythmia:   - had afib with RVR in ED  - CHADS-vasc - 7 - Eliquis on hold for now while on a heparin gtt  - digoxin 125  - Monitor on tele    PULM:  - presented with SOB  - CT chest from  showed No pulmonary embolism, small b/l pulmonary effusions and mild pulmonary edema and a nonspecific 4 mm left upper lobe juxtapleural nodule with surrounding ground-glass opacity. Recommend attention on follow-up.  - on non-rebreather mask    RENAL:  #SANDRA:   -Cr 2.24  -    GI:  #Transaminitis: Elevated LFTs  - continue to monitor    #Diet:  - NPO for cath  - pantoprazole 40mg     #Bowel regiment:  - miralax, maalox  - nonspecific bowl gas on abdominal X-ray      ENDO:  #DM2: HbA1c 8.3   - Insulin Sliding Scale  - TSH and lipid panel wnl    HEMATOLOGIC:  #CBC results show Hb stable  - continue to monitor    #DVT prophylaxis with heparin    ID:  #Pt without strong objective or clinical evidence of infection. Will observe off antibiotics  -RRT today for AMS, labs drawn during the rapid remarkable for new leukocytosis to 13, lactate to 13.5, procalc 0.61.    SKIN:  #Lines:  #Decubitus ulcers:        ==================== CCU Accept Note    CHIDI RUBIO  82y  Patient is a 82y old  Male who presents with a chief complaint of NSTEMI (18 Aug 2022 06:20)    ====================  HPI & Hospital Course:     82M with PMH CAD s/p prior stents x 3, afib (on Eliquis), DM2, HTN, CVA, carotid stenosis s/p stent, JACKELIN on CPAP, prostate ca presented with dyspnea and constipation found to have elevated troponins and EKG concerning for NSTEMI. Started on heparin gtt and s/p ASA/brillinta. MICU consulted for AMS and elevated lactate. Earlier today, patient was agitated and code grey was called, received haldol 1mg IV. Then RRT called around 3am for AMS. Pt is AOx4 at baseline, was found to be AOx0 and not responding verbally. HR noted to be elevated to 130-140s, in SVT. Mental status improved over the rapid. Labs drawn during the rapid remarkable for new leukocytosis to 13, lactate to 13.5, procalc 0.61. Pt was transferred to CCU for cardiac evaluation of causes of hypoperfusion.    Past Medical History: HTN, DM2, CAD s/p Stent x3, CVA 2/2 symptomatic carotid stenosis s/p stent, JACKELIN on CPAP, prostate ca s/p seeding     Past Surgical History:   - H/O carotid endarterectomy 2022, stent placed.  - S/P cholecystectomy   - S/P coronary angioplasty last stent 1 year ago.     Home Medications:  apixaban 5 mg oral tablet: 1 tab(s) orally 2 times a day (17 Aug 2022 09:24)  atorvastatin 80 mg oral tablet: 1 tab(s) orally once a day (17 Aug 2022 09:24)  Basaglar KwikPen 100 units/mL subcutaneous solution: 18 unit(s) subcutaneous once a day (at bedtime) (17 Aug 2022 09:24)  Claritin 10 mg oral tablet: 1 tab(s) orally once a day (17 Aug 2022 09:24)  clopidogrel 75 mg oral tablet: 1 tab(s) orally once a day (17 Aug 2022 09:24)  digoxin 125 mcg (0.125 mg) oral tablet: 1 tab(s) orally once a day (17 Aug 2022 09:24)  donepezil 10 mg oral tablet: 1 tab(s) orally once a day (at bedtime) (17 Aug 2022 09:24)  fluticasone 100 mcg/inh inhalation powder: 1 puff(s) inhaled 2 times a day (17 Aug 2022 09:24)  ketorolac 0.4% ophthalmic solution: 1 drop(s) in the left eye 3 times a day (17 Aug 2022 09:24)  metoprolol tartrate 50 mg oral tablet: 1 tab(s) orally 2 times a day (17 Aug 2022 09:24)  NovoLOG FlexPen 100 units/mL injectable solution: 7 unit(s) injectable 3 times a day (17 Aug 2022 09:)  pantoprazole 40 mg oral delayed release tablet: 1 tab(s) orally once a day (before a meal) (17 Aug 2022 09:)  polyethylene glycol 3350 oral powder for reconstitution: 17 gram(s) orally once a day (17 Aug 2022 09:)  senna oral tablet: 2 tab(s) orally once a day (17 Aug 2022 09:)      Current Medications:   MEDICATIONS  (STANDING):  aspirin enteric coated 81 milliGRAM(s) Oral daily  atorvastatin 80 milliGRAM(s) Oral at bedtime  chlorhexidine 2% Cloths 1 Application(s) Topical <User Schedule>  dextrose 5%. 1000 milliLiter(s) (50 mL/Hr) IV Continuous <Continuous>  dextrose 5%. 1000 milliLiter(s) (100 mL/Hr) IV Continuous <Continuous>  dextrose 50% Injectable 25 Gram(s) IV Push once  dextrose 50% Injectable 12.5 Gram(s) IV Push once  dextrose 50% Injectable 25 Gram(s) IV Push once  digoxin     Tablet 125 MICROGram(s) Oral daily  donepezil 10 milliGRAM(s) Oral at bedtime  furosemide   Injectable 40 milliGRAM(s) IV Push two times a day  glucagon  Injectable 1 milliGRAM(s) IntraMuscular once  heparin  Infusion.  Unit(s)/Hr (10 mL/Hr) IV Continuous <Continuous>  insulin glargine Injectable (LANTUS) 15 Unit(s) SubCutaneous at bedtime  insulin lispro (ADMELOG) corrective regimen sliding scale   SubCutaneous three times a day before meals  insulin lispro (ADMELOG) corrective regimen sliding scale   SubCutaneous at bedtime  insulin lispro Injectable (ADMELOG) 5 Unit(s) SubCutaneous three times a day before meals  ketorolac 0.5% Ophthalmic Solution 1 Drop(s) Left EYE three times a day  loratadine 10 milliGRAM(s) Oral daily  melatonin 3 milliGRAM(s) Oral at bedtime  metoprolol tartrate 50 milliGRAM(s) Oral two times a day  mometasone 220 MICROgram(s) Inhaler 2 Puff(s) Inhalation daily  pantoprazole    Tablet 40 milliGRAM(s) Oral before breakfast  piperacillin/tazobactam IVPB. 3.375 Gram(s) IV Intermittent once  piperacillin/tazobactam IVPB.. 3.375 Gram(s) IV Intermittent every 8 hours  polyethylene glycol 3350 17 Gram(s) Oral daily  senna 2 Tablet(s) Oral at bedtime  sodium bicarbonate  Infusion 0.127 mEq/kG/Hr (75 mL/Hr) IV Continuous <Continuous>  ticagrelor 90 milliGRAM(s) Oral every 12 hours    MEDICATIONS  (PRN):  acetaminophen     Tablet .. 650 milliGRAM(s) Oral every 6 hours PRN Temp greater or equal to 38C (100.4F), Mild Pain (1 - 3)  aluminum hydroxide/magnesium hydroxide/simethicone Suspension 30 milliLiter(s) Oral every 4 hours PRN Dyspepsia  dextrose Oral Gel 15 Gram(s) Oral once PRN Blood Glucose LESS THAN 70 milliGRAM(s)/deciliter  melatonin 3 milliGRAM(s) Oral at bedtime PRN Insomnia      Allergies:     Family History:     Social History:     ====================  Vital Signs Last 24 Hrs  T(C): 36.7 (18 Aug 2022 11:00), Max: 36.7 (18 Aug 2022 09:30)  T(F): 98 (18 Aug 2022 11:00), Max: 98.1 (18 Aug 2022 09:30)  HR: 67 (18 Aug 2022 10:39) (67 - 100)  BP: 84/58 (18 Aug 2022 10:39) (84/58 - 112/72)  BP(mean): 68 (18 Aug 2022 10:39) (68 - 68)  RR: 26 (18 Aug 2022 10:39) (18 - 27)  SpO2: 100% (18 Aug 2022 10:39) (97% - 100%)    Parameters below as of 18 Aug 2022 09:30  Patient On (Oxygen Delivery Method): mask, nonrebreather        Adult Advanced Hemodynamics Last 24 Hrs  CVP(mm Hg): --  CVP(cm H2O): --  CO: --  CI: --  PA: --  PA(mean): --  PCWP: --  SVR: --  SVRI: --  PVR: --  PVRI: --    PHYSICAL EXAM:  GENERAL: NAD, well-groomed, well-developed, on nonrebreather  HEAD:  Atraumatic, Normocephalic  EYES: EOMI, PERRLA, conjunctiva and sclera clear  ENMT: No tonsillar erythema, exudates, or enlargement; Moist mucous membranes  NECK: Supple, No JVD, Normal thyroid  HEART: Regular rate and rhythm; No murmurs, rubs, or gallops  RESPIRATORY: CTA B/L, No W/R/R  ABDOMEN: Soft, Nontender, Nondistended; Bowel sounds present  NEUROLOGY: A&Ox3, nonfocal, moving all extremities  EXTREMITIES:  2+ Peripheral Pulses, No clubbing, cyanosis, or edema  SKIN: warm, dry, normal color, no rash or abnormal lesions      ====================  Labs & Imaging:   CBC Full  -  ( 18 Aug 2022 03:22 )  WBC Count : 13.63 K/uL  RBC Count : 4.40 M/uL  Hemoglobin : 11.7 g/dL  Hematocrit : 38.3 %  Platelet Count - Automated : 261 K/uL  Mean Cell Volume : 87.0 fL  Mean Cell Hemoglobin : 26.6 pg  Mean Cell Hemoglobin Concentration : 30.5 gm/dL  Auto Neutrophil # : x  Auto Lymphocyte # : x  Auto Monocyte # : x  Auto Eosinophil # : x  Auto Basophil # : x  Auto Neutrophil % : x  Auto Lymphocyte % : x  Auto Monocyte % : x  Auto Eosinophil % : x  Auto Basophil % : x    08    141  |  98  |  31<H>  ----------------------------<  83  3.0<L>   |  12<L>  |  1.91<H>    Ca    9.3      18 Aug 2022 03:22  Phos  6.1     08-18  Mg     2.00     08-18    TPro  7.2  /  Alb  3.9  /  TBili  1.1  /  DBili  x   /  AST  63<H>  /  ALT  43<H>  /  AlkPhos  94  08-18    PT/INR - ( 17 Aug 2022 18:15 )   PT: 18.9 sec;   INR: 1.62 ratio         PTT - ( 18 Aug 2022 10:24 )  PTT:61.1 sec    CARDIAC MARKERS ( 18 Aug 2022 03:22 )  x     / x     / 462 U/L / x     / 5.6 ng/mL  CARDIAC MARKERS ( 17 Aug 2022 18:15 )  x     / x     / x     / x     / 5.0 ng/mL  CARDIAC MARKERS ( 16 Aug 2022 22:37 )  x     / x     / 270 U/L / x     / 8.5 ng/mL        Culture - Urine (collected 16 Aug 2022 20:06)  Source: Clean Catch Clean Catch (Midstream)  Final Report (17 Aug 2022 23:27):    <10,000 CFU/mL Normal Urogenital Julianne      Urinalysis Basic - ( 16 Aug 2022 20:16 )    Color: Orange / Appearance: Turbid / S.020 / pH: x  Gluc: x / Ketone: Negative  / Bili: Negative / Urobili: <2 mg/dL   Blood: x / Protein: 300 mg/dL / Nitrite: Negative   Leuk Esterase: Large / RBC: >50 /HPF / WBC >50 /HPF   Sq Epi: x / Non Sq Epi: x / Bacteria: Many      ====================  Assessment & Plan:     Patient is 82yMale with PMHx of *** who presents with ***.     NEURO:  #Mental status: baseline A&O x3  -Currently A&O x 3  -neuro exam q4h    CV:  #NSTEMI   - PMH CAD s/p prior stents x 3, afib (on Eliquis), HTN, CVA, carotid stenosis s/p stent  - presented with dyspnea and constipation found to have elevated troponins and EKG concerning for NSTEMI.   - Started on heparin gtt and s/p ASA/brillinta  - atorvastatin 80mg    #CHF: EF 25%  - Echo from  showed severe mitral regurgitation, severe global LV systolic dysfunction, and RV enlargement + decreased systolic function  - 50mg metoprolol BID  - 40mg IV lasix BID  -Trend I/Os and daily weights, and Cr    #Arrythmia:   - had afib with RVR in ED  - CHADS-vasc - 7 - Eliquis on hold for now while on a heparin gtt  - digoxin 125  - Monitor on tele    PULM:  - presented with SOB  - CT chest from  showed No pulmonary embolism, small b/l pulmonary effusions and mild pulmonary edema and a nonspecific 4 mm left upper lobe juxtapleural nodule with surrounding ground-glass opacity. Recommend attention on follow-up.  - on non-rebreather mask    RENAL:  #SANDRA:   -Cr 2.24  -possibly 2/2 hypoperfusion  -continue to monitor     GI:  #Transaminitis: Elevated LFTs  - continue to monitor    #Diet:  - NPO for cath  - pantoprazole 40mg     #Bowel regiment:  - miralax, maalox, senna  - nonspecific bowl gas on abdominal X-ray    ENDO:  #DM2: HbA1c 8.3   - Insulin Sliding Scale  - TSH and lipid panel wnl    HEMATOLOGIC:  #CBC results show Hb stable  - continue to monitor    #DVT prophylaxis with heparin gtt    ID:  #Pt without strong objective or clinical evidence of infection. Will observe off antibiotics  -RRT on or AMS, labs drawn during the rapid remarkable for new leukocytosis to 13, lactate to 13.5, procalc 0.61.  -Elevated lactate possibly 2/2 hypoperfusion given NSTEMI vs sepsis. However, unclear source of infection (pt being treated with bactrim for UTI for positive UA but negative UCx)  -f/u blood cultures  -c/w zosyn    ==================== CCU Accept Note    CHIDI RUBIO  82y Patient is a 82y old  Male who presents with a chief complaint of NSTEMI (18 Aug 2022 06:20)    ====================  HPI & Hospital Course:   82M with PMH CAD s/p prior stents x 3, afib (on Eliquis), DM2, HTN, CVA, dementia, carotid stenosis s/p stent, JACKELIN on CPAP, prostate brittani/p seed implantation  presented with dyspnea and constipation found to have elevated troponins and EKG concerning for NSTEMI and also clinical hypovolemia. Hospital course c/b SVT at 130-140. Started on heparin gtt and s/p ASA/brillinta. MICU consulted for AMS and elevated lactate. Earlier today, patient was agitated and code grey was called, received haldol 1mg IV. Then RRT called around 3am for AMS. HR noted to be elevated to 130-140s, in SVT. Mental status improved over the rapid. Labs drawn during the rapid remarkable for new leukocytosis to 13, lactate to 13.5, procalc 0.61. CTc/a/p was notable for small BLU nodular haziness and questionable L uteritis Pt was transferred to CCU for cardiac evaluation of causes of hypoperfusion. In CCU patient had an episode of junctional bradycardia  and hypotension with LOC and seizure likely activity with no pluse. CPR for <1 min with ROSC and patient back to baseline. Started on Levophed for hypotension. Also found to be acidotic and started on bicarb drip with BP improvement. He received 1/2 amp of atropin for another episode of junctional bradycardia. Trops un trending.    Past Medical History: HTN, DM2, CAD s/p Stent x3, CVA 2/2 symptomatic carotid stenosis s/p stent, JACKELIN on CPAP, prostate ca s/p seeding     Past Surgical History:   - H/O carotid endarterectomy 2022, stent placed.  - S/P cholecystectomy   - S/P coronary angioplasty last stent 1 year ago.     Home Medications:  apixaban 5 mg oral tablet: 1 tab(s) orally 2 times a day (17 Aug 2022 09:24)  atorvastatin 80 mg oral tablet: 1 tab(s) orally once a day (17 Aug 2022 09:24)  Basaglar KwikPen 100 units/mL subcutaneous solution: 18 unit(s) subcutaneous once a day (at bedtime) (17 Aug 2022 09:24)  Claritin 10 mg oral tablet: 1 tab(s) orally once a day (17 Aug 2022 09:24)  clopidogrel 75 mg oral tablet: 1 tab(s) orally once a day (17 Aug 2022 09:24)  digoxin 125 mcg (0.125 mg) oral tablet: 1 tab(s) orally once a day (17 Aug 2022 09:24)  donepezil 10 mg oral tablet: 1 tab(s) orally once a day (at bedtime) (17 Aug 2022 09:24)  fluticasone 100 mcg/inh inhalation powder: 1 puff(s) inhaled 2 times a day (17 Aug 2022 09:24)  ketorolac 0.4% ophthalmic solution: 1 drop(s) in the left eye 3 times a day (17 Aug 2022 09:24)  metoprolol tartrate 50 mg oral tablet: 1 tab(s) orally 2 times a day (17 Aug 2022 09:)  NovoLOG FlexPen 100 units/mL injectable solution: 7 unit(s) injectable 3 times a day (17 Aug 2022 09:24)  pantoprazole 40 mg oral delayed release tablet: 1 tab(s) orally once a day (before a meal) (17 Aug 2022 09:24)  polyethylene glycol 3350 oral powder for reconstitution: 17 gram(s) orally once a day (17 Aug 2022 09:24)  senna oral tablet: 2 tab(s) orally once a day (17 Aug 2022 09:24)      Current Medications:   MEDICATIONS  (STANDING):  aspirin enteric coated 81 milliGRAM(s) Oral daily  atorvastatin 80 milliGRAM(s) Oral at bedtime  chlorhexidine 2% Cloths 1 Application(s) Topical <User Schedule>  dextrose 5%. 1000 milliLiter(s) (50 mL/Hr) IV Continuous <Continuous>  dextrose 5%. 1000 milliLiter(s) (100 mL/Hr) IV Continuous <Continuous>  dextrose 50% Injectable 25 Gram(s) IV Push once  dextrose 50% Injectable 12.5 Gram(s) IV Push once  dextrose 50% Injectable 25 Gram(s) IV Push once  digoxin     Tablet 125 MICROGram(s) Oral daily  donepezil 10 milliGRAM(s) Oral at bedtime  furosemide   Injectable 40 milliGRAM(s) IV Push two times a day  glucagon  Injectable 1 milliGRAM(s) IntraMuscular once  heparin  Infusion.  Unit(s)/Hr (10 mL/Hr) IV Continuous <Continuous>  insulin glargine Injectable (LANTUS) 15 Unit(s) SubCutaneous at bedtime  insulin lispro (ADMELOG) corrective regimen sliding scale   SubCutaneous three times a day before meals  insulin lispro (ADMELOG) corrective regimen sliding scale   SubCutaneous at bedtime  insulin lispro Injectable (ADMELOG) 5 Unit(s) SubCutaneous three times a day before meals  ketorolac 0.5% Ophthalmic Solution 1 Drop(s) Left EYE three times a day  loratadine 10 milliGRAM(s) Oral daily  melatonin 3 milliGRAM(s) Oral at bedtime  metoprolol tartrate 50 milliGRAM(s) Oral two times a day  mometasone 220 MICROgram(s) Inhaler 2 Puff(s) Inhalation daily  pantoprazole    Tablet 40 milliGRAM(s) Oral before breakfast  piperacillin/tazobactam IVPB. 3.375 Gram(s) IV Intermittent once  piperacillin/tazobactam IVPB.. 3.375 Gram(s) IV Intermittent every 8 hours  polyethylene glycol 3350 17 Gram(s) Oral daily  senna 2 Tablet(s) Oral at bedtime  sodium bicarbonate  Infusion 0.127 mEq/kG/Hr (75 mL/Hr) IV Continuous <Continuous>  ticagrelor 90 milliGRAM(s) Oral every 12 hours    MEDICATIONS  (PRN):  acetaminophen     Tablet .. 650 milliGRAM(s) Oral every 6 hours PRN Temp greater or equal to 38C (100.4F), Mild Pain (1 - 3)  aluminum hydroxide/magnesium hydroxide/simethicone Suspension 30 milliLiter(s) Oral every 4 hours PRN Dyspepsia  dextrose Oral Gel 15 Gram(s) Oral once PRN Blood Glucose LESS THAN 70 milliGRAM(s)/deciliter  melatonin 3 milliGRAM(s) Oral at bedtime PRN Insomnia      Allergies:     Family History:     Social History:     ====================  Vital Signs Last 24 Hrs  T(C): 36.7 (18 Aug 2022 11:00), Max: 36.7 (18 Aug 2022 09:30)  T(F): 98 (18 Aug 2022 11:00), Max: 98.1 (18 Aug 2022 09:30)  HR: 67 (18 Aug 2022 10:39) (67 - 100)  BP: 84/58 (18 Aug 2022 10:39) (84/58 - 112/72)  BP(mean): 68 (18 Aug 2022 10:39) (68 - 68)  RR: 26 (18 Aug 2022 10:39) (18 - 27)  SpO2: 100% (18 Aug 2022 10:39) (97% - 100%)    Parameters below as of 18 Aug 2022 09:30  Patient On (Oxygen Delivery Method): mask, nonrebreather        Adult Advanced Hemodynamics Last 24 Hrs  CVP(mm Hg): --  CVP(cm H2O): --  CO: --  CI: --  PA: --  PA(mean): --  PCWP: --  SVR: --  SVRI: --  PVR: --  PVRI: --    PHYSICAL EXAM:  GENERAL: NAD, well-groomed, well-developed, on nonrebreather  HEAD:  Atraumatic, Normocephalic  EYES: EOMI, PERRLA, conjunctiva and sclera clear  ENMT: No tonsillar erythema, exudates, or enlargement; Moist mucous membranes  NECK: Supple, No JVD, Normal thyroid  HEART: Regular rate and rhythm; No murmurs, rubs, or gallops  RESPIRATORY: CTA B/L, No W/R/R  ABDOMEN: Soft, Nontender, Nondistended; Bowel sounds present  NEUROLOGY: A&Ox3, nonfocal, moving all extremities  EXTREMITIES:  2+ Peripheral Pulses, No clubbing, cyanosis, or edema  SKIN: warm, dry, normal color, no rash or abnormal lesions      ====================  Labs & Imaging:   CBC Full  -  ( 18 Aug 2022 03:22 )  WBC Count : 13.63 K/uL  RBC Count : 4.40 M/uL  Hemoglobin : 11.7 g/dL  Hematocrit : 38.3 %  Platelet Count - Automated : 261 K/uL  Mean Cell Volume : 87.0 fL  Mean Cell Hemoglobin : 26.6 pg  Mean Cell Hemoglobin Concentration : 30.5 gm/dL  Auto Neutrophil # : x  Auto Lymphocyte # : x  Auto Monocyte # : x  Auto Eosinophil # : x  Auto Basophil # : x  Auto Neutrophil % : x  Auto Lymphocyte % : x  Auto Monocyte % : x  Auto Eosinophil % : x  Auto Basophil % : x    08-18    141  |  98  |  31<H>  ----------------------------<  83  3.0<L>   |  12<L>  |  1.91<H>    Ca    9.3      18 Aug 2022 03:22  Phos  6.1       Mg     2.00         TPro  7.2  /  Alb  3.9  /  TBili  1.1  /  DBili  x   /  AST  63<H>  /  ALT  43<H>  /  AlkPhos  94  -18    PT/INR - ( 17 Aug 2022 18:15 )   PT: 18.9 sec;   INR: 1.62 ratio         PTT - ( 18 Aug 2022 10:24 )  PTT:61.1 sec    CARDIAC MARKERS ( 18 Aug 2022 03:22 )  x     / x     / 462 U/L / x     / 5.6 ng/mL  CARDIAC MARKERS ( 17 Aug 2022 18:15 )  x     / x     / x     / x     / 5.0 ng/mL  CARDIAC MARKERS ( 16 Aug 2022 22:37 )  x     / x     / 270 U/L / x     / 8.5 ng/mL        Culture - Urine (collected 16 Aug 2022 20:06)  Source: Clean Catch Clean Catch (Midstream)  Final Report (17 Aug 2022 23:27):    <10,000 CFU/mL Normal Urogenital Julianne      Urinalysis Basic - ( 16 Aug 2022 20:16 )    Color: Orange / Appearance: Turbid / S.020 / pH: x  Gluc: x / Ketone: Negative  / Bili: Negative / Urobili: <2 mg/dL   Blood: x / Protein: 300 mg/dL / Nitrite: Negative   Leuk Esterase: Large / RBC: >50 /HPF / WBC >50 /HPF   Sq Epi: x / Non Sq Epi: x / Bacteria: Many      ====================  Assessment & Plan:     Patient is 82yMale with PMHx of *** who presents with ***.     NEURO:  #Mental status: baseline A&O x3  -Currently A&O x 3  -neuro exam q4h    CV:  #NSTEMI   - PMH CAD s/p prior stents x 3, afib (on Eliquis), HTN, CVA, carotid stenosis s/p stent  - presented with dyspnea and constipation found to have elevated troponins and EKG concerning for NSTEMI.   - Started on heparin gtt and s/p ASA/brillinta  - atorvastatin 80mg    #CHF: EF 25%  - Echo from  showed severe mitral regurgitation, severe global LV systolic dysfunction, and RV enlargement + decreased systolic function  - 50mg metoprolol BID  - 40mg IV lasix BID  -Trend I/Os and daily weights, and Cr    #Arrythmia:   - had afib with RVR in ED  - CHADS-vasc - 7 - Eliquis on hold for now while on a heparin gtt  - digoxin 125  - Monitor on tele    PULM:  - presented with SOB  - CT chest from  showed No pulmonary embolism, small b/l pulmonary effusions and mild pulmonary edema and a nonspecific 4 mm left upper lobe juxtapleural nodule with surrounding ground-glass opacity. Recommend attention on follow-up.  - on non-rebreather mask    RENAL:  #SANDRA:   -Cr 2.24  -possibly 2/2 hypoperfusion  -continue to monitor     GI:  #Transaminitis: Elevated LFTs  - continue to monitor    #Diet:  - NPO for cath  - pantoprazole 40mg     #Bowel regiment:  - miralax, maalox, senna  - nonspecific bowl gas on abdominal X-ray    ENDO:  #DM2: HbA1c 8.3   - Insulin Sliding Scale  - TSH and lipid panel wnl    HEMATOLOGIC:  #CBC results show Hb stable  - continue to monitor    #DVT prophylaxis with heparin gtt    ID:  #Pt without strong objective or clinical evidence of infection. Will observe off antibiotics  -RRT on or AMS, labs drawn during the rapid remarkable for new leukocytosis to 13, lactate to 13.5, procalc 0.61.  -Elevated lactate possibly 2/2 hypoperfusion given NSTEMI vs sepsis. However, unclear source of infection (pt being treated with bactrim for UTI for positive UA but negative UCx)  -f/u blood cultures  -c/w zosyn    ==================== CCU Accept Note    CHIDI RUBIO  82y Patient is a 82y old  Male who presents with a chief complaint of NSTEMI (18 Aug 2022 06:20)    ====================  HPI & Hospital Course:   82M with PMH CAD s/p prior stents x 3, systolic HF EF 25%, afib (on Eliquis), DM2, HTN, CVA, dementia, carotid stenosis s/p stent, JACKELIN on CPAP, prostate brittani/p seed implantation  presented with dyspnea and constipation found to have elevated troponins and EKG concerning for NSTEMI and also clinical hypovolemia. Hospital course c/b SVT at 130-140. Started on heparin gtt and s/p ASA/brillinta. MICU consulted for AMS and elevated lactate. Earlier today, patient was agitated and code grey was called, received haldol 1mg IV. Then RRT called around 3am for AMS. HR noted to be elevated to 130-140s, in SVT. Mental status improved over the rapid. Labs drawn during the rapid remarkable for new leukocytosis to 13, lactate to 13.5, procalc 0.61. CTc/a/p was notable for small BLU nodular haziness and questionable L uteritis Pt was transferred to CCU for cardiac evaluation of causes of hypoperfusion. In CCU patient had an episode of junctional bradycardia  and hypotension with LOC and seizure likely activity with no pluse. CPR for <1 min with ROSC and patient back to baseline. Started on Levophed for hypotension. Also found to be acidotic and started on bicarb drip with BP improvement. He received 1/2 amp of atropin for another episode of junctional bradycardia. Trops un trending.    Past Medical History: HTN, DM2, CAD s/p Stent x3, CVA 2/2 symptomatic carotid stenosis s/p stent, JACKELIN on CPAP, prostate ca s/p seeding     Past Surgical History:   - H/O carotid endarterectomy 2022, stent placed.  - S/P cholecystectomy   - S/P coronary angioplasty last stent 1 year ago.     Home Medications:  apixaban 5 mg oral tablet: 1 tab(s) orally 2 times a day (17 Aug 2022 09:24)  atorvastatin 80 mg oral tablet: 1 tab(s) orally once a day (17 Aug 2022 09:24)  Basaglar KwikPen 100 units/mL subcutaneous solution: 18 unit(s) subcutaneous once a day (at bedtime) (17 Aug 2022 09:24)  Claritin 10 mg oral tablet: 1 tab(s) orally once a day (17 Aug 2022 09:24)  clopidogrel 75 mg oral tablet: 1 tab(s) orally once a day (17 Aug 2022 09:24)  digoxin 125 mcg (0.125 mg) oral tablet: 1 tab(s) orally once a day (17 Aug 2022 09:24)  donepezil 10 mg oral tablet: 1 tab(s) orally once a day (at bedtime) (17 Aug 2022 09:24)  fluticasone 100 mcg/inh inhalation powder: 1 puff(s) inhaled 2 times a day (17 Aug 2022 09:24)  ketorolac 0.4% ophthalmic solution: 1 drop(s) in the left eye 3 times a day (17 Aug 2022 09:)  metoprolol tartrate 50 mg oral tablet: 1 tab(s) orally 2 times a day (17 Aug 2022 09:24)  NovoLOG FlexPen 100 units/mL injectable solution: 7 unit(s) injectable 3 times a day (17 Aug 2022 09:)  pantoprazole 40 mg oral delayed release tablet: 1 tab(s) orally once a day (before a meal) (17 Aug 2022 09:24)  polyethylene glycol 3350 oral powder for reconstitution: 17 gram(s) orally once a day (17 Aug 2022 09:)  senna oral tablet: 2 tab(s) orally once a day (17 Aug 2022 09:)      Current Medications:   MEDICATIONS  (STANDING):  aspirin enteric coated 81 milliGRAM(s) Oral daily  atorvastatin 80 milliGRAM(s) Oral at bedtime  chlorhexidine 2% Cloths 1 Application(s) Topical <User Schedule>  dextrose 5%. 1000 milliLiter(s) (50 mL/Hr) IV Continuous <Continuous>  dextrose 5%. 1000 milliLiter(s) (100 mL/Hr) IV Continuous <Continuous>  dextrose 50% Injectable 25 Gram(s) IV Push once  dextrose 50% Injectable 12.5 Gram(s) IV Push once  dextrose 50% Injectable 25 Gram(s) IV Push once  digoxin     Tablet 125 MICROGram(s) Oral daily  donepezil 10 milliGRAM(s) Oral at bedtime  furosemide   Injectable 40 milliGRAM(s) IV Push two times a day  glucagon  Injectable 1 milliGRAM(s) IntraMuscular once  heparin  Infusion.  Unit(s)/Hr (10 mL/Hr) IV Continuous <Continuous>  insulin glargine Injectable (LANTUS) 15 Unit(s) SubCutaneous at bedtime  insulin lispro (ADMELOG) corrective regimen sliding scale   SubCutaneous three times a day before meals  insulin lispro (ADMELOG) corrective regimen sliding scale   SubCutaneous at bedtime  insulin lispro Injectable (ADMELOG) 5 Unit(s) SubCutaneous three times a day before meals  ketorolac 0.5% Ophthalmic Solution 1 Drop(s) Left EYE three times a day  loratadine 10 milliGRAM(s) Oral daily  melatonin 3 milliGRAM(s) Oral at bedtime  metoprolol tartrate 50 milliGRAM(s) Oral two times a day  mometasone 220 MICROgram(s) Inhaler 2 Puff(s) Inhalation daily  pantoprazole    Tablet 40 milliGRAM(s) Oral before breakfast  piperacillin/tazobactam IVPB. 3.375 Gram(s) IV Intermittent once  piperacillin/tazobactam IVPB.. 3.375 Gram(s) IV Intermittent every 8 hours  polyethylene glycol 3350 17 Gram(s) Oral daily  senna 2 Tablet(s) Oral at bedtime  sodium bicarbonate  Infusion 0.127 mEq/kG/Hr (75 mL/Hr) IV Continuous <Continuous>  ticagrelor 90 milliGRAM(s) Oral every 12 hours    MEDICATIONS  (PRN):  acetaminophen     Tablet .. 650 milliGRAM(s) Oral every 6 hours PRN Temp greater or equal to 38C (100.4F), Mild Pain (1 - 3)  aluminum hydroxide/magnesium hydroxide/simethicone Suspension 30 milliLiter(s) Oral every 4 hours PRN Dyspepsia  dextrose Oral Gel 15 Gram(s) Oral once PRN Blood Glucose LESS THAN 70 milliGRAM(s)/deciliter  melatonin 3 milliGRAM(s) Oral at bedtime PRN Insomnia      Allergies:     Family History:     Social History:     ====================  Vital Signs Last 24 Hrs  T(C): 36.7 (18 Aug 2022 11:00), Max: 36.7 (18 Aug 2022 09:30)  T(F): 98 (18 Aug 2022 11:00), Max: 98.1 (18 Aug 2022 09:30)  HR: 67 (18 Aug 2022 10:39) (67 - 100)  BP: 84/58 (18 Aug 2022 10:39) (84/58 - 112/72)  BP(mean): 68 (18 Aug 2022 10:39) (68 - 68)  RR: 26 (18 Aug 2022 10:39) (18 - 27)  SpO2: 100% (18 Aug 2022 10:39) (97% - 100%)    Parameters below as of 18 Aug 2022 09:30  Patient On (Oxygen Delivery Method): mask, nonrebreather      REVIEW OF SYSTEMS    General: no fatigue/malaise, weight loss/gain.  Skin: no rashes.  Ophthalmologic: no blurred vision, no loss of vision. 	  ENT: no sore throat, rhinorrhea, sinus congestion.  Cardiovascular: no chest pain ,no palpitation,+ dizziness, no diaphoresis, no edema  Respiratory: + SOB, no  cough or wheeze.  Gastrointestinal:  no N/V/D, no melena/hematemesis/hematochezia, + constipation  Genitourinary: no dysuria/hesitancy or hematuria.  Musculoskeletal: no myalgias or arthralgias.  Neurological: no changes in vision or hearing, + lightheadedness/dizziness	  Psychiatric: +anxiety      PHYSICAL EXAM:    Appearance: ill	  HEENT:   Normal oral mucosa, PERRL, EOMI	  Cardiovascular: Normal S1 S2, + JVD, No murmurs, No edema  Respiratory: Lungs crackles to auscultation	  Psychiatry: A & O x 2, Mood & affect appropriate  Gastrointestinal:  Soft, Non-tender, + BS	  Skin: No rashes, No ecchymoses, No cyanosis, b/l feet cold to touch 	  Neurologic: Non-focal,+ LOC  Extremities: Normal range of motion, No clubbing, cyanosis or edema  Vascular: Peripheral pulses palpable 2+ bilaterally                ====================  Labs & Imaging:   CBC Full  -  ( 18 Aug 2022 03:22 )  WBC Count : 13.63 K/uL  RBC Count : 4.40 M/uL  Hemoglobin : 11.7 g/dL  Hematocrit : 38.3 %  Platelet Count - Automated : 261 K/uL  Mean Cell Volume : 87.0 fL  Mean Cell Hemoglobin : 26.6 pg  Mean Cell Hemoglobin Concentration : 30.5 gm/dL  Auto Neutrophil # : x  Auto Lymphocyte # : x  Auto Monocyte # : x  Auto Eosinophil # : x  Auto Basophil # : x  Auto Neutrophil % : x  Auto Lymphocyte % : x  Auto Monocyte % : x  Auto Eosinophil % : x  Auto Basophil % : x    08-18    141  |  98  |  31<H>  ----------------------------<  83  3.0<L>   |  12<L>  |  1.91<H>    Ca    9.3      18 Aug 2022 03:22  Phos  6.1     08-18  Mg     2.00     08-18    TPro  7.2  /  Alb  3.9  /  TBili  1.1  /  DBili  x   /  AST  63<H>  /  ALT  43<H>  /  AlkPhos  94  08-18    PT/INR - ( 17 Aug 2022 18:15 )   PT: 18.9 sec;   INR: 1.62 ratio         PTT - ( 18 Aug 2022 10:24 )  PTT:61.1 sec    CARDIAC MARKERS ( 18 Aug 2022 03:22 )  x     / x     / 462 U/L / x     / 5.6 ng/mL  CARDIAC MARKERS ( 17 Aug 2022 18:15 )  x     / x     / x     / x     / 5.0 ng/mL  CARDIAC MARKERS ( 16 Aug 2022 22:37 )  x     / x     / 270 U/L / x     / 8.5 ng/mL        Culture - Urine (collected 16 Aug 2022 20:06)  Source: Clean Catch Clean Catch (Midstream)  Final Report (17 Aug 2022 23:27):    <10,000 CFU/mL Normal Urogenital Julianne      Urinalysis Basic - ( 16 Aug 2022 20:16 )    Color: Orange / Appearance: Turbid / S.020 / pH: x  Gluc: x / Ketone: Negative  / Bili: Negative / Urobili: <2 mg/dL   Blood: x / Protein: 300 mg/dL / Nitrite: Negative   Leuk Esterase: Large / RBC: >50 /HPF / WBC >50 /HPF   Sq Epi: x / Non Sq Epi: x / Bacteria: Many      ====================  Assessment & Plan:     Patient is 82yMale with PMHx of *** who presents with ***.     NEURO:  #Mental status: baseline A&O x3  -Currently A&O x 2- 3  -neuro exam q4h    CV:  #NSTEMI   - elevated troponins and EKG concerning for NSTEMI.   - Started on heparin gtt and s/p ASA/brillinta  - atorvastatin 80mg  - trend trop to peak  - continue CM      #systolic CHF: EF 25% with cardiogenic shock  - Echo from  showed severe mitral regurgitation, severe global LV systolic dysfunction, and RV enlargement + decreased systolic function  - hypotensive on Levophed drip  RHC showed : RA 30, wedge 30, CO 3.22, CI 1.6,Pa sat 49% on levophed  -s/p Rt groin intra aortic ballon pump and swan cath placed  - d/c  metoprolol BID  - HF consulted  -increase lasix to 80mg IBP bid  -Trend I/Os and daily weights, and Cr    #Arrythmia:   -afib with RVR in ED  - CHADS-vasc - 7 - Eliquis on hold for now while on a heparin gtt  - intermittent -140  - digoxin 125mcg  - Monitor on tele    PULM:   presented with SOB  - CT chest from  showed No pulmonary embolism, small b/l pulmonary effusions and mild pulmonary edema and a nonspecific 4 mm left upper lobe juxtapleural nodule with surrounding ground-glass opacity.   - on non-rebreather mask    RENAL:  #SANDRA:   -Cr 2.24  -possibly 2/2 hypoperfusion  -continue to monitor   - c/w lasix 80mg IV bid    -trend BMP    GI:  #Transaminitis: Elevated LFTs  - continue to monitor    #Diet:  - Dash diet  - pantoprazole 40mg     #Bowel regiment:  presents with constipation  - miralax, maalox, senna  - nonspecific bowl gas on abdominal X-ray    ENDO:  #DM2: HbA1c 8.3   - Insulin Sliding Scale  - TSH and lipid panel wnl    HEMATOLOGIC:  #CBC results show Hb stable  - continue to monitor    #DVT prophylaxis with heparin gtt    ID:  #Pt without strong objective or clinical evidence of infection. Will observe off antibiotics  -RRT on or AMS, labs drawn during the rapid remarkable for new leukocytosis to 13, lactate to 13.5, procalc 0.61.  -Elevated lactate possibly 2/2 hypoperfusion given NSTEMI vs sepsis. However, unclear source of infection (pt being treated with bactrim for UTI for positive UA but negative UCx)  -f/u blood cultures  -c/w zosyn x 7 days     ==================== CCU Accept Note    CHIDI RUBIO  82y Patient is a 82y old  Male who presents with a chief complaint of NSTEMI (18 Aug 2022 06:20)    ====================  HPI & Hospital Course:   82M with PMH CAD s/p prior stents x 3, systolic HF EF 25%, afib (on Eliquis), DM2, HTN, CVA, dementia, carotid stenosis s/p stent, JACKELIN on CPAP, prostate brittani/p seed implantation  presented with dyspnea and constipation found to have elevated troponins and EKG concerning for NSTEMI and also clinical hypovolemia. Hospital course c/b SVT at 130-140. Started on heparin gtt and s/p ASA/brillinta. MICU consulted for AMS and elevated lactate. Earlier today, patient was agitated and code grey was called, received haldol 1mg IV. Then RRT called around 3am for AMS. HR noted to be elevated to 130-140s, in SVT. Mental status improved over the rapid. Labs drawn during the rapid remarkable for new leukocytosis to 13, lactate to 13.5, procalc 0.61. CTc/a/p was notable for small BLU nodular haziness and questionable L uteritis Pt was transferred to CCU for cardiac evaluation of causes of hypoperfusion. In CCU patient had an episode of junctional bradycardia  and hypotension with LOC and seizure likely activity with no pluse. CPR for <1 min with ROSC and patient back to baseline. Started on Levophed for hypotension. Also found to be acidotic and started on bicarb drip with BP improvement. He received 1/2 amp of atropin for another episode of junctional bradycardia. Trops un trending.    Past Medical History: HTN, DM2, CAD s/p Stent x3, CVA 2/2 symptomatic carotid stenosis s/p stent, JACKELIN on CPAP, prostate ca s/p seeding     Past Surgical History:   - H/O carotid endarterectomy 2022, stent placed.  - S/P cholecystectomy   - S/P coronary angioplasty last stent 1 year ago.     Home Medications:  apixaban 5 mg oral tablet: 1 tab(s) orally 2 times a day (17 Aug 2022 09:24)  atorvastatin 80 mg oral tablet: 1 tab(s) orally once a day (17 Aug 2022 09:24)  Basaglar KwikPen 100 units/mL subcutaneous solution: 18 unit(s) subcutaneous once a day (at bedtime) (17 Aug 2022 09:24)  Claritin 10 mg oral tablet: 1 tab(s) orally once a day (17 Aug 2022 09:24)  clopidogrel 75 mg oral tablet: 1 tab(s) orally once a day (17 Aug 2022 09:24)  digoxin 125 mcg (0.125 mg) oral tablet: 1 tab(s) orally once a day (17 Aug 2022 09:24)  donepezil 10 mg oral tablet: 1 tab(s) orally once a day (at bedtime) (17 Aug 2022 09:24)  fluticasone 100 mcg/inh inhalation powder: 1 puff(s) inhaled 2 times a day (17 Aug 2022 09:24)  ketorolac 0.4% ophthalmic solution: 1 drop(s) in the left eye 3 times a day (17 Aug 2022 09:)  metoprolol tartrate 50 mg oral tablet: 1 tab(s) orally 2 times a day (17 Aug 2022 09:24)  NovoLOG FlexPen 100 units/mL injectable solution: 7 unit(s) injectable 3 times a day (17 Aug 2022 09:)  pantoprazole 40 mg oral delayed release tablet: 1 tab(s) orally once a day (before a meal) (17 Aug 2022 09:24)  polyethylene glycol 3350 oral powder for reconstitution: 17 gram(s) orally once a day (17 Aug 2022 09:)  senna oral tablet: 2 tab(s) orally once a day (17 Aug 2022 09:)      Current Medications:   MEDICATIONS  (STANDING):  aspirin enteric coated 81 milliGRAM(s) Oral daily  atorvastatin 80 milliGRAM(s) Oral at bedtime  chlorhexidine 2% Cloths 1 Application(s) Topical <User Schedule>  dextrose 5%. 1000 milliLiter(s) (50 mL/Hr) IV Continuous <Continuous>  dextrose 5%. 1000 milliLiter(s) (100 mL/Hr) IV Continuous <Continuous>  dextrose 50% Injectable 25 Gram(s) IV Push once  dextrose 50% Injectable 12.5 Gram(s) IV Push once  dextrose 50% Injectable 25 Gram(s) IV Push once  digoxin     Tablet 125 MICROGram(s) Oral daily  donepezil 10 milliGRAM(s) Oral at bedtime  furosemide   Injectable 40 milliGRAM(s) IV Push two times a day  glucagon  Injectable 1 milliGRAM(s) IntraMuscular once  heparin  Infusion.  Unit(s)/Hr (10 mL/Hr) IV Continuous <Continuous>  insulin glargine Injectable (LANTUS) 15 Unit(s) SubCutaneous at bedtime  insulin lispro (ADMELOG) corrective regimen sliding scale   SubCutaneous three times a day before meals  insulin lispro (ADMELOG) corrective regimen sliding scale   SubCutaneous at bedtime  insulin lispro Injectable (ADMELOG) 5 Unit(s) SubCutaneous three times a day before meals  ketorolac 0.5% Ophthalmic Solution 1 Drop(s) Left EYE three times a day  loratadine 10 milliGRAM(s) Oral daily  melatonin 3 milliGRAM(s) Oral at bedtime  metoprolol tartrate 50 milliGRAM(s) Oral two times a day  mometasone 220 MICROgram(s) Inhaler 2 Puff(s) Inhalation daily  pantoprazole    Tablet 40 milliGRAM(s) Oral before breakfast  piperacillin/tazobactam IVPB. 3.375 Gram(s) IV Intermittent once  piperacillin/tazobactam IVPB.. 3.375 Gram(s) IV Intermittent every 8 hours  polyethylene glycol 3350 17 Gram(s) Oral daily  senna 2 Tablet(s) Oral at bedtime  sodium bicarbonate  Infusion 0.127 mEq/kG/Hr (75 mL/Hr) IV Continuous <Continuous>  ticagrelor 90 milliGRAM(s) Oral every 12 hours    MEDICATIONS  (PRN):  acetaminophen     Tablet .. 650 milliGRAM(s) Oral every 6 hours PRN Temp greater or equal to 38C (100.4F), Mild Pain (1 - 3)  aluminum hydroxide/magnesium hydroxide/simethicone Suspension 30 milliLiter(s) Oral every 4 hours PRN Dyspepsia  dextrose Oral Gel 15 Gram(s) Oral once PRN Blood Glucose LESS THAN 70 milliGRAM(s)/deciliter  melatonin 3 milliGRAM(s) Oral at bedtime PRN Insomnia      Allergies:     Family History:     Social History:     ====================  Vital Signs Last 24 Hrs  T(C): 36.7 (18 Aug 2022 11:00), Max: 36.7 (18 Aug 2022 09:30)  T(F): 98 (18 Aug 2022 11:00), Max: 98.1 (18 Aug 2022 09:30)  HR: 67 (18 Aug 2022 10:39) (67 - 100)  BP: 84/58 (18 Aug 2022 10:39) (84/58 - 112/72)  BP(mean): 68 (18 Aug 2022 10:39) (68 - 68)  RR: 26 (18 Aug 2022 10:39) (18 - 27)  SpO2: 100% (18 Aug 2022 10:39) (97% - 100%)    Parameters below as of 18 Aug 2022 09:30  Patient On (Oxygen Delivery Method): mask, nonrebreather      REVIEW OF SYSTEMS    General: no fatigue/malaise, weight loss/gain.  Skin: no rashes.  Ophthalmologic: no blurred vision, no loss of vision. 	  ENT: no sore throat, rhinorrhea, sinus congestion.  Cardiovascular: no chest pain ,no palpitation,+ dizziness, no diaphoresis, no edema  Respiratory: + SOB, no  cough or wheeze.  Gastrointestinal:  no N/V/D, no melena/hematemesis/hematochezia, + constipation  Genitourinary: no dysuria/hesitancy or hematuria.  Musculoskeletal: no myalgias or arthralgias.  Neurological: no changes in vision or hearing, + lightheadedness/dizziness	  Psychiatric: +anxiety      PHYSICAL EXAM:    Appearance: ill	  HEENT:   Normal oral mucosa, PERRL, EOMI	  Cardiovascular: Normal S1 S2, + JVD, No murmurs, No edema  Respiratory: Lungs crackles to auscultation	  Psychiatry: A & O x 2, Mood & affect appropriate  Gastrointestinal:  Soft, Non-tender, + BS	  Skin: No rashes, No ecchymoses, No cyanosis, all extremities  cold to touch 	  Neurologic: Non-focal,+ LOC  Extremities: Normal range of motion, No clubbing, cyanosis or edema  Vascular: Peripheral pulses palpable 2+ bilaterally, right groin intra aortic ballon pump and swan cath                ====================  Labs & Imaging:   CBC Full  -  ( 18 Aug 2022 03:22 )  WBC Count : 13.63 K/uL  RBC Count : 4.40 M/uL  Hemoglobin : 11.7 g/dL  Hematocrit : 38.3 %  Platelet Count - Automated : 261 K/uL  Mean Cell Volume : 87.0 fL  Mean Cell Hemoglobin : 26.6 pg  Mean Cell Hemoglobin Concentration : 30.5 gm/dL  Auto Neutrophil # : x  Auto Lymphocyte # : x  Auto Monocyte # : x  Auto Eosinophil # : x  Auto Basophil # : x  Auto Neutrophil % : x  Auto Lymphocyte % : x  Auto Monocyte % : x  Auto Eosinophil % : x  Auto Basophil % : x    08-18    141  |  98  |  31<H>  ----------------------------<  83  3.0<L>   |  12<L>  |  1.91<H>    Ca    9.3      18 Aug 2022 03:22  Phos  6.1       Mg     2.00     0818    TPro  7.2  /  Alb  3.9  /  TBili  1.1  /  DBili  x   /  AST  63<H>  /  ALT  43<H>  /  AlkPhos  94  08-18    PT/INR - ( 17 Aug 2022 18:15 )   PT: 18.9 sec;   INR: 1.62 ratio         PTT - ( 18 Aug 2022 10:24 )  PTT:61.1 sec    CARDIAC MARKERS ( 18 Aug 2022 03:22 )  x     / x     / 462 U/L / x     / 5.6 ng/mL  CARDIAC MARKERS ( 17 Aug 2022 18:15 )  x     / x     / x     / x     / 5.0 ng/mL  CARDIAC MARKERS ( 16 Aug 2022 22:37 )  x     / x     / 270 U/L / x     / 8.5 ng/mL        Culture - Urine (collected 16 Aug 2022 20:06)  Source: Clean Catch Clean Catch (Midstream)  Final Report (17 Aug 2022 23:27):    <10,000 CFU/mL Normal Urogenital Julianne      Urinalysis Basic - ( 16 Aug 2022 20:16 )    Color: Orange / Appearance: Turbid / S.020 / pH: x  Gluc: x / Ketone: Negative  / Bili: Negative / Urobili: <2 mg/dL   Blood: x / Protein: 300 mg/dL / Nitrite: Negative   Leuk Esterase: Large / RBC: >50 /HPF / WBC >50 /HPF   Sq Epi: x / Non Sq Epi: x / Bacteria: Many      ====================  Assessment & Plan:     Patient is 82yMale with PMHx of *** who presents with ***.     NEURO:  #Mental status: baseline A&O x3  -Currently A&O x 2- 3  -neuro exam q4h    CV:  #NSTEMI   - elevated troponins and EKG concerning for NSTEMI.   - Started on heparin gtt and s/p ASA/brillinta  - atorvastatin 80mg  - trend trop to peak  - continue CM      #systolic CHF: EF 25% with cardiogenic shock  - Echo from  showed severe mitral regurgitation, severe global LV systolic dysfunction, and RV enlargement + decreased systolic function  - hypotensive on Levophed drip  RHC showed : RA 30, wedge 30, CO 3.22, CI 1.6,Pa sat 49% on levophed  -s/p Rt groin intra aortic ballon pump and swan cath placed  - d/c  metoprolol BID  - HF consulted  -increase lasix to 80mg IBP bid  -Trend I/Os and daily weights, and Cr    #Arrythmia:   -afib with RVR in ED  - CHADS-vasc - 7 - Eliquis on hold for now while on a heparin gtt  - intermittent -140  - digoxin 125mcg  - Monitor on tele    PULM:   presented with SOB  - CT chest from  showed No pulmonary embolism, small b/l pulmonary effusions and mild pulmonary edema and a nonspecific 4 mm left upper lobe juxtapleural nodule with surrounding ground-glass opacity.   - on non-rebreather mask- > NC    RENAL:  #SANDRA:   -Cr 2.24  -possibly 2/2 hypoperfusion  -continue to monitor   - c/w lasix 80mg IV bid    -trend BMP    GI:  #Transaminitis: Elevated LFTs  - continue to monitor    #Diet:  - Dash diet  - pantoprazole 40mg     #Bowel regiment:  presents with constipation  - miralax, maalox, senna  - nonspecific bowl gas on abdominal X-ray    ENDO:  #DM2: HbA1c 8.3   - Insulin Sliding Scale  - TSH and lipid panel wnl    HEMATOLOGIC:  #CBC results show Hb stable  - continue to monitor    #DVT prophylaxis with heparin gtt    ID:  #Pt without strong objective or clinical evidence of infection. Will observe off antibiotics  -RRT on or AMS, labs drawn during the rapid remarkable for new leukocytosis to 13, lactate to 13.5, procalc 0.61.  -Elevated lactate possibly 2/2 hypoperfusion given NSTEMI vs sepsis. However, unclear source of infection (pt being treated with bactrim for UTI for positive UA but negative UCx)  -f/u blood cultures  -c/w zosyn x 7 days     ==================== CCU Accept Note    CHIDI RUBIO  82y Patient is a 82y old  Male who presents with a chief complaint of NSTEMI (18 Aug 2022 06:20)    ====================  HPI & Hospital Course:   82M with PMH CAD s/p prior stents x 3, systolic HF EF 25%, afib (on Eliquis), DM2, HTN, CVA, dementia, carotid stenosis s/p stent, JACKELIN on CPAP, prostate brittani/p seed implantation  presented with dyspnea and constipation found to have elevated troponins and EKG concerning for NSTEMI and also clinical hypovolemia. Hospital course c/b SVT at 130-140. Started on heparin gtt and s/p ASA/brillinta. MICU consulted for AMS and elevated lactate. Earlier today, patient was agitated and code grey was called, received haldol 1mg IV. Then RRT called around 3am for AMS. HR noted to be elevated to 130-140s, in SVT. Mental status improved over the rapid. Labs drawn during the rapid remarkable for new leukocytosis to 13, lactate to 13.5, procalc 0.61. CTc/a/p was notable for small BLU nodular haziness and questionable L uteritis Pt was transferred to CCU for cardiac evaluation of causes of hypoperfusion. In CCU patient had an episode of junctional bradycardia  and hypotension with LOC and seizure likely activity with no pluse. CPR for <1 min with ROSC and patient back to baseline. Started on Levophed for hypotension. Also found to be acidotic and started on bicarb drip with BP improvement. He received 1/2 amp of atropin for another episode of junctional bradycardia. Trops un trending.    Past Medical History: HTN, DM2, CAD s/p Stent x3, CVA 2/2 symptomatic carotid stenosis s/p stent, JACKELIN on CPAP, prostate ca s/p seeding     Past Surgical History:   - H/O carotid endarterectomy 2022, stent placed.  - S/P cholecystectomy   - S/P coronary angioplasty last stent 1 year ago.     Home Medications:  apixaban 5 mg oral tablet: 1 tab(s) orally 2 times a day (17 Aug 2022 09:24)  atorvastatin 80 mg oral tablet: 1 tab(s) orally once a day (17 Aug 2022 09:24)  Basaglar KwikPen 100 units/mL subcutaneous solution: 18 unit(s) subcutaneous once a day (at bedtime) (17 Aug 2022 09:24)  Claritin 10 mg oral tablet: 1 tab(s) orally once a day (17 Aug 2022 09:24)  clopidogrel 75 mg oral tablet: 1 tab(s) orally once a day (17 Aug 2022 09:24)  digoxin 125 mcg (0.125 mg) oral tablet: 1 tab(s) orally once a day (17 Aug 2022 09:24)  donepezil 10 mg oral tablet: 1 tab(s) orally once a day (at bedtime) (17 Aug 2022 09:24)  fluticasone 100 mcg/inh inhalation powder: 1 puff(s) inhaled 2 times a day (17 Aug 2022 09:24)  ketorolac 0.4% ophthalmic solution: 1 drop(s) in the left eye 3 times a day (17 Aug 2022 09:)  metoprolol tartrate 50 mg oral tablet: 1 tab(s) orally 2 times a day (17 Aug 2022 09:24)  NovoLOG FlexPen 100 units/mL injectable solution: 7 unit(s) injectable 3 times a day (17 Aug 2022 09:)  pantoprazole 40 mg oral delayed release tablet: 1 tab(s) orally once a day (before a meal) (17 Aug 2022 09:24)  polyethylene glycol 3350 oral powder for reconstitution: 17 gram(s) orally once a day (17 Aug 2022 09:)  senna oral tablet: 2 tab(s) orally once a day (17 Aug 2022 09:)      Current Medications:   MEDICATIONS  (STANDING):  aspirin enteric coated 81 milliGRAM(s) Oral daily  atorvastatin 80 milliGRAM(s) Oral at bedtime  chlorhexidine 2% Cloths 1 Application(s) Topical <User Schedule>  dextrose 5%. 1000 milliLiter(s) (50 mL/Hr) IV Continuous <Continuous>  dextrose 5%. 1000 milliLiter(s) (100 mL/Hr) IV Continuous <Continuous>  dextrose 50% Injectable 25 Gram(s) IV Push once  dextrose 50% Injectable 12.5 Gram(s) IV Push once  dextrose 50% Injectable 25 Gram(s) IV Push once  digoxin     Tablet 125 MICROGram(s) Oral daily  donepezil 10 milliGRAM(s) Oral at bedtime  furosemide   Injectable 40 milliGRAM(s) IV Push two times a day  glucagon  Injectable 1 milliGRAM(s) IntraMuscular once  heparin  Infusion.  Unit(s)/Hr (10 mL/Hr) IV Continuous <Continuous>  insulin glargine Injectable (LANTUS) 15 Unit(s) SubCutaneous at bedtime  insulin lispro (ADMELOG) corrective regimen sliding scale   SubCutaneous three times a day before meals  insulin lispro (ADMELOG) corrective regimen sliding scale   SubCutaneous at bedtime  insulin lispro Injectable (ADMELOG) 5 Unit(s) SubCutaneous three times a day before meals  ketorolac 0.5% Ophthalmic Solution 1 Drop(s) Left EYE three times a day  loratadine 10 milliGRAM(s) Oral daily  melatonin 3 milliGRAM(s) Oral at bedtime  metoprolol tartrate 50 milliGRAM(s) Oral two times a day  mometasone 220 MICROgram(s) Inhaler 2 Puff(s) Inhalation daily  pantoprazole    Tablet 40 milliGRAM(s) Oral before breakfast  piperacillin/tazobactam IVPB. 3.375 Gram(s) IV Intermittent once  piperacillin/tazobactam IVPB.. 3.375 Gram(s) IV Intermittent every 8 hours  polyethylene glycol 3350 17 Gram(s) Oral daily  senna 2 Tablet(s) Oral at bedtime  sodium bicarbonate  Infusion 0.127 mEq/kG/Hr (75 mL/Hr) IV Continuous <Continuous>  ticagrelor 90 milliGRAM(s) Oral every 12 hours    MEDICATIONS  (PRN):  acetaminophen     Tablet .. 650 milliGRAM(s) Oral every 6 hours PRN Temp greater or equal to 38C (100.4F), Mild Pain (1 - 3)  aluminum hydroxide/magnesium hydroxide/simethicone Suspension 30 milliLiter(s) Oral every 4 hours PRN Dyspepsia  dextrose Oral Gel 15 Gram(s) Oral once PRN Blood Glucose LESS THAN 70 milliGRAM(s)/deciliter  melatonin 3 milliGRAM(s) Oral at bedtime PRN Insomnia      Allergies:     Family History:     Social History:     ====================  Vital Signs Last 24 Hrs  T(C): 36.7 (18 Aug 2022 11:00), Max: 36.7 (18 Aug 2022 09:30)  T(F): 98 (18 Aug 2022 11:00), Max: 98.1 (18 Aug 2022 09:30)  HR: 67 (18 Aug 2022 10:39) (67 - 100)  BP: 84/58 (18 Aug 2022 10:39) (84/58 - 112/72)  BP(mean): 68 (18 Aug 2022 10:39) (68 - 68)  RR: 26 (18 Aug 2022 10:39) (18 - 27)  SpO2: 100% (18 Aug 2022 10:39) (97% - 100%)    Parameters below as of 18 Aug 2022 09:30  Patient On (Oxygen Delivery Method): mask, nonrebreather      REVIEW OF SYSTEMS    General: no fatigue/malaise, weight loss/gain.  Skin: no rashes.  Ophthalmologic: no blurred vision, no loss of vision. 	  ENT: no sore throat, rhinorrhea, sinus congestion.  Cardiovascular: no chest pain ,no palpitation,+ dizziness, no diaphoresis, no edema  Respiratory: + SOB, no  cough or wheeze.  Gastrointestinal:  no N/V/D, no melena/hematemesis/hematochezia, + constipation  Genitourinary: no dysuria/hesitancy or hematuria.  Musculoskeletal: no myalgias or arthralgias.  Neurological: no changes in vision or hearing, + lightheadedness/dizziness	  Psychiatric: +anxiety      PHYSICAL EXAM:    Appearance: ill	  HEENT:   Normal oral mucosa, PERRL, EOMI	  Cardiovascular: Normal S1 S2, + JVD, No murmurs, No edema  Respiratory: Lungs crackles to auscultation	  Psychiatry: A & O x 2, Mood & affect appropriate  Gastrointestinal:  Soft, Non-tender, + BS	  Skin: No rashes, No ecchymoses, No cyanosis, all extremities  cold to touch 	  Neurologic: Non-focal,+ LOC  Extremities: Normal range of motion, No clubbing, cyanosis or edema  Vascular: Peripheral pulses palpable 2+ bilaterally, right groin intra aortic ballon pump and swan cath                ====================  Labs & Imaging:   CBC Full  -  ( 18 Aug 2022 03:22 )  WBC Count : 13.63 K/uL  RBC Count : 4.40 M/uL  Hemoglobin : 11.7 g/dL  Hematocrit : 38.3 %  Platelet Count - Automated : 261 K/uL  Mean Cell Volume : 87.0 fL  Mean Cell Hemoglobin : 26.6 pg  Mean Cell Hemoglobin Concentration : 30.5 gm/dL  Auto Neutrophil # : x  Auto Lymphocyte # : x  Auto Monocyte # : x  Auto Eosinophil # : x  Auto Basophil # : x  Auto Neutrophil % : x  Auto Lymphocyte % : x  Auto Monocyte % : x  Auto Eosinophil % : x  Auto Basophil % : x    08-18    141  |  98  |  31<H>  ----------------------------<  83  3.0<L>   |  12<L>  |  1.91<H>    Ca    9.3      18 Aug 2022 03:22  Phos  6.1       Mg     2.00     0818    TPro  7.2  /  Alb  3.9  /  TBili  1.1  /  DBili  x   /  AST  63<H>  /  ALT  43<H>  /  AlkPhos  94  08-18    PT/INR - ( 17 Aug 2022 18:15 )   PT: 18.9 sec;   INR: 1.62 ratio         PTT - ( 18 Aug 2022 10:24 )  PTT:61.1 sec    CARDIAC MARKERS ( 18 Aug 2022 03:22 )  x     / x     / 462 U/L / x     / 5.6 ng/mL  CARDIAC MARKERS ( 17 Aug 2022 18:15 )  x     / x     / x     / x     / 5.0 ng/mL  CARDIAC MARKERS ( 16 Aug 2022 22:37 )  x     / x     / 270 U/L / x     / 8.5 ng/mL        Culture - Urine (collected 16 Aug 2022 20:06)  Source: Clean Catch Clean Catch (Midstream)  Final Report (17 Aug 2022 23:27):    <10,000 CFU/mL Normal Urogenital Julianne      Urinalysis Basic - ( 16 Aug 2022 20:16 )    Color: Orange / Appearance: Turbid / S.020 / pH: x  Gluc: x / Ketone: Negative  / Bili: Negative / Urobili: <2 mg/dL   Blood: x / Protein: 300 mg/dL / Nitrite: Negative   Leuk Esterase: Large / RBC: >50 /HPF / WBC >50 /HPF   Sq Epi: x / Non Sq Epi: x / Bacteria: Many      ====================  Assessment & Plan:     Patient is 82yMale with PMHx of *** who presents with ***.     NEURO:  #Mental status: baseline A&O x3  -Currently A&O x 2- 3  -neuro exam q4h    CV:  #NSTEMI   - elevated troponins and EKG concerning for NSTEMI.   - Started on heparin gtt and s/p ASA/brillinta  - atorvastatin 80mg  - trend trop to peak  - continue CM      #systolic CHF: EF 25% with cardiogenic shock  - Echo from  showed severe mitral regurgitation, severe global LV systolic dysfunction, and RV enlargement + decreased systolic function  - hypotensive on Levophed drip  RHC showed : RA 30, wedge 30, CO 3.22, CI 1.6,Pa sat 49% on levophed  -s/p Rt groin intra aortic ballon pump and swan cath placed  - d/c  metoprolol BID  - HF consulted  -increase lasix to 80mg IBP bid  -Trend I/Os and daily weights, and Cr    #Arrythmia:   -afib with RVR in ED  - CHADS-vasc - 7 - Eliquis on hold for now while on a heparin gtt  - intermittent -140  - digoxin 125mcg  - Monitor on tele    PULM:   presented with SOB  - CT chest from  showed No pulmonary embolism, small b/l pulmonary effusions and mild pulmonary edema and a nonspecific 4 mm left upper lobe juxtapleural nodule with surrounding ground-glass opacity.   - on non-rebreather mask- > NC    RENAL:  #SANDRA:   -Cr 2.24  -possibly 2/2 hypoperfusion  -continue to monitor   - c/w lasix 80mg IV bid    -trend BMP    GI:  #Transaminitis: Elevated LFTs  - continue to monitor    #Diet:  - Dash diet  - pantoprazole 40mg     #Bowel regiment:  presents with constipation  - miralax, maalox, senna  - nonspecific bowl gas on abdominal X-ray    ENDO:  #DM2: HbA1c 8.3   - Insulin Sliding Scale  - TSH and lipid panel wnl    HEMATOLOGIC:  #CBC results show Hb stable  - continue to monitor    #DVT prophylaxis with heparin gtt    ID:  #Pt without strong objective or clinical evidence of infection. Will observe off antibiotics  -RRT on or AMS, labs drawn during the rapid remarkable for new leukocytosis to 13, lactate to 13.5, procalc 0.61.  -Elevated lactate possibly 2/2 hypoperfusion given NSTEMI vs sepsis. However, unclear source of infection (pt being treated with bactrim for UTI for positive UA but negative UCx)  -f/u blood cultures  - hold ABX for now    ====================

## 2022-08-19 NOTE — PROGRESS NOTE ADULT - ASSESSMENT
82 year old Male with PMH of HTN, HLD T2DM, JACKELIN (on BiPAP), prostate CA s/p radiation/seed implant, CVA s/p /stent (2022), AFib (on Eliquis), CAD s/p PCI/stent X3 (most recent  at [facility]), and HFrEF (EF 25%;LVIDd 6.8, severe MR, RV dysfunction and severe hypokinetic apical/lateral wall). Patient presented with c/o SOB and  abdominal distention/constipation  X10 days. Initially with elevated troponin levels and EKG concerning for NSTEMI; started on heparin gtt and s/p ASA/Brillinta. RRT called this morning for AMS/not responding to verbal commands and elevated lactate. He subsequently developed severe bradycardia/weak thready pulse and hypotension; started on Levo drip and evaluated by MICU who referred to CCU. He was sent to cath lab for RHC and IABP was placed for cardiogenic shock.       Pertinent  Labs:  Troponin- 270/462/467   Lactate- 3.2/ 4.5/6.7/12/ 13.8     BNP-  19,745  EKG- AFib subendo injury anterior wall  CXR- pulmonary edema     RHC:   RA 17  PA 60/28   PCWP 37  CO/CI 3.2/1.58  PA Sat 49.7%  SVR   (pre IABP)         RA 17   CO/CI 3.0/1.4   (mixed VO2 44.6%)  SVR 1733  (post IABP)                                               Started on Milrinone 0.25mcg/kg/min and Lasix 5mg/hr; Remains on Levo  0.05mcg/kg/min (continue to wean MAP 65)            Problem: Cardiogenic shock.   ·  Recommendation: IABP 1:1    Milrinone 0.25mcg/kg/min  Lasix 5mg/hr; urine output improving (discontinued)  Levo 0.05mcg/kg/min (wean off)  Repeat SG reading in 2 hours and as needed     Hold Beta Blocker and Dig  Strict I/O  Monitor lytes replete K>4.0 and Mg>2.0  Trend Lactate  Management per CCU team  Pending final recommendations from HF attending.   82 year old Male with PMH of HTN, HLD T2DM, JACKELIN (on BiPAP), prostate CA s/p radiation/seed implant, CVA s/p /stent (2022), AFib (on Eliquis), CAD s/p PCI/stent X3 (most recent  at [facility]), and HFrEF (EF 25%;LVIDd 6.8, severe MR, RV dysfunction and severe hypokinetic apical/lateral wall). Patient presented with c/o SOB and  abdominal distention/constipation  X10 days. Initially with elevated troponin levels and EKG concerning for NSTEMI; started on heparin gtt and s/p ASA/Brillinta. RRT called this morning for AMS/not responding to verbal commands and elevated lactate. He subsequently developed severe bradycardia/weak thready pulse and hypotension; started on Levo drip and evaluated by MICU who referred to CCU. He was sent to cath lab for RHC and IABP was placed for cardiogenic shock.       Pertinent  Labs:  Troponin- 270/462/467   Lactate- 3.2/ 4.5/6.7/12/ 13.8     BNP-  19,745  EKG- AFib subendo injury anterior wall  CXR- pulmonary edema     RHC:   RA 17  PA 60/28   PCWP 37  CO/CI 3.2/1.58  PA Sat 49.7%  SVR   (pre IABP)         RA 17   CO/CI 3.0/1.4   (mixed VO2 44.6%)  SVR 1733  (post IABP)                                               Started on Milrinone 0.25mcg/kg/min and Lasix 5mg/hr; Remains on Levo  0.05mcg/kg/min (continue to wean MAP 65)             82 year old Male with PMH of HTN, HLD T2DM, JACKELIN (on BiPAP), prostate CA s/p radiation/seed implant, CVA s/p /stent (2022), AFib (on Eliquis), CAD s/p PCI/stent X3 (most recent  at [facility] [place] [place]), and HFrEF (EF 25%;LVIDd 6.8, severe MR, RV dysfunction and severe hypokinetic apical/lateral wall). Patient presented with c/o SOB and  abdominal distention/constipation  X10 days. Initially with elevated troponin levels and EKG concerning for NSTEMI; started on heparin gtt and s/p ASA/Brillinta. RRT called this morning for AMS/not responding to verbal commands and elevated lactate. He subsequently developed severe bradycardia/weak thready pulse and hypotension; started on Levo drip and evaluated by MICU who referred to CCU. He was sent to cath lab for RHC and IABP was placed for cardiogenic shock.       Pertinent  Labs:  Troponin- 270/462/467   Lactate- 3.2/ 4.5/6.7/12/ 13.8     BNP-  19,745  EKG- AFib subendo injury anterior wall  CXR- pulmonary edema     RHC:   RA 17  PA 60/28   PCWP 37  CO/CI 3.2/1.58  PA Sat 49.7%  SVR   (pre IABP)         RA 17   CO/CI 3.0/1.4   (mixed VO2 44.6%)  SVR 1733  (post IABP)                                               Started on Milrinone 0.25mcg/kg/min and Lasix 5mg/hr; Remains on Levo  0.05mcg/kg/min (continue to wean MAP 65)       CVP 7  CO/CI 6.1/3.2   VO2 70%

## 2022-08-19 NOTE — PROGRESS NOTE ADULT - SUBJECTIVE AND OBJECTIVE BOX
Robert Zhao MD PGY1    PATIENT:  CHIDI RUBIO  [id number]    CHIEF COMPLAINT:  Patient is a 82y old  Male who presents with a chief complaint of NSTEMI (18 Aug 2022 15:04)      INTERVAL HISTORY/OVERNIGHT EVENTS: Pt's lactate and cardiac enzymes trending down. Pt did not get LHC b/c of SANDRA and only had RHC. CXR showed balloon pump was in the correct place. K+ repleted. Pt was very agitated overnight and tried to rip out balloon pump, combative. Pt got zyprexa 5mg IVP and pt was started on precedex. When examined at bedside pt was sedated.      REVIEW OF SYSTEMS:    Constitutional:     [ ] negative [ ] fevers [ ] chills [ ] weight loss [ ] weight gain  HEENT:                  [ ] negative [ ] dry eyes [ ] eye irritation [ ] postnasal drip [ ] nasal congestion  CV:                         [ ] negative  [ ] chest pain [ ] orthopnea [ ] palpitations [ ] murmur  Resp:                     [ ] negative [ ] cough [ ] shortness of breath [ ] dyspnea [ ] wheezing [ ] sputum [ ] hemoptysis  GI:                          [ ] negative [ ] nausea [ ] vomiting [ ] diarrhea [ ] constipation [ ] abd pain [ ] dysphagia   :                        [ ] negative [ ] dysuria [ ] nocturia [ ] hematuria [ ] increased urinary frequency  Musculoskeletal: [ ] negative [ ] back pain [ ] myalgias [ ] arthralgias [ ] fracture  Skin:                       [ ] negative [ ] rash [ ] itch  Neurological:        [ ] negative [ ] headache [ ] dizziness [ ] syncope [ ] weakness [ ] numbness  Psychiatric:           [ ] negative [ ] anxiety [ ] depression  Endocrine:            [ ] negative [ ] diabetes [ ] thyroid problem  Heme/Lymph:      [ ] negative [ ] anemia [ ] bleeding problem  Allergic/Immune: [ ] negative [ ] itchy eyes [ ] nasal discharge [ ] hives [ ] angioedema    [ ] All other systems negative  [X] Unable to assess ROS because sedated.    MEDICATIONS:  MEDICATIONS  (STANDING):  aspirin enteric coated 81 milliGRAM(s) Oral daily  atorvastatin 80 milliGRAM(s) Oral at bedtime  chlorhexidine 2% Cloths 1 Application(s) Topical <User Schedule>  dexMEDEtomidine Infusion 0.2 MICROgram(s)/kG/Hr (4.43 mL/Hr) IV Continuous <Continuous>  dextrose 5%. 1000 milliLiter(s) (50 mL/Hr) IV Continuous <Continuous>  dextrose 5%. 1000 milliLiter(s) (100 mL/Hr) IV Continuous <Continuous>  dextrose 50% Injectable 25 Gram(s) IV Push once  dextrose 50% Injectable 12.5 Gram(s) IV Push once  dextrose 50% Injectable 25 Gram(s) IV Push once  donepezil 10 milliGRAM(s) Oral at bedtime  fluticasone propionate 50 MICROgram(s)/spray Nasal Spray 1 Spray(s) Both Nostrils two times a day  furosemide Infusion 5 mG/Hr (2.5 mL/Hr) IV Continuous <Continuous>  glucagon  Injectable 1 milliGRAM(s) IntraMuscular once  heparin  Infusion 1000 Unit(s)/Hr (12 mL/Hr) IV Continuous <Continuous>  insulin glargine Injectable (LANTUS) 15 Unit(s) SubCutaneous at bedtime  insulin lispro (ADMELOG) corrective regimen sliding scale   SubCutaneous three times a day before meals  insulin lispro (ADMELOG) corrective regimen sliding scale   SubCutaneous at bedtime  insulin lispro Injectable (ADMELOG) 5 Unit(s) SubCutaneous three times a day before meals  ketorolac 0.5% Ophthalmic Solution 1 Drop(s) Left EYE three times a day  loratadine 10 milliGRAM(s) Oral daily  milrinone Infusion 0.25 MICROgram(s)/kG/Min (6.65 mL/Hr) IV Continuous <Continuous>  mometasone 220 MICROgram(s) Inhaler 2 Puff(s) Inhalation daily  norepinephrine Infusion 0.05 MICROgram(s)/kG/Min (8.31 mL/Hr) IV Continuous <Continuous>  pantoprazole    Tablet 40 milliGRAM(s) Oral before breakfast  polyethylene glycol 3350 17 Gram(s) Oral daily  potassium chloride  20 mEq/100 mL IVPB 20 milliEquivalent(s) IV Intermittent every 2 hours  QUEtiapine 25 milliGRAM(s) Oral once  senna 2 Tablet(s) Oral at bedtime  tamsulosin 0.4 milliGRAM(s) Oral at bedtime  ticagrelor 90 milliGRAM(s) Oral every 12 hours    MEDICATIONS  (PRN):  acetaminophen     Tablet .. 650 milliGRAM(s) Oral every 6 hours PRN Temp greater or equal to 38C (100.4F), Mild Pain (1 - 3)  aluminum hydroxide/magnesium hydroxide/simethicone Suspension 30 milliLiter(s) Oral every 4 hours PRN Dyspepsia  dextrose Oral Gel 15 Gram(s) Oral once PRN Blood Glucose LESS THAN 70 milliGRAM(s)/deciliter  melatonin 3 milliGRAM(s) Oral at bedtime PRN Insomnia      ALLERGIES:  Allergies    ceftriaxone (Urticaria)  ciprofloxacin (Rash)  schrimp (Unknown)    Intolerances      OBJECTIVE:  ICU Vital Signs Last 24 Hrs  T(C): 36.8 (19 Aug 2022 04:00), Max: 36.9 (19 Aug 2022 00:00)  T(F): 98.2 (19 Aug 2022 04:00), Max: 98.4 (19 Aug 2022 00:00)  HR: 76 (19 Aug 2022 06:00) (64 - 129)  BP: 71/59 (18 Aug 2022 11:00) (71/59 - 108/78)  BP(mean): 64 (18 Aug 2022 11:00) (64 - 68)  ABP: 101/51 (19 Aug 2022 06:00) (80/43 - 157/31)  ABP(mean): 70 (19 Aug 2022 06:00) (1 - 83)  RR: 22 (19 Aug 2022 06:00) (13 - 31)  SpO2: 100% (19 Aug 2022 06:00) (97% - 100%)    O2 Parameters below as of 19 Aug 2022 06:00  Patient On (Oxygen Delivery Method): nasal cannula  O2 Flow (L/min): 2          Adult Advanced Hemodynamics Last 24 Hrs  CVP(mm Hg): 7 (19 Aug 2022 06:00) (6 - 23)  CVP(cm H2O): --  CO: --  CI: --  PA: 39/14 (19 Aug 2022 06:00) (31/8 - 59/30)  PA(mean): 21 (19 Aug 2022 06:00) (16 - 43)  PCWP: --  SVR: --  SVRI: --  PVR: --  PVRI: --  CAPILLARY BLOOD GLUCOSE      POCT Blood Glucose.: 256 mg/dL (18 Aug 2022 21:23)  POCT Blood Glucose.: 259 mg/dL (18 Aug 2022 16:53)    CAPILLARY BLOOD GLUCOSE      POCT Blood Glucose.: 256 mg/dL (18 Aug 2022 21:23)    I&O's Summary    18 Aug 2022 07:01  -  19 Aug 2022 07:00  --------------------------------------------------------  IN: 1758.6 mL / OUT: 2170 mL / NET: -411.4 mL      Daily     Daily Weight in k.9 (19 Aug 2022 03:00)    PHYSICAL EXAMINATION:  General: sedated  HEENT: PERRLA, EOMI, moist mucous membranes  Neurology: sedated  Respiratory: CTA B/L, normal respiratory effort, no wheezes, crackles, rales  CV: RRR, S1S2, no murmurs, rubs or gallops  Abdominal: Soft, NT, ND   Extremities: No edema, + peripheral pulses  Lines: R groin has balloon pump and swan, L radial A line, and b/l peripheral IVs    LABS:  ABG - ( 19 Aug 2022 05:30 )  pH, Arterial: 7.52  pH, Blood: x     /  pCO2: 35    /  pO2: 100   / HCO3: 29    / Base Excess: 5.6   /  SaO2: 98.7                            10.3   12.10 )-----------( 234      ( 19 Aug 2022 05:30 )             30.3         142  |  103  |  48<H>  ----------------------------<  116<H>  3.1<L>   |  27  |  2.26<H>    Ca    8.3<L>      19 Aug 2022 05:30  Phos  2.1       Mg     2.20         TPro  5.5<L>  /  Alb  2.9<L>  /  TBili  0.7  /  DBili  x   /  AST  62<H>  /  ALT  51<H>  /  AlkPhos  87      LIVER FUNCTIONS - ( 19 Aug 2022 05:30 )  Alb: 2.9 g/dL / Pro: 5.5 g/dL / ALK PHOS: 87 U/L / ALT: 51 U/L / AST: 62 U/L / GGT: x           PT/INR - ( 17 Aug 2022 18:15 )   PT: 18.9 sec;   INR: 1.62 ratio         PTT - ( 19 Aug 2022 05:30 )  PTT:75.5 sec  Creatine Kinase, Serum: 425 U/L ( @ 05:30)  CKMB Units: 6.4 ng/mL ( @ 05:30)  Creatine Kinase, Serum: 428 U/L ( @ 22:00)  CKMB Units: 9.6 ng/mL ( @ 22:00)  Creatine Kinase, Serum: 446 U/L ( @ 15:30)  CKMB Units: 11.0 ng/mL ( @ 15:30)  Creatine Kinase, Serum: 467 U/L ( @ 11:40)  CKMB Units: 9.4 ng/mL ( @ 11:40)    CARDIAC MARKERS ( 19 Aug 2022 05:30 )  x     / x     / 425 U/L / x     / 6.4 ng/mL  CARDIAC MARKERS ( 18 Aug 2022 22:00 )  x     / x     / 428 U/L / x     / 9.6 ng/mL  CARDIAC MARKERS ( 18 Aug 2022 15:30 )  x     / x     / 446 U/L / x     / 11.0 ng/mL  CARDIAC MARKERS ( 18 Aug 2022 11:40 )  x     / x     / 467 U/L / x     / 9.4 ng/mL  CARDIAC MARKERS ( 18 Aug 2022 03:22 )  x     / x     / 462 U/L / x     / 5.6 ng/mL  CARDIAC MARKERS ( 17 Aug 2022 18:15 )  x     / x     / x     / x     / 5.0 ng/mL          TELEMETRY:     EKG:     IMAGING:       Robert Zhao MD PGY1    PATIENT:  CHIDI RUBIO  [id number]    CHIEF COMPLAINT:  Patient is a 82y old  Male who presents with a chief complaint of NSTEMI (18 Aug 2022 15:04)      INTERVAL HISTORY/OVERNIGHT EVENTS: Pt's lactate and cardiac enzymes trending down. Pt did not get LHC b/c of SANDRA and only had RHC. CXR showed balloon pump was in the correct place. K+ repleted. Pt was very agitated overnight and tried to rip out balloon pump, combative. Pt got zyprexa 5mg IVP and pt was started on precedex. When examined at bedside pt was somewhat sedated and was difficult to understand. Pt did not have any acute complaints.      REVIEW OF SYSTEMS:    Constitutional:     [ ] negative [ ] fevers [ ] chills [ ] weight loss [ ] weight gain  HEENT:                  [ ] negative [ ] dry eyes [ ] eye irritation [ ] postnasal drip [ ] nasal congestion  CV:                         [ ] negative  [ ] chest pain [ ] orthopnea [ ] palpitations [ ] murmur  Resp:                     [ ] negative [ ] cough [ ] shortness of breath [ ] dyspnea [ ] wheezing [ ] sputum [ ] hemoptysis  GI:                          [ ] negative [ ] nausea [ ] vomiting [ ] diarrhea [ ] constipation [ ] abd pain [ ] dysphagia   :                        [ ] negative [ ] dysuria [ ] nocturia [ ] hematuria [ ] increased urinary frequency  Musculoskeletal: [ ] negative [ ] back pain [ ] myalgias [ ] arthralgias [ ] fracture  Skin:                       [ ] negative [ ] rash [ ] itch  Neurological:        [ ] negative [ ] headache [ ] dizziness [ ] syncope [ ] weakness [ ] numbness  Psychiatric:           [ ] negative [ ] anxiety [ ] depression  Endocrine:            [ ] negative [ ] diabetes [ ] thyroid problem  Heme/Lymph:      [ ] negative [ ] anemia [ ] bleeding problem  Allergic/Immune: [ ] negative [ ] itchy eyes [ ] nasal discharge [ ] hives [ ] angioedema    [ ] All other systems negative  [X] Unable to assess ROS because sedated.    MEDICATIONS:  MEDICATIONS  (STANDING):  aspirin enteric coated 81 milliGRAM(s) Oral daily  atorvastatin 80 milliGRAM(s) Oral at bedtime  chlorhexidine 2% Cloths 1 Application(s) Topical <User Schedule>  dexMEDEtomidine Infusion 0.2 MICROgram(s)/kG/Hr (4.43 mL/Hr) IV Continuous <Continuous>  dextrose 5%. 1000 milliLiter(s) (50 mL/Hr) IV Continuous <Continuous>  dextrose 5%. 1000 milliLiter(s) (100 mL/Hr) IV Continuous <Continuous>  dextrose 50% Injectable 25 Gram(s) IV Push once  dextrose 50% Injectable 12.5 Gram(s) IV Push once  dextrose 50% Injectable 25 Gram(s) IV Push once  donepezil 10 milliGRAM(s) Oral at bedtime  fluticasone propionate 50 MICROgram(s)/spray Nasal Spray 1 Spray(s) Both Nostrils two times a day  furosemide Infusion 5 mG/Hr (2.5 mL/Hr) IV Continuous <Continuous>  glucagon  Injectable 1 milliGRAM(s) IntraMuscular once  heparin  Infusion 1000 Unit(s)/Hr (12 mL/Hr) IV Continuous <Continuous>  insulin glargine Injectable (LANTUS) 15 Unit(s) SubCutaneous at bedtime  insulin lispro (ADMELOG) corrective regimen sliding scale   SubCutaneous three times a day before meals  insulin lispro (ADMELOG) corrective regimen sliding scale   SubCutaneous at bedtime  insulin lispro Injectable (ADMELOG) 5 Unit(s) SubCutaneous three times a day before meals  ketorolac 0.5% Ophthalmic Solution 1 Drop(s) Left EYE three times a day  loratadine 10 milliGRAM(s) Oral daily  milrinone Infusion 0.25 MICROgram(s)/kG/Min (6.65 mL/Hr) IV Continuous <Continuous>  mometasone 220 MICROgram(s) Inhaler 2 Puff(s) Inhalation daily  norepinephrine Infusion 0.05 MICROgram(s)/kG/Min (8.31 mL/Hr) IV Continuous <Continuous>  pantoprazole    Tablet 40 milliGRAM(s) Oral before breakfast  polyethylene glycol 3350 17 Gram(s) Oral daily  potassium chloride  20 mEq/100 mL IVPB 20 milliEquivalent(s) IV Intermittent every 2 hours  QUEtiapine 25 milliGRAM(s) Oral once  senna 2 Tablet(s) Oral at bedtime  tamsulosin 0.4 milliGRAM(s) Oral at bedtime  ticagrelor 90 milliGRAM(s) Oral every 12 hours    MEDICATIONS  (PRN):  acetaminophen     Tablet .. 650 milliGRAM(s) Oral every 6 hours PRN Temp greater or equal to 38C (100.4F), Mild Pain (1 - 3)  aluminum hydroxide/magnesium hydroxide/simethicone Suspension 30 milliLiter(s) Oral every 4 hours PRN Dyspepsia  dextrose Oral Gel 15 Gram(s) Oral once PRN Blood Glucose LESS THAN 70 milliGRAM(s)/deciliter  melatonin 3 milliGRAM(s) Oral at bedtime PRN Insomnia      ALLERGIES:  Allergies    ceftriaxone (Urticaria)  ciprofloxacin (Rash)  schrimp (Unknown)    Intolerances      OBJECTIVE:  ICU Vital Signs Last 24 Hrs  T(C): 36.8 (19 Aug 2022 04:00), Max: 36.9 (19 Aug 2022 00:00)  T(F): 98.2 (19 Aug 2022 04:00), Max: 98.4 (19 Aug 2022 00:00)  HR: 76 (19 Aug 2022 06:00) (64 - 129)  BP: 71/59 (18 Aug 2022 11:00) (71/59 - 108/78)  BP(mean): 64 (18 Aug 2022 11:00) (64 - 68)  ABP: 101/51 (19 Aug 2022 06:00) (80/43 - 157/31)  ABP(mean): 70 (19 Aug 2022 06:00) (1 - 83)  RR: 22 (19 Aug 2022 06:00) (13 - 31)  SpO2: 100% (19 Aug 2022 06:00) (97% - 100%)    O2 Parameters below as of 19 Aug 2022 06:00  Patient On (Oxygen Delivery Method): nasal cannula  O2 Flow (L/min): 2          Adult Advanced Hemodynamics Last 24 Hrs  CVP(mm Hg): 7 (19 Aug 2022 06:00) (6 - 23)  CVP(cm H2O): --  CO: --  CI: --  PA: 39/14 (19 Aug 2022 06:00) (31/8 - 59/30)  PA(mean): 21 (19 Aug 2022 06:00) (16 - 43)  PCWP: --  SVR: --  SVRI: --  PVR: --  PVRI: --  CAPILLARY BLOOD GLUCOSE      POCT Blood Glucose.: 256 mg/dL (18 Aug 2022 21:23)  POCT Blood Glucose.: 259 mg/dL (18 Aug 2022 16:53)    CAPILLARY BLOOD GLUCOSE      POCT Blood Glucose.: 256 mg/dL (18 Aug 2022 21:23)    I&O's Summary    18 Aug 2022 07:01  -  19 Aug 2022 07:00  --------------------------------------------------------  IN: 1758.6 mL / OUT: 2170 mL / NET: -411.4 mL      Daily     Daily Weight in k.9 (19 Aug 2022 03:00)    PHYSICAL EXAMINATION:  General: mildly sedated  HEENT: PERRLA, EOMI, moist mucous membranes  Neurology: A&Ox1  Respiratory: CTA B/L, normal respiratory effort, no wheezes, crackles, rales  CV: RRR, S1S2, no murmurs, rubs or gallops  Abdominal: Soft, NT, ND   Extremities: No edema, + peripheral pulses  Lines: R groin has balloon pump and swan, L radial A line, and b/l peripheral IVs    LABS:  ABG - ( 19 Aug 2022 05:30 )  pH, Arterial: 7.52  pH, Blood: x     /  pCO2: 35    /  pO2: 100   / HCO3: 29    / Base Excess: 5.6   /  SaO2: 98.7                            10.3   12.10 )-----------( 234      ( 19 Aug 2022 05:30 )             30.3         142  |  103  |  48<H>  ----------------------------<  116<H>  3.1<L>   |  27  |  2.26<H>    Ca    8.3<L>      19 Aug 2022 05:30  Phos  2.1       Mg     2.20         TPro  5.5<L>  /  Alb  2.9<L>  /  TBili  0.7  /  DBili  x   /  AST  62<H>  /  ALT  51<H>  /  AlkPhos  87      LIVER FUNCTIONS - ( 19 Aug 2022 05:30 )  Alb: 2.9 g/dL / Pro: 5.5 g/dL / ALK PHOS: 87 U/L / ALT: 51 U/L / AST: 62 U/L / GGT: x           PT/INR - ( 17 Aug 2022 18:15 )   PT: 18.9 sec;   INR: 1.62 ratio         PTT - ( 19 Aug 2022 05:30 )  PTT:75.5 sec  Creatine Kinase, Serum: 425 U/L ( @ 05:30)  CKMB Units: 6.4 ng/mL ( @ 05:30)  Creatine Kinase, Serum: 428 U/L ( @ 22:00)  CKMB Units: 9.6 ng/mL ( @ 22:00)  Creatine Kinase, Serum: 446 U/L ( @ 15:30)  CKMB Units: 11.0 ng/mL ( @ 15:30)  Creatine Kinase, Serum: 467 U/L ( @ 11:40)  CKMB Units: 9.4 ng/mL ( @ 11:40)    CARDIAC MARKERS ( 19 Aug 2022 05:30 )  x     / x     / 425 U/L / x     / 6.4 ng/mL  CARDIAC MARKERS ( 18 Aug 2022 22:00 )  x     / x     / 428 U/L / x     / 9.6 ng/mL  CARDIAC MARKERS ( 18 Aug 2022 15:30 )  x     / x     / 446 U/L / x     / 11.0 ng/mL  CARDIAC MARKERS ( 18 Aug 2022 11:40 )  x     / x     / 467 U/L / x     / 9.4 ng/mL  CARDIAC MARKERS ( 18 Aug 2022 03:22 )  x     / x     / 462 U/L / x     / 5.6 ng/mL  CARDIAC MARKERS ( 17 Aug 2022 18:15 )  x     / x     / x     / x     / 5.0 ng/mL          TELEMETRY:     EKG:     IMAGING:       Robert Zhao MD PGY1    PATIENT:  CHIDI RUBIO  [id number]    CHIEF COMPLAINT:  Patient is a 82y old  Male who presents with a chief complaint of NSTEMI (18 Aug 2022 15:04)      INTERVAL HISTORY/OVERNIGHT EVENTS: Pt's lactate and cardiac enzymes trending down. Pt did not get LHC b/c of SANDRA and only had RHC. CXR showed balloon pump was in the correct place. K+ repleted. Vikki CO 5.4 CI 2.6, CVP 7, MV02 65.9, . Pt was very agitated overnight and tried to rip out balloon pump, combative. Pt got zyprexa 5mg IVP and pt was started on precedex. When examined at bedside pt was somewhat sedated and was difficult to understand. Pt did not have any acute complaints.      REVIEW OF SYSTEMS:    Constitutional:     [ ] negative [ ] fevers [ ] chills [ ] weight loss [ ] weight gain  HEENT:                  [ ] negative [ ] dry eyes [ ] eye irritation [ ] postnasal drip [ ] nasal congestion  CV:                         [ ] negative  [ ] chest pain [ ] orthopnea [ ] palpitations [ ] murmur  Resp:                     [ ] negative [ ] cough [ ] shortness of breath [ ] dyspnea [ ] wheezing [ ] sputum [ ] hemoptysis  GI:                          [ ] negative [ ] nausea [ ] vomiting [ ] diarrhea [ ] constipation [ ] abd pain [ ] dysphagia   :                        [ ] negative [ ] dysuria [ ] nocturia [ ] hematuria [ ] increased urinary frequency  Musculoskeletal: [ ] negative [ ] back pain [ ] myalgias [ ] arthralgias [ ] fracture  Skin:                       [ ] negative [ ] rash [ ] itch  Neurological:        [ ] negative [ ] headache [ ] dizziness [ ] syncope [ ] weakness [ ] numbness  Psychiatric:           [ ] negative [ ] anxiety [ ] depression  Endocrine:            [ ] negative [ ] diabetes [ ] thyroid problem  Heme/Lymph:      [ ] negative [ ] anemia [ ] bleeding problem  Allergic/Immune: [ ] negative [ ] itchy eyes [ ] nasal discharge [ ] hives [ ] angioedema    [ ] All other systems negative  [X] Unable to assess ROS because sedated.    MEDICATIONS:  MEDICATIONS  (STANDING):  aspirin enteric coated 81 milliGRAM(s) Oral daily  atorvastatin 80 milliGRAM(s) Oral at bedtime  chlorhexidine 2% Cloths 1 Application(s) Topical <User Schedule>  dexMEDEtomidine Infusion 0.2 MICROgram(s)/kG/Hr (4.43 mL/Hr) IV Continuous <Continuous>  dextrose 5%. 1000 milliLiter(s) (50 mL/Hr) IV Continuous <Continuous>  dextrose 5%. 1000 milliLiter(s) (100 mL/Hr) IV Continuous <Continuous>  dextrose 50% Injectable 25 Gram(s) IV Push once  dextrose 50% Injectable 12.5 Gram(s) IV Push once  dextrose 50% Injectable 25 Gram(s) IV Push once  donepezil 10 milliGRAM(s) Oral at bedtime  fluticasone propionate 50 MICROgram(s)/spray Nasal Spray 1 Spray(s) Both Nostrils two times a day  furosemide Infusion 5 mG/Hr (2.5 mL/Hr) IV Continuous <Continuous>  glucagon  Injectable 1 milliGRAM(s) IntraMuscular once  heparin  Infusion 1000 Unit(s)/Hr (12 mL/Hr) IV Continuous <Continuous>  insulin glargine Injectable (LANTUS) 15 Unit(s) SubCutaneous at bedtime  insulin lispro (ADMELOG) corrective regimen sliding scale   SubCutaneous three times a day before meals  insulin lispro (ADMELOG) corrective regimen sliding scale   SubCutaneous at bedtime  insulin lispro Injectable (ADMELOG) 5 Unit(s) SubCutaneous three times a day before meals  ketorolac 0.5% Ophthalmic Solution 1 Drop(s) Left EYE three times a day  loratadine 10 milliGRAM(s) Oral daily  milrinone Infusion 0.25 MICROgram(s)/kG/Min (6.65 mL/Hr) IV Continuous <Continuous>  mometasone 220 MICROgram(s) Inhaler 2 Puff(s) Inhalation daily  norepinephrine Infusion 0.05 MICROgram(s)/kG/Min (8.31 mL/Hr) IV Continuous <Continuous>  pantoprazole    Tablet 40 milliGRAM(s) Oral before breakfast  polyethylene glycol 3350 17 Gram(s) Oral daily  potassium chloride  20 mEq/100 mL IVPB 20 milliEquivalent(s) IV Intermittent every 2 hours  QUEtiapine 25 milliGRAM(s) Oral once  senna 2 Tablet(s) Oral at bedtime  tamsulosin 0.4 milliGRAM(s) Oral at bedtime  ticagrelor 90 milliGRAM(s) Oral every 12 hours    MEDICATIONS  (PRN):  acetaminophen     Tablet .. 650 milliGRAM(s) Oral every 6 hours PRN Temp greater or equal to 38C (100.4F), Mild Pain (1 - 3)  aluminum hydroxide/magnesium hydroxide/simethicone Suspension 30 milliLiter(s) Oral every 4 hours PRN Dyspepsia  dextrose Oral Gel 15 Gram(s) Oral once PRN Blood Glucose LESS THAN 70 milliGRAM(s)/deciliter  melatonin 3 milliGRAM(s) Oral at bedtime PRN Insomnia      ALLERGIES:  Allergies    ceftriaxone (Urticaria)  ciprofloxacin (Rash)  schrimp (Unknown)    Intolerances      OBJECTIVE:  ICU Vital Signs Last 24 Hrs  T(C): 36.8 (19 Aug 2022 04:00), Max: 36.9 (19 Aug 2022 00:00)  T(F): 98.2 (19 Aug 2022 04:00), Max: 98.4 (19 Aug 2022 00:00)  HR: 76 (19 Aug 2022 06:00) (64 - 129)  BP: 71/59 (18 Aug 2022 11:00) (71/59 - 108/78)  BP(mean): 64 (18 Aug 2022 11:00) (64 - 68)  ABP: 101/51 (19 Aug 2022 06:00) (80/43 - 157/31)  ABP(mean): 70 (19 Aug 2022 06:00) (1 - 83)  RR: 22 (19 Aug 2022 06:00) (13 - 31)  SpO2: 100% (19 Aug 2022 06:00) (97% - 100%)    O2 Parameters below as of 19 Aug 2022 06:00  Patient On (Oxygen Delivery Method): nasal cannula  O2 Flow (L/min): 2          Adult Advanced Hemodynamics Last 24 Hrs  CVP(mm Hg): 7 (19 Aug 2022 06:00) (6 - 23)  CVP(cm H2O): --  CO: --  CI: --  PA: 39/14 (19 Aug 2022 06:00) (31/8 - 59/30)  PA(mean): 21 (19 Aug 2022 06:00) (16 - 43)  PCWP: --  SVR: --  SVRI: --  PVR: --  PVRI: --  CAPILLARY BLOOD GLUCOSE      POCT Blood Glucose.: 256 mg/dL (18 Aug 2022 21:23)  POCT Blood Glucose.: 259 mg/dL (18 Aug 2022 16:53)    CAPILLARY BLOOD GLUCOSE      POCT Blood Glucose.: 256 mg/dL (18 Aug 2022 21:23)    I&O's Summary    18 Aug 2022 07:01  -  19 Aug 2022 07:00  --------------------------------------------------------  IN: 1758.6 mL / OUT: 2170 mL / NET: -411.4 mL      Daily     Daily Weight in k.9 (19 Aug 2022 03:00)    PHYSICAL EXAMINATION:  General: mildly sedated  HEENT: PERRLA, EOMI, moist mucous membranes  Neurology: A&Ox1  Respiratory: CTA B/L, normal respiratory effort, no wheezes, crackles, rales  CV: RRR, S1S2, no murmurs, rubs or gallops  Abdominal: Soft, NT, ND   Extremities: No edema, + peripheral pulses  Lines: R groin has balloon pump and swan, L radial A line, and b/l peripheral IVs    LABS:  ABG - ( 19 Aug 2022 05:30 )  pH, Arterial: 7.52  pH, Blood: x     /  pCO2: 35    /  pO2: 100   / HCO3: 29    / Base Excess: 5.6   /  SaO2: 98.7                            10.3   12.10 )-----------( 234      ( 19 Aug 2022 05:30 )             30.3     -    142  |  103  |  48<H>  ----------------------------<  116<H>  3.1<L>   |  27  |  2.26<H>    Ca    8.3<L>      19 Aug 2022 05:30  Phos  2.1       Mg     2.20         TPro  5.5<L>  /  Alb  2.9<L>  /  TBili  0.7  /  DBili  x   /  AST  62<H>  /  ALT  51<H>  /  AlkPhos  87      LIVER FUNCTIONS - ( 19 Aug 2022 05:30 )  Alb: 2.9 g/dL / Pro: 5.5 g/dL / ALK PHOS: 87 U/L / ALT: 51 U/L / AST: 62 U/L / GGT: x           PT/INR - ( 17 Aug 2022 18:15 )   PT: 18.9 sec;   INR: 1.62 ratio         PTT - ( 19 Aug 2022 05:30 )  PTT:75.5 sec  Creatine Kinase, Serum: 425 U/L ( @ 05:30)  CKMB Units: 6.4 ng/mL ( @ 05:30)  Creatine Kinase, Serum: 428 U/L ( @ 22:00)  CKMB Units: 9.6 ng/mL ( @ 22:00)  Creatine Kinase, Serum: 446 U/L ( @ 15:30)  CKMB Units: 11.0 ng/mL ( @ 15:30)  Creatine Kinase, Serum: 467 U/L ( @ 11:40)  CKMB Units: 9.4 ng/mL ( @ 11:40)    CARDIAC MARKERS ( 19 Aug 2022 05:30 )  x     / x     / 425 U/L / x     / 6.4 ng/mL  CARDIAC MARKERS ( 18 Aug 2022 22:00 )  x     / x     / 428 U/L / x     / 9.6 ng/mL  CARDIAC MARKERS ( 18 Aug 2022 15:30 )  x     / x     / 446 U/L / x     / 11.0 ng/mL  CARDIAC MARKERS ( 18 Aug 2022 11:40 )  x     / x     / 467 U/L / x     / 9.4 ng/mL  CARDIAC MARKERS ( 18 Aug 2022 03:22 )  x     / x     / 462 U/L / x     / 5.6 ng/mL  CARDIAC MARKERS ( 17 Aug 2022 18:15 )  x     / x     / x     / x     / 5.0 ng/mL          TELEMETRY:     EKG:     IMAGING:       Robert Zhao MD PGY1    PATIENT:  CHIDI RUBIO  [id number]    CHIEF COMPLAINT:  Patient is a 82y old  Male who presents with a chief complaint of NSTEMI (18 Aug 2022 15:04)      INTERVAL HISTORY/OVERNIGHT EVENTS: Pt's lactate and cardiac enzymes trending down. Pt did not get LHC b/c of SANDRA and only had RHC. CXR showed balloon pump was in the correct place. K+ repleted. Vikki CO 5.4 CI 2.6, CVP 7, MV02 65.9, . Pt was very agitated overnight and tried to rip out balloon pump, combative. Pt got zyprexa 5mg IVP and pt was started on precedex. When examined at bedside pt was somewhat sedated and was difficult to understand. Pt did not have any acute complaints.    MEDICATIONS:  MEDICATIONS  (STANDING):  aspirin enteric coated 81 milliGRAM(s) Oral daily  atorvastatin 80 milliGRAM(s) Oral at bedtime  chlorhexidine 2% Cloths 1 Application(s) Topical <User Schedule>  dexMEDEtomidine Infusion 0.2 MICROgram(s)/kG/Hr (4.43 mL/Hr) IV Continuous <Continuous>  dextrose 5%. 1000 milliLiter(s) (50 mL/Hr) IV Continuous <Continuous>  dextrose 5%. 1000 milliLiter(s) (100 mL/Hr) IV Continuous <Continuous>  dextrose 50% Injectable 25 Gram(s) IV Push once  dextrose 50% Injectable 12.5 Gram(s) IV Push once  dextrose 50% Injectable 25 Gram(s) IV Push once  donepezil 10 milliGRAM(s) Oral at bedtime  fluticasone propionate 50 MICROgram(s)/spray Nasal Spray 1 Spray(s) Both Nostrils two times a day  furosemide Infusion 5 mG/Hr (2.5 mL/Hr) IV Continuous <Continuous>  glucagon  Injectable 1 milliGRAM(s) IntraMuscular once  heparin  Infusion 1000 Unit(s)/Hr (12 mL/Hr) IV Continuous <Continuous>  insulin glargine Injectable (LANTUS) 15 Unit(s) SubCutaneous at bedtime  insulin lispro (ADMELOG) corrective regimen sliding scale   SubCutaneous three times a day before meals  insulin lispro (ADMELOG) corrective regimen sliding scale   SubCutaneous at bedtime  insulin lispro Injectable (ADMELOG) 5 Unit(s) SubCutaneous three times a day before meals  ketorolac 0.5% Ophthalmic Solution 1 Drop(s) Left EYE three times a day  loratadine 10 milliGRAM(s) Oral daily  milrinone Infusion 0.25 MICROgram(s)/kG/Min (6.65 mL/Hr) IV Continuous <Continuous>  mometasone 220 MICROgram(s) Inhaler 2 Puff(s) Inhalation daily  norepinephrine Infusion 0.05 MICROgram(s)/kG/Min (8.31 mL/Hr) IV Continuous <Continuous>  pantoprazole    Tablet 40 milliGRAM(s) Oral before breakfast  polyethylene glycol 3350 17 Gram(s) Oral daily  potassium chloride  20 mEq/100 mL IVPB 20 milliEquivalent(s) IV Intermittent every 2 hours  QUEtiapine 25 milliGRAM(s) Oral once  senna 2 Tablet(s) Oral at bedtime  tamsulosin 0.4 milliGRAM(s) Oral at bedtime  ticagrelor 90 milliGRAM(s) Oral every 12 hours    MEDICATIONS  (PRN):  acetaminophen     Tablet .. 650 milliGRAM(s) Oral every 6 hours PRN Temp greater or equal to 38C (100.4F), Mild Pain (1 - 3)  aluminum hydroxide/magnesium hydroxide/simethicone Suspension 30 milliLiter(s) Oral every 4 hours PRN Dyspepsia  dextrose Oral Gel 15 Gram(s) Oral once PRN Blood Glucose LESS THAN 70 milliGRAM(s)/deciliter  melatonin 3 milliGRAM(s) Oral at bedtime PRN Insomnia      ALLERGIES:  Allergies    ceftriaxone (Urticaria)  ciprofloxacin (Rash)  schrimp (Unknown)    Intolerances      OBJECTIVE:  ICU Vital Signs Last 24 Hrs  T(C): 36.8 (19 Aug 2022 04:00), Max: 36.9 (19 Aug 2022 00:00)  T(F): 98.2 (19 Aug 2022 04:00), Max: 98.4 (19 Aug 2022 00:00)  HR: 76 (19 Aug 2022 06:00) (64 - 129)  BP: 71/59 (18 Aug 2022 11:00) (71/59 - 108/78)  BP(mean): 64 (18 Aug 2022 11:00) (64 - 68)  ABP: 101/51 (19 Aug 2022 06:00) (80/43 - 157/31)  ABP(mean): 70 (19 Aug 2022 06:00) (1 - 83)  RR: 22 (19 Aug 2022 06:00) (13 - 31)  SpO2: 100% (19 Aug 2022 06:00) (97% - 100%)    O2 Parameters below as of 19 Aug 2022 06:00  Patient On (Oxygen Delivery Method): nasal cannula  O2 Flow (L/min): 2          Adult Advanced Hemodynamics Last 24 Hrs  CVP(mm Hg): 7 (19 Aug 2022 06:00) (6 - 23)  CVP(cm H2O): --  CO: --  CI: --  PA: 39/14 (19 Aug 2022 06:00) (31/8 - 59/30)  PA(mean): 21 (19 Aug 2022 06:00) (16 - 43)  PCWP: --  SVR: --  SVRI: --  PVR: --  PVRI: --  CAPILLARY BLOOD GLUCOSE      POCT Blood Glucose.: 256 mg/dL (18 Aug 2022 21:23)  POCT Blood Glucose.: 259 mg/dL (18 Aug 2022 16:53)    CAPILLARY BLOOD GLUCOSE      POCT Blood Glucose.: 256 mg/dL (18 Aug 2022 21:23)    I&O's Summary    18 Aug 2022 07:01  -  19 Aug 2022 07:00  --------------------------------------------------------  IN: 1758.6 mL / OUT: 2170 mL / NET: -411.4 mL      Daily     Daily Weight in k.9 (19 Aug 2022 03:00)    PHYSICAL EXAMINATION:  General: mildly sedated  HEENT: PERRLA, EOMI, moist mucous membranes  Neurology: A&Ox1  Respiratory: CTA B/L, normal respiratory effort, no wheezes, crackles, rales  CV: RRR, S1S2, no murmurs, rubs or gallops  Abdominal: Soft, NT, ND   Extremities: No edema, + peripheral pulses  Lines: R groin has balloon pump and swan, L radial A line, and b/l peripheral IVs    LABS:  ABG - ( 19 Aug 2022 05:30 )  pH, Arterial: 7.52  pH, Blood: x     /  pCO2: 35    /  pO2: 100   / HCO3: 29    / Base Excess: 5.6   /  SaO2: 98.7                            10.3   12.10 )-----------( 234      ( 19 Aug 2022 05:30 )             30.3         142  |  103  |  48<H>  ----------------------------<  116<H>  3.1<L>   |  27  |  2.26<H>    Ca    8.3<L>      19 Aug 2022 05:30  Phos  2.1       Mg     2.20         TPro  5.5<L>  /  Alb  2.9<L>  /  TBili  0.7  /  DBili  x   /  AST  62<H>  /  ALT  51<H>  /  AlkPhos  87      LIVER FUNCTIONS - ( 19 Aug 2022 05:30 )  Alb: 2.9 g/dL / Pro: 5.5 g/dL / ALK PHOS: 87 U/L / ALT: 51 U/L / AST: 62 U/L / GGT: x           PT/INR - ( 17 Aug 2022 18:15 )   PT: 18.9 sec;   INR: 1.62 ratio         PTT - ( 19 Aug 2022 05:30 )  PTT:75.5 sec  Creatine Kinase, Serum: 425 U/L ( @ 05:30)  CKMB Units: 6.4 ng/mL ( @ 05:30)  Creatine Kinase, Serum: 428 U/L ( @ 22:00)  CKMB Units: 9.6 ng/mL ( @ 22:00)  Creatine Kinase, Serum: 446 U/L ( @ 15:30)  CKMB Units: 11.0 ng/mL ( @ 15:30)  Creatine Kinase, Serum: 467 U/L ( @ 11:40)  CKMB Units: 9.4 ng/mL ( @ 11:40)    CARDIAC MARKERS ( 19 Aug 2022 05:30 )  x     / x     / 425 U/L / x     / 6.4 ng/mL  CARDIAC MARKERS ( 18 Aug 2022 22:00 )  x     / x     / 428 U/L / x     / 9.6 ng/mL  CARDIAC MARKERS ( 18 Aug 2022 15:30 )  x     / x     / 446 U/L / x     / 11.0 ng/mL  CARDIAC MARKERS ( 18 Aug 2022 11:40 )  x     / x     / 467 U/L / x     / 9.4 ng/mL  CARDIAC MARKERS ( 18 Aug 2022 03:22 )  x     / x     / 462 U/L / x     / 5.6 ng/mL  CARDIAC MARKERS ( 17 Aug 2022 18:15 )  x     / x     / x     / x     / 5.0 ng/mL          TELEMETRY:     EKG:     IMAGING:

## 2022-08-19 NOTE — PROGRESS NOTE ADULT - SUBJECTIVE AND OBJECTIVE BOX
Interval History:    Medications:  acetaminophen     Tablet .. 650 milliGRAM(s) Oral every 6 hours PRN  aluminum hydroxide/magnesium hydroxide/simethicone Suspension 30 milliLiter(s) Oral every 4 hours PRN  aspirin enteric coated 81 milliGRAM(s) Oral daily  atorvastatin 80 milliGRAM(s) Oral at bedtime  cefepime   IVPB 1000 milliGRAM(s) IV Intermittent daily  chlorhexidine 2% Cloths 1 Application(s) Topical <User Schedule>  dextrose 5%. 1000 milliLiter(s) IV Continuous <Continuous>  dextrose 5%. 1000 milliLiter(s) IV Continuous <Continuous>  dextrose 50% Injectable 25 Gram(s) IV Push once  dextrose 50% Injectable 12.5 Gram(s) IV Push once  dextrose 50% Injectable 25 Gram(s) IV Push once  dextrose Oral Gel 15 Gram(s) Oral once PRN  donepezil 10 milliGRAM(s) Oral at bedtime  fluticasone propionate 50 MICROgram(s)/spray Nasal Spray 1 Spray(s) Both Nostrils two times a day  glucagon  Injectable 1 milliGRAM(s) IntraMuscular once  haloperidol     Tablet 0.5 milliGRAM(s) Oral daily  heparin  Infusion 1000 Unit(s)/Hr IV Continuous <Continuous>  insulin glargine Injectable (LANTUS) 15 Unit(s) SubCutaneous at bedtime  insulin lispro (ADMELOG) corrective regimen sliding scale   SubCutaneous three times a day before meals  insulin lispro (ADMELOG) corrective regimen sliding scale   SubCutaneous at bedtime  insulin lispro Injectable (ADMELOG) 5 Unit(s) SubCutaneous three times a day before meals  ketorolac 0.5% Ophthalmic Solution 1 Drop(s) Left EYE three times a day  loratadine 10 milliGRAM(s) Oral daily  melatonin 3 milliGRAM(s) Oral at bedtime PRN  milrinone Infusion 0.25 MICROgram(s)/kG/Min IV Continuous <Continuous>  mometasone 220 MICROgram(s) Inhaler 2 Puff(s) Inhalation daily  norepinephrine Infusion 0.05 MICROgram(s)/kG/Min IV Continuous <Continuous>  pantoprazole    Tablet 40 milliGRAM(s) Oral before breakfast  polyethylene glycol 3350 17 Gram(s) Oral daily  potassium chloride   Powder 40 milliEquivalent(s) Oral two times a day  QUEtiapine 25 milliGRAM(s) Oral once  senna 2 Tablet(s) Oral at bedtime  tamsulosin 0.4 milliGRAM(s) Oral at bedtime  ticagrelor 90 milliGRAM(s) Oral every 12 hours      Vitals:  T(C): 36.3 (22 @ 08:00), Max: 36.9 (22 @ 00:00)  HR: 71 (22 @ 10:00) (64 - 129)  BP: 71/59 (22 @ 11:00) (71/59 - 71/59)  BP(mean): 64 (22 @ 11:00) (64 - 64)  RR: 21 (22 @ 10:00) (13 - 31)  SpO2: 100% (22 @ 10:00) (97% - 100%)    Daily     Daily Weight in k.9 (19 Aug 2022 03:00)        I&O's Summary    18 Aug 2022 07:  -  19 Aug 2022 07:00  --------------------------------------------------------  IN: 1758.6 mL / OUT: 2170 mL / NET: -411.4 mL    19 Aug 2022 07:01  -  19 Aug 2022 10:57  --------------------------------------------------------  IN: 171.6 mL / OUT: 850 mL / NET: -678.4 mL        Physical Exam:  Appearance: No Acute Distress  Neck: No JVD  Cardiovascular: Normal S1 S2  Respiratory: Clear to auscultation bilaterally  Gastrointestinal: Soft, Non-tender	  Skin: No cyanosis	  Neurologic: Non-focal  Extremities: No LE edema  Psychiatry: A & O x 3, Mood & affect appropriate    Labs:                        10.3   12.10 )-----------( 234      ( 19 Aug 2022 05:30 )             30.3         142  |  103  |  48<H>  ----------------------------<  116<H>  3.1<L>   |  27  |  2.26<H>    Ca    8.3<L>      19 Aug 2022 05:30  Phos  2.1       Mg     2.20         TPro  5.5<L>  /  Alb  2.9<L>  /  TBili  0.7  /  DBili  x   /  AST  62<H>  /  ALT  51<H>  /  AlkPhos  87  0819    PT/INR - ( 17 Aug 2022 18:15 )   PT: 18.9 sec;   INR: 1.62 ratio         PTT - ( 19 Aug 2022 05:30 )  PTT:75.5 sec  CARDIAC MARKERS ( 19 Aug 2022 05:30 )  x     / x     / 425 U/L / x     / 6.4 ng/mL  CARDIAC MARKERS ( 18 Aug 2022 22:00 )  x     / x     / 428 U/L / x     / 9.6 ng/mL  CARDIAC MARKERS ( 18 Aug 2022 15:30 )  x     / x     / 446 U/L / x     / 11.0 ng/mL  CARDIAC MARKERS ( 18 Aug 2022 11:40 )  x     / x     / 467 U/L / x     / 9.4 ng/mL  CARDIAC MARKERS ( 18 Aug 2022 03:22 )  x     / x     / 462 U/L / x     / 5.6 ng/mL  CARDIAC MARKERS ( 17 Aug 2022 18:15 )  x     / x     / x     / x     / 5.0 ng/mL        TELEMETRY: Sinus Rhythm     Echocardiogram:  < from: TTE with Doppler (w/Cont) (22 @ 19:13) >  DIMENSIONS:  Dimensions:     Normal Values:  LA:     3.8 cm    2.0 - 4.0 cm  Ao:     3.0 cm    2.0 - 3.8 cm  SEPTUM: 0.7 cm    0.6 - 1.2 cm  PWT:    0.8 cm    0.6 - 1.1 cm  LVIDd:  6.8 cm    3.0 - 5.6 cm  LVIDs:    ---     1.8 - 4.0 cm  Derived Variables:  LVMI: 105 g/m2  RWT: 0.23  Ejection Fraction (Visual Estimate): 25 %  ------------------------------------------------------------------------  OBSERVATIONS:  Mitral Valve: Thickened, tethered mitral valve. Severe  mitral regurgitation.  Aortic Root: Normal aortic root.  Aortic Valve: Calcified trileaflet aortic valve with normal  opening. No aortic valve regurgitation seen.  Left Atrium: Normal left atrium.  LA volume index = 28  cc/m2.  Left Ventricle: Endocardial visualization enhanced with  intravenous injection of echo contrast (Definity). Severe  global left ventricular systolic dysfunction.The mid to  distal septum, apical, lateral walls are severely  hypokinetic.  Increased E/e'  is consistent with elevated  left ventricular filling pressure.  Right Heart: Normal right atrium. Right ventricular  enlargement with decreased right ventricular systolic  function. Normal tricuspid valve. Mild tricuspid  regurgitation. Normal pulmonic valve.  Pericardium/PleuraNormal pericardium with no pericardial  effusion.  Hemodynamic: Estimated right ventricular systolic pressure  equals 42 mm Hg, assuming right atrial pressure equals 10  mm Hg, consistent with mild pulmonary hypertension.  ------------------------------------------------------------------------  CONCLUSIONS:  1. Thickened, tethered mitral valve. Severe mitral  regurgitation.  2. Calcified trileaflet aortic valvewith normal opening.  3. Normal left atrium.  LA volume index = 28 cc/m2.  4. Endocardial visualization enhanced with intravenous  injection of echo contrast (Definity). Severe global left  ventricular systolic dysfunction.The mid to distal septum,  apical, lateral walls are severely hypokinetic.  5. Right ventricular enlargement with decreased right  ventricular systolic function.         Interval History:  Patient resting comfortably in bed   remains lethargic today and responsive  to verbal stimuli   No acute events overnight      Medications:  acetaminophen     Tablet .. 650 milliGRAM(s) Oral every 6 hours PRN  aluminum hydroxide/magnesium hydroxide/simethicone Suspension 30 milliLiter(s) Oral every 4 hours PRN  aspirin enteric coated 81 milliGRAM(s) Oral daily  atorvastatin 80 milliGRAM(s) Oral at bedtime  cefepime   IVPB 1000 milliGRAM(s) IV Intermittent daily  chlorhexidine 2% Cloths 1 Application(s) Topical <User Schedule>  dextrose 5%. 1000 milliLiter(s) IV Continuous <Continuous>  dextrose 5%. 1000 milliLiter(s) IV Continuous <Continuous>  dextrose 50% Injectable 25 Gram(s) IV Push once  dextrose 50% Injectable 12.5 Gram(s) IV Push once  dextrose 50% Injectable 25 Gram(s) IV Push once  dextrose Oral Gel 15 Gram(s) Oral once PRN  donepezil 10 milliGRAM(s) Oral at bedtime  fluticasone propionate 50 MICROgram(s)/spray Nasal Spray 1 Spray(s) Both Nostrils two times a day  glucagon  Injectable 1 milliGRAM(s) IntraMuscular once  haloperidol     Tablet 0.5 milliGRAM(s) Oral daily  heparin  Infusion 1000 Unit(s)/Hr IV Continuous <Continuous>  insulin glargine Injectable (LANTUS) 15 Unit(s) SubCutaneous at bedtime  insulin lispro (ADMELOG) corrective regimen sliding scale   SubCutaneous three times a day before meals  insulin lispro (ADMELOG) corrective regimen sliding scale   SubCutaneous at bedtime  insulin lispro Injectable (ADMELOG) 5 Unit(s) SubCutaneous three times a day before meals  ketorolac 0.5% Ophthalmic Solution 1 Drop(s) Left EYE three times a day  loratadine 10 milliGRAM(s) Oral daily  melatonin 3 milliGRAM(s) Oral at bedtime PRN  milrinone Infusion 0.25 MICROgram(s)/kG/Min IV Continuous <Continuous>  mometasone 220 MICROgram(s) Inhaler 2 Puff(s) Inhalation daily  norepinephrine Infusion 0.05 MICROgram(s)/kG/Min IV Continuous <Continuous>  pantoprazole    Tablet 40 milliGRAM(s) Oral before breakfast  polyethylene glycol 3350 17 Gram(s) Oral daily  potassium chloride   Powder 40 milliEquivalent(s) Oral two times a day  QUEtiapine 25 milliGRAM(s) Oral once  senna 2 Tablet(s) Oral at bedtime  tamsulosin 0.4 milliGRAM(s) Oral at bedtime  ticagrelor 90 milliGRAM(s) Oral every 12 hours      Vitals:  T(C): 36.3 (22 @ 08:00), Max: 36.9 (22 @ 00:00)  HR: 71 (22 @ 10:00) (64 - 129)  BP: 71/59 (22 @ 11:00) (71/59 - 71/59)  BP(mean): 64 (22 @ 11:00) (64 - 64)  RR: 21 (22 @ 10:00) (13 - 31)  SpO2: 100% (22 @ 10:00) (97% - 100%)    Daily     Daily Weight in k.9 (19 Aug 2022 03:00)        I&O's Summary    18 Aug 2022 07:01  -  19 Aug 2022 07:00  --------------------------------------------------------  IN: 1758.6 mL / OUT: 2170 mL / NET: -411.4 mL    19 Aug 2022 07:01  -  19 Aug 2022 10:57  --------------------------------------------------------  IN: 171.6 mL / OUT: 850 mL / NET: -678.4 mL        Physical Exam:  Appearance: No Acute Distress  Neck: Cardiovascular: Normal S1 S2  Respiratory: Clear to auscultation bilaterally  Gastrointestinal: Soft, Non-tender	  Skin: No cyanosis	  Neurologic: Non-focal  Extremities: No LE edema  Psychiatry: A & O x 3, Mood & affect appropriate    Labs:                        10.3   12.10 )-----------( 234      ( 19 Aug 2022 05:30 )             30.3         142  |  103  |  48<H>  ----------------------------<  116<H>  3.1<L>   |  27  |  2.26<H>    Ca    8.3<L>      19 Aug 2022 05:30  Phos  2.1       Mg     2.20         TPro  5.5<L>  /  Alb  2.9<L>  /  TBili  0.7  /  DBili  x   /  AST  62<H>  /  ALT  51<H>  /  AlkPhos  87  08-19    PT/INR - ( 17 Aug 2022 18:15 )   PT: 18.9 sec;   INR: 1.62 ratio         PTT - ( 19 Aug 2022 05:30 )  PTT:75.5 sec  CARDIAC MARKERS ( 19 Aug 2022 05:30 )  x     / x     / 425 U/L / x     / 6.4 ng/mL  CARDIAC MARKERS ( 18 Aug 2022 22:00 )  x     / x     / 428 U/L / x     / 9.6 ng/mL  CARDIAC MARKERS ( 18 Aug 2022 15:30 )  x     / x     / 446 U/L / x     / 11.0 ng/mL  CARDIAC MARKERS ( 18 Aug 2022 11:40 )  x     / x     / 467 U/L / x     / 9.4 ng/mL  CARDIAC MARKERS ( 18 Aug 2022 03:22 )  x     / x     / 462 U/L / x     / 5.6 ng/mL  CARDIAC MARKERS ( 17 Aug 2022 18:15 )  x     / x     / x     / x     / 5.0 ng/mL        TELEMETRY: Sinus Rhythm     Echocardiogram:  < from: TTE with Doppler (w/Cont) (22 @ 19:13) >  DIMENSIONS:  Dimensions:     Normal Values:  LA:     3.8 cm    2.0 - 4.0 cm  Ao:     3.0 cm    2.0 - 3.8 cm  SEPTUM: 0.7 cm    0.6 - 1.2 cm  PWT:    0.8 cm    0.6 - 1.1 cm  LVIDd:  6.8 cm    3.0 - 5.6 cm  LVIDs:    ---     1.8 - 4.0 cm  Derived Variables:  LVMI: 105 g/m2  RWT: 0.23  Ejection Fraction (Visual Estimate): 25 %  ------------------------------------------------------------------------  OBSERVATIONS:  Mitral Valve: Thickened, tethered mitral valve. Severe  mitral regurgitation.  Aortic Root: Normal aortic root.  Aortic Valve: Calcified trileaflet aortic valve with normal  opening. No aortic valve regurgitation seen.  Left Atrium: Normal left atrium.  LA volume index = 28  cc/m2.  Left Ventricle: Endocardial visualization enhanced with  intravenous injection of echo contrast (Definity). Severe  global left ventricular systolic dysfunction.The mid to  distal septum, apical, lateral walls are severely  hypokinetic.  Increased E/e'  is consistent with elevated  left ventricular filling pressure.  Right Heart: Normal right atrium. Right ventricular  enlargement with decreased right ventricular systolic  function. Normal tricuspid valve. Mild tricuspid  regurgitation. Normal pulmonic valve.  Pericardium/PleuraNormal pericardium with no pericardial  effusion.  Hemodynamic: Estimated right ventricular systolic pressure  equals 42 mm Hg, assuming right atrial pressure equals 10  mm Hg, consistent with mild pulmonary hypertension.  ------------------------------------------------------------------------  CONCLUSIONS:  1. Thickened, tethered mitral valve. Severe mitral  regurgitation.  2. Calcified trileaflet aortic valvewith normal opening.  3. Normal left atrium.  LA volume index = 28 cc/m2.  4. Endocardial visualization enhanced with intravenous  injection of echo contrast (Definity). Severe global left  ventricular systolic dysfunction.The mid to distal septum,  apical, lateral walls are severely hypokinetic.  5. Right ventricular enlargement with decreased right  ventricular systolic function.         Interval History:  Patient resting comfortably in bed   he remains lethargic today; responsive to verbal stimuli   No acute events overnight      Medications:  acetaminophen     Tablet .. 650 milliGRAM(s) Oral every 6 hours PRN  aluminum hydroxide/magnesium hydroxide/simethicone Suspension 30 milliLiter(s) Oral every 4 hours PRN  aspirin enteric coated 81 milliGRAM(s) Oral daily  atorvastatin 80 milliGRAM(s) Oral at bedtime  cefepime   IVPB 1000 milliGRAM(s) IV Intermittent daily  chlorhexidine 2% Cloths 1 Application(s) Topical <User Schedule>  dextrose 5%. 1000 milliLiter(s) IV Continuous <Continuous>  dextrose 5%. 1000 milliLiter(s) IV Continuous <Continuous>  dextrose 50% Injectable 25 Gram(s) IV Push once  dextrose 50% Injectable 12.5 Gram(s) IV Push once  dextrose 50% Injectable 25 Gram(s) IV Push once  dextrose Oral Gel 15 Gram(s) Oral once PRN  donepezil 10 milliGRAM(s) Oral at bedtime  fluticasone propionate 50 MICROgram(s)/spray Nasal Spray 1 Spray(s) Both Nostrils two times a day  glucagon  Injectable 1 milliGRAM(s) IntraMuscular once  haloperidol     Tablet 0.5 milliGRAM(s) Oral daily  heparin  Infusion 1000 Unit(s)/Hr IV Continuous <Continuous>  insulin glargine Injectable (LANTUS) 15 Unit(s) SubCutaneous at bedtime  insulin lispro (ADMELOG) corrective regimen sliding scale   SubCutaneous three times a day before meals  insulin lispro (ADMELOG) corrective regimen sliding scale   SubCutaneous at bedtime  insulin lispro Injectable (ADMELOG) 5 Unit(s) SubCutaneous three times a day before meals  ketorolac 0.5% Ophthalmic Solution 1 Drop(s) Left EYE three times a day  loratadine 10 milliGRAM(s) Oral daily  melatonin 3 milliGRAM(s) Oral at bedtime PRN  milrinone Infusion 0.25 MICROgram(s)/kG/Min IV Continuous <Continuous>  mometasone 220 MICROgram(s) Inhaler 2 Puff(s) Inhalation daily  norepinephrine Infusion 0.05 MICROgram(s)/kG/Min IV Continuous <Continuous>  pantoprazole    Tablet 40 milliGRAM(s) Oral before breakfast  polyethylene glycol 3350 17 Gram(s) Oral daily  potassium chloride   Powder 40 milliEquivalent(s) Oral two times a day  QUEtiapine 25 milliGRAM(s) Oral once  senna 2 Tablet(s) Oral at bedtime  tamsulosin 0.4 milliGRAM(s) Oral at bedtime  ticagrelor 90 milliGRAM(s) Oral every 12 hours      Vitals:  T(C): 36.3 (22 @ 08:00), Max: 36.9 (22 @ 00:00)  HR: 71 (22 @ 10:00) (64 - 129)  BP: 71/59 (22 @ 11:00) (71/59 - 71/59)  BP(mean): 64 (22 @ 11:00) (64 - 64)  RR: 21 (22 @ 10:00) (13 - 31)  SpO2: 100% (22 @ 10:00) (97% - 100%)    Daily     Daily Weight in k.9 (19 Aug 2022 03:00)        I&O's Summary    18 Aug 2022 07:01  -  19 Aug 2022 07:00  --------------------------------------------------------  IN: 1758.6 mL / OUT: 2170 mL / NET: -411.4 mL    19 Aug 2022 07:01  -  19 Aug 2022 10:57  --------------------------------------------------------  IN: 171.6 mL / OUT: 850 mL / NET: -678.4 mL        Physical Exam:  Appearance: No Acute Distress  Neck: JVP 8-10  Cardiovascular: Normal S1 S2  Respiratory: Clear to auscultation bilaterally  Gastrointestinal: Soft, Non-tender	  Skin: No cyanosis	  Neurologic: Non-focal  Extremities: No LE edema; warmer to touch today    Labs:                        10.3   12.10 )-----------( 234      ( 19 Aug 2022 05:30 )             30.3         142  |  103  |  48<H>  ----------------------------<  116<H>  3.1<L>   |  27  |  2.26<H>    Ca    8.3<L>      19 Aug 2022 05:30  Phos  2.1       Mg     2.20         TPro  5.5<L>  /  Alb  2.9<L>  /  TBili  0.7  /  DBili  x   /  AST  62<H>  /  ALT  51<H>  /  AlkPhos  87  08-19    PT/INR - ( 17 Aug 2022 18:15 )   PT: 18.9 sec;   INR: 1.62 ratio         PTT - ( 19 Aug 2022 05:30 )  PTT:75.5 sec  CARDIAC MARKERS ( 19 Aug 2022 05:30 )  x     / x     / 425 U/L / x     / 6.4 ng/mL  CARDIAC MARKERS ( 18 Aug 2022 22:00 )  x     / x     / 428 U/L / x     / 9.6 ng/mL  CARDIAC MARKERS ( 18 Aug 2022 15:30 )  x     / x     / 446 U/L / x     / 11.0 ng/mL  CARDIAC MARKERS ( 18 Aug 2022 11:40 )  x     / x     / 467 U/L / x     / 9.4 ng/mL  CARDIAC MARKERS ( 18 Aug 2022 03:22 )  x     / x     / 462 U/L / x     / 5.6 ng/mL  CARDIAC MARKERS ( 17 Aug 2022 18:15 )  x     / x     / x     / x     / 5.0 ng/mL        TELEMETRY: Sinus Rhythm     Echocardiogram:  < from: TTE with Doppler (w/Cont) (22 @ 19:13) >  DIMENSIONS:  Dimensions:     Normal Values:  LA:     3.8 cm    2.0 - 4.0 cm  Ao:     3.0 cm    2.0 - 3.8 cm  SEPTUM: 0.7 cm    0.6 - 1.2 cm  PWT:    0.8 cm    0.6 - 1.1 cm  LVIDd:  6.8 cm    3.0 - 5.6 cm  LVIDs:    ---     1.8 - 4.0 cm  Derived Variables:  LVMI: 105 g/m2  RWT: 0.23  Ejection Fraction (Visual Estimate): 25 %  ------------------------------------------------------------------------  OBSERVATIONS:  Mitral Valve: Thickened, tethered mitral valve. Severe  mitral regurgitation.  Aortic Root: Normal aortic root.  Aortic Valve: Calcified trileaflet aortic valve with normal  opening. No aortic valve regurgitation seen.  Left Atrium: Normal left atrium.  LA volume index = 28  cc/m2.  Left Ventricle: Endocardial visualization enhanced with  intravenous injection of echo contrast (Definity). Severe  global left ventricular systolic dysfunction.The mid to  distal septum, apical, lateral walls are severely  hypokinetic.  Increased E/e'  is consistent with elevated  left ventricular filling pressure.  Right Heart: Normal right atrium. Right ventricular  enlargement with decreased right ventricular systolic  function. Normal tricuspid valve. Mild tricuspid  regurgitation. Normal pulmonic valve.  Pericardium/PleuraNormal pericardium with no pericardial  effusion.  Hemodynamic: Estimated right ventricular systolic pressure  equals 42 mm Hg, assuming right atrial pressure equals 10  mm Hg, consistent with mild pulmonary hypertension.  ------------------------------------------------------------------------  CONCLUSIONS:  1. Thickened, tethered mitral valve. Severe mitral  regurgitation.  2. Calcified trileaflet aortic valvewith normal opening.  3. Normal left atrium.  LA volume index = 28 cc/m2.  4. Endocardial visualization enhanced with intravenous  injection of echo contrast (Definity). Severe global left  ventricular systolic dysfunction.The mid to distal septum,  apical, lateral walls are severely hypokinetic.  5. Right ventricular enlargement with decreased right  ventricular systolic function.         Interval History:  Patient resting comfortably in bed   He remains lethargic today; responsive to verbal stimuli   No acute events overnight      Medications:  acetaminophen     Tablet .. 650 milliGRAM(s) Oral every 6 hours PRN  aluminum hydroxide/magnesium hydroxide/simethicone Suspension 30 milliLiter(s) Oral every 4 hours PRN  aspirin enteric coated 81 milliGRAM(s) Oral daily  atorvastatin 80 milliGRAM(s) Oral at bedtime  cefepime   IVPB 1000 milliGRAM(s) IV Intermittent daily  chlorhexidine 2% Cloths 1 Application(s) Topical <User Schedule>  dextrose 5%. 1000 milliLiter(s) IV Continuous <Continuous>  dextrose 5%. 1000 milliLiter(s) IV Continuous <Continuous>  dextrose 50% Injectable 25 Gram(s) IV Push once  dextrose 50% Injectable 12.5 Gram(s) IV Push once  dextrose 50% Injectable 25 Gram(s) IV Push once  dextrose Oral Gel 15 Gram(s) Oral once PRN  donepezil 10 milliGRAM(s) Oral at bedtime  fluticasone propionate 50 MICROgram(s)/spray Nasal Spray 1 Spray(s) Both Nostrils two times a day  glucagon  Injectable 1 milliGRAM(s) IntraMuscular once  haloperidol     Tablet 0.5 milliGRAM(s) Oral daily  heparin  Infusion 1000 Unit(s)/Hr IV Continuous <Continuous>  insulin glargine Injectable (LANTUS) 15 Unit(s) SubCutaneous at bedtime  insulin lispro (ADMELOG) corrective regimen sliding scale   SubCutaneous three times a day before meals  insulin lispro (ADMELOG) corrective regimen sliding scale   SubCutaneous at bedtime  insulin lispro Injectable (ADMELOG) 5 Unit(s) SubCutaneous three times a day before meals  ketorolac 0.5% Ophthalmic Solution 1 Drop(s) Left EYE three times a day  loratadine 10 milliGRAM(s) Oral daily  melatonin 3 milliGRAM(s) Oral at bedtime PRN  milrinone Infusion 0.25 MICROgram(s)/kG/Min IV Continuous <Continuous>  mometasone 220 MICROgram(s) Inhaler 2 Puff(s) Inhalation daily  norepinephrine Infusion 0.05 MICROgram(s)/kG/Min IV Continuous <Continuous>  pantoprazole    Tablet 40 milliGRAM(s) Oral before breakfast  polyethylene glycol 3350 17 Gram(s) Oral daily  potassium chloride   Powder 40 milliEquivalent(s) Oral two times a day  QUEtiapine 25 milliGRAM(s) Oral once  senna 2 Tablet(s) Oral at bedtime  tamsulosin 0.4 milliGRAM(s) Oral at bedtime  ticagrelor 90 milliGRAM(s) Oral every 12 hours      Vitals:  T(C): 36.3 (22 @ 08:00), Max: 36.9 (22 @ 00:00)  HR: 71 (22 @ 10:00) (64 - 129)  BP: 71/59 (22 @ 11:00) (71/59 - 71/59)  BP(mean): 64 (22 @ 11:00) (64 - 64)  RR: 21 (22 @ 10:00) (13 - 31)  SpO2: 100% (22 @ 10:00) (97% - 100%)    Daily     Daily Weight in k.9 (19 Aug 2022 03:00)        I&O's Summary    18 Aug 2022 07:01  -  19 Aug 2022 07:00  --------------------------------------------------------  IN: 1758.6 mL / OUT: 2170 mL / NET: -411.4 mL    19 Aug 2022 07:01  -  19 Aug 2022 10:57  --------------------------------------------------------  IN: 171.6 mL / OUT: 850 mL / NET: -678.4 mL        Physical Exam:  Appearance: No Acute Distress  Neck: JVP 8-10  Cardiovascular: Normal S1 S2  Respiratory: Clear to auscultation bilaterally  Gastrointestinal: Soft, Non-tender	  Skin: No cyanosis	  Neurologic: Non-focal  Extremities: No LE edema; warmer to touch today    Labs:                        10.3   12.10 )-----------( 234      ( 19 Aug 2022 05:30 )             30.3         142  |  103  |  48<H>  ----------------------------<  116<H>  3.1<L>   |  27  |  2.26<H>    Ca    8.3<L>      19 Aug 2022 05:30  Phos  2.1       Mg     2.20         TPro  5.5<L>  /  Alb  2.9<L>  /  TBili  0.7  /  DBili  x   /  AST  62<H>  /  ALT  51<H>  /  AlkPhos  87  19    PT/INR - ( 17 Aug 2022 18:15 )   PT: 18.9 sec;   INR: 1.62 ratio         PTT - ( 19 Aug 2022 05:30 )  PTT:75.5 sec  CARDIAC MARKERS ( 19 Aug 2022 05:30 )  x     / x     / 425 U/L / x     / 6.4 ng/mL  CARDIAC MARKERS ( 18 Aug 2022 22:00 )  x     / x     / 428 U/L / x     / 9.6 ng/mL  CARDIAC MARKERS ( 18 Aug 2022 15:30 )  x     / x     / 446 U/L / x     / 11.0 ng/mL  CARDIAC MARKERS ( 18 Aug 2022 11:40 )  x     / x     / 467 U/L / x     / 9.4 ng/mL  CARDIAC MARKERS ( 18 Aug 2022 03:22 )  x     / x     / 462 U/L / x     / 5.6 ng/mL  CARDIAC MARKERS ( 17 Aug 2022 18:15 )  x     / x     / x     / x     / 5.0 ng/mL        TELEMETRY: Sinus Rhythm     Echocardiogram:  TTE with Doppler (w/Cont) (22 @ 19:13)     DIMENSIONS:  Dimensions:     Normal Values:  LA:     3.8 cm    2.0 - 4.0 cm  Ao:     3.0 cm    2.0 - 3.8 cm  SEPTUM: 0.7 cm    0.6 - 1.2 cm  PWT:    0.8 cm    0.6 - 1.1 cm  LVIDd:  6.8 cm    3.0 - 5.6 cm  LVIDs:    ---     1.8 - 4.0 cm  Derived Variables:  LVMI: 105 g/m2  RWT: 0.23  Ejection Fraction (Visual Estimate): 25 %  ------------------------------------------------------------------------  OBSERVATIONS:  Mitral Valve: Thickened, tethered mitral valve. Severe  mitral regurgitation.  Aortic Root: Normal aortic root.  Aortic Valve: Calcified trileaflet aortic valve with normal  opening. No aortic valve regurgitation seen.  Left Atrium: Normal left atrium.  LA volume index = 28  cc/m2.  Left Ventricle: Endocardial visualization enhanced with  intravenous injection of echo contrast (Definity). Severe  global left ventricular systolic dysfunction.The mid to  distal septum, apical, lateral walls are severely  hypokinetic.  Increased E/e'  is consistent with elevated  left ventricular filling pressure.  Right Heart: Normal right atrium. Right ventricular  enlargement with decreased right ventricular systolic  function. Normal tricuspid valve. Mild tricuspid  regurgitation. Normal pulmonic valve.  Pericardium/PleuraNormal pericardium with no pericardial  effusion.  Hemodynamic: Estimated right ventricular systolic pressure  equals 42 mm Hg, assuming right atrial pressure equals 10  mm Hg, consistent with mild pulmonary hypertension.  ------------------------------------------------------------------------  CONCLUSIONS:  1. Thickened, tethered mitral valve. Severe mitral  regurgitation.  2. Calcified trileaflet aortic valvewith normal opening.  3. Normal left atrium.  LA volume index = 28 cc/m2.  4. Endocardial visualization enhanced with intravenous  injection of echo contrast (Definity). Severe global left  ventricular systolic dysfunction.The mid to distal septum,  apical, lateral walls are severely hypokinetic.  5. Right ventricular enlargement with decreased right  ventricular systolic function.         HEART FAILURE PROGRESS NOTE    Interval History:  Patient resting comfortably in bed   He remains lethargic today; responsive to verbal stimuli   No acute events overnight    Medications:  acetaminophen     Tablet .. 650 milliGRAM(s) Oral every 6 hours PRN  aluminum hydroxide/magnesium hydroxide/simethicone Suspension 30 milliLiter(s) Oral every 4 hours PRN  aspirin enteric coated 81 milliGRAM(s) Oral daily  atorvastatin 80 milliGRAM(s) Oral at bedtime  cefepime   IVPB 1000 milliGRAM(s) IV Intermittent daily  chlorhexidine 2% Cloths 1 Application(s) Topical <User Schedule>  dextrose 5%. 1000 milliLiter(s) IV Continuous <Continuous>  dextrose 5%. 1000 milliLiter(s) IV Continuous <Continuous>  dextrose 50% Injectable 25 Gram(s) IV Push once  dextrose 50% Injectable 12.5 Gram(s) IV Push once  dextrose 50% Injectable 25 Gram(s) IV Push once  dextrose Oral Gel 15 Gram(s) Oral once PRN  donepezil 10 milliGRAM(s) Oral at bedtime  fluticasone propionate 50 MICROgram(s)/spray Nasal Spray 1 Spray(s) Both Nostrils two times a day  glucagon  Injectable 1 milliGRAM(s) IntraMuscular once  haloperidol     Tablet 0.5 milliGRAM(s) Oral daily  heparin  Infusion 1000 Unit(s)/Hr IV Continuous <Continuous>  insulin glargine Injectable (LANTUS) 15 Unit(s) SubCutaneous at bedtime  insulin lispro (ADMELOG) corrective regimen sliding scale   SubCutaneous three times a day before meals  insulin lispro (ADMELOG) corrective regimen sliding scale   SubCutaneous at bedtime  insulin lispro Injectable (ADMELOG) 5 Unit(s) SubCutaneous three times a day before meals  ketorolac 0.5% Ophthalmic Solution 1 Drop(s) Left EYE three times a day  loratadine 10 milliGRAM(s) Oral daily  melatonin 3 milliGRAM(s) Oral at bedtime PRN  milrinone Infusion 0.25 MICROgram(s)/kG/Min IV Continuous <Continuous>  mometasone 220 MICROgram(s) Inhaler 2 Puff(s) Inhalation daily  norepinephrine Infusion 0.05 MICROgram(s)/kG/Min IV Continuous <Continuous>  pantoprazole    Tablet 40 milliGRAM(s) Oral before breakfast  polyethylene glycol 3350 17 Gram(s) Oral daily  potassium chloride   Powder 40 milliEquivalent(s) Oral two times a day  QUEtiapine 25 milliGRAM(s) Oral once  senna 2 Tablet(s) Oral at bedtime  tamsulosin 0.4 milliGRAM(s) Oral at bedtime  ticagrelor 90 milliGRAM(s) Oral every 12 hours      Vitals:  T(C): 36.3 (22 @ 08:00), Max: 36.9 (22 @ 00:00)  HR: 71 (22 @ 10:00) (64 - 129)  BP: 71/59 (22 @ 11:00) (71/59 - 71/59)  BP(mean): 64 (22 @ 11:00) (64 - 64)  RR: 21 (22 @ 10:00) (13 - 31)  SpO2: 100% (22 @ 10:00) (97% - 100%)    Daily     Daily Weight in k.9 (19 Aug 2022 03:00)      I&O's Summary    18 Aug 2022 07:01  -  19 Aug 2022 07:00  --------------------------------------------------------  IN: 1758.6 mL / OUT: 2170 mL / NET: -411.4 mL    19 Aug 2022 07:01  -  19 Aug 2022 10:57  --------------------------------------------------------  IN: 171.6 mL / OUT: 850 mL / NET: -678.4 mL      Physical Exam:  Appearance: No Acute Distress  Neck: JVP 8-10  Cardiovascular: Normal S1 S2  Respiratory: Clear to auscultation bilaterally  Gastrointestinal: Soft, Non-tender	  Skin: No cyanosis	  Neurologic: more fatigued today, yesterday was more interactive talking without prompt  Extremities: No LE edema; warmer to touch today    Labs:                        10.3   12.10 )-----------( 234      ( 19 Aug 2022 05:30 )             30.3     08-19    142  |  103  |  48<H>  ----------------------------<  116<H>  3.1<L>   |  27  |  2.26<H>    Ca    8.3<L>      19 Aug 2022 05:30  Phos  2.1     08-  Mg     2.20     08-19    TPro  5.5<L>  /  Alb  2.9<L>  /  TBili  0.7  /  DBili  x   /  AST  62<H>  /  ALT  51<H>  /  AlkPhos  87  08-19    PT/INR - ( 17 Aug 2022 18:15 )   PT: 18.9 sec;   INR: 1.62 ratio         PTT - ( 19 Aug 2022 05:30 )  PTT:75.5 sec  CARDIAC MARKERS ( 19 Aug 2022 05:30 )  x     / x     / 425 U/L / x     / 6.4 ng/mL  CARDIAC MARKERS ( 18 Aug 2022 22:00 )  x     / x     / 428 U/L / x     / 9.6 ng/mL  CARDIAC MARKERS ( 18 Aug 2022 15:30 )  x     / x     / 446 U/L / x     / 11.0 ng/mL  CARDIAC MARKERS ( 18 Aug 2022 11:40 )  x     / x     / 467 U/L / x     / 9.4 ng/mL  CARDIAC MARKERS ( 18 Aug 2022 03:22 )  x     / x     / 462 U/L / x     / 5.6 ng/mL  CARDIAC MARKERS ( 17 Aug 2022 18:15 )  x     / x     / x     / x     / 5.0 ng/mL      TELEMETRY: Sinus Rhythm     Echocardiogram:  TTE with Doppler (w/Cont) (22 @ 19:13)     DIMENSIONS:  Dimensions:     Normal Values:  LA:     3.8 cm    2.0 - 4.0 cm  Ao:     3.0 cm    2.0 - 3.8 cm  SEPTUM: 0.7 cm    0.6 - 1.2 cm  PWT:    0.8 cm    0.6 - 1.1 cm  LVIDd:  6.8 cm    3.0 - 5.6 cm  LVIDs:    ---     1.8 - 4.0 cm  Derived Variables:  LVMI: 105 g/m2  RWT: 0.23  Ejection Fraction (Visual Estimate): 25 %  ------------------------------------------------------------------------  OBSERVATIONS:  Mitral Valve: Thickened, tethered mitral valve. Severe  mitral regurgitation.  Aortic Root: Normal aortic root.  Aortic Valve: Calcified trileaflet aortic valve with normal  opening. No aortic valve regurgitation seen.  Left Atrium: Normal left atrium.  LA volume index = 28  cc/m2.  Left Ventricle: Endocardial visualization enhanced with  intravenous injection of echo contrast (Definity). Severe  global left ventricular systolic dysfunction.The mid to  distal septum, apical, lateral walls are severely  hypokinetic.  Increased E/e'  is consistent with elevated  left ventricular filling pressure.  Right Heart: Normal right atrium. Right ventricular  enlargement with decreased right ventricular systolic  function. Normal tricuspid valve. Mild tricuspid  regurgitation. Normal pulmonic valve.  Pericardium/PleuraNormal pericardium with no pericardial  effusion.  Hemodynamic: Estimated right ventricular systolic pressure  equals 42 mm Hg, assuming right atrial pressure equals 10  mm Hg, consistent with mild pulmonary hypertension.  ------------------------------------------------------------------------  CONCLUSIONS:  1. Thickened, tethered mitral valve. Severe mitral  regurgitation.  2. Calcified trileaflet aortic valvewith normal opening.  3. Normal left atrium.  LA volume index = 28 cc/m2.  4. Endocardial visualization enhanced with intravenous  injection of echo contrast (Definity). Severe global left  ventricular systolic dysfunction.The mid to distal septum,  apical, lateral walls are severely hypokinetic.  5. Right ventricular enlargement with decreased right  ventricular systolic function.

## 2022-08-19 NOTE — PROGRESS NOTE ADULT - CRITICAL CARE ATTENDING COMMENT
82 year old man with known CAD with stents, HFrEF who was admitted to CCU 8/18 with acute on chronic systolic heart failure severe MR and cardiogenic shock. Was profoundly acidotic and sp experiencing junctional bradycardia. SP RHC and IABP placement. Was very agitated overnight and started on Precedex.    [place] numbers this morning:  CO6/CI2.92/DRP783/XGD600 Lactate 1.3/CVP 7    RFA IABP  RFV [place]  LRA [place]    Meds:  Precedex gtt (off)  ASA  Heparin gtt  Brilinta 90 mg BID  Lasix gtt 5 mg/hr  Milrinone gtt 0.25 mcg/kg/min  Levophed gtt  Seroquel    #Neuro- Patient sundowns  On Seroquel  Haldol prn agitation    #Pulm- CXR stable  Continue to monitor  Continue Lasix gtt    #CV- Acute on chronic systolic heart failure; severe MR and cardiogenic shock  [place] and IABP in place  Continue Milrinone gtt  CVP 7- dc lasix gtt and use 40 mg IV as needed per Uo and CVP  Wean levophed as tolerates  Monitor MVO2 and CO/CI BID  Uo did increase (last 2 hours~300cc/hr)  FU HF recommendations  Will need eventual LHC    #Renal- Cr 2.2; continue to monitor    #ID- Reorder BCx/RVP  After Cx drawn, start Cefepime    #DVT ppx- heparin

## 2022-08-19 NOTE — PROGRESS NOTE ADULT - PROBLEM SELECTOR PLAN 1
IABP 1:1  Milrinone 0.25mcg/kg/min  Lasix 5mg/hr; switched to prn (CVP <10)  Levo 0.05mcg/kg/min (weaning off)  Strict I/O  Monitor lytes replete K>4.0 and Mg>2.0  Management per CCU team  Pending final recommendations from HF attending IABP 1:1  Milrinone 0.25mcg/kg/min  Lasix 5mg/hr; switched to prn (maintain CVP <10)  Levo 0.05mcg/kg/min (weaning off)  Strict I/O  Monitor lytes replete K>4.0 and Mg>2.0  Management per CCU team  Pending final recommendations from HF attending

## 2022-08-19 NOTE — PROGRESS NOTE ADULT - ASSESSMENT
82 year old man with CAD and HFrEF who was transferred to [facility] CCU with acute on chronic systolic heart failure, severe MR and cardiogenic shock. S/p [place] and IABP.    NEURO:  #Mental status: baseline A&O x3  -sedated precedex  -neuro exam q4h    CV:  #NSTEMI   - elevated troponins and EKG concerning for NSTEMI.   - Started on heparin gtt and s/p ASA/brillinta  - atorvastatin 80mg  - Trop peaked at 1600, currently trending down to [id number]  - continue CM      #systolic CHF: EF 25% with cardiogenic shock  - Echo from 8/17 showed severe mitral regurgitation, severe global LV systolic dysfunction, and RV enlargement + decreased systolic function  - hypotensive on Levophed drip, currently 0.12  - RHC showed : RA 30, wedge 30, CO 3.22, CI 1.6,Pa sat 49% on levophed  -s/p Rt groin intra aortic ballon pump and swan cath placed  - d/c  metoprolol BID  - HF consulted  -Trend I/Os and daily weights, and Cr    #Arrythmia:   -afib with RVR in ED  - CHADS-vasc - 7 - Eliquis on hold for now while on a heparin gtt  - intermittent -140  - d/c'ed digoxin 125mcg  - Monitor on tele    PULM:   presented with SOB  - CT chest from 8/17 showed No pulmonary embolism, small b/l pulmonary effusions and mild pulmonary edema and a nonspecific 4 mm left upper lobe juxtapleural nodule with surrounding ground-glass opacity.   - on non-rebreather mask- > NC currently    RENAL:  #SANDRA:   -Cr 2.26  -possibly 2/2 hypoperfusion  -continue to monitor   -c/w lasix gtt 5mg  -trend BMP    GI:  #Transaminitis: Elevated LFTs  - continue to monitor    #Diet:  - Dash diet  - pantoprazole 40mg     #Bowel regiment:  presents with constipation  - miralax, maalox, senna  - nonspecific bowl gas on abdominal X-ray    ENDO:  #DM2: HbA1c 8.3   - Insulin Sliding Scale  - TSH and lipid panel wnl    HEMATOLOGIC:  #CBC results show Hb stable  - continue to monitor    #DVT prophylaxis with heparin gtt    ID:  #Pt without strong objective or clinical evidence of infection. Will observe off antibiotics  -RRT on 8/18for AMS, labs drawn during the rapid remarkable for new leukocytosis to 13, lactate to 13.5, procalc 0.61.  -Elevated lactate possibly 2/2 hypoperfusion given NSTEMI vs sepsis. However, unclear source of infection (pt being treated with bactrim for UTI for positive UA but negative UCx)  -f/u blood cultures  -hold ABX for now 82 year old man with CAD and HFrEF who was transferred to [facility] CCU with acute on chronic systolic heart failure, severe MR and cardiogenic shock. S/p [place] and IABP.    NEURO:  #Mental status: baseline A&O x3  -sedated on precedex 0.2  -neuro exam q4h    CV:  #NSTEMI   - elevated troponins and EKG concerning for NSTEMI.   - Started on heparin gtt and s/p ASA/brillinta  - atorvastatin 80mg  - Trop peaked at 1600, currently trending down to [id number]  - continue CM      #systolic CHF: EF 25% with cardiogenic shock  - Echo from 8/17 showed severe mitral regurgitation, severe global LV systolic dysfunction, and RV enlargement + decreased systolic function  - hypotensive on Levophed drip, currently 0.12  - RHC showed : RA 30, wedge 30, CO 3.22, CI 1.6,Pa sat 49% on levophed  -s/p Rt groin intra aortic ballon pump and swan cath placed  - d/c  metoprolol BID  - milrinone 0.25  - HF consulted  -Trend I/Os and daily weights, and Cr    #Arrythmia:   -afib with RVR in ED  - CHADS-vasc - 7 - Eliquis on hold for now while on a heparin gtt  - intermittent -140  - d/c'ed digoxin 125mcg  - Monitor on tele    PULM:   presented with SOB  - CT chest from 8/17 showed No pulmonary embolism, small b/l pulmonary effusions and mild pulmonary edema and a nonspecific 4 mm left upper lobe juxtapleural nodule with surrounding ground-glass opacity.   - on non-rebreather mask- > NC currently    RENAL:  #SANDRA:   -Cr 2.26  -possibly 2/2 hypoperfusion  -continue to monitor   -c/w lasix gtt 5mg  -trend BMP    GI:  #Transaminitis: Elevated LFTs  - continue to monitor    #Diet:  - Dash diet  - pantoprazole 40mg     #Bowel regiment:  presents with constipation  - miralax, maalox, senna  - nonspecific bowl gas on abdominal X-ray    ENDO:  #DM2: HbA1c 8.3   - Insulin Sliding Scale  - TSH and lipid panel wnl    HEMATOLOGIC:  #CBC results show Hb stable  - continue to monitor    #DVT prophylaxis with heparin gtt    ID:  #Pt without strong objective or clinical evidence of infection. Will observe off antibiotics  -RRT on 8/18for AMS, labs drawn during the rapid remarkable for new leukocytosis to 13, lactate to 13.5, procalc 0.61.  -Elevated lactate possibly 2/2 hypoperfusion given NSTEMI vs sepsis. However, unclear source of infection (pt being treated with bactrim for UTI for positive UA but negative UCx)  -f/u blood cultures  -hold ABX for now 82 year old man with CAD and HFrEF who was transferred to [facility] CCU with acute on chronic systolic heart failure, severe MR and cardiogenic shock. S/p [place] and IABP.    NEURO:  #Mental status: baseline A&O x3  -off precedex  -haldol PRN for agitation  -neuro exam q4h    CV:  #NSTEMI   - elevated troponins and EKG concerning for NSTEMI.   - Started on heparin gtt and s/p ASA/brillinta  - atorvastatin 80mg  - Trop peaked at 1600, currently trending down to [id number]  - continue CM      #systolic CHF: EF 25% with cardiogenic shock  - Echo from 8/17 showed severe mitral regurgitation, severe global LV systolic dysfunction, and RV enlargement + decreased systolic function  - hypotensive on Levophed drip, try to wean off  - RHC showed : RA 30, wedge 30, CO 3.22, CI 1.6,Pa sat 49% on levophed  - s/p Rt groin intra aortic balloon pump and swan cath placed, f/u daily CXRs  - try to take out balloon pump if pt can be weaned off levo  - milrinone 0.25  - HF consulted  - Trend I/Os and daily weights, and Cr  - 6am: CO 6, CI 3, CVP 7, MVO2 70, , lactate 1.3  - f/u MVO2 and VBG at 12pm    #Arrythmia:   -afib with RVR in ED  - CHADS-vasc - 7 - Eliquis on hold for now while on a heparin gtt  - intermittent -140  - d/c'ed digoxin 125mcg  - Monitor on tele    PULM:   presented with SOB  - CT chest from 8/17 showed No pulmonary embolism, small b/l pulmonary effusions and mild pulmonary edema and a nonspecific 4 mm left upper lobe juxtapleural nodule with surrounding ground-glass opacity.   - on non-rebreather mask- > NC currently 3L    RENAL:  #SANDRA:   -Cr 2.26  -possibly 2/2 hypoperfusion  -continue to monitor   -d/c lasix gtt; give lasix 40mg IVP PRN  -repeat CMP at 12pm    GI:  #Transaminitis: Elevated LFTs  - continue to monitor    #Diet:  - Dash diet  - pantoprazole 40mg     #Bowel regiment:  presents with constipation  - miralax, maalox, senna  - nonspecific bowl gas on abdominal X-ray    ENDO:  #DM2: HbA1c 8.3   - Insulin Sliding Scale  - TSH and lipid panel wnl    HEMATOLOGIC:  #CBC results show Hb stable  - continue to monitor    #DVT prophylaxis with heparin gtt    ID:  #Pt without strong objective or clinical evidence of infection. Will observe off antibiotics  -RRT on 8/18for AMS, labs drawn during the rapid remarkable for new leukocytosis to 13, lactate to 13.5, procalc 0.61.  -Elevated lactate possibly 2/2 hypoperfusion given NSTEMI vs sepsis. However, unclear source of infection (pt being treated with bactrim for UTI for positive UA but negative UCx)  -f/u MRSA swab  -f/u RVP  -f/u blood cultures  -cefepime 1g qday

## 2022-08-19 NOTE — CHART NOTE - NSCHARTNOTEFT_GEN_A_CORE
IABP malposition this morning on CXR.  Repositioned IABP while maintaining sterile technique.  Repeated CXR with IABP in acceptable position.  Neurovascular of leg unchanged.  + doppler signal LLE.

## 2022-08-20 NOTE — PROGRESS NOTE ADULT - SUBJECTIVE AND OBJECTIVE BOX
Robert Zhao MD PGY1    PATIENT:  CHIDI RUBIO  [id number]    CHIEF COMPLAINT:  Patient is a 82y old  Male who presents with a chief complaint of NSTEMI (19 Aug 2022 10:57)      INTERVAL HISTORY/OVERNIGHT EVENTS: Pt tolerated weaning of balloon pump. s/p 1x haloperidol overnight. Pt is frustrated with being here and wants to go home. No other complaints.      MEDICATIONS:  MEDICATIONS  (STANDING):  aspirin enteric coated 81 milliGRAM(s) Oral daily  atorvastatin 80 milliGRAM(s) Oral at bedtime  cefepime   IVPB 1000 milliGRAM(s) IV Intermittent daily  chlorhexidine 2% Cloths 1 Application(s) Topical <User Schedule>  dextrose 5%. 1000 milliLiter(s) (50 mL/Hr) IV Continuous <Continuous>  dextrose 5%. 1000 milliLiter(s) (100 mL/Hr) IV Continuous <Continuous>  dextrose 50% Injectable 25 Gram(s) IV Push once  dextrose 50% Injectable 12.5 Gram(s) IV Push once  dextrose 50% Injectable 25 Gram(s) IV Push once  donepezil 10 milliGRAM(s) Oral at bedtime  fluticasone propionate 50 MICROgram(s)/spray Nasal Spray 1 Spray(s) Both Nostrils two times a day  glucagon  Injectable 1 milliGRAM(s) IntraMuscular once  haloperidol     Tablet 0.5 milliGRAM(s) Oral daily  insulin glargine Injectable (LANTUS) 15 Unit(s) SubCutaneous at bedtime  insulin lispro (ADMELOG) corrective regimen sliding scale   SubCutaneous three times a day before meals  insulin lispro (ADMELOG) corrective regimen sliding scale   SubCutaneous at bedtime  insulin lispro Injectable (ADMELOG) 5 Unit(s) SubCutaneous three times a day before meals  ketorolac 0.5% Ophthalmic Solution 1 Drop(s) Left EYE three times a day  loratadine 10 milliGRAM(s) Oral daily  milrinone Infusion 0.25 MICROgram(s)/kG/Min (6.65 mL/Hr) IV Continuous <Continuous>  mometasone 220 MICROgram(s) Inhaler 2 Puff(s) Inhalation daily  norepinephrine Infusion 0.05 MICROgram(s)/kG/Min (8.31 mL/Hr) IV Continuous <Continuous>  pantoprazole    Tablet 40 milliGRAM(s) Oral before breakfast  polyethylene glycol 3350 17 Gram(s) Oral daily  senna 2 Tablet(s) Oral at bedtime  tamsulosin 0.4 milliGRAM(s) Oral at bedtime  ticagrelor 90 milliGRAM(s) Oral every 12 hours    MEDICATIONS  (PRN):  acetaminophen     Tablet .. 650 milliGRAM(s) Oral every 6 hours PRN Temp greater or equal to 38C (100.4F), Mild Pain (1 - 3)  aluminum hydroxide/magnesium hydroxide/simethicone Suspension 30 milliLiter(s) Oral every 4 hours PRN Dyspepsia  dextrose Oral Gel 15 Gram(s) Oral once PRN Blood Glucose LESS THAN 70 milliGRAM(s)/deciliter  melatonin 3 milliGRAM(s) Oral at bedtime PRN Insomnia      ALLERGIES:  Allergies    ceftriaxone (Urticaria)  ciprofloxacin (Rash)  schrimp (Unknown)    Intolerances        OBJECTIVE:  ICU Vital Signs Last 24 Hrs  T(C): 37 (20 Aug 2022 04:00), Max: 37.4 (19 Aug 2022 18:00)  T(F): 98.6 (20 Aug 2022 04:00), Max: 99.3 (19 Aug 2022 18:00)  HR: 97 (20 Aug 2022 07:00) (71 - 132)  BP: --  BP(mean): --  ABP: 81/48 (20 Aug 2022 07:00) (81/48 - 122/67)  ABP(mean): 64 (20 Aug 2022 07:00) (56 - 89)  RR: 18 (20 Aug 2022 07:00) (13 - 26)  SpO2: 100% (20 Aug 2022 07:00) (95% - 100%)    O2 Parameters below as of 20 Aug 2022 06:00  Patient On (Oxygen Delivery Method): nasal cannula  O2 Flow (L/min): 2        POCT Blood Glucose.: 72 mg/dL (19 Aug 2022 21:24)  POCT Blood Glucose.: 171 mg/dL (19 Aug 2022 12:16)  POCT Blood Glucose.: 112 mg/dL (19 Aug 2022 08:26)    CAPILLARY BLOOD GLUCOSE      POCT Blood Glucose.: 72 mg/dL (19 Aug 2022 21:24)    I&O's Summary    19 Aug 2022 07:01  -  20 Aug 2022 07:00  --------------------------------------------------------  IN: 1194.6 mL / OUT: 2710 mL / NET: -1515.4 mL      Daily     Daily Weight in k.4 (20 Aug 2022 05:00)    PHYSICAL EXAMINATION:  General: NAD  HEENT: PERRLA, EOMI, moist mucous membranes  Neurology: A&Ox3  Respiratory: CTA B/L, normal respiratory effort, no wheezes, crackles, rales  CV: RRR, S1S2, holosystolic murmur  Abdominal: Soft, NT, ND   Extremities: trace edema b/l, able to move all extremities, R groin site clean, dry, intact  Lines: R groin has balloon pump and swan, L radial A line, and b/l peripheral IVs    LABS:  ABG - ( 20 Aug 2022 00:20 )  pH, Arterial: 7.51  pH, Blood: x     /  pCO2: 36    /  pO2: 106   / HCO3: 29    / Base Excess: 5.4   /  SaO2: 98.9                                    9.7    6.76  )-----------( 183      ( 20 Aug 2022 00:20 )             29.1     08-20    143  |  105  |  40<H>  ----------------------------<  142<H>  4.1   |  26  |  2.00<H>    Ca    8.2<L>      20 Aug 2022 06:51  Phos  2.1       Mg     2.10         TPro  5.6<L>  /  Alb  2.8<L>  /  TBili  0.7  /  DBili  x   /  AST  40  /  ALT  40  /  AlkPhos  80  08-20    LIVER FUNCTIONS - ( 20 Aug 2022 06:51 )  Alb: 2.8 g/dL / Pro: 5.6 g/dL / ALK PHOS: 80 U/L / ALT: 40 U/L / AST: 40 U/L / GGT: x           PTT - ( 20 Aug 2022 06:51 )  PTT:48.5 sec  Creatine Kinase, Serum: 442 U/L ( @ 00:20)    CARDIAC MARKERS ( 20 Aug 2022 00:20 )  x     / x     / 442 U/L / x     / x      CARDIAC MARKERS ( 19 Aug 2022 05:30 )  x     / x     / 425 U/L / x     / 6.4 ng/mL  CARDIAC MARKERS ( 18 Aug 2022 22:00 )  x     / x     / 428 U/L / x     / 9.6 ng/mL  CARDIAC MARKERS ( 18 Aug 2022 15:30 )  x     / x     / 446 U/L / x     / 11.0 ng/mL  CARDIAC MARKERS ( 18 Aug 2022 11:40 )  x     / x     / 467 U/L / x     / 9.4 ng/mL          TELEMETRY:     EKG:     IMAGING:

## 2022-08-20 NOTE — PROGRESS NOTE ADULT - ASSESSMENT
82 year old man with CAD and HFrEF who was transferred to [facility] CCU with acute on chronic systolic heart failure, severe MR and cardiogenic shock. S/p [place] and IABP.    NEURO:  #Mental status: baseline A&O x3  -off precedex  -haldol PRN for agitation  -neuro exam q4h    CV:  #NSTEMI   - elevated troponins and EKG concerning for NSTEMI.   - Started on heparin gtt and s/p ASA/brillinta  - atorvastatin 80mg  - Trop peaked at 1600, currently trending down to 6526-0875  - continue CM      #systolic CHF: EF 25% with cardiogenic shock  - Echo from 8/17 showed severe mitral regurgitation, severe global LV systolic dysfunction, and RV enlargement + decreased systolic function  - hypotensive on Levophed drip, try to wean off  - RHC showed : RA 30, wedge 30, CO 3.22, CI 1.6,Pa sat 49% on levophed  - s/p Rt groin intra aortic balloon pump and swan cath placed, f/u daily CXRs  - try to take out balloon pump if pt can be weaned off levo  - milrinone 0.25  - HF consulted  - Trend I/Os and daily weights, and Cr  - 6am: CO 6, CI 3, CVP 7, MVO2 70, , lactate 1.3  - f/u MVO2 and VBG at 12pm    #Arrythmia:   -afib with RVR in ED  - CHADS-vasc - 7 - Eliquis on hold for now while on a heparin gtt  - intermittent -140  - d/c'ed digoxin 125mcg  - Monitor on tele    PULM:   presented with SOB  - CT chest from 8/17 showed No pulmonary embolism, small b/l pulmonary effusions and mild pulmonary edema and a nonspecific 4 mm left upper lobe juxtapleural nodule with surrounding ground-glass opacity.   - on non-rebreather mask- > NC currently 3L    RENAL:  #SANDRA:   -Cr 2.26  -possibly 2/2 hypoperfusion  -continue to monitor   -d/c lasix gtt; give lasix 40mg IVP PRN  -repeat CMP at 12pm    GI:  #Transaminitis: Elevated LFTs  - continue to monitor    #Diet:  - Dash diet  - pantoprazole 40mg     #Bowel regiment:  presents with constipation  - miralax, maalox, senna  - nonspecific bowl gas on abdominal X-ray    ENDO:  #DM2: HbA1c 8.3   - Insulin Sliding Scale  - TSH and lipid panel wnl    HEMATOLOGIC:  #CBC results show Hb stable  - continue to monitor    #DVT prophylaxis with heparin gtt    ID:  #Pt without strong objective or clinical evidence of infection. Will observe off antibiotics  -RRT on 8/18for AMS, labs drawn during the rapid remarkable for new leukocytosis to 13, lactate to 13.5, procalc 0.61.  -Elevated lactate possibly 2/2 hypoperfusion given NSTEMI vs sepsis. However, unclear source of infection (pt being treated with bactrim for UTI for positive UA but negative UCx)  -f/u MRSA swab  -f/u RVP  -f/u blood cultures  -cefepime 1g qday 82 year old man with CAD and HFrEF who was transferred to [facility] CCU with acute on chronic systolic heart failure, severe MR and cardiogenic shock. S/p [place] and IABP.    NEURO:  #Mental status: baseline A&O x3  -off precedex  -haldol PRN for agitation  -neuro exam q4h    CV:  #NSTEMI   - elevated troponins and EKG concerning for NSTEMI.   - Started on heparin gtt and s/p ASA/brillinta  - atorvastatin 80mg  - Trop peaked at 1600, currently trending down to [id number]  - continue CM      #systolic CHF: EF 25% with cardiogenic shock  - Echo from 8/17 showed severe mitral regurgitation, severe global LV systolic dysfunction, and RV enlargement + decreased systolic function  - hypotensive on Levophed drip, try to wean off  - RHC showed : RA 30, wedge 30, CO 3.22, CI 1.6,Pa sat 49% on levophed  - s/p Rt groin intra aortic balloon pump and swan cath placed, f/u daily CXRs  - milrinone 0.25  - HF consulted  - Trend I/Os and daily weights, and Cr  - plan to remove IABP today    #Arrythmia:   -afib with RVR in ED  - CHADS-vasc - 7 - Eliquis on hold for now while on a heparin gtt  - intermittent -140  - d/c'ed digoxin 125mcg  - Monitor on tele    PULM:   presented with SOB  - CT chest from 8/17 showed No pulmonary embolism, small b/l pulmonary effusions and mild pulmonary edema and a nonspecific 4 mm left upper lobe juxtapleural nodule with surrounding ground-glass opacity.   - on non-rebreather mask- > NC currently 3L    RENAL:  #SANDRA:   -Cr 2.26  -possibly 2/2 hypoperfusion  -continue to monitor   -d/c lasix gtt; give lasix 40mg IVP PRN  -repeat CMP at 12pm    GI:  #Transaminitis: Elevated LFTs  - continue to monitor    #Diet:  - Dash diet  - pantoprazole 40mg     #Bowel regiment:  presents with constipation  - miralax, maalox, senna  - nonspecific bowl gas on abdominal X-ray    ENDO:  #DM2: HbA1c 8.3   - Insulin Sliding Scale  - TSH and lipid panel wnl    HEMATOLOGIC:  #CBC results show Hb stable  - continue to monitor    #DVT prophylaxis with heparin gtt    ID:  #Pt without strong objective or clinical evidence of infection. Will observe off antibiotics  -RRT on 8/18for AMS, labs drawn during the rapid remarkable for new leukocytosis to 13, lactate to 13.5, procalc 0.61.  -Elevated lactate possibly 2/2 hypoperfusion given NSTEMI vs sepsis. However, unclear source of infection (pt being treated with bactrim for UTI for positive UA but negative UCx)  -f/u MRSA swab  -f/u RVP  -f/u blood cultures  -cefepime 1g qday 82 year old man with CAD and HFrEF who was transferred to [facility] CCU with acute on chronic systolic heart failure, severe MR and cardiogenic shock. S/p [place] and IABP.    NEURO:  #Mental status: baseline A&O x3  -off precedex  -haldol PRN for agitation  -neuro exam q4h    CV:  #NSTEMI   - elevated troponins and EKG concerning for NSTEMI.   - Started on heparin gtt and s/p ASA/brillinta  - atorvastatin 80mg  - Trop peaked at 1600, currently trending down to [id number]  - continue CM      #systolic CHF: EF 25% with cardiogenic shock  - Echo from 8/17 showed severe mitral regurgitation, severe global LV systolic dysfunction, and RV enlargement + decreased systolic function  - hypotensive on Levophed drip, try to wean off  - RHC showed : RA 30, wedge 30, CO 3.22, CI 1.6,Pa sat 49% on levophed  - s/p Rt groin intra aortic balloon pump and swan cath placed, f/u daily CXRs  - milrinone 0.25  - HF consulted  - Trend I/Os and daily weights, and Cr  - plan to remove IABP today    #Arrythmia:   -afib with RVR in ED  - CHADS-vasc - 7 - Eliquis on hold for now while on a heparin gtt  - intermittent -140  - d/c'ed digoxin 125mcg  - Monitor on tele    PULM:   presented with SOB  - CT chest from 8/17 showed No pulmonary embolism, small b/l pulmonary effusions and mild pulmonary edema and a nonspecific 4 mm left upper lobe juxtapleural nodule with surrounding ground-glass opacity.   - on non-rebreather mask- > NC     RENAL:  #SANDRA:   -Cr 2.26  -possibly 2/2 hypoperfusion  -continue to monitor   -d/c lasix gtt; give lasix 40mg IVP PRN if CVP > 10      GI:  #Transaminitis: Elevated LFTs  - continue to monitor    #Diet:  - Dash diet  - pantoprazole 40mg     #Bowel regiment:  presents with constipation  - miralax, maalox, senna  - nonspecific bowl gas on abdominal X-ray    ENDO:  #DM2: HbA1c 8.3   - Insulin Sliding Scale  - TSH and lipid panel wnl    HEMATOLOGIC:  #CBC results show Hb stable  - continue to monitor    #DVT prophylaxis with heparin gtt    ID:  #Pt without strong objective or clinical evidence of infection. Will observe off antibiotics  -RRT on 8/18for AMS, labs drawn during the rapid remarkable for new leukocytosis to 13, lactate to 13.5, procalc 0.61.  -Elevated lactate possibly 2/2 hypoperfusion given NSTEMI vs sepsis. However, unclear source of infection (pt being treated with bactrim for UTI for positive UA but negative UCx)  -f/u MRSA swab  -RVP neg  -f/u blood cultures  -cefepime 1g qday empiric coverage 82 year old man with CAD and HFrEF who was transferred to [facility] CCU with acute on chronic systolic heart failure, severe MR and cardiogenic shock. S/p [place] and IABP.    NEURO:  #Mental status: baseline A&O x3  -off precedex  -haldol PRN for agitation  -neuro exam q4h    CV:  #NSTEMI   - elevated troponins and EKG concerning for NSTEMI.   - Started on heparin gtt and s/p ASA/brillinta  - atorvastatin 80mg  - Trop peaked at 1600, currently trending down to [id number]  - continue CM      #systolic CHF: EF 25% with cardiogenic shock  - Echo from 8/17 showed severe mitral regurgitation, severe global LV systolic dysfunction, and RV enlargement + decreased systolic function  - RHC showed : RA 30, wedge 30, CO 3.22, CI 1.6,Pa sat 49% on levophed  - s/p Rt groin intra aortic balloon pump and swan cath placed, f/u daily CXRs  - milrinone 0.25  - currently off levo  - HF consulted  - Trend I/Os and daily weights, and Cr  - plan to remove IABP today    #Arrythmia:   -afib with RVR in ED  - CHADS-vasc - 7 - Eliquis on hold for now while on a heparin gtt  - intermittent -140  - d/c'ed digoxin 125mcg  - Monitor on tele    PULM:   presented with SOB  - CT chest from 8/17 showed No pulmonary embolism, small b/l pulmonary effusions and mild pulmonary edema and a nonspecific 4 mm left upper lobe juxtapleural nodule with surrounding ground-glass opacity.   - on non-rebreather mask- > NC     RENAL:  #SANDRA:   -Cr 2  -possibly 2/2 hypoperfusion  -continue to monitor   -d/c lasix gtt; give lasix 40mg IVP PRN if CVP > 10    GI:  #Transaminitis: Elevated LFTs  - continue to monitor    #Diet:  - Dash diet  - pantoprazole 40mg     #Bowel regiment:  presents with constipation  - miralax, maalox, senna  - nonspecific bowl gas on abdominal X-ray    ENDO:  #DM2: HbA1c 8.3   - Insulin Sliding Scale  - TSH and lipid panel wnl    HEMATOLOGIC:  #CBC results show Hb stable  - continue to monitor    #DVT prophylaxis with heparin gtt    ID:  #Pt without strong objective or clinical evidence of infection. Will observe off antibiotics  -RRT on 8/18for AMS, labs drawn during the rapid remarkable for new leukocytosis to 13, lactate to 13.5, procalc 0.61.  -Elevated lactate possibly 2/2 hypoperfusion given NSTEMI vs sepsis. However, unclear source of infection (pt being treated with bactrim for UTI for positive UA but negative UCx)  -f/u MRSA swab  -RVP neg  -f/u blood cultures  -cefepime 1g qday empiric coverage

## 2022-08-20 NOTE — CHART NOTE - NSCHARTNOTEFT_GEN_A_CORE
Overnight pt remained off norepinephrine. Hemodynamics at 8pm with IABP 1:2 --> Vikki CO 7.3, CI 3.5, CVP 6, MV02 74, , lact 1.2.  Hemodynamics at midnight with IABP 1:3 --> Vikki CO 10, CI 5, CVP 5, MV02 81.8, , lact 1.1  Placed IABP 1:1 @ 1:30am with plan to discontinue heparin @ 6am.  Will continue to monitor.

## 2022-08-21 NOTE — CONSULT NOTE ADULT - SUBJECTIVE AND OBJECTIVE BOX
Neurology - Consult Note - 08-21-22  -  Marsha Khan DO  PGY-2 Neurology  Spectra: [phone] ([facility]), [phone] ([facility])  -    Name: CHIDI RUBIO; 82y (1940)    Reason for Admission: Shortness of breath    Chief Complaint:   HPI:  82 y.o M pmh of HTN, DM2, CAD s/p Stent x3, CVA 2/2 symptomatic carotid stenosis s/p stent, JACKELIN on CPAP, prostate ca s/p seeding originally presented with SOB &  transferred to [facility] CCU with acute on chronic systolic heart failure, severe MR and cardiogenic shock. S/p [place] and IABP. Code stroke was called 8:42 am. RN at bedside reports he is unresponsive with pinpoint pupils. Another RN at bedside had been with the patient on thursday and reported he was AAox2-3 and is remarkably different now. NP at bedside gave collateral that patient developed ICU delirium during his stay. Patient received 0.5 mg haldol last on (8/20) and Seroquel 25 mg around 9 pm last night (8/20). Likely last known normal was yesterday nigh, unable to retrieve an exact time due to different staff then. Patient's Eliquis was restarted yesterday, last dose was given today at 5 am. Patient is unresponsive to sternal rub and does not follow command. He is mouth breathing and staring straight with intermittent blinking.        Review of Systems: unable to preform due to unresponsive     Allergies: ceftriaxone (Urticaria), ciprofloxacin (Rash), shrimp (Unknown)    PMHx: Diabetes mellitus, CAD, Prostate ca, Sleep apnea, Hypertension, Hypercholesteremia    PFHx: No pertinent family history in first degree relatives  FH: type 2 diabetes    PSuHx:   S/P cholecystectomy  S/P coronary angioplasty  H/O carotid endarterectomy    Medications:  MEDICATIONS  (STANDING):  apixaban 2.5 milliGRAM(s) Oral every 12 hours  aspirin enteric coated 81 milliGRAM(s) Oral daily  atorvastatin 80 milliGRAM(s) Oral at bedtime  cefepime   IVPB 1000 milliGRAM(s) IV Intermittent daily  chlorhexidine 2% Cloths 1 Application(s) Topical <User Schedule>  clopidogrel Tablet 75 milliGRAM(s) Oral daily  dextrose 5%. 1000 milliLiter(s) (50 mL/Hr) IV Continuous <Continuous>  dextrose 5%. 1000 milliLiter(s) (100 mL/Hr) IV Continuous <Continuous>  dextrose 50% Injectable 25 Gram(s) IV Push once  dextrose 50% Injectable 12.5 Gram(s) IV Push once  dextrose 50% Injectable 25 Gram(s) IV Push once  fluticasone propionate 50 MICROgram(s)/spray Nasal Spray 1 Spray(s) Both Nostrils two times a day  glucagon  Injectable 1 milliGRAM(s) IntraMuscular once  insulin glargine Injectable (LANTUS) 15 Unit(s) SubCutaneous at bedtime  insulin lispro (ADMELOG) corrective regimen sliding scale   SubCutaneous three times a day before meals  insulin lispro (ADMELOG) corrective regimen sliding scale   SubCutaneous at bedtime  insulin lispro Injectable (ADMELOG) 5 Unit(s) SubCutaneous three times a day before meals  ketorolac 0.5% Ophthalmic Solution 1 Drop(s) Left EYE three times a day  milrinone Infusion 0.25 MICROgram(s)/kG/Min (6.65 mL/Hr) IV Continuous <Continuous>  mometasone 220 MICROgram(s) Inhaler 2 Puff(s) Inhalation daily  pantoprazole    Tablet 40 milliGRAM(s) Oral before breakfast  polyethylene glycol 3350 17 Gram(s) Oral daily  senna 2 Tablet(s) Oral at bedtime  tamsulosin 0.4 milliGRAM(s) Oral at bedtime    MEDICATIONS  (PRN):  acetaminophen     Tablet .. 650 milliGRAM(s) Oral every 6 hours PRN Temp greater or equal to 38C (100.4F), Mild Pain (1 - 3)  aluminum hydroxide/magnesium hydroxide/simethicone Suspension 30 milliLiter(s) Oral every 4 hours PRN Dyspepsia  dextrose Oral Gel 15 Gram(s) Oral once PRN Blood Glucose LESS THAN 70 milliGRAM(s)/deciliter      Vitals:  T(C): 35.9 (08-21-22 @ 04:00), Max: 37.1 (08-20-22 @ 20:00)  HR: 124 (08-21-22 @ 08:48) (82 - 146)  BP: 111/73 (08-20-22 @ 18:00) (92/55 - 144/83)  RR: 31 (08-21-22 @ 08:48) (13 - 37)  SpO2: 100% (08-21-22 @ 08:48) (98% - 100%)      Constitutional: well-developed, well-nourished, well-groomed  Eyes: ophthalmoscopic exam deferred secondary to COVID-19 pandemic  Cardiovascular: no swelling, warm-to-touch    Neurological Examination:    - Mental Status: AAOx0, eyes open and blinking staring straight ahead  does not respond to sternal rub.   Pupils are about  2-3 mm, minimal and slugglish constrictions to light, normal blink to threat   - Motor/Strength Testing: unable to preform     - Reflexes:   Bicep (C5/C6):                  R 2+ --- L 2+   Brachioradialis (C5/C6) :   R 2+ --- L 2+   Patella (L3/L4) :                 R 2+ --- L 2+   Ankle (S1) :                       R 2+ --- L 2+     - Plant responses down bilaterally.  - Sensory: unable to preform   - Coordination:  unable to preform   - Gait:  unable to preform     Labs: 8/21/22             10.5   6.67  )-----------( 165                  32.0     138  |  102  |  38<H>  ----------------------------<  293<H>  4.6   |  20<L>  |  1.84<H>    Ca    8.6  Phos  3.6       Mg     2.30        TPro  6.3  /  Alb  3.2<L>  /  TBili  0.6  /  DBili  x   /  AST  32  /  ALT  37  /  AlkPhos  90      CAPILLARY BLOOD GLUCOSE  300 (21 Aug 2022 09:00  POCT Blood Glucose.: 305 mg/dL     LIVER FUNCTIONS   Alb: 3.2 g/dL / Pro: 6.3 g/dL / ALK PHOS: 90 U/L / ALT: 37 U/L / AST: 32 U/L / GGT: x           Culture - Blood (collected 19 Aug 2022 11:30)  Source: .Blood Blood-Venous  Preliminary Report (20 Aug 2022 17:02):    No growth to date.    Culture - Blood (collected 19 Aug 2022 11:30)  Source: .Blood Blood-Catheter  Preliminary Report (20 Aug 2022 17:02):    No growth to date.    PTT - PTT: 48.5 sec    Radiology:   8/21/22  CT head non-contrast shows no hemorrhage (read 9:25 am)  CT perfusion unremarkable    CTA     Neurology - Consult Note - 08-21-22  -  Marsha Khan DO  PGY-2 Neurology  Spectra: [phone] ([facility]), [phone] ([facility])  -  Name: CHIDI RUBIO; 82y (1940)  Reason for Admission: Shortness of breath    HPI:  82 y.o M pmh of HTN, DM2, CAD s/p Stent x3, CVA 2/2 symptomatic carotid stenosis s/p stent, JACKELIN on CPAP, prostate ca s/p seeding originally presented with SOB & transferred to [facility] CCU with acute on chronic systolic heart failure, severe MR and cardiogenic shock. S/p [place] and IABP. Code stroke was called 8:42 am. RN at bedside reports he is unresponsive with pinpoint pupils. Another RN at bedside had been with the patient on thursday and reported he was AAox2-3 and is remarkably different now. NP at bedside gave collateral that patient developed ICU delirium during his stay. Patient received 0.5 mg haldol last on (8/20) and Seroquel 25 mg around 9 pm last night (8/20). Likely last known normal was yesterday nigh, unable to retrieve an exact time due to different staff then. Patient's Eliquis was restarted yesterday, last dose was given today at 5 am. Patient is unresponsive to sternal rub and does not follow command. He is mouth breathing and staring straight with intermittent blinking.        Review of Systems: unable to preform due to unresponsive     Allergies: ceftriaxone (Urticaria), ciprofloxacin (Rash), shrimp (Unknown)    PMHx: Diabetes mellitus, CAD, Prostate ca, Sleep apnea, Hypertension, Hypercholesteremia    PFHx: No pertinent family history in first degree relatives  FH: type 2 diabetes    PSuHx:   S/P cholecystectomy  S/P coronary angioplasty  H/O carotid endarterectomy    Medications:  MEDICATIONS  (STANDING):  apixaban 2.5 milliGRAM(s) Oral every 12 hours  aspirin enteric coated 81 milliGRAM(s) Oral daily  atorvastatin 80 milliGRAM(s) Oral at bedtime  cefepime   IVPB 1000 milliGRAM(s) IV Intermittent daily  chlorhexidine 2% Cloths 1 Application(s) Topical <User Schedule>  clopidogrel Tablet 75 milliGRAM(s) Oral daily  dextrose 5%. 1000 milliLiter(s) (50 mL/Hr) IV Continuous <Continuous>  dextrose 5%. 1000 milliLiter(s) (100 mL/Hr) IV Continuous <Continuous>  dextrose 50% Injectable 25 Gram(s) IV Push once  dextrose 50% Injectable 12.5 Gram(s) IV Push once  dextrose 50% Injectable 25 Gram(s) IV Push once  fluticasone propionate 50 MICROgram(s)/spray Nasal Spray 1 Spray(s) Both Nostrils two times a day  glucagon  Injectable 1 milliGRAM(s) IntraMuscular once  insulin glargine Injectable (LANTUS) 15 Unit(s) SubCutaneous at bedtime  insulin lispro (ADMELOG) corrective regimen sliding scale   SubCutaneous three times a day before meals  insulin lispro (ADMELOG) corrective regimen sliding scale   SubCutaneous at bedtime  insulin lispro Injectable (ADMELOG) 5 Unit(s) SubCutaneous three times a day before meals  ketorolac 0.5% Ophthalmic Solution 1 Drop(s) Left EYE three times a day  milrinone Infusion 0.25 MICROgram(s)/kG/Min (6.65 mL/Hr) IV Continuous <Continuous>  mometasone 220 MICROgram(s) Inhaler 2 Puff(s) Inhalation daily  pantoprazole    Tablet 40 milliGRAM(s) Oral before breakfast  polyethylene glycol 3350 17 Gram(s) Oral daily  senna 2 Tablet(s) Oral at bedtime  tamsulosin 0.4 milliGRAM(s) Oral at bedtime    MEDICATIONS  (PRN):  acetaminophen     Tablet .. 650 milliGRAM(s) Oral every 6 hours PRN Temp greater or equal to 38C (100.4F), Mild Pain (1 - 3)  aluminum hydroxide/magnesium hydroxide/simethicone Suspension 30 milliLiter(s) Oral every 4 hours PRN Dyspepsia  dextrose Oral Gel 15 Gram(s) Oral once PRN Blood Glucose LESS THAN 70 milliGRAM(s)/deciliter      Vitals:  T(C): 35.9 (08-21-22 @ 04:00), Max: 37.1 (08-20-22 @ 20:00)  HR: 124 (08-21-22 @ 08:48) (82 - 146)  BP: 111/73 (08-20-22 @ 18:00) (92/55 - 144/83)  RR: 31 (08-21-22 @ 08:48) (13 - 37)  SpO2: 100% (08-21-22 @ 08:48) (98% - 100%)      Constitutional: well-developed, well-nourished, well-groomed  Eyes: ophthalmoscopic exam deferred secondary to COVID-19 pandemic  Cardiovascular: no swelling, warm-to-touch    Neurological Examination:    - Mental Status: AAOx0, eyes open and blinking staring straight ahead  does not respond to sternal rub.   Pupils are about  2-3 mm, minimal and slugglish constrictions to light, normal blink to threat   - Motor/Strength Testing: unable to preform     - Reflexes:   Bicep (C5/C6):                  R 2+ --- L 2+   Brachioradialis (C5/C6) :   R 2+ --- L 2+   Patella (L3/L4) :                 R 2+ --- L 2+   Ankle (S1) :                       R 2+ --- L 2+     - Plant responses down bilaterally.  - Sensory: unable to preform   - Coordination:  unable to preform   - Gait:  unable to preform     Labs: 8/21/22             10.5   6.67  )-----------( 165                  32.0     138  |  102  |  38<H>  ----------------------------<  293<H>  4.6   |  20<L>  |  1.84<H>    Ca    8.6  Phos  3.6       Mg     2.30        TPro  6.3  /  Alb  3.2<L>  /  TBili  0.6  /  DBili  x   /  AST  32  /  ALT  37  /  AlkPhos  90      CAPILLARY BLOOD GLUCOSE  300 (21 Aug 2022 09:00  POCT Blood Glucose.: 305 mg/dL     LIVER FUNCTIONS   Alb: 3.2 g/dL / Pro: 6.3 g/dL / ALK PHOS: 90 U/L / ALT: 37 U/L / AST: 32 U/L / GGT: x           Culture - Blood (collected 19 Aug 2022 11:30)  Source: .Blood Blood-Venous  Preliminary Report (20 Aug 2022 17:02):    No growth to date.    Culture - Blood (collected 19 Aug 2022 11:30)  Source: .Blood Blood-Catheter  Preliminary Report (20 Aug 2022 17:02):    No growth to date.    PTT - PTT: 48.5 sec    Radiology:   8/21/22  CT head non-contrast shows no hemorrhage (read 9:25 am)  CT perfusion unremarkable    CTA     Neurology - Consult Note - 08-21-22  -  Marsha Khan DO  PGY-2 Neurology  Spectra: [phone] ([facility]), [phone] ([facility])  -  Name: CHIDI RUBIO; 82y (1940)  Reason for Admission: Shortness of breath    HPI:  82 y.o M pmh of HTN, DM2, CAD s/p Stent x3, CVA 2/2 symptomatic carotid stenosis s/p stent, JACKELIN on CPAP, prostate ca s/p seeding originally presented with SOB & transferred to [facility] CCU with acute on chronic systolic heart failure, severe MR and cardiogenic shock. S/p [place] and IABP. Code stroke was called 8:42 am. RN at bedside reports pt is unresponsive with pinpoint pupils. Another RN at bedside had been with the patient on thursday and reported he was AAox2-3 and is remarkably different now. CCU NP at bedside gave collateral that patient developed ICU delirium during his stay. Patient received 0.5 mg haldol last on (12am 8/20) and Seroquel 25 mg around 9 pm last night (8/20). Likely last known normal was yesterday night, unable to retrieve an exact time due to different staff then. Patient's Eliquis was restarted yesterday, last dose was given today at 5 am. Patient is unresponsive to sternal rub and does not follow command. He is mouth breathing and staring straight with intermittent blinking.      Review of Systems: unable to preform due to unresponsive     Allergies: ceftriaxone (Urticaria), ciprofloxacin (Rash), shrimp (Unknown)    PMHx: Diabetes mellitus, CAD, Prostate ca, Sleep apnea, Hypertension, Hypercholesteremia    PFHx: No pertinent family history in first degree relatives  FH: type 2 diabetes    PSuHx:   S/P cholecystectomy  S/P coronary angioplasty  H/O carotid endarterectomy    Medications:  MEDICATIONS  (STANDING):  apixaban 2.5 milliGRAM(s) Oral every 12 hours  aspirin enteric coated 81 milliGRAM(s) Oral daily  atorvastatin 80 milliGRAM(s) Oral at bedtime  cefepime   IVPB 1000 milliGRAM(s) IV Intermittent daily  chlorhexidine 2% Cloths 1 Application(s) Topical <User Schedule>  clopidogrel Tablet 75 milliGRAM(s) Oral daily  dextrose 5%. 1000 milliLiter(s) (50 mL/Hr) IV Continuous <Continuous>  dextrose 5%. 1000 milliLiter(s) (100 mL/Hr) IV Continuous <Continuous>  dextrose 50% Injectable 25 Gram(s) IV Push once  dextrose 50% Injectable 12.5 Gram(s) IV Push once  dextrose 50% Injectable 25 Gram(s) IV Push once  fluticasone propionate 50 MICROgram(s)/spray Nasal Spray 1 Spray(s) Both Nostrils two times a day  glucagon  Injectable 1 milliGRAM(s) IntraMuscular once  insulin glargine Injectable (LANTUS) 15 Unit(s) SubCutaneous at bedtime  insulin lispro (ADMELOG) corrective regimen sliding scale   SubCutaneous three times a day before meals  insulin lispro (ADMELOG) corrective regimen sliding scale   SubCutaneous at bedtime  insulin lispro Injectable (ADMELOG) 5 Unit(s) SubCutaneous three times a day before meals  ketorolac 0.5% Ophthalmic Solution 1 Drop(s) Left EYE three times a day  milrinone Infusion 0.25 MICROgram(s)/kG/Min (6.65 mL/Hr) IV Continuous <Continuous>  mometasone 220 MICROgram(s) Inhaler 2 Puff(s) Inhalation daily  pantoprazole    Tablet 40 milliGRAM(s) Oral before breakfast  polyethylene glycol 3350 17 Gram(s) Oral daily  senna 2 Tablet(s) Oral at bedtime  tamsulosin 0.4 milliGRAM(s) Oral at bedtime    MEDICATIONS  (PRN):  acetaminophen     Tablet .. 650 milliGRAM(s) Oral every 6 hours PRN Temp greater or equal to 38C (100.4F), Mild Pain (1 - 3)  aluminum hydroxide/magnesium hydroxide/simethicone Suspension 30 milliLiter(s) Oral every 4 hours PRN Dyspepsia  dextrose Oral Gel 15 Gram(s) Oral once PRN Blood Glucose LESS THAN 70 milliGRAM(s)/deciliter      Vitals:  T(C): 35.9 (08-21-22 @ 04:00), Max: 37.1 (08-20-22 @ 20:00)  HR: 124 (08-21-22 @ 08:48) (82 - 146)  BP: 111/73 (08-20-22 @ 18:00) (92/55 - 144/83)  RR: 31 (08-21-22 @ 08:48) (13 - 37)  SpO2: 100% (08-21-22 @ 08:48) (98% - 100%)    Neurological Examination:  - Mental Status: AAOx0, eyes open and blinking staring straight ahead  does not respond to sternal rub.   Pupils are about  2-3 mm, minimal and slugglish constrictions to light, normal blink to threat   - Motor/Strength Testing: unable to preform   - Sensory: unable to preform   - Coordination:  unable to preform   - Gait:  unable to preform     Labs: 8/21/22             10.5   6.67  )-----------( 165                  32.0     138  |  102  |  38<H>  ----------------------------<  293<H>  4.6   |  20<L>  |  1.84<H>    Ca    8.6  Phos  3.6       Mg     2.30        TPro  6.3  /  Alb  3.2<L>  /  TBili  0.6  /  DBili  x   /  AST  32  /  ALT  37  /  AlkPhos  90      CAPILLARY BLOOD GLUCOSE  300 (21 Aug 2022 09:00  POCT Blood Glucose.: 305 mg/dL     LIVER FUNCTIONS   Alb: 3.2 g/dL / Pro: 6.3 g/dL / ALK PHOS: 90 U/L / ALT: 37 U/L / AST: 32 U/L / GGT: x           Culture - Blood (collected 19 Aug 2022 11:30)  Source: .Blood Blood-Venous  Preliminary Report (20 Aug 2022 17:02):    No growth to date.    Culture - Blood (collected 19 Aug 2022 11:30)  Source: .Blood Blood-Catheter  Preliminary Report (20 Aug 2022 17:02):    No growth to date.    PTT - PTT: 48.5 sec    Radiology:   8/21/22  CT PERFUSION: No core infarct or at risk ischemic tissue identified.  CTA BRAIN:  -No large vessel occlusion.  -Moderate stenosis of the right cavernous ICA.  -Mild stenosis of the right MCA proximal M1 segment.  CTA NECK:  -No vaso-occlusive disease.  -Bilateral pleural effusions, partially visualized.

## 2022-08-21 NOTE — CONSULT NOTE ADULT - ATTENDING COMMENTS
Episode of poor responsiveness, now recovered. Exam with normal cranial nerves, motor 5/5 and intact sensation. He is still a bit confused about where he is, but naming and repetition intact. Given sudden onset, would pursue stroke. If recurs, may also need EEG

## 2022-08-21 NOTE — CHART NOTE - NSCHARTNOTEFT_GEN_A_CORE
Patient on milrinone at 0.25mcg. 1am hemodynamics- CO/CI 13.1/6.37, CVP8, . MVO2 84.4%, Art lactate 2.4    Patient with decreased UOP, 250ml bolus over 2 hours ordered and will check hemodynamics again.

## 2022-08-21 NOTE — PROGRESS NOTE ADULT - SUBJECTIVE AND OBJECTIVE BOX
PATIENT:  CHIDI RUBIO  [id number]    INTERVAL HISTORY/OVERNIGHT EVENTS: IABP discontinued on , pt on Milrinone 0.25 drip, pt received 500 cc IVF bolus overnight for CVP = 8, as well as seroquel.     MEDICATIONS:  MEDICATIONS  (STANDING):  apixaban 2.5 milliGRAM(s) Oral every 12 hours  aspirin enteric coated 81 milliGRAM(s) Oral daily  atorvastatin 80 milliGRAM(s) Oral at bedtime  cefepime   IVPB 1000 milliGRAM(s) IV Intermittent daily  chlorhexidine 2% Cloths 1 Application(s) Topical <User Schedule>  clopidogrel Tablet 75 milliGRAM(s) Oral daily  dextrose 5%. 1000 milliLiter(s) (100 mL/Hr) IV Continuous <Continuous>  dextrose 5%. 1000 milliLiter(s) (50 mL/Hr) IV Continuous <Continuous>  dextrose 50% Injectable 25 Gram(s) IV Push once  dextrose 50% Injectable 12.5 Gram(s) IV Push once  dextrose 50% Injectable 25 Gram(s) IV Push once  donepezil 10 milliGRAM(s) Oral at bedtime  fluticasone propionate 50 MICROgram(s)/spray Nasal Spray 1 Spray(s) Both Nostrils two times a day  glucagon  Injectable 1 milliGRAM(s) IntraMuscular once  haloperidol     Tablet 0.5 milliGRAM(s) Oral daily  insulin glargine Injectable (LANTUS) 15 Unit(s) SubCutaneous at bedtime  insulin lispro (ADMELOG) corrective regimen sliding scale   SubCutaneous at bedtime  insulin lispro (ADMELOG) corrective regimen sliding scale   SubCutaneous three times a day before meals  insulin lispro Injectable (ADMELOG) 5 Unit(s) SubCutaneous three times a day before meals  ketorolac 0.5% Ophthalmic Solution 1 Drop(s) Left EYE three times a day  loratadine 10 milliGRAM(s) Oral daily  milrinone Infusion 0.25 MICROgram(s)/kG/Min (6.65 mL/Hr) IV Continuous <Continuous>  mometasone 220 MICROgram(s) Inhaler 2 Puff(s) Inhalation daily  pantoprazole    Tablet 40 milliGRAM(s) Oral before breakfast  polyethylene glycol 3350 17 Gram(s) Oral daily  senna 2 Tablet(s) Oral at bedtime  tamsulosin 0.4 milliGRAM(s) Oral at bedtime    MEDICATIONS  (PRN):  acetaminophen     Tablet .. 650 milliGRAM(s) Oral every 6 hours PRN Temp greater or equal to 38C (100.4F), Mild Pain (1 - 3)  aluminum hydroxide/magnesium hydroxide/simethicone Suspension 30 milliLiter(s) Oral every 4 hours PRN Dyspepsia  dextrose Oral Gel 15 Gram(s) Oral once PRN Blood Glucose LESS THAN 70 milliGRAM(s)/deciliter  melatonin 3 milliGRAM(s) Oral at bedtime PRN Insomnia      ALLERGIES:  Allergies    ceftriaxone (Urticaria)  ciprofloxacin (Rash)  schrimp (Unknown)    Intolerances        OBJECTIVE:  ICU Vital Signs Last 24 Hrs  T(C): 35.9 (21 Aug 2022 04:00), Max: 37.1 (20 Aug 2022 20:00)  T(F): 96.7 (21 Aug 2022 04:00), Max: 98.8 (20 Aug 2022 20:00)  HR: 119 (21 Aug 2022 06:00) (82 - 146)  BP: 111/73 (20 Aug 2022 18:00) (92/55 - 144/83)  BP(mean): 87 (20 Aug 2022 18:00) (66 - 106)  ABP: 113/62 (21 Aug 2022 06:00) (81/43 - 141/82)  ABP(mean): 78 (21 Aug 2022 06:00) (57 - 101)  RR: 25 (21 Aug 2022 06:00) (13 - 30)  SpO2: 100% (21 Aug 2022 06:00) (98% - 100%)    O2 Parameters below as of 21 Aug 2022 06:00  Patient On (Oxygen Delivery Method): nasal cannula  O2 Flow (L/min): 2          Adult Advanced Hemodynamics Last 24 Hrs  CVP(mm Hg): 18 (21 Aug 2022 06:00) (1 - 18)  CVP(cm H2O): --  CO: --  CI: --  PA: 58/34 (21 Aug 2022 06:00) (30/8 - 62/34)  PA(mean): 45 (21 Aug 2022 06:00) (15 - 46)  PCWP: --  SVR: --  SVRI: --  PVR: --  PVRI: --  CAPILLARY BLOOD GLUCOSE      POCT Blood Glucose.: 259 mg/dL (20 Aug 2022 21:48)  POCT Blood Glucose.: 181 mg/dL (20 Aug 2022 17:49)  POCT Blood Glucose.: 155 mg/dL (20 Aug 2022 11:52)  POCT Blood Glucose.: 139 mg/dL (20 Aug 2022 08:05)    CAPILLARY BLOOD GLUCOSE      POCT Blood Glucose.: 259 mg/dL (20 Aug 2022 21:48)    I&O's Summary    20 Aug 2022 07:01  -  21 Aug 2022 07:00  --------------------------------------------------------  IN: 533.4 mL / OUT: 1280 mL / NET: -746.6 mL      Daily     Daily Weight in k.1 (21 Aug 2022 01:00)    PHYSICAL EXAMINATION:  General: WN/WD NAD  HEENT: PERRLA, EOMI, moist mucous membranes  Neurology: A&Ox3, nonfocal, MCDANIELS x 4  Respiratory: CTA B/L, normal respiratory effort, no wheezes, crackles, rales  CV: RRR, S1S2, no murmurs, rubs or gallops  Abdominal: Soft, NT, ND +BS, Last BM  Extremities: No edema, + peripheral pulses  Incisions:   Tubes:    LABS:  ABG - ( 21 Aug 2022 01:00 )  pH, Arterial: 7.44  pH, Blood: x     /  pCO2: 33    /  pO2: 108   / HCO3: 22    / Base Excess: -1.3  /  SaO2: 98.4                                    10.3   6.32  )-----------( 156      ( 21 Aug 2022 01:00 )             31.3     08-    138  |  102  |  38<H>  ----------------------------<  293<H>  4.6   |  20<L>  |  1.84<H>    Ca    8.6      21 Aug 2022 01:00  Phos  3.6     08-  Mg     2.30     -    TPro  6.3  /  Alb  3.2<L>  /  TBili  0.6  /  DBili  x   /  AST  32  /  ALT  37  /  AlkPhos  90      LIVER FUNCTIONS - ( 21 Aug 2022 01:00 )  Alb: 3.2 g/dL / Pro: 6.3 g/dL / ALK PHOS: 90 U/L / ALT: 37 U/L / AST: 32 U/L / GGT: x           PTT - ( 20 Aug 2022 06:51 )  PTT:48.5 sec  Creatine Kinase, Serum: 290 U/L ( @ 01:00)    CARDIAC MARKERS ( 21 Aug 2022 01:00 )  x     / x     / 290 U/L / x     / x      CARDIAC MARKERS ( 20 Aug 2022 00:20 )  x     / x     / 442 U/L / x     / x              TELEMETRY:     EKG:     IMAGING:       PATIENT:  CHIDI RUBIO  [id number]    INTERVAL HISTORY/OVERNIGHT EVENTS: IABP discontinued on , pt on Milrinone 0.25 drip, pt received 500 cc IVF bolus overnight for CVP = 8, as well as seroquel.     MEDICATIONS:  MEDICATIONS  (STANDING):  apixaban 2.5 milliGRAM(s) Oral every 12 hours  aspirin enteric coated 81 milliGRAM(s) Oral daily  atorvastatin 80 milliGRAM(s) Oral at bedtime  cefepime   IVPB 1000 milliGRAM(s) IV Intermittent daily  chlorhexidine 2% Cloths 1 Application(s) Topical <User Schedule>  clopidogrel Tablet 75 milliGRAM(s) Oral daily  dextrose 5%. 1000 milliLiter(s) (100 mL/Hr) IV Continuous <Continuous>  dextrose 5%. 1000 milliLiter(s) (50 mL/Hr) IV Continuous <Continuous>  dextrose 50% Injectable 25 Gram(s) IV Push once  dextrose 50% Injectable 12.5 Gram(s) IV Push once  dextrose 50% Injectable 25 Gram(s) IV Push once  donepezil 10 milliGRAM(s) Oral at bedtime  fluticasone propionate 50 MICROgram(s)/spray Nasal Spray 1 Spray(s) Both Nostrils two times a day  glucagon  Injectable 1 milliGRAM(s) IntraMuscular once  haloperidol     Tablet 0.5 milliGRAM(s) Oral daily  insulin glargine Injectable (LANTUS) 15 Unit(s) SubCutaneous at bedtime  insulin lispro (ADMELOG) corrective regimen sliding scale   SubCutaneous at bedtime  insulin lispro (ADMELOG) corrective regimen sliding scale   SubCutaneous three times a day before meals  insulin lispro Injectable (ADMELOG) 5 Unit(s) SubCutaneous three times a day before meals  ketorolac 0.5% Ophthalmic Solution 1 Drop(s) Left EYE three times a day  loratadine 10 milliGRAM(s) Oral daily  milrinone Infusion 0.25 MICROgram(s)/kG/Min (6.65 mL/Hr) IV Continuous <Continuous>  mometasone 220 MICROgram(s) Inhaler 2 Puff(s) Inhalation daily  pantoprazole    Tablet 40 milliGRAM(s) Oral before breakfast  polyethylene glycol 3350 17 Gram(s) Oral daily  senna 2 Tablet(s) Oral at bedtime  tamsulosin 0.4 milliGRAM(s) Oral at bedtime    MEDICATIONS  (PRN):  acetaminophen     Tablet .. 650 milliGRAM(s) Oral every 6 hours PRN Temp greater or equal to 38C (100.4F), Mild Pain (1 - 3)  aluminum hydroxide/magnesium hydroxide/simethicone Suspension 30 milliLiter(s) Oral every 4 hours PRN Dyspepsia  dextrose Oral Gel 15 Gram(s) Oral once PRN Blood Glucose LESS THAN 70 milliGRAM(s)/deciliter  melatonin 3 milliGRAM(s) Oral at bedtime PRN Insomnia      ALLERGIES:  Allergies    ceftriaxone (Urticaria)  ciprofloxacin (Rash)  schrimp (Unknown)    Intolerances        OBJECTIVE:  ICU Vital Signs Last 24 Hrs  T(C): 35.9 (21 Aug 2022 04:00), Max: 37.1 (20 Aug 2022 20:00)  T(F): 96.7 (21 Aug 2022 04:00), Max: 98.8 (20 Aug 2022 20:00)  HR: 119 (21 Aug 2022 06:00) (82 - 146)  BP: 111/73 (20 Aug 2022 18:00) (92/55 - 144/83)  BP(mean): 87 (20 Aug 2022 18:00) (66 - 106)  ABP: 113/62 (21 Aug 2022 06:00) (81/43 - 141/82)  ABP(mean): 78 (21 Aug 2022 06:00) (57 - 101)  RR: 25 (21 Aug 2022 06:00) (13 - 30)  SpO2: 100% (21 Aug 2022 06:00) (98% - 100%)    O2 Parameters below as of 21 Aug 2022 06:00  Patient On (Oxygen Delivery Method): nasal cannula  O2 Flow (L/min): 2          Adult Advanced Hemodynamics Last 24 Hrs  CVP(mm Hg): 18 (21 Aug 2022 06:00) (1 - 18)  CVP(cm H2O): --  CO: --  CI: --  PA: 58/34 (21 Aug 2022 06:00) (30/8 - 62/34)  PA(mean): 45 (21 Aug 2022 06:00) (15 - 46)  PCWP: --  SVR: --  SVRI: --  PVR: --  PVRI: --  CAPILLARY BLOOD GLUCOSE      POCT Blood Glucose.: 259 mg/dL (20 Aug 2022 21:48)  POCT Blood Glucose.: 181 mg/dL (20 Aug 2022 17:49)  POCT Blood Glucose.: 155 mg/dL (20 Aug 2022 11:52)  POCT Blood Glucose.: 139 mg/dL (20 Aug 2022 08:05)    CAPILLARY BLOOD GLUCOSE      POCT Blood Glucose.: 259 mg/dL (20 Aug 2022 21:48)    I&O's Summary    20 Aug 2022 07:01  -  21 Aug 2022 07:00  --------------------------------------------------------  IN: 533.4 mL / OUT: 1280 mL / NET: -746.6 mL      Daily     Daily Weight in k.1 (21 Aug 2022 01:00)    General: NAD  HEENT: PERRLA, EOMI, moist mucous membranes  Neurology: A&Ox3  Respiratory: CTA B/L, normal respiratory effort, no wheezes, crackles, rales  CV: RRR, S1S2, holosystolic murmur  Abdominal: Soft, NT, ND   Extremities: trace edema b/l, able to move all extremities, R groin site clean, dry, intact  Lines: R groin has balloon pump and swan, L radial A line, and b/l peripheral IVs    LABS:  ABG - ( 21 Aug 2022 01:00 )  pH, Arterial: 7.44  pH, Blood: x     /  pCO2: 33    /  pO2: 108   / HCO3: 22    / Base Excess: -1.3  /  SaO2: 98.4                                    10.3   6.32  )-----------( 156      ( 21 Aug 2022 01:00 )             31.3     08-21    138  |  102  |  38<H>  ----------------------------<  293<H>  4.6   |  20<L>  |  1.84<H>    Ca    8.6      21 Aug 2022 01:00  Phos  3.6     08-  Mg     2.30     08-    TPro  6.3  /  Alb  3.2<L>  /  TBili  0.6  /  DBili  x   /  AST  32  /  ALT  37  /  AlkPhos  90  08-    LIVER FUNCTIONS - ( 21 Aug 2022 01:00 )  Alb: 3.2 g/dL / Pro: 6.3 g/dL / ALK PHOS: 90 U/L / ALT: 37 U/L / AST: 32 U/L / GGT: x           PTT - ( 20 Aug 2022 06:51 )  PTT:48.5 sec  Creatine Kinase, Serum: 290 U/L ( @ 01:00)    CARDIAC MARKERS ( 21 Aug 2022 01:00 )  x     / x     / 290 U/L / x     / x      CARDIAC MARKERS ( 20 Aug 2022 00:20 )  x     / x     / 442 U/L / x     / x              TELEMETRY:     EKG:     IMAGING:       PATIENT:  CHIDI RUBIO  [id number]    INTERVAL HISTORY/OVERNIGHT EVENTS: IABP discontinued on , pt remains Milrinone 0.25 drip, pt received 250 cc IVF bolus overnight for CVP = 8, as well as seroquel 25. In AM pt found to be unresponsive verbally + sternal rub, FS Glucose 300, b/l pinpoint pupils, pulse present + normal arterial line BP + sinus EKG. Code stroke called, CT Head, Perfusion, and Angio were obtained. CBC, CMP, Coags, T&S drawn.    MEDICATIONS:  MEDICATIONS  (STANDING):  apixaban 2.5 milliGRAM(s) Oral every 12 hours  aspirin enteric coated 81 milliGRAM(s) Oral daily  atorvastatin 80 milliGRAM(s) Oral at bedtime  cefepime   IVPB 1000 milliGRAM(s) IV Intermittent daily  chlorhexidine 2% Cloths 1 Application(s) Topical <User Schedule>  clopidogrel Tablet 75 milliGRAM(s) Oral daily  dextrose 5%. 1000 milliLiter(s) (100 mL/Hr) IV Continuous <Continuous>  dextrose 5%. 1000 milliLiter(s) (50 mL/Hr) IV Continuous <Continuous>  dextrose 50% Injectable 25 Gram(s) IV Push once  dextrose 50% Injectable 12.5 Gram(s) IV Push once  dextrose 50% Injectable 25 Gram(s) IV Push once  donepezil 10 milliGRAM(s) Oral at bedtime  fluticasone propionate 50 MICROgram(s)/spray Nasal Spray 1 Spray(s) Both Nostrils two times a day  glucagon  Injectable 1 milliGRAM(s) IntraMuscular once  haloperidol     Tablet 0.5 milliGRAM(s) Oral daily  insulin glargine Injectable (LANTUS) 15 Unit(s) SubCutaneous at bedtime  insulin lispro (ADMELOG) corrective regimen sliding scale   SubCutaneous at bedtime  insulin lispro (ADMELOG) corrective regimen sliding scale   SubCutaneous three times a day before meals  insulin lispro Injectable (ADMELOG) 5 Unit(s) SubCutaneous three times a day before meals  ketorolac 0.5% Ophthalmic Solution 1 Drop(s) Left EYE three times a day  loratadine 10 milliGRAM(s) Oral daily  milrinone Infusion 0.25 MICROgram(s)/kG/Min (6.65 mL/Hr) IV Continuous <Continuous>  mometasone 220 MICROgram(s) Inhaler 2 Puff(s) Inhalation daily  pantoprazole    Tablet 40 milliGRAM(s) Oral before breakfast  polyethylene glycol 3350 17 Gram(s) Oral daily  senna 2 Tablet(s) Oral at bedtime  tamsulosin 0.4 milliGRAM(s) Oral at bedtime    MEDICATIONS  (PRN):  acetaminophen     Tablet .. 650 milliGRAM(s) Oral every 6 hours PRN Temp greater or equal to 38C (100.4F), Mild Pain (1 - 3)  aluminum hydroxide/magnesium hydroxide/simethicone Suspension 30 milliLiter(s) Oral every 4 hours PRN Dyspepsia  dextrose Oral Gel 15 Gram(s) Oral once PRN Blood Glucose LESS THAN 70 milliGRAM(s)/deciliter  melatonin 3 milliGRAM(s) Oral at bedtime PRN Insomnia      ALLERGIES:  Allergies    ceftriaxone (Urticaria)  ciprofloxacin (Rash)  schrimp (Unknown)    Intolerances        OBJECTIVE:  ICU Vital Signs Last 24 Hrs  T(C): 35.9 (21 Aug 2022 04:00), Max: 37.1 (20 Aug 2022 20:00)  T(F): 96.7 (21 Aug 2022 04:00), Max: 98.8 (20 Aug 2022 20:00)  HR: 119 (21 Aug 2022 06:00) (82 - 146)  BP: 111/73 (20 Aug 2022 18:00) (92/55 - 144/83)  BP(mean): 87 (20 Aug 2022 18:00) (66 - 106)  ABP: 113/62 (21 Aug 2022 06:00) (81/43 - 141/82)  ABP(mean): 78 (21 Aug 2022 06:00) (57 - 101)  RR: 25 (21 Aug 2022 06:00) (13 - 30)  SpO2: 100% (21 Aug 2022 06:00) (98% - 100%)    O2 Parameters below as of 21 Aug 2022 06:00  Patient On (Oxygen Delivery Method): nasal cannula  O2 Flow (L/min): 2          Adult Advanced Hemodynamics Last 24 Hrs  CVP(mm Hg): 18 (21 Aug 2022 06:00) (1 - 18)  CVP(cm H2O): --  CO: --  CI: --  PA: 58/34 (21 Aug 2022 06:00) (30/8 - 62/34)  PA(mean): 45 (21 Aug 2022 06:00) (15 - 46)  PCWP: --  SVR: --  SVRI: --  PVR: --  PVRI: --  CAPILLARY BLOOD GLUCOSE      POCT Blood Glucose.: 259 mg/dL (20 Aug 2022 21:48)  POCT Blood Glucose.: 181 mg/dL (20 Aug 2022 17:49)  POCT Blood Glucose.: 155 mg/dL (20 Aug 2022 11:52)  POCT Blood Glucose.: 139 mg/dL (20 Aug 2022 08:05)    CAPILLARY BLOOD GLUCOSE      POCT Blood Glucose.: 259 mg/dL (20 Aug 2022 21:48)    I&O's Summary    20 Aug 2022 07:01  -  21 Aug 2022 07:00  --------------------------------------------------------  IN: 533.4 mL / OUT: 1280 mL / NET: -746.6 mL      Daily     Daily Weight in k.1 (21 Aug 2022 01:00)    General: NAD  HEENT: PERRLA, EOMI, moist mucous membranes  Neurology: A&Ox3  Respiratory: CTA B/L, normal respiratory effort, no wheezes, crackles, rales  CV: RRR, S1S2, holosystolic murmur  Abdominal: Soft, NT, ND   Extremities: trace edema b/l, able to move all extremities, R groin site clean, dry, intact  Lines: R groin has balloon pump and swan, L radial A line, and b/l peripheral IVs    LABS:  ABG - ( 21 Aug 2022 01:00 )  pH, Arterial: 7.44  pH, Blood: x     /  pCO2: 33    /  pO2: 108   / HCO3: 22    / Base Excess: -1.3  /  SaO2: 98.4                                    10.3   6.32  )-----------( 156      ( 21 Aug 2022 01:00 )             31.3     08-    138  |  102  |  38<H>  ----------------------------<  293<H>  4.6   |  20<L>  |  1.84<H>    Ca    8.6      21 Aug 2022 01:00  Phos  3.6       Mg     2.30         TPro  6.3  /  Alb  3.2<L>  /  TBili  0.6  /  DBili  x   /  AST  32  /  ALT  37  /  AlkPhos  90      LIVER FUNCTIONS - ( 21 Aug 2022 01:00 )  Alb: 3.2 g/dL / Pro: 6.3 g/dL / ALK PHOS: 90 U/L / ALT: 37 U/L / AST: 32 U/L / GGT: x           PTT - ( 20 Aug 2022 06:51 )  PTT:48.5 sec  Creatine Kinase, Serum: 290 U/L ( @ 01:00)    CARDIAC MARKERS ( 21 Aug 2022 01:00 )  x     / x     / 290 U/L / x     / x      CARDIAC MARKERS ( 20 Aug 2022 00:20 )  x     / x     / 442 U/L / x     / x              TELEMETRY:     EKG:     IMAGING:       PATIENT:  CHIDI RUBIO  [id number]    INTERVAL HISTORY/OVERNIGHT EVENTS: IABP discontinued on , pt remains Milrinone 0.25 drip, pt received 250 cc IVF bolus overnight for CVP = 8, as well as seroquel 25. In AM pt found to be unresponsive verbally + sternal rub, FS Glucose 300, b/l pinpoint pupils, pulse present + normal arterial line BP + sinus EKG. Code stroke called, CT Head, Perfusion, and Angio were obtained. CT Head Negative for acute hemorrhage, CT Perfusion showing no core infarct or ischemic penumbra,  and CT Angio showing moderate stenosis of right cavernous ICA and mild stenosis of right proximal M1. CBC, CMP, Coags, T&S were also drawn.    MEDICATIONS:  MEDICATIONS  (STANDING):  apixaban 2.5 milliGRAM(s) Oral every 12 hours  aspirin enteric coated 81 milliGRAM(s) Oral daily  atorvastatin 80 milliGRAM(s) Oral at bedtime  cefepime   IVPB 1000 milliGRAM(s) IV Intermittent daily  chlorhexidine 2% Cloths 1 Application(s) Topical <User Schedule>  clopidogrel Tablet 75 milliGRAM(s) Oral daily  dextrose 5%. 1000 milliLiter(s) (100 mL/Hr) IV Continuous <Continuous>  dextrose 5%. 1000 milliLiter(s) (50 mL/Hr) IV Continuous <Continuous>  dextrose 50% Injectable 25 Gram(s) IV Push once  dextrose 50% Injectable 12.5 Gram(s) IV Push once  dextrose 50% Injectable 25 Gram(s) IV Push once  donepezil 10 milliGRAM(s) Oral at bedtime  fluticasone propionate 50 MICROgram(s)/spray Nasal Spray 1 Spray(s) Both Nostrils two times a day  glucagon  Injectable 1 milliGRAM(s) IntraMuscular once  haloperidol     Tablet 0.5 milliGRAM(s) Oral daily  insulin glargine Injectable (LANTUS) 15 Unit(s) SubCutaneous at bedtime  insulin lispro (ADMELOG) corrective regimen sliding scale   SubCutaneous at bedtime  insulin lispro (ADMELOG) corrective regimen sliding scale   SubCutaneous three times a day before meals  insulin lispro Injectable (ADMELOG) 5 Unit(s) SubCutaneous three times a day before meals  ketorolac 0.5% Ophthalmic Solution 1 Drop(s) Left EYE three times a day  loratadine 10 milliGRAM(s) Oral daily  milrinone Infusion 0.25 MICROgram(s)/kG/Min (6.65 mL/Hr) IV Continuous <Continuous>  mometasone 220 MICROgram(s) Inhaler 2 Puff(s) Inhalation daily  pantoprazole    Tablet 40 milliGRAM(s) Oral before breakfast  polyethylene glycol 3350 17 Gram(s) Oral daily  senna 2 Tablet(s) Oral at bedtime  tamsulosin 0.4 milliGRAM(s) Oral at bedtime    MEDICATIONS  (PRN):  acetaminophen     Tablet .. 650 milliGRAM(s) Oral every 6 hours PRN Temp greater or equal to 38C (100.4F), Mild Pain (1 - 3)  aluminum hydroxide/magnesium hydroxide/simethicone Suspension 30 milliLiter(s) Oral every 4 hours PRN Dyspepsia  dextrose Oral Gel 15 Gram(s) Oral once PRN Blood Glucose LESS THAN 70 milliGRAM(s)/deciliter  melatonin 3 milliGRAM(s) Oral at bedtime PRN Insomnia      ALLERGIES:  Allergies    ceftriaxone (Urticaria)  ciprofloxacin (Rash)  schrimp (Unknown)    Intolerances    OBJECTIVE:  ICU Vital Signs Last 24 Hrs  T(C): 35.9 (21 Aug 2022 04:00), Max: 37.1 (20 Aug 2022 20:00)  T(F): 96.7 (21 Aug 2022 04:00), Max: 98.8 (20 Aug 2022 20:00)  HR: 119 (21 Aug 2022 06:00) (82 - 146)  BP: 111/73 (20 Aug 2022 18:00) (92/55 - 144/83)  BP(mean): 87 (20 Aug 2022 18:00) (66 - 106)  ABP: 113/62 (21 Aug 2022 06:00) (81/43 - 141/82)  ABP(mean): 78 (21 Aug 2022 06:00) (57 - 101)  RR: 25 (21 Aug 2022 06:00) (13 - 30)  SpO2: 100% (21 Aug 2022 06:00) (98% - 100%)    O2 Parameters below as of 21 Aug 2022 06:00  Patient On (Oxygen Delivery Method): nasal cannula  O2 Flow (L/min): 2          Adult Advanced Hemodynamics Last 24 Hrs  CVP(mm Hg): 18 (21 Aug 2022 06:00) (1 - 18)  CVP(cm H2O): --  CO: --  CI: --  PA: 58/34 (21 Aug 2022 06:00) (30/8 - 62/34)  PA(mean): 45 (21 Aug 2022 06:00) (15 - 46)  PCWP: --  SVR: --  SVRI: --  PVR: --  PVRI: --  CAPILLARY BLOOD GLUCOSE      POCT Blood Glucose.: 259 mg/dL (20 Aug 2022 21:48)  POCT Blood Glucose.: 181 mg/dL (20 Aug 2022 17:49)  POCT Blood Glucose.: 155 mg/dL (20 Aug 2022 11:52)  POCT Blood Glucose.: 139 mg/dL (20 Aug 2022 08:05)    CAPILLARY BLOOD GLUCOSE      POCT Blood Glucose.: 259 mg/dL (20 Aug 2022 21:48)    I&O's Summary    20 Aug 2022 07:01  -  21 Aug 2022 07:00  --------------------------------------------------------  IN: 533.4 mL / OUT: 1280 mL / NET: -746.6 mL      Daily     Daily Weight in k.1 (21 Aug 2022 01:00)    General: NAD  Neurology: Pt unresponsive in AM with b/l pinpoint pupils (when code stroke was called), several hours later was AAOX3 and MCDANIELS x 4  Respiratory: Bilateral crackles   CV: Tachycardic with regular rhythm, +holosystolic murmur  Abdominal: Soft, NT, ND   Extremities: trace edema b/l, able to move all extremities, R groin site clean, dry, intact  Lines: R groin has and swan, L radial A line, and b/l peripheral IVs    LABS:  ABG - ( 21 Aug 2022 01:00 )  pH, Arterial: 7.44  pH, Blood: x     /  pCO2: 33    /  pO2: 108   / HCO3: 22    / Base Excess: -1.3  /  SaO2: 98.4                          10.3   6.32  )-----------( 156      ( 21 Aug 2022 01:00 )             31.3     08-21    138  |  102  |  38<H>  ----------------------------<  293<H>  4.6   |  20<L>  |  1.84<H>    Ca    8.6      21 Aug 2022 01:00  Phos  3.6       Mg     2.30         TPro  6.3  /  Alb  3.2<L>  /  TBili  0.6  /  DBili  x   /  AST  32  /  ALT  37  /  AlkPhos  90      LIVER FUNCTIONS - ( 21 Aug 2022 01:00 )  Alb: 3.2 g/dL / Pro: 6.3 g/dL / ALK PHOS: 90 U/L / ALT: 37 U/L / AST: 32 U/L / GGT: x           PTT - ( 20 Aug 2022 06:51 )  PTT:48.5 sec  Creatine Kinase, Serum: 290 U/L ( @ 01:00)    CARDIAC MARKERS ( 21 Aug 2022 01:00 )  x     / x     / 290 U/L / x     / x      CARDIAC MARKERS ( 20 Aug 2022 00:20 )  x     / x     / 442 U/L / x     / x              TELEMETRY:     EKG:     IMAGING:

## 2022-08-21 NOTE — PROGRESS NOTE ADULT - ASSESSMENT
82 year old man with CAD and HFrEF who was transferred to [facility] CCU with acute on chronic systolic heart failure, severe MR and cardiogenic shock. S/p [place] and IABP.    NEURO:  #Mental status: baseline A&O x3  -off precedex  -haldol PRN for agitation  -neuro exam q4h    CV:  #NSTEMI   - elevated troponins and EKG concerning for NSTEMI.   - Started on heparin gtt and s/p ASA/brillinta  - atorvastatin 80mg  - Trop peaked at 1600, currently trending down to [id number]  - continue CM      #systolic CHF: EF 25% with cardiogenic shock  - Echo from 8/17 showed severe mitral regurgitation, severe global LV systolic dysfunction, and RV enlargement + decreased systolic function  - RHC showed : RA 30, wedge 30, CO 3.22, CI 1.6,Pa sat 49% on levophed  - s/p Rt groin intra aortic balloon pump and swan cath placed, f/u daily CXRs  - milrinone 0.25  - currently off levo  - HF consulted  - Trend I/Os and daily weights, and Cr  - plan to remove IABP today    #Arrythmia:   -afib with RVR in ED  - CHADS-vasc - 7 - Eliquis on hold for now while on a heparin gtt  - intermittent -140  - d/c'ed digoxin 125mcg  - Monitor on tele    PULM:   presented with SOB  - CT chest from 8/17 showed No pulmonary embolism, small b/l pulmonary effusions and mild pulmonary edema and a nonspecific 4 mm left upper lobe juxtapleural nodule with surrounding ground-glass opacity.   - on non-rebreather mask- > NC     RENAL:  #SANDRA:   -Cr 2  -possibly 2/2 hypoperfusion  -continue to monitor   -d/c lasix gtt; give lasix 40mg IVP PRN if CVP > 10    GI:  #Transaminitis: Elevated LFTs  - continue to monitor    #Diet:  - Dash diet  - pantoprazole 40mg     #Bowel regiment:  presents with constipation  - miralax, maalox, senna  - nonspecific bowl gas on abdominal X-ray    ENDO:  #DM2: HbA1c 8.3   - Insulin Sliding Scale  - TSH and lipid panel wnl    HEMATOLOGIC:  #CBC results show Hb stable  - continue to monitor    #DVT prophylaxis with heparin gtt    ID:  #Pt without strong objective or clinical evidence of infection. Will observe off antibiotics  -RRT on 8/18for AMS, labs drawn during the rapid remarkable for new leukocytosis to 13, lactate to 13.5, procalc 0.61.  -Elevated lactate possibly 2/2 hypoperfusion given NSTEMI vs sepsis. However, unclear source of infection (pt being treated with bactrim for UTI for positive UA but negative UCx)  -f/u MRSA swab  -RVP neg  -f/u blood cultures  -cefepime 1g qday empiric coverage 82 year old man with CAD and HFrEF who was transferred to [facility] CCU with acute on chronic systolic heart failure, severe MR and cardiogenic shock. S/p [place] and IABP.    NEURO:  #Mental status: baseline A&O x3  -8/21 pt became unresponsive, code stroke was called. Pt received CTH, CTP, and CTA. Neuro recommending MRI w/wo IV contrast. AMS possibly in setting of overnight sedation from Seroquel.  -Pt's mental status back to AAOx3  -Will hold haldol, loratidine, melatonin, and donepezil for the time being    CV:  #NSTEMI   - elevated troponins and EKG concerning for NSTEMI.   - Started on heparin gtt and s/p ASA/brillinta  - atorvastatin 80mg  - Trop peaked at 1600, currently trending down to [id number]  - continue CM      #systolic CHF: EF 25% with cardiogenic shock  - Echo from 8/17 showed severe mitral regurgitation, severe global LV systolic dysfunction, and RV enlargement + decreased systolic function  - RHC showed : RA 30, wedge 30, CO 3.22, CI 1.6,Pa sat 49% on levophed  - s/p Rt groin intra aortic balloon pump and swan cath placed, f/u daily CXRs  - milrinone 0.25  - currently off levo  - HF consulted  - Trend I/Os and daily weights, and Cr  - plan to remove IABP today    #Arrythmia:   -afib with RVR in ED  - CHADS-vasc - 7 - Eliquis d/erik and will start Heparin drip again at 5 PM 8/21  - intermittent -140  - d/c'ed digoxin 125mcg  - Monitor on tele    PULM:   presented with SOB  - CT chest from 8/17 showed No pulmonary embolism, small b/l pulmonary effusions and mild pulmonary edema and a nonspecific 4 mm left upper lobe juxtapleural nodule with surrounding ground-glass opacity.   - Pt satting well on 2LNC  -8/21 CXR showing persistent pulmonary edema in setting of CHF  -s/p Bumex 1 IVP on 8/21. Will consider additional diuresis based on UOP    RENAL:  #SANDRA:   -Cr downtrending to 1.84  -continue to monitor UOP and lytes  -pt s/p Bumex 1 IVP 8/21.    GI:  #Transaminitis: Elevated LFTs  - continue to monitor    #Diet:  - Dash diet  - pantoprazole 40mg     #Bowel regiment:  presents with constipation  - miralax, maalox, senna  - nonspecific bowl gas on abdominal X-ray    ENDO:  #DM2: HbA1c 8.3   - Lantus increased to 20U qhs, c/w premeal 5U TID and SSI  - TSH and lipid panel wnl    HEMATOLOGIC:  #CBC results show Hb stable  - continue to monitor    #DVT prophylaxis with heparin gtt    ID:  #Pt without strong objective or clinical evidence of infection. Will observe off antibiotics  -8/21 WBC 6.3, afebrile  -RRT on 8/18for AMS, labs drawn during the rapid remarkable for new leukocytosis to 13, lactate to 13.5, procalc 0.61.  -Elevated lactate possibly 2/2 hypoperfusion given NSTEMI vs sepsis. However, unclear source of infection (pt being treated with bactrim for UTI for positive UA but negative UCx)  -f/u MRSA swab  -RVP neg  -f/u blood cultures  -cefepime 1g qday empiric coverage for 7 days total (8/19-8/25) 82 year old man with CAD and HFrEF who was transferred to [facility] CCU with acute on chronic systolic heart failure, severe MR and cardiogenic shock. S/p [place] and IABP. Pt currently on Milrinone and Heparin drips, receiving Bumex diuresis and empiric cefepime.    NEURO:  #Mental status: baseline A&O x3  -8/21 pt became unresponsive, code stroke was called. Pt received CTH, CTP, and CTA. Neuro recommending MRI w/wo IV contrast. AMS possibly in setting of overnight sedation from Seroquel.  - CT Head Negative for acute hemorrhage, CT Perfusion showing no core infarct or ischemic penumbra,  and CT Angio showing moderate stenosis of right cavernous ICA and mild stenosis of right proximal M1.  -Pt's mental status back to AAOx3  -Will hold haldol, loratidine, melatonin, and donepezil for the time being in setting of AMS episode. Will consider adding back as tolerated.    CV:  #NSTEMI   - elevated troponins and EKG concerning for NSTEMI.   - Started on heparin gtt and s/p ASA/brillinta  - atorvastatin 80mg  -Troponins peak in 1600s, has downtrended since      #systolic CHF: EF 25% with cardiogenic shock  - Echo from 8/17 showed severe mitral regurgitation, severe global LV systolic dysfunction, and RV enlargement + decreased systolic function  - RHC showed : RA 30, wedge 30, CO 3.22, CI 1.6,Pa sat 49% on levophed  - swan cath placed, f/u daily CXRs. 8/21 1AM [place] numbers:  - milrinone 0.25  - currently off levo  - HF consulted  - Trend I/Os and daily weights, and Cr  - s/p IABP removal on 8/20    #Arrythmia:   -afib with RVR in ED  - CHADS-vasc - 7 - Eliquis d/erik and will start Heparin drip again at 5 PM 8/21  - intermittent -140  - d/c'ed digoxin 125mcg  - Monitor on tele    PULM:   presented with SOB  - CT chest from 8/17 showed No pulmonary embolism, small b/l pulmonary effusions and mild pulmonary edema and a nonspecific 4 mm left upper lobe juxtapleural nodule with surrounding ground-glass opacity.   - Pt satting well on 2LNC  -8/21 CXR showing persistent pulmonary edema in setting of CHF  -s/p Bumex 1 IVP on 8/21. Will consider additional diuresis based on UOP    RENAL:  #SANDRA:   -Cr downtrending to 1.84  -continue to monitor UOP and lytes  -pt s/p Bumex 1 IVP 8/21.    GI:  #Transaminitis: Elevated LFTs  - continue to monitor    #Diet:  - Dash diet  - pantoprazole 40mg     #Bowel regiment:  presents with constipation  - miralax, maalox, senna  - nonspecific bowl gas on abdominal X-ray    ENDO:  #DM2: HbA1c 8.3   - Lantus increased to 20U qhs, c/w premeal 5U TID and SSI  - TSH and lipid panel wnl    HEMATOLOGIC:  #CBC results show Hb stable  - continue to monitor    #DVT prophylaxis with heparin gtt    ID:  #Pt without strong objective or clinical evidence of infection. Will observe off antibiotics  -8/21 WBC 6.3, afebrile  -RRT on 8/18for AMS, labs drawn during the rapid remarkable for new leukocytosis to 13, lactate to 13.5, procalc 0.61.  -Elevated lactate possibly 2/2 hypoperfusion given NSTEMI vs sepsis. However, unclear source of infection (pt being treated with bactrim for UTI for positive UA but negative UCx)  -f/u MRSA swab  -RVP neg  -f/u blood cultures  -cefepime 1g qday empiric coverage for 7 days total (8/19-8/25) None

## 2022-08-21 NOTE — CONSULT NOTE ADULT - ASSESSMENT
Assessment: 82 year old man with pmhx significant for HTN, DM2, CAD s/p Stent x3, CVA 2/2 symptomatic carotid stenosis s/p stent, JACKELIN on CPAP, prostate ca HFrEF who was transferred to [facility] CCU with acute on chronic systolic heart failure, severe MR and cardiogenic shock. S/p [place] and IABP. Code stroke called at 8:42 am (8/21) for AMS, unresponsive, pinpoint pupils. unknown LKN, likely yesterday night. Patient's hospital stay complicated by delirium s/p haldol (0.5mg at 12am on 8/20) & Seroquel (25mg at 9pm 8/20). Patient's home eliquis was restarted after IABP was removed, last dose was today 8/21 5 am. On exam patient is unresponsive to sternal rub and continue to stare straight. normal blink to threat. CT head w/o (-) for hemorrhage. CTA was a poor study per radiology. CT Perfusion also a poor study but unremarkable     Impression: AMS 2/2 like metabolic encephalopathy vs possible infarct in the setting of afib and recent IABP vs over sedation due to psychiatric medications for delirium     Plan:  f/u repeat CTA and final CT reports  recommend MRI with and without contrast brain   continue with anticoagulation   improve glucose management as glucose was 300 during encounter.     Management & disposition discussed with Stroke Fellow Dr. Tam Maldonado  Assessment: 82 year old man with pmhx significant for HTN, DM2, CAD s/p Stent x3, CVA 2/2 symptomatic carotid stenosis s/p stent, JACKELIN on CPAP, prostate ca HFrEF who was transferred to [facility] CCU with acute on chronic systolic heart failure, severe MR and cardiogenic shock. S/p [place] and IABP. Code stroke called at 8:42 am (8/21) for AMS, unresponsive, pinpoint pupils. unknown LKN, likely yesterday night. Patient's hospital stay complicated by delirium s/p haldol (0.5mg at 12am on 8/20) & Seroquel (25mg at 9pm 8/20). Patient's home eliquis was restarted after IABP was removed, last dose was today 8/21 5 am. On exam patient is unresponsive to sternal rub and continue to stare straight. normal blink to threat. CT head w/o (-) for hemorrhage. CTA was a poor study per radiology. CT Perfusion also a poor study but unremarkable. Upon re-evalution post CT scan with Dr. Souza patient was now awake and alert. Oriented to self and year. He is able to move all extremities and follow commands.    Impression: altered mentation due to ICU delirium in combination with sedation effects from anti-psychotic medications given for prior agitation vs possible infarction     Plan:  recommend MRI with and without contrast brain   continue with anticoagulation   improve glucose management as glucose was 300 during encounter.   recommend against using haldol or seroquel for agitation. recommend trying zyprexa for acute agitation.    Management & disposition discussed with Stroke Fellow Dr. Tam Maldonado & neurology attending Dr. Souza

## 2022-08-22 NOTE — PROGRESS NOTE ADULT - ASSESSMENT
82 year old Male with PMH of HTN, HLD T2DM, JACKELIN (on BiPAP), prostate CA s/p radiation/seed implant, CVA s/p /stent (2022), AFib (on Eliquis), CAD s/p PCI/stent X3 (most recent  at [facility]), and HFrEF (EF 25%;LVIDd 6.8, severe MR, RV dysfunction and severe hypokinetic apical/lateral wall). Patient presented with c/o SOB and  abdominal distention/constipation  X10 days. Initially with elevated troponin levels and EKG concerning for NSTEMI; started on heparin gtt and s/p ASA/Brillinta. RRT called this morning for AMS/not responding to verbal commands and elevated lactate. He subsequently developed severe bradycardia/weak thready pulse and hypotension; started on Levo drip and evaluated by MICU who referred to CCU. He was sent to cath lab for RHC and IABP was placed for cardiogenic shock.       Pertinent  Labs:  Troponin- 270/462/467   Lactate- 3.2/ 4.5/6.7/12/ 13.8     BNP-  19,745  EKG- AFib subendo injury anterior wall  CXR- pulmonary edema     RHC:   RA 17  PA 60/28   PCWP 37  CO/CI 3.2/1.58  PA Sat 49.7%  SVR   (pre IABP)         RA 17   CO/CI 3.0/1.4   (mixed VO2 44.6%)  SVR 1733  (post IABP)                                               Started on Milrinone 0.25mcg/kg/min and Lasix 5mg/hr; Remains on Levo  0.05mcg/kg/min (continue to wean MAP 65)       CVP 7  CO/CI 6.1/3.2   VO2 70%   CVP 15, CI 1.85, PA sat 46%, CO 3.7

## 2022-08-22 NOTE — PROGRESS NOTE ADULT - PROBLEM SELECTOR PLAN 1
IABP out on 8/20  [place] on Milrinone 0.25mcg/kg/min-8/22 CVP 15, CI 1.85, PA sat 46%, CO 3.7  As d/w Dr. Kahn increase milrinone to 0.375 mcg/kg/min. Repeat hemos.   May need home inotrope. Not a candidate for advanced cardiac therapies.   Will need IV diuresis based on CVP.  Keep K 4.0-5.0 and mag >2.0.

## 2022-08-22 NOTE — CHART NOTE - NSCHARTNOTEFT_GEN_A_CORE
At 21:45 - MVO2 53.2%, CO/CI 4.07/1.99, CVP 16, SVR 1356    OVN patient received bumex 1mg iv X1    f/u with morning labs.

## 2022-08-22 NOTE — PROGRESS NOTE ADULT - CRITICAL CARE ATTENDING COMMENT
Having episodes of decreased responsiveness and possible confusion.  Currently in PAT. To receive IV amiodarone.  CI~2.0 on milrinone 0.25 mcg/kg/min. Will increase to 0.375. Will likely need home inotropic therapy.  Will follow.

## 2022-08-22 NOTE — PROGRESS NOTE ADULT - SUBJECTIVE AND OBJECTIVE BOX
Medications:  acetaminophen     Tablet .. 650 milliGRAM(s) Oral every 6 hours PRN  aluminum hydroxide/magnesium hydroxide/simethicone Suspension 30 milliLiter(s) Oral every 4 hours PRN  aMIOdarone    Tablet   Oral   aMIOdarone    Tablet 400 milliGRAM(s) Oral every 8 hours  apixaban 2.5 milliGRAM(s) Oral every 12 hours  aspirin enteric coated 81 milliGRAM(s) Oral daily  atorvastatin 80 milliGRAM(s) Oral at bedtime  cefepime   IVPB 1000 milliGRAM(s) IV Intermittent daily  chlorhexidine 2% Cloths 1 Application(s) Topical <User Schedule>  clopidogrel Tablet 75 milliGRAM(s) Oral daily  dextrose 5%. 1000 milliLiter(s) IV Continuous <Continuous>  dextrose 5%. 1000 milliLiter(s) IV Continuous <Continuous>  dextrose 50% Injectable 25 Gram(s) IV Push once  dextrose 50% Injectable 12.5 Gram(s) IV Push once  dextrose 50% Injectable 25 Gram(s) IV Push once  dextrose Oral Gel 15 Gram(s) Oral once PRN  donepezil 10 milliGRAM(s) Oral at bedtime  fluticasone propionate 50 MICROgram(s)/spray Nasal Spray 1 Spray(s) Both Nostrils two times a day  glucagon  Injectable 1 milliGRAM(s) IntraMuscular once  insulin glargine Injectable (LANTUS) 20 Unit(s) SubCutaneous at bedtime  insulin lispro (ADMELOG) corrective regimen sliding scale   SubCutaneous at bedtime  insulin lispro (ADMELOG) corrective regimen sliding scale   SubCutaneous three times a day before meals  insulin lispro Injectable (ADMELOG) 5 Unit(s) SubCutaneous three times a day before meals  ketorolac 0.5% Ophthalmic Solution 1 Drop(s) Left EYE three times a day  milrinone Infusion 0.25 MICROgram(s)/kG/Min IV Continuous <Continuous>  mometasone 220 MICROgram(s) Inhaler 2 Puff(s) Inhalation daily  pantoprazole    Tablet 40 milliGRAM(s) Oral before breakfast  polyethylene glycol 3350 17 Gram(s) Oral daily  senna 2 Tablet(s) Oral at bedtime  tamsulosin 0.4 milliGRAM(s) Oral at bedtime      Vitals:  Vital Signs Last 24 Hrs  T(C): 36.5 (22 Aug 2022 04:00), Max: 36.9 (21 Aug 2022 19:00)  T(F): 97.7 (22 Aug 2022 04:00), Max: 98.4 (21 Aug 2022 19:00)  HR: 127 (22 Aug 2022 11:20) (95 - 142)  BP: --  BP(mean): --  RR: 26 (22 Aug 2022 10:00) (17 - 32)  SpO2: 94% (22 Aug 2022 11:20) (93% - 100%)    Parameters below as of 22 Aug 2022 10:00  Patient On (Oxygen Delivery Method): nasal cannula  O2 Flow (L/min): 2      Daily     Daily Weight in k.6 (22 Aug 2022 04:00)    I&O's Detail    21 Aug 2022 07:01  -  22 Aug 2022 07:00  --------------------------------------------------------  IN:    Heparin: 36 mL    Heparin Infusion: 144 mL    IV PiggyBack: 50 mL    Milrinone: 163.2 mL    Oral Fluid: 30 mL  Total IN: 423.2 mL    OUT:    Indwelling Catheter - Urethral (mL): 943 mL  Total OUT: 943 mL    Total NET: -519.8 mL      22 Aug 2022 07:01  -  22 Aug 2022 12:22  --------------------------------------------------------  IN:    Heparin: 36 mL    Milrinone: 20.4 mL  Total IN: 56.4 mL    OUT:    Indwelling Catheter - Urethral (mL): 70 mL  Total OUT: 70 mL    Total NET: -13.6 mL          Physical Exam:     General: No distress. Comfortable.  HEENT: EOM intact.  Neck: Neck supple. JVP not elevated. No masses  Chest: Clear to auscultation bilaterally  CV: Normal S1 and S2. No murmurs, rub, or gallops. Radial pulses normal.  Abdomen: Soft, non-distended, non-tender  Skin: No rashes or skin breakdown  Neurology: Alert and oriented times three. Sensation intact  Psych: Affect normal    Labs:                        10.6   10.57 )-----------( 215      ( 22 Aug 2022 05:15 )             33.1     08    141  |  104  |  48<H>  ----------------------------<  219<H>  4.9   |  22  |  2.34<H>    Ca    8.9      22 Aug 2022 10:15  Phos  4.6       Mg     2.40         TPro  6.7  /  Alb  3.4  /  TBili  0.5  /  DBili  x   /  AST  22  /  ALT  30  /  AlkPhos  90      PT/INR - ( 21 Aug 2022 08:55 )   PT: 17.6 sec;   INR: 1.51 ratio         PTT - ( 22 Aug 2022 05:15 )  PTT:91.6 sec  CARDIAC MARKERS ( 22 Aug 2022 05:15 )  x     / x     / 161 U/L / x     / 4.0 ng/mL  CARDIAC MARKERS ( 21 Aug 2022 01:00 )  x     / x     / 290 U/L / x     / x           Patient seen and examined. He reported to family that he is having mid epigastric pain?    Medications:  acetaminophen     Tablet .. 650 milliGRAM(s) Oral every 6 hours PRN  aluminum hydroxide/magnesium hydroxide/simethicone Suspension 30 milliLiter(s) Oral every 4 hours PRN  aMIOdarone    Tablet   Oral   aMIOdarone    Tablet 400 milliGRAM(s) Oral every 8 hours  apixaban 2.5 milliGRAM(s) Oral every 12 hours  aspirin enteric coated 81 milliGRAM(s) Oral daily  atorvastatin 80 milliGRAM(s) Oral at bedtime  cefepime   IVPB 1000 milliGRAM(s) IV Intermittent daily  chlorhexidine 2% Cloths 1 Application(s) Topical <User Schedule>  clopidogrel Tablet 75 milliGRAM(s) Oral daily  dextrose 5%. 1000 milliLiter(s) IV Continuous <Continuous>  dextrose 5%. 1000 milliLiter(s) IV Continuous <Continuous>  dextrose 50% Injectable 25 Gram(s) IV Push once  dextrose 50% Injectable 12.5 Gram(s) IV Push once  dextrose 50% Injectable 25 Gram(s) IV Push once  dextrose Oral Gel 15 Gram(s) Oral once PRN  donepezil 10 milliGRAM(s) Oral at bedtime  fluticasone propionate 50 MICROgram(s)/spray Nasal Spray 1 Spray(s) Both Nostrils two times a day  glucagon  Injectable 1 milliGRAM(s) IntraMuscular once  insulin glargine Injectable (LANTUS) 20 Unit(s) SubCutaneous at bedtime  insulin lispro (ADMELOG) corrective regimen sliding scale   SubCutaneous at bedtime  insulin lispro (ADMELOG) corrective regimen sliding scale   SubCutaneous three times a day before meals  insulin lispro Injectable (ADMELOG) 5 Unit(s) SubCutaneous three times a day before meals  ketorolac 0.5% Ophthalmic Solution 1 Drop(s) Left EYE three times a day  milrinone Infusion 0.25 MICROgram(s)/kG/Min IV Continuous <Continuous>  mometasone 220 MICROgram(s) Inhaler 2 Puff(s) Inhalation daily  pantoprazole    Tablet 40 milliGRAM(s) Oral before breakfast  polyethylene glycol 3350 17 Gram(s) Oral daily  senna 2 Tablet(s) Oral at bedtime  tamsulosin 0.4 milliGRAM(s) Oral at bedtime      Vitals:  Vital Signs Last 24 Hrs  T(C): 36.5 (22 Aug 2022 04:00), Max: 36.9 (21 Aug 2022 19:00)  T(F): 97.7 (22 Aug 2022 04:00), Max: 98.4 (21 Aug 2022 19:00)  HR: 127 (22 Aug 2022 11:20) (95 - 142)  BP: --  BP(mean): --  RR: 26 (22 Aug 2022 10:00) (17 - 32)  SpO2: 94% (22 Aug 2022 11:20) (93% - 100%)    Parameters below as of 22 Aug 2022 10:00  Patient On (Oxygen Delivery Method): nasal cannula  O2 Flow (L/min): 2      Daily     Daily Weight in k.6 (22 Aug 2022 04:00)    I&O's Detail    21 Aug 2022 07:01  -  22 Aug 2022 07:00  --------------------------------------------------------  IN:    Heparin: 36 mL    Heparin Infusion: 144 mL    IV PiggyBack: 50 mL    Milrinone: 163.2 mL    Oral Fluid: 30 mL  Total IN: 423.2 mL    OUT:    Indwelling Catheter - Urethral (mL): 943 mL  Total OUT: 943 mL    Total NET: -519.8 mL      22 Aug 2022 07:01  -  22 Aug 2022 12:22  --------------------------------------------------------  IN:    Heparin: 36 mL    Milrinone: 20.4 mL  Total IN: 56.4 mL    OUT:    Indwelling Catheter - Urethral (mL): 70 mL  Total OUT: 70 mL    Total NET: -13.6 mL          Physical Exam:     General: sleeping   HEENT: not examined  Neck: Neck supple. JVP elevated. No masses  Chest: Clear to auscultation bilaterally anteriorly   CV: Tachycardic. No murmurs, rub, or gallops. Radial pulses normal. No LE edema b/l. Warm b/l.   Abdomen: Soft, non-distended, non-tender  Skin: No rashes or skin breakdown  Neurology: was able to state name.  Psych: not assessed     Labs:                        10.6   10.57 )-----------( 215      ( 22 Aug 2022 05:15 )             33.1     08    141  |  104  |  48<H>  ----------------------------<  219<H>  4.9   |  22  |  2.34<H>    Ca    8.9      22 Aug 2022 10:15  Phos  4.6       Mg     2.40         TPro  6.7  /  Alb  3.4  /  TBili  0.5  /  DBili  x   /  AST  22  /  ALT  30  /  AlkPhos  90  08    PT/INR - ( 21 Aug 2022 08:55 )   PT: 17.6 sec;   INR: 1.51 ratio         PTT - ( 22 Aug 2022 05:15 )  PTT:91.6 sec  CARDIAC MARKERS ( 22 Aug 2022 05:15 )  x     / x     / 161 U/L / x     / 4.0 ng/mL  CARDIAC MARKERS ( 21 Aug 2022 01:00 )  x     / x     / 290 U/L / x     / x

## 2022-08-22 NOTE — PROGRESS NOTE ADULT - CRITICAL CARE ATTENDING COMMENT
82 year old man with known CAD with stents, HFrEF who was admitted to CCU 8/18 with acute on chronic systolic heart failure severe MR and cardiogenic shock. Was profoundly acidotic and sp experiencing junctional bradycardia. IABP weaned over the weekend    [place] numbers this morning:  CO 3.6/CI 1.8/SVR 1178    RFV [place]  LRA [place]    Meds:  ASA  Heparin gtt  Brilinta 90 mg BID  Milrinone gtt 0.25 mcg/kg/min  Seroquel  Cefepime    #Neuro- Patient sundowns  On Seroquel  Haldol prn agitation    #Pulm- CXR stable  Continue to monitor  Continue Lasix     #CV- Acute on chronic systolic heart failure; severe MR and cardiogenic shock  [place] in place, IABP out  Continue Milrinone gtt  CVP 15 - give lasix 40 mg IV  Wean levophed as tolerates  Monitor MVO2 and CO/CI BID  FU HF recommendations  DC heparin and start apixiban  Start Amiodarone 400 mg ID x 12 doses/then 200 mg daily    #Renal- Cr 2.15; continue to monitor    #DVT ppx- heparin to transition to apixiban

## 2022-08-22 NOTE — PROGRESS NOTE ADULT - SUBJECTIVE AND OBJECTIVE BOX
INTERVAL HISTORY: Patient seen at bedside by stroke team & attending. Code stroke called last night for acute change in mental status, patient now with 2 staring episodes since presentation. Last night with pinpoint pupils b/l. Mental status improved following code stroke. Discussed imaging & management with patient & primary team.     PAST MEDICAL & SURGICAL HISTORY:  Diabetes mellitus      CAD (coronary artery disease)      Prostate ca  s/p SEED 5 yrs ago      Sleep apnea  on cpap at home      Hypertension      Hypercholesteremia      S/P cholecystectomy      S/P coronary angioplasty  last stent 1 year ago      H/O carotid endarterectomy  April 2022, stent placed.        FAMILY HISTORY:  FH: type 2 diabetes      SOCIAL HISTORY:   T/E/D:   Occupation:   Lives with:     MEDICATIONS (HOME):  Home Medications:  apixaban 5 mg oral tablet: 1 tab(s) orally 2 times a day (17 Aug 2022 09:24)  atorvastatin 80 mg oral tablet: 1 tab(s) orally once a day (17 Aug 2022 09:24)  Basaglar KwikPen 100 units/mL subcutaneous solution: 18 unit(s) subcutaneous once a day (at bedtime) (17 Aug 2022 09:24)  Claritin 10 mg oral tablet: 1 tab(s) orally once a day (17 Aug 2022 09:24)  clopidogrel 75 mg oral tablet: 1 tab(s) orally once a day (17 Aug 2022 09:24)  digoxin 125 mcg (0.125 mg) oral tablet: 1 tab(s) orally once a day (17 Aug 2022 09:24)  donepezil 10 mg oral tablet: 1 tab(s) orally once a day (at bedtime) (17 Aug 2022 09:24)  fluticasone 100 mcg/inh inhalation powder: 1 puff(s) inhaled 2 times a day (17 Aug 2022 09:24)  ketorolac 0.4% ophthalmic solution: 1 drop(s) in the left eye 3 times a day (17 Aug 2022 09:24)  metoprolol tartrate 50 mg oral tablet: 1 tab(s) orally 2 times a day (17 Aug 2022 09:24)  NovoLOG FlexPen 100 units/mL injectable solution: 7 unit(s) injectable 3 times a day (17 Aug 2022 09:24)  pantoprazole 40 mg oral delayed release tablet: 1 tab(s) orally once a day (before a meal) (17 Aug 2022 09:24)  polyethylene glycol 3350 oral powder for reconstitution: 17 gram(s) orally once a day (17 Aug 2022 09:24)  senna oral tablet: 2 tab(s) orally once a day (17 Aug 2022 09:24)    MEDICATIONS  (STANDING):  aMIOdarone    Tablet   Oral   aMIOdarone    Tablet 400 milliGRAM(s) Oral every 8 hours  apixaban 2.5 milliGRAM(s) Oral every 12 hours  aspirin enteric coated 81 milliGRAM(s) Oral daily  atorvastatin 80 milliGRAM(s) Oral at bedtime  cefepime   IVPB 1000 milliGRAM(s) IV Intermittent daily  chlorhexidine 2% Cloths 1 Application(s) Topical <User Schedule>  clopidogrel Tablet 75 milliGRAM(s) Oral daily  dextrose 5%. 1000 milliLiter(s) (100 mL/Hr) IV Continuous <Continuous>  dextrose 5%. 1000 milliLiter(s) (50 mL/Hr) IV Continuous <Continuous>  dextrose 50% Injectable 25 Gram(s) IV Push once  dextrose 50% Injectable 12.5 Gram(s) IV Push once  dextrose 50% Injectable 25 Gram(s) IV Push once  donepezil 10 milliGRAM(s) Oral at bedtime  fluticasone propionate 50 MICROgram(s)/spray Nasal Spray 1 Spray(s) Both Nostrils two times a day  glucagon  Injectable 1 milliGRAM(s) IntraMuscular once  hydrALAZINE 5 milliGRAM(s) Oral every 8 hours  insulin glargine Injectable (LANTUS) 20 Unit(s) SubCutaneous at bedtime  insulin lispro (ADMELOG) corrective regimen sliding scale   SubCutaneous at bedtime  insulin lispro (ADMELOG) corrective regimen sliding scale   SubCutaneous three times a day before meals  insulin lispro Injectable (ADMELOG) 5 Unit(s) SubCutaneous three times a day before meals  ketorolac 0.5% Ophthalmic Solution 1 Drop(s) Left EYE three times a day  milrinone Infusion 0.25 MICROgram(s)/kG/Min (6.65 mL/Hr) IV Continuous <Continuous>  mometasone 220 MICROgram(s) Inhaler 2 Puff(s) Inhalation daily  pantoprazole    Tablet 40 milliGRAM(s) Oral before breakfast  polyethylene glycol 3350 17 Gram(s) Oral daily  senna 2 Tablet(s) Oral at bedtime  tamsulosin 0.4 milliGRAM(s) Oral at bedtime    MEDICATIONS  (PRN):  acetaminophen     Tablet .. 650 milliGRAM(s) Oral every 6 hours PRN Temp greater or equal to 38C (100.4F), Mild Pain (1 - 3)  aluminum hydroxide/magnesium hydroxide/simethicone Suspension 30 milliLiter(s) Oral every 4 hours PRN Dyspepsia  dextrose Oral Gel 15 Gram(s) Oral once PRN Blood Glucose LESS THAN 70 milliGRAM(s)/deciliter    ALLERGIES/INTOLERANCES:  Allergies  ceftriaxone (Urticaria)  ciprofloxacin (Rash)  Shrimp (Vomiting; Anaphylaxis; Nausea)    Intolerances    VITALS & EXAMINATION:  Vital Signs Last 24 Hrs  T(C): 36.5 (22 Aug 2022 04:00), Max: 36.9 (21 Aug 2022 19:00)  T(F): 97.7 (22 Aug 2022 04:00), Max: 98.4 (21 Aug 2022 19:00)  HR: 140 (22 Aug 2022 12:00) (95 - 142)  BP: --  BP(mean): --  RR: 26 (22 Aug 2022 12:00) (17 - 29)  SpO2: 94% (22 Aug 2022 12:00) (93% - 100%)    Parameters below as of 22 Aug 2022 12:00  Patient On (Oxygen Delivery Method): nasal cannula  O2 Flow (L/min): 2      General:  Constitutional: Male, appears stated age, in no apparent distress including pain  Head: Normocephalic & Atraumatic.    Neurological:  MS: Eyes open. Awake, alert, oriented to person, situation, not location (rehab), month but not year (2230). Follows all commands, including cross commands. Slightly tangential thought process. -    Language: Speech is clear, fluent with good repetition & comprehension.    CNs: VFF. EOMI no nystagmus. V1-3 intact to LT b/l. No facial asymmetry b/l, full eye closure strength b/l. Hearing grossly normal (rubbing fingers) b/l. Tongue midline, normal movements, no atrophy.     Motor: Normal muscle bulk & tone. Postural/action tremors throughout UE & LE. No drifts of UE or LE b/l.	     Sensation: Intact to LT b/l throughout.     Cortical: Extinction on DSS (neglect): none    Coordination: No dysmetria to FTN b/l.     Gait: Deferred.       LABORATORY:  CBC                       10.6   10.57 )-----------( 215      ( 22 Aug 2022 05:15 )             33.1     Chem 08-22    141  |  104  |  48<H>  ----------------------------<  219<H>  4.9   |  22  |  2.34<H>    Ca    8.9      22 Aug 2022 10:15  Phos  4.6     08-22  Mg     2.40     08-22    TPro  6.7  /  Alb  3.4  /  TBili  0.5  /  DBili  x   /  AST  22  /  ALT  30  /  AlkPhos  90  08-22    LFTs LIVER FUNCTIONS - ( 22 Aug 2022 10:15 )  Alb: 3.4 g/dL / Pro: 6.7 g/dL / ALK PHOS: 90 U/L / ALT: 30 U/L / AST: 22 U/L / GGT: x           Coagulopathy PT/INR - ( 21 Aug 2022 08:55 )   PT: 17.6 sec;   INR: 1.51 ratio         PTT - ( 22 Aug 2022 05:15 )  PTT:91.6 sec  Lipid Panel 08-18 Chol 91 LDL -- HDL 50 Trig 74  A1c   Cardiac enzymes CARDIAC MARKERS ( 22 Aug 2022 05:15 )  x     / x     / 161 U/L / x     / 4.0 ng/mL  CARDIAC MARKERS ( 21 Aug 2022 01:00 )  x     / x     / 290 U/L / x     / x          U/A   CSF  Immunological  Other    STUDIES & IMAGING:  Studies (EKG, EEG, EMG, etc):     Radiology (XR, CT, MR, U/S, TTE/MARILIN):    CT PERFUSION:  CBF <30%: 0 ml  Tmax > 6s: 0 ml  Mismatch volume=  0 ml    At the imaged levels, normal mean transit time, cerebral blood flow, and   cerebral blood volume are demonstrated bilaterally. There is no evidence   of irreversible, potentially reversible, or compensated perfusion   abnormality within these portions of the brain.    CTA BRAIN    INTERNAL CAROTID ARTERIES: Moderate stenosis of the right cavernous ICA.   The ICA bifurcations are unremarkable.  ANTERIOR CEREBRAL ARTERIES: No flow-limiting stenosis or occlusion.   Anterior communicating artery is unremarkable without aneurysm.  MIDDLE CEREBRAL ARTERIES: Mild stenosis of the right MCA proximal M1   segment. MCA bifurcations are unremarkable without aneurysm.  POSTERIOR CEREBRAL ARTERIES: No flow-limiting stenosis or occlusion.   Fetal predisposition of the bilateral PCAs.  VERTEBROBASILAR SYSTEM: No flow-limiting stenosis or occlusion. Basilar   tip is unremarkable.    CTA NECK    RIGHT CAROTID SYSTEM: Status post right common and internal carotid   artery stent. Normal in course and caliber without flow-limiting stenosis   or occlusion.  LEFT CAROTID SYSTEM: Normal in course and caliber without flow-limiting   stenosis or occlusion. Mild atherosclerotic changes.  VERTEBRAL SYSTEM:  Normal in course and caliber without flow-limiting   stenosis or occlusion. Origin of the vertebral arteries are unremarkable.  AORTIC ARCH: Origin of the great vessels are unremarkable.  MISCELLANEOUS: Bilateral pleural effusions are partially seen.      IMPRESSION:  CT PERFUSION: No core infarct or at risk ischemic tissue identified.    CTA BRAIN:  -No large vessel occlusion.  -Moderate stenosis of the right cavernous ICA.  -Mild stenosis of the right MCA proximal M1 segment.    CTA NECK:  -No vaso-occlusive disease.  -Bilateral pleural effusions, partially visualized.    --- End of Report ---      NCCT H:    No acute intracranial hemorrhage no mass effect.  Interval development of a chronic infarct involving the right precentral   gyrus just lateral to the hand knob.  Chronic right frontal periventricular infarct is unchanged.  The ventricles, cisterns and sulci are prominent, consistent with   generalized volume loss.. There are moderate patchy areas of hypodensity   in the periventricular and subcortical white matter which are likely   related to chronic microangiopathic changes.    The calvarium is intact. The paranasal sinuses are clear. The mastoid air   cells are predominantly clear.There has been prior bilateral cataract   surgery.    IMPRESSION:  -No acute intra-cranial hemorrhage, mass effect, or midline shift.   Discussed with Dr Khan by Dr. Segovia on 8/21/2022 9:23 AM with read back   confirmation.    -Chronic right frontal lobe infarcts.    --- End of Report ---

## 2022-08-22 NOTE — PROGRESS NOTE ADULT - ASSESSMENT
82 year old man with pmhx significant for HTN, DM2, CAD s/p Stent x3, CVA 2/2 symptomatic carotid stenosis s/p stent, JACKELIN on CPAP, prostate ca HFrEF who was transferred to [facility] CCU with acute on chronic systolic heart failure, severe MR and cardiogenic shock. S/p [place] and IABP. Code stroke called at 8:42 am (8/21) for AMS, unresponsive, pinpoint pupils. unknown LKN, likely yesterday night. Patient's hospital stay complicated by delirium s/p haldol (0.5mg at 12am on 8/20) & Seroquel (25mg at 9pm 8/20). Patient's home eliquis was restarted after IABP was removed, last dose was today 8/21 5 am. On exam patient is unresponsive to sternal rub and continue to stare straight. normal blink to threat. CT head w/o (-) for hemorrhage. CTA was a poor study per radiology. CT Perfusion also a poor study but unremarkable. Upon re-evalution post CT scan with Dr. Souza patient was now awake and alert. Oriented to self and year. He is able to move all extremities and follow commands. Today (8/22), patient seen and spoke with primary team who reports patient had similar episode in ED on presentation a/w hypotension & bradycardia. Exam as above.     Impression: Recurrent changes in mental status with staring episodes with unclear etiology at this time, but possibly due to focal brain dysfunction from focal seizures with impaired awareness with unknown cause (possibly in the setting of underlying toxic metabolic or infectious derangements) vs diffuse cerebral dysfunction from toxic metabolic or infectious etiologies. Consider pharmacologic agents, such as haloperidol or quetiapine, playing role in presentation. Lower suspicion for acute ischemic stroke causing global cerebral or brainstem dysfunction at this time. Likely asymptomatic, mild to moderate stenosis of R cavernous ICA, R MCA.     Recommendations    Imaging:  [] vEEG   [] MRI brain w/o contrast   [] Further management to be determined after EEG & MRI    Meds:  [] Can continue with Apixaban 2.5 mg BID  [] Would avoid using haloperidol or quetiapine for agitation, can use olanzapine as alternative  [] Atorvastatin 80 mg, titrate to LDL < 70 (currently 26)     Other:  [] If patient has a convulsion or additional staring episodes please document accurately the length of the episode, and specifically what the patient was doing paying attention to eye opening vs closure, gaze deviation, shaking of extremities, tongue bite, urinary incontinence, any derangement of vital signs  [] Normotension, avoid hypotension if possible  [] PT/OT eval  [] C/w DASH diet as tolerated   [] HgA1C goals < 7.0 (currently 8.3)  [] Rest of workup as per primary team    Patient case discussed and seen with stroke attending, Dr. Libman.    Please call with questions: [id number]

## 2022-08-22 NOTE — PROGRESS NOTE ADULT - TIME BILLING
Diagnosis and treatment plan. Counselling for secondary stroke prevention.  Agree with above. ROS otherwise negative.

## 2022-08-22 NOTE — PROGRESS NOTE ADULT - SUBJECTIVE AND OBJECTIVE BOX
Robert Zhao MD PGY1    PATIENT:  CHIDI RUBIO  [id number]    CHIEF COMPLAINT:  Patient is a 82y old  Male who presents with a chief complaint of NSTEMI (21 Aug 2022 09:07)      INTERVAL HISTORY/OVERNIGHT EVENTS: Pt got Zyprexa overnight. Pt also got 1g Bumex bc of elevated CVP of 16 and decreased urine output. Pt A&Ox3 at bedside today, just complained of some tiredness, no other acute complaints. Pt is urinating well, no issues with eating, no n/v, f/c, chest pain, abdominal pain, SOB.    MEDICATIONS:  MEDICATIONS  (STANDING):  aspirin enteric coated 81 milliGRAM(s) Oral daily  atorvastatin 80 milliGRAM(s) Oral at bedtime  cefepime   IVPB 1000 milliGRAM(s) IV Intermittent daily  chlorhexidine 2% Cloths 1 Application(s) Topical <User Schedule>  clopidogrel Tablet 75 milliGRAM(s) Oral daily  dextrose 5%. 1000 milliLiter(s) (50 mL/Hr) IV Continuous <Continuous>  dextrose 5%. 1000 milliLiter(s) (100 mL/Hr) IV Continuous <Continuous>  dextrose 50% Injectable 25 Gram(s) IV Push once  dextrose 50% Injectable 12.5 Gram(s) IV Push once  dextrose 50% Injectable 25 Gram(s) IV Push once  fluticasone propionate 50 MICROgram(s)/spray Nasal Spray 1 Spray(s) Both Nostrils two times a day  glucagon  Injectable 1 milliGRAM(s) IntraMuscular once  heparin  Infusion 1200 Unit(s)/Hr (12 mL/Hr) IV Continuous <Continuous>  insulin glargine Injectable (LANTUS) 20 Unit(s) SubCutaneous at bedtime  insulin lispro (ADMELOG) corrective regimen sliding scale   SubCutaneous at bedtime  insulin lispro (ADMELOG) corrective regimen sliding scale   SubCutaneous three times a day before meals  insulin lispro Injectable (ADMELOG) 5 Unit(s) SubCutaneous three times a day before meals  ketorolac 0.5% Ophthalmic Solution 1 Drop(s) Left EYE three times a day  milrinone Infusion 0.25 MICROgram(s)/kG/Min (6.65 mL/Hr) IV Continuous <Continuous>  mometasone 220 MICROgram(s) Inhaler 2 Puff(s) Inhalation daily  pantoprazole    Tablet 40 milliGRAM(s) Oral before breakfast  polyethylene glycol 3350 17 Gram(s) Oral daily  senna 2 Tablet(s) Oral at bedtime  tamsulosin 0.4 milliGRAM(s) Oral at bedtime    MEDICATIONS  (PRN):  acetaminophen     Tablet .. 650 milliGRAM(s) Oral every 6 hours PRN Temp greater or equal to 38C (100.4F), Mild Pain (1 - 3)  aluminum hydroxide/magnesium hydroxide/simethicone Suspension 30 milliLiter(s) Oral every 4 hours PRN Dyspepsia  dextrose Oral Gel 15 Gram(s) Oral once PRN Blood Glucose LESS THAN 70 milliGRAM(s)/deciliter      ALLERGIES:  Allergies    ceftriaxone (Urticaria)  ciprofloxacin (Rash)  Shrimp (Vomiting; Anaphylaxis; Nausea)    Intolerances      OBJECTIVE:  ICU Vital Signs Last 24 Hrs  T(C): 36.5 (22 Aug 2022 04:00), Max: 36.9 (21 Aug 2022 19:00)  T(F): 97.7 (22 Aug 2022 04:00), Max: 98.4 (21 Aug 2022 19:00)  HR: 98 (22 Aug 2022 07:24) (95 - 142)  BP: --  BP(mean): --  ABP: 104/52 (22 Aug 2022 07:00) (104/52 - 141/84)  ABP(mean): 66 (22 Aug 2022 07:00) (66 - 241)  RR: 28 (22 Aug 2022 07:00) (17 - 32)  SpO2: 99% (22 Aug 2022 07:24) (93% - 100%)    O2 Parameters below as of 22 Aug 2022 07:00  Patient On (Oxygen Delivery Method): nasal cannula  O2 Flow (L/min): 2          Adult Advanced Hemodynamics Last 24 Hrs  CVP(mm Hg): 12 (21 Aug 2022 21:00) (12 - 24)  CVP(cm H2O): --  CO: --  CI: --  PA: 53/28 (21 Aug 2022 21:00) (50/30 - 68/38)  PA(mean): 40 (21 Aug 2022 21:00) (31 - 53)  PCWP: --  SVR: --  SVRI: --  PVR: --  PVRI: --  CAPILLARY BLOOD GLUCOSE  300 (21 Aug 2022 09:00)      POCT Blood Glucose.: 210 mg/dL (22 Aug 2022 07:30)  POCT Blood Glucose.: 228 mg/dL (21 Aug 2022 22:46)  POCT Blood Glucose.: 190 mg/dL (21 Aug 2022 21:19)  POCT Blood Glucose.: 240 mg/dL (21 Aug 2022 17:05)  POCT Blood Glucose.: 282 mg/dL (21 Aug 2022 11:53)    CAPILLARY BLOOD GLUCOSE  300 (21 Aug 2022 09:00)      POCT Blood Glucose.: 210 mg/dL (22 Aug 2022 07:30)    I&O's Summary    21 Aug 2022 07:01  -  22 Aug 2022 07:00  --------------------------------------------------------  IN: 423.2 mL / OUT: 943 mL / NET: -519.8 mL      Daily     Daily Weight in k.6 (22 Aug 2022 04:00)    PHYSICAL EXAMINATION:  General: WN/WD NAD  HEENT: PERRLA, EOMI, moist mucous membranes  Neurology: A&Ox3, nonfocal, MCDANIELS x 4  Respiratory: Bilateral crackles   CV: Tachycardic with regular rhythm, +holosystolic murmur  Abdominal: Soft, NT, ND   Extremities: trace edema b/l, able to move all extremities, R groin site clean, dry, intact  Lines: R groin has and swan, L radial A line, and b/l peripheral IVs    LABS:  ABG - ( 22 Aug 2022 05:15 )  pH, Arterial: 7.40  pH, Blood: x     /  pCO2: 38    /  pO2: 80    / HCO3: 24    / Base Excess: -1.1  /  SaO2: 96.7                            10.6   10.57 )-----------( 215      ( 22 Aug 2022 05:15 )             33.1     08-    140  |  104  |  45<H>  ----------------------------<  233<H>  4.5   |  21<L>  |  2.15<H>    Ca    8.8      22 Aug 2022 05:15  Phos  4.6     08-  Mg     2.40         TPro  7.0  /  Alb  3.2<L>  /  TBili  0.6  /  DBili  x   /  AST  21  /  ALT  26  /  AlkPhos  90      LIVER FUNCTIONS - ( 22 Aug 2022 05:15 )  Alb: 3.2 g/dL / Pro: 7.0 g/dL / ALK PHOS: 90 U/L / ALT: 26 U/L / AST: 21 U/L / GGT: x           PT/INR - ( 21 Aug 2022 08:55 )   PT: 17.6 sec;   INR: 1.51 ratio         PTT - ( 22 Aug 2022 05:15 )  PTT:91.6 sec  Creatine Kinase, Serum: 161 U/L ( @ 05:15)    CARDIAC MARKERS ( 22 Aug 2022 05:15 )  x     / x     / 161 U/L / x     / x      CARDIAC MARKERS ( 21 Aug 2022 01:00 )  x     / x     / 290 U/L / x     / x              TELEMETRY:     EKG:     IMAGING:

## 2022-08-22 NOTE — PROGRESS NOTE ADULT - ASSESSMENT
82 year old man with CAD and HFrEF who was transferred to [facility] CCU with acute on chronic systolic heart failure, severe MR and cardiogenic shock. S/p [place] and IABP. Pt currently on Milrinone and Heparin drips, receiving Bumex diuresis and empiric cefepime.    NEURO:  #Mental status: baseline A&O x3  -8/21 pt became unresponsive, code stroke was called. Pt received CTH, CTP, and CTA. Neuro recommending MRI w/wo IV contrast. AMS possibly in setting of overnight sedation from Seroquel.  - CT Head Negative for acute hemorrhage, CT Perfusion showing no core infarct or ischemic penumbra,  and CT Angio showing moderate stenosis of right cavernous ICA and mild stenosis of right proximal M1.  -Pt's mental status back to AAOx3  -Will hold haldol, loratidine, melatonin, and donepezil for the time being in setting of AMS episode. Will consider adding back as tolerated.    CV:  #NSTEMI   - elevated troponins and EKG concerning for NSTEMI.   - Started on heparin gtt and s/p ASA/brillinta  - atorvastatin 80mg  - Troponins peak in 1600s, has downtrended since      #systolic CHF: EF 25% with cardiogenic shock  - Echo from 8/17 showed severe mitral regurgitation, severe global LV systolic dysfunction, and RV enlargement + decreased systolic function  - RHC showed : RA 30, wedge 30, CO 3.22, CI 1.6,Pa sat 49% on levophed  - swan cath placed, f/u daily CXRs. 8/21 1AM rico numbers:  - milrinone 0.25  - currently off levo  - HF consulted  - Trend I/Os and daily weights, and Cr  - s/p IABP removal on 8/20  - 5am 8/22: CO 3.6, CI 1.8, SVR 1178    #Arrythmia:   -afib with RVR in ED  - CHADS-vasc - 7 - Eliquis d/erik and will start Heparin drip again at 5 PM 8/21  - intermittent -140  - d/c'ed digoxin 125mcg  - Monitor on tele    PULM:   presented with SOB  - CT chest from 8/17 showed No pulmonary embolism, small b/l pulmonary effusions and mild pulmonary edema and a nonspecific 4 mm left upper lobe juxtapleural nodule with surrounding ground-glass opacity.   - Pt satting well on 2LNC  -8/21 CXR showing persistent pulmonary edema in setting of CHF  -s/p Bumex 1 IVP on 8/21. Will consider additional diuresis based on UOP (currently ~30cc/hr)    RENAL:  #SANDRA:   -Cr downtrending to 1.84  -continue to monitor UOP and lytes  -pt s/p Bumex 1 IVP 8/21.    GI:  #Transaminitis: Elevated LFTs  - resolved    #Diet:  - Dash diet  - pantoprazole 40mg     #Bowel regiment:  presents with constipation  - miralax, maalox, senna  - nonspecific bowl gas on abdominal X-ray    ENDO:  #DM2: HbA1c 8.3   - Lantus increased to 20U qhs, c/w premeal 5U TID and SSI  - TSH and lipid panel wnl    HEMATOLOGIC:  #CBC results show Hb stable  - continue to monitor    #DVT prophylaxis with heparin gtt    ID:  #Pt without strong objective or clinical evidence of infection. Will observe off antibiotics  -8/21 WBC 6.3, afebrile  -RRT on 8/18for AMS, labs drawn during the rapid remarkable for new leukocytosis to 13, lactate to 13.5, procalc 0.61.  -Elevated lactate possibly 2/2 hypoperfusion given NSTEMI vs sepsis. However, unclear source of infection (pt being treated with bactrim for UTI for positive UA but negative UCx)  -f/u MRSA swab  -RVP neg  -f/u blood cultures  -cefepime 1g qday empiric coverage for 7 days total (8/19-8/25) 82 year old man with CAD and HFrEF who was transferred to [facility] CCU with acute on chronic systolic heart failure, severe MR and cardiogenic shock. S/p [place] and IABP. Pt currently on Milrinone and Heparin drips, receiving Bumex diuresis and empiric cefepime.    NEURO:  #Mental status: baseline A&O x3  -8/21 pt became unresponsive, code stroke was called. Pt received CTH, CTP, and CTA. Neuro recommending MRI w/wo IV contrast. AMS possibly in setting of overnight sedation from Seroquel.  - CT Head Negative for acute hemorrhage, CT Perfusion showing no core infarct or ischemic penumbra,  and CT Angio showing moderate stenosis of right cavernous ICA and mild stenosis of right proximal M1.  -Pt's mental status back to AAOx3  -Will hold haldol, loratidine, melatonin, and donepezil for the time being in setting of AMS episode. Will consider adding back as tolerated.    CV:  #NSTEMI   - elevated troponins and EKG concerning for NSTEMI.   - Started on heparin gtt and s/p ASA/brillinta  - atorvastatin 80mg  - Troponins peak in 1600s, has downtrended since      #systolic CHF: EF 25% with cardiogenic shock  - Echo from 8/17 showed severe mitral regurgitation, severe global LV systolic dysfunction, and RV enlargement + decreased systolic function  - RHC showed : RA 30, wedge 30, CO 3.22, CI 1.6,Pa sat 49% on levophed  - swan cath placed, f/u daily CXRs. 8/21 1AM rico numbers:  - milrinone 0.25  - currently off levo  - HF consulted  - Trend I/Os and daily weights, and Cr  - s/p IABP removal on 8/20  - 5am 8/22: CO 3.6, CI 1.8, SVR 1178    #Arrythmia:   -afib with RVR in ED  - CHADS-vasc - 7 - Eliquis d/erik and will start Heparin drip again at 5 PM 8/21  - intermittent -140  - d/c'ed digoxin 125mcg  - Monitor on tele    PULM:   presented with SOB  - CT chest from 8/17 showed No pulmonary embolism, small b/l pulmonary effusions and mild pulmonary edema and a nonspecific 4 mm left upper lobe juxtapleural nodule with surrounding ground-glass opacity.   - Pt satting well on 2LNC  -8/21 CXR showing persistent pulmonary edema in setting of CHF  -s/p Bumex 1 IVP on 8/21. Will consider additional diuresis based on UOP (currently ~30cc/hr)    RENAL:  #SANDRA:   -Cr back up to 2.2  -continue to monitor UOP and lytes  -pt s/p Bumex 1 IVP 8/21.  -f/u urine studies  -consult nephro    GI:  #Transaminitis: Elevated LFTs  - resolved    #Diet:  - Dash diet  - pantoprazole 40mg     #Bowel regiment:  presents with constipation  - miralax, maalox, senna  - nonspecific bowl gas on abdominal X-ray    ENDO:  #DM2: HbA1c 8.3   - Lantus increased to 20U qhs, c/w premeal 5U TID and SSI  - TSH and lipid panel wnl    HEMATOLOGIC:  #CBC results show Hb stable  - continue to monitor    #DVT prophylaxis with heparin gtt    ID:  #Pt without strong objective or clinical evidence of infection. Will observe off antibiotics  -8/21 WBC 6.3, afebrile  -RRT on 8/18for AMS, labs drawn during the rapid remarkable for new leukocytosis to 13, lactate to 13.5, procalc 0.61.  -Elevated lactate possibly 2/2 hypoperfusion given NSTEMI vs sepsis. However, unclear source of infection (pt being treated with bactrim for UTI for positive UA but negative UCx)  -f/u MRSA swab  -RVP neg  -f/u blood cultures  -cefepime 1g qday empiric coverage for 7 days total (8/19-8/25) 82 year old man with CAD and HFrEF who was transferred to [facility] CCU with acute on chronic systolic heart failure, severe MR and cardiogenic shock. S/p [place] and IABP. Pt currently on Milrinone and Heparin drips, receiving Bumex diuresis and empiric cefepime.    NEURO:  #Mental status: baseline A&O x3  -8/21 pt became unresponsive, code stroke was called. Pt received CTH, CTP, and CTA. Neuro recommending MRI w/wo IV contrast. AMS possibly in setting of overnight sedation from Seroquel.  - CT Head Negative for acute hemorrhage, CT Perfusion showing no core infarct or ischemic penumbra,  and CT Angio showing moderate stenosis of right cavernous ICA and mild stenosis of right proximal M1.  -Pt's mental status back to AAOx3  -Will hold haldol, loratidine, melatonin, for the time being in setting of AMS episode. Will consider adding back as tolerated.    CV:  #NSTEMI   - elevated troponins and EKG concerning for NSTEMI.   - Started on heparin gtt and s/p ASA/brillinta  - atorvastatin 80mg  - Troponins peak in 1600s, has downtrended to 1000      #systolic CHF: EF 25% with cardiogenic shock  - Echo from 8/17 showed severe mitral regurgitation, severe global LV systolic dysfunction, and RV enlargement + decreased systolic function  - RHC showed : RA 30, wedge 30, CO 3.22, CI 1.6,Pa sat 49% on levophed  - swan cath placed, f/u daily CXRs. 8/21 1AM rico numbers:  - milrinone 0.25  - currently off levo  - HF consulted  - Trend I/Os and daily weights, and Cr  - s/p IABP removal on 8/20  - 5am 8/22: CO 3.6, CI 1.8, SVR 1178    #Arrythmia:   -afib with RVR in ED  - CHADS-vasc - 7 - Eliquis d/erik and will start Heparin drip again at 5 PM 8/21  - intermittent -140  - d/c'ed digoxin 125mcg  - started on amiodarone  - Monitor on tele    PULM:   presented with SOB  - CT chest from 8/17 showed No pulmonary embolism, small b/l pulmonary effusions and mild pulmonary edema and a nonspecific 4 mm left upper lobe juxtapleural nodule with surrounding ground-glass opacity.   - Pt satting well on 2LNC  -8/21 CXR showing persistent pulmonary edema in setting of CHF  -s/p Bumex 1 IVP on 8/21. Will consider additional diuresis based on UOP (currently ~30cc/hr)  -s/p 80 lasix IVP on 8/22.    RENAL:  #SANDRA:   -Cr back up to 2.2  -continue to monitor UOP and lytes  -pt s/p Bumex 1 IVP 8/21.  -f/u urine studies  -bladder scan wnl  -consult nephro if needed    GI:  #Transaminitis: Elevated LFTs  - resolved    #Diet:  - Dash diet  - pantoprazole 40mg     #Bowel regiment:  presents with constipation  - miralax, maalox, senna  - nonspecific bowl gas on abdominal X-ray    ENDO:  #DM2: HbA1c 8.3   - Lantus increased to 20U qhs, c/w premeal 5U TID and SSI  - TSH and lipid panel wnl    HEMATOLOGIC:  #CBC results show Hb stable  - continue to monitor    #DVT prophylaxis with heparin gtt    ID:  #Pt without strong objective or clinical evidence of infection. Will observe off antibiotics  -8/21 WBC 6.3, afebrile  -RRT on 8/18for AMS, labs drawn during the rapid remarkable for new leukocytosis to 13, lactate to 13.5, procalc 0.61.  -Elevated lactate possibly 2/2 hypoperfusion given NSTEMI vs sepsis. However, unclear source of infection (pt being treated with bactrim for UTI for positive UA but negative UCx)  -Neg MRSA swab  -RVP neg  -f/u blood cultures  -cefepime 1g qday empiric coverage for 7 days total (8/19-8/25) 82 year old man with CAD and HFrEF who was transferred to [facility] CCU with acute on chronic systolic heart failure, severe MR and cardiogenic shock. S/p [place] and IABP. Pt currently on Milrinone and Heparin drips, receiving Bumex diuresis and empiric cefepime.    NEURO:  #Mental status: baseline A&O x3  -8/21 pt became unresponsive, code stroke was called. Pt received CTH, CTP, and CTA. Neuro recommending MRI w/wo IV contrast. AMS possibly in setting of overnight sedation from Seroquel.  - CT Head Negative for acute hemorrhage, CT Perfusion showing no core infarct or ischemic penumbra,  and CT Angio showing moderate stenosis of right cavernous ICA and mild stenosis of right proximal M1.  -Pt's mental status back to AAOx3  -Will hold haldol, loratidine, melatonin, for the time being in setting of AMS episode. Will consider adding back as tolerated.    CV:  #NSTEMI   - elevated troponins and EKG concerning for NSTEMI.   - Started on heparin gtt and s/p ASA/brillinta  - atorvastatin 80mg  - Troponins peak in 1600s, has downtrended to 1000      #systolic CHF: EF 25% with cardiogenic shock  - Echo from 8/17 showed severe mitral regurgitation, severe global LV systolic dysfunction, and RV enlargement + decreased systolic function  - RHC showed : RA 30, wedge 30, CO 3.22, CI 1.6,Pa sat 49% on levophed  - swan cath placed, f/u daily CXRs. 8/21 1AM rico numbers:  - milrinone 0.25  - currently off levo  - HF consulted  - Trend I/Os and daily weights, and Cr  - s/p IABP removal on 8/20  - 5am 8/22: CO 3.6, CI 1.8, SVR 1178    #Arrythmia:   -afib with RVR in ED  - CHADS-vasc - 7 - Eliquis d/erik and will start Heparin drip again at 5 PM 8/21  - intermittent -140  - d/c'ed digoxin 125mcg  - started on amiodarone  - Monitor on tele    PULM:   presented with SOB  - CT chest from 8/17 showed No pulmonary embolism, small b/l pulmonary effusions and mild pulmonary edema and a nonspecific 4 mm left upper lobe juxtapleural nodule with surrounding ground-glass opacity.   - Pt satting well on 2LNC  -8/21 CXR showing persistent pulmonary edema in setting of CHF  -s/p Bumex 1 IVP on 8/21. Will consider additional diuresis based on UOP (currently ~30cc/hr)  -s/p 80 lasix IVP on 8/22.    RENAL:  #SANDRA:   -Cr back up to 2.2  -continue to monitor UOP and lytes  -pt s/p Bumex 1 IVP 8/21.  -FeUrea 23.9%, urine P/Cr 1.0,   -bladder scan wnl  -consult nephro if needed    GI:  #Transaminitis: Elevated LFTs  - resolved    #Diet:  - Dash diet  - pantoprazole 40mg     #Bowel regiment:  presents with constipation  - miralax, maalox, senna  - nonspecific bowl gas on abdominal X-ray    ENDO:  #DM2: HbA1c 8.3   - Lantus increased to 20U qhs, c/w premeal 5U TID and SSI  - TSH and lipid panel wnl    HEMATOLOGIC:  #CBC results show Hb stable  - continue to monitor    #DVT prophylaxis with heparin gtt    ID:  #Pt without strong objective or clinical evidence of infection. Will observe off antibiotics  -8/21 WBC 6.3, afebrile  -RRT on 8/18for AMS, labs drawn during the rapid remarkable for new leukocytosis to 13, lactate to 13.5, procalc 0.61.  -Elevated lactate possibly 2/2 hypoperfusion given NSTEMI vs sepsis. However, unclear source of infection (pt being treated with bactrim for UTI for positive UA but negative UCx)  -Neg MRSA swab  -RVP neg  -f/u blood cultures  -cefepime 1g qday empiric coverage for 7 days total (8/19-8/25)

## 2022-08-23 NOTE — PROGRESS NOTE ADULT - CRITICAL CARE ATTENDING COMMENT
82 year old man with known CAD with stents, HFrEF who was admitted to CCU 8/18 with acute on chronic systolic heart failure severe MR and cardiogenic shock. Was profoundly acidotic and sp experiencing junctional bradycardia. IABP weaned over the weekend. Started on Bumex gtt early this AM after 4 mg IV x1 and 6 mg Bumex throughout the night. Net positive over the past 24 hours.     [place] numbers this morning at 7:30 AM:  CO 7.4/CI 3.6//CVP 14  MVO2 68.1    RFV [place]  LRA A-line    Meds:  ASA  Apixaban 2.5 mg BID  Brilinta 90 mg BID  Hydralazine 5 mg TID  Bumex gtt at 0.5 mg/hr  Milrinone gtt 0.375 mcg/kg/min  Amiodarone 400 mg TID  Seroquel  Cefepime    #Neuro- Patient sundowns  On Seroquel  Haldol prn agitation    #Pulm- CXR stable  Continue to monitor  Continue Bumex    #CV- Acute on chronic systolic heart failure; severe MR and cardiogenic shock  [place] in place, IABP out  Continue Milrinone gtt-decrease to 0.25 mcg/kg/min  Increase Bumex gtt to 1 mg/hr  Monitor MVO2 and CO/CI BID  FU HF recommendations  Apixiban  Started Amiodarone 400 mg ID x 12 doses/then 200 mg daily    #Renal- Cr 3 continue to monitor    #DVT ppx- Apixiban 2.5 mg BID

## 2022-08-23 NOTE — PROGRESS NOTE ADULT - SUBJECTIVE AND OBJECTIVE BOX
Robert Zhao MD PGY1    PATIENT:  CHIDI RUBIO  [id number]    CHIEF COMPLAINT:  Patient is a 82y old  Male who presents with a chief complaint of NSTEMI (22 Aug 2022 13:27)      INTERVAL HISTORY/OVERNIGHT EVENTS: Pt examined at bedside. Overnight pt was given 6mg total of Bumex IVP and started on a bumex gtt due to low urine output and elevated CVPs of 12-14.  Pt had no acute complaints when at bedside.     MEDICATIONS:  MEDICATIONS  (STANDING):  aMIOdarone    Tablet   Oral   aMIOdarone    Tablet 400 milliGRAM(s) Oral every 8 hours  apixaban 2.5 milliGRAM(s) Oral every 12 hours  aspirin enteric coated 81 milliGRAM(s) Oral daily  atorvastatin 80 milliGRAM(s) Oral at bedtime  buMETAnide Infusion 0.5 mG/Hr (2.5 mL/Hr) IV Continuous <Continuous>  cefepime   IVPB 1000 milliGRAM(s) IV Intermittent daily  chlorhexidine 2% Cloths 1 Application(s) Topical <User Schedule>  clopidogrel Tablet 75 milliGRAM(s) Oral daily  dextrose 5%. 1000 milliLiter(s) (50 mL/Hr) IV Continuous <Continuous>  dextrose 5%. 1000 milliLiter(s) (100 mL/Hr) IV Continuous <Continuous>  dextrose 50% Injectable 25 Gram(s) IV Push once  dextrose 50% Injectable 12.5 Gram(s) IV Push once  dextrose 50% Injectable 25 Gram(s) IV Push once  donepezil 10 milliGRAM(s) Oral at bedtime  fluticasone propionate 50 MICROgram(s)/spray Nasal Spray 1 Spray(s) Both Nostrils two times a day  glucagon  Injectable 1 milliGRAM(s) IntraMuscular once  hydrALAZINE 5 milliGRAM(s) Oral every 8 hours  insulin glargine Injectable (LANTUS) 20 Unit(s) SubCutaneous at bedtime  insulin lispro (ADMELOG) corrective regimen sliding scale   SubCutaneous three times a day before meals  insulin lispro (ADMELOG) corrective regimen sliding scale   SubCutaneous at bedtime  insulin lispro Injectable (ADMELOG) 5 Unit(s) SubCutaneous three times a day before meals  ketorolac 0.5% Ophthalmic Solution 1 Drop(s) Left EYE three times a day  milrinone Infusion 0.375 MICROgram(s)/kG/Min (9.97 mL/Hr) IV Continuous <Continuous>  mometasone 220 MICROgram(s) Inhaler 2 Puff(s) Inhalation daily  pantoprazole    Tablet 40 milliGRAM(s) Oral before breakfast  polyethylene glycol 3350 17 Gram(s) Oral daily  senna 2 Tablet(s) Oral at bedtime  tamsulosin 0.4 milliGRAM(s) Oral at bedtime    MEDICATIONS  (PRN):  acetaminophen     Tablet .. 650 milliGRAM(s) Oral every 6 hours PRN Temp greater or equal to 38C (100.4F), Mild Pain (1 - 3)  aluminum hydroxide/magnesium hydroxide/simethicone Suspension 30 milliLiter(s) Oral every 4 hours PRN Dyspepsia  dextrose Oral Gel 15 Gram(s) Oral once PRN Blood Glucose LESS THAN 70 milliGRAM(s)/deciliter      ALLERGIES:  Allergies    ceftriaxone (Urticaria)  ciprofloxacin (Rash)  Shrimp (Vomiting; Anaphylaxis; Nausea)    Intolerances        OBJECTIVE:  ICU Vital Signs Last 24 Hrs  T(C): 36.2 (23 Aug 2022 07:00), Max: 36.8 (22 Aug 2022 20:00)  T(F): 97.2 (23 Aug 2022 07:00), Max: 98.2 (22 Aug 2022 20:00)  HR: 85 (23 Aug 2022 07:00) (80 - 141)  BP: --  BP(mean): --  ABP: 103/51 (23 Aug 2022 07:00) (94/52 - 133/70)  ABP(mean): 65 (23 Aug 2022 07:00) (64 - 90)  RR: 20 (23 Aug 2022 07:00) (20 - 29)  SpO2: 96% (23 Aug 2022 07:00) (92% - 98%)    O2 Parameters below as of 23 Aug 2022 07:00  Patient On (Oxygen Delivery Method): nasal cannula  O2 Flow (L/min): 4          Adult Advanced Hemodynamics Last 24 Hrs  CVP(mm Hg): 15 (23 Aug 2022 07:00) (11 - 15)  CVP(cm H2O): --  CO: --  CI: --  PA: 45/29 (23 Aug 2022 07:00) (45/29 - 69/41)  PA(mean): 37 (23 Aug 2022 07:00) (37 - 54)  PCWP: --  SVR: --  SVRI: --  PVR: --  PVRI: --  CAPILLARY BLOOD GLUCOSE      POCT Blood Glucose.: 212 mg/dL (23 Aug 2022 07:42)  POCT Blood Glucose.: 195 mg/dL (22 Aug 2022 21:33)  POCT Blood Glucose.: 201 mg/dL (22 Aug 2022 16:32)  POCT Blood Glucose.: 179 mg/dL (22 Aug 2022 11:45)    CAPILLARY BLOOD GLUCOSE  300 (21 Aug 2022 09:00)      POCT Blood Glucose.: 212 mg/dL (23 Aug 2022 07:42)    I&O's Summary    22 Aug 2022 07:01  -  23 Aug 2022 07:00  --------------------------------------------------------  IN: 834.9 mL / OUT: 705 mL / NET: 129.9 mL      Daily     Daily Weight in k.7 (23 Aug 2022 05:00)    PHYSICAL EXAMINATION:  General: NAD  Neurology: AAOX3 and MCDANIELS x 4  Respiratory: Bilateral crackles   CV: Tachycardic with regular rhythm, +holosystolic murmur  Abdominal: Soft, NT, ND   Extremities: trace edema b/l, able to move all extremities, R groin site clean, dry, intact  Lines: R groin has and swan, L radial A line, and b/l peripheral IVs    LABS:  ABG - ( 23 Aug 2022 07:45 )  pH, Arterial: 7.44  pH, Blood: x     /  pCO2: 31    /  pO2: 94    / HCO3: 21    / Base Excess: -2.1  /  SaO2: 98.2                            9.7    10.29 )-----------( 209      ( 23 Aug 2022 07:30 )             31.2     08-23    139  |  102  |  56<H>  ----------------------------<  172<H>  4.5   |  23  |  2.71<H>    Ca    8.8      23 Aug 2022 00:15  Phos  4.7     08-  Mg     2.50     08-    TPro  6.7  /  Alb  3.3  /  TBili  0.5  /  DBili  x   /  AST  21  /  ALT  23  /  AlkPhos  91  08-    LIVER FUNCTIONS - ( 23 Aug 2022 00:15 )  Alb: 3.3 g/dL / Pro: 6.7 g/dL / ALK PHOS: 91 U/L / ALT: 23 U/L / AST: 21 U/L / GGT: x           PT/INR - ( 21 Aug 2022 08:55 )   PT: 17.6 sec;   INR: 1.51 ratio         PTT - ( 22 Aug 2022 05:15 )  PTT:91.6 sec    CARDIAC MARKERS ( 22 Aug 2022 05:15 )  x     / x     / 161 U/L / x     / 4.0 ng/mL    TELEMETRY:     EKG:     IMAGING:

## 2022-08-23 NOTE — CONSULT NOTE ADULT - SUBJECTIVE AND OBJECTIVE BOX
HPI:  82 y.o M pmh of HTN, DM2, CAD s/p Stent x3, CVA 2/2 symptomatic carotid stenosis s/p stent, JACKELIN on CPAP, prostate ca s/p seeding presents for for constipation and SOB. Patient states that ~1 week ago he had spicy oxtail, the following day when he would try to go the bathroom he would have a hard time having a BM and discomfort urinating with small bit of blood in it. his stomach was uncomfortable, burning type pain,  during this time He tried to take peptobismol and tums to help but did not provide relief.  He also noted some difficulty breathing during this time, he thinks the hot sauce triggered it. SOB with mild exertion. Some chest pain, felt like a "restriction". Developed more belching and worse SOB at rest and Since symptoms were not resolving came to ER> While in the ER hewas found to have be tachycardic to 140.  +dysuria, generally weak, Denies fever, chills, nausea, vomtiing, diarrhea, brbrpr, melena, dizziness, fall, syncope, head trauma, visual change, hearing change, edema, weight gain, recent travel, recent abx. .  (17 Aug 2022 07:02)    PERTINENT PM/SXH:   Diabetes mellitus    CAD (coronary artery disease)    Prostate ca    Sleep apnea    Hypertension    Hypercholesteremia      S/P cholecystectomy    S/P coronary angioplasty    H/O carotid endarterectomy      FAMILY HISTORY:  FH: type 2 diabetes    Family Hx substance abuse [ ]yes [ ]no  ITEMS NOT CHECKED ARE NOT PRESENT    SOCIAL HISTORY:   Lives with son Jatin, has another daughter Gloria, . Worked as [employer] officer   Significant other/partner[ ]  Children[ ]  Church/Spirituality:  Substance hx:  [ ]   Tobacco hx:  [ ]   Alcohol hx: [ ]   Home Opioid hx:  [ ] I-Stop Reference No:  Living Situation: [X ]Home  [ ]Long term care  [ ]Rehab [ ]Other    ADVANCE DIRECTIVES:    DNR/MOLST  [ ]  Living Will  [ ]   DECISION MAKER(s):  [ ] Health Care Proxy(s)  [X ] Surrogate(s) Adult children Madison [ ] Guardian           Name(s): Phone Number(s):    BASELINE (I)ADL(s) (prior to admission):  [place]: [ ]Total  [X ] Moderate [ ]Dependent  Walked with rolling walker otherwise fairly functional     Allergies  ceftriaxone (Urticaria)  ciprofloxacin (Rash)  Shrimp (Vomiting; Anaphylaxis; Nausea)    Intolerances    MEDICATIONS  (STANDING):  aMIOdarone    Tablet   Oral   aMIOdarone    Tablet 400 milliGRAM(s) Oral every 8 hours  apixaban 2.5 milliGRAM(s) Oral every 12 hours  aspirin enteric coated 81 milliGRAM(s) Oral daily  atorvastatin 80 milliGRAM(s) Oral at bedtime  buMETAnide Infusion 1 mG/Hr (5 mL/Hr) IV Continuous <Continuous>  chlorhexidine 2% Cloths 1 Application(s) Topical <User Schedule>  clopidogrel Tablet 75 milliGRAM(s) Oral daily  dextrose 5%. 1000 milliLiter(s) (50 mL/Hr) IV Continuous <Continuous>  dextrose 5%. 1000 milliLiter(s) (100 mL/Hr) IV Continuous <Continuous>  dextrose 50% Injectable 25 Gram(s) IV Push once  dextrose 50% Injectable 12.5 Gram(s) IV Push once  dextrose 50% Injectable 25 Gram(s) IV Push once  donepezil 10 milliGRAM(s) Oral at bedtime  fluticasone propionate 50 MICROgram(s)/spray Nasal Spray 1 Spray(s) Both Nostrils two times a day  glucagon  Injectable 1 milliGRAM(s) IntraMuscular once  hydrALAZINE 10 milliGRAM(s) Oral every 8 hours  insulin glargine Injectable (LANTUS) 20 Unit(s) SubCutaneous at bedtime  insulin lispro (ADMELOG) corrective regimen sliding scale   SubCutaneous three times a day before meals  insulin lispro (ADMELOG) corrective regimen sliding scale   SubCutaneous at bedtime  insulin lispro Injectable (ADMELOG) 5 Unit(s) SubCutaneous three times a day before meals  ketorolac 0.5% Ophthalmic Solution 1 Drop(s) Left EYE three times a day  milrinone Infusion 0.375 MICROgram(s)/kG/Min (9.97 mL/Hr) IV Continuous <Continuous>  mometasone 220 MICROgram(s) Inhaler 2 Puff(s) Inhalation daily  pantoprazole    Tablet 40 milliGRAM(s) Oral before breakfast  polyethylene glycol 3350 17 Gram(s) Oral daily  senna 2 Tablet(s) Oral at bedtime  tamsulosin 0.4 milliGRAM(s) Oral at bedtime    MEDICATIONS  (PRN):  acetaminophen     Tablet .. 650 milliGRAM(s) Oral every 6 hours PRN Temp greater or equal to 38C (100.4F), Mild Pain (1 - 3)  aluminum hydroxide/magnesium hydroxide/simethicone Suspension 30 milliLiter(s) Oral every 4 hours PRN Dyspepsia  dextrose Oral Gel 15 Gram(s) Oral once PRN Blood Glucose LESS THAN 70 milliGRAM(s)/deciliter    PRESENT SYMPTOMS: [X ]Unable to self-report  [ ] CPOT [ ] PAINADs [ ] RDOS  Source if other than patient:  [ ]Family   [ ]Team     Pain: [ ]yes [ ]no  QOL impact -   Location -                    Aggravating factors -  Quality -  Radiation -  Timing-  Severity (0-10 scale):  Minimal acceptable level (0-10 scale):     CPOT:    https://www.scc.org/getattachment/[email or web address]/Critical-Care-Pain-Observation-Tool-(CPOT)    PAIN AD Score:   http://[email or web address]/cog/painad.pdf (press ctrl +  left click to view)    Dyspnea:                           [ ]Mild [ ]Moderate [ ]Severe      RDOS:  0 to 2  minimal or no respiratory distress   3  mild distress  4 to 6 moderate distress  >7 severe distress  https://[email or web address].net/handouts/hen/Respiratory_Distress_Observation_Scale.pdf (Ctrl +  left click to view)     Anxiety:                             [ ]Mild [ ]Moderate [ ]Severe  Fatigue:                             [ ]Mild [ ]Moderate [ ]Severe  Nausea:                             [ ]Mild [ ]Moderate [ ]Severe  Loss of appetite:              [ ]Mild [ ]Moderate [ ]Severe  Constipation:                    [ ]Mild [ ]Moderate [ ]Severe    PCSSQ[Palliative Care Spiritual Screening Question]   Severity (0-10):  Score of 4 or > indicate consideration of Chaplaincy referral.  Chaplaincy Referral: [ ] yes [ ] refused [ ] following    Other Symptoms:  [ ]All other review of systems negative     Palliative Performance Status Version 2:         %    http://npcrc.org/files/news/palliative_performance_scale_ppsv2.pdf  PHYSICAL EXAM:  Vital Signs Last 24 Hrs  T(C): 36 (23 Aug 2022 12:00), Max: 36.8 (22 Aug 2022 20:00)  T(F): 96.8 (23 Aug 2022 12:00), Max: 98.2 (22 Aug 2022 20:00)  HR: 85 (23 Aug 2022 15:20) (66 - 139)  BP: --  BP(mean): --  RR: 24 (23 Aug 2022 15:00) (20 - 29)  SpO2: 98% (23 Aug 2022 15:20) (93% - 99%)    Parameters below as of 23 Aug 2022 10:48  Patient On (Oxygen Delivery Method): nasal cannula     I&O's Summary    22 Aug 2022 07:01  -  23 Aug 2022 07:00  --------------------------------------------------------  IN: 834.9 mL / OUT: 705 mL / NET: 129.9 mL    23 Aug 2022 07:01  -  23 Aug 2022 16:38  --------------------------------------------------------  IN: 546.2 mL / OUT: 50 mL / NET: 496.2 mL      GENERAL: [ ]Cachexia    [X ]Alert  [X ]Oriented x 1  [ ]Lethargic  [ ]Unarousable  [ ]Verbal  [ ]Non-Verbal  Behavioral:   [ ] Anxiety  [X ] Delirium [ ] Agitation [ ] Other  HEENT:  [X ]Normal   [ ]Dry mouth   [ ]ET Tube/Trach  [ ]Oral lesions  PULMONARY:   [ ]Clear [ ]Tachypnea  [ ]Audible excessive secretions   [ ]Rhonchi        [ ]Right [ ]Left [ ]Bilateral  [X ]Crackles        [ ]Right [ ]Left [ ]Bilateral  [ ]Wheezing     [ ]Right [ ]Left [ ]Bilateral  [ ]Diminished breath sounds [ ]right [ ]left [ ]bilateral  CARDIOVASCULAR:    [X ]Regular [ ]Irregular [ ]Tachy  [ ]Juventino [ ]Murmur [ ]Other  GASTROINTESTINAL:  [X ]Soft  [ ]Distended   [ ]+BS  [X ]Non tender [ ]Tender  [ ]Other [ ]PEG [ ]OGT/ NGT  Last BM:  GENITOURINARY:  [ ]Normal [ ] Incontinent   [X ]Oliguria/Anuria   [ ]Guevara  MUSCULOSKELETAL:   [X ]Normal   [ ]Weakness  [ ]Bed/Wheelchair bound [ ]Edema  NEUROLOGIC:   [X ]No focal deficits  [ ]Cognitive impairment  [ ]Dysphagia [ ]Dysarthria [ ]Paresis [ ]Other   SKIN:   [ ]Normal  [ ]Rash  [ ]Other  [ ]Pressure ulcer(s)       Present on admission [ ]y [ ]n    CRITICAL CARE:  [X ] Shock Present  [ ]Septic [X ]Cardiogenic [ ]Neurologic [ ]Hypovolemic  [ ]  Vasopressors [X ]  Inotropes   [X ]Respiratory failure present [ ]Mechanical ventilation [ ]Non-invasive ventilatory support [ ]High flow    [X ]Acute  [ ]Chronic [ ]Hypoxic  [ ]Hypercarbic [ ]Other  [ ]Other organ failure     LABS:                        9.7    10.29 )-----------( 209      ( 23 Aug 2022 07:30 )             31.2   08-23    139  |  102  |  60<H>  ----------------------------<  223<H>  4.7   |  21<L>  |  3.16<H>    Ca    8.9      23 Aug 2022 07:30  Phos  5.3     08-23  Mg     2.40     08-23    TPro  6.7  /  Alb  3.3  /  TBili  0.5  /  DBili  x   /  AST  21  /  ALT  23  /  AlkPhos  91  08-23  PTT - ( 22 Aug 2022 05:15 )  PTT:91.6 sec      RADIOLOGY & ADDITIONAL STUDIES:  < from: TTE with Doppler (w/Cont) (08.17.22 @ 19:13) >  CONCLUSIONS:  1. Thickened, tethered mitral valve. Severe mitral  regurgitation.  2. Calcified trileaflet aortic valvewith normal opening.  3. Normal left atrium.  LA volume index = 28 cc/m2.  4. Endocardial visualization enhanced with intravenous  injection of echo contrast (Definity). Severe global left  ventricular systolic dysfunction.The mid to distal septum,  apical, lateral walls are severely hypokinetic.  5. Right ventricular enlargement with decreased right  ventricular systolic function.    < end of copied text >  < from: Xray Chest 1 View- PORTABLE-Routine (Xray Chest 1 View- PORTABLE-Routine in AM.) (08.23.22 @ 07:37) >  Pulmonary edema with improvement on the most recent study.  Inferior approach [place]-Munir catheter within the interlobar/segmental   right pulmonary artery.    < end of copied text >    PROTEIN CALORIE MALNUTRITION PRESENT: [ ]mild [ ]moderate [ ]severe [ ]underweight [ ]morbid obesity  https://www.andeal.org/vault/2440/web/files/ONC/Table_Clinical%20Characteristics%20to%20Document%20Malnutrition-White%20JV%20et%20al%896289.pdf    Height (cm): 175.3 (08-16-22 @ 14:33), 175.3 (05-02-22 @ 13:47), 175.3 (09-21-21 @ 03:07)  Weight (kg): 88.6 (08-17-22 @ 01:44), 84.5 (05-02-22 @ 13:47), 97.5 (09-21-21 @ 03:07)  BMI (kg/m2): 28.8 (08-17-22 @ 01:44), 27.5 (08-16-22 @ 14:33), 27.5 (05-02-22 @ 13:47)    [ ]PPSV2 < or = to 30% [ ]significant weight loss  [ ]poor nutritional intake  [ ]anasarca[ ]Artificial Nutrition      Other REFERRALS:  [ ]Hospice  [ ]Child Life  [ ]Social Work  [ ]Case management [ ]Holistic Therapy    HPI:  Patient is an 83 yo M DM/HTN/CAD s/p Stent x3/CVA 2/2 symptomatic carotid stenosis s/p stent/JACKELIN on CPAP/prostate ca s/p seeding presented SOB. In ED he was noted to have elevated troponin and was admitted for management of NSTEMI. However patient had RRT in which he became altered, lactate increase >6, was admitted to CCU for cardiogenic shock. Patient has been requiring vasopressor and inotrope support and diuresis. Hospital course has been complicated by delirium and LOC episodes, and bradycardia. Patient has been unable to be weaned off milrinone and has acute renal failure, now anuric. Patient is not a candidate for further heart failure therapies due to age and comorbidities. Palliative consulted for [facility].     Patient was seen this AM, undergoing EEG. He is able to say his name, birthday, however having confused speech. He is able to follow some commands.   Please see below for discussion with his children Jatin and Gloria.     PERTINENT PM/SXH:   Diabetes mellitus    CAD (coronary artery disease)    Prostate ca    Sleep apnea    Hypertension    Hypercholesteremia      S/P cholecystectomy    S/P coronary angioplasty    H/O carotid endarterectomy      FAMILY HISTORY:  FH: type 2 diabetes    Family Hx substance abuse [ ]yes [ ]no  ITEMS NOT CHECKED ARE NOT PRESENT    SOCIAL HISTORY:   Lives with son Jatin, has another daughter Gloria, . Worked as [employer] officer   Significant other/partner[ ]  Children[ ]  Latter-day/Spirituality:  Substance hx:  [ ]   Tobacco hx:  [ ]   Alcohol hx: [ ]   Home Opioid hx:  [ ] I-Stop Reference No:  Living Situation: [X ]Home  [ ]Long term care  [ ]Rehab [ ]Other    ADVANCE DIRECTIVES:    DNR/MOLST  [ ]  Living Will  [ ]   DECISION MAKER(s):  [ ] Health Care Proxy(s)  [X ] Surrogate(s) Adult children Madison [ ] Guardian           Name(s): Phone Number(s):    BASELINE (I)ADL(s) (prior to admission):  [place]: [ ]Total  [X ] Moderate [ ]Dependent  Walked with rolling walker otherwise fairly functional     Allergies  ceftriaxone (Urticaria)  ciprofloxacin (Rash)  Shrimp (Vomiting; Anaphylaxis; Nausea)    Intolerances    MEDICATIONS  (STANDING):  aMIOdarone    Tablet   Oral   aMIOdarone    Tablet 400 milliGRAM(s) Oral every 8 hours  apixaban 2.5 milliGRAM(s) Oral every 12 hours  aspirin enteric coated 81 milliGRAM(s) Oral daily  atorvastatin 80 milliGRAM(s) Oral at bedtime  buMETAnide Infusion 1 mG/Hr (5 mL/Hr) IV Continuous <Continuous>  chlorhexidine 2% Cloths 1 Application(s) Topical <User Schedule>  clopidogrel Tablet 75 milliGRAM(s) Oral daily  dextrose 5%. 1000 milliLiter(s) (50 mL/Hr) IV Continuous <Continuous>  dextrose 5%. 1000 milliLiter(s) (100 mL/Hr) IV Continuous <Continuous>  dextrose 50% Injectable 25 Gram(s) IV Push once  dextrose 50% Injectable 12.5 Gram(s) IV Push once  dextrose 50% Injectable 25 Gram(s) IV Push once  donepezil 10 milliGRAM(s) Oral at bedtime  fluticasone propionate 50 MICROgram(s)/spray Nasal Spray 1 Spray(s) Both Nostrils two times a day  glucagon  Injectable 1 milliGRAM(s) IntraMuscular once  hydrALAZINE 10 milliGRAM(s) Oral every 8 hours  insulin glargine Injectable (LANTUS) 20 Unit(s) SubCutaneous at bedtime  insulin lispro (ADMELOG) corrective regimen sliding scale   SubCutaneous three times a day before meals  insulin lispro (ADMELOG) corrective regimen sliding scale   SubCutaneous at bedtime  insulin lispro Injectable (ADMELOG) 5 Unit(s) SubCutaneous three times a day before meals  ketorolac 0.5% Ophthalmic Solution 1 Drop(s) Left EYE three times a day  milrinone Infusion 0.375 MICROgram(s)/kG/Min (9.97 mL/Hr) IV Continuous <Continuous>  mometasone 220 MICROgram(s) Inhaler 2 Puff(s) Inhalation daily  pantoprazole    Tablet 40 milliGRAM(s) Oral before breakfast  polyethylene glycol 3350 17 Gram(s) Oral daily  senna 2 Tablet(s) Oral at bedtime  tamsulosin 0.4 milliGRAM(s) Oral at bedtime    MEDICATIONS  (PRN):  acetaminophen     Tablet .. 650 milliGRAM(s) Oral every 6 hours PRN Temp greater or equal to 38C (100.4F), Mild Pain (1 - 3)  aluminum hydroxide/magnesium hydroxide/simethicone Suspension 30 milliLiter(s) Oral every 4 hours PRN Dyspepsia  dextrose Oral Gel 15 Gram(s) Oral once PRN Blood Glucose LESS THAN 70 milliGRAM(s)/deciliter    PRESENT SYMPTOMS: [X ]Unable to self-report  [ ] CPOT [ ] PAINADs [ ] RDOS  Source if other than patient:  [ ]Family   [ ]Team     Pain: [ ]yes [ ]no  QOL impact -   Location -                    Aggravating factors -  Quality -  Radiation -  Timing-  Severity (0-10 scale):  Minimal acceptable level (0-10 scale):     CPOT:    https://www.[facility].org/getattachment/[email or web address]/Critical-Care-Pain-Observation-Tool-(CPOT)    PAIN AD Score:   http://geriatrictoolkit.[facility]/cog/painad.pdf (press ctrl +  left click to view)    Dyspnea:                           [ ]Mild [ ]Moderate [ ]Severe      RDOS:  0 to 2  minimal or no respiratory distress   3  mild distress  4 to 6 moderate distress  >7 severe distress  https://[email or web address]/handouts/hen/Respiratory_Distress_Observation_Scale.pdf (Ctrl +  left click to view)     Anxiety:                             [ ]Mild [ ]Moderate [ ]Severe  Fatigue:                             [ ]Mild [ ]Moderate [ ]Severe  Nausea:                             [ ]Mild [ ]Moderate [ ]Severe  Loss of appetite:              [ ]Mild [ ]Moderate [ ]Severe  Constipation:                    [ ]Mild [ ]Moderate [ ]Severe    PCSSQ[Palliative Care Spiritual Screening Question]   Severity (0-10):  Score of 4 or > indicate consideration of Chaplaincy referral.  Chaplaincy Referral: [ ] yes [ ] refused [ ] following    Other Symptoms:  [ ]All other review of systems negative       PHYSICAL EXAM:  Vital Signs Last 24 Hrs  T(C): 36 (23 Aug 2022 12:00), Max: 36.8 (22 Aug 2022 20:00)  T(F): 96.8 (23 Aug 2022 12:00), Max: 98.2 (22 Aug 2022 20:00)  HR: 85 (23 Aug 2022 15:20) (66 - 139)  BP: --  BP(mean): --  RR: 24 (23 Aug 2022 15:00) (20 - 29)  SpO2: 98% (23 Aug 2022 15:20) (93% - 99%)    Parameters below as of 23 Aug 2022 10:48  Patient On (Oxygen Delivery Method): nasal cannula     I&O's Summary    22 Aug 2022 07:01  -  23 Aug 2022 07:00  --------------------------------------------------------  IN: 834.9 mL / OUT: 705 mL / NET: 129.9 mL    23 Aug 2022 07:01  -  23 Aug 2022 16:38  --------------------------------------------------------  IN: 546.2 mL / OUT: 50 mL / NET: 496.2 mL      GENERAL: [ ]Cachexia    [X ]Alert  [X ]Oriented x 1  [ ]Lethargic  [ ]Unarousable  [ ]Verbal  [ ]Non-Verbal  Behavioral:   [ ] Anxiety  [X ] Delirium [ ] Agitation [ ] Other  HEENT:  [X ]Normal   [ ]Dry mouth   [ ]ET Tube/Trach  [ ]Oral lesions  PULMONARY:   [ ]Clear [ ]Tachypnea  [ ]Audible excessive secretions   [ ]Rhonchi        [ ]Right [ ]Left [ ]Bilateral  [X ]Crackles        [ ]Right [ ]Left [ ]Bilateral  [ ]Wheezing     [ ]Right [ ]Left [ ]Bilateral  [ ]Diminished breath sounds [ ]right [ ]left [ ]bilateral  CARDIOVASCULAR:    [X ]Regular [ ]Irregular [ ]Tachy  [ ]Juventino [ ]Murmur [ ]Other  GASTROINTESTINAL:  [X ]Soft  [ ]Distended   [ ]+BS  [X ]Non tender [ ]Tender  [ ]Other [ ]PEG [ ]OGT/ NGT  Last BM:  GENITOURINARY:  [ ]Normal [ ] Incontinent   [X ]Oliguria/Anuria   [ ]Guevara  MUSCULOSKELETAL:   [X ]Normal   [ ]Weakness  [ ]Bed/Wheelchair bound [ ]Edema  NEUROLOGIC:   [X ]No focal deficits  [ ]Cognitive impairment  [ ]Dysphagia [ ]Dysarthria [ ]Paresis [ ]Other   SKIN:   [ ]Normal  [ ]Rash  [ ]Other  [ ]Pressure ulcer(s)       Present on admission [ ]y [ ]n    CRITICAL CARE:  [X ] Shock Present  [ ]Septic [X ]Cardiogenic [ ]Neurologic [ ]Hypovolemic  [ ]  Vasopressors [X ]  Inotropes   [X ]Respiratory failure present [ ]Mechanical ventilation [ ]Non-invasive ventilatory support [ ]High flow    [X ]Acute  [ ]Chronic [ ]Hypoxic  [ ]Hypercarbic [ ]Other  [ ]Other organ failure     LABS:                        9.7    10.29 )-----------( 209      ( 23 Aug 2022 07:30 )             31.2   08-23    139  |  102  |  60<H>  ----------------------------<  223<H>  4.7   |  21<L>  |  3.16<H>    Ca    8.9      23 Aug 2022 07:30  Phos  5.3     08-23  Mg     2.40     08-23    TPro  6.7  /  Alb  3.3  /  TBili  0.5  /  DBili  x   /  AST  21  /  ALT  23  /  AlkPhos  91  08-23  PTT - ( 22 Aug 2022 05:15 )  PTT:91.6 sec      RADIOLOGY & ADDITIONAL STUDIES:  < from: TTE with Doppler (w/Cont) (08.17.22 @ 19:13) >  CONCLUSIONS:  1. Thickened, tethered mitral valve. Severe mitral  regurgitation.  2. Calcified trileaflet aortic valvewith normal opening.  3. Normal left atrium.  LA volume index = 28 cc/m2.  4. Endocardial visualization enhanced with intravenous  injection of echo contrast (Definity). Severe global left  ventricular systolic dysfunction.The mid to distal septum,  apical, lateral walls are severely hypokinetic.  5. Right ventricular enlargement with decreased right  ventricular systolic function.    < end of copied text >  < from: Xray Chest 1 View- PORTABLE-Routine (Xray Chest 1 View- PORTABLE-Routine in AM.) (08.23.22 @ 07:37) >  Pulmonary edema with improvement on the most recent study.  Inferior approach [place]-Munir catheter within the interlobar/segmental   right pulmonary artery.    < end of copied text >    PROTEIN CALORIE MALNUTRITION PRESENT: [ ]mild [ ]moderate [ ]severe [ ]underweight [ ]morbid obesity  https://www.andeal.org/vault/2440/web/files/ONC/Table_Clinical%20Characteristics%20to%20Document%20Malnutrition-White%20JV%20et%20al%202012.pdf    Height (cm): 175.3 (08-16-22 @ 14:33), 175.3 (05-02-22 @ 13:47), 175.3 (09-21-21 @ 03:07)  Weight (kg): 88.6 (08-17-22 @ 01:44), 84.5 (05-02-22 @ 13:47), 97.5 (09-21-21 @ 03:07)  BMI (kg/m2): 28.8 (08-17-22 @ 01:44), 27.5 (08-16-22 @ 14:33), 27.5 (05-02-22 @ 13:47)    [ ]PPSV2 < or = to 30% [ ]significant weight loss  [ ]poor nutritional intake  [ ]anasarca[ ]Artificial Nutrition      Other REFERRALS:  [ ]Hospice  [ ]Child Life  [ ]Social Work  [ ]Case management [ ]Holistic Therapy

## 2022-08-23 NOTE — PROGRESS NOTE ADULT - PROBLEM SELECTOR PLAN 1
ISO multiple CT scans with IV contrast    -Continue to monitor now 2 days from last contrast dose (8/21)  -No indication for metolazine given decreased UOP on bumez drip  -C/W IVF

## 2022-08-23 NOTE — CHART NOTE - NSCHARTNOTEFT_GEN_A_CORE
Overnight events:  2200: No response to bumex 1mg for CVP 12. Pt refusing metolazone, Bumex 2mg IVP given with no respone to bumex 2mg. creatinine incr 2.74  Midnight: Vikki CO 4.15, CI 2, CVP 14, MV02 53.4, svr 983 (map 65, hg 10.2) on milrinone .375,  lact 1.3  0400 Bumex 4mg IVP + bumex gtt @ 0.5mg Overnight events: Milrinone remained @ 0.375mcg/kg/min  2200: No response to bumex 1mg for CVP 12. Pt refusing metolazone, Bumex 2mg IVP given with no respone. creatinine increasing, now 2.74  Midnight: Vikki CO 4.15, CI 2, CVP 14, MV02 53.4, svr 983 (map 65, hg 10.2) on milrinone .375mcg,  lact 1.3  0400 Bumex 4mg IVP + bumex gtt @ 0.5 mg/hr

## 2022-08-23 NOTE — CHART NOTE - NSCHARTNOTEFT_GEN_A_CORE
Overnight events:   8pm hemodynamic calculations on milrinone .375mcg: CO 6.4, CI 3.1, MV02 67%, CVP 12, , MAP 66,  lact 1.7  Pt continues oliguric on bumex with urine output 0-5cc/hr and creatinine increasing to 4.15 with plan for CRRT/CVVHDF if creatinine continues to worsen with no urine output.  Hydralazine Dc'd and norepinephrine started for hypotension and up titrated to 0.3mcg/kg/mn to maintain MAP > or = 65.  03:30: hemodynamic calculations on norepinephrine 0.3mcg and milrinone 0.375 -> CO 9, CI 4.5, CVP 12, MV02 76.5,  (map65), lact 1.5 Milrinone decreased to 0.25 mcg. Will continue to monitor. Overnight events:   8pm hemodynamic calculations on milrinone .375mcg: CO 6.4, CI 3.1, MV02 67%, CVP 12, , MAP 66,  lact 1.7  Pt continues oliguric on bumex with urine output 0-5cc/hr and creatinine increasing to 4.15 with plan for CRRT/CVVHDF if creatinine continues to worsen with no urine output.  Hydralazine Dc'd and norepinephrine started for hypotension and up titrated to 0.3mcg/kg/mn to maintain MAP > or = 65.  03:30: hemodynamic calculations on norepinephrine 0.3mcg and milrinone 0.375 -> CO 9, CI 4.5, CVP 12, MV02 76.5,  (map65), lact 1.5 Milrinone decreased to 0.25 mcg @ 05:30. Will repeat labs @ 09:30.

## 2022-08-23 NOTE — CONSULT NOTE ADULT - ASSESSMENT
Patient is an 83 yo M DM/HTN/CAD s/p Stent x3/CVA 2/2 symptomatic carotid stenosis s/p stent/JACKELIN on CPAP/prostate ca s/p seeding presented SOB, now with refractory cardiogenic shock unable to be weaned off inotropic support. Patient is a not a candidate for further advanced heart failure therapies. Palliative consulted for [facility].

## 2022-08-23 NOTE — PROGRESS NOTE ADULT - PROBLEM SELECTOR PLAN 1
[place] on Milrinone 0.375 mcg/kg/min-CVP 15, 58/27/37, CO 6.55, CI 3.19. Milrinone has been decreased to 0.25 mcg/kg/min.   Patient is not making much urine- he received Bumex 4 mg IVPB, followed by Bumex gtt 0.5 mg/hr, which has since been increased to Bumex 1 mg/hr. Still only making 5 ml/hr.  Consider metolazone? Will d/w Dr. Kahn.   Keep K 4.0-5.0 and mag >2.0.

## 2022-08-23 NOTE — CHART NOTE - NSCHARTNOTEFT_GEN_A_CORE
Pt with oliguric SANDRA. Monitor for next 12 hours. If renal fx continues to worsens will initiate CRRT/CVVHDF. HD consent obtained from son and kept on file. Reach to nephrology for any questions.

## 2022-08-23 NOTE — PROGRESS NOTE ADULT - ASSESSMENT
note incomplete pt to be discussed with attending Nephrologist Dr. Damon 82M PMHx HTN, DM2, CAD s/p Stent x3, CVA 2/2 symptomatic carotid stenosis s/p stent, JACKELIN on CPAP, prostate ca s/p seeding p/w constipation and SOB found to have NSTEMI c/b SVTs. Fluctating mental status, leukocytosis to 13, lactate to 13.5, procalc 0.61. CTc/a/p was notable for small BLU nodular haziness and questionable L uteritis. Found to be in acute on chronic systolic heart failure, severe MR and cardiogenic shock. S/p [place] and IABP.     Nephro consulted for SANDRA     Impression: SANDRA with oliguria iso multiple CT scans with IVC (8/16,17,21) and perfusion issues as above.

## 2022-08-23 NOTE — CONSULT NOTE ADULT - PROBLEM SELECTOR RECOMMENDATION 9
- Discussed with surrogates that patient has reached end of life and possible plans of either home hospice with milrinone or palliative liberation of milrinone in CCU  - DNR/DNI was recommended   - Surrogates are emotionally overwhelmed. Quality of life is important to them, but they need time to process before making end of life decisions   - Will follow for ongoing GOC

## 2022-08-23 NOTE — PROGRESS NOTE ADULT - ASSESSMENT
82 year old man with CAD and HFrEF who was transferred to [facility] CCU with acute on chronic systolic heart failure, severe MR and cardiogenic shock. S/p [place] and IABP. Pt currently on Milrinone and Heparin drips, receiving Bumex diuresis and empiric cefepime.    NEURO:  #Mental status: baseline A&O x3  -8/21 pt became unresponsive, code stroke was called. Pt received CTH, CTP, and CTA. Neuro recommending MRI w/wo IV contrast. AMS possibly in setting of overnight sedation from Seroquel.  - CT Head Negative for acute hemorrhage, CT Perfusion showing no core infarct or ischemic penumbra,  and CT Angio showing moderate stenosis of right cavernous ICA and mild stenosis of right proximal M1.  -Pt's mental status back to AAOx3  -Will hold haldol, loratidine, melatonin, for the time being in setting of AMS episode. Will consider adding back as tolerated.  -f/u EEG results  -f/u MRI head   -f/u PT and OT recs    CV:  #NSTEMI   - elevated troponins and EKG concerning for NSTEMI.   - Started on heparin gtt and s/p ASA/brillinta  - atorvastatin 80mg  - Troponins peak in 1600s, has downtrended to 1000      #systolic CHF: EF 25% with cardiogenic shock  - Echo from 8/17 showed severe mitral regurgitation, severe global LV systolic dysfunction, and RV enlargement + decreased systolic function  - RHC showed : RA 30, wedge 30, CO 3.22, CI 1.6,Pa sat 49% on levophed  - swan cath placed, f/u daily CXRs.   - milrinone 0.375  - currently off levo  - HF consulted, Not a candidate for advanced cardiac therapies  - Trend I/Os and daily weights, and Cr  - s/p IABP removal on 8/20  - 7am 8/23: CO 7.5, CI 3.6,   - started on bumex gtt    #Arrythmia:   -afib with RVR in ED  - CHADS-vasc - 7 - Eliquis d/erik and will start Heparin drip again at 5 PM 8/21  - intermittent -140  - d/c'ed digoxin 125mcg  - started on amiodarone  - Monitor on tele    PULM:   presented with SOB  - CT chest from 8/17 showed No pulmonary embolism, small b/l pulmonary effusions and mild pulmonary edema and a nonspecific 4 mm left upper lobe juxtapleural nodule with surrounding ground-glass opacity.   - Pt satting well on 2LNC  -8/21 CXR showing persistent pulmonary edema in setting of CHF  -s/p Bumex 1 IVP on 8/21. Will consider additional diuresis based on UOP (currently ~30cc/hr)  -s/p 80 lasix IVP on 8/22.  -bumex gtt    RENAL:  #SANDRA:   -Cr back up to 2.2  -continue to monitor UOP and lytes  -pt s/p Bumex 1 IVP 8/21.  -FeUrea 23.9%, urine P/Cr 1.0,   -bladder scan wnl  -consult nephro if needed    GI:  #Transaminitis: Elevated LFTs  - resolved    #Diet:  - Dash diet  - pantoprazole 40mg     #Bowel regiment:  presents with constipation  - miralax, maalox, senna  - nonspecific bowl gas on abdominal X-ray    ENDO:  #DM2: HbA1c 8.3   - Lantus increased to 20U qhs, c/w premeal 5U TID and SSI  - TSH and lipid panel wnl    HEMATOLOGIC:  #CBC results show Hb stable  - continue to monitor    #DVT prophylaxis with heparin gtt    ID:  #Pt without strong objective or clinical evidence of infection. Will observe off antibiotics  -8/21 WBC 6.3, afebrile  -RRT on 8/18for AMS, labs drawn during the rapid remarkable for new leukocytosis to 13, lactate to 13.5, procalc 0.61.  -Elevated lactate possibly 2/2 hypoperfusion given NSTEMI vs sepsis. However, unclear source of infection (pt being treated with bactrim for UTI for positive UA but negative UCx)  -Neg MRSA swab  -RVP neg  -f/u blood cultures  -cefepime 1g qday empiric coverage for 7 days total (8/19-8/25) 82 year old man with CAD and HFrEF who was transferred to [facility] CCU with acute on chronic systolic heart failure, severe MR and cardiogenic shock. S/p [place] and IABP. Pt currently on Milrinone and Heparin drips, receiving Bumex diuresis and empiric cefepime.    NEURO:  #Mental status: baseline A&O x3  -8/21 pt became unresponsive, code stroke was called. Pt received CTH, CTP, and CTA. Neuro recommending MRI w/wo IV contrast. AMS possibly in setting of overnight sedation from Seroquel.  - CT Head Negative for acute hemorrhage, CT Perfusion showing no core infarct or ischemic penumbra,  and CT Angio showing moderate stenosis of right cavernous ICA and mild stenosis of right proximal M1.  -Pt's mental status back to AAOx3  -Will hold haldol, loratidine, melatonin, for the time being in setting of AMS episode. Will consider adding back as tolerated.  -f/u EEG results  -f/u MRI head   -f/u PT and OT recs    CV:  #NSTEMI   - elevated troponins and EKG concerning for NSTEMI.   - Started on heparin gtt and s/p ASA/brillinta  - atorvastatin 80mg  - Troponins peak in 1600s, has downtrended to 1000      #systolic CHF: EF 25% with cardiogenic shock  - Echo from 8/17 showed severe mitral regurgitation, severe global LV systolic dysfunction, and RV enlargement + decreased systolic function  - RHC showed : RA 30, wedge 30, CO 3.22, CI 1.6,Pa sat 49% on levophed  - swan cath placed, f/u daily CXRs.   - milrinone 0.375  - currently off levo  - HF consulted, Not a candidate for advanced cardiac therapies  - Trend I/Os and daily weights, and Cr  - s/p IABP removal on 8/20  - 7am 8/23: CO 7.5, CI 3.6,   - started on bumex gtt  - palliative consulted, f/u recs    #Arrythmia:   -afib with RVR in ED  - CHADS-vasc - 7 - Eliquis d/erik and will start Heparin drip again at 5 PM 8/21  - intermittent -140  - d/c'ed digoxin 125mcg  - started on amiodarone  - Monitor on tele    PULM:   presented with SOB  - CT chest from 8/17 showed No pulmonary embolism, small b/l pulmonary effusions and mild pulmonary edema and a nonspecific 4 mm left upper lobe juxtapleural nodule with surrounding ground-glass opacity.   - Pt satting well on 2LNC  -8/21 CXR showing persistent pulmonary edema in setting of CHF  -s/p Bumex 1 IVP on 8/21. Will consider additional diuresis based on UOP (currently ~30cc/hr)  -s/p 80 lasix IVP on 8/22.  -bumex gtt    RENAL:  #SANDRA:   -Cr back up to 2.2  -continue to monitor UOP and lytes  -pt s/p Bumex 1 IVP 8/21.  -FeUrea 23.9%, urine P/Cr 1.0,   -bladder scan wnl  -consult nephro if needed    GI:  #Transaminitis: Elevated LFTs  - resolved    #Diet:  - Dash diet  - pantoprazole 40mg     #Bowel regiment:  presents with constipation  - miralax, maalox, senna  - nonspecific bowl gas on abdominal X-ray    ENDO:  #DM2: HbA1c 8.3   - Lantus increased to 20U qhs, c/w premeal 5U TID and SSI  - TSH and lipid panel wnl    HEMATOLOGIC:  #CBC results show Hb stable  - continue to monitor    #DVT prophylaxis with heparin gtt    ID:  #Pt without strong objective or clinical evidence of infection. Will observe off antibiotics  -8/21 WBC 6.3, afebrile  -RRT on 8/18for AMS, labs drawn during the rapid remarkable for new leukocytosis to 13, lactate to 13.5, procalc 0.61.  -Elevated lactate possibly 2/2 hypoperfusion given NSTEMI vs sepsis. However, unclear source of infection (pt being treated with bactrim for UTI for positive UA but negative UCx)  -Neg MRSA swab  -RVP neg  -f/u blood cultures  -cefepime 1g qday empiric coverage for 7 days total (8/19-8/25) 82 year old man with CAD and HFrEF who was transferred to [facility] CCU with acute on chronic systolic heart failure, severe MR and cardiogenic shock. S/p [place] and IABP. Pt currently on Milrinone and Heparin drips, receiving Bumex diuresis and empiric cefepime.    NEURO:  #Mental status: baseline A&O x3  -8/21 pt became unresponsive, code stroke was called. Pt received CTH, CTP, and CTA. Neuro recommending MRI w/wo IV contrast. AMS possibly in setting of overnight sedation from Seroquel.  - CT Head Negative for acute hemorrhage, CT Perfusion showing no core infarct or ischemic penumbra,  and CT Angio showing moderate stenosis of right cavernous ICA and mild stenosis of right proximal M1.  -Pt's mental status back to AAOx3  -Will hold haldol, loratidine, melatonin, for the time being in setting of AMS episode. Will consider adding back as tolerated.  -f/u EEG results  -f/u MRI head   -f/u PT and OT recs    CV:  #NSTEMI   - elevated troponins and EKG concerning for NSTEMI.   - Started on heparin gtt and s/p ASA/brillinta  - atorvastatin 80mg  - Troponins peak in 1600s, has downtrended to 1000      #systolic CHF: EF 25% with cardiogenic shock  - Echo from 8/17 showed severe mitral regurgitation, severe global LV systolic dysfunction, and RV enlargement + decreased systolic function  - RHC showed : RA 30, wedge 30, CO 3.22, CI 1.6,Pa sat 49% on levophed  - swan cath placed, f/u daily CXRs.   - milrinone 0.375  - currently off levo  - HF consulted, Not a candidate for advanced cardiac therapies  - Trend I/Os and daily weights, and Cr  - s/p IABP removal on 8/20  - 7am 8/23: CO 7.5, CI 3.6,   - started on bumex gtt  - palliative consulted, f/u recs    #Arrythmia:   -afib with RVR in ED  - CHADS-vasc - 7 - Eliquis d/erik and will start Heparin drip again at 5 PM 8/21  - intermittent -140  - d/c'ed digoxin 125mcg  - started on amiodarone  - Monitor on tele    PULM:   presented with SOB  - CT chest from 8/17 showed No pulmonary embolism, small b/l pulmonary effusions and mild pulmonary edema and a nonspecific 4 mm left upper lobe juxtapleural nodule with surrounding ground-glass opacity.   - Pt satting well on 2LNC  -8/21 CXR showing persistent pulmonary edema in setting of CHF  -s/p Bumex 1 IVP on 8/21. Will consider additional diuresis based on UOP (currently ~30cc/hr)  -s/p 80 lasix IVP on 8/22.  -bumex gtt    RENAL:  #SANDRA:   -Cr back up to 2.2  -continue to monitor UOP and lytes  -pt s/p Bumex 1 IVP 8/21.  -FeUrea 23.9%, urine P/Cr 1.0,   -bladder scan wnl  -consult nephro if needed    GI:  #Transaminitis: Elevated LFTs  - resolved    #Diet:  - Dash diet  - pantoprazole 40mg     #Bowel regiment:  presents with constipation  - miralax, maalox, senna  - nonspecific bowl gas on abdominal X-ray    ENDO:  #DM2: HbA1c 8.3   - Lantus increased to 20U qhs, c/w premeal 5U TID and SSI  - TSH and lipid panel wnl    HEMATOLOGIC:  #CBC results show Hb stable  - continue to monitor    #DVT prophylaxis with apixaban    ID:  #Pt without strong objective or clinical evidence of infection. Will observe off antibiotics  -8/21 WBC 6.3, afebrile  -RRT on 8/18for AMS, labs drawn during the rapid remarkable for new leukocytosis to 13, lactate to 13.5, procalc 0.61.  -Elevated lactate possibly 2/2 hypoperfusion given NSTEMI vs sepsis. However, unclear source of infection (pt being treated with bactrim for UTI for positive UA but negative UCx)  -Neg MRSA swab  -RVP neg  -f/u blood cultures  -cefepime 1g qday empiric coverage for 7 days total (8/19-8/25) 82 year old man with CAD and HFrEF who was transferred to [facility] CCU with acute on chronic systolic heart failure, severe MR and cardiogenic shock. S/p [place] and IABP. Pt currently on Milrinone and Heparin drips, receiving Bumex diuresis and empiric cefepime.    NEURO:  #Mental status: baseline A&O x3  -8/21 pt became unresponsive, code stroke was called. Pt received CTH, CTP, and CTA. Neuro recommending MRI w/wo IV contrast. AMS possibly in setting of overnight sedation from Seroquel.  - CT Head Negative for acute hemorrhage, CT Perfusion showing no core infarct or ischemic penumbra,  and CT Angio showing moderate stenosis of right cavernous ICA and mild stenosis of right proximal M1.  -Pt's mental status back to AAOx3  -Will hold haldol, loratidine, melatonin, for the time being in setting of AMS episode. Will consider adding back as tolerated.  -no epileptiform activity on EEG  -f/u MRI head   -f/u PT and OT recs    CV:  #NSTEMI   - elevated troponins and EKG concerning for NSTEMI.   - Started on heparin gtt and s/p ASA/brillinta  - atorvastatin 80mg  - Troponins peak in 1600s, has downtrended to 1000      #systolic CHF: EF 25% with cardiogenic shock  - Echo from 8/17 showed severe mitral regurgitation, severe global LV systolic dysfunction, and RV enlargement + decreased systolic function  - RHC showed : RA 30, wedge 30, CO 3.22, CI 1.6,Pa sat 49% on levophed  - swan cath placed, f/u daily CXRs.   - milrinone 0.375  - currently off levo  - HF consulted, Not a candidate for advanced cardiac therapies  - Trend I/Os and daily weights, and Cr  - s/p IABP removal on 8/20  - 7am 8/23: CO 7.5, CI 3.6,   - started on bumex gtt  - palliative consulted, f/u recs    #Arrythmia:   -afib with RVR in ED  - CHADS-vasc - 7 - Eliquis d/erik and will start Heparin drip again at 5 PM 8/21  - intermittent -140  - d/c'ed digoxin 125mcg  - started on amiodarone  - Monitor on tele    PULM:   presented with SOB  - CT chest from 8/17 showed No pulmonary embolism, small b/l pulmonary effusions and mild pulmonary edema and a nonspecific 4 mm left upper lobe juxtapleural nodule with surrounding ground-glass opacity.   - Pt satting well on 2LNC  -8/21 CXR showing persistent pulmonary edema in setting of CHF  -s/p Bumex 1 IVP on 8/21. Will consider additional diuresis based on UOP (currently ~30cc/hr)  -s/p 80 lasix IVP on 8/22.  -bumex gtt    RENAL:  #SANDRA:   -Cr back up to 2.2  -continue to monitor UOP and lytes  -pt s/p Bumex 1 IVP 8/21.  -FeUrea 23.9%, urine P/Cr 1.0,   -bladder scan wnl  -consult nephro if needed    GI:  #Transaminitis: Elevated LFTs  - resolved    #Diet:  - Dash diet  - pantoprazole 40mg     #Bowel regiment:  presents with constipation  - miralax, maalox, senna  - nonspecific bowl gas on abdominal X-ray    ENDO:  #DM2: HbA1c 8.3   - Lantus increased to 20U qhs, c/w premeal 5U TID and SSI  - TSH and lipid panel wnl    HEMATOLOGIC:  #CBC results show Hb stable  - continue to monitor    #DVT prophylaxis with apixaban    ID:  #Pt without strong objective or clinical evidence of infection. Will observe off antibiotics  -8/21 WBC 6.3, afebrile  -RRT on 8/18for AMS, labs drawn during the rapid remarkable for new leukocytosis to 13, lactate to 13.5, procalc 0.61.  -Elevated lactate possibly 2/2 hypoperfusion given NSTEMI vs sepsis. However, unclear source of infection (pt being treated with bactrim for UTI for positive UA but negative UCx)  -Neg MRSA swab  -RVP neg  -f/u blood cultures  -cefepime 1g qday empiric coverage for 7 days total (8/19-8/25)

## 2022-08-23 NOTE — PROGRESS NOTE ADULT - CRITICAL CARE ATTENDING COMMENT
CI remains subnormal (~2.1) on milrinone 0.375 mcg/kg/min.  Is oliguric, receiving Bumex drip.  Increase hydralazine CI remains subnormal (~2.1) on milrinone 0.25 mcg/kg/min. Would increase and leave at 0.375.  Recheck MVO2 sat this evening. If acceptable, would consider removing PA catheter tomorrow.  Is oliguric, receiving Bumex drip. Given inotrope dependence, he is likely not a good candidate for HD. Would favor palliative care.  Increase hydralazine to 10 mg po tid.

## 2022-08-23 NOTE — PROGRESS NOTE ADULT - SUBJECTIVE AND OBJECTIVE BOX
note incomplete. pt to be discussed with attending    [facility] DIVISION OF KIDNEY DISEASES AND HYPERTENSION -- [phone]  -- INITIAL CONSULT NOTE  --------------------------------------------------------------------------------  HPI:  Pt found with AMS. History from chart/primary team.    82M PMHx HTN, DM2, CAD s/p Stent x3, CVA 2/2 symptomatic carotid stenosis s/p stent, JACKELIN on CPAP, prostate ca s/p seeding p/w constipation and SOB. Patient states that ~1 prior  he had spicy oxtail, the following day difficulty with BM and small hematuria. A/W Abd pain (burning) w/out palliation from pepto or tums. SOB with mild exertion, +CP restriction developed belching and SOB@Rest.    ED course:  -Found to have be tachycardic to 140.  +dysuria, generally weak. elevated troponins and EKG concerning for NSTEMI c/b SVTs.     Hospital course:  -Hospital course c/b SVT at 130-140. Started on heparin gtt and s/p ASA/brillinta. Agitation recieved haldol 1mg IV.  -RRT called for  AMS. HR noted to be elevated to 130-140s, in SVT. Mental status improved over the rapid. Labs drawn during the rapid remarkable for new leukocytosis to 13, lactate to 13.5, procalc 0.61. CTc/a/p was notable for small BLU nodular haziness and questionable L uteritis     CCU Course:  -Episode of junctional bradycardia  and hypotension with LOC and seizure likely activity with no pluse. CPR for <1 min with ROSC and patient back to baseline. Started on Levophed for hypotension. Also found to be acidotic and started on bicarb drip with BP improvement. He received 1/2 amp of atropin for another episode of junctional bradycardia. Trops un trending.  -Found to be in acute on chronic systolic heart failure, severe MR and cardiogenic shock. S/p [place] and IABP.     -Code stroke was called 8/21/22 8:42 am. RN at bedside reports pt is unresponsive with pinpoint pupils. Another RN at bedside had been with the patient on Thursday and reported he was AAox2-3 and is remarkably different now. CCU NP at bedside gave collateral that patient developed ICU delirium during his stay. Patient received 0.5 mg haldol last on (12am 8/20) and Seroquel 25 mg around 9 pm last night (8/20). Likely last known normal was yesterday night, unable to retrieve an exact time due to different staff then. Patient's Eliquis was restarted yesterday, last dose was given today at 5 am. Patient is unresponsive to sternal rub and does not follow command. He is mouth breathing and staring straight with intermittent blinking.      Pt on bumex and milrinone drips at time of encounter.    Nephro consulted for SANDRA.      PAST HISTORY  --------------------------------------------------------------------------------  PAST MEDICAL & SURGICAL HISTORY:  Diabetes mellitus      CAD (coronary artery disease)      Prostate ca  s/p SEED 5 yrs ago      Sleep apnea  on cpap at home      Hypertension      Hypercholesteremia      S/P cholecystectomy      S/P coronary angioplasty  last stent 1 year ago      H/O carotid endarterectomy  April 2022, stent placed.        FAMILY HISTORY:  FH: type 2 diabetes      PAST SOCIAL HISTORY:    ALLERGIES & MEDICATIONS  --------------------------------------------------------------------------------  Allergies    ceftriaxone (Urticaria)  ciprofloxacin (Rash)  Shrimp (Vomiting; Anaphylaxis; Nausea)    Intolerances      Standing Inpatient Medications  aMIOdarone    Tablet   Oral   aMIOdarone    Tablet 400 milliGRAM(s) Oral every 8 hours  apixaban 2.5 milliGRAM(s) Oral every 12 hours  aspirin enteric coated 81 milliGRAM(s) Oral daily  atorvastatin 80 milliGRAM(s) Oral at bedtime  buMETAnide Infusion 1 mG/Hr IV Continuous <Continuous>  cefepime   IVPB 1000 milliGRAM(s) IV Intermittent daily  chlorhexidine 2% Cloths 1 Application(s) Topical <User Schedule>  clopidogrel Tablet 75 milliGRAM(s) Oral daily  dextrose 5%. 1000 milliLiter(s) IV Continuous <Continuous>  dextrose 5%. 1000 milliLiter(s) IV Continuous <Continuous>  dextrose 50% Injectable 25 Gram(s) IV Push once  dextrose 50% Injectable 12.5 Gram(s) IV Push once  dextrose 50% Injectable 25 Gram(s) IV Push once  donepezil 10 milliGRAM(s) Oral at bedtime  fluticasone propionate 50 MICROgram(s)/spray Nasal Spray 1 Spray(s) Both Nostrils two times a day  glucagon  Injectable 1 milliGRAM(s) IntraMuscular once  hydrALAZINE 5 milliGRAM(s) Oral every 8 hours  insulin glargine Injectable (LANTUS) 20 Unit(s) SubCutaneous at bedtime  insulin lispro (ADMELOG) corrective regimen sliding scale   SubCutaneous three times a day before meals  insulin lispro (ADMELOG) corrective regimen sliding scale   SubCutaneous at bedtime  insulin lispro Injectable (ADMELOG) 5 Unit(s) SubCutaneous three times a day before meals  ketorolac 0.5% Ophthalmic Solution 1 Drop(s) Left EYE three times a day  milrinone Infusion 0.25 MICROgram(s)/kG/Min IV Continuous <Continuous>  mometasone 220 MICROgram(s) Inhaler 2 Puff(s) Inhalation daily  pantoprazole    Tablet 40 milliGRAM(s) Oral before breakfast  polyethylene glycol 3350 17 Gram(s) Oral daily  senna 2 Tablet(s) Oral at bedtime  tamsulosin 0.4 milliGRAM(s) Oral at bedtime    PRN Inpatient Medications  acetaminophen     Tablet .. 650 milliGRAM(s) Oral every 6 hours PRN  aluminum hydroxide/magnesium hydroxide/simethicone Suspension 30 milliLiter(s) Oral every 4 hours PRN  dextrose Oral Gel 15 Gram(s) Oral once PRN      REVIEW OF SYSTEMS  --------------------------------------------------------------------------------  Gen: No fevers/chills  Head/Eyes/Ears: No HA  Respiratory: No dyspnea, cough  CV: No chest pain  GI: No abdominal pain, diarrhea  : No dysuria, hematuria  MSK: No  edema  Skin: No rashes  Heme: No easy bruising or bleeding    All other systems were reviewed and are negative, except as noted.    VITALS/PHYSICAL EXAM  --------------------------------------------------------------------------------  T(C): 36 (08-23-22 @ 12:00), Max: 36.8 (08-22-22 @ 20:00)  HR: 94 (08-23-22 @ 12:00) (66 - 139)  BP: --  RR: 27 (08-23-22 @ 12:00) (20 - 29)  SpO2: 95% (08-23-22 @ 12:00) (92% - 99%)  Wt(kg): --        08-22-22 @ 07:01  -  08-23-22 @ 07:00  --------------------------------------------------------  IN: 834.9 mL / OUT: 705 mL / NET: 129.9 mL    08-23-22 @ 07:01  -  08-23-22 @ 13:14  --------------------------------------------------------  IN: 461.4 mL / OUT: 35 mL / NET: 426.4 mL        Physical Exam:  	Gen: NAD  	HEENT: Anicteric  	Pulm: Faint crackling only appreciated L posteriorly (pt left lateral decubitus), otherwise CTAB b/l  	CV: S1S2+, no JVD, no murmurs  	Abd: Slightly tender to palpation RUQ and LUQ, "tight" in RLQ, NTTP LLQ. Soft nondistended. No rebound, guarding.          Transplant site: RLQ non tender, well healed surgical scar.  	Ext: LE pitting edema B/L to mid shin  	Neuro: Awake and alert. Not oriented. Follows all commands. MCDANIELS.          : Guevara+ with clear urine in the bag  	Skin: Warm and dry    LABS/STUDIES  --------------------------------------------------------------------------------              9.7    10.29 >-----------<  209      [08-23-22 @ 07:30]              31.2     139  |  102  |  60  ----------------------------<  223      [08-23-22 @ 07:30]  4.7   |  21  |  3.16        Ca     8.9     [08-23-22 @ 07:30]      Mg     2.40     [08-23-22 @ 07:30]      Phos  5.3     [08-23-22 @ 07:30]    TPro  6.7  /  Alb  3.3  /  TBili  0.5  /  DBili  x   /  AST  21  /  ALT  23  /  AlkPhos  91  [08-23-22 @ 00:15]      PTT: 91.6       [08-22-22 @ 05:15]          [08-22-22 @ 05:15]  Serum Osmolality 314      [08-22-22 @ 10:15]    Creatinine Trend:  SCr 3.16 [08-23 @ 07:30]  SCr 2.71 [08-23 @ 00:15]  SCr 2.34 [08-22 @ 10:15]  SCr 2.15 [08-22 @ 05:15]  SCr 1.99 [08-21 @ 16:00]    Urinalysis - [08-16-22 @ 20:16]      Color Orange / Appearance Turbid / SG 1.020 / pH 6.0      Gluc 500 mg/dL / Ketone Negative  / Bili Negative / Urobili <2 mg/dL       Blood Large / Protein 300 mg/dL / Leuk Est Large / Nitrite Negative      RBC >50 / WBC >50 / Hyaline  / Gran  / Sq Epi  / Non Sq Epi  / Bacteria Many    Urine Creatinine 106      [08-22-22 @ 10:15]  Urine Protein 110      [08-22-22 @ 10:15]  Urine Sodium <20      [08-22-22 @ 10:15]  Urine Urea Nitrogen 520.2      [08-22-22 @ 10:15]  Urine Potassium 60.7      [08-22-22 @ 10:15]  Urine Chloride 34      [08-22-22 @ 10:15]  Urine Osmolality 424      [08-22-22 @ 10:15]    HIV Nonreact      [05-10-22 @ 07:09]       [facility] DIVISION OF KIDNEY DISEASES AND HYPERTENSION -- [phone]  -- INITIAL CONSULT NOTE  --------------------------------------------------------------------------------  HPI:  Pt found with AMS. History from chart/primary team.    82M PMHx HTN, DM2, CAD s/p Stent x3, CVA 2/2 symptomatic carotid stenosis s/p stent, JACKELIN on CPAP, prostate ca s/p seeding p/w constipation and SOB. Patient states that ~1 prior  he had spicy oxtail, the following day difficulty with BM and small hematuria. A/W Abd pain (burning) w/out palliation from pepto or tums. SOB with mild exertion, +CP restriction developed belching and SOB@Rest.    ED course:  -Found to have be tachycardic to 140.  +dysuria, generally weak. elevated troponins and EKG concerning for NSTEMI c/b SVTs.     Hospital course:  -Hospital course c/b SVT at 130-140. Started on heparin gtt and s/p ASA/brillinta. Agitation recieved haldol 1mg IV.  -RRT called for  AMS. HR noted to be elevated to 130-140s, in SVT. Mental status improved over the rapid. Labs drawn during the rapid remarkable for new leukocytosis to 13, lactate to 13.5, procalc 0.61. CTc/a/p was notable for small BLU nodular haziness and questionable L uteritis     CCU Course:  -Episode of junctional bradycardia  and hypotension with LOC and seizure likely activity with no pluse. CPR for <1 min with ROSC and patient back to baseline. Started on Levophed for hypotension. Also found to be acidotic and started on bicarb drip with BP improvement. He received 1/2 amp of atropin for another episode of junctional bradycardia. Trops un trending.  -Found to be in acute on chronic systolic heart failure, severe MR and cardiogenic shock. S/p [place] and IABP.   -Also Tx with cefepime for possible sepsis  -Code stroke was called 8/21/22 8:42 am. CTA without occlusive disease  - Pt on bumex and milrinone drips at time of encounter.    Nephro consulted for SANDRA.        PAST HISTORY  --------------------------------------------------------------------------------  PAST MEDICAL & SURGICAL HISTORY:  Diabetes mellitus      CAD (coronary artery disease)      Prostate ca  s/p SEED 5 yrs ago      Sleep apnea  on cpap at home      Hypertension      Hypercholesteremia      S/P cholecystectomy      S/P coronary angioplasty  last stent 1 year ago      H/O carotid endarterectomy  April 2022, stent placed.        FAMILY HISTORY:  FH: type 2 diabetes      PAST SOCIAL HISTORY:    ALLERGIES & MEDICATIONS  --------------------------------------------------------------------------------  Allergies    ceftriaxone (Urticaria)  ciprofloxacin (Rash)  Shrimp (Vomiting; Anaphylaxis; Nausea)    Intolerances      Standing Inpatient Medications  aMIOdarone    Tablet   Oral   aMIOdarone    Tablet 400 milliGRAM(s) Oral every 8 hours  apixaban 2.5 milliGRAM(s) Oral every 12 hours  aspirin enteric coated 81 milliGRAM(s) Oral daily  atorvastatin 80 milliGRAM(s) Oral at bedtime  buMETAnide Infusion 1 mG/Hr IV Continuous <Continuous>  cefepime   IVPB 1000 milliGRAM(s) IV Intermittent daily  chlorhexidine 2% Cloths 1 Application(s) Topical <User Schedule>  clopidogrel Tablet 75 milliGRAM(s) Oral daily  dextrose 5%. 1000 milliLiter(s) IV Continuous <Continuous>  dextrose 5%. 1000 milliLiter(s) IV Continuous <Continuous>  dextrose 50% Injectable 25 Gram(s) IV Push once  dextrose 50% Injectable 12.5 Gram(s) IV Push once  dextrose 50% Injectable 25 Gram(s) IV Push once  donepezil 10 milliGRAM(s) Oral at bedtime  fluticasone propionate 50 MICROgram(s)/spray Nasal Spray 1 Spray(s) Both Nostrils two times a day  glucagon  Injectable 1 milliGRAM(s) IntraMuscular once  hydrALAZINE 5 milliGRAM(s) Oral every 8 hours  insulin glargine Injectable (LANTUS) 20 Unit(s) SubCutaneous at bedtime  insulin lispro (ADMELOG) corrective regimen sliding scale   SubCutaneous three times a day before meals  insulin lispro (ADMELOG) corrective regimen sliding scale   SubCutaneous at bedtime  insulin lispro Injectable (ADMELOG) 5 Unit(s) SubCutaneous three times a day before meals  ketorolac 0.5% Ophthalmic Solution 1 Drop(s) Left EYE three times a day  milrinone Infusion 0.25 MICROgram(s)/kG/Min IV Continuous <Continuous>  mometasone 220 MICROgram(s) Inhaler 2 Puff(s) Inhalation daily  pantoprazole    Tablet 40 milliGRAM(s) Oral before breakfast  polyethylene glycol 3350 17 Gram(s) Oral daily  senna 2 Tablet(s) Oral at bedtime  tamsulosin 0.4 milliGRAM(s) Oral at bedtime    PRN Inpatient Medications  acetaminophen     Tablet .. 650 milliGRAM(s) Oral every 6 hours PRN  aluminum hydroxide/magnesium hydroxide/simethicone Suspension 30 milliLiter(s) Oral every 4 hours PRN  dextrose Oral Gel 15 Gram(s) Oral once PRN      REVIEW OF SYSTEMS  --------------------------------------------------------------------------------  Gen: No fevers/chills  Head/Eyes/Ears: No HA  Respiratory: No dyspnea, cough  CV: No chest pain  GI: No abdominal pain, diarrhea  : No dysuria, hematuria  MSK: No  edema  Skin: No rashes  Heme: No easy bruising or bleeding    All other systems were reviewed and are negative, except as noted.    VITALS/PHYSICAL EXAM  --------------------------------------------------------------------------------  T(C): 36 (08-23-22 @ 12:00), Max: 36.8 (08-22-22 @ 20:00)  HR: 94 (08-23-22 @ 12:00) (66 - 139)  BP: --  RR: 27 (08-23-22 @ 12:00) (20 - 29)  SpO2: 95% (08-23-22 @ 12:00) (92% - 99%)  Wt(kg): --        08-22-22 @ 07:01  -  08-23-22 @ 07:00  --------------------------------------------------------  IN: 834.9 mL / OUT: 705 mL / NET: 129.9 mL    08-23-22 @ 07:01  -  08-23-22 @ 13:14  --------------------------------------------------------  IN: 461.4 mL / OUT: 35 mL / NET: 426.4 mL        Physical Exam:  	Gen: NAD  	HEENT: Anicteric  	Pulm: Faint crackling only appreciated L posteriorly (pt left lateral decubitus), otherwise CTAB b/l  	CV: S1S2+, no JVD, no murmurs  	Abd: Slightly tender to palpation RUQ and LUQ, "tight" in RLQ, NTTP LLQ. Soft nondistended. No rebound, guarding.          Transplant site: RLQ non tender, well healed surgical scar.  	Ext: LE pitting edema B/L to mid shin  	Neuro: Awake and alert. Not oriented. Follows all commands. MCDANIELS.          : Guevara+ with clear urine in the bag  	Skin: Warm and dry    LABS/STUDIES  --------------------------------------------------------------------------------              9.7    10.29 >-----------<  209      [08-23-22 @ 07:30]              31.2     139  |  102  |  60  ----------------------------<  223      [08-23-22 @ 07:30]  4.7   |  21  |  3.16        Ca     8.9     [08-23-22 @ 07:30]      Mg     2.40     [08-23-22 @ 07:30]      Phos  5.3     [08-23-22 @ 07:30]    TPro  6.7  /  Alb  3.3  /  TBili  0.5  /  DBili  x   /  AST  21  /  ALT  23  /  AlkPhos  91  [08-23-22 @ 00:15]      PTT: 91.6       [08-22-22 @ 05:15]          [08-22-22 @ 05:15]  Serum Osmolality 314      [08-22-22 @ 10:15]    Creatinine Trend:  SCr 3.16 [08-23 @ 07:30]  SCr 2.71 [08-23 @ 00:15]  SCr 2.34 [08-22 @ 10:15]  SCr 2.15 [08-22 @ 05:15]  SCr 1.99 [08-21 @ 16:00]    Urinalysis - [08-16-22 @ 20:16]      Color Orange / Appearance Turbid / SG 1.020 / pH 6.0      Gluc 500 mg/dL / Ketone Negative  / Bili Negative / Urobili <2 mg/dL       Blood Large / Protein 300 mg/dL / Leuk Est Large / Nitrite Negative      RBC >50 / WBC >50 / Hyaline  / Gran  / Sq Epi  / Non Sq Epi  / Bacteria Many    Urine Creatinine 106      [08-22-22 @ 10:15]  Urine Protein 110      [08-22-22 @ 10:15]  Urine Sodium <20      [08-22-22 @ 10:15]  Urine Urea Nitrogen 520.2      [08-22-22 @ 10:15]  Urine Potassium 60.7      [08-22-22 @ 10:15]  Urine Chloride 34      [08-22-22 @ 10:15]  Urine Osmolality 424      [08-22-22 @ 10:15]    HIV Nonreact      [05-10-22 @ 07:09]

## 2022-08-23 NOTE — CONSULT NOTE ADULT - PROBLEM SELECTOR RECOMMENDATION 2
- Patient presented to cardiogenic shock from NSTEMI, now dependent on milrinone
Known PAF with RVR on previous hospitalization  chadsvasc 8  Hold Eliquis while patient is on Heparin gtt for ACS, resume Eliquis when off Heparin for stroke prevention  Obtain Digoxin Level now  May continue Digoxin 125mcg daily, hold for HR <60  May continue Metoprolol Tartrate 50mg BID hold for HR <60, SBP <100  Cardiology to follow

## 2022-08-23 NOTE — CONSULT NOTE ADULT - PROBLEM SELECTOR RECOMMENDATION 3
- Worsening renal failure from cardiogenic shock  - There is possibility of starting CRRT, however doubtful of the benefit due to poor overall prognosis

## 2022-08-23 NOTE — PROGRESS NOTE ADULT - SUBJECTIVE AND OBJECTIVE BOX
Medications:  acetaminophen     Tablet .. 650 milliGRAM(s) Oral every 6 hours PRN  aluminum hydroxide/magnesium hydroxide/simethicone Suspension 30 milliLiter(s) Oral every 4 hours PRN  aMIOdarone    Tablet   Oral   aMIOdarone    Tablet 400 milliGRAM(s) Oral every 8 hours  apixaban 2.5 milliGRAM(s) Oral every 12 hours  aspirin enteric coated 81 milliGRAM(s) Oral daily  atorvastatin 80 milliGRAM(s) Oral at bedtime  buMETAnide Infusion 1 mG/Hr IV Continuous <Continuous>  cefepime   IVPB 1000 milliGRAM(s) IV Intermittent daily  chlorhexidine 2% Cloths 1 Application(s) Topical <User Schedule>  clopidogrel Tablet 75 milliGRAM(s) Oral daily  dextrose 5%. 1000 milliLiter(s) IV Continuous <Continuous>  dextrose 5%. 1000 milliLiter(s) IV Continuous <Continuous>  dextrose 50% Injectable 25 Gram(s) IV Push once  dextrose 50% Injectable 12.5 Gram(s) IV Push once  dextrose 50% Injectable 25 Gram(s) IV Push once  dextrose Oral Gel 15 Gram(s) Oral once PRN  donepezil 10 milliGRAM(s) Oral at bedtime  fluticasone propionate 50 MICROgram(s)/spray Nasal Spray 1 Spray(s) Both Nostrils two times a day  glucagon  Injectable 1 milliGRAM(s) IntraMuscular once  hydrALAZINE 5 milliGRAM(s) Oral every 8 hours  insulin glargine Injectable (LANTUS) 20 Unit(s) SubCutaneous at bedtime  insulin lispro (ADMELOG) corrective regimen sliding scale   SubCutaneous three times a day before meals  insulin lispro (ADMELOG) corrective regimen sliding scale   SubCutaneous at bedtime  insulin lispro Injectable (ADMELOG) 5 Unit(s) SubCutaneous three times a day before meals  ketorolac 0.5% Ophthalmic Solution 1 Drop(s) Left EYE three times a day  milrinone Infusion 0.25 MICROgram(s)/kG/Min IV Continuous <Continuous>  mometasone 220 MICROgram(s) Inhaler 2 Puff(s) Inhalation daily  pantoprazole    Tablet 40 milliGRAM(s) Oral before breakfast  polyethylene glycol 3350 17 Gram(s) Oral daily  senna 2 Tablet(s) Oral at bedtime  tamsulosin 0.4 milliGRAM(s) Oral at bedtime      Vitals:  Vital Signs Last 24 Hrs  T(C): 36.2 (23 Aug 2022 07:00), Max: 36.8 (22 Aug 2022 20:00)  T(F): 97.2 (23 Aug 2022 07:00), Max: 98.2 (22 Aug 2022 20:00)  HR: 66 (23 Aug 2022 10:48) (66 - 140)  BP: --  BP(mean): --  RR: 29 (23 Aug 2022 10:00) (20 - 29)  SpO2: 97% (23 Aug 2022 10:48) (92% - 98%)    Parameters below as of 23 Aug 2022 10:48  Patient On (Oxygen Delivery Method): nasal cannula        Daily     Daily Weight in k.7 (23 Aug 2022 05:00)    I&O's Detail    22 Aug 2022 07:01  -  23 Aug 2022 07:00  --------------------------------------------------------  IN:    Bumetanide: 7.5 mL    Heparin: 36 mL    IV PiggyBack: 100 mL    Milrinone: 61.1 mL    Milrinone: 150.3 mL    Oral Fluid: 480 mL  Total IN: 834.9 mL    OUT:    Indwelling Catheter - Urethral (mL): 705 mL  Total OUT: 705 mL    Total NET: 129.9 mL      23 Aug 2022 07:01  -  23 Aug 2022 11:32  --------------------------------------------------------  IN:    Bumetanide: 10 mL    Bumetanide: 5 mL    Milrinone: 6.6 mL    Milrinone: 26.6 mL    Oral Fluid: 240 mL  Total IN: 288.2 mL    OUT:    Indwelling Catheter - Urethral (mL): 15 mL  Total OUT: 15 mL    Total NET: 273.2 mL          Physical Exam:     General: No distress. Comfortable.  HEENT: EOM intact.  Neck: Neck supple. JVP not elevated. No masses  Chest: Clear to auscultation bilaterally  CV: Normal S1 and S2. No murmurs, rub, or gallops. Radial pulses normal.  Abdomen: Soft, non-distended, non-tender  Skin: No rashes or skin breakdown  Neurology: Alert and oriented times three. Sensation intact  Psych: Affect normal    Labs:                        9.7    10.29 )-----------( 209      ( 23 Aug 2022 07:30 )             31.2     08    139  |  102  |  60<H>  ----------------------------<  223<H>  4.7   |  21<L>  |  3.16<H>    Ca    8.9      23 Aug 2022 07:30  Phos  5.3       Mg     2.40         TPro  6.7  /  Alb  3.3  /  TBili  0.5  /  DBili  x   /  AST  21  /  ALT  23  /  AlkPhos  91  08-    PTT - ( 22 Aug 2022 05:15 )  PTT:91.6 sec  CARDIAC MARKERS ( 22 Aug 2022 05:15 )  x     / x     / 161 U/L / x     / 4.0 ng/mL       Patient seen and examined. He is talking to himself and states he sees people on the wall, and explosions going off.   When redirected he is able to state his name and knows that he is in the hospital, but states does not know where in the hospital.   Falls asleep mid sentence, wakes up after a few seconds and starts talking again.   Not making appropriate urine output.         Medications:  acetaminophen     Tablet .. 650 milliGRAM(s) Oral every 6 hours PRN  aluminum hydroxide/magnesium hydroxide/simethicone Suspension 30 milliLiter(s) Oral every 4 hours PRN  aMIOdarone    Tablet   Oral   aMIOdarone    Tablet 400 milliGRAM(s) Oral every 8 hours  apixaban 2.5 milliGRAM(s) Oral every 12 hours  aspirin enteric coated 81 milliGRAM(s) Oral daily  atorvastatin 80 milliGRAM(s) Oral at bedtime  buMETAnide Infusion 1 mG/Hr IV Continuous <Continuous>  cefepime   IVPB 1000 milliGRAM(s) IV Intermittent daily  chlorhexidine 2% Cloths 1 Application(s) Topical <User Schedule>  clopidogrel Tablet 75 milliGRAM(s) Oral daily  dextrose 5%. 1000 milliLiter(s) IV Continuous <Continuous>  dextrose 5%. 1000 milliLiter(s) IV Continuous <Continuous>  dextrose 50% Injectable 25 Gram(s) IV Push once  dextrose 50% Injectable 12.5 Gram(s) IV Push once  dextrose 50% Injectable 25 Gram(s) IV Push once  dextrose Oral Gel 15 Gram(s) Oral once PRN  donepezil 10 milliGRAM(s) Oral at bedtime  fluticasone propionate 50 MICROgram(s)/spray Nasal Spray 1 Spray(s) Both Nostrils two times a day  glucagon  Injectable 1 milliGRAM(s) IntraMuscular once  hydrALAZINE 5 milliGRAM(s) Oral every 8 hours  insulin glargine Injectable (LANTUS) 20 Unit(s) SubCutaneous at bedtime  insulin lispro (ADMELOG) corrective regimen sliding scale   SubCutaneous three times a day before meals  insulin lispro (ADMELOG) corrective regimen sliding scale   SubCutaneous at bedtime  insulin lispro Injectable (ADMELOG) 5 Unit(s) SubCutaneous three times a day before meals  ketorolac 0.5% Ophthalmic Solution 1 Drop(s) Left EYE three times a day  milrinone Infusion 0.25 MICROgram(s)/kG/Min IV Continuous <Continuous>  mometasone 220 MICROgram(s) Inhaler 2 Puff(s) Inhalation daily  pantoprazole    Tablet 40 milliGRAM(s) Oral before breakfast  polyethylene glycol 3350 17 Gram(s) Oral daily  senna 2 Tablet(s) Oral at bedtime  tamsulosin 0.4 milliGRAM(s) Oral at bedtime      Vitals:  Vital Signs Last 24 Hrs  T(C): 36.2 (23 Aug 2022 07:00), Max: 36.8 (22 Aug 2022 20:00)  T(F): 97.2 (23 Aug 2022 07:00), Max: 98.2 (22 Aug 2022 20:00)  HR: 66 (23 Aug 2022 10:48) (66 - 140)  BP: --  BP(mean): --  RR: 29 (23 Aug 2022 10:00) (20 - 29)  SpO2: 97% (23 Aug 2022 10:48) (92% - 98%)    Parameters below as of 23 Aug 2022 10:48  Patient On (Oxygen Delivery Method): nasal cannula        Daily     Daily Weight in k.7 (23 Aug 2022 05:00)    I&O's Detail    22 Aug 2022 07:01  -  23 Aug 2022 07:00  --------------------------------------------------------  IN:    Bumetanide: 7.5 mL    Heparin: 36 mL    IV PiggyBack: 100 mL    Milrinone: 61.1 mL    Milrinone: 150.3 mL    Oral Fluid: 480 mL  Total IN: 834.9 mL    OUT:    Indwelling Catheter - Urethral (mL): 705 mL  Total OUT: 705 mL    Total NET: 129.9 mL      23 Aug 2022 07:01  -  23 Aug 2022 11:32  --------------------------------------------------------  IN:    Bumetanide: 10 mL    Bumetanide: 5 mL    Milrinone: 6.6 mL    Milrinone: 26.6 mL    Oral Fluid: 240 mL  Total IN: 288.2 mL    OUT:    Indwelling Catheter - Urethral (mL): 15 mL  Total OUT: 15 mL    Total NET: 273.2 mL          Physical Exam:     General: No distress. Comfortable.  HEENT: EOM intact.  Neck: Neck supple. JVP elevated. No masses  Chest: Clear to auscultation bilaterally  CV: Normal S1 and S2. No murmurs, rub, or gallops. Radial pulses normal. No LE edema b/l. Warm b/l.   Abdomen: Soft, non-distended, non-tender  Skin: No rashes or skin breakdown  Neurology: Alert  Psych: Not able to assess     Labs:                        9.7    10.29 )-----------( 209      ( 23 Aug 2022 07:30 )             31.2     08-    139  |  102  |  60<H>  ----------------------------<  223<H>  4.7   |  21<L>  |  3.16<H>    Ca    8.9      23 Aug 2022 07:30  Phos  5.3     08-  Mg     2.40     08-    TPro  6.7  /  Alb  3.3  /  TBili  0.5  /  DBili  x   /  AST  21  /  ALT  23  /  AlkPhos  91  08-23    PTT - ( 22 Aug 2022 05:15 )  PTT:91.6 sec  CARDIAC MARKERS ( 22 Aug 2022 05:15 )  x     / x     / 161 U/L / x     / 4.0 ng/mL

## 2022-08-23 NOTE — PROGRESS NOTE ADULT - ASSESSMENT
82 year old Male with PMH of HTN, HLD T2DM, JACKELIN (on BiPAP), prostate CA s/p radiation/seed implant, CVA s/p /stent (2022), AFib (on Eliquis), CAD s/p PCI/stent X3 (most recent  at [facility]), and HFrEF (EF 25%;LVIDd 6.8, severe MR, RV dysfunction and severe hypokinetic apical/lateral wall). Patient presented with c/o SOB and  abdominal distention/constipation  X10 days. Initially with elevated troponin levels and EKG concerning for NSTEMI; started on heparin gtt and s/p ASA/Brillinta. RRT called this morning for AMS/not responding to verbal commands and elevated lactate. He subsequently developed severe bradycardia/weak thready pulse and hypotension; started on Levo drip and evaluated by MICU who referred to CCU. He was sent to cath lab for RHC and IABP was placed for cardiogenic shock.       Pertinent  Labs:  Troponin- 270/462/467   Lactate- 3.2/ 4.5/6.7/12/ 13.8     BNP-  19,745  EKG- AFib subendo injury anterior wall  CXR- pulmonary edema     RHC:   RA 17  PA 60/28   PCWP 37  CO/CI 3.2/1.58  PA Sat 49.7%  SVR   (pre IABP)         RA 17   CO/CI 3.0/1.4   (mixed VO2 44.6%)  SVR 1733  (post IABP)                                               Started on Milrinone 0.25mcg/kg/min and Lasix 5mg/hr; Remains on Levo  0.05mcg/kg/min (continue to wean MAP 65)       CVP 7  CO/CI 6.1/3.2   VO2 70%   IABP out.   CVP 15, CI 1.85, PA sat 46%, CO 3.7

## 2022-08-23 NOTE — CONSULT NOTE ADULT - CONVERSATION DETAILS
I spoke with patient's son and daughter Jatin and Gloria over the phone. Jatin became very emotional upon first speaking. Patient was fairly functional prior to hospitalization and are now shocked patient has declined so rapidly. They are aware that patient has reached end of life. They were told there was a possibility that patient may be able to go home with milrinone. I stated that may be an option if patient reaches a level of stability that hospice could manage at home. However, there is a chance patient will not be able to be discharged. I discussed the possibility of palliative liberation from milrinone with the understanding that he will die in the hospital, but would receive symptom management. Quality of life is indeed important to them. However Jatin states they are in the process of trying to obtain power of /managing financial and legal matters. He is worried about  and burial. I also recommended DNR/DNI as CPR/intunation would have no benefit and place greater burden. Gloria stated they need time to digest information and are not ready to make decisions at this moment.

## 2022-08-23 NOTE — EEG REPORT - NS EEG TEXT BOX
CHIDI RUBIO [facility]-[id number]     Study Date: 		08-22 17:32-08:00 8-3-22  Duration in hours:  x14.5    --------------------------------------------------------------------------------------------------  History:  CC/ HPI Patient is a 82y old  Male who presents with a chief complaint of NSTEMI (23 Aug 2022 08:19)    MEDICATIONS  (STANDING):  aMIOdarone    Tablet   Oral   aMIOdarone    Tablet 400 milliGRAM(s) Oral every 8 hours  apixaban 2.5 milliGRAM(s) Oral every 12 hours  aspirin enteric coated 81 milliGRAM(s) Oral daily  atorvastatin 80 milliGRAM(s) Oral at bedtime  buMETAnide Infusion 0.5 mG/Hr (2.5 mL/Hr) IV Continuous <Continuous>  cefepime   IVPB 1000 milliGRAM(s) IV Intermittent daily  chlorhexidine 2% Cloths 1 Application(s) Topical <User Schedule>  clopidogrel Tablet 75 milliGRAM(s) Oral daily  donepezil 10 milliGRAM(s) Oral at bedtime  fluticasone propionate 50 MICROgram(s)/spray Nasal Spray 1 Spray(s) Both Nostrils two times a day  glucagon  Injectable 1 milliGRAM(s) IntraMuscular once  hydrALAZINE 5 milliGRAM(s) Oral every 8 hours  insulin glargine Injectable (LANTUS) 20 Unit(s) SubCutaneous at bedtime  insulin lispro (ADMELOG) corrective regimen sliding scale   SubCutaneous three times a day before meals  insulin lispro (ADMELOG) corrective regimen sliding scale   SubCutaneous at bedtime  insulin lispro Injectable (ADMELOG) 5 Unit(s) SubCutaneous three times a day before meals  ketorolac 0.5% Ophthalmic Solution 1 Drop(s) Left EYE three times a day  milrinone Infusion 0.375 MICROgram(s)/kG/Min (9.97 mL/Hr) IV Continuous <Continuous>  mometasone 220 MICROgram(s) Inhaler 2 Puff(s) Inhalation daily  pantoprazole    Tablet 40 milliGRAM(s) Oral before breakfast  polyethylene glycol 3350 17 Gram(s) Oral daily  senna 2 Tablet(s) Oral at bedtime  tamsulosin 0.4 milliGRAM(s) Oral at bedtime    --------------------------------------------------------------------------------------------------  Study Interpretation:    [Abbreviation Key:  PDR=alpha rhythm/posterior dominant rhythm. A-P=anterior posterior.  Amplitude: ‘very low’:<20; ‘low’:20-49; ‘medium’:; ‘high’:>150uV.  Persistence for periodic/rhythmic patterns (% of epoch) ‘rare’:<1%; ‘occasional’:1-10%; ‘frequent’:10-50%; ‘abundant’:50-90%; ‘continuous’:>90%.  Persistence for sporadic discharges: ‘rare’:<1/hr; ‘occasional’:1/min-1/hr; ‘frequent’:>1/min; ‘abundant’:>1/10 sec.  RPP=rhythmic and periodic patterns; GRDA=generalized rhythmic delta activity; FIRDA=frontal intermittent GRDA; LRDA=lateralized rhythmic delta activity; TIRDA=temporal intermittent rhythmic delta activity;  LPD=PLED=lateralized periodic discharges; GPD=generalized periodic discharges; BIPDs =bilateral independent periodic discharges; Mf=multifocal; SIRPDs=stimulus induced rhythmic, periodic, or ictal appearing discharges; BIRDs=brief potentially ictal rhythmic discharges >4 Hz, lasting .5-10s; PFA (paroxysmal bursts >13 Hz or =8 Hz <10s).  Modifiers: +F=with fast component; +S=with spike component; +R=with rhythmic component.  S-B=burst suppression pattern.  Max=maximal. N1-drowsy; N2-stage II sleep; N3-slow wave sleep. SSS/BETS=small sharp spikes/benign epileptiform transients of sleep. HV=hyperventilation; PS=photic stimulation]    Daily EEG Visual Analysis  FINDINGS:      Background:  Continuity: continuous  Symmetry: symmetric  PDR: 7Hz activity, with amplitude to 40 uV, that attenuated to eye opening.    Reactivity: present  Voltage: normal (between 20-150uV)  Anterior Posterior Gradient: present  Other background findings: none  Breach: absent    Background Slowing:  Generalized slowing: diffuse irregular mostly theta activity.  Focal slowing: none was present.    State Changes:   -Drowsiness noted with increased slowing, attenuation of fast activity, vertex transients.  -Present with N2 sleep transients with symmetric spindles and K-complexes.    Sporadic Epileptiform Discharges:    None    Rhythmic and Periodic Patterns (RPPs):  None     Electrographic and Electroclinical seizures:  None    Other Clinical Events:  None    Activation Procedures:   -Hyperventilation was not performed.    -Photic stimulation was not performed.    Artifacts:  Intermittent myogenic and movement artifacts were noted.  Increased electrode artifact after 01:30    ECG:  The heart rate on single channel ECG was predominantly between  BPM with frequent ectopy.    EEG Classification / Summary:  Abnormal EEG study  Mild generalized background slowing  ECG with frequent ectopy  -----------------------------------------------------------------------------------------------------    Clinical Impression:  Mild diffuse/multifocal cerebral dysfunction, not specific as to etiology.  There were no epileptiform abnormalities recorded.    Increased electrode artifact after 01:30  ECG with frequent ectopy      -------------------------------------------------------------------------------------------------------  [facility] EEG Reading Room Ph#: [phone]  Epilepsy Answering Service after 5PM and before 8:30AM: Ph#: [phone]    Atul Benavides M.D.   of Neurology, [facility]

## 2022-08-24 NOTE — PHYSICAL THERAPY INITIAL EVALUATION ADULT - GENERAL OBSERVATIONS, REHAB EVAL
Patient received in semifowler position in bed in NAD +alice driver aline, foley. Patient denies chest pain, SOB, headache, and dizziness.

## 2022-08-24 NOTE — PROGRESS NOTE ADULT - PROBLEM SELECTOR PLAN 1
- After family meeting, patient himself was able to make himself DNR/DNI and refused HD and feeding tube. MOLST completed with his verbal consent.   - Discussed with family that patient is being artificially sustained with milrinone, which family refuses to discontinue  - Patient would like to comfort approach to impending end of life symptoms      - Currently patient is comfortable. However if patient becomes dyspneic, would recommend to start IV Dilaudid 0.4 mg q4h ATC and q2h PRN for dyspnea

## 2022-08-24 NOTE — PROGRESS NOTE ADULT - PROBLEM SELECTOR PLAN 2
-Recommend D/C Cefepime given s/p 5 day course and with AMS ISO renal failure
- Refractory cardiogenic shock dependent on milrinone   - Family refusing of a palliative liberation from inotropic support

## 2022-08-24 NOTE — PHYSICAL THERAPY INITIAL EVALUATION ADULT - PERTINENT HX OF CURRENT PROBLEM, REHAB EVAL
patient is a 82 year old male admitted for acute on chronic systolic heart failure, severe MR and cardiogenic shock.

## 2022-08-24 NOTE — PROGRESS NOTE ADULT - SUBJECTIVE AND OBJECTIVE BOX
Indication of Geriatrcis and Palliative Medicine Services: [facility]     SUBJECTIVE AND OBJECTIVE:    DNR on chart: No     INTERVAL: Overnight patient became hypotensive, started on levophed. Renal failure also worsening, planned for HD today.     -------------------------------------------------------------------------------------------------------  ITEMS UNCHECKED ARE NOT PRESENT    PHYSICAL:  Vital Signs Last 24 Hrs  T(C): 36.4 (24 Aug 2022 08:00), Max: 36.6 (23 Aug 2022 20:01)  T(F): 97.5 (24 Aug 2022 08:00), Max: 97.9 (23 Aug 2022 20:01)  HR: 88 (24 Aug 2022 10:00) (66 - 123)  BP: --  BP(mean): --  RR: 21 (24 Aug 2022 10:00) (13 - 29)  SpO2: 97% (24 Aug 2022 10:00) (94% - 100%)    Parameters below as of 24 Aug 2022 07:00  Patient On (Oxygen Delivery Method): nasal cannula  O2 Flow (L/min): 4   I&O's Summary    23 Aug 2022 07:01  -  24 Aug 2022 07:00  --------------------------------------------------------  IN: 1530.3 mL / OUT: 135 mL / NET: 1395.3 mL    24 Aug 2022 07:01  -  24 Aug 2022 10:47  --------------------------------------------------------  IN: 143 mL / OUT: 25 mL / NET: 118 mL    GENERAL: [ ]Cachexia    [X ]Alert  [X ]Oriented x 1  [ ]Lethargic  [ ]Unarousable  [ ]Verbal  [ ]Non-Verbal  Behavioral:   [ ] Anxiety  [X ] Delirium [ ] Agitation [ ] Other  HEENT:  [X ]Normal   [ ]Dry mouth   [ ]ET Tube/Trach  [ ]Oral lesions  PULMONARY:   [ ]Clear [ ]Tachypnea  [ ]Audible excessive secretions   [ ]Rhonchi        [ ]Right [ ]Left [ ]Bilateral  [X ]Crackles        [ ]Right [ ]Left [ ]Bilateral  [ ]Wheezing     [ ]Right [ ]Left [ ]Bilateral  [ ]Diminished breath sounds [ ]right [ ]left [ ]bilateral  CARDIOVASCULAR:    [X ]Regular [ ]Irregular [ ]Tachy  [ ]Juventino [ ]Murmur [ ]Other  GASTROINTESTINAL:  [X ]Soft  [ ]Distended   [ ]+BS  [X ]Non tender [ ]Tender  [ ]Other [ ]PEG [ ]OGT/ NGT  Last BM:  GENITOURINARY:  [ ]Normal [ ] Incontinent   [X ]Oliguria/Anuria   [ ]Gueavra  MUSCULOSKELETAL:   [X ]Normal   [ ]Weakness  [ ]Bed/Wheelchair bound [ ]Edema  NEUROLOGIC:   [X ]No focal deficits  [ ]Cognitive impairment  [ ]Dysphagia [ ]Dysarthria [ ]Paresis [ ]Other   SKIN:   [ ]Normal  [ ]Rash  [ ]Other  [ ]Pressure ulcer(s)       Present on admission [ ]y [ ]n    -------------------------------------------------------------------------------------------------------  LABS:                        9.7    11.03 )-----------( 229      ( 24 Aug 2022 03:25 )             30.4   08-24    139  |  101  |  70<H>  ----------------------------<  188<H>  5.0   |  21<L>  |  4.15<H>    Ca    8.5      24 Aug 2022 03:25  Phos  5.9     08-24  Mg     2.40     08-24    TPro  6.8  /  Alb  3.2<L>  /  TBili  0.5  /  DBili  x   /  AST  22  /  ALT  23  /  AlkPhos  86  08-24      Procalcitonin, Serum: 2.44 ng/mL (08-19-22 @ 12:20)  Procalcitonin, Serum: 0.61 ng/mL (08-18-22 @ 03:22)  Procalcitonin, Serum: 0.53 ng/mL (08-18-22 @ 03:22)    -------------------------------------------------------------------------------------------------------  RADIOLOGY & ADDITIONAL STUDIES:     < from: TTE with Doppler (w/Cont) (08.17.22 @ 19:13) >  CONCLUSIONS:  1. Thickened, tethered mitral valve. Severe mitral  regurgitation.  2. Calcified trileaflet aortic valvewith normal opening.  3. Normal left atrium.  LA volume index = 28 cc/m2.  4. Endocardial visualization enhanced with intravenous  injection of echo contrast (Definity). Severe global left  ventricular systolic dysfunction.The mid to distal septum,  apical, lateral walls are severely hypokinetic.  5. Right ventricular enlargement with decreased right  ventricular systolic function.    < end of copied text >  < from: Xray Chest 1 View- PORTABLE-Routine (Xray Chest 1 View- PORTABLE-Routine in AM.) (08.23.22 @ 07:37) >  Pulmonary edema with improvement on the most recent study.  Inferior approach Rowe-Munir catheter within the interlobar/segmental   right pulmonary artery.    -------------------------------------------------------------------------------------------------------  MEDICATIONS:     MEDICATIONS  (STANDING):  aMIOdarone    Tablet 400 milliGRAM(s) Oral two times a day  aspirin enteric coated 81 milliGRAM(s) Oral daily  atorvastatin 80 milliGRAM(s) Oral at bedtime  buMETAnide Infusion 1 mG/Hr (5 mL/Hr) IV Continuous <Continuous>  chlorhexidine 2% Cloths 1 Application(s) Topical <User Schedule>  clopidogrel Tablet 75 milliGRAM(s) Oral daily  dextrose 5%. 1000 milliLiter(s) (100 mL/Hr) IV Continuous <Continuous>  dextrose 5%. 1000 milliLiter(s) (50 mL/Hr) IV Continuous <Continuous>  dextrose 50% Injectable 25 Gram(s) IV Push once  dextrose 50% Injectable 12.5 Gram(s) IV Push once  dextrose 50% Injectable 25 Gram(s) IV Push once  donepezil 10 milliGRAM(s) Oral at bedtime  fluticasone propionate 50 MICROgram(s)/spray Nasal Spray 1 Spray(s) Both Nostrils two times a day  glucagon  Injectable 1 milliGRAM(s) IntraMuscular once  heparin   Injectable 5000 Unit(s) SubCutaneous every 8 hours  insulin glargine Injectable (LANTUS) 20 Unit(s) SubCutaneous at bedtime  insulin lispro (ADMELOG) corrective regimen sliding scale   SubCutaneous three times a day before meals  insulin lispro (ADMELOG) corrective regimen sliding scale   SubCutaneous at bedtime  insulin lispro Injectable (ADMELOG) 5 Unit(s) SubCutaneous three times a day before meals  ketorolac 0.5% Ophthalmic Solution 1 Drop(s) Left EYE three times a day  milrinone Infusion 0.25 MICROgram(s)/kG/Min (6.65 mL/Hr) IV Continuous <Continuous>  mometasone 220 MICROgram(s) Inhaler 2 Puff(s) Inhalation daily  norepinephrine Infusion 0.05 MICROgram(s)/kG/Min (4.15 mL/Hr) IV Continuous <Continuous>  pantoprazole    Tablet 40 milliGRAM(s) Oral before breakfast  polyethylene glycol 3350 17 Gram(s) Oral daily  senna 2 Tablet(s) Oral at bedtime  tamsulosin 0.4 milliGRAM(s) Oral at bedtime    MEDICATIONS  (PRN):  acetaminophen     Tablet .. 650 milliGRAM(s) Oral every 6 hours PRN Temp greater or equal to 38C (100.4F), Mild Pain (1 - 3)  aluminum hydroxide/magnesium hydroxide/simethicone Suspension 30 milliLiter(s) Oral every 4 hours PRN Dyspepsia  dextrose Oral Gel 15 Gram(s) Oral once PRN Blood Glucose LESS THAN 70 milliGRAM(s)/deciliter    -------------------------------------------------------------------------------------------------------  PRESENT SYMPTOMS:   [X ] Unable to report   Source if other than patient:  [ ]Family   [ ]Team     Pain:  [ ]yes [ ]no  QOL impact -   Location -                    Aggravating factors -  Quality -  Radiation -  Timing-  Severity (0-10 scale):  Minimal acceptable level (0-10 scale):     Dyspnea:                           [ ]Mild [ ]Moderate [ ]Severe    RDOS:  0 to 2 minimal or no respiratory distress  3 mild distress  4 to 6 moderate distress  >7 severe distress    Anxiety:                             [ ]Mild [ ]Moderate [ ]Severe  Fatigue:                             [ ]Mild [ ]Moderate [ ]Severe  Nausea:                             [ ]Mild [ ]Moderate [ ]Severe  Loss of appetite:              [ ]Mild [ ]Moderate [ ]Severe  Constipation:                    [ ]Mild [ ]Moderate [ ]Severe    Other Symptoms:  [ ]All other review of systems negative     Home Medications for Symptoms if present:     -------------------------------------------------------------------------------------------------------    CRITICAL CARE:  [X ]Shock Present  [ ]Septic [X ]Cardiogenic [ ]Neurologic [ ]Hypovolemic  [X ]Vasopressors [X ]Inotropes  [X ]Respiratory failure present [ ]Mechanical Ventilation [ ]Non-invasive ventilatory support [ ]High-Flow   [ ]Acute  [ ]Chronic [ ]Hypoxic  [ ]Hypercarbic [ ]Other  [X ]Other organ failure - renal failure     -------------------------------------------------------------------------------------------------------  REFERRALS:   [ ]Chaplaincy  [ ]Hospice  [ ]Child Life  [ ]Social Work  [ ]Case management [ ]Holistic Therapy    Indication of Geriatrcis and Palliative Medicine Services: [facility]     SUBJECTIVE AND OBJECTIVE:    DNR on chart: Yes     INTERVAL: Overnight patient became hypotensive, started on levophed. Renal failure also worsening, planned for HD today.   Patient was seen this AM, awake and alert and able to communicate clearly.   Family meeting held with patient present, see [facility] note below.     -------------------------------------------------------------------------------------------------------  ITEMS UNCHECKED ARE NOT PRESENT    PHYSICAL:  Vital Signs Last 24 Hrs  T(C): 36.4 (24 Aug 2022 08:00), Max: 36.6 (23 Aug 2022 20:01)  T(F): 97.5 (24 Aug 2022 08:00), Max: 97.9 (23 Aug 2022 20:01)  HR: 88 (24 Aug 2022 10:00) (66 - 123)  BP: --  BP(mean): --  RR: 21 (24 Aug 2022 10:00) (13 - 29)  SpO2: 97% (24 Aug 2022 10:00) (94% - 100%)    Parameters below as of 24 Aug 2022 07:00  Patient On (Oxygen Delivery Method): nasal cannula  O2 Flow (L/min): 4   I&O's Summary    23 Aug 2022 07:01  -  24 Aug 2022 07:00  --------------------------------------------------------  IN: 1530.3 mL / OUT: 135 mL / NET: 1395.3 mL    24 Aug 2022 07:01  -  24 Aug 2022 10:47  --------------------------------------------------------  IN: 143 mL / OUT: 25 mL / NET: 118 mL    GENERAL: [ ]Cachexia    [X ]Alert  [X ]Oriented x 2  [ ]Lethargic  [ ]Unarousable  [ ]Verbal  [ ]Non-Verbal  Behavioral:   [ ] Anxiety  [ ] Delirium [ ] Agitation [ ] Other  HEENT:  [X ]Normal   [ ]Dry mouth   [ ]ET Tube/Trach  [ ]Oral lesions  PULMONARY:   [ ]Clear [ ]Tachypnea  [ ]Audible excessive secretions   [ ]Rhonchi        [ ]Right [ ]Left [ ]Bilateral  [X ]Crackles        [ ]Right [ ]Left [ ]Bilateral  [ ]Wheezing     [ ]Right [ ]Left [ ]Bilateral  [ ]Diminished breath sounds [ ]right [ ]left [ ]bilateral  CARDIOVASCULAR:    [X ]Regular [ ]Irregular [ ]Tachy  [ ]Juventino [ ]Murmur [ ]Other  GASTROINTESTINAL:  [X ]Soft  [ ]Distended   [ ]+BS  [X ]Non tender [ ]Tender  [ ]Other [ ]PEG [ ]OGT/ NGT  Last BM:  GENITOURINARY:  [ ]Normal [ ] Incontinent   [X ]Oliguria/Anuria   [ ]Guevara  MUSCULOSKELETAL:   [X ]Normal   [ ]Weakness  [ ]Bed/Wheelchair bound [ ]Edema  NEUROLOGIC:   [X ]No focal deficits  [ ]Cognitive impairment  [ ]Dysphagia [ ]Dysarthria [ ]Paresis [ ]Other   SKIN:   [ ]Normal  [ ]Rash  [ ]Other  [ ]Pressure ulcer(s)       Present on admission [ ]y [ ]n    -------------------------------------------------------------------------------------------------------  LABS:                        9.7    11.03 )-----------( 229      ( 24 Aug 2022 03:25 )             30.4   08-24    139  |  101  |  70<H>  ----------------------------<  188<H>  5.0   |  21<L>  |  4.15<H>    Ca    8.5      24 Aug 2022 03:25  Phos  5.9     08-24  Mg     2.40     08-24    TPro  6.8  /  Alb  3.2<L>  /  TBili  0.5  /  DBili  x   /  AST  22  /  ALT  23  /  AlkPhos  86  08-24      Procalcitonin, Serum: 2.44 ng/mL (08-19-22 @ 12:20)  Procalcitonin, Serum: 0.61 ng/mL (08-18-22 @ 03:22)  Procalcitonin, Serum: 0.53 ng/mL (08-18-22 @ 03:22)    -------------------------------------------------------------------------------------------------------  RADIOLOGY & ADDITIONAL STUDIES:     < from: TTE with Doppler (w/Cont) (08.17.22 @ 19:13) >  CONCLUSIONS:  1. Thickened, tethered mitral valve. Severe mitral  regurgitation.  2. Calcified trileaflet aortic valvewith normal opening.  3. Normal left atrium.  LA volume index = 28 cc/m2.  4. Endocardial visualization enhanced with intravenous  injection of echo contrast (Definity). Severe global left  ventricular systolic dysfunction.The mid to distal septum,  apical, lateral walls are severely hypokinetic.  5. Right ventricular enlargement with decreased right  ventricular systolic function.    < end of copied text >  < from: Xray Chest 1 View- PORTABLE-Routine (Xray Chest 1 View- PORTABLE-Routine in AM.) (08.23.22 @ 07:37) >  Pulmonary edema with improvement on the most recent study.  Inferior approach Independence-Munir catheter within the interlobar/segmental   right pulmonary artery.    -------------------------------------------------------------------------------------------------------  MEDICATIONS:     MEDICATIONS  (STANDING):  aMIOdarone    Tablet 400 milliGRAM(s) Oral two times a day  aspirin enteric coated 81 milliGRAM(s) Oral daily  atorvastatin 80 milliGRAM(s) Oral at bedtime  buMETAnide Infusion 1 mG/Hr (5 mL/Hr) IV Continuous <Continuous>  chlorhexidine 2% Cloths 1 Application(s) Topical <User Schedule>  clopidogrel Tablet 75 milliGRAM(s) Oral daily  dextrose 5%. 1000 milliLiter(s) (100 mL/Hr) IV Continuous <Continuous>  dextrose 5%. 1000 milliLiter(s) (50 mL/Hr) IV Continuous <Continuous>  dextrose 50% Injectable 25 Gram(s) IV Push once  dextrose 50% Injectable 12.5 Gram(s) IV Push once  dextrose 50% Injectable 25 Gram(s) IV Push once  donepezil 10 milliGRAM(s) Oral at bedtime  fluticasone propionate 50 MICROgram(s)/spray Nasal Spray 1 Spray(s) Both Nostrils two times a day  glucagon  Injectable 1 milliGRAM(s) IntraMuscular once  heparin   Injectable 5000 Unit(s) SubCutaneous every 8 hours  insulin glargine Injectable (LANTUS) 20 Unit(s) SubCutaneous at bedtime  insulin lispro (ADMELOG) corrective regimen sliding scale   SubCutaneous three times a day before meals  insulin lispro (ADMELOG) corrective regimen sliding scale   SubCutaneous at bedtime  insulin lispro Injectable (ADMELOG) 5 Unit(s) SubCutaneous three times a day before meals  ketorolac 0.5% Ophthalmic Solution 1 Drop(s) Left EYE three times a day  milrinone Infusion 0.25 MICROgram(s)/kG/Min (6.65 mL/Hr) IV Continuous <Continuous>  mometasone 220 MICROgram(s) Inhaler 2 Puff(s) Inhalation daily  norepinephrine Infusion 0.05 MICROgram(s)/kG/Min (4.15 mL/Hr) IV Continuous <Continuous>  pantoprazole    Tablet 40 milliGRAM(s) Oral before breakfast  polyethylene glycol 3350 17 Gram(s) Oral daily  senna 2 Tablet(s) Oral at bedtime  tamsulosin 0.4 milliGRAM(s) Oral at bedtime    MEDICATIONS  (PRN):  acetaminophen     Tablet .. 650 milliGRAM(s) Oral every 6 hours PRN Temp greater or equal to 38C (100.4F), Mild Pain (1 - 3)  aluminum hydroxide/magnesium hydroxide/simethicone Suspension 30 milliLiter(s) Oral every 4 hours PRN Dyspepsia  dextrose Oral Gel 15 Gram(s) Oral once PRN Blood Glucose LESS THAN 70 milliGRAM(s)/deciliter    -------------------------------------------------------------------------------------------------------  PRESENT SYMPTOMS:   Denies any symptoms   [ ] Unable to report   Source if other than patient:  [ ]Family   [ ]Team     Pain:  [ ]yes [ ]no  QOL impact -   Location -                    Aggravating factors -  Quality -  Radiation -  Timing-  Severity (0-10 scale):  Minimal acceptable level (0-10 scale):     Dyspnea:                           [ ]Mild [ ]Moderate [ ]Severe    RDOS:  0 to 2 minimal or no respiratory distress  3 mild distress  4 to 6 moderate distress  >7 severe distress    Anxiety:                             [ ]Mild [ ]Moderate [ ]Severe  Fatigue:                             [ ]Mild [ ]Moderate [ ]Severe  Nausea:                             [ ]Mild [ ]Moderate [ ]Severe  Loss of appetite:              [ ]Mild [ ]Moderate [ ]Severe  Constipation:                    [ ]Mild [ ]Moderate [ ]Severe    Other Symptoms:  [ ]All other review of systems negative     Home Medications for Symptoms if present:     -------------------------------------------------------------------------------------------------------    CRITICAL CARE:  [X ]Shock Present  [ ]Septic [X ]Cardiogenic [ ]Neurologic [ ]Hypovolemic  [X ]Vasopressors [X ]Inotropes  [X ]Respiratory failure present [ ]Mechanical Ventilation [ ]Non-invasive ventilatory support [ ]High-Flow   [ ]Acute  [ ]Chronic [ ]Hypoxic  [ ]Hypercarbic [ ]Other  [X ]Other organ failure - renal failure     -------------------------------------------------------------------------------------------------------  REFERRALS:   [ ]Jackie  [ ]Hospice  [ ]Child Life  [ ]Social Work  [ ]Case management [ ]Holistic Therapy

## 2022-08-24 NOTE — PROGRESS NOTE ADULT - PROBLEM SELECTOR PLAN 3
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

## 2022-08-24 NOTE — PROGRESS NOTE ADULT - ASSESSMENT
82 year old man with CAD and HFrEF who was transferred to [facility] CCU with acute on chronic systolic heart failure, severe MR and cardiogenic shock. S/p [place] and IABP. Pt currently on Milrinone and Heparin drips, receiving Bumex diuresis, milrinone, and levo.    NEURO:  #Mental status: baseline A&O x3  -8/21 pt became unresponsive, code stroke was called. Pt received CTH, CTP, and CTA. Neuro recommending MRI w/wo IV contrast. AMS possibly in setting of overnight sedation from Seroquel.  - CT Head Negative for acute hemorrhage, CT Perfusion showing no core infarct or ischemic penumbra,  and CT Angio showing moderate stenosis of right cavernous ICA and mild stenosis of right proximal M1.  -Pt's mental status back to AAOx3  -Will hold haldol, loratidine, melatonin, for the time being in setting of AMS episode. Will consider adding back as tolerated.  -no epileptiform activity on EEG  -f/u MRI head   -f/u PT and OT recs    CV:  #NSTEMI   - elevated troponins and EKG concerning for NSTEMI.   - Started on heparin gtt and s/p ASA/brillinta  - atorvastatin 80mg  - Troponins peak in 1600s, has downtrended to 1000      #systolic CHF: EF 25% with cardiogenic shock  - Echo from 8/17 showed severe mitral regurgitation, severe global LV systolic dysfunction, and RV enlargement + decreased systolic function  - RHC showed : RA 30, wedge 30, CO 3.22, CI 1.6,Pa sat 49% on levophed  - swan cath placed, f/u daily CXRs.   - milrinone 0.25  - levo 0.3  - HF consulted, Not a candidate for advanced cardiac therapies  - Trend I/Os and daily weights, and Cr  - s/p IABP removal on 8/20  - started on bumex 1mg gtt  - palliative consulted, pt is full code at this time    #Arrythmia:   -afib with RVR in ED  - CHADS-vasc - 7 - Eliquis d/erik and will start Heparin drip again at 5 PM 8/21  - intermittent -140  - d/c'ed digoxin 125mcg  - started on amiodarone  - Monitor on tele    PULM:   presented with SOB  - CT chest from 8/17 showed No pulmonary embolism, small b/l pulmonary effusions and mild pulmonary edema and a nonspecific 4 mm left upper lobe juxtapleural nodule with surrounding ground-glass opacity.   - Pt satting well on 2LNC  -8/21 CXR showing persistent pulmonary edema in setting of CHF  -s/p Bumex 1 IVP on 8/21. Will consider additional diuresis based on UOP (currently ~30cc/hr)  -s/p 80 lasix IVP on 8/22.  -bumex 1mg gtt    RENAL:  #SANDRA:   -Cr continues to rise  -producing around 10cc/hr of urine on bumex gtt  -continue to monitor UOP and lytes  -pt s/p Bumex 1 IVP 8/21.  -FeUrea 23.9%, urine P/Cr 1.0,   -bladder scan wnl  -consider starting CRRT per nephro    GI:  #Transaminitis: Elevated LFTs  - resolved    #Diet:  - Dash diet  - pantoprazole 40mg     #Bowel regiment:  presents with constipation  - miralax, maalox, senna  - nonspecific bowl gas on abdominal X-ray    ENDO:  #DM2: HbA1c 8.3   - Lantus increased to 20U qhs, c/w premeal 5U TID and SSI  - TSH and lipid panel wnl    HEMATOLOGIC:  #CBC results show Hb stable  - continue to monitor    #DVT prophylaxis with apixaban    ID:  #Pt without strong objective or clinical evidence of infection. Will observe off antibiotics  -8/21 WBC 6.3, afebrile  -RRT on 8/18for AMS, labs drawn during the rapid remarkable for new leukocytosis to 13, lactate to 13.5, procalc 0.61.  -Elevated lactate possibly 2/2 hypoperfusion given NSTEMI vs sepsis. However, unclear source of infection (pt being treated with bactrim for UTI for positive UA but negative UCx)  -Neg MRSA swab  -RVP neg  -blood cultures neg  -s/p cefepime 1g qday empiric coverage for 5 days total (8/19-8/23) 82 year old man with CAD and HFrEF who was transferred to [facility] CCU with acute on chronic systolic heart failure, severe MR and cardiogenic shock. S/p [place] and IABP. Pt currently on Milrinone and Heparin drips, receiving Bumex diuresis, milrinone, and levo.    NEURO:  #Mental status: baseline A&O x3  -8/21 pt became unresponsive, code stroke was called. Pt received CTH, CTP, and CTA. Neuro recommending MRI w/wo IV contrast. AMS possibly in setting of overnight sedation from Seroquel.  - CT Head Negative for acute hemorrhage, CT Perfusion showing no core infarct or ischemic penumbra,  and CT Angio showing moderate stenosis of right cavernous ICA and mild stenosis of right proximal M1.  -Pt's mental status back to AAOx3  -Will hold haldol, loratidine, melatonin, for the time being in setting of AMS episode. Will consider adding back as tolerated.  -no epileptiform activity on EEG  -f/u MRI head   -f/u PT and OT recs    CV:  #NSTEMI   - elevated troponins and EKG concerning for NSTEMI.   - ASA/clopidogrel   - atorvastatin 80mg  - Troponins peak in 1600s, has downtrended to 1000      #systolic CHF: EF 25% with cardiogenic shock  - Echo from 8/17 showed severe mitral regurgitation, severe global LV systolic dysfunction, and RV enlargement + decreased systolic function  - RHC showed : RA 30, wedge 30, CO 3.22, CI 1.6,Pa sat 49% on levophed  - swan cath placed, f/u daily CXRs.   - milrinone 0.25  - levo 0.3  - HF consulted, Not a candidate for advanced cardiac therapies  - Trend I/Os and daily weights, and Cr  - s/p IABP removal on 8/20  - started on bumex 1mg gtt  - palliative consulted, pt is full code at this time    #Arrythmia:   -afib with RVR in ED  - CHADS-vasc - 7 - Eliquis d/erik and will start Heparin drip again at 5 PM 8/21  - intermittent -140  - d/c'ed digoxin 125mcg  - started on amiodarone  - Monitor on tele    PULM:   presented with SOB  - CT chest from 8/17 showed No pulmonary embolism, small b/l pulmonary effusions and mild pulmonary edema and a nonspecific 4 mm left upper lobe juxtapleural nodule with surrounding ground-glass opacity.   - Pt satting well on 2LNC  -8/21 CXR showing persistent pulmonary edema in setting of CHF  -s/p Bumex 1 IVP on 8/21. Will consider additional diuresis based on UOP (currently ~30cc/hr)  -s/p 80 lasix IVP on 8/22.  -bumex 1mg gtt    RENAL:  #SANDRA:   -Cr continues to rise  -producing around 10cc/hr of urine on bumex gtt  -continue to monitor UOP and lytes  -pt s/p Bumex 1 IVP 8/21.  -FeUrea 23.9%, urine P/Cr 1.0,   -bladder scan wnl  -consider starting CRRT per nephro    GI:  #Transaminitis: Elevated LFTs  - resolved    #Diet:  - Dash diet  - pantoprazole 40mg     #Bowel regiment:  presents with constipation  - miralax, maalox, senna  - nonspecific bowl gas on abdominal X-ray    ENDO:  #DM2: HbA1c 8.3   - Lantus increased to 20U qhs, c/w premeal 5U TID and SSI  - TSH and lipid panel wnl    HEMATOLOGIC:  #CBC results show Hb stable  - continue to monitor    #DVT prophylaxis   - hold apixaban for possible shiley placement     ID:  #Pt without strong objective or clinical evidence of infection. Will observe off antibiotics  -8/21 WBC 6.3, afebrile  -RRT on 8/18for AMS, labs drawn during the rapid remarkable for new leukocytosis to 13, lactate to 13.5, procalc 0.61.  -Elevated lactate possibly 2/2 hypoperfusion given NSTEMI vs sepsis. However, unclear source of infection (pt being treated with bactrim for UTI for positive UA but negative UCx)  -Neg MRSA swab  -RVP neg  -blood cultures neg  -s/p cefepime 1g qday empiric coverage for 5 days total (8/19-8/23) 82 year old man with CAD and HFrEF who was transferred to [facility] CCU with acute on chronic systolic heart failure, severe MR and cardiogenic shock. S/p [place] and IABP. Pt currently on Milrinone and Heparin drips, receiving Bumex diuresis, milrinone, and levo.    NEURO:  #Mental status: baseline A&O x3  -8/21 pt became unresponsive, code stroke was called. Pt received CTH, CTP, and CTA. Neuro recommending MRI w/wo IV contrast. AMS possibly in setting of overnight sedation from Seroquel.  - CT Head Negative for acute hemorrhage, CT Perfusion showing no core infarct or ischemic penumbra,  and CT Angio showing moderate stenosis of right cavernous ICA and mild stenosis of right proximal M1.  -Pt's mental status back to AAOx3  -Will hold haldol, loratidine, melatonin, for the time being in setting of AMS episode. Will consider adding back as tolerated.  -no epileptiform activity on EEG  -hold off on MRI head for now   -f/u PT and OT recs    CV:  #NSTEMI   - elevated troponins and EKG concerning for NSTEMI.   - ASA/clopidogrel   - atorvastatin 80mg  - Troponins peak in 1600s, has downtrended to 1000      #systolic CHF: EF 25% with cardiogenic shock  - Echo from 8/17 showed severe mitral regurgitation, severe global LV systolic dysfunction, and RV enlargement + decreased systolic function  - RHC showed : RA 30, wedge 30, CO 3.22, CI 1.6,Pa sat 49% on levophed  - swan cath placed, f/u daily CXRs.   - milrinone 0.25  - levo 0.3  - HF consulted, Not a candidate for advanced cardiac therapies  - Trend I/Os and daily weights, and Cr  - s/p IABP removal on 8/20  - started on bumex 1mg gtt  - palliative consulted, pt is full code at this time    #Arrythmia:   -afib with RVR in ED  - CHADS-vasc - 7 - Eliquis d/erik and will start Heparin drip again at 5 PM 8/21  - intermittent -140  - d/c'ed digoxin 125mcg  - started on amiodarone  - Monitor on tele    PULM:   presented with SOB  - CT chest from 8/17 showed No pulmonary embolism, small b/l pulmonary effusions and mild pulmonary edema and a nonspecific 4 mm left upper lobe juxtapleural nodule with surrounding ground-glass opacity.   - Pt satting well on 2LNC  -8/21 CXR showing persistent pulmonary edema in setting of CHF  -s/p Bumex 1 IVP on 8/21.   -s/p 80 lasix IVP on 8/22.  -bumex 1mg gtt    RENAL:  #SANDRA:   -Cr continues to rise  -producing around 10cc/hr of urine on bumex gtt  -continue to monitor UOP and lytes  -pt s/p Bumex 1 IVP 8/21.  -FeUrea 23.9%, urine P/Cr 1.0,   -bladder scan wnl  -consider starting CRRT per nephro    GI:  #Transaminitis: Elevated LFTs  - resolved    #Diet:  - Dash diet  - pantoprazole 40mg     #Bowel regiment:  presents with constipation  - miralax, maalox, senna  - nonspecific bowl gas on abdominal X-ray    ENDO:  #DM2: HbA1c 8.3   - Lantus increased to 20U qhs, c/w premeal 5U TID and SSI  - TSH and lipid panel wnl    HEMATOLOGIC:  #CBC results show Hb stable  - continue to monitor    #DVT prophylaxis   - hold apixaban for possible shiley placement   - heparin q8h    ID:  #Pt without strong objective or clinical evidence of infection. Will observe off antibiotics  -8/21 WBC 6.3, afebrile  -RRT on 8/18for AMS, labs drawn during the rapid remarkable for new leukocytosis to 13, lactate to 13.5, procalc 0.61.  -Elevated lactate possibly 2/2 hypoperfusion given NSTEMI vs sepsis. However, unclear source of infection (pt being treated with bactrim for UTI for positive UA but negative UCx)  -Neg MRSA swab  -RVP neg  -blood cultures neg  -s/p cefepime 1g qday empiric coverage for 5 days total (8/19-8/23)

## 2022-08-24 NOTE — PROGRESS NOTE ADULT - ATTENDING COMMENTS
To switch ticagrelor to clopidogrel given age and need for triple therapy for NSTEMI (no recent VINITA)  Wean IABP as tolerated  Continue hemodynamic monitoring
82M PMHx HTN, DM2, CAD s/p Stent x3, CVA 2/2 symptomatic carotid stenosis s/p stent, JACKELIN on CPAP, prostate ca s/p seeding.  admitted with  NSTEMI c/b SVTs.   multifactorial SANDRA/ATN   tachycardia and bradycardia with intermittent hypotension, Leukocytosis s/p Cefepime, lactic acidosis, HF with MR s/p IABP now stopped. most recently brain CTA s/p code stroke on 8/21. progressive renal failure and oliguria for the past 24-48 with delirium despite milrinone and Bumex gtt   recommend   pt with progressive and End stage HF per CCU. and given that dialysis would not make an impact on his survival will not move forward with CRRT.  this was extensively communicated with the palliative and CCU attending. patient and his family understands and would want to focus on comfort.
82M PMHx HTN, DM2, CAD s/p Stent x3, CVA 2/2 symptomatic carotid stenosis s/p stent, JACKELIN on CPAP, prostate ca s/p seeding.  admitted with  NSTEMI c/b SVTs.   multifactorial SANDRA/ATN   tachycardia and bradycardia with intermittent hypotension, Leukocytosis s/p Cefepime, lactic acidosis, HF with MR s/p IABP now stopped. most recently brain CTA s/p code stroke on 8/21. progressive renal failure and oliguria for the past 24-48 with delirium despite milrinone and Bumex gtt   recommend   DC Cefepime as no evidence of infection and AMS reports in SANDRA   check lactate level with ABG   if cr not trending down or worsening v/ol will proceed with CRRT in am   No role for Metolazone if no response to Loop diuretics   plan discussed with CCU team at  bedside.

## 2022-08-24 NOTE — PROGRESS NOTE ADULT - ASSESSMENT
82 year old Male with PMH of HTN, HLD T2DM, JACKELIN (on BiPAP), prostate CA s/p radiation/seed implant, CVA s/p /stent (2022), AFib (on Eliquis), CAD s/p PCI/stent X3 (most recent  at [facility]), and HFrEF (EF 25%;LVIDd 6.8, severe MR, RV dysfunction and severe hypokinetic apical/lateral wall). Patient presented with c/o SOB and  abdominal distention/constipation  X10 days. Initially with elevated troponin levels and EKG concerning for NSTEMI; started on heparin gtt and s/p ASA/Brillinta. RRT called this morning for AMS/not responding to verbal commands and elevated lactate. He subsequently developed severe bradycardia/weak thready pulse and hypotension; started on Levo drip and evaluated by MICU who referred to CCU. He was sent to cath lab for RHC and IABP was placed for cardiogenic shock.       Pertinent  Labs:  Troponin- 270/462/467   Lactate- 3.2/ 4.5/6.7/12/ 13.8     BNP-  19,745  EKG- AFib subendo injury anterior wall  CXR- pulmonary edema     RHC:   RA 17  PA 60/28   PCWP 37  CO/CI 3.2/1.58  PA Sat 49.7%  SVR   (pre IABP)         RA 17   CO/CI 3.0/1.4   (mixed VO2 44.6%)  SVR 1733  (post IABP)                                               Started on Milrinone 0.25mcg/kg/min and Lasix 5mg/hr; Remains on Levo  0.05mcg/kg/min (continue to wean MAP 65)       CVP 7  CO/CI 6.1/3.2   VO2 70%   IABP out.   CVP 15, CI 1.85, PA sat 46%, CO 3.7

## 2022-08-24 NOTE — PROGRESS NOTE ADULT - SUBJECTIVE AND OBJECTIVE BOX
Robert Zhao MD PGY1    PATIENT:  CHIDI RUBIO  [id number]    CHIEF COMPLAINT:  Patient is a 82y old  Male who presents with a chief complaint of NSTEMI (23 Aug 2022 13:13)      INTERVAL HISTORY/OVERNIGHT EVENTS: Pt's BP dropped to the 70s overnight and Pt was started on levo 0.3. Milrinone decreased to 0.25 and hydralazine was d/c'ed. Pt was examined at bedside. No complaints.      REVIEW OF SYSTEMS:    Constitutional:     [ ] negative [ ] fevers [ ] chills [ ] weight loss [ ] weight gain  HEENT:                  [ ] negative [ ] dry eyes [ ] eye irritation [ ] postnasal drip [ ] nasal congestion  CV:                         [ ] negative  [ ] chest pain [ ] orthopnea [ ] palpitations [ ] murmur  Resp:                     [ ] negative [ ] cough [ ] shortness of breath [ ] dyspnea [ ] wheezing [ ] sputum [ ] hemoptysis  GI:                          [ ] negative [ ] nausea [ ] vomiting [ ] diarrhea [ ] constipation [ ] abd pain [ ] dysphagia   :                        [ ] negative [ ] dysuria [ ] nocturia [ ] hematuria [ ] increased urinary frequency  Musculoskeletal: [ ] negative [ ] back pain [ ] myalgias [ ] arthralgias [ ] fracture  Skin:                       [ ] negative [ ] rash [ ] itch  Neurological:        [ ] negative [ ] headache [ ] dizziness [ ] syncope [ ] weakness [ ] numbness  Psychiatric:           [ ] negative [ ] anxiety [ ] depression  Endocrine:            [ ] negative [ ] diabetes [ ] thyroid problem  Heme/Lymph:      [ ] negative [ ] anemia [ ] bleeding problem  Allergic/Immune: [ ] negative [ ] itchy eyes [ ] nasal discharge [ ] hives [ ] angioedema    [X ] All other systems negative  [ ] Unable to assess ROS because ________.    MEDICATIONS:  MEDICATIONS  (STANDING):  aMIOdarone    Tablet 400 milliGRAM(s) Oral two times a day  apixaban 2.5 milliGRAM(s) Oral every 12 hours  aspirin enteric coated 81 milliGRAM(s) Oral daily  atorvastatin 80 milliGRAM(s) Oral at bedtime  buMETAnide Infusion 1 mG/Hr (5 mL/Hr) IV Continuous <Continuous>  chlorhexidine 2% Cloths 1 Application(s) Topical <User Schedule>  clopidogrel Tablet 75 milliGRAM(s) Oral daily  dextrose 5%. 1000 milliLiter(s) (50 mL/Hr) IV Continuous <Continuous>  dextrose 5%. 1000 milliLiter(s) (100 mL/Hr) IV Continuous <Continuous>  dextrose 50% Injectable 25 Gram(s) IV Push once  dextrose 50% Injectable 12.5 Gram(s) IV Push once  dextrose 50% Injectable 25 Gram(s) IV Push once  donepezil 10 milliGRAM(s) Oral at bedtime  fluticasone propionate 50 MICROgram(s)/spray Nasal Spray 1 Spray(s) Both Nostrils two times a day  glucagon  Injectable 1 milliGRAM(s) IntraMuscular once  insulin glargine Injectable (LANTUS) 20 Unit(s) SubCutaneous at bedtime  insulin lispro (ADMELOG) corrective regimen sliding scale   SubCutaneous three times a day before meals  insulin lispro (ADMELOG) corrective regimen sliding scale   SubCutaneous at bedtime  insulin lispro Injectable (ADMELOG) 5 Unit(s) SubCutaneous three times a day before meals  ketorolac 0.5% Ophthalmic Solution 1 Drop(s) Left EYE three times a day  milrinone Infusion 0.25 MICROgram(s)/kG/Min (6.65 mL/Hr) IV Continuous <Continuous>  mometasone 220 MICROgram(s) Inhaler 2 Puff(s) Inhalation daily  norepinephrine Infusion 0.05 MICROgram(s)/kG/Min (4.15 mL/Hr) IV Continuous <Continuous>  pantoprazole    Tablet 40 milliGRAM(s) Oral before breakfast  polyethylene glycol 3350 17 Gram(s) Oral daily  senna 2 Tablet(s) Oral at bedtime  tamsulosin 0.4 milliGRAM(s) Oral at bedtime    MEDICATIONS  (PRN):  acetaminophen     Tablet .. 650 milliGRAM(s) Oral every 6 hours PRN Temp greater or equal to 38C (100.4F), Mild Pain (1 - 3)  aluminum hydroxide/magnesium hydroxide/simethicone Suspension 30 milliLiter(s) Oral every 4 hours PRN Dyspepsia  dextrose Oral Gel 15 Gram(s) Oral once PRN Blood Glucose LESS THAN 70 milliGRAM(s)/deciliter      ALLERGIES:  Allergies    ceftriaxone (Urticaria)  ciprofloxacin (Rash)  Shrimp (Vomiting; Anaphylaxis; Nausea)    Intolerances        OBJECTIVE:  ICU Vital Signs Last 24 Hrs  T(C): 36.6 (24 Aug 2022 00:00), Max: 36.6 (23 Aug 2022 20:01)  T(F): 97.9 (24 Aug 2022 00:00), Max: 97.9 (23 Aug 2022 20:01)  HR: 88 (24 Aug 2022 07:18) (66 - 123)  BP: --  BP(mean): --  ABP: 122/57 (24 Aug 2022 07:00) (73/36 - 122/70)  ABP(mean): 76 (24 Aug 2022 07:00) (48 - 85)  RR: 24 (24 Aug 2022 07:00) (13 - 29)  SpO2: 99% (24 Aug 2022 07:18) (94% - 100%)    O2 Parameters below as of 24 Aug 2022 07:00  Patient On (Oxygen Delivery Method): nasal cannula  O2 Flow (L/min): 4          Adult Advanced Hemodynamics Last 24 Hrs  CVP(mm Hg): 12 (24 Aug 2022 04:00) (12 - 22)  CVP(cm H2O): --  CO: --  CI: --  PA: 62/30 (24 Aug 2022 07:00) (50/23 - 64/33)  PA(mean): 42 (24 Aug 2022 07:00) (31 - 43)  PCWP: --  SVR: --  SVRI: --  PVR: --  PVRI: --  CAPILLARY BLOOD GLUCOSE      POCT Blood Glucose.: 210 mg/dL (24 Aug 2022 07:57)  POCT Blood Glucose.: 140 mg/dL (23 Aug 2022 21:36)  POCT Blood Glucose.: 221 mg/dL (23 Aug 2022 16:52)  POCT Blood Glucose.: 209 mg/dL (23 Aug 2022 11:39)  POCT Blood Glucose.: 427 mg/dL (23 Aug 2022 11:35)    CAPILLARY BLOOD GLUCOSE      POCT Blood Glucose.: 210 mg/dL (24 Aug 2022 07:57)    I&O's Summary    23 Aug 2022 07:01  -  24 Aug 2022 07:00  --------------------------------------------------------  IN: 1530.3 mL / OUT: 135 mL / NET: 1395.3 mL      Daily     Daily Weight in k.5 (24 Aug 2022 05:00)    PHYSICAL EXAMINATION:  General: NAD  Neurology: AAOX3 and MCDANIELS x 4  Respiratory: Bilateral crackles at bases  CV: RRR  Abdominal: Soft, NT, ND   Extremities: able to move all extremities, R groin site clean, dry, intact  Lines: R groin has and swan, L radial A line, and b/l peripheral IVs    LABS:  ABG - ( 23 Aug 2022 07:45 )  pH, Arterial: 7.44  pH, Blood: x     /  pCO2: 31    /  pO2: 94    / HCO3: 21    / Base Excess: -2.1  /  SaO2: 98.2                          9.7    11.03 )-----------( 229      ( 24 Aug 2022 03:25 )             30.4     08-24    139  |  101  |  70<H>  ----------------------------<  188<H>  5.0   |  21<L>  |  4.15<H>    Ca    8.5      24 Aug 2022 03:25  Phos  5.9     08-24  Mg     2.40     08-24    TPro  6.8  /  Alb  3.2<L>  /  TBili  0.5  /  DBili  x   /  AST  22  /  ALT  23  /  AlkPhos  86  08-24    LIVER FUNCTIONS - ( 24 Aug 2022 03:25 )  Alb: 3.2 g/dL / Pro: 6.8 g/dL / ALK PHOS: 86 U/L / ALT: 23 U/L / AST: 22 U/L / GGT: x

## 2022-08-24 NOTE — PROGRESS NOTE ADULT - PROBLEM SELECTOR PLAN 1
Pt with oliguric SANDRA in setting of recent contrast use and CRS. Pt with ATN. SCr was WNL at 1.13 and progressively worsened to 4.15 today. Pt with suboptimal response with diuretics. Pt with elevated CVP and volume overload. Plan is to initiate CRRT. HD consent has been obtained from son and kept on chart. Will initiate CRRT/CVVHDF to optimize volume status. Avoid nephrotoxins. Monitor labs and urine output. Pt with oliguric SANDRA in setting of recent contrast use and CRS. Pt with ATN. SCr was WNL at 1.13 and progressively worsened to 4.15 today. Pt with suboptimal response with diuretics. Pt with elevated CVP and volume overload.  As per primary team GOC discussion. Pt and family has opted against HD, thus will not move with RRT plans. Avoid nephrotoxins. Continue Bumex. Monitor labs and urine output.

## 2022-08-24 NOTE — PROGRESS NOTE ADULT - ASSESSMENT
Patient is an 83 yo M DM/HTN/CAD s/p Stent x3/CVA 2/2 symptomatic carotid stenosis s/p stent/JACKELIN on CPAP/prostate ca s/p seeding presented SOB, now with refractory cardiogenic shock unable to be weaned off inotropic support. Patient is a not a candidate for further advanced heart failure therapies. Palliative consulted for [facility]. Patient was previously altered and agitated, however day of family meeting patient was lucid and able to make his own decisions with his family present. Patient agreed to DNR/DNI, refused HD.

## 2022-08-24 NOTE — OCCUPATIONAL THERAPY INITIAL EVALUATION ADULT - PERTINENT HX OF CURRENT PROBLEM, REHAB EVAL
82 year old male with history of HTN, HLD T2DM, JACKELIN, prostate CA s/p radiation/seed implant, CVA s/p /stent (2022), AFib, CAD s/p PCI/stent X3, and HFrEF presented with c/o SOB and abdominal distention/constipation X10 days. Initially with elevated troponin levels and EKG concerning for NSTEMI; started on heparin gtt. RRT called for AMS/not responding to verbal commands and elevated lactate, developed severe bradycardia/weak thready pulse and hypotension; started on Levo drip.

## 2022-08-24 NOTE — PROGRESS NOTE ADULT - SUBJECTIVE AND OBJECTIVE BOX
[facility] DIVISION OF KIDNEY DISEASES AND HYPERTENSION   FOLLOW UP NOTE    --------------------------------------------------------------------------------  Chief Complaint: SANDRA with volume overload    24 hour events/subjective: Pt. was seen and examined today, confused. Scr elevated at 4.35, with elevated CVP. Pt remains oliguric.       PAST HISTORY  --------------------------------------------------------------------------------  No significant changes to PMH, PSH, FHx, SHx, unless otherwise noted    ALLERGIES & MEDICATIONS  --------------------------------------------------------------------------------  Allergies    ceftriaxone (Urticaria)  ciprofloxacin (Rash)  Shrimp (Vomiting; Anaphylaxis; Nausea)    Intolerances      Standing Inpatient Medications  aMIOdarone    Tablet 400 milliGRAM(s) Oral two times a day  apixaban 2.5 milliGRAM(s) Oral every 12 hours  aspirin enteric coated 81 milliGRAM(s) Oral daily  atorvastatin 80 milliGRAM(s) Oral at bedtime  buMETAnide Infusion 1 mG/Hr IV Continuous <Continuous>  chlorhexidine 2% Cloths 1 Application(s) Topical <User Schedule>  clopidogrel Tablet 75 milliGRAM(s) Oral daily  dextrose 5%. 1000 milliLiter(s) IV Continuous <Continuous>  dextrose 5%. 1000 milliLiter(s) IV Continuous <Continuous>  dextrose 50% Injectable 25 Gram(s) IV Push once  dextrose 50% Injectable 12.5 Gram(s) IV Push once  dextrose 50% Injectable 25 Gram(s) IV Push once  donepezil 10 milliGRAM(s) Oral at bedtime  fluticasone propionate 50 MICROgram(s)/spray Nasal Spray 1 Spray(s) Both Nostrils two times a day  glucagon  Injectable 1 milliGRAM(s) IntraMuscular once  insulin glargine Injectable (LANTUS) 20 Unit(s) SubCutaneous at bedtime  insulin lispro (ADMELOG) corrective regimen sliding scale   SubCutaneous three times a day before meals  insulin lispro (ADMELOG) corrective regimen sliding scale   SubCutaneous at bedtime  insulin lispro Injectable (ADMELOG) 5 Unit(s) SubCutaneous three times a day before meals  ketorolac 0.5% Ophthalmic Solution 1 Drop(s) Left EYE three times a day  milrinone Infusion 0.25 MICROgram(s)/kG/Min IV Continuous <Continuous>  mometasone 220 MICROgram(s) Inhaler 2 Puff(s) Inhalation daily  norepinephrine Infusion 0.05 MICROgram(s)/kG/Min IV Continuous <Continuous>  pantoprazole    Tablet 40 milliGRAM(s) Oral before breakfast  polyethylene glycol 3350 17 Gram(s) Oral daily  senna 2 Tablet(s) Oral at bedtime  tamsulosin 0.4 milliGRAM(s) Oral at bedtime    PRN Inpatient Medications  acetaminophen     Tablet .. 650 milliGRAM(s) Oral every 6 hours PRN  aluminum hydroxide/magnesium hydroxide/simethicone Suspension 30 milliLiter(s) Oral every 4 hours PRN  dextrose Oral Gel 15 Gram(s) Oral once PRN      REVIEW OF SYSTEMS  --------------------------------------------------------------------------------  Gen: No fevers/chills  Head/Eyes/Ears: No HA   Respiratory: No dyspnea, cough  CV: No chest pain  GI: No abdominal pain, diarrhea  : as per HPI  MSK:  trace LE edema+  Skin: No rashes  Heme: No easy bruising or bleeding    All other systems were reviewed and are negative, except as noted.    VITALS/PHYSICAL EXAM  --------------------------------------------------------------------------------  T(C): 36.4 (08-24-22 @ 08:00), Max: 36.6 (08-23-22 @ 20:01)  HR: 90 (08-24-22 @ 08:00) (66 - 123)  BP: --  RR: 19 (08-24-22 @ 08:00) (13 - 29)  SpO2: 96% (08-24-22 @ 08:00) (94% - 100%)  Wt(kg): --      08-23-22 @ 07:01  -  08-24-22 @ 07:00  --------------------------------------------------------  IN: 1530.3 mL / OUT: 135 mL / NET: 1395.3 mL      Physical Exam:  	Gen: sick appearing+  	HEENT: Anicteric  	Pulm: fair entry b/l  	CV: S1S2+  	Abd: Soft, +BS  	Ext: No LE edema B/L  	Neuro: Awake             : Guevara cath+ with clear urine  	Skin: Warm and dry  	  LABS/STUDIES  --------------------------------------------------------------------------------              9.7    11.03 >-----------<  229      [08-24-22 @ 03:25]              30.4     139  |  101  |  70  ----------------------------<  188      [08-24-22 @ 03:25]  5.0   |  21  |  4.15        Ca     8.5     [08-24-22 @ 03:25]      Mg     2.40     [08-24-22 @ 03:25]      Phos  5.9     [08-24-22 @ 03:25]    Serum Osmolality 314      [08-22-22 @ 10:15]    Creatinine Trend:  SCr 4.15 [08-24 @ 03:25]  SCr 3.75 [08-23 @ 19:46]  SCr 3.16 [08-23 @ 07:30]  SCr 2.71 [08-23 @ 00:15]  SCr 2.34 [08-22 @ 10:15]    Urinalysis - [08-16-22 @ 20:16]      Color Orange / Appearance Turbid / SG 1.020 / pH 6.0      Gluc 500 mg/dL / Ketone Negative  / Bili Negative / Urobili <2 mg/dL       Blood Large / Protein 300 mg/dL / Leuk Est Large / Nitrite Negative      RBC >50 / WBC >50 / Hyaline  / Gran  / Sq Epi  / Non Sq Epi  / Bacteria Many    Urine Creatinine 106      [08-22-22 @ 10:15]  Urine Protein 110      [08-22-22 @ 10:15]  Urine Sodium <20      [08-22-22 @ 10:15]  Urine Urea Nitrogen 520.2      [08-22-22 @ 10:15]  Urine Potassium 60.7      [08-22-22 @ 10:15]  Urine Chloride 34      [08-22-22 @ 10:15]  Urine Osmolality 424      [08-22-22 @ 10:15]

## 2022-08-24 NOTE — PROGRESS NOTE ADULT - CRITICAL CARE ATTENDING COMMENT
82 year old man with known CAD with stents, HFrEF who was admitted to CCU 8/18 with acute on chronic systolic heart failure severe MR and cardiogenic shock. Was profoundly acidotic and sp experiencing junctional bradycardia. IABP weaned over the weekend. Started on Bumex gtt. Cr increasing and oliguric    [place] numbers this morning on Milrinone 0.375mcg/kg/min:  CO 9/CI 4.5//CVP 12  MVO2 76.5    RFV [place]  LRA A-line    Meds:  ASA  Apixaban 2.5 mg BID  Brilinta 90 mg BID  Levophed 0.3 mcg/kg/min  Bumex gtt at 1 mg/hr  Milrinone gtt 0.25 mcg/kg/min  Amiodarone 400 mg TID  Seroquel    #Neuro- Patient sundowns  On Seroquel  Haldol prn agitation    #Pulm- CXR stable  Continue to monitor  Continue Bumex    #CV- Acute on chronic systolic heart failure; severe MR and cardiogenic shock  [place] in place, IABP out  Continue Milrinone gtt-decreased again to 0.25 mcg/kg/min  Levophed started last night  Bumex gtt to 1 mg/hr  Monitor MVO2 and CO/CI BID  FU HF recommendations  Apixiban-will dc if needed prior to procedure  Started Amiodarone 400 mg ID x 12 doses/then 200 mg daily    #Renal- Cr 4.15 continue to monitor  Nephrology input appreciated; ?HD    #DVT ppx- Apixiban 2.5 mg BID-start sq heparin

## 2022-08-24 NOTE — OCCUPATIONAL THERAPY INITIAL EVALUATION ADULT - GENERAL OBSERVATIONS, REHAB EVAL
Patient received semisupine in bed in NAD; agreeable to participate in OT evaluation. +O2 via NC. +tele. Daughter at bedside.

## 2022-08-24 NOTE — PROGRESS NOTE ADULT - SUBJECTIVE AND OBJECTIVE BOX
Patient seen, family meeting with palliative care in progress.       Medications:  acetaminophen     Tablet .. 650 milliGRAM(s) Oral every 6 hours PRN  aluminum hydroxide/magnesium hydroxide/simethicone Suspension 30 milliLiter(s) Oral every 4 hours PRN  aMIOdarone    Tablet 400 milliGRAM(s) Oral two times a day  aspirin enteric coated 81 milliGRAM(s) Oral daily  atorvastatin 80 milliGRAM(s) Oral at bedtime  buMETAnide Infusion 1 mG/Hr IV Continuous <Continuous>  chlorhexidine 2% Cloths 1 Application(s) Topical <User Schedule>  clopidogrel Tablet 75 milliGRAM(s) Oral daily  dextrose 5%. 1000 milliLiter(s) IV Continuous <Continuous>  dextrose 5%. 1000 milliLiter(s) IV Continuous <Continuous>  dextrose 50% Injectable 25 Gram(s) IV Push once  dextrose 50% Injectable 12.5 Gram(s) IV Push once  dextrose 50% Injectable 25 Gram(s) IV Push once  dextrose Oral Gel 15 Gram(s) Oral once PRN  donepezil 10 milliGRAM(s) Oral at bedtime  fluticasone propionate 50 MICROgram(s)/spray Nasal Spray 1 Spray(s) Both Nostrils two times a day  glucagon  Injectable 1 milliGRAM(s) IntraMuscular once  heparin   Injectable 5000 Unit(s) SubCutaneous every 8 hours  insulin glargine Injectable (LANTUS) 20 Unit(s) SubCutaneous at bedtime  insulin lispro (ADMELOG) corrective regimen sliding scale   SubCutaneous three times a day before meals  insulin lispro (ADMELOG) corrective regimen sliding scale   SubCutaneous at bedtime  insulin lispro Injectable (ADMELOG) 5 Unit(s) SubCutaneous three times a day before meals  ketorolac 0.5% Ophthalmic Solution 1 Drop(s) Left EYE three times a day  milrinone Infusion 0.25 MICROgram(s)/kG/Min IV Continuous <Continuous>  mometasone 220 MICROgram(s) Inhaler 2 Puff(s) Inhalation daily  norepinephrine Infusion 0.05 MICROgram(s)/kG/Min IV Continuous <Continuous>  pantoprazole    Tablet 40 milliGRAM(s) Oral before breakfast  polyethylene glycol 3350 17 Gram(s) Oral daily  senna 2 Tablet(s) Oral at bedtime  tamsulosin 0.4 milliGRAM(s) Oral at bedtime      Vitals:  Vital Signs Last 24 Hrs  T(C): 36.5 (24 Aug 2022 12:00), Max: 36.6 (23 Aug 2022 20:01)  T(F): 97.7 (24 Aug 2022 12:00), Max: 97.9 (23 Aug 2022 20:01)  HR: 91 (24 Aug 2022 13:00) (73 - 123)  BP: --  BP(mean): --  RR: 20 (24 Aug 2022 13:00) (13 - 39)  SpO2: 94% (24 Aug 2022 13:00) (94% - 100%)    Parameters below as of 24 Aug 2022 10:50  Patient On (Oxygen Delivery Method): nasal cannula        Daily     Daily Weight in k.5 (24 Aug 2022 05:00)    I&O's Detail    23 Aug 2022 07:01  -  24 Aug 2022 07:00  --------------------------------------------------------  IN:    Bumetanide: 105 mL    Bumetanide: 10 mL    IV PiggyBack: 50 mL    Milrinone: 13.4 mL    Milrinone: 33 mL    Milrinone: 26.6 mL    Milrinone: 140 mL    Norepinephrine: 121 mL    Norepinephrine: 41.3 mL    Oral Fluid: 990 mL  Total IN: 1530.3 mL    OUT:    Indwelling Catheter - Urethral (mL): 135 mL  Total OUT: 135 mL    Total NET: 1395.3 mL      24 Aug 2022 07:01  -  24 Aug 2022 13:51  --------------------------------------------------------  IN:    Bumetanide: 25 mL    Milrinone: 42 mL    Norepinephrine: 130 mL    Oral Fluid: 150 mL  Total IN: 347 mL    OUT:    Indwelling Catheter - Urethral (mL): 50 mL  Total OUT: 50 mL    Total NET: 297 mL          Physical Exam:     General: No distress. Comfortable.  HEENT: EOM intact.  Neck: Neck supple. JVP elevated. No masses  Chest: Clear to auscultation bilaterally  CV: Normal S1 and S2. No murmurs, rub, or gallops. Radial pulses normal.  Abdomen: Soft, non-distended, non-tender  Skin: No rashes or skin breakdown  Neurology: Alert   Psych: not assessed     Labs:                        9.7    11.03 )-----------( 229      ( 24 Aug 2022 03:25 )             30.4     08-24    139  |  101  |  70<H>  ----------------------------<  188<H>  5.0   |  21<L>  |  4.15<H>    Ca    8.5      24 Aug 2022 03:25  Phos  5.9     08-24  Mg     2.40     08-24    TPro  6.8  /  Alb  3.2<L>  /  TBili  0.5  /  DBili  x   /  AST  22  /  ALT  23  /  AlkPhos  86  08-24

## 2022-08-24 NOTE — PROGRESS NOTE ADULT - CONVERSATION DETAILS
Family meeting was planned for PM with patient's children. Patient himself was awake, alert and engaged and thus was also able to participate in GO meeting. I discussed that patient has refractory cardiogenic shock causing renal failure with no available curative treatments, and so patient has reached end of life. Patient himself was very accepting of this situation. The family is very [employer] and katerin is extremely to them. Patient spoke of his strong katerin in God, that he has lived a long life and is ready to go back to God if this is his time. He agreed that he prioritizes being comfortable in these last days of his life. When discussing code status, he very clear stated "no heroics" and agreed to DNR/DNI. I also discussed renal failure and that HD would not change the irreversibility of cardiogenic shock, which patient also agreed to. In terms of comfort measures, I discussed that patient was currently being artificially sustained through milrinone. However family was uncomfortable with the idea of discontinuing milrinone and refused. Family is having some difficulty in withdrawing care patient is already receiving but are also understanding that he will die soon.

## 2022-08-24 NOTE — PROGRESS NOTE ADULT - PROBLEM SELECTOR PLAN 1
[place] on Milrinone 0.25 mcg/kg/min-CVP 21, 62/31/41, CO 6.1, CI 3.01.   Patient is not making much urine- net positive 135 ml.   Family has decided on comfort care- they have decided to continue milrinone but no blood draws.   Will follow up official palliative care note.

## 2022-08-24 NOTE — EEG REPORT - NS EEG TEXT BOX
CHIDI RUBIO [facility]-[id number]     Study Date: 		08-23 08:00-08:00 8-4-22  Duration in hours:  x23.5    --------------------------------------------------------------------------------------------------  History:  CC/ HPI Patient is a 82y old  Male who presents with a chief complaint of NSTEMI (23 Aug 2022 08:19)    MEDICATIONS  (STANDING):  aMIOdarone    Tablet   Oral   aMIOdarone    Tablet 400 milliGRAM(s) Oral every 8 hours  apixaban 2.5 milliGRAM(s) Oral every 12 hours  aspirin enteric coated 81 milliGRAM(s) Oral daily  atorvastatin 80 milliGRAM(s) Oral at bedtime  buMETAnide Infusion 0.5 mG/Hr (2.5 mL/Hr) IV Continuous <Continuous>  cefepime   IVPB 1000 milliGRAM(s) IV Intermittent daily  chlorhexidine 2% Cloths 1 Application(s) Topical <User Schedule>  clopidogrel Tablet 75 milliGRAM(s) Oral daily  donepezil 10 milliGRAM(s) Oral at bedtime  fluticasone propionate 50 MICROgram(s)/spray Nasal Spray 1 Spray(s) Both Nostrils two times a day  glucagon  Injectable 1 milliGRAM(s) IntraMuscular once  hydrALAZINE 5 milliGRAM(s) Oral every 8 hours  insulin glargine Injectable (LANTUS) 20 Unit(s) SubCutaneous at bedtime  insulin lispro (ADMELOG) corrective regimen sliding scale   SubCutaneous three times a day before meals  insulin lispro (ADMELOG) corrective regimen sliding scale   SubCutaneous at bedtime  insulin lispro Injectable (ADMELOG) 5 Unit(s) SubCutaneous three times a day before meals  ketorolac 0.5% Ophthalmic Solution 1 Drop(s) Left EYE three times a day  milrinone Infusion 0.375 MICROgram(s)/kG/Min (9.97 mL/Hr) IV Continuous <Continuous>  mometasone 220 MICROgram(s) Inhaler 2 Puff(s) Inhalation daily  pantoprazole    Tablet 40 milliGRAM(s) Oral before breakfast  polyethylene glycol 3350 17 Gram(s) Oral daily  senna 2 Tablet(s) Oral at bedtime  tamsulosin 0.4 milliGRAM(s) Oral at bedtime    --------------------------------------------------------------------------------------------------  Study Interpretation:    [Abbreviation Key:  PDR=alpha rhythm/posterior dominant rhythm. A-P=anterior posterior.  Amplitude: ‘very low’:<20; ‘low’:20-49; ‘medium’:; ‘high’:>150uV.  Persistence for periodic/rhythmic patterns (% of epoch) ‘rare’:<1%; ‘occasional’:1-10%; ‘frequent’:10-50%; ‘abundant’:50-90%; ‘continuous’:>90%.  Persistence for sporadic discharges: ‘rare’:<1/hr; ‘occasional’:1/min-1/hr; ‘frequent’:>1/min; ‘abundant’:>1/10 sec.  RPP=rhythmic and periodic patterns; GRDA=generalized rhythmic delta activity; FIRDA=frontal intermittent GRDA; LRDA=lateralized rhythmic delta activity; TIRDA=temporal intermittent rhythmic delta activity;  LPD=PLED=lateralized periodic discharges; GPD=generalized periodic discharges; BIPDs =bilateral independent periodic discharges; Mf=multifocal; SIRPDs=stimulus induced rhythmic, periodic, or ictal appearing discharges; BIRDs=brief potentially ictal rhythmic discharges >4 Hz, lasting .5-10s; PFA (paroxysmal bursts >13 Hz or =8 Hz <10s).  Modifiers: +F=with fast component; +S=with spike component; +R=with rhythmic component.  S-B=burst suppression pattern.  Max=maximal. N1-drowsy; N2-stage II sleep; N3-slow wave sleep. SSS/BETS=small sharp spikes/benign epileptiform transients of sleep. HV=hyperventilation; PS=photic stimulation]    Daily EEG Visual Analysis  FINDINGS:      Background:  Continuity: continuous  Symmetry: symmetric  PDR: 7Hz activity, with amplitude to 40 uV, that attenuated to eye opening.    Reactivity: present  Voltage: normal (between 20-150uV)  Anterior Posterior Gradient: present  Other background findings: none  Breach: absent    Background Slowing:  Generalized slowing: diffuse irregular mostly theta activity.  Focal slowing: none was present.    State Changes:   -Drowsiness noted with increased slowing, attenuation of fast activity, vertex transients.  -Present with N2 sleep transients with symmetric spindles and K-complexes.    Sporadic Epileptiform Discharges:    None    Rhythmic and Periodic Patterns (RPPs):  None     Electrographic and Electroclinical seizures:  None    Other Clinical Events:  None    Activation Procedures:   -Hyperventilation was not performed.    -Photic stimulation was not performed.    Artifacts:  Intermittent myogenic and movement artifacts were noted.    ECG:  The heart rate on single channel ECG was predominantly between  BPM with frequent ectopy.    EEG Classification / Summary:  Abnormal EEG study  Mild generalized background slowing  ECG with frequent ectopy  -----------------------------------------------------------------------------------------------------    Clinical Impression:  Mild diffuse/multifocal cerebral dysfunction, not specific as to etiology.  There were no epileptiform abnormalities recorded.    ECG with frequent ectopy  No seizures x 2 day(s)    In absence of additional clinical concerns, recommend consideration for discontinuation of current EEG study with reconnection in future if warranted.    General recommendations for CEEG/LTM duration:  1 Day recording if patient awake and no epileptiform abnormalities recorded.  2 Day recording if patient comatose and no epileptiform abnormalities recorded.  2-3 Day recording if patient comatose and epileptiform abnormalities recorded, with no seizures recorded.  Discontinuation of recording if patient with epileptiform abnormalities or seizures initially on recording, and no subsequent seizures recorded x 1-2 Days.      -------------------------------------------------------------------------------------------------------  [facility] EEG Reading Room Ph#: [phone]  Epilepsy Answering Service after 5PM and before 8:30AM: Ph#: [phone]    Atul Benavides M.D.   of Neurology, [facility] Epilepsy [place]	   CHIDI RUBIO [facility]-[id number]     Study Date: 		08-23 08:00-08:00 8-24-22  Duration in hours:  x23.5    --------------------------------------------------------------------------------------------------  History:  CC/ HPI Patient is a 82y old  Male who presents with a chief complaint of NSTEMI (23 Aug 2022 08:19)    MEDICATIONS  (STANDING):  aMIOdarone    Tablet   Oral   aMIOdarone    Tablet 400 milliGRAM(s) Oral every 8 hours  apixaban 2.5 milliGRAM(s) Oral every 12 hours  aspirin enteric coated 81 milliGRAM(s) Oral daily  atorvastatin 80 milliGRAM(s) Oral at bedtime  buMETAnide Infusion 0.5 mG/Hr (2.5 mL/Hr) IV Continuous <Continuous>  cefepime   IVPB 1000 milliGRAM(s) IV Intermittent daily  chlorhexidine 2% Cloths 1 Application(s) Topical <User Schedule>  clopidogrel Tablet 75 milliGRAM(s) Oral daily  donepezil 10 milliGRAM(s) Oral at bedtime  fluticasone propionate 50 MICROgram(s)/spray Nasal Spray 1 Spray(s) Both Nostrils two times a day  glucagon  Injectable 1 milliGRAM(s) IntraMuscular once  hydrALAZINE 5 milliGRAM(s) Oral every 8 hours  insulin glargine Injectable (LANTUS) 20 Unit(s) SubCutaneous at bedtime  insulin lispro (ADMELOG) corrective regimen sliding scale   SubCutaneous three times a day before meals  insulin lispro (ADMELOG) corrective regimen sliding scale   SubCutaneous at bedtime  insulin lispro Injectable (ADMELOG) 5 Unit(s) SubCutaneous three times a day before meals  ketorolac 0.5% Ophthalmic Solution 1 Drop(s) Left EYE three times a day  milrinone Infusion 0.375 MICROgram(s)/kG/Min (9.97 mL/Hr) IV Continuous <Continuous>  mometasone 220 MICROgram(s) Inhaler 2 Puff(s) Inhalation daily  pantoprazole    Tablet 40 milliGRAM(s) Oral before breakfast  polyethylene glycol 3350 17 Gram(s) Oral daily  senna 2 Tablet(s) Oral at bedtime  tamsulosin 0.4 milliGRAM(s) Oral at bedtime    --------------------------------------------------------------------------------------------------  Study Interpretation:    [Abbreviation Key:  PDR=alpha rhythm/posterior dominant rhythm. A-P=anterior posterior.  Amplitude: ‘very low’:<20; ‘low’:20-49; ‘medium’:; ‘high’:>150uV.  Persistence for periodic/rhythmic patterns (% of epoch) ‘rare’:<1%; ‘occasional’:1-10%; ‘frequent’:10-50%; ‘abundant’:50-90%; ‘continuous’:>90%.  Persistence for sporadic discharges: ‘rare’:<1/hr; ‘occasional’:1/min-1/hr; ‘frequent’:>1/min; ‘abundant’:>1/10 sec.  RPP=rhythmic and periodic patterns; GRDA=generalized rhythmic delta activity; FIRDA=frontal intermittent GRDA; LRDA=lateralized rhythmic delta activity; TIRDA=temporal intermittent rhythmic delta activity;  LPD=PLED=lateralized periodic discharges; GPD=generalized periodic discharges; BIPDs =bilateral independent periodic discharges; Mf=multifocal; SIRPDs=stimulus induced rhythmic, periodic, or ictal appearing discharges; BIRDs=brief potentially ictal rhythmic discharges >4 Hz, lasting .5-10s; PFA (paroxysmal bursts >13 Hz or =8 Hz <10s).  Modifiers: +F=with fast component; +S=with spike component; +R=with rhythmic component.  S-B=burst suppression pattern.  Max=maximal. N1-drowsy; N2-stage II sleep; N3-slow wave sleep. SSS/BETS=small sharp spikes/benign epileptiform transients of sleep. HV=hyperventilation; PS=photic stimulation]    Daily EEG Visual Analysis  FINDINGS:      Background:  Continuity: continuous  Symmetry: symmetric  PDR: 7Hz activity, with amplitude to 40 uV, that attenuated to eye opening.    Reactivity: present  Voltage: normal (between 20-150uV)  Anterior Posterior Gradient: present  Other background findings: none  Breach: absent    Background Slowing:  Generalized slowing: diffuse irregular mostly theta activity.  Focal slowing: none was present.    State Changes:   -Drowsiness noted with increased slowing, attenuation of fast activity, vertex transients.  -Present with N2 sleep transients with symmetric spindles and K-complexes.    Sporadic Epileptiform Discharges:    None    Rhythmic and Periodic Patterns (RPPs):  None     Electrographic and Electroclinical seizures:  None    Other Clinical Events:  None    Activation Procedures:   -Hyperventilation was not performed.    -Photic stimulation was not performed.    Artifacts:  Intermittent myogenic and movement artifacts were noted.    ECG:  The heart rate on single channel ECG was predominantly between  BPM with frequent ectopy.    EEG Classification / Summary:  Abnormal EEG study  Mild generalized background slowing  ECG with frequent ectopy  -----------------------------------------------------------------------------------------------------    Clinical Impression:  Mild diffuse/multifocal cerebral dysfunction, not specific as to etiology.  There were no epileptiform abnormalities recorded.    ECG with frequent ectopy  No seizures x 2 day(s)    In absence of additional clinical concerns, recommend consideration for discontinuation of current EEG study with reconnection in future if warranted.    General recommendations for CEEG/LTM duration:  1 Day recording if patient awake and no epileptiform abnormalities recorded.  2 Day recording if patient comatose and no epileptiform abnormalities recorded.  2-3 Day recording if patient comatose and epileptiform abnormalities recorded, with no seizures recorded.  Discontinuation of recording if patient with epileptiform abnormalities or seizures initially on recording, and no subsequent seizures recorded x 1-2 Days.      -------------------------------------------------------------------------------------------------------  [facility] EEG Reading Room Ph#: [phone]  Epilepsy Answering Service after 5PM and before 8:30AM: Ph#: [phone]    Atul Benavides M.D.   of Neurology, [facility] Epilepsy [place]

## 2022-08-24 NOTE — PROGRESS NOTE ADULT - CRITICAL CARE ATTENDING COMMENT
Acceptable CI (~3.0) on milrinone 0.25.  Wean off norepinephrine.  D/c hydralazine.  D/c PA catheter this evening.  D/c amiodarone.  Likely will pursue comfort care only.

## 2022-08-25 NOTE — PROGRESS NOTE ADULT - ASSESSMENT
82 year old man with CAD and HFrEF who was transferred to [facility] CCU with acute on chronic systolic heart failure, severe MR and cardiogenic shock. S/p Delcambre and IABP. Pt currently on Milrinone and Heparin drips, receiving Bumex diuresis, milrinone, and levo.    NEURO:  #Mental status: baseline A&O x3  -8/21 pt became unresponsive, code stroke was called. Pt received CTH, CTP, and CTA. Neuro recommending MRI w/wo IV contrast. AMS possibly in setting of overnight sedation from Seroquel.  - CT Head Negative for acute hemorrhage, CT Perfusion showing no core infarct or ischemic penumbra,  and CT Angio showing moderate stenosis of right cavernous ICA and mild stenosis of right proximal M1.  -Pt's mental status back to AAOx3  -Will hold haldol, loratidine, melatonin, for the time being in setting of AMS episode. Will consider adding back as tolerated.  -no epileptiform activity on EEG  -hold off on MRI head for now     CV:  #NSTEMI   - elevated troponins and EKG concerning for NSTEMI.   - ASA/clopidogrel   - atorvastatin 80mg  - Troponins peak in 1600s, has downtrended to 1000      #systolic CHF: EF 25% with cardiogenic shock  - Echo from 8/17 showed severe mitral regurgitation, severe global LV systolic dysfunction, and RV enlargement + decreased systolic function  - RHC showed : RA 30, wedge 30, CO 3.22, CI 1.6,Pa sat 49% on levophed  - swan cath placed, f/u daily CXRs.   - milrinone 0.25  - levo 0.11  - HF consulted, Not a candidate for advanced cardiac therapies  - Trend I/Os and daily weights, and Cr  - s/p IABP removal on 8/20  - started on bumex 1mg gtt  - palliative consulted, pt is DNR/DNI with comfort care    #Arrythmia:   -afib with RVR in ED  - CHADS-vasc - 7 - Eliquis d/erik and will start Heparin drip again at 5 PM 8/21  - intermittent -140  - d/c'ed digoxin 125mcg  - started on amiodarone  - Monitor on tele    PULM:   presented with SOB  - CT chest from 8/17 showed No pulmonary embolism, small b/l pulmonary effusions and mild pulmonary edema and a nonspecific 4 mm left upper lobe juxtapleural nodule with surrounding ground-glass opacity.   - Pt satting well on 2LNC  -8/21 CXR showing persistent pulmonary edema in setting of CHF  -s/p Bumex 1 IVP on 8/21.   -s/p 80 lasix IVP on 8/22.  -bumex 1mg gtt    RENAL:  #SANDRA:   -Cr continues to rise  -producing around 10cc/hr of urine on bumex gtt  -continue to monitor UOP and lytes  -pt s/p Bumex 1 IVP 8/21.  -FeUrea 23.9%, urine P/Cr 1.0,   -bladder scan wnl  -no CRRT/HD     GI:  #Transaminitis: Elevated LFTs  - resolved    #Diet:  - Dash diet  - pantoprazole 40mg     #Bowel regiment:  presents with constipation  - miralax, maalox, senna  - nonspecific bowl gas on abdominal X-ray    ENDO:  #DM2: HbA1c 8.3   - Lantus increased to 20U qhs, c/w premeal 5U TID and SSI  - TSH and lipid panel wnl    HEMATOLOGIC:  #CBC results show Hb stable  - continue to monitor    #DVT prophylaxis   - hold apixaban for removal of lines today  - heparin q8h    ID:  #Pt without strong objective or clinical evidence of infection. Will observe off antibiotics  -8/21 WBC 6.3, afebrile  -RRT on 8/18for AMS, labs drawn during the rapid remarkable for new leukocytosis to 13, lactate to 13.5, procalc 0.61.  -Elevated lactate possibly 2/2 hypoperfusion given NSTEMI vs sepsis. However, unclear source of infection (pt being treated with bactrim for UTI for positive UA but negative UCx)  -Neg MRSA swab  -RVP neg  -blood cultures neg  -s/p cefepime 1g qday empiric coverage for 5 days total (8/19-8/23)

## 2022-08-25 NOTE — PROGRESS NOTE ADULT - NS ATTEND OPT1 GEN_ALL_CORE
Jacquelyn was seen today for vaginal problem.    Diagnoses and all orders for this visit:    Vaginal itching  -     nystatin (MYCOSTATIN) 704357 UNIT/GM ointment; Local application of thin film to the affected area 3 times a day for next 7-10 days    Urine abnormality  -     POCT URINALYSIS DIPSTICK  -     URINE, BACTERIAL CULTURE           Plan:  Recommend plenty of fluids, hydration orally. . Rest. Avoid undue exertion. Watch for any increasing/worsening symptoms.  Recommendation to follow with the primary care physician in next 2-10 days. If the symptoms continues or  worsen contact primary care physician or  recommend patient go to the emergency room.     Dr. Joesph Cali M.D.  [facility]  [address] [address] [place] [address]  Telephone:  [phone]      
I independently performed the documented:
I attest my time as attending is greater than 50% of the total combined time spent on qualifying patient care activities by the PA/NP and attending.

## 2022-08-25 NOTE — PROGRESS NOTE ADULT - NS ATTEND AMEND GEN_ALL_CORE FT
Excellent cardiac output on current support.  Remove PA catheter.  Wean off norepinephrine.  Monitor for renal recovery. Continue Bumex gtt.  He should go home with hospice care.
Patient was seen and examined at bedside with the advanced care provider.    Overnight, had to increase levophed back to 0.12.  However, PA sat improved to 70% with CO 6/CI 3.1 today based on Vikki calculation.  With increased lethargy and SVR of 840 concern for possible ongoing sepsis.  Infectious work up underway.  Continue to wean levophed as able.  May need to trial on a lower dose of milrinone if having difficulties with BP due to vasodilation with close monitoring of lactate, and MVO2.  Continue with daily radiographic verification olf IABP and PAC.  Hold further beta blocker therapy.  If he goes into Afib with RVR would use amiodarone.      Please obtain cardiac records from his [facility] and [facility] cardiologists.  He likely will need repeat left heart catherization to evaluate cardiac stents given shock.  However, no new wall motion abnormality seen on echocardiogram with no ST elevations on EKG to suggest urgent revascularization is needed.    We will continue to follow along with you.

## 2022-08-25 NOTE — PROGRESS NOTE ADULT - CRITICAL CARE ATTENDING COMMENT
82 year old man with known CAD with stents, HFrEF who was admitted to CCU 8/18 with acute on chronic systolic heart failure severe MR and cardiogenic shock. Was profoundly acidotic and sp experiencing junctional bradycardia. IABP weaned over the weekend. Started on Bumex gtt. Cr increasing and oliguric      Meds:  ASA  Apixaban 2.5 mg BID  Brilinta 90 mg BID  Bumex gtt at 1 mg/hr  Milrinone gtt 0.25 mcg/kg/min  Amiodarone 400 mg TID  Seroquel    #Neuro- Patient sundowns  On Seroquel  Haldol prn agitation    #Pulm- CXR stable  Continue to monitor  Continue Bumex    #CV- Acute on chronic systolic heart failure; severe MR and cardiogenic shock  Continue Milrinone gtt- 0.25 mcg/kg/min  Bumex gtt to 1 mg/hr  FU HF recommendations  Started Amiodarone 400 mg ID x 12 doses/then 200 mg daily    #Renal- Cr 5.3 continue to monitor  Nephrology input appreciated; no plan for HD at this time    #DVT ppx- Apixiban 2.5 mg BID

## 2022-08-25 NOTE — PROGRESS NOTE ADULT - PROBLEM SELECTOR PLAN 1
Milrinone 0.25 mcg/kg/min  Bumex 1mg hr  Appreciate Palliative Care consult (family in agreement with comfort care)

## 2022-08-25 NOTE — PROGRESS NOTE ADULT - ASSESSMENT
82 year old Male with PMH of HTN, HLD T2DM, JACKELIN (on BiPAP), prostate CA s/p radiation/seed implant, CVA s/p /stent (2022), AFib (on Eliquis), CAD s/p PCI/stent X3 (most recent  at [facility]), and HFrEF (EF 25%;LVIDd 6.8, severe MR, RV dysfunction and severe hypokinetic apical/lateral wall). Patient presented with c/o SOB and  abdominal distention/constipation  X10 days. Initially with elevated troponin levels and EKG concerning for NSTEMI; started on heparin gtt and s/p ASA/Brillinta. RRT called this morning for AMS/not responding to verbal commands and elevated lactate. He subsequently developed severe bradycardia/weak thready pulse and hypotension; started on Levo drip and evaluated by MICU who referred to CCU. He was sent to cath lab for RHC and IABP was placed for cardiogenic shock.       Pertinent  Labs:  Troponin- 270/462/467   Lactate- 3.2/ 4.5/6.7/12/ 13.8     BNP-  19,745  EKG- AFib subendo injury anterior wall  CXR- pulmonary edema     RHC:   RA 17  PA 60/28   PCWP 37  CO/CI 3.2/1.58  PA Sat 49.7%  SVR   (pre IABP)         RA 17   CO/CI 3.0/1.4   (mixed VO2 44.6%)  SVR 1733  (post IABP)                                               Started on Milrinone 0.25mcg/kg/min and Lasix 5mg/hr; Remains on Levo  0.05mcg/kg/min (continue to wean MAP 65)       CVP 7  CO/CI 6.1/3.2   VO2 70%   IABP out.   CVP 15, CI 1.85, PA sat 46%, CO 3.7

## 2022-08-25 NOTE — CHART NOTE - NSCHARTNOTEFT_GEN_A_CORE
PRE-INTERVENTIONAL RADIOLOGY PROCEDURE NOTE      Patient Age: 82    Patient Gender: Male    Procedure: PICC line placement    Diagnosis/Indication: for home milrinone    Interventional Radiology Attending Physician:    Ordering Attending Physician: Agus    Pertinent Medical History: CAD and HFrEF     Pertinent labs:                      9.3    9.58  )-----------( 250      ( 25 Aug 2022 03:56 )             29.8       08-25    136  |  98  |  84<H>  ----------------------------<  138<H>  5.2   |  23  |  5.35<H>    Ca    8.4      25 Aug 2022 03:56  Phos  7.5     08-25  Mg     2.50     08-25    TPro  6.6  /  Alb  3.5  /  TBili  0.4  /  DBili  x   /  AST  22  /  ALT  21  /  AlkPhos  90  08-24            Patient and Family Aware ? Yes

## 2022-08-25 NOTE — EEG REPORT - NS EEG TEXT BOX
CHIDI RUBIO [facility]-[id number]     Study Date: 		08-24 08:00-11:06 8-24-22  Duration in hours:  x3:06    --------------------------------------------------------------------------------------------------  History:  CC/ HPI Patient is a 82y old  Male who presents with a chief complaint of NSTEMI (23 Aug 2022 08:19)    MEDICATIONS  (STANDING):  aMIOdarone    Tablet   Oral   aMIOdarone    Tablet 400 milliGRAM(s) Oral every 8 hours  apixaban 2.5 milliGRAM(s) Oral every 12 hours  aspirin enteric coated 81 milliGRAM(s) Oral daily  atorvastatin 80 milliGRAM(s) Oral at bedtime  buMETAnide Infusion 0.5 mG/Hr (2.5 mL/Hr) IV Continuous <Continuous>  cefepime   IVPB 1000 milliGRAM(s) IV Intermittent daily  chlorhexidine 2% Cloths 1 Application(s) Topical <User Schedule>  clopidogrel Tablet 75 milliGRAM(s) Oral daily  donepezil 10 milliGRAM(s) Oral at bedtime  fluticasone propionate 50 MICROgram(s)/spray Nasal Spray 1 Spray(s) Both Nostrils two times a day  glucagon  Injectable 1 milliGRAM(s) IntraMuscular once  hydrALAZINE 5 milliGRAM(s) Oral every 8 hours  insulin glargine Injectable (LANTUS) 20 Unit(s) SubCutaneous at bedtime  insulin lispro (ADMELOG) corrective regimen sliding scale   SubCutaneous three times a day before meals  insulin lispro (ADMELOG) corrective regimen sliding scale   SubCutaneous at bedtime  insulin lispro Injectable (ADMELOG) 5 Unit(s) SubCutaneous three times a day before meals  ketorolac 0.5% Ophthalmic Solution 1 Drop(s) Left EYE three times a day  milrinone Infusion 0.375 MICROgram(s)/kG/Min (9.97 mL/Hr) IV Continuous <Continuous>  mometasone 220 MICROgram(s) Inhaler 2 Puff(s) Inhalation daily  pantoprazole    Tablet 40 milliGRAM(s) Oral before breakfast  polyethylene glycol 3350 17 Gram(s) Oral daily  senna 2 Tablet(s) Oral at bedtime  tamsulosin 0.4 milliGRAM(s) Oral at bedtime    --------------------------------------------------------------------------------------------------  Study Interpretation:    [Abbreviation Key:  PDR=alpha rhythm/posterior dominant rhythm. A-P=anterior posterior.  Amplitude: ‘very low’:<20; ‘low’:20-49; ‘medium’:; ‘high’:>150uV.  Persistence for periodic/rhythmic patterns (% of epoch) ‘rare’:<1%; ‘occasional’:1-10%; ‘frequent’:10-50%; ‘abundant’:50-90%; ‘continuous’:>90%.  Persistence for sporadic discharges: ‘rare’:<1/hr; ‘occasional’:1/min-1/hr; ‘frequent’:>1/min; ‘abundant’:>1/10 sec.  RPP=rhythmic and periodic patterns; GRDA=generalized rhythmic delta activity; FIRDA=frontal intermittent GRDA; LRDA=lateralized rhythmic delta activity; TIRDA=temporal intermittent rhythmic delta activity;  LPD=PLED=lateralized periodic discharges; GPD=generalized periodic discharges; BIPDs =bilateral independent periodic discharges; Mf=multifocal; SIRPDs=stimulus induced rhythmic, periodic, or ictal appearing discharges; BIRDs=brief potentially ictal rhythmic discharges >4 Hz, lasting .5-10s; PFA (paroxysmal bursts >13 Hz or =8 Hz <10s).  Modifiers: +F=with fast component; +S=with spike component; +R=with rhythmic component.  S-B=burst suppression pattern.  Max=maximal. N1-drowsy; N2-stage II sleep; N3-slow wave sleep. SSS/BETS=small sharp spikes/benign epileptiform transients of sleep. HV=hyperventilation; PS=photic stimulation]    Daily EEG Visual Analysis  FINDINGS:      Background:  Continuity: continuous  Symmetry: symmetric  PDR: 6.5-7Hz activity, with amplitude to 40 uV, that attenuated to eye opening.    Reactivity: present  Voltage: normal (between 20-150uV)  Anterior Posterior Gradient: present  Other background findings: none  Breach: absent    Background Slowing:  Generalized slowing: diffuse irregular mostly theta activity.  Focal slowing: none was present.    State Changes:   -Drowsiness noted with increased slowing, attenuation of fast activity, vertex transients.  -Present with N2 sleep transients with symmetric spindles and K-complexes.    Sporadic Epileptiform Discharges:    None    Rhythmic and Periodic Patterns (RPPs):  None     Electrographic and Electroclinical seizures:  None    Other Clinical Events:  None    Activation Procedures:   -Hyperventilation was not performed.    -Photic stimulation was not performed.    Artifacts:  Intermittent myogenic and movement artifacts were noted.    ECG:  The heart rate on single channel ECG was predominantly between  BPM with frequent ectopy.    EEG Classification / Summary:  Abnormal EEG study  Mild generalized background slowing  ECG with frequent ectopy  -----------------------------------------------------------------------------------------------------    Clinical Impression:  Mild diffuse/multifocal cerebral dysfunction, not specific as to etiology.  There were no epileptiform abnormalities recorded.    ECG with frequent ectopy  No seizures x 2 day(s)        -------------------------------------------------------------------------------------------------------  [facility] EEG Reading Room Ph#: [phone]  Epilepsy Answering Service after 5PM and before 8:30AM: Ph#: [phone]    Atul Benavides M.D.   of Neurology, [facility]

## 2022-08-25 NOTE — PROGRESS NOTE ADULT - SUBJECTIVE AND OBJECTIVE BOX
[facility] DIVISION OF KIDNEY DISEASES AND HYPERTENSION -- FOLLOW UP NOTE  MARTY Fellow pager # [phone]  Nephrology office # [phone]  Available on [employer] teams--> [place] Damon  -----------------------------------------------------------------------------  Chief Complaint:/subjective:  comfortable. sleepy       ALLERGIES & MEDICATIONS  --------------------------------------------------------------------------------  Allergies    ceftriaxone (Urticaria)  ciprofloxacin (Rash)  Shrimp (Vomiting; Anaphylaxis; Nausea)    Intolerances      Standing Inpatient Medications  aMIOdarone    Tablet 400 milliGRAM(s) Oral two times a day  aspirin enteric coated 81 milliGRAM(s) Oral daily  atorvastatin 80 milliGRAM(s) Oral at bedtime  buMETAnide Infusion 1 mG/Hr IV Continuous <Continuous>  chlorhexidine 2% Cloths 1 Application(s) Topical <User Schedule>  clopidogrel Tablet 75 milliGRAM(s) Oral daily  donepezil 10 milliGRAM(s) Oral at bedtime  fluticasone propionate 50 MICROgram(s)/spray Nasal Spray 1 Spray(s) Both Nostrils two times a day  heparin   Injectable 5000 Unit(s) SubCutaneous every 8 hours  ketorolac 0.5% Ophthalmic Solution 1 Drop(s) Left EYE three times a day  milrinone Infusion 0.25 MICROgram(s)/kG/Min IV Continuous <Continuous>  mometasone 220 MICROgram(s) Inhaler 2 Puff(s) Inhalation daily  pantoprazole    Tablet 40 milliGRAM(s) Oral before breakfast  polyethylene glycol 3350 17 Gram(s) Oral daily  senna 2 Tablet(s) Oral at bedtime  tamsulosin 0.4 milliGRAM(s) Oral at bedtime    PRN Inpatient Medications  acetaminophen     Tablet .. 650 milliGRAM(s) Oral every 6 hours PRN  aluminum hydroxide/magnesium hydroxide/simethicone Suspension 30 milliLiter(s) Oral every 4 hours PRN  glycopyrrolate Injectable 0.4 milliGRAM(s) IV Push every 6 hours PRN      REVIEW OF SYSTEMS  --------------------------------------------------------------------------------  unable     VITALS/PHYSICAL EXAM  --------------------------------------------------------------------------------  T(C): 35 (08-25-22 @ 12:00), Max: 36.9 (08-24-22 @ 16:00)  HR: 80 (08-25-22 @ 13:00) (65 - 89)  BP: --  RR: 13 (08-25-22 @ 13:00) (13 - 33)  SpO2: 95% (08-25-22 @ 13:00) (92% - 99%)  Wt(kg): --        08-24-22 @ 07:01  -  08-25-22 @ 07:00  --------------------------------------------------------  IN: 1368.8 mL / OUT: 245 mL / NET: 1123.8 mL    08-25-22 @ 07:01  -  08-25-22 @ 14:21  --------------------------------------------------------  IN: 408.8 mL / OUT: 65 mL / NET: 343.8 mL      Physical Exam:  	Gen NAD, lethargic   	HEENT: no JVD  	Pulm: CTABL  	CV: S1S2,  	Abd: Soft,   	Ext:  - edema B/L LE   	Neuro: Awake and alert  	Skin: Warm and dry               LABS/STUDIES  --------------------------------------------------------------------------------              9.3    9.58  >-----------<  250      [08-25-22 @ 03:56]              29.8     Hemoglobin: 9.3 g/dL (08-25-22 @ 03:56)  Hemoglobin: 9.7 g/dL (08-24-22 @ 03:25)    Platelet Count - Automated: 250 K/uL (08-25-22 @ 03:56)  Platelet Count - Automated: 229 K/uL (08-24-22 @ 03:25)    136  |  98  |  84  ----------------------------<  138      [08-25-22 @ 03:56]  5.2   |  23  |  5.35        Ca     8.4     [08-25-22 @ 03:56]      Mg     2.50     [08-25-22 @ 03:56]      Phos  7.5     [08-25-22 @ 03:56]    TPro  6.6  /  Alb  3.5  /  TBili  0.4  /  DBili  x   /  AST  22  /  ALT  21  /  AlkPhos  90  [08-24-22 @ 17:29]          Creatinine, Serum: 5.35 mg/dL (08-25-22 @ 03:56)  Creatinine, Serum: 5.00 mg/dL (08-24-22 @ 17:29)  Creatinine, Serum: 4.15 mg/dL (08-24-22 @ 03:25)  Creatinine, Serum: 3.75 mg/dL (08-23-22 @ 19:46)  Creatinine, Serum: 3.16 mg/dL (08-23-22 @ 07:30)  Creatinine, Serum: 2.71 mg/dL (08-23-22 @ 00:15)  Creatinine, Serum: 2.34 mg/dL (08-22-22 @ 10:15)  Creatinine, Serum: 2.15 mg/dL (08-22-22 @ 05:15)  Creatinine, Serum: 1.99 mg/dL (08-21-22 @ 16:00)  Creatinine, Serum: 1.94 mg/dL (08-21-22 @ 08:55)  Creatinine, Serum: 1.84 mg/dL (08-21-22 @ 01:00)  Creatinine, Serum: 2.00 mg/dL (08-20-22 @ 06:51)  Creatinine, Serum: 2.12 mg/dL (08-20-22 @ 00:20)  Creatinine, Serum: 2.22 mg/dL (08-19-22 @ 20:09)  Creatinine, Serum: 2.20 mg/dL (08-19-22 @ 16:20)  Creatinine, Serum: 2.14 mg/dL (08-19-22 @ 12:20)  Creatinine, Serum: 2.26 mg/dL (08-19-22 @ 05:30)  Creatinine, Serum: 2.28 mg/dL (08-18-22 @ 22:00)  Creatinine, Serum: 2.24 mg/dL (08-18-22 @ 15:30)  Creatinine, Serum: 2.24 mg/dL (08-18-22 @ 11:40)  Creatinine, Serum: 1.91 mg/dL (08-18-22 @ 03:22)  Creatinine, Serum: 1.31 mg/dL (08-17-22 @ 18:15)  Creatinine, Serum: 1.16 mg/dL (08-16-22 @ 22:37)  Creatinine, Serum: 1.13 mg/dL (08-16-22 @ 19:12)    SODIUM TREND:  Sodium 136 [08-25 @ 03:56]  Sodium 140 [08-24 @ 17:29]  Sodium 139 [08-24 @ 03:25]  Sodium 140 [08-23 @ 19:46]  Sodium 139 [08-23 @ 07:30]  Sodium 139 [08-23 @ 00:15]  Sodium 141 [08-22 @ 10:15]  Sodium 140 [08-22 @ 05:15]  Sodium 141 [08-21 @ 16:00]  Sodium 139 [08-21 @ 08:55]        SCr 5.35 [08-25 @ 03:56]  SCr 5.00 [08-24 @ 17:29]  SCr 4.15 [08-24 @ 03:25]  SCr 3.75 [08-23 @ 19:46]  SCr 3.16 [08-23 @ 07:30]    Urinalysis - [08-16-22 @ 20:16]      Color Orange / Appearance Turbid / SG 1.020 / pH 6.0      Gluc 500 mg/dL / Ketone Negative  / Bili Negative / Urobili <2 mg/dL       Blood Large / Protein 300 mg/dL / Leuk Est Large / Nitrite Negative      RBC >50 / WBC >50 / Hyaline  / Gran  / Sq Epi  / Non Sq Epi  / Bacteria Many    Urine Creatinine 106      [08-22-22 @ 10:15]  Urine Protein 110      [08-22-22 @ 10:15]  Urine Sodium <20      [08-22-22 @ 10:15]  Urine Urea Nitrogen 520.2      [08-22-22 @ 10:15]  Urine Potassium 60.7      [08-22-22 @ 10:15]  Urine Chloride 34      [08-22-22 @ 10:15]  Urine Osmolality 424      [08-22-22 @ 10:15]    TSH 4.02      [08-17-22 @ 07:38]  Lipid: chol 91, TG 74, HDL 50, LDL --      [08-18-22 @ 03:22]

## 2022-08-25 NOTE — PROGRESS NOTE ADULT - PROVIDER SPECIALTY LIST ADULT
CCU
Heart Failure
CCU
Heart Failure
Heart Failure
Nephrology
Neurology
Palliative Care
Nephrology
Nephrology
Heart Failure
Heart Failure

## 2022-08-25 NOTE — PROGRESS NOTE ADULT - PROBLEM SELECTOR PLAN 1
Pt with oliguric SANDRA in setting of recent contrast use and CRS. Pt with ATN. SCr was WNL at 1.13 and progressively worsened to 5.5 today. Pt with suboptimal response with diuretics. Pt with elevated CVP and volume overload.  As per primary team GOC discussion. Pt and family has opted against HD, thus will not move with RRT plans. Avoid nephrotoxins.   Continue Bumex.   Monitor labs and urine output.

## 2022-08-25 NOTE — PROGRESS NOTE ADULT - SUBJECTIVE AND OBJECTIVE BOX
Interval History:  Patient resting comfortably in bed   Denies CP/SOB/palpitations/dizziness  No acute events overnight      Medications:  acetaminophen     Tablet .. 650 milliGRAM(s) Oral every 6 hours PRN  aluminum hydroxide/magnesium hydroxide/simethicone Suspension 30 milliLiter(s) Oral every 4 hours PRN  aMIOdarone    Tablet 400 milliGRAM(s) Oral two times a day  aspirin enteric coated 81 milliGRAM(s) Oral daily  atorvastatin 80 milliGRAM(s) Oral at bedtime  buMETAnide Infusion 1 mG/Hr IV Continuous <Continuous>  chlorhexidine 2% Cloths 1 Application(s) Topical <User Schedule>  clopidogrel Tablet 75 milliGRAM(s) Oral daily  donepezil 10 milliGRAM(s) Oral at bedtime  fluticasone propionate 50 MICROgram(s)/spray Nasal Spray 1 Spray(s) Both Nostrils two times a day  glycopyrrolate Injectable 0.4 milliGRAM(s) IV Push every 6 hours PRN  heparin   Injectable 5000 Unit(s) SubCutaneous every 8 hours  ketorolac 0.5% Ophthalmic Solution 1 Drop(s) Left EYE three times a day  milrinone Infusion 0.25 MICROgram(s)/kG/Min IV Continuous <Continuous>  mometasone 220 MICROgram(s) Inhaler 2 Puff(s) Inhalation daily  pantoprazole    Tablet 40 milliGRAM(s) Oral before breakfast  polyethylene glycol 3350 17 Gram(s) Oral daily  senna 2 Tablet(s) Oral at bedtime  tamsulosin 0.4 milliGRAM(s) Oral at bedtime      Vitals:  T(C): 35.4 (22 @ 08:00), Max: 36.9 (22 @ 16:00)  HR: 75 (22 @ 10:00) (65 - 94)  BP: --  BP(mean): --  RR: 15 (22 @ 10:00) (13 - 33)  SpO2: 96% (22 @ 10:00) (93% - 99%)    Daily     Daily Weight in k (25 Aug 2022 06:00)        I&O's Summary    24 Aug 2022 07:01  -  25 Aug 2022 07:00  --------------------------------------------------------  IN: 1368.8 mL / OUT: 245 mL / NET: 1123.8 mL    25 Aug 2022 07:01  -  25 Aug 2022 12:01  --------------------------------------------------------  IN: 377 mL / OUT: 55 mL / NET: 322 mL        Physical Exam:  Appearance: No Acute Distress  Neck: JVD  Cardiovascular: Normal S1 S2  Respiratory: Clear to auscultation bilaterally  Gastrointestinal: Soft, Non-tender	  Skin: No cyanosis	  Neurologic: Non-focal  Extremities: No LE edema    Labs:                        9.3    9.58  )-----------( 250      ( 25 Aug 2022 03:56 )             29.8         136  |  98  |  84<H>  ----------------------------<  138<H>  5.2   |  23  |  5.35<H>    Ca    8.4      25 Aug 2022 03:56  Phos  7.5       Mg     2.50         TPro  6.6  /  Alb  3.5  /  TBili  0.4  /  DBili  x   /  AST  22  /  ALT  21  /  AlkPhos  90          TELEMETRY:    Echocardiogram:  TTE with Doppler (w/Cont) (22 @ 19:13)   ------------------------------------------------------------------------  DIMENSIONS:  Dimensions:     Normal Values:  LA:     3.8 cm    2.0 - 4.0 cm  Ao:     3.0 cm    2.0 - 3.8 cm  SEPTUM: 0.7 cm    0.6 - 1.2 cm  PWT:    0.8 cm    0.6 - 1.1 cm  LVIDd:  6.8 cm    3.0 - 5.6 cm  LVIDs:    ---     1.8 - 4.0 cm  Derived Variables:  LVMI: 105 g/m2  RWT: 0.23  Ejection Fraction (Visual Estimate): 25 %  ------------------------------------------------------------------------  OBSERVATIONS:  Mitral Valve: Thickened, tethered mitral valve. Severe  mitral regurgitation.  Aortic Root: Normal aortic root.  Aortic Valve: Calcified trileaflet aortic valve with normal  opening. No aortic valve regurgitation seen.  Left Atrium: Normal left atrium.  LA volume index = 28  cc/m2.  Left Ventricle: Endocardial visualization enhanced with  intravenous injection of echo contrast (Definity). Severe  global left ventricular systolic dysfunction.The mid to  distal septum, apical, lateral walls are severely  hypokinetic.  Increased E/e'  is consistent with elevated  left ventricular filling pressure.  Right Heart: Normal right atrium. Right ventricular  enlargement with decreased right ventricular systolic  function. Normal tricuspid valve. Mild tricuspid  regurgitation. Normal pulmonic valve.  Pericardium/PleuraNormal pericardium with no pericardial  effusion.  Hemodynamic: Estimated right ventricular systolic pressure  equals 42 mm Hg, assuming right atrial pressure equals 10  mm Hg, consistent with mild pulmonary hypertension.  ------------------------------------------------------------------------  CONCLUSIONS:  1. Thickened, tethered mitral valve. Severe mitral  regurgitation.  2. Calcified trileaflet aortic valvewith normal opening.  3. Normal left atrium.  LA volume index = 28 cc/m2.  4. Endocardial visualization enhanced with intravenous  injection of echo contrast (Definity). Severe global left  ventricular systolic dysfunction.The mid to distal septum,  apical, lateral walls are severely hypokinetic.  5. Right ventricular enlargement with decreased right  ventricular systolic function.       Interval History:  Patient resting comfortably in bed   He reports no difficulty breathing while lying flat     Denies CP/SOB/palpitations/dizziness  No acute events overnight      Medications:  acetaminophen     Tablet .. 650 milliGRAM(s) Oral every 6 hours PRN  aluminum hydroxide/magnesium hydroxide/simethicone Suspension 30 milliLiter(s) Oral every 4 hours PRN  aMIOdarone    Tablet 400 milliGRAM(s) Oral two times a day  aspirin enteric coated 81 milliGRAM(s) Oral daily  atorvastatin 80 milliGRAM(s) Oral at bedtime  buMETAnide Infusion 1 mG/Hr IV Continuous <Continuous>  chlorhexidine 2% Cloths 1 Application(s) Topical <User Schedule>  clopidogrel Tablet 75 milliGRAM(s) Oral daily  donepezil 10 milliGRAM(s) Oral at bedtime  fluticasone propionate 50 MICROgram(s)/spray Nasal Spray 1 Spray(s) Both Nostrils two times a day  glycopyrrolate Injectable 0.4 milliGRAM(s) IV Push every 6 hours PRN  heparin   Injectable 5000 Unit(s) SubCutaneous every 8 hours  ketorolac 0.5% Ophthalmic Solution 1 Drop(s) Left EYE three times a day  milrinone Infusion 0.25 MICROgram(s)/kG/Min IV Continuous <Continuous>  mometasone 220 MICROgram(s) Inhaler 2 Puff(s) Inhalation daily  pantoprazole    Tablet 40 milliGRAM(s) Oral before breakfast  polyethylene glycol 3350 17 Gram(s) Oral daily  senna 2 Tablet(s) Oral at bedtime  tamsulosin 0.4 milliGRAM(s) Oral at bedtime      Vitals:  T(C): 35.4 (22 @ 08:00), Max: 36.9 (22 @ 16:00)  HR: 75 (22 @ 10:00) (65 - 94)  BP: --  BP(mean): --  RR: 15 (22 @ 10:00) (13 - 33)  SpO2: 96% (22 @ 10:00) (93% - 99%)    Daily     Daily Weight in k (25 Aug 2022 06:00)        I&O's Summary    24 Aug 2022 07:01  -  25 Aug 2022 07:00  --------------------------------------------------------  IN: 1368.8 mL / OUT: 245 mL / NET: 1123.8 mL    25 Aug 2022 07:01  -  25 Aug 2022 12:01  --------------------------------------------------------  IN: 377 mL / OUT: 55 mL / NET: 322 mL        Physical Exam:  Appearance: No Acute Distress  Neck: JVP 12-14  Cardiovascular: Normal S1 S2  Respiratory: Clear to auscultation diminished bilateral bases   Gastrointestinal: Soft, Non-tender	  Skin: No cyanosis	  Neurologic: Non-focal  Extremities: No LE edema; warm to touch     Labs:                        9.3    9.58  )-----------( 250      ( 25 Aug 2022 03:56 )             29.8         136  |  98  |  84<H>  ----------------------------<  138<H>  5.2   |  23  |  5.35<H>    Ca    8.4      25 Aug 2022 03:56  Phos  7.5       Mg     2.50         TPro  6.6  /  Alb  3.5  /  TBili  0.4  /  DBili  x   /  AST  22  /  ALT  21  /  AlkPhos  90          TELEMETRY:    Echocardiogram:  TTE with Doppler (w/Cont) (22 @ 19:13)   ------------------------------------------------------------------------  DIMENSIONS:  Dimensions:     Normal Values:  LA:     3.8 cm    2.0 - 4.0 cm  Ao:     3.0 cm    2.0 - 3.8 cm  SEPTUM: 0.7 cm    0.6 - 1.2 cm  PWT:    0.8 cm    0.6 - 1.1 cm  LVIDd:  6.8 cm    3.0 - 5.6 cm  LVIDs:    ---     1.8 - 4.0 cm  Derived Variables:  LVMI: 105 g/m2  RWT: 0.23  Ejection Fraction (Visual Estimate): 25 %  ------------------------------------------------------------------------  OBSERVATIONS:  Mitral Valve: Thickened, tethered mitral valve. Severe  mitral regurgitation.  Aortic Root: Normal aortic root.  Aortic Valve: Calcified trileaflet aortic valve with normal  opening. No aortic valve regurgitation seen.  Left Atrium: Normal left atrium.  LA volume index = 28  cc/m2.  Left Ventricle: Endocardial visualization enhanced with  intravenous injection of echo contrast (Definity). Severe  global left ventricular systolic dysfunction.The mid to  distal septum, apical, lateral walls are severely  hypokinetic.  Increased E/e'  is consistent with elevated  left ventricular filling pressure.  Right Heart: Normal right atrium. Right ventricular  enlargement with decreased right ventricular systolic  function. Normal tricuspid valve. Mild tricuspid  regurgitation. Normal pulmonic valve.  Pericardium/PleuraNormal pericardium with no pericardial  effusion.  Hemodynamic: Estimated right ventricular systolic pressure  equals 42 mm Hg, assuming right atrial pressure equals 10  mm Hg, consistent with mild pulmonary hypertension.  ------------------------------------------------------------------------  CONCLUSIONS:  1. Thickened, tethered mitral valve. Severe mitral  regurgitation.  2. Calcified trileaflet aortic valvewith normal opening.  3. Normal left atrium.  LA volume index = 28 cc/m2.  4. Endocardial visualization enhanced with intravenous  injection of echo contrast (Definity). Severe global left  ventricular systolic dysfunction.The mid to distal septum,  apical, lateral walls are severely hypokinetic.  5. Right ventricular enlargement with decreased right  ventricular systolic function.

## 2022-08-25 NOTE — CHART NOTE - NSCHARTNOTESELECT_GEN_ALL_CORE
CCU/Event Note
CCU/Event Note
Cardiology/Event Note
Event Note
IABP/Event Note
IR pre-procedure note/Event Note
CCU Accept/Event Note
CCU/Event Note
Event Note
Hemodynamic #s/Event Note
Nephrology fellow/Event Note
Rapid Response
r/o SZ/Event Note
rico #/Event Note

## 2022-08-25 NOTE — DISCHARGE NOTE FOR THE EXPIRED PATIENT - HOSPITAL COURSE
This is an 82 year old man with past medical history of CAD s/p PCI/stent X3 (most recent  at [facility]), and HFrEF (EF 25%;LVIDd 6.8, severe MR, RV dysfunction and severe hypokinetic apical/lateral wall), AFib (on Eliquis),  CVA s/p /stent (2022), HTN, HLD T2DM, JACKELIN (on BiPAP), prostate CA s/p radiation/seed implant on 2022 presented with c/o SOB and  abdominal distention/constipation  X10 days. Initially with elevated troponin levels and EKG concerning for NSTEMI; started on heparin gtt and s/p ASA/Brillinta. 2022 RRT called for AMS/not responding to verbal commands and elevated lactate. He subsequently developed severe bradycardia/weak thready pulse and hypotension; started on Levo drip, Milrinone and BUmex gtt received right hear catheterization, IABP placed and transferred to CCU for further management. IABP removed on 22.  2022 patient not a candidate for advanced therapies, goals of care discussion with patient and family initiated and patient was made DNR/DNI. 22 patient went into cardiopulmonary arrest at 20:55 with son and daughter at bedside.              {[id number]}       {[id number]} Problem/Plan - 1:  ·  Problem: Cardiogenic shock.   ·  Plan: Milrinone 0.25 mcg/kg/min  Bumex 1mg hr  Appreciate Palliative Care consult (family in agreement with comfort care).    Attestation Statements:   {[id number]} Attestation Statements:  Attending and PA/NP shared services statement (NON-critical care):     Attending to bill.     I independently performed the documented:     Medical decision making.     I have made amendments to the documentation where necessary. Additional comments: Excellent cardiac output on current support.  Remove PA catheter.  Wean off norepinephrine.  Monitor for renal recovery. Continue Bumex gtt.  He should go home with hospice care.

## 2022-08-25 NOTE — PROGRESS NOTE ADULT - PROBLEM SELECTOR PROBLEM 1
Cardiogenic shock
Cardiogenic shock
SANDRA (acute kidney injury)
SANDRA (acute kidney injury)
Cardiogenic shock
Counseling regarding advanced directives
Cardiogenic shock
SANDRA (acute kidney injury)
Cardiogenic shock

## 2022-08-25 NOTE — PROGRESS NOTE ADULT - SUBJECTIVE AND OBJECTIVE BOX
Robert Zhao MD PGY1    PATIENT:  CHIDI RUBIO  [id number]    CHIEF COMPLAINT:  Patient is a 82y old  Male who presents with a chief complaint of NSTEMI (24 Aug 2022 13:51)      INTERVAL HISTORY/OVERNIGHT EVENTS: Pt was started back on levo overnight due to low BPs. No acute events overnight. Pt examined at bedside, no acute complaints. Pt brought up not wanting to get fingersticks as much.      REVIEW OF SYSTEMS:    Constitutional:     [ ] negative [ ] fevers [ ] chills [ ] weight loss [ ] weight gain  HEENT:                  [ ] negative [ ] dry eyes [ ] eye irritation [ ] postnasal drip [ ] nasal congestion  CV:                         [ ] negative  [ ] chest pain [ ] orthopnea [ ] palpitations [ ] murmur  Resp:                     [ ] negative [ ] cough [ ] shortness of breath [ ] dyspnea [ ] wheezing [ ] sputum [ ] hemoptysis  GI:                          [ ] negative [ ] nausea [ ] vomiting [ ] diarrhea [ ] constipation [ ] abd pain [ ] dysphagia   :                        [ ] negative [ ] dysuria [ ] nocturia [ ] hematuria [ ] increased urinary frequency  Musculoskeletal: [ ] negative [ ] back pain [ ] myalgias [ ] arthralgias [ ] fracture  Skin:                       [ ] negative [ ] rash [ ] itch  Neurological:        [ ] negative [ ] headache [ ] dizziness [ ] syncope [ ] weakness [ ] numbness  Psychiatric:           [ ] negative [ ] anxiety [ ] depression  Endocrine:            [ ] negative [ ] diabetes [ ] thyroid problem  Heme/Lymph:      [ ] negative [ ] anemia [ ] bleeding problem  Allergic/Immune: [ ] negative [ ] itchy eyes [ ] nasal discharge [ ] hives [ ] angioedema    [ X] All other systems negative  [ ] Unable to assess ROS because ________.    MEDICATIONS:  MEDICATIONS  (STANDING):  aMIOdarone    Tablet 400 milliGRAM(s) Oral two times a day  aspirin enteric coated 81 milliGRAM(s) Oral daily  atorvastatin 80 milliGRAM(s) Oral at bedtime  buMETAnide Infusion 1 mG/Hr (5 mL/Hr) IV Continuous <Continuous>  chlorhexidine 2% Cloths 1 Application(s) Topical <User Schedule>  clopidogrel Tablet 75 milliGRAM(s) Oral daily  dextrose 5%. 1000 milliLiter(s) (50 mL/Hr) IV Continuous <Continuous>  dextrose 5%. 1000 milliLiter(s) (100 mL/Hr) IV Continuous <Continuous>  dextrose 50% Injectable 25 Gram(s) IV Push once  dextrose 50% Injectable 12.5 Gram(s) IV Push once  dextrose 50% Injectable 25 Gram(s) IV Push once  donepezil 10 milliGRAM(s) Oral at bedtime  fluticasone propionate 50 MICROgram(s)/spray Nasal Spray 1 Spray(s) Both Nostrils two times a day  glucagon  Injectable 1 milliGRAM(s) IntraMuscular once  heparin   Injectable 5000 Unit(s) SubCutaneous every 8 hours  insulin glargine Injectable (LANTUS) 20 Unit(s) SubCutaneous at bedtime  insulin lispro (ADMELOG) corrective regimen sliding scale   SubCutaneous three times a day before meals  insulin lispro (ADMELOG) corrective regimen sliding scale   SubCutaneous at bedtime  insulin lispro Injectable (ADMELOG) 5 Unit(s) SubCutaneous three times a day before meals  ketorolac 0.5% Ophthalmic Solution 1 Drop(s) Left EYE three times a day  milrinone Infusion 0.25 MICROgram(s)/kG/Min (6.65 mL/Hr) IV Continuous <Continuous>  mometasone 220 MICROgram(s) Inhaler 2 Puff(s) Inhalation daily  norepinephrine Infusion 0.05 MICROgram(s)/kG/Min (4.15 mL/Hr) IV Continuous <Continuous>  pantoprazole    Tablet 40 milliGRAM(s) Oral before breakfast  polyethylene glycol 3350 17 Gram(s) Oral daily  senna 2 Tablet(s) Oral at bedtime  tamsulosin 0.4 milliGRAM(s) Oral at bedtime    MEDICATIONS  (PRN):  acetaminophen     Tablet .. 650 milliGRAM(s) Oral every 6 hours PRN Temp greater or equal to 38C (100.4F), Mild Pain (1 - 3)  aluminum hydroxide/magnesium hydroxide/simethicone Suspension 30 milliLiter(s) Oral every 4 hours PRN Dyspepsia  dextrose Oral Gel 15 Gram(s) Oral once PRN Blood Glucose LESS THAN 70 milliGRAM(s)/deciliter      ALLERGIES:  Allergies    ceftriaxone (Urticaria)  ciprofloxacin (Rash)  Shrimp (Vomiting; Anaphylaxis; Nausea)    Intolerances        OBJECTIVE:  ICU Vital Signs Last 24 Hrs  T(C): 35.8 (25 Aug 2022 04:00), Max: 36.9 (24 Aug 2022 16:00)  T(F): 96.4 (25 Aug 2022 04:00), Max: 98.4 (24 Aug 2022 16:00)  HR: 79 (25 Aug 2022 07:19) (65 - 96)  BP: --  BP(mean): --  ABP: 130/56 (25 Aug 2022 06:00) (69/37 - 139/64)  ABP(mean): 75 (25 Aug 2022 06:00) (46 - 86)  RR: 19 (25 Aug 2022 06:00) (13 - 39)  SpO2: 97% (25 Aug 2022 07:19) (94% - 99%)    O2 Parameters below as of 25 Aug 2022 06:00  Patient On (Oxygen Delivery Method): nasal cannula  O2 Flow (L/min): 2          Adult Advanced Hemodynamics Last 24 Hrs  CVP(mm Hg): 16 (24 Aug 2022 19:00) (13 - 25)  CVP(cm H2O): --  CO: --  CI: --  PA: 63/29 (25 Aug 2022 06:00) (41/20 - 68/32)  PA(mean): 40 (25 Aug 2022 06:00) (26 - 45)  PCWP: --  SVR: --  SVRI: --  PVR: --  PVRI: --  CAPILLARY BLOOD GLUCOSE      POCT Blood Glucose.: 143 mg/dL (24 Aug 2022 21:17)  POCT Blood Glucose.: 179 mg/dL (24 Aug 2022 16:49)  POCT Blood Glucose.: 150 mg/dL (24 Aug 2022 11:55)  POCT Blood Glucose.: 210 mg/dL (24 Aug 2022 07:57)    CAPILLARY BLOOD GLUCOSE      POCT Blood Glucose.: 143 mg/dL (24 Aug 2022 21:17)    I&O's Summary    24 Aug 2022 07:01  -  25 Aug 2022 07:00  --------------------------------------------------------  IN: 1368.8 mL / OUT: 245 mL / NET: 1123.8 mL      Daily     Daily Weight in k (25 Aug 2022 06:00)    PHYSICAL EXAMINATION:  General: NAD  Neurology: AAOX3 and MCDANIELS x 4  Respiratory: CTABL  CV: RRR  Abdominal: Soft, NT, ND   Extremities: able to move all extremities, R groin site clean, dry, intact  Lines: R groin has and swan, L radial A line, and b/l peripheral IVs    LABS:  ABG - ( 23 Aug 2022 07:45 )  pH, Arterial: 7.44  pH, Blood: x     /  pCO2: 31    /  pO2: 94    / HCO3: 21    / Base Excess: -2.1  /  SaO2: 98.2                              9.3    9.58  )-----------( 250      ( 25 Aug 2022 03:56 )             29.8     08-    136  |  98  |  84<H>  ----------------------------<  138<H>  5.2   |  23  |  5.35<H>    Ca    8.4      25 Aug 2022 03:56  Phos  7.5       Mg     2.50         TPro  6.6  /  Alb  3.5  /  TBili  0.4  /  DBili  x   /  AST  22  /  ALT  21  /  AlkPhos  90  08-24    LIVER FUNCTIONS - ( 24 Aug 2022 17:29 )  Alb: 3.5 g/dL / Pro: 6.6 g/dL / ALK PHOS: 90 U/L / ALT: 21 U/L / AST: 22 U/L / GGT: x                       TELEMETRY:     EKG:     IMAGING:

## 2022-08-30 LAB — FACT X AG PPP IA-ACNC: 56 % — LOW

## 2023-07-24 NOTE — PATIENT INSTRUCTIONS
Encounter for dietary counseling and surveillance  1-2 meals a day  Cereal, fruits, veggies  Pureed food to start, then small soft pieces  1 new food every 3-4 days in case of a reaction such as vomiting or rash  Can start fish, eggs, peanut butter sometime over the next month  Cup for water    Need for vaccination  -     PNEUMOCOCCAL VACC, 13 LEONIE IM  -     DTAP, HEPB, AND IPV    Fall from bed, initial encounter  Discussed safety  Never sleep in parent bed, always in crib    Tylenol/Acetaminophen Dosing    Please dose every 4 hours as needed, do not give more than 5 doses in any 24 hour period  Children's Oral Suspension = 160mg/5ml                                                          Tylenol suspension                                                                                                                                                                          6-11 lbs                 1.25 ml  12-17 lbs               2.5 ml  18-23 lbs               3.75 ml  24-35 lbs               5 ml Refills    -    please call your pharmacy and have them send us a refill request    Results  -  allow up to a week for lab results to be processed and reviewed. Phone calls  -  Allow upto 24 hrs. for non-urgent calls to be retained    Prior authorization - It may take up to 2 weeks to process, depending on your insurance    Forms  -  FMLA, DMV, patient assistance, etc. will take up to 2 weeks to process    Cancellations - please notify the office in advance if you cannot keep your appointment    Samples  - will only be dispensed at visits as supply is limited      If you are having a medical emergency call 911    --------------------------------------------------------------------------------------------    FREE style LOLA   OR   DEXCOM      Do not skip meals  Do not eat in between meals    Reduce carbs- pasta, rice, potatoes, bread   Do not drink juices or sodas. sherbat   Donot eat peanut butter     Do not eat sugar free cookies and cakes   Do not eat peaches, grapes, pineapples, oranges, raisins and halos and clementines         Check blood sugars immediately before each meal and at bedtime    BASAGLAR  insulin  50 units  at bed time     NOVOLOG  insulin 14 units before breakfast, 14 units before lunch and 18 units before dinner. ( no novolog when not eating )    Also, add additional NOVOLOG  as follows with meals  If blood sugars are[de-identified]    150-200 mg 3 units    201-250 mg 6 units    251-300 mg 9 units    301-350 mg 12 units    351-400 mg 15 units    401-450 mg 18 units    451-500 mg 21 units     Less than 70 mg NO INSULIN        ----------------------------------------------------------------------------------------------------------                      Refills    -    please call your pharmacy and have them send us a refill request    Results  -  allow up to a week for lab results to be processed and reviewed. Phone calls  -  Allow upto 24 hrs.  for non-urgent calls to be retained    Prior authorization - It may take up to 2 weeks to process, depending on your insurance    Forms  -  FMLA, DMV, patient assistance, etc. will take up to 2 weeks to process    Cancellations - please notify the office in advance if you cannot keep your appointment    Samples  - will only be dispensed at visits as supply is limited      If you are having a medical emergency call 911    --------------------------------------------------------------------------------------------    Do not skip meals  Do not eat in between meals    Reduce carbs- pasta, rice, potatoes, bread   Do not drink juices or sodas. sherbat   Donot eat peanut butter     Do not eat sugar free cookies and cakes   Do not eat peaches, grapes, pineapples, oranges, raisins and halos and clementines         Check blood sugars immediately before each meal and at bedtime     BASAGLAR  insulin  50 units  at bed time     NOVOLOG  insulin 14 units before breakfast, 14 units before lunch and 18 units before dinner.   ( no novolog when not eating )    Also, add additional NOVOLOG  as follows with meals  If blood sugars are[de-identified]    150-200 mg 3 units    201-250 mg 6 units    251-300 mg 9 units    301-350 mg 12 units    351-400 mg 15 units    401-450 mg 18 units    451-500 mg 21 units     Less than 70 mg NO INSULIN        ----------------------------------------------------------------------------------------------------------        Start on simvastatin 20 mg at night

## 2023-08-23 NOTE — ED PROVIDER NOTE - IV ALTEPLASE EXCL ABS HIDDEN
Sore throat:- strep test     Will call with results of strep test  if it is positive and will send Antibiotics, if negative it is viral in nature     Supportive care discussed with eating soft food and drink plenty of fluids and rest.    OTC analgesics for pain/fever.     New tooth brush in 48 hours     Salt water gargles. OTC sore throat lozenges     Strep is contagious for 12 hours after starting antibiotics, do not share food/drink or utensils.        If have neck stiffness, unable to swallow , drooling or diffculty breathing or continued fever to please be seen again at UC/ED    Follow-up with PCP in 1-2 days . Return to urgent care/ED for worsening or any new symptoms.     show

## 2024-01-15 NOTE — ED ADULT NURSE NOTE - ISOLATION TYPE:
From: Hamilton Joel  Sent: 1/15/2024 3:09 PM CST  To: [facility] [place] [address] [facility] Pool  Subject: Follow up on MRI    They need a Creatnin blood test before I can do the MRI. She said to make an appointment with you.   
Pt will call us back to schedule lab fabrizio  
None

## 2025-02-10 NOTE — PROGRESS NOTE ADULT - PROBLEM SELECTOR PLAN 5
[facility]/i  Patient is scheduled for a colonoscopy with Dr regalado on 2/28. Please send Suprep prep to patient's selected pharmacy.       Thank you,  GI [facility]read[facility] Surgery Scheduler    Suprep has been sent to patient pharmacy of choice.     elevated sugars on arrival. pt and son report basaglar 30 units qhs but ?unsure. Will start lantus at 15 units for now and sliding scale. check a1c Patient with elevated sugars on arrival  - Patient and son report basaglar 30 units qhs but ?unsure. Will start lantus at 15 units for now and sliding scale  - A1c: 8.8  - Will obtain insulin regimen per PMD Patient with elevated sugars on arrival  - Patient and son report basaglar 30 units qhs but ?unsure. Will start lantus at 15 units for now and sliding scale  - A1c: 8.8  - C/W home dose 30 units lantus bedtime

## 2025-03-13 NOTE — ED PROVIDER NOTE - DOMESTIC TRAVEL HIGH RISK QUESTION
Please request labs results from [facility] on [address] and needs to add urine microalbumin, HgA1C, and Vitamin D level to labss that were done this week at [facility]. Thanks. 
No
